# Patient Record
Sex: MALE | Race: WHITE | NOT HISPANIC OR LATINO | Employment: OTHER | URBAN - METROPOLITAN AREA
[De-identification: names, ages, dates, MRNs, and addresses within clinical notes are randomized per-mention and may not be internally consistent; named-entity substitution may affect disease eponyms.]

---

## 2019-03-22 ENCOUNTER — TELEPHONE (OUTPATIENT)
Dept: CARDIOLOGY CLINIC | Facility: CLINIC | Age: 64
End: 2019-03-22

## 2019-04-15 ENCOUNTER — OFFICE VISIT (OUTPATIENT)
Dept: CARDIOLOGY CLINIC | Facility: CLINIC | Age: 64
End: 2019-04-15
Payer: COMMERCIAL

## 2019-04-15 VITALS
WEIGHT: 165 LBS | DIASTOLIC BLOOD PRESSURE: 82 MMHG | HEIGHT: 68 IN | BODY MASS INDEX: 25.01 KG/M2 | HEART RATE: 82 BPM | OXYGEN SATURATION: 94 % | SYSTOLIC BLOOD PRESSURE: 124 MMHG

## 2019-04-15 DIAGNOSIS — I63.9 CEREBROVASCULAR ACCIDENT (CVA), UNSPECIFIED MECHANISM (HCC): ICD-10-CM

## 2019-04-15 DIAGNOSIS — R94.31 ABNORMAL EKG: Primary | ICD-10-CM

## 2019-04-15 DIAGNOSIS — I10 HYPERTENSION, ESSENTIAL: ICD-10-CM

## 2019-04-15 PROCEDURE — 99203 OFFICE O/P NEW LOW 30 MIN: CPT | Performed by: INTERNAL MEDICINE

## 2019-04-15 PROCEDURE — 93000 ELECTROCARDIOGRAM COMPLETE: CPT | Performed by: INTERNAL MEDICINE

## 2019-04-15 RX ORDER — DONEPEZIL HYDROCHLORIDE 5 MG/1
5 TABLET, FILM COATED ORAL
COMMUNITY
Start: 2019-03-18 | End: 2021-10-26 | Stop reason: ALTCHOICE

## 2019-04-15 RX ORDER — BUPROPION HYDROCHLORIDE 300 MG/1
TABLET ORAL
COMMUNITY

## 2019-04-15 RX ORDER — ASPIRIN 81 MG/1
81 TABLET ORAL DAILY
COMMUNITY

## 2019-04-15 RX ORDER — AMLODIPINE BESYLATE AND BENAZEPRIL HYDROCHLORIDE 10; 40 MG/1; MG/1
CAPSULE ORAL
COMMUNITY
Start: 2019-02-26

## 2019-04-15 RX ORDER — DIPHENOXYLATE HYDROCHLORIDE AND ATROPINE SULFATE 2.5; .025 MG/1; MG/1
1 TABLET ORAL
COMMUNITY

## 2019-04-15 RX ORDER — VENLAFAXINE HYDROCHLORIDE 75 MG/1
CAPSULE, EXTENDED RELEASE ORAL
COMMUNITY

## 2019-04-15 RX ORDER — ESOMEPRAZOLE MAGNESIUM 40 MG/1
40 CAPSULE, DELAYED RELEASE ORAL
COMMUNITY

## 2019-05-21 ENCOUNTER — TELEPHONE (OUTPATIENT)
Dept: CARDIOLOGY CLINIC | Facility: CLINIC | Age: 64
End: 2019-05-21

## 2019-05-21 ENCOUNTER — HOSPITAL ENCOUNTER (OUTPATIENT)
Dept: NON INVASIVE DIAGNOSTICS | Facility: CLINIC | Age: 64
Discharge: HOME | End: 2019-05-21
Payer: COMMERCIAL

## 2019-05-21 DIAGNOSIS — I10 HYPERTENSION, ESSENTIAL: ICD-10-CM

## 2019-05-21 DIAGNOSIS — R94.31 ABNORMAL EKG: ICD-10-CM

## 2019-05-21 DIAGNOSIS — I63.9 CEREBROVASCULAR ACCIDENT (CVA), UNSPECIFIED MECHANISM (HCC): ICD-10-CM

## 2019-05-21 PROCEDURE — 93225 XTRNL ECG REC<48 HRS REC: CPT

## 2019-05-21 PROCEDURE — 93226 XTRNL ECG REC<48 HR SCAN A/R: CPT

## 2019-05-21 PROCEDURE — 93306 TTE W/DOPPLER COMPLETE: CPT | Performed by: INTERNAL MEDICINE

## 2019-05-21 PROCEDURE — 93306 TTE W/DOPPLER COMPLETE: CPT

## 2019-05-28 ENCOUNTER — TELEPHONE (OUTPATIENT)
Dept: CARDIOLOGY CLINIC | Facility: CLINIC | Age: 64
End: 2019-05-28

## 2019-05-28 PROCEDURE — 93227 XTRNL ECG REC<48 HR R&I: CPT | Performed by: INTERNAL MEDICINE

## 2021-01-03 ENCOUNTER — TELEPHONE (OUTPATIENT)
Dept: OTHER | Facility: OTHER | Age: 66
End: 2021-01-03

## 2021-01-03 NOTE — TELEPHONE ENCOUNTER
Pt's wife is calling to cancel appointment tomorrow at 3:20pm with Dr Michelle Burks   Pt would like a call back to reschedule

## 2021-01-11 ENCOUNTER — OFFICE VISIT (OUTPATIENT)
Dept: CARDIOLOGY CLINIC | Facility: CLINIC | Age: 66
End: 2021-01-11
Payer: COMMERCIAL

## 2021-01-11 VITALS
BODY MASS INDEX: 27.67 KG/M2 | OXYGEN SATURATION: 98 % | WEIGHT: 182 LBS | DIASTOLIC BLOOD PRESSURE: 80 MMHG | SYSTOLIC BLOOD PRESSURE: 122 MMHG | HEART RATE: 97 BPM

## 2021-01-11 DIAGNOSIS — I63.9 CEREBROVASCULAR ACCIDENT (CVA), UNSPECIFIED MECHANISM (HCC): ICD-10-CM

## 2021-01-11 DIAGNOSIS — I10 HYPERTENSION, ESSENTIAL: Primary | ICD-10-CM

## 2021-01-11 DIAGNOSIS — R06.02 SHORTNESS OF BREATH: ICD-10-CM

## 2021-01-11 PROCEDURE — 99213 OFFICE O/P EST LOW 20 MIN: CPT | Performed by: INTERNAL MEDICINE

## 2021-01-11 RX ORDER — BUDESONIDE AND FORMOTEROL FUMARATE DIHYDRATE 160; 4.5 UG/1; UG/1
2 AEROSOL RESPIRATORY (INHALATION) 2 TIMES DAILY
COMMUNITY
Start: 2020-08-24

## 2021-01-11 RX ORDER — OXYCODONE HYDROCHLORIDE 5 MG/1
TABLET ORAL
COMMUNITY
Start: 2020-10-20

## 2021-01-11 RX ORDER — TIOTROPIUM BROMIDE INHALATION SPRAY 3.12 UG/1
SPRAY, METERED RESPIRATORY (INHALATION) DAILY
COMMUNITY
Start: 2020-10-05

## 2021-01-11 RX ORDER — PREDNISONE 10 MG/1
TABLET ORAL
COMMUNITY
Start: 2020-11-25 | End: 2022-03-24 | Stop reason: ALTCHOICE

## 2021-01-11 RX ORDER — ARIPIPRAZOLE 10 MG/1
TABLET ORAL
COMMUNITY
Start: 2020-09-28

## 2021-01-11 RX ORDER — MONTELUKAST SODIUM 10 MG/1
10 TABLET ORAL
COMMUNITY
Start: 2020-11-25

## 2021-01-11 RX ORDER — ESZOPICLONE 3 MG/1
3 TABLET, FILM COATED ORAL
COMMUNITY
Start: 2020-12-22 | End: 2021-10-26 | Stop reason: ALTCHOICE

## 2021-01-11 RX ORDER — ALBUTEROL SULFATE 90 UG/1
2 AEROSOL, METERED RESPIRATORY (INHALATION) EVERY 6 HOURS PRN
COMMUNITY
Start: 2020-11-25

## 2021-01-11 NOTE — PROGRESS NOTES
[facility] [place]  [address] [facility] [address] [address]  Cardiology Follow Up    Jayden Molina  1955  [id number]      1  Hypertension, essential     2  Cerebrovascular accident (CVA), unspecified mechanism [facility])         Chief Complaint   Patient presents with    Follow-up       Interval History:  Patient presents for follow-up visit  Patient does have some shortness of breath  Patient has been diagnosed to have respiratory issues and asthma by Gerhardt Sherry, patient's pulmonologist   Patient has been on tapering dose of steroids  Patient also has been on inhalers  Patient does state that he smokes marijuana  Patient denies any history of smoking  Patient denies any history of leg edema orthopnea PND  No recent cardiac workup  He states that he has been compliant with all his present medications  Does have history of stroke      Patient Active Problem List   Diagnosis    Abnormal EKG    Hypertension, essential     Past Medical History:   Diagnosis Date    Dementia     Stroke [facility])      Social History     Socioeconomic History    Marital status: /Civil Union     Spouse name: Not on file    Number of children: Not on file    Years of education: Not on file    Highest education level: Not on file   Occupational History    Not on file   Social Needs    Financial resource strain: Not on file    Food insecurity     Worry: Not on file     Inability: Not on file   [employer] needs     Medical: Not on file     Non-medical: Not on file   Tobacco Use    Smoking status: Never Smoker    Smokeless tobacco: Never Used    Tobacco comment: Patient admits to using marijuana   Substance and Sexual Activity    Alcohol use: Not on file    Drug use: Not on file    Sexual activity: Not on file   Lifestyle    Physical activity     Days per week: Not on file     Minutes per session: Not on file    Stress: Not on file   Relationships    Social connections Talks on phone: Not on file     Gets together: Not on file     Attends [employer] service: Not on file     Active member of club or organization: Not on file     Attends meetings of clubs or organizations: Not on file     Relationship status: Not on file    Intimate partner violence     Fear of current or ex partner: Not on file     Emotionally abused: Not on file     Physically abused: Not on file     Forced sexual activity: Not on file   Other Topics Concern    Not on file   Social History Narrative    Not on file      No family history on file    Past Surgical History:   Procedure Laterality Date    BACK SURGERY      ELBOW SURGERY      KNEE SURGERY      NECK SURGERY      SHOULDER SURGERY         Current Outpatient Medications:     albuterol (PROVENTIL HFA,VENTOLIN HFA) 90 mcg/act inhaler, Inhale 2 puffs every 6 (six) hours as needed, Disp: , Rfl:     amLODIPine-benazepril (LOTREL) 10-40 MG per capsule, , Disp: , Rfl:     ARIPiprazole (ABILIFY) 10 mg tablet, , Disp: , Rfl:     aspirin (ECOTRIN LOW STRENGTH) 81 mg EC tablet, Take 81 mg by mouth daily, Disp: , Rfl:     budesonide-formoterol (Symbicort) 160-4 5 mcg/act inhaler, Inhale 2 puffs 2 (two) times a day, Disp: , Rfl:     buPROPion (WELLBUTRIN XL) 300 mg 24 hr tablet, 1 tab(s), Disp: , Rfl:     donepezil (ARICEPT) 5 mg tablet, Take 5 mg by mouth, Disp: , Rfl:     esomeprazole (NexIUM) 40 MG capsule, Take 40 mg by mouth, Disp: , Rfl:     eszopiclone (LUNESTA) 3 MG tablet, Take 3 mg by mouth daily at bedtime as needed, Disp: , Rfl:     montelukast (SINGULAIR) 10 mg tablet, Take 10 mg by mouth, Disp: , Rfl:     multivitamin (THERAGRAN) TABS, Take 1 tablet by mouth, Disp: , Rfl:     oxyCODONE (ROXICODONE) 5 mg immediate release tablet, , Disp: , Rfl:     predniSONE 10 mg tablet, , Disp: , Rfl:     tiotropium (Spiriva Respimat) 2 5 MCG/ACT AERS inhaler, Inhale daily, Disp: , Rfl:     venlafaxine (EFFEXOR-XR) 75 mg 24 hr capsule, 1 cap(s), Disp: , Rfl:   Allergies   Allergen Reactions    Beta Adrenergic Blockers      Pt states they make him feel crappy  Labs:  No visits with results within 2 Month(s) from this visit  Latest known visit with results is:   No results found for any previous visit  Imaging: No results found  Review of Systems:  Review of Systems   REVIEW OF SYSTEMS:  Constitutional:  Denies fever or chills   Eyes:  Denies change in visual acuity   HENT:  Denies nasal congestion or sore throat   Respiratory:   shortness of breath   Cardiovascular:  Denies chest pain or edema   GI:  Denies abdominal pain, nausea, vomiting, bloody stools or diarrhea   :  Denies dysuria, frequency, difficulty in micturition and nocturia  Musculoskeletal:  Denies back pain or joint pain   Neurologic:  Denies headache, focal weakness or sensory changes   Endocrine:  Denies polyuria or polydipsia   Lymphatic:  Denies swollen glands   Psychiatric:  Denies depression or anxiety     Physical Exam:    /80   Pulse 97   Wt 82 6 kg (182 lb)   SpO2 98%   BMI 27 67 kg/m²     Physical Exam   PHYSICAL EXAM:  General:  Patient is not in acute distress   Head: Normocephalic, Atraumatic  HEENT:  Both pupils normal-size atraumatic, normocephalic, nonicteric  Neck:  JVP not raised  Trachea central  No carotid bruit  Respiratory:  Decreased breath sounds bilateral  Cardiovascular:  Regular rate and rhythm no S3 no murmurs  GI:  Abdomen soft nontender  No organomegaly  Lymphatic:  No cervical or inguinal lymphadenopathy  Neurologic:  Patient is awake alert, oriented   Grossly nonfocal  Extremities no edema    Discussion/Summary:  Patient with symptoms of shortness of breath  Patient does have multiple risk factors for coronary artery disease  Patient will be scheduled for an echocardiogram to evaluate ejection fraction valves and look for evidence of pulmonary hypertension especially given his respiratory issues    Patient will also be scheduled for pharmacological nuclear stress test to assess for ischemia given symptoms of shortness of breath with exertion  Possible etiologies for symptoms of shortness of breath discussed at length with patient  Importance of dietary and risk factor modification reinforced  Patient is unable to exercise on the treadmill because of respiratory issues  Medications reviewed  Patient had a few questions which were answered  Follow-up in 1 year or earlier as needed  Patient is agreeable with the plan of care

## 2021-01-28 ENCOUNTER — HOSPITAL ENCOUNTER (OUTPATIENT)
Dept: NON INVASIVE DIAGNOSTICS | Facility: CLINIC | Age: 66
Discharge: HOME | End: 2021-01-28
Payer: COMMERCIAL

## 2021-01-28 DIAGNOSIS — R06.02 SHORTNESS OF BREATH: ICD-10-CM

## 2021-01-28 DIAGNOSIS — I10 HYPERTENSION, ESSENTIAL: ICD-10-CM

## 2021-01-28 PROCEDURE — 93306 TTE W/DOPPLER COMPLETE: CPT | Performed by: INTERNAL MEDICINE

## 2021-01-28 PROCEDURE — A9502 TC99M TETROFOSMIN: HCPCS

## 2021-01-28 PROCEDURE — 78452 HT MUSCLE IMAGE SPECT MULT: CPT

## 2021-01-28 PROCEDURE — C8929 TTE W OR WO FOL WCON,DOPPLER: HCPCS

## 2021-01-28 PROCEDURE — 78452 HT MUSCLE IMAGE SPECT MULT: CPT | Performed by: INTERNAL MEDICINE

## 2021-01-28 PROCEDURE — 93018 CV STRESS TEST I&R ONLY: CPT | Performed by: INTERNAL MEDICINE

## 2021-01-28 PROCEDURE — G1004 CDSM NDSC: HCPCS

## 2021-01-28 PROCEDURE — 93016 CV STRESS TEST SUPVJ ONLY: CPT | Performed by: INTERNAL MEDICINE

## 2021-01-28 PROCEDURE — 93017 CV STRESS TEST TRACING ONLY: CPT

## 2021-01-28 RX ADMIN — PERFLUTREN 0.4 ML/MIN: 6.52 INJECTION, SUSPENSION INTRAVENOUS at 10:52

## 2021-01-28 RX ADMIN — REGADENOSON 0.4 MG: 0.08 INJECTION, SOLUTION INTRAVENOUS at 13:00

## 2021-01-29 LAB
ARRHY DURING EX: NORMAL
CHEST PAIN STATEMENT: NORMAL
MAX DIASTOLIC BP: 84 MMHG
MAX HEART RATE: 108 BPM
MAX PREDICTED HEART RATE: 153 BPM
MAX. SYSTOLIC BP: 150 MMHG
PROTOCOL NAME: NORMAL
REASON FOR TERMINATION: NORMAL
TARGET HR FORMULA: NORMAL
TEST INDICATION: NORMAL
TIME IN EXERCISE PHASE: NORMAL

## 2021-03-02 ENCOUNTER — TELEPHONE (OUTPATIENT)
Dept: CARDIOLOGY CLINIC | Facility: CLINIC | Age: 66
End: 2021-03-02

## 2021-03-02 DIAGNOSIS — I63.89 CEREBROVASCULAR ACCIDENT (CVA) DUE TO OTHER MECHANISM (HCC): Primary | ICD-10-CM

## 2021-03-02 NOTE — TELEPHONE ENCOUNTER
Pt's wife Lay Willams called and would like to know if Dr Hugh Bond will order a Carotid study for pt  Please call back

## 2021-03-03 NOTE — TELEPHONE ENCOUNTER
Dr Tai Hernandez with the patient's wife Ailin [place] and informed her that the order for the Carotid Study was ordered and she understood  Patient's wife would like to make a Virtual Appointment with you after the study is done  Please let me know if this can be done      Thank you

## 2021-03-10 DIAGNOSIS — Z23 ENCOUNTER FOR IMMUNIZATION: ICD-10-CM

## 2021-03-19 ENCOUNTER — IMMUNIZATIONS (OUTPATIENT)
Dept: FAMILY MEDICINE CLINIC | Facility: HOSPITAL | Age: 66
End: 2021-03-19

## 2021-03-19 DIAGNOSIS — Z23 ENCOUNTER FOR IMMUNIZATION: Primary | ICD-10-CM

## 2021-03-19 PROCEDURE — 91300 SARS-COV-2 / COVID-19 MRNA VACCINE (PFIZER-BIONTECH) 30 MCG: CPT

## 2021-03-19 PROCEDURE — 0001A SARS-COV-2 / COVID-19 MRNA VACCINE (PFIZER-BIONTECH) 30 MCG: CPT

## 2021-04-12 ENCOUNTER — IMMUNIZATIONS (OUTPATIENT)
Dept: FAMILY MEDICINE CLINIC | Facility: HOSPITAL | Age: 66
End: 2021-04-12

## 2021-04-12 DIAGNOSIS — Z23 ENCOUNTER FOR IMMUNIZATION: Primary | ICD-10-CM

## 2021-04-12 PROCEDURE — 91300 SARS-COV-2 / COVID-19 MRNA VACCINE (PFIZER-BIONTECH) 30 MCG: CPT

## 2021-04-12 PROCEDURE — 0002A SARS-COV-2 / COVID-19 MRNA VACCINE (PFIZER-BIONTECH) 30 MCG: CPT

## 2021-04-27 ENCOUNTER — HOSPITAL ENCOUNTER (OUTPATIENT)
Dept: NON INVASIVE DIAGNOSTICS | Facility: CLINIC | Age: 66
Discharge: HOME | End: 2021-04-27
Payer: COMMERCIAL

## 2021-04-27 DIAGNOSIS — I63.89 CEREBROVASCULAR ACCIDENT (CVA) DUE TO OTHER MECHANISM (HCC): ICD-10-CM

## 2021-04-27 PROCEDURE — 93880 EXTRACRANIAL BILAT STUDY: CPT | Performed by: INTERNAL MEDICINE

## 2021-04-27 PROCEDURE — 93880 EXTRACRANIAL BILAT STUDY: CPT

## 2021-04-28 ENCOUNTER — TELEPHONE (OUTPATIENT)
Dept: CARDIOLOGY CLINIC | Facility: CLINIC | Age: 66
End: 2021-04-28

## 2021-04-28 NOTE — TELEPHONE ENCOUNTER
----- Message from Philip Bautista MD sent at 4/27/2021 10:09 PM EDT -----  Please call  Carotid ultrasound showed no significant carotid stenosis on the right side and less than 50% stenosis on the left side

## 2021-04-28 NOTE — TELEPHONE ENCOUNTER
Spoke with patient's wife and informed her of the results  She would like to make an appointment with you to go over the tests  You are booked, can clerical overbook you or can patient wait until next available?

## 2021-04-30 NOTE — TELEPHONE ENCOUNTER
Patient's appointment can be virtual if the prefer  I do not see anything in the reports which is of any major concern at this time  Please let them know  Thank you

## 2021-04-30 NOTE — TELEPHONE ENCOUNTER
Less than 50% stenosis in the left internal carotid artery by report (  Unless I missing something)   is age appropriate for the patient

## 2021-04-30 NOTE — TELEPHONE ENCOUNTER
Pt spouse called requesting a virtual appt, spouse also requesting to speak to the doctor before the appt because they are nervous    Can we move to virtual?

## 2021-04-30 NOTE — TELEPHONE ENCOUNTER
Spoke with patients wife, reviewed the results  Verbally understands  CLERICAL ~ can you please switch patients appointment on 5/24 to virtual  Thanks

## 2021-04-30 NOTE — TELEPHONE ENCOUNTER
Spoke with the pt wife she verbally understood there was no major concern but she would like to have a virtual visit sooner that 3 to 4 weeks  She is very concerned about the stenosis on the left   Please advise

## 2021-05-24 ENCOUNTER — TELEPHONE (OUTPATIENT)
Dept: CARDIOLOGY CLINIC | Facility: CLINIC | Age: 66
End: 2021-05-24

## 2021-05-24 NOTE — TELEPHONE ENCOUNTER
Tried to call patient to start virtual appointment  No answer   Left message for patient to call back

## 2021-10-26 ENCOUNTER — OFFICE VISIT (OUTPATIENT)
Dept: CARDIOLOGY CLINIC | Facility: CLINIC | Age: 66
End: 2021-10-26
Payer: COMMERCIAL

## 2021-10-26 VITALS
BODY MASS INDEX: 24.91 KG/M2 | DIASTOLIC BLOOD PRESSURE: 82 MMHG | HEART RATE: 104 BPM | SYSTOLIC BLOOD PRESSURE: 130 MMHG | OXYGEN SATURATION: 94 % | WEIGHT: 174 LBS | HEIGHT: 70 IN | RESPIRATION RATE: 16 BRPM

## 2021-10-26 DIAGNOSIS — R06.02 SHORTNESS OF BREATH: Primary | ICD-10-CM

## 2021-10-26 DIAGNOSIS — I10 HYPERTENSION, ESSENTIAL: ICD-10-CM

## 2021-10-26 PROCEDURE — 99213 OFFICE O/P EST LOW 20 MIN: CPT | Performed by: INTERNAL MEDICINE

## 2021-12-21 NOTE — TELEPHONE ENCOUNTER
I ordered the carotid study  As long he did not have one done through his neurologist in the past 6 months, it should be fine   Please schedule  I did not see in our system or care everywhere Left breast markers

## 2022-03-24 ENCOUNTER — OFFICE VISIT (OUTPATIENT)
Dept: CARDIOLOGY CLINIC | Facility: CLINIC | Age: 67
End: 2022-03-24
Payer: COMMERCIAL

## 2022-03-24 VITALS
HEART RATE: 101 BPM | OXYGEN SATURATION: 95 % | HEIGHT: 70 IN | BODY MASS INDEX: 25.27 KG/M2 | SYSTOLIC BLOOD PRESSURE: 128 MMHG | DIASTOLIC BLOOD PRESSURE: 78 MMHG | WEIGHT: 176.5 LBS

## 2022-03-24 DIAGNOSIS — E11.9 TYPE 2 DIABETES MELLITUS WITHOUT COMPLICATION, WITHOUT LONG-TERM CURRENT USE OF INSULIN (HCC): ICD-10-CM

## 2022-03-24 DIAGNOSIS — I10 HYPERTENSION, ESSENTIAL: Primary | ICD-10-CM

## 2022-03-24 DIAGNOSIS — I65.22 LEFT CAROTID STENOSIS: ICD-10-CM

## 2022-03-24 DIAGNOSIS — R06.02 SHORTNESS OF BREATH: ICD-10-CM

## 2022-03-24 DIAGNOSIS — I20.8 ANGINAL EQUIVALENT (HCC): ICD-10-CM

## 2022-03-24 PROCEDURE — 99214 OFFICE O/P EST MOD 30 MIN: CPT | Performed by: INTERNAL MEDICINE

## 2022-03-24 RX ORDER — TAMSULOSIN HYDROCHLORIDE 0.4 MG/1
0.4 CAPSULE ORAL DAILY
COMMUNITY
Start: 2022-02-13

## 2022-03-24 RX ORDER — TIZANIDINE 4 MG/1
TABLET ORAL
COMMUNITY
Start: 2022-03-08

## 2022-03-24 RX ORDER — TEMAZEPAM 22.5 MG/1
30 CAPSULE ORAL DAILY
COMMUNITY
End: 2022-03-24 | Stop reason: SDUPTHER

## 2022-03-24 RX ORDER — ATORVASTATIN CALCIUM 10 MG/1
10 TABLET, FILM COATED ORAL DAILY
COMMUNITY
Start: 2022-02-28

## 2022-03-24 RX ORDER — TEMAZEPAM 30 MG/1
30 CAPSULE ORAL
COMMUNITY
Start: 2022-03-15

## 2022-03-24 NOTE — PROGRESS NOTES
[facility] [address] [facility] [address]  Cardiology Follow Up    Kelvin Friend  1955  [id number]      1  Hypertension, essential     2  Shortness of breath         Chief Complaint   Patient presents with    Follow-up     6 month follow up       Interval History:  Patient presents for follow-up visit  Patient does have symptoms of chest discomfort and shortness of breath  Patient states that he has been compliant all his present medications  Patient also has 50% left carotid artery stenosis  No history of leg edema orthopnea PND  No history of presyncope syncope  Patient Active Problem List   Diagnosis    Abnormal EKG    Hypertension, essential     Past Medical History:   Diagnosis Date    [facility] ([facility] [address] [facility] )     Stroke ([facility] )      Social History     Socioeconomic History    Marital status: /Civil Union     Spouse name: Not on file    Number of children: Not on file    Years of education: Not on file    Highest education level: Not on file   Occupational History    Not on file   Tobacco Use    Smoking status: Never Smoker    Smokeless tobacco: Never Used    Tobacco comment: Patient admits to using marijuana   Substance and Sexual Activity    Alcohol use: Not on file    Drug use: Not on file    Sexual activity: Not on file   Other Topics Concern    Not on file   Social History Narrative    Not on file     Social Determinants of Health     Financial Resource Strain: Not on file   Food Insecurity: Not on file   Transportation Needs: Not on file   Physical Activity: Not on file   Stress: Not on file   Social Connections: Not on file   Intimate Partner Violence: Not on file   Housing Stability: Not on file      History reviewed  No pertinent family history    Past Surgical History:   Procedure Laterality Date    BACK SURGERY      ELBOW SURGERY      KNEE SURGERY      NECK SURGERY      SHOULDER SURGERY         Current Outpatient Medications:     albuterol (PROVENTIL HFA,VENTOLIN HFA) 90 mcg/act inhaler, Inhale 2 puffs every 6 (six) hours as needed, Disp: , Rfl:     amLODIPine-benazepril (LOTREL) 10-40 MG per capsule, , Disp: , Rfl:     ARIPiprazole (ABILIFY) 10 mg tablet, , Disp: , Rfl:     aspirin (ECOTRIN LOW STRENGTH) 81 mg EC tablet, Take 81 mg by mouth daily, Disp: , Rfl:     atorvastatin (LIPITOR) 10 mg tablet, Take 10 mg by mouth daily, Disp: , Rfl:     budesonide-formoterol (Symbicort) 160-4 5 mcg/act inhaler, Inhale 2 puffs 2 (two) times a day, Disp: , Rfl:     buPROPion (WELLBUTRIN XL) 300 mg 24 hr tablet, 1 tab(s), Disp: , Rfl:     esomeprazole (NexIUM) 40 MG capsule, Take 40 mg by mouth, Disp: , Rfl:     metFORMIN (GLUCOPHAGE) 500 mg tablet, Take 1 tablet by mouth 2 (two) times a day with meals, Disp: , Rfl:     montelukast (SINGULAIR) 10 mg tablet, Take 10 mg by mouth, Disp: , Rfl:     multivitamin (THERAGRAN) TABS, Take 1 tablet by mouth, Disp: , Rfl:     oxyCODONE (ROXICODONE) 5 mg immediate release tablet, , Disp: , Rfl:     predniSONE 10 mg tablet, , Disp: , Rfl:     tamsulosin (FLOMAX) 0 4 mg, Take 0 4 mg by mouth daily, Disp: , Rfl:     temazepam (RESTORIL) 22 5 MG capsule, Take 30 mg by mouth daily, Disp: , Rfl:     temazepam (RESTORIL) 30 mg capsule, Take 30 mg by mouth daily at bedtime as needed, Disp: , Rfl:     tiotropium (Spiriva Respimat) 2 5 MCG/ACT AERS inhaler, Inhale daily, Disp: , Rfl:     tiZANidine (ZANAFLEX) 4 mg tablet, , Disp: , Rfl:     venlafaxine (EFFEXOR-XR) 75 mg 24 hr capsule, 1 cap(s), Disp: , Rfl:   Allergies   Allergen Reactions    Beta Adrenergic Blockers      Pt states they make him feel crappy  Labs:  No visits with results within 2 Month(s) from this visit  Latest known visit with results is:   Hospital Outpatient Visit on 01/28/2021   Component Date Value    Protocol Name 01/28/2021 LEXISCAN-SIT     Time In Exercise Phase 01/28/2021 00:03:00     MAX   SYSTOLIC BP 01/28/2021 120     Max Diastolic Bp 59/65/3893 84     Max Heart Rate 01/28/2021 108     Max Predicted Heart Rate 01/28/2021 153     Reason for Termination 01/28/2021 Protocol Complete     Test Indication 01/28/2021 SOB, PATEL     Target Hr Formular 01/28/2021 (220 - Age)*85%     Arrhy During Ex 01/28/2021 none     Chest Pain Statement 01/28/2021 none      Imaging: No results found  Review of Systems:  Review of Systems   REVIEW OF SYSTEMS:  Constitutional:  Denies fever or chills   Eyes:  Denies change in visual acuity   HENT:  Denies nasal congestion or sore throat   Respiratory:   shortness of breath   Cardiovascular:  Chest discomfort  GI:  Denies abdominal pain, nausea, vomiting, bloody stools or diarrhea   :  Denies dysuria, frequency, difficulty in micturition and nocturia  Musculoskeletal:  Denies back pain or joint pain   Neurologic:  Denies headache, focal weakness or sensory changes   Endocrine:  Denies polyuria or polydipsia   Lymphatic:  Denies swollen glands   Psychiatric:  Denies depression or anxiety     Physical Exam:    /78 (BP Location: Left arm, Patient Position: Sitting, Cuff Size: Standard)   Pulse 101   Ht 5' 10" (1 778 m)   Wt 80 1 kg (176 lb 8 oz)   SpO2 95%   BMI 25 33 kg/m²     Physical Exam   PHYSICAL EXAM:  General:  Patient is not in acute distress   Head: Normocephalic, Atraumatic  HEENT:  Both pupils normal-size atraumatic, normocephalic, nonicteric  Neck:  JVP not raised  Trachea central  Left  carotid bruit  Respiratory:  Decreased breath sounds bilateral  Cardiovascular:  Regular rate and rhythm no S3 no murmurs  GI:  Abdomen soft nontender  No organomegaly  Lymphatic:  No cervical or inguinal lymphadenopathy  Neurologic:  Patient is awake alert, oriented   Grossly nonfocal   Extremities no edema      Discussion/Summary:  Patient with multiple risk factors for coronary artery disease with symptoms of chest pain and shortness of breath    Patient will be scheduled for pharmacological nuclear stress test to assess for ischemia  Patient is unable to exercise on the treadmill because of COPD  Sensitivity and specificity of stress test discussed at length with patient and family  Symptoms to watch out from cardiac standpoint which would indicate the need for further cardiac evaluation including consideration for cardiac catheterization also discussed  Echocardiogram will be done to evaluate ejection fraction valves and look for any evidence of pulmonary hypertension  Carotid ultrasound to assess for carotid artery stenosis and follow-up  Continue dietary and risk factor modification  Follow-up in 6 months or earlier as needed  Patient and family had a few questions which were answered

## 2022-04-29 ENCOUNTER — HOSPITAL ENCOUNTER (OUTPATIENT)
Dept: NON INVASIVE DIAGNOSTICS | Facility: CLINIC | Age: 67
Discharge: HOME | End: 2022-04-29
Payer: COMMERCIAL

## 2022-04-29 DIAGNOSIS — I65.22 LEFT CAROTID STENOSIS: ICD-10-CM

## 2022-04-29 PROCEDURE — 93880 EXTRACRANIAL BILAT STUDY: CPT

## 2022-05-02 PROCEDURE — 93880 EXTRACRANIAL BILAT STUDY: CPT | Performed by: INTERNAL MEDICINE

## 2022-05-03 ENCOUNTER — TELEPHONE (OUTPATIENT)
Dept: CARDIOLOGY CLINIC | Facility: CLINIC | Age: 67
End: 2022-05-03

## 2022-05-03 NOTE — TELEPHONE ENCOUNTER
----- Message from Lei Masters MD sent at 5/2/2022  3:28 PM EDT -----  Less than 50% carotid stenosis on the left side

## 2022-07-11 ENCOUNTER — TELEPHONE (OUTPATIENT)
Dept: CARDIOLOGY CLINIC | Facility: CLINIC | Age: 67
End: 2022-07-11

## 2022-07-11 DIAGNOSIS — I20.8 ANGINAL EQUIVALENT: ICD-10-CM

## 2022-07-11 DIAGNOSIS — R06.02 SHORTNESS OF BREATH: Primary | ICD-10-CM

## 2022-07-11 NOTE — TELEPHONE ENCOUNTER
Patient had to cancel his stress test and scheduling has told him that he needs a new order for him to reschedule the appt      Order needs to be faxed to central scheduling    ed - [phone]

## 2022-07-11 NOTE — TELEPHONE ENCOUNTER
Order faxed to testing dept  Downstairs   Pt stated he will call central scheduling to make appt for test

## 2022-07-27 ENCOUNTER — HOSPITAL ENCOUNTER (OUTPATIENT)
Dept: NON INVASIVE DIAGNOSTICS | Facility: CLINIC | Age: 67
Discharge: HOME | End: 2022-07-27
Payer: COMMERCIAL

## 2022-07-27 VITALS
HEIGHT: 70 IN | SYSTOLIC BLOOD PRESSURE: 152 MMHG | WEIGHT: 176 LBS | BODY MASS INDEX: 25.2 KG/M2 | HEART RATE: 90 BPM | OXYGEN SATURATION: 100 % | DIASTOLIC BLOOD PRESSURE: 80 MMHG

## 2022-07-27 DIAGNOSIS — R06.02 SHORTNESS OF BREATH: ICD-10-CM

## 2022-07-27 DIAGNOSIS — I20.8 ANGINAL EQUIVALENT (HCC): ICD-10-CM

## 2022-07-27 LAB
BASELINE ST DEPRESSION: 0 MM
MAX DIASTOLIC BP: 86 MMHG
MAX HEART RATE: 127 BPM
MAX PREDICTED HEART RATE: 152 BPM
MAX. SYSTOLIC BP: 186 MMHG
NUC STRESS DIASTOLIC VOLUME INDEX: 22 ML/M2
NUC STRESS EJECTION FRACTION: 63 %
NUC STRESS SYSTOLIC VOLUME INDEX: 39 ML/M2
PROTOCOL NAME: NORMAL
RATE PRESSURE PRODUCT: NORMAL
REASON FOR TERMINATION: NORMAL
SL CV REST NUCLEAR ISOTOPE DOSE: 10.84 MCI
SL CV STRESS NUCLEAR ISOTOPE DOSE: 32.1 MCI
SL CV STRESS RECOVERY BP: NORMAL MMHG
SL CV STRESS RECOVERY HR: 108 BPM
STRESS ANGINA INDEX: 0
STRESS BASELINE BP: NORMAL MMHG
STRESS BASELINE HR: 90 BPM
STRESS O2 SAT REST: 100 %
STRESS PEAK HR: 122 BPM
STRESS POST O2 SAT PEAK: 99 %
STRESS POST PEAK BP: 186 MMHG
STRESS ST DEPRESSION: 0 MM
STRESS/REST PERFUSION RATIO: 0.98
TARGET HR FORMULA: NORMAL
TIME IN EXERCISE PHASE: NORMAL

## 2022-07-27 PROCEDURE — 93017 CV STRESS TEST TRACING ONLY: CPT

## 2022-07-27 PROCEDURE — 93018 CV STRESS TEST I&R ONLY: CPT | Performed by: INTERNAL MEDICINE

## 2022-07-27 PROCEDURE — 78452 HT MUSCLE IMAGE SPECT MULT: CPT

## 2022-07-27 PROCEDURE — 93016 CV STRESS TEST SUPVJ ONLY: CPT | Performed by: INTERNAL MEDICINE

## 2022-07-27 PROCEDURE — G1004 CDSM NDSC: HCPCS

## 2022-07-27 PROCEDURE — 78452 HT MUSCLE IMAGE SPECT MULT: CPT | Performed by: INTERNAL MEDICINE

## 2022-07-27 PROCEDURE — A9502 TC99M TETROFOSMIN: HCPCS

## 2022-07-27 RX ADMIN — REGADENOSON 0.4 MG: 0.08 INJECTION, SOLUTION INTRAVENOUS at 13:31

## 2022-09-01 ENCOUNTER — TELEPHONE (OUTPATIENT)
Dept: CARDIOLOGY CLINIC | Facility: CLINIC | Age: 67
End: 2022-09-01

## 2022-09-01 NOTE — TELEPHONE ENCOUNTER
Renita Castro, from [address] [place] [address] Ave testing called & stated patient has an upcoming appt on 9/8 for a Nuclear stress test and she wanted to make sure pt needed to have another one being he just had one in July         Please Advise

## 2022-09-01 NOTE — TELEPHONE ENCOUNTER
Patient already had a nuclear stress test   Please cancel the appointment for the upcoming stress test

## 2022-09-13 ENCOUNTER — OFFICE VISIT (OUTPATIENT)
Dept: CARDIOLOGY CLINIC | Facility: CLINIC | Age: 67
End: 2022-09-13
Payer: COMMERCIAL

## 2022-09-13 VITALS
BODY MASS INDEX: 25.48 KG/M2 | HEART RATE: 95 BPM | WEIGHT: 178 LBS | OXYGEN SATURATION: 96 % | SYSTOLIC BLOOD PRESSURE: 152 MMHG | DIASTOLIC BLOOD PRESSURE: 68 MMHG | HEIGHT: 70 IN | RESPIRATION RATE: 16 BRPM

## 2022-09-13 DIAGNOSIS — I10 HYPERTENSION, ESSENTIAL: Primary | ICD-10-CM

## 2022-09-13 DIAGNOSIS — R06.02 SHORTNESS OF BREATH: ICD-10-CM

## 2022-09-13 PROCEDURE — 99213 OFFICE O/P EST LOW 20 MIN: CPT | Performed by: INTERNAL MEDICINE

## 2022-09-13 NOTE — PROGRESS NOTES
[facility]  [address] [facility] 16 [address]  Cardiology Follow Up    Valdez Luciano  1955  [id number]      1  Hypertension, essential     2  Shortness of breath         Chief Complaint   Patient presents with    Follow-up       Interval History:  Patient presents for follow-up visit  Patient denies any chest pain  No shortness of breath out of the ordinary  Patient does have history of COPD followed by Pulmonary  No history of leg edema orthopnea PND  No history of presyncope syncope  He states that he has been compliant all his present medications  Patient Active Problem List   Diagnosis    Abnormal EKG    Hypertension, essential    Type 2 diabetes mellitus without complication, without long-term current use of insulin (HCC)     Past Medical History:   Diagnosis Date    Dementia ([facility] )     Stroke ([facility] )      Social History     Socioeconomic History    Marital status: /Civil Union     Spouse name: Not on file    Number of children: Not on file    Years of education: Not on file    Highest education level: Not on file   Occupational History    Not on file   Tobacco Use    Smoking status: Never Smoker    Smokeless tobacco: Never Used    Tobacco comment: Patient admits to using marijuana   Substance and Sexual Activity    Alcohol use: Not Currently    Drug use: Yes     Types: Marijuana    Sexual activity: Not on file   Other Topics Concern    Not on file   Social History Narrative    Not on file     Social Determinants of Health     Financial Resource Strain: Not on file   Food Insecurity: Not on file   Transportation Needs: Not on file   Physical Activity: Not on file   Stress: Not on file   Social Connections: Not on file   Intimate Partner Violence: Not on file   Housing Stability: Not on file      History reviewed  No pertinent family history    Past Surgical History:   Procedure Laterality Date    BACK SURGERY      ELBOW SURGERY  KNEE SURGERY      NECK SURGERY      SHOULDER SURGERY         Current Outpatient Medications:     albuterol (PROVENTIL HFA,VENTOLIN HFA) 90 mcg/act inhaler, Inhale 2 puffs every 6 (six) hours as needed, Disp: , Rfl:     amLODIPine-benazepril (LOTREL) 10-40 MG per capsule, , Disp: , Rfl:     ARIPiprazole (ABILIFY) 10 mg tablet, , Disp: , Rfl:     aspirin (ECOTRIN LOW STRENGTH) 81 mg EC tablet, Take 81 mg by mouth daily, Disp: , Rfl:     atorvastatin (LIPITOR) 10 mg tablet, Take 10 mg by mouth daily, Disp: , Rfl:     budesonide-formoterol (SYMBICORT) 160-4 5 mcg/act inhaler, Inhale 2 puffs 2 (two) times a day, Disp: , Rfl:     buPROPion (WELLBUTRIN XL) 300 mg 24 hr tablet, 1 tab(s), Disp: , Rfl:     esomeprazole (NexIUM) 40 MG capsule, Take 40 mg by mouth, Disp: , Rfl:     metFORMIN (GLUCOPHAGE) 500 mg tablet, Take 1 tablet by mouth 2 (two) times a day with meals, Disp: , Rfl:     montelukast (SINGULAIR) 10 mg tablet, Take 10 mg by mouth, Disp: , Rfl:     multivitamin (THERAGRAN) TABS, Take 1 tablet by mouth, Disp: , Rfl:     oxyCODONE (ROXICODONE) 5 mg immediate release tablet, , Disp: , Rfl:     tamsulosin (FLOMAX) 0 4 mg, Take 0 4 mg by mouth daily, Disp: , Rfl:     temazepam (RESTORIL) 30 mg capsule, Take 30 mg by mouth daily at bedtime as needed, Disp: , Rfl:     tiotropium (Spiriva Respimat) 2 5 MCG/ACT AERS inhaler, Inhale daily, Disp: , Rfl:     tiZANidine (ZANAFLEX) 4 mg tablet, , Disp: , Rfl:     venlafaxine (EFFEXOR-XR) 75 mg 24 hr capsule, 1 cap(s), Disp: , Rfl:   Allergies   Allergen Reactions    Beta Adrenergic Blockers      Pt states they make him feel crappy         Labs:  Hospital Outpatient Visit on 07/27/2022   Component Date Value    Rest Nuclear Isotope Dose 07/27/2022 10 84     Stress Nuclear Isotope D* 07/27/2022 32 10     Baseline HR 07/27/2022 90     Baseline BP 07/27/2022 152/80     O2 sat rest 07/27/2022 100     Stress peak HR 07/27/2022 122     Post peak BP 07/27/2022 186     Rate Pressure Product 07/27/2022 22,692 0     O2 sat peak 07/27/2022 99     Recovery HR 07/27/2022 108     Recovery BP 07/27/2022 186/86     Angina Index 07/27/2022 0     Stress/rest perfusion ra* 07/27/2022 4 10     End diastolic index (mL/* 28/16/4021 22 0     EF (%) 07/27/2022 63     End systolic index (mL/m* 79/75/7183 39 0     Base ST Depresion (mm) 07/27/2022 0     ST Depression (mm) 07/27/2022 0     Protocol Name 07/27/2022 LEONIE- WALK     Time In Exercise Phase 07/27/2022 00:03:00     MAX  SYSTOLIC BP 88/19/7317 324     Max Diastolic Bp 04/81/4746 86     Max Heart Rate 07/27/2022 127     Max Predicted Heart Rate 07/27/2022 152     Reason for Termination 07/27/2022 Protocol Complete     Test Indication 07/27/2022                      Value:ANGINAL EQUIVALENT  SOB      Target Hr Formular 07/27/2022 (220 - Age)*85%      Imaging: No results found  Review of Systems:  Review of Systems   REVIEW OF SYSTEMS:  Constitutional:  Denies fever or chills   Eyes:  Denies change in visual acuity   HENT:  Denies nasal congestion or sore throat   Respiratory:   shortness of breath   Cardiovascular:  Denies chest pain or edema   GI:  Denies abdominal pain, nausea, vomiting, bloody stools or diarrhea   :  Denies dysuria, frequency, difficulty in micturition and nocturia  Musculoskeletal:  Denies back pain or joint pain   Neurologic:  Denies headache, focal weakness or sensory changes   Endocrine:  Denies polyuria or polydipsia   Lymphatic:  Denies swollen glands   Psychiatric:  Denies depression or anxiety     Physical Exam:    /68 (BP Location: Left arm, Patient Position: Sitting, Cuff Size: Standard)   Pulse 95   Resp 16   Ht 5' 10" (1 778 m)   Wt 80 7 kg (178 lb)   SpO2 96%   BMI 25 54 kg/m²     Physical Exam   PHYSICAL EXAM:  General:  Patient is not in acute distress   Head: Normocephalic, Atraumatic    HEENT:  Both pupils normal-size atraumatic, normocephalic, nonicteric  Neck:  JVP not raised  Trachea central  No carotid bruit  Respiratory:  Decreased breath sounds bilateral  Cardiovascular:  Regular rate and rhythm no S3 no murmurs  GI:  Abdomen soft nontender  No organomegaly  Lymphatic:  No cervical or inguinal lymphadenopathy  Neurologic:  Patient is awake alert, oriented   Grossly nonfocal  Extremities no edema      Discussion/Summary:  Patient with multiple medical problems who seems to be doing reasonably well from cardiac standpoint  Previous studies reviewed with patient  Medications reviewed and possible side effects discussed  concepts of cardiovascular disease , signs and symptoms of heart disease  Dietary and risk factor modification reinforced  All questions answered  Safety measures reviewed  Patient advised to report any problems prompting medical attention  Patient had a pharmacological nuclear stress test which was negative for ischemia  Previous cardiovascular studies otherwise also discussed and reviewed  Results of carotid ultrasound reviewed  Symptoms to watch out from cardiac standpoint which would indicate the need for further cardiac evaluation discussed  Follow-up with primary care physician as well as Pulmonary for COPD  Follow-up in 6 months or earlier as needed  Patient is agreeable with the plan of care

## 2023-05-03 ENCOUNTER — TELEPHONE (OUTPATIENT)
Dept: CARDIOLOGY CLINIC | Facility: CLINIC | Age: 68
End: 2023-05-03

## 2023-05-03 ENCOUNTER — HOSPITAL ENCOUNTER (OUTPATIENT)
Dept: NON INVASIVE DIAGNOSTICS | Facility: CLINIC | Age: 68
Discharge: HOME | End: 2023-05-03

## 2023-05-03 VITALS
SYSTOLIC BLOOD PRESSURE: 126 MMHG | BODY MASS INDEX: 22.76 KG/M2 | HEART RATE: 70 BPM | DIASTOLIC BLOOD PRESSURE: 68 MMHG | HEIGHT: 70 IN | WEIGHT: 159 LBS

## 2023-05-03 DIAGNOSIS — R06.02 SHORTNESS OF BREATH: ICD-10-CM

## 2023-05-03 DIAGNOSIS — I10 HYPERTENSION, ESSENTIAL: ICD-10-CM

## 2023-05-03 LAB
AORTIC ROOT: 3.7 CM
APICAL FOUR CHAMBER EJECTION FRACTION: 64 %
E WAVE DECELERATION TIME: 206 MS
FRACTIONAL SHORTENING: 36 % (ref 28–44)
INTERVENTRICULAR SEPTUM IN DIASTOLE (PARASTERNAL SHORT AXIS VIEW): 0.9 CM
INTERVENTRICULAR SEPTUM: 0.9 CM (ref 0.6–1.1)
LAAS-AP2: 15.4 CM2
LAAS-AP4: 16.5 CM2
LEFT ATRIUM SIZE: 2.8 CM
LEFT INTERNAL DIMENSION IN SYSTOLE: 2.5 CM (ref 2.1–4)
LEFT VENTRICULAR INTERNAL DIMENSION IN DIASTOLE: 3.9 CM (ref 3.5–6)
LEFT VENTRICULAR POSTERIOR WALL IN END DIASTOLE: 1 CM
LEFT VENTRICULAR STROKE VOLUME: 45 ML
LVSV (TEICH): 45 ML
MV E'TISSUE VEL-SEP: 9 CM/S
MV PEAK A VEL: 0.87 M/S
MV PEAK E VEL: 72 CM/S
MV STENOSIS PRESSURE HALF TIME: 60 MS
MV VALVE AREA P 1/2 METHOD: 3.67 CM2
RIGHT ATRIUM AREA SYSTOLE A4C: 16.5 CM2
RIGHT VENTRICLE ID DIMENSION: 3.2 CM
SL CV LEFT ATRIUM LENGTH A2C: 4.6 CM
SL CV LV EF: 65
SL CV PED ECHO LEFT VENTRICLE DIASTOLIC VOLUME (MOD BIPLANE) 2D: 67 ML
SL CV PED ECHO LEFT VENTRICLE SYSTOLIC VOLUME (MOD BIPLANE) 2D: 23 ML
TRICUSPID ANNULAR PLANE SYSTOLIC EXCURSION: 1.8 CM

## 2023-05-03 NOTE — TELEPHONE ENCOUNTER
----- Message from Foreign Pa MD sent at 5/3/2023  4:16 PM EDT -----  Please call the patient  Echocardiogram shows normal ejection fraction with no significant valvular abnormalities

## 2023-06-15 ENCOUNTER — HOSPITAL ENCOUNTER (OUTPATIENT)
Dept: NON INVASIVE DIAGNOSTICS | Facility: CLINIC | Age: 68
Discharge: HOME | End: 2023-06-15
Payer: COMMERCIAL

## 2023-06-15 DIAGNOSIS — I65.22 LEFT CAROTID STENOSIS: ICD-10-CM

## 2023-06-15 PROCEDURE — 93880 EXTRACRANIAL BILAT STUDY: CPT

## 2023-06-20 ENCOUNTER — HOSPITAL ENCOUNTER (EMERGENCY)
Facility: HOSPITAL | Age: 68
Discharge: HOME | End: 2023-06-20
Attending: EMERGENCY MEDICINE
Payer: COMMERCIAL

## 2023-06-20 VITALS
OXYGEN SATURATION: 98 % | HEART RATE: 88 BPM | BODY MASS INDEX: 22.9 KG/M2 | DIASTOLIC BLOOD PRESSURE: 68 MMHG | RESPIRATION RATE: 16 BRPM | WEIGHT: 160 LBS | TEMPERATURE: 98.6 F | HEIGHT: 70 IN | SYSTOLIC BLOOD PRESSURE: 120 MMHG

## 2023-06-20 DIAGNOSIS — N17.9 ACUTE KIDNEY INJURY (HCC): ICD-10-CM

## 2023-06-20 DIAGNOSIS — R42 DIZZINESS: Primary | ICD-10-CM

## 2023-06-20 DIAGNOSIS — E86.0 DEHYDRATION: ICD-10-CM

## 2023-06-20 LAB
ALBUMIN SERPL BCP-MCNC: 3.9 G/DL (ref 3.5–5)
ALP SERPL-CCNC: 38 U/L (ref 34–104)
ALT SERPL W P-5'-P-CCNC: 25 U/L (ref 7–52)
ANION GAP SERPL CALCULATED.3IONS-SCNC: 9 MMOL/L
AST SERPL W P-5'-P-CCNC: 17 U/L (ref 13–39)
BASOPHILS # BLD AUTO: 0.04 THOUSANDS/ÂΜL (ref 0–0.1)
BASOPHILS NFR BLD AUTO: 0 % (ref 0–1)
BILIRUB SERPL-MCNC: 0.63 MG/DL (ref 0.2–1)
BUN SERPL-MCNC: 40 MG/DL (ref 5–25)
CALCIUM SERPL-MCNC: 9.6 MG/DL (ref 8.4–10.2)
CARDIAC TROPONIN I PNL SERPL HS: 10 NG/L
CHLORIDE SERPL-SCNC: 102 MMOL/L (ref 96–108)
CO2 SERPL-SCNC: 24 MMOL/L (ref 21–32)
CREAT SERPL-MCNC: 1.92 MG/DL (ref 0.6–1.3)
EOSINOPHIL # BLD AUTO: 0.03 THOUSAND/ÂΜL (ref 0–0.61)
EOSINOPHIL NFR BLD AUTO: 0 % (ref 0–6)
ERYTHROCYTE [DISTWIDTH] IN BLOOD BY AUTOMATED COUNT: 14.7 % (ref 11.6–15.1)
GFR SERPL CREATININE-BSD FRML MDRD: 34 ML/MIN/1.73SQ M
GLUCOSE SERPL-MCNC: 121 MG/DL (ref 65–140)
HCT VFR BLD AUTO: 44.2 % (ref 36.5–49.3)
HGB BLD-MCNC: 14.3 G/DL (ref 12–17)
IMM GRANULOCYTES # BLD AUTO: 0.07 THOUSAND/UL (ref 0–0.2)
IMM GRANULOCYTES NFR BLD AUTO: 1 % (ref 0–2)
LYMPHOCYTES # BLD AUTO: 2.39 THOUSANDS/ÂΜL (ref 0.6–4.47)
LYMPHOCYTES NFR BLD AUTO: 15 % (ref 14–44)
MAGNESIUM SERPL-MCNC: 2 MG/DL (ref 1.9–2.7)
MCH RBC QN AUTO: 27.3 PG (ref 26.8–34.3)
MCHC RBC AUTO-ENTMCNC: 32.4 G/DL (ref 31.4–37.4)
MCV RBC AUTO: 84 FL (ref 82–98)
MONOCYTES # BLD AUTO: 1.57 THOUSAND/ÂΜL (ref 0.17–1.22)
MONOCYTES NFR BLD AUTO: 10 % (ref 4–12)
NEUTROPHILS # BLD AUTO: 11.44 THOUSANDS/ÂΜL (ref 1.85–7.62)
NEUTS SEG NFR BLD AUTO: 74 % (ref 43–75)
NRBC BLD AUTO-RTO: 0 /100 WBCS
PLATELET # BLD AUTO: 404 THOUSANDS/UL (ref 149–390)
PMV BLD AUTO: 8.4 FL (ref 8.9–12.7)
POTASSIUM SERPL-SCNC: 3.7 MMOL/L (ref 3.5–5.3)
PROT SERPL-MCNC: 7 G/DL (ref 6.4–8.4)
RBC # BLD AUTO: 5.24 MILLION/UL (ref 3.88–5.62)
SARS-COV-2 RNA RESP QL NAA+PROBE: NEGATIVE
SODIUM SERPL-SCNC: 135 MMOL/L (ref 135–147)
WBC # BLD AUTO: 15.54 THOUSAND/UL (ref 4.31–10.16)

## 2023-06-20 PROCEDURE — 93005 ELECTROCARDIOGRAM TRACING: CPT

## 2023-06-20 PROCEDURE — 80053 COMPREHEN METABOLIC PANEL: CPT | Performed by: EMERGENCY MEDICINE

## 2023-06-20 PROCEDURE — 85025 COMPLETE CBC W/AUTO DIFF WBC: CPT | Performed by: EMERGENCY MEDICINE

## 2023-06-20 PROCEDURE — 84484 ASSAY OF TROPONIN QUANT: CPT | Performed by: EMERGENCY MEDICINE

## 2023-06-20 PROCEDURE — 87635 SARS-COV-2 COVID-19 AMP PRB: CPT | Performed by: EMERGENCY MEDICINE

## 2023-06-20 PROCEDURE — 36415 COLL VENOUS BLD VENIPUNCTURE: CPT | Performed by: EMERGENCY MEDICINE

## 2023-06-20 PROCEDURE — 83735 ASSAY OF MAGNESIUM: CPT | Performed by: EMERGENCY MEDICINE

## 2023-06-20 RX ADMIN — SODIUM CHLORIDE 1000 ML: 0.9 INJECTION, SOLUTION INTRAVENOUS at 17:00

## 2023-06-20 NOTE — ED PROVIDER NOTES
"History  Chief Complaint   Patient presents with   • Dizziness     Has been feeling weak and dizzy since Saturday  Has become worse, \"near syncope\" as per EMS  Pt with n/v last Saturday x few times  Also diarrhea  Then  was better  But Monday with lightheadedness and low energy  Today had a near syncope, and felt even more dizzy  No CHI  No LOC  No fever no chills       History provided by:  Patient   used: No        Prior to Admission Medications   Prescriptions Last Dose Informant Patient Reported? Taking? ARIPiprazole (ABILIFY) 10 mg tablet  Self Yes No    MG tablet  Self Yes No   OneTouch Ultra test strip  Self Yes No   Sig: TEST DAILY  Antonio Duke  DIAGNOSIS E11 9   albuterol (PROVENTIL HFA,VENTOLIN HFA) 90 mcg/act inhaler  Self Yes No   Sig: Inhale 2 puffs every 6 (six) hours as needed   amLODIPine-benazepril (LOTREL) 10-40 MG per capsule  Self Yes No   aspirin (ECOTRIN LOW STRENGTH) 81 mg EC tablet  Self Yes No   Sig: Take 81 mg by mouth daily   atorvastatin (LIPITOR) 10 mg tablet  Self Yes No   Sig: Take 10 mg by mouth daily   buPROPion (WELLBUTRIN XL) 300 mg 24 hr tablet  Self Yes No   Si tab(s)   budesonide-formoterol (SYMBICORT) 160-4 5 mcg/act inhaler  Self Yes No   Sig: Inhale 2 puffs 2 (two) times a day   esomeprazole (NexIUM) 40 MG capsule  Self Yes No   Sig: Take 40 mg by mouth   metFORMIN (GLUCOPHAGE) 500 mg tablet  Self Yes No   Sig: Take 1 tablet by mouth 2 (two) times a day with meals   montelukast (SINGULAIR) 10 mg tablet  Self Yes No   Sig: Take 10 mg by mouth   multivitamin (THERAGRAN) TABS  Self Yes No   Sig: Take 1 tablet by mouth   oxyCODONE (ROXICODONE) 5 mg immediate release tablet  Self Yes No   tamsulosin (FLOMAX) 0 4 mg  Self Yes No   Sig: Take 0 4 mg by mouth daily   temazepam (RESTORIL) 30 mg capsule  Self Yes No   Sig: Take 30 mg by mouth daily at bedtime as needed   tiZANidine (ZANAFLEX) 4 mg tablet  Self Yes No   tiotropium (Spiriva Respimat) 2 5 " MCG/ACT AERS inhaler  Self Yes No   Sig: Inhale daily   venlafaxine (EFFEXOR-XR) 75 mg 24 hr capsule  Self Yes No   Si cap(s)      Facility-Administered Medications: None       Past Medical History:   Diagnosis Date   • Dementia ([address] )    • Sleep apnea    • Stroke [facility])        Past Surgical History:   Procedure Laterality Date   • BACK SURGERY     • ELBOW SURGERY     • KNEE SURGERY     • NECK SURGERY     • SHOULDER SURGERY         History reviewed  No pertinent family history  I have reviewed and agree with the history as documented  E-Cigarette/Vaping     E-Cigarette/Vaping Substances     Social History     Tobacco Use   • Smoking status: Never   • Smokeless tobacco: Never   • Tobacco comments:     Patient admits to using marijuana   Substance Use Topics   • Alcohol use: Not Currently   • Drug use: Yes     Types: Marijuana       Review of Systems   Constitutional: Positive for fatigue  Negative for chills and fever  HENT: Negative for ear pain and sore throat  Eyes: Negative for pain and visual disturbance  Respiratory: Negative for cough and shortness of breath  Cardiovascular: Negative for chest pain and palpitations  Gastrointestinal: Positive for diarrhea, nausea and vomiting  Negative for abdominal pain  Genitourinary: Negative for dysuria and hematuria  Musculoskeletal: Negative for arthralgias and back pain  Skin: Negative for color change and rash  Neurological: Positive for dizziness, weakness and light-headedness  Negative for seizures and syncope  All other systems reviewed and are negative  Physical Exam  Physical Exam  Vitals and nursing note reviewed  Constitutional:       General: He is not in acute distress  Appearance: Normal appearance  He is well-developed and normal weight  HENT:      Head: Normocephalic and atraumatic        Right Ear: Ear canal and external ear normal       Left Ear: Ear canal and external ear normal       Nose: Nose normal    Eyes: Extraocular Movements: Extraocular movements intact  Conjunctiva/sclera: Conjunctivae normal       Pupils: Pupils are equal, round, and reactive to light  Cardiovascular:      Rate and Rhythm: Normal rate and regular rhythm  Pulses: Normal pulses  Heart sounds: Normal heart sounds  No murmur heard  Pulmonary:      Effort: Pulmonary effort is normal  No respiratory distress  Breath sounds: Normal breath sounds  Abdominal:      General: Abdomen is flat  Palpations: Abdomen is soft  Tenderness: There is no abdominal tenderness  Musculoskeletal:         General: No swelling  Cervical back: Neck supple  Skin:     General: Skin is warm and dry  Capillary Refill: Capillary refill takes less than 2 seconds  Neurological:      General: No focal deficit present  Mental Status: He is alert and oriented to person, place, and time  Psychiatric:         Mood and Affect: Mood normal          Thought Content:  Thought content normal          Judgment: Judgment normal          Vital Signs  ED Triage Vitals   Temperature Pulse Respirations Blood Pressure SpO2   06/20/23 1645 06/20/23 1641 06/20/23 1641 06/20/23 1641 06/20/23 1641   98 6 °F (37 °C) 90 16 109/67 98 %      Temp Source Heart Rate Source Patient Position - Orthostatic VS BP Location FiO2 (%)   06/20/23 1645 06/20/23 1641 06/20/23 1641 06/20/23 1641 --   Oral Monitor Lying Right arm       Pain Score       --                  Vitals:    06/20/23 1641 06/20/23 1800   BP: 109/67 120/68   Pulse: 90 88   Patient Position - Orthostatic VS: Lying Lying         Visual Acuity  Visual Acuity    Flowsheet Row Most Recent Value   L Pupil Size (mm) 3   R Pupil Size (mm) 3          ED Medications  Medications   sodium chloride 0 9 % bolus 1,000 mL (1,000 mL Intravenous New Bag 6/20/23 1700)       Diagnostic Studies  Results Reviewed     Procedure Component Value Units Date/Time    HS Troponin I 2hr [[id number]]     Lab Status: No result Specimen: Blood     HS Troponin 0hr (reflex protocol) [[id number]]  (Normal) Collected: 06/20/23 1657    Lab Status: Final result Specimen: Blood from Arm, Left Updated: 06/20/23 1736     hs TnI 0hr 10 ng/L     COVID only [[id number]] Collected: 06/20/23 1729    Lab Status:  In process Specimen: Nares from Nose Updated: 06/20/23 1736    Comprehensive metabolic panel [[id number]]  (Abnormal) Collected: 06/20/23 1657    Lab Status: Final result Specimen: Blood from Arm, Left Updated: 06/20/23 1727     Sodium 135 mmol/L      Potassium 3 7 mmol/L      Chloride 102 mmol/L      CO2 24 mmol/L      ANION GAP 9 mmol/L      BUN 40 mg/dL      Creatinine 1 92 mg/dL      Glucose 121 mg/dL      Calcium 9 6 mg/dL      AST 17 U/L      ALT 25 U/L      Alkaline Phosphatase 38 U/L      Total Protein 7 0 g/dL      Albumin 3 9 g/dL      Total Bilirubin 0 63 mg/dL      eGFR 34 ml/min/1 73sq m     Narrative:      [place] guidelines for Chronic Kidney Disease (CKD):   •  Stage 1 with normal or high GFR (GFR > 90 mL/min/1 73 square meters)  •  Stage 2 Mild CKD (GFR = 60-89 mL/min/1 73 square meters)  •  Stage 3A Moderate CKD (GFR = 45-59 mL/min/1 73 square meters)  •  Stage 3B Moderate CKD (GFR = 30-44 mL/min/1 73 square meters)  •  Stage 4 Severe CKD (GFR = 15-29 mL/min/1 73 square meters)  •  Stage 5 End Stage CKD (GFR <15 mL/min/1 73 square meters)  Note: GFR calculation is accurate only with a steady state creatinine    Magnesium [[id number]]  (Normal) Collected: 06/20/23 1657    Lab Status: Final result Specimen: Blood from Arm, Left Updated: 06/20/23 1727     Magnesium 2 0 mg/dL     CBC and differential [[id number]]  (Abnormal) Collected: 06/20/23 1657    Lab Status: Final result Specimen: Blood from Arm, Left Updated: 06/20/23 1705     WBC 15 54 Thousand/uL      RBC 5 24 Million/uL      Hemoglobin 14 3 g/dL      Hematocrit 44 2 %      MCV 84 fL      MCH 27 3 pg      MCHC 32 4 g/dL      RDW 14 7 %      MPV 8 4 fL      Platelets 407 Thousands/uL      nRBC 0 /100 WBCs      Neutrophils Relative 74 %      Immat GRANS % 1 %      Lymphocytes Relative 15 %      Monocytes Relative 10 %      Eosinophils Relative 0 %      Basophils Relative 0 %      Neutrophils Absolute 11 44 Thousands/µL      Immature Grans Absolute 0 07 Thousand/uL      Lymphocytes Absolute 2 39 Thousands/µL      Monocytes Absolute 1 57 Thousand/µL      Eosinophils Absolute 0 03 Thousand/µL      Basophils Absolute 0 04 Thousands/µL                  No orders to display              Procedures  Procedures         ED Course  ED Course as of 06/20/23 1822 Tue Jun 20, 2023   1816 WBC(!): 15 54   1816 BUN(!): 40   1816 Creatinine(!): 1 92   1816 hs TnI 0hr: 10   1816 Magnesium: 2 0             HEART Risk Score    Flowsheet Row Most Recent Value   Heart Score Risk Calculator    History 0 Filed at: 06/20/2023 1816   ECG 0 Filed at: 06/20/2023 1816   Age 2 Filed at: 06/20/2023 1816   Risk Factors 1 Filed at: 06/20/2023 1816   Troponin 0 Filed at: 06/20/2023 1816   HEART Score 3 Filed at: 06/20/2023 1816                                      Medical Decision Making  Pt with BENNY, up BUN/Cr  Normal electrolytes  Slight wbc elevation but no fever    Gastroenteritis with dehydration and BENNY  IVFs in the ER  Improved symptoms    Acute kidney injury [facility]): acute illness or injury  Dehydration: acute illness or injury  Dizziness: acute illness or injury  Amount and/or Complexity of Data Reviewed  Labs: ordered  Decision-making details documented in ED Course  Risk  OTC drugs  Prescription drug management            Disposition  Final diagnoses:   Dizziness   Dehydration   Acute kidney injury [facility])     Time reflects when diagnosis was documented in both MDM as applicable and the Disposition within this note     Time User Action Codes Description Comment    6/20/2023  6:16 PM Corey Rivera Add [R42] Dizziness     6/20/2023  6:16 PM Corey Holly [E86 0] Dehydration 6/20/2023  6:17 PM Corey Guerrero Add [N17 9] Acute kidney injury [facility])       ED Disposition     ED Disposition   Discharge    Condition   Stable    Date/Time   Tue Jun 20, 2023  6:16 PM    Comment   Josemanuel Organ discharge to home/self care  Follow-up Information     Follow up With Specialties Details Why [address], DO Family Medicine In 3 days If symptoms worsen [address]  [facility] [address]  [phone]            Patient's Medications   Discharge Prescriptions    No medications on file       No discharge procedures on file      PDMP Review     None          ED Provider  Electronically Signed by           Jennie Mars MD  06/20/23 Hung Morton

## 2023-06-20 NOTE — ED NOTES
Patient for discharge  No acute distress noted  All questions answered and DC papers given  IV removed, patent  Ambulatory out of department without difficulty              Jorge Fuentes, RN  06/20/23 1730

## 2023-06-20 NOTE — DISCHARGE INSTRUCTIONS
A  personal message from Dr Gail Leach,  Thank you so much for allowing me to care for you today  I pride myself in the care and attention I give all my patients  I hope you were a witness to this tonight  If for any reason your condition does not improve or worsens, or you have a question that was not answered during your visit you can feel free to text me on my personal phone #  # [phone]  I will answer to your message and continue your care past your emergency room visit  Please understand that although you are being discharged because your condition has been deemed stable and able to be managed on an outpatient setting  However your condition may worsen as part of the natural progression of the illness/condition, if this occurs please come back to the emergency department for a repeat evaluation

## 2023-06-20 NOTE — ED NOTES
"Patient ambulated to bathroom without difficulty, endorses \"I am feeling so much better, amazing what a little bit of fluids can do  \" States he is ready to go home and will follow up with his family doctor        Adalberto Mcgowan RN  06/20/23 [id number]    "

## 2023-06-23 ENCOUNTER — TELEPHONE (OUTPATIENT)
Dept: CARDIOLOGY CLINIC | Facility: CLINIC | Age: 68
End: 2023-06-23

## 2023-06-23 LAB
ATRIAL RATE: 100 BPM
P AXIS: 58 DEGREES
PR INTERVAL: 126 MS
QRS AXIS: 39 DEGREES
QRSD INTERVAL: 78 MS
QT INTERVAL: 356 MS
QTC INTERVAL: 456 MS
T WAVE AXIS: 70 DEGREES
VENTRICULAR RATE: 99 BPM

## 2023-06-23 PROCEDURE — 93010 ELECTROCARDIOGRAM REPORT: CPT | Performed by: INTERNAL MEDICINE

## 2023-06-23 NOTE — TELEPHONE ENCOUNTER
----- Message from Reza Hernandez MD sent at 6/23/2023  9:05 AM EDT -----  Please call the patient  Carotid ultrasound showed less than 50% left carotid artery stenosis

## 2024-03-26 ENCOUNTER — TELEPHONE (OUTPATIENT)
Age: 69
End: 2024-03-26

## 2024-03-26 NOTE — TELEPHONE ENCOUNTER
Pt wife called back and played the voicemail message for me/ it does say calling to confirm sleep study consult for tomorrow at 12:45    But no appt listed for pt.

## 2024-03-26 NOTE — TELEPHONE ENCOUNTER
Patients wife called stating she was returning our call from yesterday, confirming Eds appointment for tomorrow. I advised her Ed does not have a visit with our [place] office until June 5th with  and that is our soonest opening at this time. She stated again that we called them about confirming his appointment tomorrow. I advised again there is nothing documented from today or yesterday for me to concur and that I apologized. That if she would like to listen to her voicemail again and find a name I could try to look into this further. She tried to play the voicemail for me and we had got disconnected. I had already confirmed the appointment for June 5th with the patient but they were still concerned as to what the call was about yesterday. Please advise if anyone is aware of whom called the patient. Thank you.

## 2024-04-29 ENCOUNTER — APPOINTMENT (EMERGENCY)
Dept: RADIOLOGY | Facility: HOSPITAL | Age: 69
End: 2024-04-29
Payer: COMMERCIAL

## 2024-04-29 ENCOUNTER — HOSPITAL ENCOUNTER (EMERGENCY)
Facility: HOSPITAL | Age: 69
Discharge: HOME | End: 2024-04-29
Attending: EMERGENCY MEDICINE
Payer: COMMERCIAL

## 2024-04-29 VITALS
OXYGEN SATURATION: 97 % | SYSTOLIC BLOOD PRESSURE: 135 MMHG | TEMPERATURE: 98.5 F | RESPIRATION RATE: 22 BRPM | DIASTOLIC BLOOD PRESSURE: 70 MMHG | HEART RATE: 105 BPM

## 2024-04-29 DIAGNOSIS — J18.9 PNEUMONIA: Primary | ICD-10-CM

## 2024-04-29 LAB
ALBUMIN SERPL BCP-MCNC: 3.7 G/DL (ref 3.5–5)
ALP SERPL-CCNC: 75 U/L (ref 34–104)
ALT SERPL W P-5'-P-CCNC: 28 U/L (ref 7–52)
ANION GAP SERPL CALCULATED.3IONS-SCNC: 7 MMOL/L (ref 4–13)
APTT PPP: 30 SECONDS (ref 23–37)
AST SERPL W P-5'-P-CCNC: 20 U/L (ref 13–39)
ATRIAL RATE: 114 BPM
BASE EX.OXY STD BLDV CALC-SCNC: 94.4 % (ref 60–80)
BASE EXCESS BLDV CALC-SCNC: 0.2 MMOL/L
BASOPHILS # BLD AUTO: 0.05 THOUSANDS/ÂΜL (ref 0–0.1)
BASOPHILS NFR BLD AUTO: 0 % (ref 0–1)
BILIRUB DIRECT SERPL-MCNC: 0.1 MG/DL (ref 0–0.2)
BILIRUB SERPL-MCNC: 0.26 MG/DL (ref 0.2–1)
BUN SERPL-MCNC: 22 MG/DL (ref 5–25)
CALCIUM SERPL-MCNC: 9.3 MG/DL (ref 8.4–10.2)
CARDIAC TROPONIN I PNL SERPL HS: 8 NG/L
CHLORIDE SERPL-SCNC: 102 MMOL/L (ref 96–108)
CO2 SERPL-SCNC: 26 MMOL/L (ref 21–32)
CREAT SERPL-MCNC: 0.88 MG/DL (ref 0.6–1.3)
EOSINOPHIL # BLD AUTO: 0.06 THOUSAND/ÂΜL (ref 0–0.61)
EOSINOPHIL NFR BLD AUTO: 0 % (ref 0–6)
ERYTHROCYTE [DISTWIDTH] IN BLOOD BY AUTOMATED COUNT: 17.2 % (ref 11.6–15.1)
FLUAV RNA RESP QL NAA+PROBE: NEGATIVE
FLUBV RNA RESP QL NAA+PROBE: NEGATIVE
GFR SERPL CREATININE-BSD FRML MDRD: 87 ML/MIN/1.73SQ M
GLUCOSE SERPL-MCNC: 203 MG/DL (ref 65–140)
HCO3 BLDV-SCNC: 23.9 MMOL/L (ref 24–30)
HCT VFR BLD AUTO: 39.5 % (ref 36.5–49.3)
HGB BLD-MCNC: 12 G/DL (ref 12–17)
IMM GRANULOCYTES # BLD AUTO: 0.15 THOUSAND/UL (ref 0–0.2)
IMM GRANULOCYTES NFR BLD AUTO: 1 % (ref 0–2)
INR PPP: 0.99 (ref 0.84–1.19)
LYMPHOCYTES # BLD AUTO: 1.46 THOUSANDS/ÂΜL (ref 0.6–4.47)
LYMPHOCYTES NFR BLD AUTO: 6 % (ref 14–44)
MCH RBC QN AUTO: 23.3 PG (ref 26.8–34.3)
MCHC RBC AUTO-ENTMCNC: 30.4 G/DL (ref 31.4–37.4)
MCV RBC AUTO: 77 FL (ref 82–98)
MONOCYTES # BLD AUTO: 1.34 THOUSAND/ÂΜL (ref 0.17–1.22)
MONOCYTES NFR BLD AUTO: 6 % (ref 4–12)
NEUTROPHILS # BLD AUTO: 21.48 THOUSANDS/ÂΜL (ref 1.85–7.62)
NEUTS SEG NFR BLD AUTO: 87 % (ref 43–75)
NRBC BLD AUTO-RTO: 0 /100 WBCS
O2 CT BLDV-SCNC: 16.8 ML/DL
P AXIS: 59 DEGREES
PCO2 BLDV: 35.6 MM HG (ref 42–50)
PH BLDV: 7.45 [PH] (ref 7.3–7.4)
PLATELET # BLD AUTO: 417 THOUSANDS/UL (ref 149–390)
PMV BLD AUTO: 8.4 FL (ref 8.9–12.7)
PO2 BLDV: 79.6 MM HG (ref 35–45)
POTASSIUM SERPL-SCNC: 3.9 MMOL/L (ref 3.5–5.3)
PR INTERVAL: 158 MS
PROT SERPL-MCNC: 6.8 G/DL (ref 6.4–8.4)
PROTHROMBIN TIME: 13.7 SECONDS (ref 11.6–14.5)
QRS AXIS: 25 DEGREES
QRSD INTERVAL: 78 MS
QT INTERVAL: 338 MS
QTC INTERVAL: 465 MS
RBC # BLD AUTO: 5.15 MILLION/UL (ref 3.88–5.62)
RSV RNA RESP QL NAA+PROBE: NEGATIVE
SARS-COV-2 RNA RESP QL NAA+PROBE: NEGATIVE
SODIUM SERPL-SCNC: 135 MMOL/L (ref 135–147)
T WAVE AXIS: 62 DEGREES
VENTRICULAR RATE: 114 BPM
WBC # BLD AUTO: 24.54 THOUSAND/UL (ref 4.31–10.16)

## 2024-04-29 PROCEDURE — 80048 BASIC METABOLIC PNL TOTAL CA: CPT | Performed by: EMERGENCY MEDICINE

## 2024-04-29 PROCEDURE — 71045 X-RAY EXAM CHEST 1 VIEW: CPT

## 2024-04-29 PROCEDURE — 93010 ELECTROCARDIOGRAM REPORT: CPT | Performed by: INTERNAL MEDICINE

## 2024-04-29 PROCEDURE — 96375 TX/PRO/DX INJ NEW DRUG ADDON: CPT

## 2024-04-29 PROCEDURE — 99284 EMERGENCY DEPT VISIT MOD MDM: CPT | Performed by: EMERGENCY MEDICINE

## 2024-04-29 PROCEDURE — 93005 ELECTROCARDIOGRAM TRACING: CPT

## 2024-04-29 PROCEDURE — 84484 ASSAY OF TROPONIN QUANT: CPT | Performed by: EMERGENCY MEDICINE

## 2024-04-29 PROCEDURE — 36415 COLL VENOUS BLD VENIPUNCTURE: CPT | Performed by: EMERGENCY MEDICINE

## 2024-04-29 PROCEDURE — 82805 BLOOD GASES W/O2 SATURATION: CPT | Performed by: EMERGENCY MEDICINE

## 2024-04-29 PROCEDURE — 99285 EMERGENCY DEPT VISIT HI MDM: CPT

## 2024-04-29 PROCEDURE — 0241U HB NFCT DS VIR RESP RNA 4 TRGT: CPT | Performed by: EMERGENCY MEDICINE

## 2024-04-29 PROCEDURE — 96365 THER/PROPH/DIAG IV INF INIT: CPT

## 2024-04-29 PROCEDURE — 85730 THROMBOPLASTIN TIME PARTIAL: CPT | Performed by: EMERGENCY MEDICINE

## 2024-04-29 PROCEDURE — 85025 COMPLETE CBC W/AUTO DIFF WBC: CPT | Performed by: EMERGENCY MEDICINE

## 2024-04-29 PROCEDURE — 85610 PROTHROMBIN TIME: CPT | Performed by: EMERGENCY MEDICINE

## 2024-04-29 PROCEDURE — 80076 HEPATIC FUNCTION PANEL: CPT | Performed by: EMERGENCY MEDICINE

## 2024-04-29 RX ORDER — IPRATROPIUM BROMIDE AND ALBUTEROL SULFATE 2.5; .5 MG/3ML; MG/3ML
3 SOLUTION RESPIRATORY (INHALATION) ONCE
Status: COMPLETED | OUTPATIENT
Start: 2024-04-29 | End: 2024-04-29

## 2024-04-29 RX ORDER — AMOXICILLIN AND CLAVULANATE POTASSIUM 875; 125 MG/1; MG/1
1 TABLET, FILM COATED ORAL EVERY 12 HOURS
Qty: 14 TABLET | Refills: 0 | Status: SHIPPED | OUTPATIENT
Start: 2024-04-30 | End: 2024-05-07

## 2024-04-29 RX ORDER — METHYLPREDNISOLONE SODIUM SUCCINATE 125 MG/2ML
60 INJECTION, POWDER, LYOPHILIZED, FOR SOLUTION INTRAMUSCULAR; INTRAVENOUS ONCE
Status: COMPLETED | OUTPATIENT
Start: 2024-04-29 | End: 2024-04-29

## 2024-04-29 RX ADMIN — IPRATROPIUM BROMIDE AND ALBUTEROL SULFATE 3 ML: 2.5; .5 SOLUTION RESPIRATORY (INHALATION) at 12:55

## 2024-04-29 RX ADMIN — CEFTRIAXONE SODIUM 1000 MG: 10 INJECTION, POWDER, FOR SOLUTION INTRAVENOUS at 13:26

## 2024-04-29 RX ADMIN — METHYLPREDNISOLONE SODIUM SUCCINATE 60 MG: 125 INJECTION, POWDER, FOR SOLUTION INTRAMUSCULAR; INTRAVENOUS at 12:53

## 2024-04-29 NOTE — ED PROVIDER NOTES
History  Chief Complaint   Patient presents with    Shortness of Breath     Pt reports increased work of breathing and shortness of breath since last night, having a cough with concerns of blood for a few days.      67 yo male with severe COPD who presents to ED c/o dyspnea since last night. Had difficulty tolerating his HS CPAP last night. No cp. No leg pain or swelling. No h/o VTE. Wife notes scant hemoptysis, ongoing but unchanged from baseline. No fever or chills but wife notes pt recently had COVID. Was scheduled to see pulm last month but appt got canceled due to emergency, is re-scheduled but appt not until .         Prior to Admission Medications   Prescriptions Last Dose Informant Patient Reported? Taking?   ARIPiprazole (ABILIFY) 10 mg tablet  Self Yes No    MG tablet  Self Yes No   OneTouch Ultra test strip  Self Yes No   Sig: TEST DAILY.. DIAGNOSIS E11.9   albuterol (PROVENTIL HFA,VENTOLIN HFA) 90 mcg/act inhaler  Self Yes No   Sig: Inhale 2 puffs every 6 (six) hours as needed   amLODIPine-benazepril (LOTREL) 10-40 MG per capsule  Self Yes No   aspirin (ECOTRIN LOW STRENGTH) 81 mg EC tablet  Self Yes No   Sig: Take 81 mg by mouth daily   atorvastatin (LIPITOR) 10 mg tablet  Self Yes No   Sig: Take 10 mg by mouth daily   buPROPion (WELLBUTRIN XL) 300 mg 24 hr tablet  Self Yes No   Si tab(s)   budesonide-formoterol (SYMBICORT) 160-4.5 mcg/act inhaler  Self Yes No   Sig: Inhale 2 puffs 2 (two) times a day   esomeprazole (NexIUM) 40 MG capsule  Self Yes No   Sig: Take 40 mg by mouth   metFORMIN (GLUCOPHAGE) 500 mg tablet  Self Yes No   Sig: Take 1 tablet by mouth 2 (two) times a day with meals   montelukast (SINGULAIR) 10 mg tablet  Self Yes No   Sig: Take 10 mg by mouth   multivitamin (THERAGRAN) TABS  Self Yes No   Sig: Take 1 tablet by mouth   oxyCODONE (ROXICODONE) 5 mg immediate release tablet  Self Yes No   tamsulosin (FLOMAX) 0.4 mg  Self Yes No   Sig: Take 0.4 mg by mouth daily    temazepam (RESTORIL) 30 mg capsule  Self Yes No   Sig: Take 30 mg by mouth daily at bedtime as needed   tiZANidine (ZANAFLEX) 4 mg tablet  Self Yes No   tiotropium (Spiriva Respimat) 2.5 MCG/ACT AERS inhaler  Self Yes No   Sig: Inhale daily   venlafaxine (EFFEXOR-XR) 75 mg 24 hr capsule  Self Yes No   Si cap(s)      Facility-Administered Medications: None       Past Medical History:   Diagnosis Date    Dementia (HCC)     Sleep apnea     Stroke (HCC)        Past Surgical History:   Procedure Laterality Date    BACK SURGERY      ELBOW SURGERY      KNEE SURGERY      NECK SURGERY      SHOULDER SURGERY         History reviewed. No pertinent family history.  I have reviewed and agree with the history as documented.    E-Cigarette/Vaping    E-Cigarette Use Never User      E-Cigarette/Vaping Substances     Social History     Tobacco Use    Smoking status: Never    Smokeless tobacco: Never    Tobacco comments:     Patient admits to using marijuana   Vaping Use    Vaping status: Never Used   Substance Use Topics    Alcohol use: Not Currently    Drug use: Yes     Types: Marijuana       Review of Systems   Constitutional:  Negative for chills and fever.   Respiratory:  Positive for cough and shortness of breath. Negative for apnea, choking, chest tightness, wheezing and stridor.    Cardiovascular:  Negative for chest pain and leg swelling.       Physical Exam  Physical Exam  Vitals and nursing note reviewed.   Constitutional:       General: He is not in acute distress.     Appearance: He is well-developed. He is not ill-appearing, toxic-appearing or diaphoretic.   HENT:      Head: Normocephalic and atraumatic.      Mouth/Throat:      Mouth: Mucous membranes are moist.      Pharynx: Oropharynx is clear.   Eyes:      Conjunctiva/sclera: Conjunctivae normal.      Pupils: Pupils are equal, round, and reactive to light.   Neck:      Vascular: No JVD.   Cardiovascular:      Rate and Rhythm: Normal rate and regular rhythm.       Pulses: Normal pulses.      Heart sounds: Normal heart sounds. No murmur heard.     No friction rub. No gallop.   Pulmonary:      Effort: Pulmonary effort is normal. No tachypnea, accessory muscle usage or respiratory distress.      Breath sounds: Normal breath sounds. No stridor. No wheezing or rales.   Abdominal:      General: There is no distension.      Palpations: Abdomen is soft.      Tenderness: There is no abdominal tenderness. There is no guarding or rebound.   Musculoskeletal:         General: No swelling, tenderness, deformity or signs of injury. Normal range of motion.      Cervical back: Normal range of motion and neck supple. No rigidity.      Right lower leg: No edema.      Left lower leg: No edema.   Skin:     General: Skin is warm and dry.      Capillary Refill: Capillary refill takes less than 2 seconds.      Coloration: Skin is not jaundiced or pale.      Findings: No bruising or erythema.   Neurological:      General: No focal deficit present.      Mental Status: He is alert and oriented to person, place, and time.      Cranial Nerves: No cranial nerve deficit.      Sensory: No sensory deficit.      Motor: No weakness or abnormal muscle tone.      Coordination: Coordination normal.      Gait: Gait normal.         Vital Signs  ED Triage Vitals [04/29/24 1211]   Temperature Pulse Respirations Blood Pressure SpO2   98.5 °F (36.9 °C) 105 22 135/70 97 %      Temp Source Heart Rate Source Patient Position - Orthostatic VS BP Location FiO2 (%)   Oral Monitor Sitting Left arm --      Pain Score       --           Vitals:    04/29/24 1211   BP: 135/70   Pulse: 105   Patient Position - Orthostatic VS: Sitting         Visual Acuity      ED Medications  Medications   methylPREDNISolone sodium succinate (Solu-MEDROL) injection 60 mg (60 mg Intravenous Given 4/29/24 1253)   ipratropium-albuterol (DUO-NEB) 0.5-2.5 mg/3 mL inhalation solution 3 mL (3 mL Nebulization Given 4/29/24 1255)   ceftriaxone (ROCEPHIN) 1  g/50 mL in dextrose IVPB (0 mg Intravenous Stopped 4/29/24 1420)       Diagnostic Studies  Results Reviewed       Procedure Component Value Units Date/Time    FLU/RSV/COVID - if FLU/RSV clinically relevant [[id number]]  (Normal) Collected: 04/29/24 1247    Lab Status: Final result Specimen: Nares from Nose Updated: 04/29/24 1346     SARS-CoV-2 Negative     INFLUENZA A PCR Negative     INFLUENZA B PCR Negative     RSV PCR Negative    Narrative:      FOR PEDIATRIC PATIENTS - copy/paste COVID Guidelines URL to browser: [email or web address]    SARS-CoV-2 assay is a Nucleic Acid Amplification assay intended for the  qualitative detection of nucleic acid from SARS-CoV-2 in nasopharyngeal  swabs. Results are for the presumptive identification of SARS-CoV-2 RNA.    Positive results are indicative of infection with SARS-CoV-2, the virus  causing COVID-19, but do not rule out bacterial infection or co-infection  with other viruses. Laboratories within the [place] and its  territories are required to report all positive results to the appropriate  public health authorities. Negative results do not preclude SARS-CoV-2  infection and should not be used as the sole basis for treatment or other  patient management decisions. Negative results must be combined with  clinical observations, patient history, and epidemiological information.  This test has not been FDA cleared or approved.    This test has been authorized by FDA under an Emergency Use Authorization  (EUA). This test is only authorized for the duration of time the  declaration that circumstances exist justifying the authorization of the  emergency use of an in vitro diagnostic tests for detection of SARS-CoV-2  virus and/or diagnosis of COVID-19 infection under section 564(b)(1) of  the Act, 21 U.S.C. 360bbb-3(b)(1), unless the authorization is terminated  or revoked sooner. The test has been validated but  independent review by FDA  and CLIA is pending.    Test performed using [employer] GeneXpert: This RT-PCR assay targets N2,  a region unique to SARS-CoV-2. A conserved region in the E-gene was chosen  for pan-Sarbecovirus detection which includes SARS-CoV-2.    According to CMS-2020-01-R, this platform meets the definition of high-throughput technology.    Blood gas, venous [[id number]]  (Abnormal) Collected: 04/29/24 1316    Lab Status: Final result Specimen: Blood from Arm, Right Updated: 04/29/24 1328     pH, Baljit 7.445     pCO2, Baljit 35.6 mm Hg      pO2, Baljit 79.6 mm Hg      HCO3, Baljit 23.9 mmol/L      Base Excess, Baljit 0.2 mmol/L      O2 Content, Baljit 16.8 ml/dL      O2 HGB, VENOUS 94.4 %     HS Troponin 0hr (reflex protocol) [[id number]]  (Normal) Collected: 04/29/24 1247    Lab Status: Final result Specimen: Blood from Arm, Right Updated: 04/29/24 1319     hs TnI 0hr 8 ng/L     Protime-INR [[id number]]  (Normal) Collected: 04/29/24 1247    Lab Status: Final result Specimen: Blood from Arm, Right Updated: 04/29/24 1312     Protime 13.7 seconds      INR 0.99    APTT [[id number]]  (Normal) Collected: 04/29/24 1247    Lab Status: Final result Specimen: Blood from Arm, Right Updated: 04/29/24 1312     PTT 30 seconds     Basic metabolic panel [[id number]]  (Abnormal) Collected: 04/29/24 1247    Lab Status: Final result Specimen: Blood from Arm, Right Updated: 04/29/24 1311     Sodium 135 mmol/L      Potassium 3.9 mmol/L      Chloride 102 mmol/L      CO2 26 mmol/L      ANION GAP 7 mmol/L      BUN 22 mg/dL      Creatinine 0.88 mg/dL      Glucose 203 mg/dL      Calcium 9.3 mg/dL      eGFR 87 ml/min/1.73sq m     Narrative:      National Kidney Disease Foundation guidelines for Chronic Kidney Disease (CKD):     Stage 1 with normal or high GFR (GFR > 90 mL/min/1.73 square meters)    Stage 2 Mild CKD (GFR = 60-89 mL/min/1.73 square meters)    Stage 3A Moderate CKD (GFR = 45-59 mL/min/1.73 square meters)    Stage 3B Moderate CKD (GFR =  30-44 mL/min/1.73 square meters)    Stage 4 Severe CKD (GFR = 15-29 mL/min/1.73 square meters)    Stage 5 End Stage CKD (GFR <15 mL/min/1.73 square meters)  Note: GFR calculation is accurate only with a steady state creatinine    Hepatic function panel [[id number]]  (Normal) Collected: 04/29/24 1247    Lab Status: Final result Specimen: Blood from Arm, Right Updated: 04/29/24 1311     Total Bilirubin 0.26 mg/dL      Bilirubin, Direct 0.10 mg/dL      Alkaline Phosphatase 75 U/L      AST 20 U/L      ALT 28 U/L      Total Protein 6.8 g/dL      Albumin 3.7 g/dL     CBC and differential [[id number]]  (Abnormal) Collected: 04/29/24 1247    Lab Status: Final result Specimen: Blood from Arm, Right Updated: 04/29/24 1255     WBC 24.54 Thousand/uL      RBC 5.15 Million/uL      Hemoglobin 12.0 g/dL      Hematocrit 39.5 %      MCV 77 fL      MCH 23.3 pg      MCHC 30.4 g/dL      RDW 17.2 %      MPV 8.4 fL      Platelets 417 Thousands/uL      nRBC 0 /100 WBCs      Segmented % 87 %      Immature Grans % 1 %      Lymphocytes % 6 %      Monocytes % 6 %      Eosinophils Relative 0 %      Basophils Relative 0 %      Absolute Neutrophils 21.48 Thousands/µL      Absolute Immature Grans 0.15 Thousand/uL      Absolute Lymphocytes 1.46 Thousands/µL      Absolute Monocytes 1.34 Thousand/µL      Eosinophils Absolute 0.06 Thousand/µL      Basophils Absolute 0.05 Thousands/µL                    XR chest 1 view portable    (Results Pending)              Procedures  Procedures         ED Course  ED Course as of 04/29/24 1711   Mon Apr 29, 2024   1348 Reexamined. Remains well appearing with normal WOB, no distress, sat 95-96% on RA, no accessory muscle use. Pt and wife agreeable with plan to d/c home and outpt abx.                                SBIRT 22yo+      Flowsheet Row Most Recent Value   Initial Alcohol Screen: US AUDIT-C     1. How often do you have a drink containing alcohol? 0 Filed at: 04/29/2024 1212   2. How many drinks containing  alcohol do you have on a typical day you are drinking?  0 Filed at: 04/29/2024 1212   3a. Male UNDER 65: How often do you have five or more drinks on one occasion? 0 Filed at: 04/29/2024 1212   3b. FEMALE Any Age, or MALE 65+: How often do you have 4 or more drinks on one occassion? 0 Filed at: 04/29/2024 1212   Audit-C Score 0 Filed at: 04/29/2024 1212   LETICIA: How many times in the past year have you...    Used an illegal drug or used a prescription medication for non-medical reasons? Never Filed at: 04/29/2024 1212                      Medical Decision Making  Problems Addressed:  Pneumonia: acute illness or injury    Amount and/or Complexity of Data Reviewed  Labs: ordered.  Radiology: ordered and independent interpretation performed.    Risk  Prescription drug management.             Disposition  Final diagnoses:   Pneumonia     Time reflects when diagnosis was documented in both MDM as applicable and the Disposition within this note       Time User Action Codes Description Comment    4/29/2024  2:06 PM Marco Antonio Garcia Add [J18.9] Pneumonia           ED Disposition       ED Disposition   Discharge    Condition   Stable    Date/Time   Mon Apr 29, 2024 1406    Comment   Fahad Hernandez discharge to home/self care.                   Follow-up Information       Follow up With Specialties Details Why Contact Info Additional Information    [facility] Emergency Department Emergency Medicine  If symptoms worsen [address] [facility] [address]  [phone] [facility] Emergency Department, [address], [place], [address]            Discharge Medication List as of 4/29/2024  2:07 PM        START taking these medications    Details   amoxicillin-clavulanate (AUGMENTIN) 875-125 mg per tablet Take 1 tablet by mouth every 12 (twelve) hours for 7 days Do not start before April 30, 2024., Starting Tue 4/30/2024, Until Tue 5/7/2024, Normal            CONTINUE these medications which have NOT CHANGED    Details   albuterol (PROVENTIL HFA,VENTOLIN HFA) 90 mcg/act inhaler Inhale 2 puffs every 6 (six) hours as needed, Starting Wed 11/25/2020, Historical Med      amLODIPine-benazepril (LOTREL) 10-40 MG per capsule Starting Tue 2/26/2019, Historical Med      ARIPiprazole (ABILIFY) 10 mg tablet Starting Mon 9/28/2020, Historical Med      aspirin (ECOTRIN LOW STRENGTH) 81 mg EC tablet Take 81 mg by mouth daily, Historical Med      atorvastatin (LIPITOR) 10 mg tablet Take 10 mg by mouth daily, Starting Mon 2/28/2022, Historical Med      budesonide-formoterol (SYMBICORT) 160-4.5 mcg/act inhaler Inhale 2 puffs 2 (two) times a day, Starting Mon 8/24/2020, Historical Med      buPROPion (WELLBUTRIN XL) 300 mg 24 hr tablet 1 tab(s), Historical Med      esomeprazole (NexIUM) 40 MG capsule Take 40 mg by mouth, Historical Med       MG tablet Starting Tue 3/14/2023, Historical Med      metFORMIN (GLUCOPHAGE) 500 mg tablet Take 1 tablet by mouth 2 (two) times a day with meals, Starting Mon 2/14/2022, Historical Med      montelukast (SINGULAIR) 10 mg tablet Take 10 mg by mouth, Starting Wed 11/25/2020, Historical Med      multivitamin (THERAGRAN) TABS Take 1 tablet by mouth, Historical Med      OneTouch Ultra test strip TEST DAILY.. DIAGNOSIS E11.9, Historical Med      oxyCODONE (ROXICODONE) 5 mg immediate release tablet Starting Tue 10/20/2020, Historical Med      tamsulosin (FLOMAX) 0.4 mg Take 0.4 mg by mouth daily, Starting Sun 2/13/2022, Historical Med      temazepam (RESTORIL) 30 mg capsule Take 30 mg by mouth daily at bedtime as needed, Starting Tue 3/15/2022, Historical Med      tiotropium (Spiriva Respimat) 2.5 MCG/ACT AERS inhaler Inhale daily, Starting Mon 10/5/2020, Historical Med      tiZANidine (ZANAFLEX) 4 mg tablet Starting Tue 3/8/2022, Historical Med      venlafaxine (EFFEXOR-XR) 75 mg 24 hr capsule 1 cap(s), Historical Med                 PDMP Review        None            ED Provider  Electronically Signed by             Marco Antonio Garcia MD  04/29/24 5940

## 2024-05-03 ENCOUNTER — OFFICE VISIT (OUTPATIENT)
Age: 69
End: 2024-05-03
Payer: COMMERCIAL

## 2024-05-03 ENCOUNTER — APPOINTMENT (OUTPATIENT)
Age: 69
End: 2024-05-03
Payer: COMMERCIAL

## 2024-05-03 VITALS
BODY MASS INDEX: 22.9 KG/M2 | HEIGHT: 70 IN | SYSTOLIC BLOOD PRESSURE: 130 MMHG | WEIGHT: 160 LBS | HEART RATE: 106 BPM | RESPIRATION RATE: 18 BRPM | OXYGEN SATURATION: 95 % | TEMPERATURE: 99.3 F | DIASTOLIC BLOOD PRESSURE: 79 MMHG

## 2024-05-03 DIAGNOSIS — J45.40 MODERATE PERSISTENT ASTHMA WITHOUT COMPLICATION: ICD-10-CM

## 2024-05-03 DIAGNOSIS — R93.89 ABNORMAL CHEST X-RAY: ICD-10-CM

## 2024-05-03 DIAGNOSIS — J18.9 PNEUMONIA OF RIGHT LUNG DUE TO INFECTIOUS ORGANISM, UNSPECIFIED PART OF LUNG: ICD-10-CM

## 2024-05-03 DIAGNOSIS — J45.40 MODERATE PERSISTENT ASTHMA WITHOUT COMPLICATION: Primary | ICD-10-CM

## 2024-05-03 DIAGNOSIS — F12.90 CANNABIS USE, UNCOMPLICATED: ICD-10-CM

## 2024-05-03 LAB
BASOPHILS # BLD AUTO: 0.04 THOUSANDS/ÂΜL (ref 0–0.1)
BASOPHILS NFR BLD AUTO: 0 % (ref 0–1)
EOSINOPHIL # BLD AUTO: 0.17 THOUSAND/ÂΜL (ref 0–0.61)
EOSINOPHIL NFR BLD AUTO: 1 % (ref 0–6)
ERYTHROCYTE [DISTWIDTH] IN BLOOD BY AUTOMATED COUNT: 17.3 % (ref 11.6–15.1)
HCT VFR BLD AUTO: 43 % (ref 36.5–49.3)
HGB BLD-MCNC: 12.7 G/DL (ref 12–17)
IMM GRANULOCYTES # BLD AUTO: 0.05 THOUSAND/UL (ref 0–0.2)
IMM GRANULOCYTES NFR BLD AUTO: 0 % (ref 0–2)
LYMPHOCYTES # BLD AUTO: 2.6 THOUSANDS/ÂΜL (ref 0.6–4.47)
LYMPHOCYTES NFR BLD AUTO: 22 % (ref 14–44)
MCH RBC QN AUTO: 23.6 PG (ref 26.8–34.3)
MCHC RBC AUTO-ENTMCNC: 29.5 G/DL (ref 31.4–37.4)
MCV RBC AUTO: 80 FL (ref 82–98)
MONOCYTES # BLD AUTO: 0.88 THOUSAND/ÂΜL (ref 0.17–1.22)
MONOCYTES NFR BLD AUTO: 7 % (ref 4–12)
NEUTROPHILS # BLD AUTO: 8.19 THOUSANDS/ÂΜL (ref 1.85–7.62)
NEUTS SEG NFR BLD AUTO: 70 % (ref 43–75)
NRBC BLD AUTO-RTO: 0 /100 WBCS
PLATELET # BLD AUTO: 481 THOUSANDS/UL (ref 149–390)
PMV BLD AUTO: 8.9 FL (ref 8.9–12.7)
RBC # BLD AUTO: 5.37 MILLION/UL (ref 3.88–5.62)
WBC # BLD AUTO: 11.93 THOUSAND/UL (ref 4.31–10.16)

## 2024-05-03 PROCEDURE — 86602 ANTINOMYCES ANTIBODY: CPT

## 2024-05-03 PROCEDURE — 85025 COMPLETE CBC W/AUTO DIFF WBC: CPT

## 2024-05-03 PROCEDURE — 86671 FUNGUS NES ANTIBODY: CPT

## 2024-05-03 PROCEDURE — 86331 IMMUNODIFFUSION OUCHTERLONY: CPT

## 2024-05-03 PROCEDURE — 86003 ALLG SPEC IGE CRUDE XTRC EA: CPT

## 2024-05-03 PROCEDURE — 99214 OFFICE O/P EST MOD 30 MIN: CPT | Performed by: PHYSICIAN ASSISTANT

## 2024-05-03 PROCEDURE — 36415 COLL VENOUS BLD VENIPUNCTURE: CPT

## 2024-05-03 PROCEDURE — 86606 ASPERGILLUS ANTIBODY: CPT

## 2024-05-03 PROCEDURE — 82785 ASSAY OF IGE: CPT

## 2024-05-03 RX ORDER — PREDNISONE 20 MG/1
40 TABLET ORAL DAILY
Qty: 10 TABLET | Refills: 0 | Status: SHIPPED | OUTPATIENT
Start: 2024-05-03 | End: 2024-05-08

## 2024-05-03 NOTE — PROGRESS NOTES
Assessment/Plan:   Diagnoses and all orders for this visit:    Moderate persistent asthma without complication  -     CT chest without contrast; Future  -     predniSONE 20 mg tablet; Take 2 tablets (40 mg total) by mouth daily for 5 days  -     CBC and differential; Future  -     [facility] Panel, Adult; Future  -     Hypersensitivity pnuemonitis profile; Future    Pneumonia of right lung due to infectious organism, unspecified part of lung  -     CT chest without contrast; Future    Cannabis use, uncomplicated    Abnormal chest x-ray  -     CT chest without contrast; Future        Patient is here today to establish for his history of asthma.  He has had asthma for many years, most recent PFT showed severe obstruction with FEV1 of 45% with appreciable response to the bronchodilator.  Also with air trapping and hyperinflation, mildly reduced DLCO.  He does have significant secondhand smoke exposure and does also smoke cannabis for many decades.  They also are not sure if there is any mold at home.  Last week he was seen in the emergency room and did have a possible right lung opacity suspicious for pneumonia, did have a leukocytosis and was given antibiotics which he completed.  He does have some wheezing on the right side.  Will give him a course of steroids.  Will switch him from the Symbicort and Spiriva onto Breztri and he can continue the albuterol 4 times per day, continue Singulair.  He has been on 2 Biologics in the past 1 being Tezspire.  He states these did not help with his asthma control, patient and wife state that he has not received frequent courses of steroids although does have increased need for rescue medications.    PFTs were done recently, these do not need to be repeated.  Will send him for [facility] allergy panel, CBC with differential, hypersensitivity panel.  Will send him for CT scan of the chest given the abnormal chest CT as well as the ongoing symptoms and poorly controlled  "asthma.  He was given a BiPAP from his previous pulmonologist at [facility] which he will continue on a nightly basis.  Does feel that he is sleeping better and wakes up less short of breath with the use of the BiPAP.    He will follow-up with us in about 4 weeks or sooner if necessary.      Return in about 4 weeks (around 5/31/2024).  All questions are answered to the patient's satisfaction and understanding.  He verbalizes understanding.  He is encouraged to call with any further questions or concerns.    Portions of the record may have been created with voice recognition software.  Occasional wrong word or \"sound a like\" substitutions may have occurred due to the inherent limitations of voice recognition software.  Read the chart carefully and recognize, using context, where substitutions have occurred.    Electronically Signed by Kirk Bueno PA-C    ______________________________________________________________________    Chief Complaint: No chief complaint on file.      Patient ID: Fahad is a 68 y.o. y.o. male has a past medical history of Dementia (HCC), Sleep apnea, and Stroke (HCC).    5/3/2024  Patient presents today for follow-up visit.  Patient is a 68-year-old male with secondhand cigarette smoke exposure, cannabis smoker with past medical history of asthma, hypertension, diabetes.    He is here today to establish for his asthma.  He has been following with [facility] for many years.  Has noticed over the last several months worsening of his shortness of breath/dyspnea on exertion.  He has been on Symbicort and Spiriva for some time now for his asthma with albuterol as needed.  He has been on tTezspire as well as 1 other biologic but he cannot remember which one.  Tried both of these for at least 6 months with no better control of his asthma.  He does not frequently get steroids although does have frequent increased need for his rescue medications.  No pets at home, they " do think there may be some mold at home.  He did have COVID back in January.  Last week was seen in the emergency room for ongoing shortness of breath and was found to have a possible right-sided pneumonia for which he did complete antibiotics.        Review of Systems   Constitutional: Negative.    HENT: Negative.     Respiratory:  Positive for cough and shortness of breath.    Cardiovascular: Negative.    Gastrointestinal: Negative.    Genitourinary: Negative.    Musculoskeletal: Negative.    Skin: Negative.    Allergic/Immunologic: Negative.    Neurological: Negative.    Psychiatric/Behavioral: Negative.         Smoking history: He reports that he has never smoked. He has never used smokeless tobacco.    The following portions of the patient's history were reviewed and updated as appropriate: allergies, current medications, past family history, past medical history, past social history, past surgical history, and problem list.    Immunization History   Administered Date(s) Administered    COVID-19 PFIZER VACCINE 0.3 ML IM 03/19/2021, 04/12/2021, 10/21/2021    COVID-19 Pfizer Vac BIVALENT Cuong-sucrose 12 Yr+ IM 09/27/2022    COVID-19 Pfizer vac (Cuong-sucrose, gray cap) 12 yr+ IM 04/04/2022     Current Outpatient Medications   Medication Sig Dispense Refill    albuterol (PROVENTIL HFA,VENTOLIN HFA) 90 mcg/act inhaler Inhale 2 puffs every 6 (six) hours as needed      amLODIPine-benazepril (LOTREL) 10-40 MG per capsule       amoxicillin-clavulanate (AUGMENTIN) 875-125 mg per tablet Take 1 tablet by mouth every 12 (twelve) hours for 7 days Do not start before April 30, 2024. 14 tablet 0    ARIPiprazole (ABILIFY) 10 mg tablet       aspirin (ECOTRIN LOW STRENGTH) 81 mg EC tablet Take 81 mg by mouth daily      atorvastatin (LIPITOR) 10 mg tablet Take 10 mg by mouth daily      buPROPion (WELLBUTRIN XL) 300 mg 24 hr tablet 1 tab(s)      esomeprazole (NexIUM) 40 MG capsule Take 40 mg by mouth       MG tablet        "metFORMIN (GLUCOPHAGE) 500 mg tablet Take 1 tablet by mouth 2 (two) times a day with meals      montelukast (SINGULAIR) 10 mg tablet Take 10 mg by mouth      multivitamin (THERAGRAN) TABS Take 1 tablet by mouth      OneTouch Ultra test strip TEST DAILY.. DIAGNOSIS E11.9      oxyCODONE (ROXICODONE) 5 mg immediate release tablet       predniSONE 20 mg tablet Take 2 tablets (40 mg total) by mouth daily for 5 days 10 tablet 0    tamsulosin (FLOMAX) 0.4 mg Take 0.4 mg by mouth daily      temazepam (RESTORIL) 30 mg capsule Take 30 mg by mouth daily at bedtime as needed      tiZANidine (ZANAFLEX) 4 mg tablet       venlafaxine (EFFEXOR-XR) 75 mg 24 hr capsule 1 cap(s)       No current facility-administered medications for this visit.     Allergies: Beta adrenergic blockers    Objective:  Vitals:    05/03/24 1500 05/03/24 1502   BP: 130/79    BP Location: Right arm    Patient Position: Sitting    Cuff Size: Large    Pulse: (!) 106    Resp: 18    Temp: 99.3 °F (37.4 °C)    SpO2: 95% 95%   Weight: 72.6 kg (160 lb)    Height: 5' 10\" (1.778 m)    Oxygen Therapy  SpO2: 95 %  Oxygen Therapy: None (Room air)  .  Wt Readings from Last 3 Encounters:   05/03/24 72.6 kg (160 lb)   06/20/23 72.6 kg (160 lb)   05/03/23 72.1 kg (159 lb)     Body mass index is 22.96 kg/m².    Physical Exam  Constitutional:       General: He is not in acute distress.     Appearance: Normal appearance. He is well-developed. He is not ill-appearing.   HENT:      Head: Normocephalic and atraumatic.      Mouth/Throat:      Pharynx: Oropharynx is clear.   Eyes:      Pupils: Pupils are equal, round, and reactive to light.   Cardiovascular:      Rate and Rhythm: Normal rate and regular rhythm.   Pulmonary:      Effort: Pulmonary effort is normal. No respiratory distress.      Breath sounds: Examination of the right-upper field reveals wheezing. Examination of the right-middle field reveals wheezing. Wheezing present. No decreased breath sounds, rhonchi or rales. "   Abdominal:      General: Abdomen is flat. There is no distension.   Musculoskeletal:         General: Normal range of motion.      Cervical back: Normal range of motion.      Right lower leg: No edema.      Left lower leg: No edema.   Skin:     General: Skin is warm and dry.      Findings: No rash.   Neurological:      Mental Status: He is alert and oriented to person, place, and time.   Psychiatric:         Mood and Affect: Mood normal.         Behavior: Behavior normal.         Lab Review:   Lab Results   Component Value Date    K 3.9 04/29/2024    K 4.5 10/20/2023     04/29/2024     10/20/2023    CO2 26 04/29/2024    CO2 27 10/20/2023    BUN 22 04/29/2024    BUN 23 10/20/2023    CREATININE 0.88 04/29/2024    CREATININE 0.93 10/20/2023    CALCIUM 9.3 04/29/2024    CALCIUM 9.5 10/20/2023     Lab Results   Component Value Date    WBC 24.54 (H) 04/29/2024    HGB 12.0 04/29/2024    HCT 39.5 04/29/2024    MCV 77 (L) 04/29/2024     (H) 04/29/2024       Diagnostics:  I have personally reviewed pertinent reports.   and I have personally reviewed pertinent films in PACS  Reviewed chest x-ray  Office Spirometry Results:     ESS:    XR chest 1 view portable    Result Date: 4/30/2024  Narrative: XR CHEST PORTABLE INDICATION: SOB. COMPARISON: None FINDINGS: The lungs are hyperinflated. There is vague abnormal airspace disease in the right midlung. No pneumothorax. No pleural effusion. Normal cardiomediastinal silhouette. Bones are unremarkable for age. Postoperative changes in the lower cervical spine. Normal upper abdomen.     Impression: Vague abdominal airspace disease in the right midlung. Correlate for pneumonia. The study was marked in EPIC for significant notification. Recommend follow-up chest film in 1 month to ensure clearing. Workstation performed: [id number]

## 2024-05-06 LAB

## 2024-05-07 ENCOUNTER — TELEPHONE (OUTPATIENT)
Age: 69
End: 2024-05-07

## 2024-05-07 NOTE — TELEPHONE ENCOUNTER
Notified patient of results per HILARIO Thapa 5/7/24 message. Pt acknowledged understanding and states he will await final lab result notification. Pt had no further questions at this time.

## 2024-05-08 LAB
ASPERGILLUS FUMAGATUS IGG: NEGATIVE
AUREOBASIDIUM PULLULANS IGG: NEGATIVE
LACEYELLA SACCHARI AB SER QL: NEGATIVE
PIGEON SERUM IGG: NEGATIVE
S RECTIVIRGULA AB SER QL ID: NEGATIVE
T VULGARIS AB SER QL ID: NEGATIVE

## 2024-05-28 ENCOUNTER — TELEPHONE (OUTPATIENT)
Age: 69
End: 2024-05-28

## 2024-05-28 NOTE — TELEPHONE ENCOUNTER
Patient called in and would like to get a script on the Breztri . Patient is out of the sample that he was given on his last OV.     Please advise

## 2024-05-29 DIAGNOSIS — J45.20 MILD INTERMITTENT ASTHMA WITHOUT COMPLICATION: Primary | ICD-10-CM

## 2024-05-29 RX ORDER — BUDESONIDE, GLYCOPYRROLATE, AND FORMOTEROL FUMARATE 160; 9; 4.8 UG/1; UG/1; UG/1
2 AEROSOL, METERED RESPIRATORY (INHALATION) 2 TIMES DAILY
Qty: 10.7 G | Refills: 3 | Status: SHIPPED | OUTPATIENT
Start: 2024-05-29 | End: 2024-06-07 | Stop reason: SDUPTHER

## 2024-06-02 ENCOUNTER — HOSPITAL ENCOUNTER (OUTPATIENT)
Dept: CT IMAGING | Facility: HOSPITAL | Age: 69
Discharge: HOME | End: 2024-06-02
Payer: COMMERCIAL

## 2024-06-02 DIAGNOSIS — J18.9 PNEUMONIA OF RIGHT LUNG DUE TO INFECTIOUS ORGANISM, UNSPECIFIED PART OF LUNG: ICD-10-CM

## 2024-06-02 DIAGNOSIS — R93.89 ABNORMAL CHEST X-RAY: ICD-10-CM

## 2024-06-02 DIAGNOSIS — J45.40 MODERATE PERSISTENT ASTHMA WITHOUT COMPLICATION: ICD-10-CM

## 2024-06-02 PROCEDURE — 71250 CT THORAX DX C-: CPT

## 2024-06-07 ENCOUNTER — OFFICE VISIT (OUTPATIENT)
Age: 69
End: 2024-06-07
Payer: COMMERCIAL

## 2024-06-07 VITALS
HEIGHT: 70 IN | TEMPERATURE: 98 F | HEART RATE: 98 BPM | BODY MASS INDEX: 23.62 KG/M2 | OXYGEN SATURATION: 96 % | WEIGHT: 165 LBS | SYSTOLIC BLOOD PRESSURE: 136 MMHG | DIASTOLIC BLOOD PRESSURE: 74 MMHG

## 2024-06-07 DIAGNOSIS — J18.9 PNEUMONIA: ICD-10-CM

## 2024-06-07 DIAGNOSIS — J45.50 SEVERE PERSISTENT ASTHMA, UNSPECIFIED WHETHER COMPLICATED: ICD-10-CM

## 2024-06-07 DIAGNOSIS — J44.89 CHRONIC OBSTRUCTIVE ASTHMA: Primary | ICD-10-CM

## 2024-06-07 DIAGNOSIS — J45.20 MILD INTERMITTENT ASTHMA WITHOUT COMPLICATION: ICD-10-CM

## 2024-06-07 DIAGNOSIS — Z79.899 MEDICAL MARIJUANA USE: ICD-10-CM

## 2024-06-07 PROBLEM — F32.9 MAJOR DEPRESSIVE DISORDER WITH CURRENT ACTIVE EPISODE: Status: RESOLVED | Noted: 2019-11-07 | Resolved: 2024-06-07

## 2024-06-07 PROBLEM — Z86.73 HISTORY OF STROKE: Status: RESOLVED | Noted: 2019-11-07 | Resolved: 2024-06-07

## 2024-06-07 PROBLEM — E78.2 MIXED HYPERLIPIDEMIA: Status: ACTIVE | Noted: 2018-11-12

## 2024-06-07 PROBLEM — K21.00 GASTROESOPHAGEAL REFLUX DISEASE WITH ESOPHAGITIS: Status: ACTIVE | Noted: 2019-11-07

## 2024-06-07 PROBLEM — R33.9 URINARY RETENTION: Status: RESOLVED | Noted: 2018-11-12 | Resolved: 2024-06-07

## 2024-06-07 PROBLEM — N36.9 URETHRAL DISORDER: Status: RESOLVED | Noted: 2018-11-12 | Resolved: 2024-06-07

## 2024-06-07 PROBLEM — J44.9 CHRONIC OBSTRUCTIVE PULMONARY DISEASE (HCC): Status: ACTIVE | Noted: 2019-11-07

## 2024-06-07 PROCEDURE — 99214 OFFICE O/P EST MOD 30 MIN: CPT | Performed by: INTERNAL MEDICINE

## 2024-06-07 RX ORDER — DEUTETRABENAZINE 12 MG/1
TABLET, COATED ORAL
COMMUNITY
Start: 2024-05-30

## 2024-06-07 RX ORDER — ARIPIPRAZOLE 15 MG/1
TABLET ORAL
COMMUNITY
Start: 2024-06-03

## 2024-06-07 RX ORDER — ALBUTEROL SULFATE 1.25 MG/3ML
1.25 SOLUTION RESPIRATORY (INHALATION) EVERY 6 HOURS PRN
COMMUNITY

## 2024-06-07 RX ORDER — BUDESONIDE, GLYCOPYRROLATE, AND FORMOTEROL FUMARATE 160; 9; 4.8 UG/1; UG/1; UG/1
2 AEROSOL, METERED RESPIRATORY (INHALATION) 2 TIMES DAILY
Qty: 32.1 G | Refills: 3 | Status: SHIPPED | OUTPATIENT
Start: 2024-06-07

## 2024-06-07 NOTE — ASSESSMENT & PLAN NOTE
Patient's CAT scan does appear to have minimal airspace disease, likely improving from prior chest x-ray  Formal radiology interpretation of the CAT scan is pending.  Will reach out to the patient once this has been formally read but would anticipate 6-month follow-up unless new or concerning finding has been identified

## 2024-06-07 NOTE — ASSESSMENT & PLAN NOTE
He smokes the equivalent of 3 joints a day.  Discussed at length that inhalational use is not recommended.

## 2024-06-07 NOTE — PROGRESS NOTES
Progress note - Pulmonary Medicine   Fahad Hernandez 68 y.o. male MRN: [id number]       Impression & Plan:     Severe persistent asthma, unspecified whether complicated  Patient has severe persistent asthma based on the testing.  No evidence of emphysema.  No evidence of chronic bronchitis based on CT imaging.  He is a lifelong non-smoker.  His pulmonary function testing does show some bronchodilator reversibility although he does have a chronic obstructive component.  Noticed improvement with Breztri.  This prescription has been renewed  Continue albuterol and albuterol rescue inhaler  Discussed use of inhalational marijuana.  With his airway disease, would advise alternate mode of administration.  Inhalational use is not recommended    Pneumonia  Patient's CAT scan does appear to have minimal airspace disease, likely improving from prior chest x-ray  Formal radiology interpretation of the CAT scan is pending.  Will reach out to the patient once this has been formally read but would anticipate 6-month follow-up unless new or concerning finding has been identified    Medical marijuana use  He smokes the equivalent of 3 joints a day.  Discussed at length that inhalational use is not recommended.    Return in about 6 months (around 12/7/2024).    ______________________________________________________________________    HPI:    Fahad Hernandez presents today for follow-up of asthma/COPD.  He had been a longstanding patient of Dr. Ahn and initially was felt to be asthma but more recently was told he has COPD.  In the past he was on Xolair which was not beneficial.  He was on Tezspire which also was not beneficial.  After his most recent PFTs he was told he has COPD and there was concern for possible emphysema or other contributing factors.  This left he and his wife confused.  They sought alternate opinion and did see Kirk in our practice recently.  She is the current pulmonary provider for the patient's  wife.    The patient does have severe obstructive disease by pulmonary function testing but lung volumes and diffusion are normal.  He has significant bronchodilator responsiveness.  His most recent pulmonary function test was at [facility] in November 2023.    He does use Breztri currently.  This is a new medication and works well for him.  He is also on albuterol rescue inhaler and albuterol nebulizer.  He finds that these are beneficial.    He carries history of obstructive sleep apnea.  He was previously managed with a dental appliance.  More recently he was transition to a BiPAP device which was supposedly for his COPD.  This was prescribed by Dr. Ahn.  He has noticed benefit from this device.  He does sleep a bit better and does not have need for the nebulizer immediately when waking up as he had previously.    Finally, he does smoke medical marijuana.  He has been utilizing this for combination of anxiety, sleep disorder, and pain.  He obtains from the dispensary with a medical marijuana card.  He previously used some edibles that he no longer can get his hands on due to a change in the company that supplies them.  He does not have any other inhalational use of cigarettes or other agents    Current Medications:    Current Outpatient Medications:     albuterol (ACCUNEB) 1.25 MG/3ML nebulizer solution, Take 1.25 mg by nebulization every 6 (six) hours as needed for wheezing, Disp: , Rfl:     albuterol (PROVENTIL HFA,VENTOLIN HFA) 90 mcg/act inhaler, Inhale 2 puffs every 6 (six) hours as needed, Disp: , Rfl:     amLODIPine-benazepril (LOTREL) 10-40 MG per capsule, , Disp: , Rfl:     ARIPiprazole (ABILIFY) 10 mg tablet, , Disp: , Rfl:     ARIPiprazole (ABILIFY) 15 mg tablet, , Disp: , Rfl:     aspirin (ECOTRIN LOW STRENGTH) 81 mg EC tablet, Take 81 mg by mouth daily, Disp: , Rfl:     atorvastatin (LIPITOR) 10 mg tablet, Take 10 mg by mouth daily, Disp: , Rfl:     Austedo 12 MG TABS, , Disp: , Rfl:      "Budeson-Glycopyrrol-Formoterol (Breztri Aerosphere) 160-9-4.8 MCG/ACT AERO, Inhale 2 puffs 2 (two) times a day Rinse mouth after use., Disp: 32.1 g, Rfl: 3    buPROPion (WELLBUTRIN XL) 300 mg 24 hr tablet, 1 tab(s), Disp: , Rfl:     esomeprazole (NexIUM) 40 MG capsule, Take 40 mg by mouth, Disp: , Rfl:      MG tablet, , Disp: , Rfl:     metFORMIN (GLUCOPHAGE) 500 mg tablet, Take 1 tablet by mouth 2 (two) times a day with meals, Disp: , Rfl:     montelukast (SINGULAIR) 10 mg tablet, Take 10 mg by mouth, Disp: , Rfl:     multivitamin (THERAGRAN) TABS, Take 1 tablet by mouth, Disp: , Rfl:     nystatin (MYCOSTATIN) 500,000 units/5 mL suspension, TAKE 5 ML (1 TSP) BY MOUTH (SWISH, GARGLE, THEN SPIT) EVERY 6 HOURS FOR TREATMENT OF ORAL THRUSH., Disp: , Rfl:     OneTouch Ultra test strip, TEST DAILY.. DIAGNOSIS E11.9, Disp: , Rfl:     oxyCODONE (ROXICODONE) 5 mg immediate release tablet, , Disp: , Rfl:     tamsulosin (FLOMAX) 0.4 mg, Take 0.4 mg by mouth daily, Disp: , Rfl:     temazepam (RESTORIL) 30 mg capsule, Take 30 mg by mouth daily at bedtime as needed, Disp: , Rfl:     tiZANidine (ZANAFLEX) 4 mg tablet, , Disp: , Rfl:     venlafaxine (EFFEXOR-XR) 75 mg 24 hr capsule, 1 cap(s), Disp: , Rfl:     Review of Systems:    Aside from what is mentioned in the HPI, the review of systems is otherwise negative    Past medical history, surgical history, and family history were reviewed and updated as appropriate    Social history updates:  Social History     Tobacco Use   Smoking Status Never   Smokeless Tobacco Never   Tobacco Comments    Patient admits to using marijuana       PhysicalExamination:  Vitals:   /74   Pulse 98   Temp 98 °F (36.7 °C)   Ht 5' 10\" (1.778 m)   Wt 74.8 kg (165 lb)   SpO2 96%   BMI 23.68 kg/m²     Gen:  Comfortable on room air.  No conversational dyspnea  HEENT:  Conjugate gaze.  sclerae anicteric.  Oropharynx moist  Neck: Trachea is midline. No JVD. No adenopathy  Chest: Slightly " distant breath sounds but they are otherwise clear to auscultation and without any wheezing or crackles  Cardiac: Regular. no murmur  Abdomen:  benign  Extremities: No edema  Neuro:  Normal speech and mentation    Diagnostic Data:  Labs:  I personally reviewed the most recent laboratory data pertinent to today's visit    Lab Results   Component Value Date    WBC 11.93 (H) 05/03/2024    HGB 12.7 05/03/2024    HCT 43.0 05/03/2024    MCV 80 (L) 05/03/2024     (H) 05/03/2024     Lab Results   Component Value Date    SODIUM 135 04/29/2024    K 3.9 04/29/2024    CO2 26 04/29/2024     04/29/2024    BUN 22 04/29/2024    CREATININE 0.88 04/29/2024    CALCIUM 9.3 04/29/2024       PFT results:  The most recent pulmonary function tests were reviewed.  PFT from November 2023 showed FEV1 of 45% predicted.  23% improvement postbronchodilator.  Total lung capacity however was 104% predicted.  Corrected DLCO 83% predicted.  Residual volume elevated at 126% predicted.    Imaging:  I personally reviewed the images on the PAC system pertinent to today's visit  Chest x-ray from April showed right midlung airspace disease concerning for pneumonia.  Patient was short of breath at the time of this study.  Subsequent CT scan which was just performed June 2 does show some scattered atypical airspace disease that may represent resolving pneumonia/inflammation.    Varun Terrell MD

## 2024-06-07 NOTE — ASSESSMENT & PLAN NOTE
Patient has severe persistent asthma based on the testing.  No evidence of emphysema.  No evidence of chronic bronchitis based on CT imaging.  He is a lifelong non-smoker.  His pulmonary function testing does show some bronchodilator reversibility although he does have a chronic obstructive component.  Noticed improvement with Breztri.  This prescription has been renewed  Continue albuterol and albuterol rescue inhaler  Discussed use of inhalational marijuana.  With his airway disease, would advise alternate mode of administration.  Inhalational use is not recommended

## 2024-06-11 ENCOUNTER — TELEPHONE (OUTPATIENT)
Age: 69
End: 2024-06-11

## 2024-06-12 DIAGNOSIS — J18.9 PNEUMONIA DUE TO INFECTIOUS ORGANISM, UNSPECIFIED LATERALITY, UNSPECIFIED PART OF LUNG: Primary | ICD-10-CM

## 2024-06-12 DIAGNOSIS — R91.1 LUNG NODULE: ICD-10-CM

## 2024-06-12 NOTE — TELEPHONE ENCOUNTER
Dr. Terrell    Pt stating did not receive message regarding CT Chest.     Reviewed CT Chest with patient and spouse, concerned about emphysema and nodule.     Requesting call back from Dr. Terrell to review in more depth.

## 2024-06-12 NOTE — TELEPHONE ENCOUNTER
Called patient twice. No answer. Left message on machine that findings are similar as to what Dr. Terrell discussed with him and that he will need 6 month follow-up scan. Advised to call back with any follow-up questions.

## 2024-06-13 PROBLEM — J43.2 CENTRILOBULAR EMPHYSEMA (HCC): Status: ACTIVE | Noted: 2024-06-13

## 2024-07-07 PROBLEM — J18.9 PNEUMONIA: Status: RESOLVED | Noted: 2024-06-07 | Resolved: 2024-07-07

## 2024-08-26 ENCOUNTER — APPOINTMENT (OUTPATIENT)
Dept: NON INVASIVE DIAGNOSTICS | Facility: HOSPITAL | Age: 69
DRG: 202 | End: 2024-08-26
Payer: COMMERCIAL

## 2024-08-26 ENCOUNTER — HOSPITAL ENCOUNTER (INPATIENT)
Facility: HOSPITAL | Age: 69
LOS: 1 days | Discharge: HOME | DRG: 202 | End: 2024-08-28
Admitting: INTERNAL MEDICINE
Payer: COMMERCIAL

## 2024-08-26 ENCOUNTER — APPOINTMENT (EMERGENCY)
Dept: RADIOLOGY | Facility: HOSPITAL | Age: 69
DRG: 202 | End: 2024-08-26
Payer: COMMERCIAL

## 2024-08-26 DIAGNOSIS — J45.50 SEVERE PERSISTENT ASTHMA, UNSPECIFIED WHETHER COMPLICATED: ICD-10-CM

## 2024-08-26 DIAGNOSIS — R06.00 DYSPNEA: ICD-10-CM

## 2024-08-26 DIAGNOSIS — R07.9 CHEST PAIN: ICD-10-CM

## 2024-08-26 DIAGNOSIS — I10 HYPERTENSION: Primary | ICD-10-CM

## 2024-08-26 LAB
2HR DELTA HS TROPONIN: 0 NG/L
4HR DELTA HS TROPONIN: 0 NG/L
ALBUMIN SERPL BCG-MCNC: 3.8 G/DL (ref 3.5–5)
ALP SERPL-CCNC: 61 U/L (ref 34–104)
ALT SERPL W P-5'-P-CCNC: 27 U/L (ref 7–52)
ANION GAP SERPL CALCULATED.3IONS-SCNC: 7 MMOL/L (ref 4–13)
AST SERPL W P-5'-P-CCNC: 20 U/L (ref 13–39)
ATRIAL RATE: 90 BPM
BASOPHILS # BLD AUTO: 0.04 THOUSANDS/ÂΜL (ref 0–0.1)
BASOPHILS NFR BLD AUTO: 0 % (ref 0–1)
BILIRUB SERPL-MCNC: 0.34 MG/DL (ref 0.2–1)
BNP SERPL-MCNC: 81 PG/ML (ref 0–100)
BUN SERPL-MCNC: 19 MG/DL (ref 5–25)
CALCIUM SERPL-MCNC: 9.4 MG/DL (ref 8.4–10.2)
CARDIAC TROPONIN I PNL SERPL HS: 7 NG/L
CHLORIDE SERPL-SCNC: 104 MMOL/L (ref 96–108)
CO2 SERPL-SCNC: 27 MMOL/L (ref 21–32)
CREAT SERPL-MCNC: 0.81 MG/DL (ref 0.6–1.3)
D DIMER PPP FEU-MCNC: 0.41 UG/ML FEU
EOSINOPHIL # BLD AUTO: 0.28 THOUSAND/ÂΜL (ref 0–0.61)
EOSINOPHIL NFR BLD AUTO: 3 % (ref 0–6)
ERYTHROCYTE [DISTWIDTH] IN BLOOD BY AUTOMATED COUNT: 16.1 % (ref 11.6–15.1)
GFR SERPL CREATININE-BSD FRML MDRD: 90 ML/MIN/1.73SQ M
GLUCOSE SERPL-MCNC: 96 MG/DL (ref 65–140)
HCT VFR BLD AUTO: 38.6 % (ref 36.5–49.3)
HGB BLD-MCNC: 12.1 G/DL (ref 12–17)
IMM GRANULOCYTES # BLD AUTO: 0.04 THOUSAND/UL (ref 0–0.2)
IMM GRANULOCYTES NFR BLD AUTO: 0 % (ref 0–2)
LYMPHOCYTES # BLD AUTO: 1.98 THOUSANDS/ÂΜL (ref 0.6–4.47)
LYMPHOCYTES NFR BLD AUTO: 19 % (ref 14–44)
MCH RBC QN AUTO: 25.3 PG (ref 26.8–34.3)
MCHC RBC AUTO-ENTMCNC: 31.3 G/DL (ref 31.4–37.4)
MCV RBC AUTO: 81 FL (ref 82–98)
MONOCYTES # BLD AUTO: 0.89 THOUSAND/ÂΜL (ref 0.17–1.22)
MONOCYTES NFR BLD AUTO: 9 % (ref 4–12)
NEUTROPHILS # BLD AUTO: 7.02 THOUSANDS/ÂΜL (ref 1.85–7.62)
NEUTS SEG NFR BLD AUTO: 69 % (ref 43–75)
NRBC BLD AUTO-RTO: 0 /100 WBCS
P AXIS: 65 DEGREES
PLATELET # BLD AUTO: 372 THOUSANDS/UL (ref 149–390)
PMV BLD AUTO: 8.2 FL (ref 8.9–12.7)
POTASSIUM SERPL-SCNC: 4.1 MMOL/L (ref 3.5–5.3)
PR INTERVAL: 164 MS
PROT SERPL-MCNC: 6.9 G/DL (ref 6.4–8.4)
QRS AXIS: 36 DEGREES
QRSD INTERVAL: 78 MS
QT INTERVAL: 350 MS
QTC INTERVAL: 428 MS
RBC # BLD AUTO: 4.79 MILLION/UL (ref 3.88–5.62)
SODIUM SERPL-SCNC: 138 MMOL/L (ref 135–147)
T WAVE AXIS: 72 DEGREES
VENTRICULAR RATE: 90 BPM
WBC # BLD AUTO: 10.25 THOUSAND/UL (ref 4.31–10.16)

## 2024-08-26 PROCEDURE — 84484 ASSAY OF TROPONIN QUANT: CPT

## 2024-08-26 PROCEDURE — 85025 COMPLETE CBC W/AUTO DIFF WBC: CPT

## 2024-08-26 PROCEDURE — 99223 1ST HOSP IP/OBS HIGH 75: CPT | Performed by: STUDENT IN AN ORGANIZED HEALTH CARE EDUCATION/TRAINING PROGRAM

## 2024-08-26 PROCEDURE — 93005 ELECTROCARDIOGRAM TRACING: CPT

## 2024-08-26 PROCEDURE — 99285 EMERGENCY DEPT VISIT HI MDM: CPT

## 2024-08-26 PROCEDURE — 93306 TTE W/DOPPLER COMPLETE: CPT

## 2024-08-26 PROCEDURE — 83880 ASSAY OF NATRIURETIC PEPTIDE: CPT

## 2024-08-26 PROCEDURE — 93306 TTE W/DOPPLER COMPLETE: CPT | Performed by: INTERNAL MEDICINE

## 2024-08-26 PROCEDURE — 80053 COMPREHEN METABOLIC PANEL: CPT

## 2024-08-26 PROCEDURE — 71046 X-RAY EXAM CHEST 2 VIEWS: CPT

## 2024-08-26 PROCEDURE — 93010 ELECTROCARDIOGRAM REPORT: CPT | Performed by: STUDENT IN AN ORGANIZED HEALTH CARE EDUCATION/TRAINING PROGRAM

## 2024-08-26 PROCEDURE — 36415 COLL VENOUS BLD VENIPUNCTURE: CPT

## 2024-08-26 PROCEDURE — 85379 FIBRIN DEGRADATION QUANT: CPT

## 2024-08-26 PROCEDURE — 96374 THER/PROPH/DIAG INJ IV PUSH: CPT

## 2024-08-26 RX ORDER — OXYCODONE HYDROCHLORIDE 10 MG/1
10 TABLET ORAL DAILY PRN
Status: DISCONTINUED | OUTPATIENT
Start: 2024-08-26 | End: 2024-08-27

## 2024-08-26 RX ORDER — TEMAZEPAM 15 MG/1
30 CAPSULE ORAL
Status: DISCONTINUED | OUTPATIENT
Start: 2024-08-26 | End: 2024-08-28 | Stop reason: HOSPADM

## 2024-08-26 RX ORDER — AMLODIPINE BESYLATE 10 MG/1
10 TABLET ORAL DAILY
Status: DISCONTINUED | OUTPATIENT
Start: 2024-08-26 | End: 2024-08-28 | Stop reason: HOSPADM

## 2024-08-26 RX ORDER — MECLIZINE HYDROCHLORIDE 25 MG/1
25 TABLET ORAL ONCE
Status: COMPLETED | OUTPATIENT
Start: 2024-08-26 | End: 2024-08-26

## 2024-08-26 RX ORDER — LISINOPRIL 20 MG/1
40 TABLET ORAL DAILY
Status: DISCONTINUED | OUTPATIENT
Start: 2024-08-26 | End: 2024-08-28 | Stop reason: HOSPADM

## 2024-08-26 RX ORDER — TAMSULOSIN HYDROCHLORIDE 0.4 MG/1
0.4 CAPSULE ORAL DAILY
Status: DISCONTINUED | OUTPATIENT
Start: 2024-08-26 | End: 2024-08-28 | Stop reason: HOSPADM

## 2024-08-26 RX ORDER — HEPARIN SODIUM 5000 [USP'U]/ML
5000 INJECTION, SOLUTION INTRAVENOUS; SUBCUTANEOUS EVERY 8 HOURS SCHEDULED
Status: DISCONTINUED | OUTPATIENT
Start: 2024-08-26 | End: 2024-08-28 | Stop reason: HOSPADM

## 2024-08-26 RX ORDER — HYDROXYZINE HYDROCHLORIDE 25 MG/1
25 TABLET, FILM COATED ORAL EVERY 6 HOURS PRN
Status: DISCONTINUED | OUTPATIENT
Start: 2024-08-26 | End: 2024-08-28 | Stop reason: HOSPADM

## 2024-08-26 RX ORDER — BUPROPION HYDROCHLORIDE 150 MG/1
300 TABLET ORAL DAILY
Status: DISCONTINUED | OUTPATIENT
Start: 2024-08-26 | End: 2024-08-28 | Stop reason: HOSPADM

## 2024-08-26 RX ORDER — LORAZEPAM 2 MG/ML
0.5 INJECTION INTRAMUSCULAR ONCE
Status: COMPLETED | OUTPATIENT
Start: 2024-08-26 | End: 2024-08-26

## 2024-08-26 RX ORDER — VENLAFAXINE HYDROCHLORIDE 75 MG/1
75 CAPSULE, EXTENDED RELEASE ORAL DAILY
Status: DISCONTINUED | OUTPATIENT
Start: 2024-08-26 | End: 2024-08-28 | Stop reason: HOSPADM

## 2024-08-26 RX ORDER — ATORVASTATIN CALCIUM 10 MG/1
10 TABLET, FILM COATED ORAL DAILY
Status: DISCONTINUED | OUTPATIENT
Start: 2024-08-26 | End: 2024-08-28 | Stop reason: HOSPADM

## 2024-08-26 RX ORDER — NITROGLYCERIN 0.4 MG/1
0.4 TABLET SUBLINGUAL
Status: DISCONTINUED | OUTPATIENT
Start: 2024-08-26 | End: 2024-08-28 | Stop reason: HOSPADM

## 2024-08-26 RX ORDER — LORAZEPAM 0.5 MG/1
0.5 TABLET ORAL EVERY 8 HOURS PRN
Status: DISCONTINUED | OUTPATIENT
Start: 2024-08-26 | End: 2024-08-28 | Stop reason: HOSPADM

## 2024-08-26 RX ADMIN — MECLIZINE HYDROCHLORIDE 25 MG: 25 TABLET ORAL at 13:29

## 2024-08-26 RX ADMIN — HEPARIN SODIUM 5000 UNITS: 5000 INJECTION INTRAVENOUS; SUBCUTANEOUS at 22:29

## 2024-08-26 RX ADMIN — TAMSULOSIN HYDROCHLORIDE 0.4 MG: 0.4 CAPSULE ORAL at 22:30

## 2024-08-26 RX ADMIN — LORAZEPAM 0.5 MG: 2 INJECTION INTRAMUSCULAR; INTRAVENOUS at 13:29

## 2024-08-26 RX ADMIN — OXYCODONE HYDROCHLORIDE 10 MG: 10 TABLET ORAL at 19:28

## 2024-08-26 RX ADMIN — TEMAZEPAM 30 MG: 15 CAPSULE ORAL at 22:29

## 2024-08-26 RX ADMIN — LORAZEPAM 0.5 MG: 0.5 TABLET ORAL at 20:38

## 2024-08-26 NOTE — ASSESSMENT & PLAN NOTE
Presenting with chest pain  No previous cardiac history  Prediabetic, lipid panel with good control from 2023  Troponin's negative thus  Monitor on telemetry, check ECHO   If symptoms persist, consult cardiology

## 2024-08-26 NOTE — ED PROVIDER NOTES
"History  Chief Complaint   Patient presents with    Shortness of Breath     Increased SOB for the past 2 weeks. Hx of COPD. CPAP at night. Use of nebs and inhaler provided no relief.      Patient is a 68-year-old male with a past medical history significant for CASSIE, asthma presenting to the emergency department for evaluation of shortness of breath.  Patient is accompanied by his wife who augments his story.  He notes significant shortness of breath with exertion over the past 3 weeks.  They note the recent son's death last week that has been a significant stressor for the patient.  He reports \"I feel like himself again\" and \"I cannot get into my lungs \".  He notes over the last 3 days he has had acutely worsening shortness of breath with associated chest tightness.  He does report using BiPAP at night.  He denies cough, fever, lower extremity swelling. Overall patient reports his dyspnea has worsened. When asked what brought patient to the ER today his wife reports that they wanted to get in sooner but the death of their son was a great stressor and they are just not getting in.        Prior to Admission Medications   Prescriptions Last Dose Informant Patient Reported? Taking?   ARIPiprazole (ABILIFY) 10 mg tablet 8/26/2024 Self Yes Yes   ARIPiprazole (ABILIFY) 15 mg tablet Unknown  Yes No   Austedo 12 MG TABS Unknown  Yes No   Budeson-Glycopyrrol-Formoterol (Breztri Aerosphere) 160-9-4.8 MCG/ACT AERO Unknown  No No   Sig: Inhale 2 puffs 2 (two) times a day Rinse mouth after use.    MG tablet Unknown Self Yes No   OneTouch Ultra test strip Unknown Self Yes No   Sig: TEST DAILY.. DIAGNOSIS E11.9   albuterol (ACCUNEB) 1.25 MG/3ML nebulizer solution Past Week  Yes Yes   Sig: Take 1.25 mg by nebulization every 6 (six) hours as needed for wheezing   albuterol (PROVENTIL HFA,VENTOLIN HFA) 90 mcg/act inhaler Past Week Self Yes Yes   Sig: Inhale 2 puffs every 6 (six) hours as needed   amLODIPine-benazepril (LOTREL) " 10-40 MG per capsule 2024 Self Yes Yes   aspirin (ECOTRIN LOW STRENGTH) 81 mg EC tablet 2024 Self Yes Yes   Sig: Take 81 mg by mouth daily   atorvastatin (LIPITOR) 10 mg tablet 2024 Self Yes Yes   Sig: Take 10 mg by mouth daily   buPROPion (WELLBUTRIN XL) 300 mg 24 hr tablet 2024 Self Yes Yes   Si tab(s)   esomeprazole (NexIUM) 40 MG capsule Unknown Self Yes No   Sig: Take 40 mg by mouth   metFORMIN (GLUCOPHAGE) 500 mg tablet 2024 Self Yes Yes   Sig: Take 1 tablet by mouth 2 (two) times a day with meals   montelukast (SINGULAIR) 10 mg tablet 2024 Self Yes Yes   Sig: Take 10 mg by mouth   multivitamin (THERAGRAN) TABS 2024 Self Yes Yes   Sig: Take 1 tablet by mouth   nystatin (MYCOSTATIN) 500,000 units/5 mL suspension Unknown  Yes No   Sig: TAKE 5 ML (1 TSP) BY MOUTH (SWISH, GARGLE, THEN SPIT) EVERY 6 HOURS FOR TREATMENT OF ORAL THRUSH.   oxyCODONE (ROXICODONE) 5 mg immediate release tablet 2024 Self Yes Yes   Sig: Take 5 mg by mouth every 6 (six) hours as needed   tamsulosin (FLOMAX) 0.4 mg 2024 Self Yes Yes   Sig: Take 0.4 mg by mouth daily   temazepam (RESTORIL) 30 mg capsule 2024 Self Yes Yes   Sig: Take 30 mg by mouth daily at bedtime as needed   tiZANidine (ZANAFLEX) 4 mg tablet 2024 Self Yes Yes   venlafaxine (EFFEXOR-XR) 75 mg 24 hr capsule 2024 Self Yes Yes   Si cap(s)      Facility-Administered Medications: None       Past Medical History:   Diagnosis Date    Dementia (HCC)     History of stroke 2019    Major depressive disorder with current active episode 2019    Sleep apnea     Stroke (HCC)     Urethral disorder 2018       Past Surgical History:   Procedure Laterality Date    BACK SURGERY      ELBOW SURGERY      KNEE SURGERY      NECK SURGERY      SHOULDER SURGERY         Family History   Family history unknown: Yes     I have reviewed and agree with the history as documented.    E-Cigarette/Vaping    E-Cigarette Use  Never User      E-Cigarette/Vaping Substances    Nicotine No     THC No     CBD Yes     Flavoring No     Other No     Unknown No      Social History     Tobacco Use    Smoking status: Never     Passive exposure: Never    Smokeless tobacco: Never    Tobacco comments:     Patient admits to using marijuana, edibles   Vaping Use    Vaping status: Never Used   Substance Use Topics    Alcohol use: Not Currently    Drug use: Yes     Types: Marijuana       Review of Systems   Constitutional:  Negative for chills and fever.   HENT:  Negative for ear pain and sore throat.    Eyes:  Negative for pain and visual disturbance.   Respiratory:  Positive for chest tightness and shortness of breath. Negative for cough.    Cardiovascular:  Negative for chest pain and palpitations.   Gastrointestinal:  Negative for abdominal pain and vomiting.   Genitourinary:  Negative for dysuria and hematuria.   Musculoskeletal:  Negative for arthralgias and back pain.   Skin:  Negative for color change and rash.   Neurological:  Negative for seizures and syncope.   All other systems reviewed and are negative.      Physical Exam  Physical Exam  Vitals and nursing note reviewed.   Constitutional:       General: He is not in acute distress.     Appearance: Normal appearance. He is not ill-appearing, toxic-appearing or diaphoretic.      Comments: Patient sitting in no acute distress, answering questions in full sentences.   HENT:      Head: Normocephalic and atraumatic.   Eyes:      General: No scleral icterus.        Right eye: No discharge.         Left eye: No discharge.      Extraocular Movements: Extraocular movements intact.      Conjunctiva/sclera: Conjunctivae normal.   Cardiovascular:      Rate and Rhythm: Normal rate.      Pulses: Normal pulses.      Heart sounds: Normal heart sounds. No murmur heard.     No friction rub. No gallop.      Comments: 2+ radial pulses bilaterally  Pulmonary:      Effort: Pulmonary effort is normal. No respiratory  distress.      Breath sounds: Normal breath sounds. No stridor. No wheezing, rhonchi or rales.   Abdominal:      General: Abdomen is flat. Bowel sounds are normal. There is no distension.      Palpations: Abdomen is soft.      Tenderness: There is no abdominal tenderness. There is no guarding or rebound.   Musculoskeletal:         General: No swelling. Normal range of motion.      Cervical back: Normal range of motion. No rigidity.      Right lower leg: No edema.      Left lower leg: No edema.   Skin:     General: Skin is warm and dry.      Capillary Refill: Capillary refill takes less than 2 seconds.      Coloration: Skin is not jaundiced.      Findings: No bruising or lesion.   Neurological:      General: No focal deficit present.      Mental Status: He is alert and oriented to person, place, and time. Mental status is at baseline.   Psychiatric:         Mood and Affect: Mood normal.         Behavior: Behavior normal.         Thought Content: Thought content normal.         Judgment: Judgment normal.         Vital Signs  ED Triage Vitals   Temperature Pulse Respirations Blood Pressure SpO2   08/26/24 1236 08/26/24 1236 08/26/24 1236 08/26/24 1236 08/26/24 1236   98 °F (36.7 °C) 92 20 (!) 178/78 96 %      Temp Source Heart Rate Source Patient Position - Orthostatic VS BP Location FiO2 (%)   08/26/24 1236 08/26/24 1236 08/26/24 1236 08/26/24 1236 --   Oral Monitor Sitting Right arm       Pain Score       08/26/24 1802       No Pain           Vitals:    08/26/24 1542 08/26/24 1648 08/26/24 2100 08/27/24 0300   BP: 164/79 164/79 124/78 146/83   Pulse: 91 90 78    Patient Position - Orthostatic VS: Lying            Visual Acuity      ED Medications  Medications   amLODIPine (NORVASC) tablet 10 mg (10 mg Oral Given 8/27/24 0842)     And   lisinopril (ZESTRIL) tablet 40 mg (40 mg Oral Given 8/27/24 0842)   atorvastatin (LIPITOR) tablet 10 mg (10 mg Oral Given 8/27/24 0842)   aspirin (ECOTRIN LOW STRENGTH) EC tablet 81 mg  (81 mg Oral Given 8/27/24 0842)   buPROPion (WELLBUTRIN XL) 24 hr tablet 300 mg (300 mg Oral Given 8/27/24 0842)   tamsulosin (FLOMAX) capsule 0.4 mg (0.4 mg Oral Given 8/27/24 0842)   temazepam (RESTORIL) capsule 30 mg (30 mg Oral Given 8/26/24 2229)   venlafaxine (EFFEXOR-XR) 24 hr capsule 75 mg (75 mg Oral Given 8/27/24 0842)   heparin (porcine) subcutaneous injection 5,000 Units (5,000 Units Subcutaneous Given 8/27/24 1300)   nitroglycerin (NITROSTAT) SL tablet 0.4 mg (has no administration in time range)   hydrOXYzine HCL (ATARAX) tablet 25 mg (has no administration in time range)   LORazepam (ATIVAN) tablet 0.5 mg (0.5 mg Oral Given 8/27/24 1005)   fluticasone-vilanterol 200-25 mcg/actuation 1 puff (1 puff Inhalation Given 8/27/24 1300)   umeclidinium 62.5 mcg/actuation inhaler AEPB 1 puff (1 puff Inhalation Given 8/27/24 1300)   methylPREDNISolone sodium succinate (Solu-MEDROL) injection 40 mg (40 mg Intravenous Given 8/27/24 1227)   levalbuterol (XOPENEX) inhalation solution 1.25 mg (1.25 mg Nebulization Given 8/27/24 1349)   oxyCODONE (ROXICODONE) IR tablet 5 mg (has no administration in time range)   meclizine (ANTIVERT) tablet 25 mg (25 mg Oral Given 8/26/24 1329)   LORazepam (ATIVAN) injection 0.5 mg (0.5 mg Intravenous Given 8/26/24 1329)       Diagnostic Studies  Results Reviewed       Procedure Component Value Units Date/Time    HS Troponin I 4hr [[id number]]  (Normal) Collected: 08/26/24 1623    Lab Status: Final result Specimen: Blood from Arm, Right Updated: 08/26/24 1656     hs TnI 4hr 7 ng/L      Delta 4hr hsTnI 0 ng/L     HS Troponin I 2hr [[id number]]  (Normal) Collected: 08/26/24 1501    Lab Status: Final result Specimen: Blood from Arm, Left Updated: 08/26/24 1534     hs TnI 2hr 7 ng/L      Delta 2hr hsTnI 0 ng/L     HS Troponin 0hr (reflex protocol) [[id number]]  (Normal) Collected: 08/26/24 1314    Lab Status: Final result Specimen: Blood from Arm, Left Updated: 08/26/24 1345     hs TnI 0hr 7  ng/L     B-Type Natriuretic Peptide(BNP) [[id number]]  (Normal) Collected: 08/26/24 1314    Lab Status: Final result Specimen: Blood from Arm, Left Updated: 08/26/24 1345     BNP 81 pg/mL     Comprehensive metabolic panel [[id number]] Collected: 08/26/24 1314    Lab Status: Final result Specimen: Blood from Arm, Left Updated: 08/26/24 1338     Sodium 138 mmol/L      Potassium 4.1 mmol/L      Chloride 104 mmol/L      CO2 27 mmol/L      ANION GAP 7 mmol/L      BUN 19 mg/dL      Creatinine 0.81 mg/dL      Glucose 96 mg/dL      Calcium 9.4 mg/dL      AST 20 U/L      ALT 27 U/L      Alkaline Phosphatase 61 U/L      Total Protein 6.9 g/dL      Albumin 3.8 g/dL      Total Bilirubin 0.34 mg/dL      eGFR 90 ml/min/1.73sq m     Narrative:      [employer] guidelines for Chronic Kidney Disease (CKD):     Stage 1 with normal or high GFR (GFR > 90 mL/min/1.73 square meters)    Stage 2 Mild CKD (GFR = 60-89 mL/min/1.73 square meters)    Stage 3A Moderate CKD (GFR = 45-59 mL/min/1.73 square meters)    Stage 3B Moderate CKD (GFR = 30-44 mL/min/1.73 square meters)    Stage 4 Severe CKD (GFR = 15-29 mL/min/1.73 square meters)    Stage 5 End Stage CKD (GFR <15 mL/min/1.73 square meters)  Note: GFR calculation is accurate only with a steady state creatinine    D-Dimer [[id number]]  (Normal) Collected: 08/26/24 1314    Lab Status: Final result Specimen: Blood from Arm, Left Updated: 08/26/24 1338     D-Dimer, Quant 0.41 ug/ml FEU     Narrative:      In the evaluation for possible pulmonary embolism, in the appropriate (Well's Score of 4 or less) patient, the age adjusted d-dimer cutoff for this patient can be calculated as:    Age x 0.01 (in ug/mL) for Age-adjusted D-dimer exclusion threshold for a patient over 50 years.    CBC and differential [[id number]]  (Abnormal) Collected: 08/26/24 1314    Lab Status: Final result Specimen: Blood from Arm, Left Updated: 08/26/24 1323     WBC 10.25 Thousand/uL      RBC 4.79  Million/uL      Hemoglobin 12.1 g/dL      Hematocrit 38.6 %      MCV 81 fL      MCH 25.3 pg      MCHC 31.3 g/dL      RDW 16.1 %      MPV 8.2 fL      Platelets 372 Thousands/uL      nRBC 0 /100 WBCs      Segmented % 69 %      Immature Grans % 0 %      Lymphocytes % 19 %      Monocytes % 9 %      Eosinophils Relative 3 %      Basophils Relative 0 %      Absolute Neutrophils 7.02 Thousands/µL      Absolute Immature Grans 0.04 Thousand/uL      Absolute Lymphocytes 1.98 Thousands/µL      Absolute Monocytes 0.89 Thousand/µL      Eosinophils Absolute 0.28 Thousand/µL      Basophils Absolute 0.04 Thousands/µL                    XR chest 2 views   Final Result by Naida Grady MD (08/26 2013)      No acute cardiopulmonary disease.            Workstation performed: [id number]                    Procedures  Procedures         ED Course  ED Course as of 08/27/24 1513   Mon Aug 26, 2024   1322 ECG interpretation by me.  Normal sinus rhythm at 90.  Normal axis.  Normal intervals.  No acute ST or T wave changes.   1325 CBC and differential(!)  Improvement in WBC from prior   1339 Comprehensive metabolic panel   1339 D-Dimer, Quant: 0.41   1345 BNP: 81   1345 hs TnI 0hr: 7               HEART Risk Score      Flowsheet Row Most Recent Value   Heart Score Risk Calculator    History 1 Filed at: 08/26/2024 1416   ECG 1 Filed at: 08/26/2024 1416   Age 2 Filed at: 08/26/2024 1416   Risk Factors 1 Filed at: 08/26/2024 1416   Troponin 0 Filed at: 08/26/2024 1416   HEART Score 5 Filed at: 08/26/2024 1416                          SBIRT 22yo+      Flowsheet Row Most Recent Value   Initial Alcohol Screen: US AUDIT-C     1. How often do you have a drink containing alcohol? 0 Filed at: 08/26/2024 1302   2. How many drinks containing alcohol do you have on a typical day you are drinking?  0 Filed at: 08/26/2024 1302   3b. FEMALE Any Age, or MALE 65+: How often do you have 4 or more drinks on one occassion? 0 Filed at: 08/26/2024 1302    Audit-C Score 0 Filed at: 08/26/2024 1302   LETICIA: How many times in the past year have you...    Used an illegal drug or used a prescription medication for non-medical reasons? Never Filed at: 08/26/2024 1302                      Medical Decision Making  68-year-old male presenting to emergency department for evaluation of worsening shortness of breath    DDX including but not limited to: pneumonia, pleural effusion, CHF, SCAPE, PE, PTX, ACS, MI, asthma exacerbation, anemia, metabolic abnormality, renal failure.    Plan blood work, imaging    Patient evaluation was unrevealing for cause of shortness of breath and chest pressure.  Concern over patient's story, feel this patient would benefit from admission for chest pain rule out.  Patient admitted.  No acute ischemic changes on ECG while in the emergency department, acute distress.  Patient remained hemodynamically stable while in the emergency department.      Amount and/or Complexity of Data Reviewed  Labs: ordered. Decision-making details documented in ED Course.  Radiology: ordered.    Risk  Prescription drug management.  Decision regarding hospitalization.                 Disposition  Final diagnoses:   Hypertension   Dyspnea   Chest pain     Time reflects when diagnosis was documented in both MDM as applicable and the Disposition within this note       Time User Action Codes Description Comment    8/26/2024  2:16 PM Chriss Singer Add [I10] Hypertension     8/26/2024  2:16 PM Chriss Singer Add [R06.00] Dyspnea     8/26/2024  2:16 PM Chriss Singer Add [R07.9] Chest pain     8/27/2024 10:48 AM Dario Gomes Add [J45.50] Severe persistent asthma, unspecified whether complicated           ED Disposition       ED Disposition   Admit    Condition   Stable    Date/Time   Mon Aug 26, 2024 1417    Comment   Case was discussed with johnahtan and the patient's admission status was agreed to be Admission Status: observation status to the service of Dr. mann .                Follow-up Information    None         Current Discharge Medication List        CONTINUE these medications which have NOT CHANGED    Details   albuterol (ACCUNEB) 1.25 MG/3ML nebulizer solution Take 1.25 mg by nebulization every 6 (six) hours as needed for wheezing      albuterol (PROVENTIL HFA,VENTOLIN HFA) 90 mcg/act inhaler Inhale 2 puffs every 6 (six) hours as needed    Comments: Substitution to a formulary equivalent within the same pharmaceutical class is authorized.      amLODIPine-benazepril (LOTREL) 10-40 MG per capsule       !! ARIPiprazole (ABILIFY) 10 mg tablet       aspirin (ECOTRIN LOW STRENGTH) 81 mg EC tablet Take 81 mg by mouth daily      atorvastatin (LIPITOR) 10 mg tablet Take 10 mg by mouth daily      buPROPion (WELLBUTRIN XL) 300 mg 24 hr tablet 1 tab(s)      metFORMIN (GLUCOPHAGE) 500 mg tablet Take 1 tablet by mouth 2 (two) times a day with meals      montelukast (SINGULAIR) 10 mg tablet Take 10 mg by mouth      multivitamin (THERAGRAN) TABS Take 1 tablet by mouth      oxyCODONE (ROXICODONE) 5 mg immediate release tablet Take 5 mg by mouth every 6 (six) hours as needed      tamsulosin (FLOMAX) 0.4 mg Take 0.4 mg by mouth daily      temazepam (RESTORIL) 30 mg capsule Take 30 mg by mouth daily at bedtime as needed      tiZANidine (ZANAFLEX) 4 mg tablet       venlafaxine (EFFEXOR-XR) 75 mg 24 hr capsule 1 cap(s)      !! ARIPiprazole (ABILIFY) 15 mg tablet       Austedo 12 MG TABS       Budeson-Glycopyrrol-Formoterol (Breztri Aerosphere) 160-9-4.8 MCG/ACT AERO Inhale 2 puffs 2 (two) times a day Rinse mouth after use.  Qty: 32.1 g, Refills: 3    Associated Diagnoses: Mild intermittent asthma without complication      esomeprazole (NexIUM) 40 MG capsule Take 40 mg by mouth       MG tablet       nystatin (MYCOSTATIN) 500,000 units/5 mL suspension TAKE 5 ML (1 TSP) BY MOUTH (SWISH, GARGLE, THEN SPIT) EVERY 6 HOURS FOR TREATMENT OF ORAL THRUSH.      OneTouch Ultra test strip TEST  DAILY.. DIAGNOSIS E11.9       !! - Potential duplicate medications found. Please discuss with provider.          No discharge procedures on file.    PDMP Review         Value Time User    PDMP Reviewed  Yes 8/26/2024  7:22 PM JAMIL Tobin            ED Provider  Electronically Signed by             Chriss Singer DO  08/27/24 1899

## 2024-08-26 NOTE — H&P
Critical access hospital  H&P  Name: Fahad Hernandez 68 y.o. male I MRN: [id number]  Unit/Bed#: FT 04 I Date of Admission: 2024   Date of Service: 2024 I Hospital Day: 0      Assessment & Plan   * Chest pain  Assessment & Plan  Presenting with chest pain  No previous cardiac history  Prediabetic, lipid panel with good control from   Troponin's negative thus  Monitor on telemetry, check ECHO   If symptoms persist, consult cardiology     Hypertension  Assessment & Plan  Could be better controlled  Likely exacerbated by pain  Start home meds  Monitor blood pressure           VTE Pharmacologic Prophylaxis:   Moderate Risk (Score 3-4) - Pharmacological DVT Prophylaxis Ordered: heparin.  Code Status: Level 1 - Full Code   Discussion with family: Patient declined call to .     Anticipated Length of Stay: Patient will be admitted on an observation basis with an anticipated length of stay of less than 2 midnights secondary to chest pain .    Total Time Spent on Date of Encounter in care of patient: 30+ mins. This time was spent on one or more of the following: performing physical exam; counseling and coordination of care; obtaining or reviewing history; documenting in the medical record; reviewing/ordering tests, medications or procedures; communicating with other healthcare professionals and discussing with patient's family/caregivers.    Chief Complaint: chest pain    History of Present Illness:  Fahad Hernandez is a 68 y.o. male with a PMH of htn who presents with chest pain. Patient reports chest pain with shortness of breath that has been progressively worsening. His son  last week and he reports it's been a significant stressor in his life. He came to the ED for further evaluation and was admitted for chest pain.     Review of Systems:  Review of Systems   Constitutional:  Negative for chills and fever.   HENT:  Negative for ear pain and sore throat.    Eyes:  Negative for pain  and visual disturbance.   Respiratory:  Negative for cough and shortness of breath.    Cardiovascular:  Positive for chest pain. Negative for palpitations.   Gastrointestinal:  Negative for abdominal pain and vomiting.   Genitourinary:  Negative for dysuria and hematuria.   Musculoskeletal:  Negative for arthralgias and back pain.   Skin:  Negative for color change and rash.   Neurological:  Negative for seizures and syncope.   All other systems reviewed and are negative.      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 Authorizing Provider   albuterol (ACCUNEB) 1.25 MG/3ML nebulizer solution Take 1.25 mg by nebulization every 6 (six) hours as needed for wheezing    Historical Provider, MD   albuterol (PROVENTIL HFA,VENTOLIN HFA) 90 mcg/act inhaler Inhale 2 puffs every 6 (six) hours as needed 11/25/20   Historical Provider, MD   amLODIPine-benazepril (LOTREL) 10-40 MG per capsule  2/26/19   Historical Provider, MD   ARIPiprazole (ABILIFY) 10 mg tablet  9/28/20   Historical Provider, MD   ARIPiprazole (ABILIFY) 15 mg tablet  6/3/24   Historical Provider, MD   aspirin (ECOTRIN LOW STRENGTH) 81 mg EC tablet Take 81 mg by mouth daily    Historical Provider, MD   atorvastatin (LIPITOR) 10 mg tablet Take 10 mg by mouth daily 2/28/22   Historical Provider, MD   Austedo 12 MG TABS  5/30/24   Historical Provider, MD   Budeson-Glycopyrrol-Formoterol (Breztri Aerosphere) 160-9-4.8 MCG/ACT AERO Inhale 2 puffs 2 (two) times a day Rinse mouth after use. 6/7/24    Varun Terrell MD   buPROPion (WELLBUTRIN XL) 300 mg 24 hr tablet 1 tab(s)    Historical Provider, MD   esomeprazole (NexIUM) 40 MG capsule Take 40 mg by mouth    Historical Provider, MD    MG tablet  3/14/23   Historical Provider, MD   metFORMIN (GLUCOPHAGE) 500 mg tablet Take 1 tablet by mouth 2 (two) times a day with meals 2/14/22   Historical Provider, MD   montelukast (SINGULAIR) 10 mg tablet Take 10 mg by mouth 11/25/20   Historical Provider, MD   multivitamin (THERAGRAN) TABS Take 1 tablet by mouth    Historical Provider, MD   nystatin (MYCOSTATIN) 500,000 units/5 mL suspension TAKE 5 ML (1 TSP) BY MOUTH (SWISH, GARGLE, THEN SPIT) EVERY 6 HOURS FOR TREATMENT OF ORAL THRUSH. 5/9/24   Historical Provider, MD   OneTouch Ultra test strip TEST DAILY.. DIAGNOSIS E11.9 3/28/23   Historical Provider, MD   oxyCODONE (ROXICODONE) 5 mg immediate release tablet  10/20/20   Historical Provider, MD   tamsulosin (FLOMAX) 0.4 mg Take 0.4 mg by mouth daily 2/13/22   Historical Provider, MD   temazepam (RESTORIL) 30 mg capsule Take 30 mg by mouth daily at bedtime as needed 3/15/22   Historical Provider, MD   tiZANidine (ZANAFLEX) 4 mg tablet  3/8/22   Historical Provider, MD   venlafaxine (EFFEXOR-XR) 75 mg 24 hr capsule 1 cap(s)    Historical Provider, MD     I have reviewed home medications using recent Epic encounter.    Allergies:   Allergies   Allergen Reactions    Beta Adrenergic Blockers      Pt states they make him feel crappy.       Social History:  Marital Status: /Civil Union   Occupation: na  Patient Pre-hospital Living Situation: Home  Patient Pre-hospital Level of Mobility: walks  Patient Pre-hospital Diet Restrictions: na  Substance Use History:   Social History     Substance and Sexual Activity   Alcohol Use Not Currently     Social History     Tobacco Use   Smoking Status Never   Smokeless Tobacco Never   Tobacco Comments    Patient admits to using marijuana     Social History     Substance  and Sexual Activity   Drug Use Yes    Types: Marijuana       Family History:  History reviewed. No pertinent family history.    Physical Exam:     Vitals:   Blood Pressure: (!) 178/78 (08/26/24 1236)  Pulse: 92 (08/26/24 1236)  Temperature: 98 °F (36.7 °C) (08/26/24 1236)  Temp Source: Oral (08/26/24 1236)  Respirations: 20 (08/26/24 1236)  SpO2: 96 % (08/26/24 1236)    Physical Exam  Constitutional:       General: He is not in acute distress.     Appearance: Normal appearance. He is not toxic-appearing.   Cardiovascular:      Rate and Rhythm: Normal rate and regular rhythm.      Heart sounds: Normal heart sounds. No murmur heard.  Pulmonary:      Effort: Pulmonary effort is normal. No respiratory distress.      Breath sounds: Normal breath sounds. No wheezing.   Abdominal:      General: Abdomen is flat. There is no distension.      Palpations: Abdomen is soft.      Tenderness: There is no abdominal tenderness.   Neurological:      General: No focal deficit present.      Mental Status: He is alert and oriented to person, place, and time. Mental status is at baseline.      Motor: No weakness.          Additional Data:     Lab Results:  Results from last 7 days   Lab Units 08/26/24  1314   WBC Thousand/uL 10.25*   HEMOGLOBIN g/dL 12.1   HEMATOCRIT % 38.6   PLATELETS Thousands/uL 372   SEGS PCT % 69   LYMPHO PCT % 19   MONO PCT % 9   EOS PCT % 3     Results from last 7 days   Lab Units 08/26/24  1314   SODIUM mmol/L 138   POTASSIUM mmol/L 4.1   CHLORIDE mmol/L 104   CO2 mmol/L 27   BUN mg/dL 19   CREATININE mg/dL 0.81   ANION GAP mmol/L 7   CALCIUM mg/dL 9.4   ALBUMIN g/dL 3.8   TOTAL BILIRUBIN mg/dL 0.34   ALK PHOS U/L 61   ALT U/L 27   AST U/L 20   GLUCOSE RANDOM mg/dL 96             Lab Results   Component Value Date    HGBA1C 6.6 (H) 10/20/2023    HGBA1C 6.4 (H) 06/26/2023    HGBA1C 6.7 (H) 06/14/2023           Lines/Drains:  Invasive Devices       Peripheral Intravenous Line  Duration             Peripheral IV  08/26/24 Left Antecubital <1 day                        Imaging: Reviewed radiology reports from this admission including: chest xray  XR chest 2 views    (Results Pending)       EKG and Other Studies Reviewed on Admission:   EKG:  sinus.    ** Please Note: This note has been constructed using a voice recognition system. **

## 2024-08-26 NOTE — PLAN OF CARE
Problem: SAFETY ADULT  Goal: Patient will remain free of falls  Description: INTERVENTIONS:  - Educate patient/family on patient safety including physical limitations  - Instruct patient to call for assistance with activity   - Consult OT/PT to assist with strengthening/mobility   - Keep Call bell within reach  - Keep bed low and locked with side rails adjusted as appropriate  - Keep care items and personal belongings within reach  - Initiate and maintain comfort rounds  - Make Fall Risk Sign visible to staff  - Offer Toileting every 2 Hours, in advance of need  - Initiate/Maintain bed/chair alarm  - Obtain necessary fall risk management equipment: daily  - Apply yellow socks and bracelet for high fall risk patients  - Consider moving patient to room near nurses station  Outcome: Progressing

## 2024-08-27 ENCOUNTER — TELEPHONE (OUTPATIENT)
Age: 69
End: 2024-08-27

## 2024-08-27 PROBLEM — R06.02 SOB (SHORTNESS OF BREATH): Status: ACTIVE | Noted: 2024-08-27

## 2024-08-27 LAB
AORTIC ROOT: 3.7 CM
APICAL FOUR CHAMBER EJECTION FRACTION: 52 %
BSA FOR ECHO PROCEDURE: 1.92 M2
DOP CALC LVOT AREA: 3.14 CM2
DOP CALC LVOT DIAMETER: 2 CM
E WAVE DECELERATION TIME: 218 MS
E/A RATIO: 0.78
FRACTIONAL SHORTENING: 36 (ref 28–44)
INTERVENTRICULAR SEPTUM IN DIASTOLE (PARASTERNAL SHORT AXIS VIEW): 1.1 CM
INTERVENTRICULAR SEPTUM: 1.1 CM (ref 0.6–1.1)
LAAS-AP2: 10.1 CM2
LAAS-AP4: 9.5 CM2
LEFT ATRIUM AREA SYSTOLE SINGLE PLANE A4C: 10 CM2
LEFT ATRIUM SIZE: 3.1 CM
LEFT ATRIUM VOLUME (MOD BIPLANE): 20 ML
LEFT ATRIUM VOLUME INDEX (MOD BIPLANE): 10.4 ML/M2
LEFT INTERNAL DIMENSION IN SYSTOLE: 2.5 CM (ref 2.1–4)
LEFT VENTRICULAR INTERNAL DIMENSION IN DIASTOLE: 3.9 CM (ref 3.5–6)
LEFT VENTRICULAR POSTERIOR WALL IN END DIASTOLE: 1.2 CM
LEFT VENTRICULAR STROKE VOLUME: 43 ML
LVSV (TEICH): 43 ML
MV E'TISSUE VEL-SEP: 8 CM/S
MV PEAK A VEL: 1.04 M/S
MV PEAK E VEL: 81 CM/S
MV STENOSIS PRESSURE HALF TIME: 63 MS
MV VALVE AREA P 1/2 METHOD: 3.49
RIGHT ATRIUM AREA SYSTOLE A4C: 11.6 CM2
RIGHT VENTRICLE ID DIMENSION: 3 CM
SL CV LEFT ATRIUM LENGTH A2C: 4.1 CM
SL CV LV EF: 60
SL CV PED ECHO LEFT VENTRICLE DIASTOLIC VOLUME (MOD BIPLANE) 2D: 65 ML
SL CV PED ECHO LEFT VENTRICLE SYSTOLIC VOLUME (MOD BIPLANE) 2D: 22 ML
TR MAX PG: 6 MMHG
TR PEAK VELOCITY: 1.3 M/S
TRICUSPID ANNULAR PLANE SYSTOLIC EXCURSION: 2.4 CM
TRICUSPID VALVE PEAK REGURGITATION VELOCITY: 1.25 M/S

## 2024-08-27 PROCEDURE — 94760 N-INVAS EAR/PLS OXIMETRY 1: CPT

## 2024-08-27 PROCEDURE — 94660 CPAP INITIATION&MGMT: CPT

## 2024-08-27 PROCEDURE — 94640 AIRWAY INHALATION TREATMENT: CPT

## 2024-08-27 PROCEDURE — 99223 1ST HOSP IP/OBS HIGH 75: CPT | Performed by: INTERNAL MEDICINE

## 2024-08-27 PROCEDURE — 99232 SBSQ HOSP IP/OBS MODERATE 35: CPT

## 2024-08-27 RX ORDER — FLUTICASONE FUROATE AND VILANTEROL 200; 25 UG/1; UG/1
1 POWDER RESPIRATORY (INHALATION) DAILY
Status: DISCONTINUED | OUTPATIENT
Start: 2024-08-27 | End: 2024-08-28 | Stop reason: HOSPADM

## 2024-08-27 RX ORDER — OXYCODONE HYDROCHLORIDE 5 MG/1
5 TABLET ORAL EVERY 6 HOURS PRN
Status: DISCONTINUED | OUTPATIENT
Start: 2024-08-27 | End: 2024-08-28 | Stop reason: HOSPADM

## 2024-08-27 RX ORDER — LEVALBUTEROL INHALATION SOLUTION 1.25 MG/3ML
1.25 SOLUTION RESPIRATORY (INHALATION)
Status: DISCONTINUED | OUTPATIENT
Start: 2024-08-27 | End: 2024-08-28 | Stop reason: HOSPADM

## 2024-08-27 RX ORDER — METHYLPREDNISOLONE SODIUM SUCCINATE 40 MG/ML
40 INJECTION, POWDER, LYOPHILIZED, FOR SOLUTION INTRAMUSCULAR; INTRAVENOUS DAILY
Status: DISCONTINUED | OUTPATIENT
Start: 2024-08-27 | End: 2024-08-28 | Stop reason: HOSPADM

## 2024-08-27 RX ADMIN — LEVALBUTEROL HYDROCHLORIDE 1.25 MG: 1.25 SOLUTION RESPIRATORY (INHALATION) at 19:47

## 2024-08-27 RX ADMIN — LEVALBUTEROL HYDROCHLORIDE 1.25 MG: 1.25 SOLUTION RESPIRATORY (INHALATION) at 13:49

## 2024-08-27 RX ADMIN — UMECLIDINIUM 1 PUFF: 62.5 AEROSOL, POWDER ORAL at 13:00

## 2024-08-27 RX ADMIN — VENLAFAXINE HYDROCHLORIDE 75 MG: 75 CAPSULE, EXTENDED RELEASE ORAL at 08:42

## 2024-08-27 RX ADMIN — BUPROPION HYDROCHLORIDE 300 MG: 150 TABLET, EXTENDED RELEASE ORAL at 08:42

## 2024-08-27 RX ADMIN — OXYCODONE HYDROCHLORIDE 5 MG: 5 TABLET ORAL at 22:20

## 2024-08-27 RX ADMIN — ASPIRIN 81 MG: 81 TABLET, COATED ORAL at 08:42

## 2024-08-27 RX ADMIN — HEPARIN SODIUM 5000 UNITS: 5000 INJECTION INTRAVENOUS; SUBCUTANEOUS at 13:00

## 2024-08-27 RX ADMIN — TAMSULOSIN HYDROCHLORIDE 0.4 MG: 0.4 CAPSULE ORAL at 08:42

## 2024-08-27 RX ADMIN — HEPARIN SODIUM 5000 UNITS: 5000 INJECTION INTRAVENOUS; SUBCUTANEOUS at 05:19

## 2024-08-27 RX ADMIN — LORAZEPAM 0.5 MG: 0.5 TABLET ORAL at 10:05

## 2024-08-27 RX ADMIN — HEPARIN SODIUM 5000 UNITS: 5000 INJECTION INTRAVENOUS; SUBCUTANEOUS at 22:20

## 2024-08-27 RX ADMIN — LORAZEPAM 0.5 MG: 0.5 TABLET ORAL at 22:30

## 2024-08-27 RX ADMIN — ATORVASTATIN CALCIUM 10 MG: 10 TABLET, FILM COATED ORAL at 08:42

## 2024-08-27 RX ADMIN — AMLODIPINE BESYLATE 10 MG: 10 TABLET ORAL at 08:42

## 2024-08-27 RX ADMIN — OXYCODONE HYDROCHLORIDE 10 MG: 10 TABLET ORAL at 12:54

## 2024-08-27 RX ADMIN — TEMAZEPAM 30 MG: 15 CAPSULE ORAL at 22:20

## 2024-08-27 RX ADMIN — FLUTICASONE FUROATE AND VILANTEROL TRIFENATATE 1 PUFF: 200; 25 POWDER RESPIRATORY (INHALATION) at 13:00

## 2024-08-27 RX ADMIN — LISINOPRIL 40 MG: 20 TABLET ORAL at 08:42

## 2024-08-27 RX ADMIN — METHYLPREDNISOLONE SODIUM SUCCINATE 40 MG: 40 INJECTION, POWDER, FOR SOLUTION INTRAMUSCULAR; INTRAVENOUS at 12:27

## 2024-08-27 NOTE — PLAN OF CARE
Problem: Potential for Falls  Goal: Patient will remain free of falls  Description: INTERVENTIONS:  - Educate patient/family on patient safety including physical limitations  - Instruct patient to call for assistance with activity   - Consult OT/PT to assist with strengthening/mobility   - Keep Call bell within reach  - Keep bed low and locked with side rails adjusted as appropriate  - Keep care items and personal belongings within reach  - Initiate and maintain comfort rounds  - Make Fall Risk Sign visible to staff  - Offer Toileting every 2 Hours, in advance of need    - Apply yellow socks and bracelet for high fall risk patients  - Consider moving patient to room near nurses station  Outcome: Progressing     Problem: SAFETY ADULT  Goal: Patient will remain free of falls  Description: INTERVENTIONS:  - Educate patient/family on patient safety including physical limitations  - Instruct patient to call for assistance with activity   - Consult OT/PT to assist with strengthening/mobility   - Keep Call bell within reach  - Keep bed low and locked with side rails adjusted as appropriate  - Keep care items and personal belongings within reach  - Initiate and maintain comfort rounds  - - Apply yellow socks and bracelet for high fall risk patients  - Consider moving patient to room near nurses station  Outcome: Progressing  Goal: Maintain or return to baseline ADL function  Description: INTERVENTIONS:  -  Assess patient's ability to carry out ADLs; assess patient's baseline for ADL function and identify physical deficits which impact ability to perform ADLs (bathing, care of mouth/teeth, toileting, grooming, dressing, etc.)  - Assess/evaluate cause of self-care deficits   - Assess range of motion  - Assess patient's mobility; develop plan if impaired  - Assess patient's need for assistive devices and provide as appropriate  - Encourage maximum independence but intervene and supervise when necessary  - Involve family in  performance of ADLs  - Assess for home care needs following discharge   - Consider OT consult to assist with ADL evaluation and planning for discharge  - Provide patient education as appropriate  Outcome: Progressing  Goal: Maintains/Returns to pre admission functional level  Description: INTERVENTIONS:  - Perform AM-PAC 6 Click Basic Mobility/ Daily Activity assessment daily.  - Set and communicate daily mobility goal to care team and patient/family/caregiver.   - Collaborate with rehabilitation services on mobility goals if consulted  - Perform Range of Motion 2 times a day.  - Reposition patient every 2 hours.  - Dangle patient 2 times a day  - Stand patient 2 times a day  - Ambulate patient 2 times a day  - Out of bed to chair 2 times a day   - Out of bed for meals 2 times a day  - Out of bed for toileting  - Record patient progress and toleration of activity level   Outcome: Progressing

## 2024-08-27 NOTE — UTILIZATION REVIEW
Initial Clinical Review  OBSERVATION ADMISSION 2024 1421   CONVERTED TO   INPATIENT ADMISSION 2024 1322 RE: PATIENT NEEDS > 2 MN FOR MANAGEMENT OF SOB, SEV ASTHMA W MILD ACUTE EXACERBATION    Admission: Date/Time/Statement:   Admission Orders (From admission, onward)       Ordered        24 1322  INPATIENT ADMISSION  Once            24 1421  Place in Observation  Once                          Orders Placed This Encounter   Procedures    INPATIENT ADMISSION     Standing Status:   Standing     Number of Occurrences:   1     Order Specific Question:   Level of Care     Answer:   Med Surg [16]     Order Specific Question:   Estimated length of stay     Answer:   More than 2 Midnights     Order Specific Question:   Certification     Answer:   I certify that inpatient services are medically necessary for this patient for a duration of greater than two midnights. See H&P and MD Progress Notes for additional information about the patient's course of treatment.     ED Arrival Information       Expected   -    Arrival   2024 12:28    Acuity   Urgent              Means of arrival   Wheelchair    Escorted by   Spouse    Service   Hospitalist    Admission type   Emergency              Arrival complaint   SOB, eye pain & foot pain             Chief Complaint   Patient presents with    Shortness of Breath     Increased SOB for the past 2 weeks. Hx of COPD. CPAP at night. Use of nebs and inhaler provided no relief.        Initial Presentation: 68 y.o. male to ED w Wife presents in wheelchair w chest pain; SOB.  PMH HTN, CASSIE, severe persistent asthma, does have emphysema although lifelong non-smoker  presents with chest pain with shortness of breath that has been progressively worsening. Compliant w HS BiPAP.His son  last week and he reports it's been a significant stressor in his life.   EXAM  hypertensive; neg trop, EKG NSR  Observation admission due to Chest pain, HTN. Tele; trend trop, ECHO. If  symptoms persist consult Cardiology. Cont home antiHTN meds, monitor BP  Anticipated Length of Stay/Certification Statement: Patient will be admitted on an observation basis with an anticipated length of stay of less than 2 midnights secondary to chest pain .   Date: 8/27   Day 2: Changed to Inpatient  Pulmonology  on room air, chest x-ray is clear, Wheezing, SOB / PATEL increased over last couple wks. Start IV Solu medrol 40 mg daily & sched NEBs TID. Start on BREO & Incruse & resume OP Breztri on D. Lung nodules on imaging for 6 mo OP follow up   Date: 8/28/2024  Day 3: Has surpassed a 2nd midnight with active treatments and services.  Presents with  hypertensive; chest pain; SOB.  On exam tele wo events; POX well on room air; HTN could be better controlled likely exacerbated by pain  Abnormal labs or imaging  Diagnosis/Plan      COPD/ Asthma exacerbation, Shortness of breath likely multifactorial related to asthma exacerbation, do think there is a large component of anxiety contributing to his respiratory symptoms. Shortness of breath likely multifactorial related to asthma exacerbation, do think there is a large component of anxiety contributing to his respiratory symptoms   HTN start home meds    ED Triage Vitals   Temperature Pulse Respirations Blood Pressure SpO2 Pain Score   08/26/24 1236 08/26/24 1236 08/26/24 1236 08/26/24 1236 08/26/24 1236 08/26/24 1802   98 °F (36.7 °C) 92 20 (!) 178/78 96 % No Pain     Weight (last 2 days)       Date/Time Weight    08/26/24 1651 72.9 (160.72)    08/26/24 1648 74.8 (165)            Vital Signs (last 3 days)       Date/Time Temp Pulse Resp BP MAP (mmHg) SpO2 O2 Device O2 Interface Device Patient Position - Orthostatic VS Pain    08/28/24 0722 99 °F (37.2 °C) -- -- 112/68 -- -- -- -- Lying --    08/27/24 2220 -- -- -- -- -- -- -- -- -- 8    08/27/24 2218 98.2 °F (36.8 °C) 80 -- 133/74 97 94 % -- Nasal mask -- --    08/27/24 2100 -- -- -- -- -- -- -- -- -- 8    08/27/24 1949  -- -- -- -- -- 97 % -- -- -- --    08/27/24 1523 -- 81 20 132/68 93 -- -- -- Lying --    08/27/24 1254 -- -- -- -- -- -- -- -- -- 7    08/27/24 0815 -- -- -- -- -- -- -- -- -- No Pain    08/27/24 0300 -- -- -- 146/83 -- -- -- -- -- --    08/26/24 2100 97.1 °F (36.2 °C) 78 18 124/78 -- 97 % -- -- -- --    08/26/24 1928 -- -- -- -- -- -- -- -- -- 8    08/26/24 1901 -- -- -- -- -- -- -- -- -- 8    08/26/24 1802 -- -- -- -- -- -- -- -- -- No Pain    08/26/24 1700 -- -- -- -- -- 98 % -- -- -- --    08/26/24 1648 -- 90 -- 164/79 -- -- -- -- -- --    08/26/24 1542 98.7 °F (37.1 °C) 91 18 164/79 -- 96 % None (Room air) -- Lying --    08/26/24 1236 98 °F (36.7 °C) 92 20 178/78 -- 96 % None (Room air) -- Sitting --              Pertinent Labs/Diagnostic Test Results:   Radiology:  XR chest 2 views   Final Interpretation by Naida Grady MD (08/26 2013)      No acute cardiopulmonary disease.            Workstation performed: [id number]           Cardiology:  Echo complete w/ contrast if indicated   Final Result by Eva Holman MD (08/27 1109)        Left Ventricle: Left ventricular cavity size is normal. Wall thickness    is normal. The left ventricular ejection fraction is 60%. Systolic    function is normal. Although no diagnostic regional wall motion    abnormality was identified, this possibility cannot be completely excluded    on the basis of this study.  This is a technically difficult study with    poor acoustic window. Diastolic function is mildly abnormal, consistent    with grade I (abnormal) relaxation.     Mitral Valve: There is mild regurgitation.     Tricuspid Valve: There is mild regurgitation.     Pericardium: Trace pericardial effusion versus epicardial fat.           GI:  No orders to display           Results from last 7 days   Lab Units 08/28/24  0617 08/26/24  1314   WBC Thousand/uL 13.43* 10.25*   HEMOGLOBIN g/dL 11.1* 12.1   HEMATOCRIT % 36.4* 38.6   PLATELETS Thousands/uL 340 372   TOTAL  "NEUT ABS Thousands/µL  --  7.02         Results from last 7 days   Lab Units 08/28/24  0617 08/26/24  1314   SODIUM mmol/L 139 138   POTASSIUM mmol/L 4.1 4.1   CHLORIDE mmol/L 103 104   CO2 mmol/L 31 27   ANION GAP mmol/L 5 7   BUN mg/dL 24 19   CREATININE mg/dL 0.83 0.81   EGFR ml/min/1.73sq m 89 90   CALCIUM mg/dL 9.1 9.4     Results from last 7 days   Lab Units 08/26/24  1314   AST U/L 20   ALT U/L 27   ALK PHOS U/L 61   TOTAL PROTEIN g/dL 6.9   ALBUMIN g/dL 3.8   TOTAL BILIRUBIN mg/dL 0.34         Results from last 7 days   Lab Units 08/28/24  0617 08/26/24  1314   GLUCOSE RANDOM mg/dL 115 96             No results found for: \"BETA-HYDROXYBUTYRATE\"                   Results from last 7 days   Lab Units 08/26/24  1623 08/26/24  1501 08/26/24  1314   HS TNI 0HR ng/L  --   --  7   HS TNI 2HR ng/L  --  7  --    HSTNI D2 ng/L  --  0  --    HS TNI 4HR ng/L 7  --   --    HSTNI D4 ng/L 0  --   --      Results from last 7 days   Lab Units 08/26/24  1314   D-DIMER QUANTITATIVE ug/ml FEU 0.41                             Results from last 7 days   Lab Units 08/26/24  1314   BNP pg/mL 81                                                                                       ED Treatment-Medication Administration from 08/26/2024 1228 to 08/26/2024 1541         Date/Time Order Dose Route Action     08/26/2024 1329 meclizine (ANTIVERT) tablet 25 mg 25 mg Oral Given     08/26/2024 1329 LORazepam (ATIVAN) injection 0.5 mg 0.5 mg Intravenous Given            Past Medical History:   Diagnosis Date    Dementia (HCC)     History of stroke 11/07/2019    Major depressive disorder with current active episode 11/07/2019    Sleep apnea     Stroke (HCC)     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 male  Admission Orders:  Tele    Scheduled Medications:  amLODIPine, 10 mg, Oral, Daily   " And  lisinopril, 40 mg, Oral, Daily  aspirin, 81 mg, Oral, Daily  atorvastatin, 10 mg, Oral, Daily  buPROPion, 300 mg, Oral, Daily  fluticasone-vilanterol, 1 puff, Inhalation, Daily  heparin (porcine), 5,000 Units, Subcutaneous, Q8H PARTHA  levalbuterol, 1.25 mg, Nebulization, TID  methylPREDNISolone sodium succinate, 40 mg, Intravenous, Daily  tamsulosin, 0.4 mg, Oral, Daily  umeclidinium, 1 puff, Inhalation, Daily  venlafaxine, 75 mg, Oral, Daily      Continuous IV Infusions:     PRN Meds:  hydrOXYzine HCL, 25 mg, Oral, Q6H PRN  LORazepam, 0.5 mg, Oral, Q8H PRN  nitroglycerin, 0.4 mg, Sublingual, Q5 Min PRN  oxyCODONE, 5 mg, Oral, Q6H PRN  temazepam, 30 mg, Oral, HS PRN            Network Utilization Review Department  ATTENTION: Please call with any questions or concerns to [phone] and carefully listen to the prompts so that you are directed to the right person. All voicemails are confidential.   For Discharge needs, contact Care Management DC Support Team at [phone] opt. [phone]  Send all requests for admission clinical reviews, approved or denied determinations and any other requests to dedicated fax number below belonging to the [facility] where the patient is receiving treatment. List of dedicated fax numbers for the Facilities:  FACILITY NAME UR FAX NUMBER   ADMISSION DENIALS (Administrative/Medical Necessity) [phone]   DISCHARGE SUPPORT TEAM (NETWORK) [phone]   PARENT CHILD HEALTH (Maternity/NICU/Pediatrics) [phone]   [facility] [phone]   [facility] [phone]   [facility] [phone]   [facility] [phone]   [facility] [phone]   [facility] [phone]   [facility] [phone]   [facility] [phone]   [facility] [phone]    [facility] [phone]   [facility] [phone]   [facility] [phone]

## 2024-08-27 NOTE — TELEPHONE ENCOUNTER
I spoke with the hospitalist and the patient is not being discharged. The wife was not in the room when I was there but Dr. Casas will be there to speak with them shortly. Thanks for letting me know!

## 2024-08-27 NOTE — CASE MANAGEMENT
Case Management Progress Note    Patient name Fahad Hernandez  Location /-01 MRN [id number]  : 1955 Date 2024       LOS (days): 0  Geometric Mean LOS (GMLOS) (days):   Days to GMLOS:        OBJECTIVE:        Current admission status: Observation  Preferred Pharmacy:   [facility]/pharmacy #[id number] - [place] - [address]  [address]  [place] [address]  Phone: [phone] Fax: [phone]    [facility] - [address] [address]  Phone: [phone] Fax: [phone]    Primary Care Provider: Surjit Hayes DO    Primary Insurance: [employer]  REP  Secondary Insurance:     PROGRESS NOTE:  As per SLIM rounds, pt is anticipated for dc today pending ECHO results. NO CM NEEDS identified. CM continues to follow.

## 2024-08-27 NOTE — CONSULTS
Consultation - Pulmonary Medicine   Fahad Hernandez 68 y.o. male MRN: [id number]  Unit/Bed#: -01 Encounter: [id number]      Assessment:  Shortness of breath  Severe persistent asthma with mild acute exacerbation  Anxiety  Lung nodules    Plan:   Patient with increasing shortness of breath/PATEL over last couple of weeks acutely worsened.  Asthma exacerbation, also anxiety/stress playing a part due to loss of his son last week  Patient is on room air, chest x-ray is clear  Will start solu-medrol 40 daily and nebs TID - can hopefully switch to prednisone tomorrow  Will place him on Breo and Incruse while here in the hospital, can resume his Breztri upon discharge  Lung nodules seen previously which need 6 month follow up that has already been ordered  Will continue to follow      History of Present Illness   Physician Requesting Consult: Brad Corona,*  Reason for Consult / Principal Problem: Shortness of breath  Hx and PE limited by: None  HPI: Fahad Hernandez is a 68 y.o. year old male non-smoker with significant secondhand smoke exposure with PMH of HTN, asthma, stroke, dementia who presents with complaint of worsening shortness of breath. Breathing has been worsening over the last couple of weeks and has been getting worse over the last several days after the loss of their son. He feels he cannot take a deep breath. Using the inhaler and resting has helped the symptoms. He has been using the Breztri twice per day, some relief with the albuterol.     Patient has history of severe persistent asthma, does have emphysema although lifelong non-smoker.  He does have significant secondhand smoke exposure.  Most recent PFT showed severe obstruction with appreciable response to the bronchodilator.  He is on Breztri for his asthma with albuterol as needed. He had been on both Xolair and Tezspire in the past.     Inpatient consult to Pulmonology  Consult performed by: Kirk Bueno PA-C  Consult ordered by:  Dario Gomes PA-C          Review of Systems   Constitutional: Negative.    HENT: Negative.     Respiratory:  Positive for chest tightness and shortness of breath.    Cardiovascular:  Positive for chest pain.   Gastrointestinal: Negative.    Genitourinary: Negative.    Musculoskeletal: Negative.    Skin: Negative.    Allergic/Immunologic: Negative.    Neurological: Negative.    Psychiatric/Behavioral: Negative.         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     Past Surgical History:   Procedure Laterality Date    BACK SURGERY      ELBOW SURGERY      KNEE SURGERY      NECK SURGERY      SHOULDER SURGERY       Social History   Social History     Substance and Sexual Activity   Alcohol Use Not Currently     Social History     Substance and Sexual Activity   Drug Use Yes    Types: Marijuana     E-Cigarette/Vaping    E-Cigarette Use Never User      E-Cigarette/Vaping Substances    Nicotine No     THC No     CBD Yes     Flavoring No     Other No     Unknown No      Social History     Tobacco Use   Smoking Status Never   Smokeless Tobacco Never   Tobacco Comments    Patient admits to using marijuana, edibles     Occupational History:     Family History:   Family History   Family history unknown: Yes       Meds/Allergies   all current active meds have been reviewed, pertinent pulmonary meds have been reviewed, and current meds:   Current Facility-Administered Medications   Medication Dose Route Frequency    amLODIPine (NORVASC) tablet 10 mg  10 mg Oral Daily    And    lisinopril (ZESTRIL) tablet 40 mg  40 mg Oral Daily    aspirin (ECOTRIN LOW STRENGTH) EC tablet 81 mg  81 mg Oral Daily    atorvastatin (LIPITOR) tablet 10 mg  10 mg Oral Daily    buPROPion (WELLBUTRIN XL) 24 hr tablet 300 mg  300 mg Oral Daily     "heparin (porcine) subcutaneous injection 5,000 Units  5,000 Units Subcutaneous Q8H PARTHA    hydrOXYzine HCL (ATARAX) tablet 25 mg  25 mg Oral Q6H PRN    LORazepam (ATIVAN) tablet 0.5 mg  0.5 mg Oral Q8H PRN    nitroglycerin (NITROSTAT) SL tablet 0.4 mg  0.4 mg Sublingual Q5 Min PRN    oxyCODONE (ROXICODONE) immediate release tablet 10 mg  10 mg Oral Daily PRN    tamsulosin (FLOMAX) capsule 0.4 mg  0.4 mg Oral Daily    temazepam (RESTORIL) capsule 30 mg  30 mg Oral HS PRN    venlafaxine (EFFEXOR-XR) 24 hr capsule 75 mg  75 mg Oral Daily       Allergies   Allergen Reactions    Beta Adrenergic Blockers      Pt states they make him feel crappy.       Objective   Vitals: Blood pressure 146/83, pulse 78, temperature (!) 97.1 °F (36.2 °C), resp. rate 18, height 5' 10\" (1.778 m), weight 72.9 kg (160 lb 11.5 oz), SpO2 97%.,Body mass index is 23.06 kg/m².    Intake/Output Summary (Last 24 hours) at 8/27/2024 1056  Last data filed at 8/26/2024 1700  Gross per 24 hour   Intake 240 ml   Output --   Net 240 ml     Invasive Devices       Peripheral Intravenous Line  Duration             Peripheral IV 08/26/24 Left Antecubital <1 day                    Physical Exam  Vitals reviewed.   Constitutional:       General: He is not in acute distress.     Appearance: Normal appearance. He is not ill-appearing.   HENT:      Head: Normocephalic and atraumatic.      Mouth/Throat:      Pharynx: Oropharynx is clear.   Eyes:      Conjunctiva/sclera: Conjunctivae normal.   Cardiovascular:      Rate and Rhythm: Normal rate and regular rhythm.   Pulmonary:      Effort: Pulmonary effort is normal. No respiratory distress.      Breath sounds: Examination of the right-upper field reveals wheezing. Examination of the right-middle field reveals wheezing. Wheezing present. No rhonchi or rales.   Abdominal:      General: Abdomen is flat. There is no distension.   Musculoskeletal:         General: Normal range of motion.      Cervical back: Normal range of " motion.      Right lower leg: No edema.      Left lower leg: No edema.   Skin:     General: Skin is warm and dry.   Neurological:      Mental Status: He is alert and oriented to person, place, and time.   Psychiatric:         Mood and Affect: Mood normal.         Behavior: Behavior normal.         Lab Results: I have personally reviewed pertinent lab results., CBC:   Lab Results   Component Value Date    WBC 10.25 (H) 08/26/2024    HGB 12.1 08/26/2024    HCT 38.6 08/26/2024    MCV 81 (L) 08/26/2024     08/26/2024    RBC 4.79 08/26/2024    MCH 25.3 (L) 08/26/2024    MCHC 31.3 (L) 08/26/2024    RDW 16.1 (H) 08/26/2024    MPV 8.2 (L) 08/26/2024    NRBC 0 08/26/2024   , CMP:   Lab Results   Component Value Date    SODIUM 138 08/26/2024    K 4.1 08/26/2024     08/26/2024    CO2 27 08/26/2024    BUN 19 08/26/2024    CREATININE 0.81 08/26/2024    CALCIUM 9.4 08/26/2024    AST 20 08/26/2024    ALT 27 08/26/2024    ALKPHOS 61 08/26/2024    EGFR 90 08/26/2024     Imaging Studies: I have personally reviewed pertinent reports.   and I have personally reviewed pertinent films in PACS  EKG, Pathology, and Other Studies: I have personally reviewed pertinent reports.    VTE Prophylaxis: Sequential compression device (Venodyne)  and Heparin    Code Status: Level 1 - Full Code  Advance Directive and Living Will:      Power of :    POLST:

## 2024-08-27 NOTE — TELEPHONE ENCOUNTER
Pt wife calling as she states the hospital is going to discharge pt any minute now and wants to speak to Kirk KWON. She is not happy with this decision as his shortness of breath has had rapid acceleration, he cannot walk to the bathroom without being out of breath. She would like to speak with Kirk baker, or she would like to meet in the hospital for a consult. Please advise

## 2024-08-27 NOTE — PROGRESS NOTES
[facility]  Progress Note  Name: Fahad Hernandez I  MRN: [id number]  Unit/Bed#: -01 I Date of Admission: 8/26/2024   Date of Service: 8/27/2024 I Hospital Day: 0    Assessment & Plan   * Chest pain  Assessment & Plan  Presenting with chest pain and shortness of breath which has since resolved  No previous cardiac history  Prediabetic, lipid panel with good control from 2023  Troponin 7, 7, 7, D-dimer 0.41  BNP 81  EKG heart rate 90 normal sinus rhythm with occasional premature supraventricular complexes  Echocardiogram showed LVEF 60%  telemetry showed no acute events      SOB (shortness of breath)  Assessment & Plan  Mild wheezing on exam   satting well on room air  Pulmonology consulted-recommending to  start IV steroids and monitor  Shortness of breath likely component of seasonal allergies with anxiety    Chronic obstructive asthma  Assessment & Plan  Possible mild exacerbation  Started on steroids  Satting well on room air    Hypertension  Assessment & Plan  Could be better controlled  Likely exacerbated by pain  Start home meds  Monitor blood pressure           VTE Pharmacologic Prophylaxis:   High Risk (Score >/= 5) - Pharmacological DVT Prophylaxis Ordered: heparin. Sequential Compression Devices Ordered.    Mobility:   Basic Mobility Inpatient Raw Score: 22  JH-HLM Goal: 7: Walk 25 feet or more  JH-HLM Achieved: 7: Walk 25 feet or more  JH-HLM Goal achieved. Continue to encourage appropriate mobility.    Patient Centered Rounds: I performed bedside rounds with nursing staff today.   Discussions with Specialists or Other Care Team Provider: CM, pulm    Education and Discussions with Family / Patient: Updated  (wife) at bedside.    Total Time Spent on Date of Encounter in care of patient: 45 mins. This time was spent on one or more of the following: performing physical exam; counseling and coordination of care; obtaining or reviewing history; documenting in the  medical record; reviewing/ordering tests, medications or procedures; communicating with other healthcare professionals and discussing with patient's family/caregivers.    Current Length of Stay: 0 day(s)  Current Patient Status: Inpatient   Certification Statement: The patient will continue to require additional inpatient hospital stay due to continued treatment for shortness of breath with IV steroids  Discharge Plan: Anticipate discharge tomorrow to home.    Code Status: Level 1 - Full Code    Subjective:   Patient reports to have continued shortness of breath although feeling relatively well.  Continues to deny any chest pain/pressure, palpitations, lightheadedness, nausea, or chills.    Objective:     Vitals:   Temp (24hrs), Av.9 °F (36.6 °C), Min:97.1 °F (36.2 °C), Max:98.7 °F (37.1 °C)    Temp:  [97.1 °F (36.2 °C)-98.7 °F (37.1 °C)] 97.1 °F (36.2 °C)  HR:  [78-91] 78  Resp:  [18] 18  BP: (124-164)/(78-83) 146/83  SpO2:  [96 %-98 %] 97 %  Body mass index is 23.06 kg/m².     Input and Output Summary (last 24 hours):     Intake/Output Summary (Last 24 hours) at 2024 1346  Last data filed at 2024 0929  Gross per 24 hour   Intake 480 ml   Output --   Net 480 ml       Physical Exam:   Physical Exam  Vitals and nursing note reviewed.   Constitutional:       Appearance: He is normal weight.   HENT:      Head: Normocephalic.      Nose: Nose normal.      Mouth/Throat:      Mouth: Mucous membranes are moist.      Pharynx: Oropharynx is clear.   Eyes:      General: No scleral icterus.     Conjunctiva/sclera: Conjunctivae normal.      Pupils: Pupils are equal, round, and reactive to light.   Cardiovascular:      Rate and Rhythm: Normal rate and regular rhythm.      Heart sounds: No murmur heard.     No friction rub. No gallop.   Pulmonary:      Effort: Pulmonary effort is normal. No respiratory distress.      Breath sounds: No stridor. Wheezing (Mild wheezing, generalized) present. No rhonchi or rales.    Abdominal:      General: Abdomen is flat.      Palpations: Abdomen is soft.   Musculoskeletal:         General: Normal range of motion.      Cervical back: Normal range of motion and neck supple.      Right lower leg: No edema.      Left lower leg: No edema.   Lymphadenopathy:      Cervical: No cervical adenopathy.   Skin:     General: Skin is warm.      Coloration: Skin is not jaundiced or pale.      Findings: No bruising, erythema or lesion.   Neurological:      General: No focal deficit present.      Mental Status: He is alert and oriented to person, place, and time. Mental status is at baseline.      Cranial Nerves: No cranial nerve deficit.      Motor: No weakness.   Psychiatric:         Mood and Affect: Mood normal.         Behavior: Behavior normal.         Thought Content: Thought content normal.          Additional Data:     Labs:  Results from last 7 days   Lab Units 08/26/24  1314   WBC Thousand/uL 10.25*   HEMOGLOBIN g/dL 12.1   HEMATOCRIT % 38.6   PLATELETS Thousands/uL 372   SEGS PCT % 69   LYMPHO PCT % 19   MONO PCT % 9   EOS PCT % 3     Results from last 7 days   Lab Units 08/26/24  1314   SODIUM mmol/L 138   POTASSIUM mmol/L 4.1   CHLORIDE mmol/L 104   CO2 mmol/L 27   BUN mg/dL 19   CREATININE mg/dL 0.81   ANION GAP mmol/L 7   CALCIUM mg/dL 9.4   ALBUMIN g/dL 3.8   TOTAL BILIRUBIN mg/dL 0.34   ALK PHOS U/L 61   ALT U/L 27   AST U/L 20   GLUCOSE RANDOM mg/dL 96                       Lines/Drains:  Invasive Devices       Peripheral Intravenous Line  Duration             Peripheral IV 08/26/24 Left Antecubital 1 day                          Imaging: Reviewed radiology reports from this admission including: chest xray    Recent Cultures (last 7 days):         Last 24 Hours Medication List:   Current Facility-Administered Medications   Medication Dose Route Frequency Provider Last Rate    amLODIPine  10 mg Oral Daily Ruperto Viveros MD      And    lisinopril  40 mg Oral Daily Ruperto Viveros MD       aspirin  81 mg Oral Daily Ruperto Viveros MD      atorvastatin  10 mg Oral Daily Ruperto Viveros MD      buPROPion  300 mg Oral Daily Ruperto Viveros MD      fluticasone-vilanterol  1 puff Inhalation Daily Kirk Bueno PA-C      heparin (porcine)  5,000 Units Subcutaneous Q8H PARTHA Ruperto Viveros MD      hydrOXYzine HCL  25 mg Oral Q6H PRN JAMIL Tobin      levalbuterol  1.25 mg Nebulization TID Kirk Bueno PA-C      LORazepam  0.5 mg Oral Q8H PRN Ruperto Viveros MD      methylPREDNISolone sodium succinate  40 mg Intravenous Daily Kirk Bueno PA-C      nitroglycerin  0.4 mg Sublingual Q5 Min PRN Ruperto Viveros MD      oxyCODONE  10 mg Oral Daily PRN Ruperto Viveros MD      tamsulosin  0.4 mg Oral Daily Ruperto Viveros MD      temazepam  30 mg Oral HS PRN Ruperto Viveros MD      umeclidinium  1 puff Inhalation Daily Kirk Bueno PA-C      venlafaxine  75 mg Oral Daily Ruperto Viveros MD          Today, Patient Was Seen By: Dario Gomes PA-C    **Please Note: This note may have been constructed using a voice recognition system.**

## 2024-08-27 NOTE — ASSESSMENT & PLAN NOTE
Presenting with chest pain and shortness of breath which has since resolved  No previous cardiac history  Prediabetic, lipid panel with good control from 2023  Troponin 7, 7, 7, D-dimer 0.41  BNP 81  EKG heart rate 90 normal sinus rhythm with occasional premature supraventricular complexes  Echocardiogram showed LVEF 60%  telemetry showed no acute events

## 2024-08-28 VITALS
HEIGHT: 70 IN | TEMPERATURE: 99 F | OXYGEN SATURATION: 94 % | HEART RATE: 80 BPM | DIASTOLIC BLOOD PRESSURE: 68 MMHG | BODY MASS INDEX: 23.01 KG/M2 | RESPIRATION RATE: 20 BRPM | WEIGHT: 160.72 LBS | SYSTOLIC BLOOD PRESSURE: 112 MMHG

## 2024-08-28 DIAGNOSIS — J45.20 MILD INTERMITTENT ASTHMA WITHOUT COMPLICATION: ICD-10-CM

## 2024-08-28 LAB
ANION GAP SERPL CALCULATED.3IONS-SCNC: 5 MMOL/L (ref 4–13)
BUN SERPL-MCNC: 24 MG/DL (ref 5–25)
CALCIUM SERPL-MCNC: 9.1 MG/DL (ref 8.4–10.2)
CHLORIDE SERPL-SCNC: 103 MMOL/L (ref 96–108)
CO2 SERPL-SCNC: 31 MMOL/L (ref 21–32)
CREAT SERPL-MCNC: 0.83 MG/DL (ref 0.6–1.3)
ERYTHROCYTE [DISTWIDTH] IN BLOOD BY AUTOMATED COUNT: 16.1 % (ref 11.6–15.1)
GFR SERPL CREATININE-BSD FRML MDRD: 89 ML/MIN/1.73SQ M
GLUCOSE SERPL-MCNC: 115 MG/DL (ref 65–140)
HCT VFR BLD AUTO: 36.4 % (ref 36.5–49.3)
HGB BLD-MCNC: 11.1 G/DL (ref 12–17)
MCH RBC QN AUTO: 24.6 PG (ref 26.8–34.3)
MCHC RBC AUTO-ENTMCNC: 30.5 G/DL (ref 31.4–37.4)
MCV RBC AUTO: 81 FL (ref 82–98)
PLATELET # BLD AUTO: 340 THOUSANDS/UL (ref 149–390)
PMV BLD AUTO: 8.2 FL (ref 8.9–12.7)
POTASSIUM SERPL-SCNC: 4.1 MMOL/L (ref 3.5–5.3)
RBC # BLD AUTO: 4.52 MILLION/UL (ref 3.88–5.62)
SODIUM SERPL-SCNC: 139 MMOL/L (ref 135–147)
WBC # BLD AUTO: 13.43 THOUSAND/UL (ref 4.31–10.16)

## 2024-08-28 PROCEDURE — 80048 BASIC METABOLIC PNL TOTAL CA: CPT

## 2024-08-28 PROCEDURE — 99232 SBSQ HOSP IP/OBS MODERATE 35: CPT | Performed by: PHYSICIAN ASSISTANT

## 2024-08-28 PROCEDURE — 85027 COMPLETE CBC AUTOMATED: CPT

## 2024-08-28 PROCEDURE — 99239 HOSP IP/OBS DSCHRG MGMT >30: CPT

## 2024-08-28 RX ORDER — BUDESONIDE, GLYCOPYRROLATE, AND FORMOTEROL FUMARATE 160; 9; 4.8 UG/1; UG/1; UG/1
2 AEROSOL, METERED RESPIRATORY (INHALATION) 2 TIMES DAILY
Qty: 32.1 G | Refills: 3 | Status: SHIPPED | OUTPATIENT
Start: 2024-08-28 | End: 2024-09-16 | Stop reason: SDUPTHER

## 2024-08-28 RX ORDER — PREDNISONE 10 MG/1
TABLET ORAL
Qty: 30 TABLET | Refills: 0 | Status: SHIPPED | OUTPATIENT
Start: 2024-08-28 | End: 2024-09-09

## 2024-08-28 RX ADMIN — BUPROPION HYDROCHLORIDE 300 MG: 150 TABLET, EXTENDED RELEASE ORAL at 09:55

## 2024-08-28 RX ADMIN — METHYLPREDNISOLONE SODIUM SUCCINATE 40 MG: 40 INJECTION, POWDER, FOR SOLUTION INTRAMUSCULAR; INTRAVENOUS at 09:56

## 2024-08-28 RX ADMIN — ATORVASTATIN CALCIUM 10 MG: 10 TABLET, FILM COATED ORAL at 09:56

## 2024-08-28 RX ADMIN — UMECLIDINIUM 1 PUFF: 62.5 AEROSOL, POWDER ORAL at 09:58

## 2024-08-28 RX ADMIN — FLUTICASONE FUROATE AND VILANTEROL TRIFENATATE 1 PUFF: 200; 25 POWDER RESPIRATORY (INHALATION) at 09:58

## 2024-08-28 RX ADMIN — HEPARIN SODIUM 5000 UNITS: 5000 INJECTION INTRAVENOUS; SUBCUTANEOUS at 05:46

## 2024-08-28 RX ADMIN — OXYCODONE HYDROCHLORIDE 5 MG: 5 TABLET ORAL at 11:29

## 2024-08-28 RX ADMIN — VENLAFAXINE HYDROCHLORIDE 75 MG: 75 CAPSULE, EXTENDED RELEASE ORAL at 09:55

## 2024-08-28 RX ADMIN — AMLODIPINE BESYLATE 10 MG: 10 TABLET ORAL at 09:56

## 2024-08-28 RX ADMIN — ASPIRIN 81 MG: 81 TABLET, COATED ORAL at 09:56

## 2024-08-28 RX ADMIN — LISINOPRIL 40 MG: 20 TABLET ORAL at 09:56

## 2024-08-28 RX ADMIN — TAMSULOSIN HYDROCHLORIDE 0.4 MG: 0.4 CAPSULE ORAL at 09:56

## 2024-08-28 NOTE — PROGRESS NOTES
"Progress Note - Pulmonary   Fahad Hernandez 68 y.o. male MRN: [id number]  Unit/Bed#: -01 Encounter: [id number]    Assessment:  Shortness of breath  Severe persistent asthma with acute exacerbation  Obstructive sleep apnea on BiPAP  Anxiety  Lung nodule    Plan:  Shortness of breath likely multifactorial related to asthma exacerbation, do think there is a large component of anxiety contributing to his respiratory symptoms as well  Chest x-ray is clear  Continue Breo and Incruse, nebulizer treatments  Will transition to prednisone, can decrease by 10 mg every 3 days  He can resume his Breztri upon discharge  Lung nodule needs 6-month follow-up CT scan which has already been ordered  He is stable for discharge home from a pulmonary standpoint, can follow-up with us in the office in 1 week      Subjective:   Patient resting in bed.  He is feeling much better with his breathing.  He was able to ambulate to the bathroom much easier today.    Objective:     Vitals: Blood pressure 112/68, pulse 80, temperature 99 °F (37.2 °C), temperature source Oral, resp. rate 20, height 5' 10\" (1.778 m), weight 72.9 kg (160 lb 11.5 oz), SpO2 94%.,Body mass index is 23.06 kg/m².      Intake/Output Summary (Last 24 hours) at 8/28/2024 1004  Last data filed at 8/28/2024 0601  Gross per 24 hour   Intake 240 ml   Output --   Net 240 ml       Invasive Devices       Peripheral Intravenous Line  Duration             Peripheral IV 08/26/24 Left Antecubital 1 day                    Physical Exam: /68 (BP Location: Left arm)   Pulse 80   Temp 99 °F (37.2 °C) (Oral)   Resp 20   Ht 5' 10\" (1.778 m)   Wt 72.9 kg (160 lb 11.5 oz)   SpO2 94%   BMI 23.06 kg/m²   General appearance: alert and oriented, in no acute distress  Head: Normocephalic, without obvious abnormality, atraumatic  Eyes: negative findings: conjunctivae and sclerae normal  Lungs: clear to auscultation bilaterally  Heart: regular rate and rhythm  Abdomen: normal " findings: soft, non-tender  Extremities:  no edema  Skin:  warm and dry  Neurologic: Mental status: Alert, oriented, thought content appropriate     Labs: I have personally reviewed pertinent lab results., CBC:   Lab Results   Component Value Date    WBC 13.43 (H) 08/28/2024    HGB 11.1 (L) 08/28/2024    HCT 36.4 (L) 08/28/2024    MCV 81 (L) 08/28/2024     08/28/2024    RBC 4.52 08/28/2024    MCH 24.6 (L) 08/28/2024    MCHC 30.5 (L) 08/28/2024    RDW 16.1 (H) 08/28/2024    MPV 8.2 (L) 08/28/2024   , CMP:   Lab Results   Component Value Date    SODIUM 139 08/28/2024    K 4.1 08/28/2024     08/28/2024    CO2 31 08/28/2024    BUN 24 08/28/2024    CREATININE 0.83 08/28/2024    CALCIUM 9.1 08/28/2024    EGFR 89 08/28/2024     Imaging and other studies: I have personally reviewed pertinent reports.   and I have personally reviewed pertinent films in PACS

## 2024-08-28 NOTE — PLAN OF CARE

## 2024-08-28 NOTE — DISCHARGE INSTR - AVS FIRST PAGE
Please be aware I have added a prednisone taper.  This prednisone taper should be started tomorrow.  Please take 4 tablets daily for the first 3 days, followed by 3 tablets daily for the next 3 days, followed by 2 tablets daily for the next 3 days, and lastly 1 tablet daily for the final 3 days.  Please follow-up with the outpatient pulmonologist as well as your PCP within 1 week.

## 2024-08-28 NOTE — ASSESSMENT & PLAN NOTE
Mild wheezing on exam   satting well on room air  Pulmonology consulted-recommending to  start IV steroids and transition to prednisone taper and monitor  Shortness of breath likely component of seasonal allergies with anxiety

## 2024-08-28 NOTE — RESPIRATORY THERAPY NOTE
08/27/24 2218   Respiratory Assessment   Assessment Type Assess only   General Appearance Drowsy   Respiratory Pattern Normal   Chest Assessment Chest expansion symmetrical   Bilateral Breath Sounds Diminished   Cough None   Resp Comments Pt placed on bipap at this time   O2 Device RA   Non-Invasive Information   O2 Interface Device Nasal mask   Non-Invasive Ventilation Mode BiPAP   $ Intermittent NIV Yes   SpO2 94 %   $ Pulse Oximetry Spot Check Charge Completed   Non-Invasive Settings   IPAP (cm) 14 cm   EPAP (cm) 6 cm   Rate (Set) 8   FiO2 (%) 21   Pressure Support (cm H2O) 8   Rise Time 2   Non-Invasive Readings   Skin Intervention Skin intact   Total Rate 13   Vt (mL) (Mech) 1000   MV (Mech) 13   Peak Pressure (Obs) 14   Spontaneous Vt (mL) 877   Leak (lpm) 38   Non-Invasive Alarms   Low Insp Pressure Time (sec) 60 sec   High Resp Rate (BPM) 40 BPM   Apnea Interval (sec) 30     RT Ventilator Management Note  Fahad Hernandez 68 y.o. male MRN: [id number]  Unit/Bed#: -01 Encounter: [id number]      Daily Screen    No data found in the last 10 encounters.           Physical Exam:   Assessment Type: Assess only  General Appearance: Drowsy  Respiratory Pattern: Normal  Chest Assessment: Chest expansion symmetrical  Bilateral Breath Sounds: Diminished  Cough: None  O2 Device: RA      Resp Comments: Pt placed on bipap at this time

## 2024-08-28 NOTE — PLAN OF CARE
Problem: PAIN - ADULT  Goal: Verbalizes/displays adequate comfort level or baseline comfort level  Description: Interventions:  - Encourage patient to monitor pain and request assistance  - Assess pain using appropriate pain scale  - Administer analgesics based on type and severity of pain and evaluate response  - Implement non-pharmacological measures as appropriate and evaluate response  - Consider cultural and social influences on pain and pain management  - Notify physician/advanced practitioner if interventions unsuccessful or patient reports new pain  Outcome: Adequate for Discharge     Problem: INFECTION - ADULT  Goal: Absence or prevention of progression during hospitalization  Description: INTERVENTIONS:  - Assess and monitor for signs and symptoms of infection  - Monitor lab/diagnostic results  - Monitor all insertion sites, i.e. indwelling lines, tubes, and drains  - Monitor endotracheal if appropriate and nasal secretions for changes in amount and color  - [place] appropriate cooling/warming therapies per order  - Administer medications as ordered  - Instruct and encourage patient and family to use good hand hygiene technique  - Identify and instruct in appropriate isolation precautions for identified infection/condition  Outcome: Adequate for Discharge     Problem: SAFETY ADULT  Goal: Patient will remain free of falls  Description: INTERVENTIONS:  - Educate patient/family on patient safety including physical limitations  - Instruct patient to call for assistance with activity   - Consult OT/PT to assist with strengthening/mobility   - Keep Call bell within reach  - Keep bed low and locked with side rails adjusted as appropriate  - Keep care items and personal belongings within reach  - Initiate and maintain comfort rounds  - Make Fall Risk Sign visible to staff  - Offer Toileting every 2 Hours, in advance of need  - Apply yellow socks and bracelet for high fall risk patients  - Consider moving  patient to room near nurses station  Outcome: Adequate for Discharge  Goal: Maintain or return to baseline ADL function  Description: INTERVENTIONS:  -  Assess patient's ability to carry out ADLs; assess patient's baseline for ADL function and identify physical deficits which impact ability to perform ADLs (bathing, care of mouth/teeth, toileting, grooming, dressing, etc.)  - Assess/evaluate cause of self-care deficits   - Assess range of motion  - Assess patient's mobility; develop plan if impaired  - Assess patient's need for assistive devices and provide as appropriate  - Encourage maximum independence but intervene and supervise when necessary  - Involve family in performance of ADLs  - Assess for home care needs following discharge   - Consider OT consult to assist with ADL evaluation and planning for discharge  - Provide patient education as appropriate  Outcome: Adequate for Discharge     Problem: DISCHARGE PLANNING  Goal: Discharge to home or other facility with appropriate resources  Description: INTERVENTIONS:  - Identify barriers to discharge w/patient and caregiver  - Arrange for needed discharge resources and transportation as appropriate  - Identify discharge learning needs (meds, wound care, etc.)  - Arrange for interpretive services to assist at discharge as needed  - Refer to Case Management Department for coordinating discharge planning if the patient needs post-hospital services based on physician/advanced practitioner order or complex needs related to functional status, cognitive ability, or social support system  Outcome: Adequate for Discharge     Problem: Knowledge Deficit  Goal: Patient/family/caregiver demonstrates understanding of disease process, treatment plan, medications, and discharge instructions  Description: Complete learning assessment and assess knowledge base.  Interventions:  - Provide teaching at level of understanding  - Provide teaching via preferred learning  methods  Outcome: Adequate for Discharge

## 2024-08-28 NOTE — DISCHARGE SUMMARY
Formerly [facility]  Discharge- Fahad Hernandez 1955, 68 y.o. male MRN: [id number]  Unit/Bed#: MS [facility] Encounter: [id number]  Primary Care Provider: Surjit Hayes DO   Date and time admitted to hospital: 8/26/2024 12:34 PM    * Chest pain  Assessment & Plan  Presenting with chest pain and shortness of breath which has since resolved  No previous cardiac history  Prediabetic, lipid panel with good control from 2023  Troponin 7, 7, 7, D-dimer 0.41  BNP 81  EKG heart rate 90 normal sinus rhythm with occasional premature supraventricular complexes  Echocardiogram showed LVEF 60%  telemetry showed no acute events      SOB (shortness of breath)  Assessment & Plan  Mild wheezing on exam   satting well on room air  Pulmonology consulted-recommending to  start IV steroids and transition to prednisone taper and monitor  Shortness of breath likely component of seasonal allergies with anxiety    Chronic obstructive asthma  Assessment & Plan  Possible mild exacerbation  Started on steroids  Satting well on room air    Hypertension  Assessment & Plan  Could be better controlled  Likely exacerbated by pain  Start home meds  Monitor blood pressure      Medical Problems       Resolved Problems  Date Reviewed: 6/7/2024   None       Discharging Physician / Practitioner: Dario Gomes PA-C  PCP: Surjit Hayes DO  Admission Date:   Admission Orders (From admission, onward)       Ordered        08/27/24 1322  INPATIENT ADMISSION  Once            08/26/24 1421  Place in Observation  Once                          Discharge Date: 08/28/24    Consultations During Hospital Stay:  Pulm    Procedures Performed:   Echo complete w/ contrast if indicated  Result Date: 8/27/2024  Narrative:   Left Ventricle: Left ventricular cavity size is normal. Wall thickness is normal. The left ventricular ejection fraction is 60%. Systolic function is normal. Although no diagnostic regional wall motion abnormality was  identified, this possibility cannot be completely excluded on the basis of this study.  This is a technically difficult study with poor acoustic window. Diastolic function is mildly abnormal, consistent with grade I (abnormal) relaxation.   Mitral Valve: There is mild regurgitation.   Tricuspid Valve: There is mild regurgitation.   Pericardium: Trace pericardial effusion versus epicardial fat.     XR chest 2 views  Impression: No acute cardiopulmonary disease      Significant Findings / Test Results:   Troponin, 7, 7, 7  D-dimer 0.41      Incidental Findings:   None      Test Results Pending at Discharge (will require follow up):   None     Outpatient Tests Requested:  None    Complications: None    Reason for Admission: Chest pain/shortness of breath    Hospital Course:   Fahad Hernandez is a 68 y.o. male patient who originally presented to the hospital on 8/26/2024 due to chest pain/shortness of breath.  The patient recently presented with chest pain and shortness of breath that been progressively worsening over the last several weeks.  Last week patient's son passed away which has been a significant stressor in life and shortness of breath worsened.  Patient underwent a brief cardiac workup which was acutely negative.  Pulmonology was consulted and no immediate pulmonology findings were found.  Patient did have mild wheezing on exam and steroids were initiated.  Patient will be sent home with a prednisone taper and instructed follow-up with PCP and pulmonology team.  At this time patient is medically stable for discharge and agreeable for plan at discharge.  For further information in regards to course hospitalization, please see notes attached.      Please see above list of diagnoses and related plan for additional information.     Condition at Discharge: good    Discharge Day Visit / Exam:   Subjective: Patient reports to be feeling well would like to be discharged home.  He currently denies any chest  "pain/pressure, palpitations, lightness, nausea, shortness breath, or chills.  Vitals: Blood Pressure: 112/68 (08/28/24 0722)  Pulse: 80 (08/27/24 2218)  Temperature: 99 °F (37.2 °C) (08/28/24 0722)  Temp Source: Oral (08/28/24 0722)  Respirations: 20 (08/27/24 1523)  Height: 5' 10\" (177.8 cm) (08/26/24 1648)  Weight - Scale: 72.9 kg (160 lb 11.5 oz) (08/26/24 1651)  SpO2: 94 % (08/27/24 2218)  Exam:   Physical Exam  Vitals and nursing note reviewed.   Constitutional:       Appearance: He is normal weight.   HENT:      Head: Normocephalic.      Nose: Nose normal.      Mouth/Throat:      Mouth: Mucous membranes are moist.      Pharynx: Oropharynx is clear.   Eyes:      General: No scleral icterus.     Conjunctiva/sclera: Conjunctivae normal.      Pupils: Pupils are equal, round, and reactive to light.   Cardiovascular:      Rate and Rhythm: Normal rate and regular rhythm.      Heart sounds: No murmur heard.     No friction rub. No gallop.   Pulmonary:      Effort: Pulmonary effort is normal. No respiratory distress.      Breath sounds: Normal breath sounds. No stridor. No wheezing, rhonchi or rales.   Abdominal:      General: Abdomen is flat.      Palpations: Abdomen is soft.   Musculoskeletal:         General: Normal range of motion.      Cervical back: Normal range of motion and neck supple.      Right lower leg: No edema.      Left lower leg: No edema.   Lymphadenopathy:      Cervical: No cervical adenopathy.   Skin:     General: Skin is warm.      Coloration: Skin is not jaundiced or pale.      Findings: No bruising, erythema or lesion.   Neurological:      General: No focal deficit present.      Mental Status: He is alert and oriented to person, place, and time. Mental status is at baseline.      Cranial Nerves: No cranial nerve deficit.      Motor: No weakness.   Psychiatric:         Mood and Affect: Mood normal.         Behavior: Behavior normal.         Thought Content: Thought content normal.      "     Discussion with Family: Updated  (wife) at bedside.    Discharge instructions/Information to patient and family:   See after visit summary for information provided to patient and family.      Provisions for Follow-Up Care:  See after visit summary for information related to follow-up care and any pertinent home health orders.      Mobility at time of Discharge:   Basic Mobility Inpatient Raw Score: 22  JH-HLM Goal: 7: Walk 25 feet or more  JH-HLM Achieved: 7: Walk 25 feet or more  HLM Goal achieved. Continue to encourage appropriate mobility.     Disposition:   Home    Planned Readmission: No     Discharge Statement:  I spent 60 minutes discharging the patient. This time was spent on the day of discharge. I had direct contact with the patient on the day of discharge. Greater than 50% of the total time was spent examining patient, answering all patient questions, arranging and discussing plan of care with patient as well as directly providing post-discharge instructions.  Additional time then spent on discharge activities.    Discharge Medications:  See after visit summary for reconciled discharge medications provided to patient and/or family.      **Please Note: This note may have been constructed using a voice recognition system**

## 2024-08-30 NOTE — UTILIZATION REVIEW
NOTIFICATION OF ADMISSION DISCHARGE   This is a Notification of Discharge from [facility]. Please be advised that this patient has been discharge from our facility. Below you will find the admission and discharge date and time including the patient’s disposition.   UTILIZATION REVIEW CONTACT:  Tania Britton  Utilization   Network Utilization Review Department  Phone: [phone] x carefully listen to the prompts. All voicemails are confidential.  Email: [email or web address]     ADMISSION INFORMATION  PRESENTATION DATE: 8/26/2024 12:34 PM  OBERVATION ADMISSION DATE: 08/26/2024 1421  INPATIENT ADMISSION DATE: 8/27/24  1:22 PM   DISCHARGE DATE: 8/28/2024 12:58 PM   DISPOSITION:Home/Self Care    Network Utilization Review Department  ATTENTION: Please call with any questions or concerns to [phone] and carefully listen to the prompts so that you are directed to the right person. All voicemails are confidential.   For Discharge needs, contact Care Management DC Support Team at [phone] opt. 2  Send all requests for admission clinical reviews, approved or denied determinations and any other requests to dedicated fax number below belonging to the campus where the patient is receiving treatment. List of dedicated fax numbers for the Facilities:  FACILITY NAME UR FAX NUMBER   ADMISSION DENIALS (Administrative/Medical Necessity) [phone]   DISCHARGE SUPPORT TEAM ([facility]) [phone]   PARENT CHILD HEALTH (Maternity/NICU/Pediatrics) [phone]   [facility] [phone]   [facility] [phone]   [facility] [phone]   [facility] [phone]   [facility] [phone]   [facility] [phone]   [facility] [phone]   [facility]  [place] [phone]   [facility] [phone]   [facility] [phone]   [facility] [phone]   [facility] [phone]

## 2024-09-03 ENCOUNTER — TELEPHONE (OUTPATIENT)
Age: 69
End: 2024-09-03

## 2024-09-03 NOTE — TELEPHONE ENCOUNTER
Patient calling to schedule hospital follow up appt. Stated per Elijah KWON he should be seen within one week. Scheduled him for 09/05 in [facility]. No further questions.

## 2024-09-04 ENCOUNTER — APPOINTMENT (OUTPATIENT)
Age: 69
End: 2024-09-04
Payer: COMMERCIAL

## 2024-09-04 ENCOUNTER — OFFICE VISIT (OUTPATIENT)
Age: 69
End: 2024-09-04
Payer: COMMERCIAL

## 2024-09-04 VITALS
OXYGEN SATURATION: 94 % | TEMPERATURE: 98.2 F | WEIGHT: 162 LBS | DIASTOLIC BLOOD PRESSURE: 80 MMHG | HEART RATE: 110 BPM | BODY MASS INDEX: 23.19 KG/M2 | HEIGHT: 70 IN | SYSTOLIC BLOOD PRESSURE: 124 MMHG

## 2024-09-04 DIAGNOSIS — J44.89 CHRONIC OBSTRUCTIVE ASTHMA: ICD-10-CM

## 2024-09-04 DIAGNOSIS — R91.8 LUNG NODULES: ICD-10-CM

## 2024-09-04 DIAGNOSIS — J43.2 CENTRILOBULAR EMPHYSEMA (HCC): Primary | ICD-10-CM

## 2024-09-04 PROBLEM — J45.50 SEVERE PERSISTENT ASTHMA, UNSPECIFIED WHETHER COMPLICATED: Status: RESOLVED | Noted: 2024-06-07 | Resolved: 2024-09-04

## 2024-09-04 PROBLEM — R06.02 SOB (SHORTNESS OF BREATH): Status: RESOLVED | Noted: 2024-08-27 | Resolved: 2024-09-04

## 2024-09-04 PROCEDURE — 99214 OFFICE O/P EST MOD 30 MIN: CPT | Performed by: INTERNAL MEDICINE

## 2024-09-04 PROCEDURE — G2211 COMPLEX E/M VISIT ADD ON: HCPCS | Performed by: INTERNAL MEDICINE

## 2024-09-04 NOTE — ASSESSMENT & PLAN NOTE
He does have lung nodules last identified on most recent CT in June  Recommendation for follow-up is December CAT scan, this was ordered previously but has not yet been scheduled.  He was reminded to schedule this testing

## 2024-09-04 NOTE — ASSESSMENT & PLAN NOTE
Currently on prednisone taper but has had exertional shortness of breath since hospital discharge  This may be baseline for him but he is also somewhat deconditioned and I have recommended pulmonary rehab  I would plan to taper him completely off prednisone but if symptoms recur, he may require low-dose of chronic prednisone and potentially evaluation for other secondary causes and possibly targeted therapy such as Tezspire or Dupixent

## 2024-09-04 NOTE — PROGRESS NOTES
Progress note - Pulmonary Medicine   Fahad Hernandez 68 y.o. male MRN: [id number]       Impression & Plan:     Centrilobular emphysema (HCC)  Emphysema component is mild radiographically  Has severe obstruction on PFTs however which is likely a combination of COPD but chronic obstructive asthma as well  I have recommended pulmonary rehab  I have recommended repeat pulmonary function testing    Chronic obstructive asthma  Currently on prednisone taper but has had exertional shortness of breath since hospital discharge  This may be baseline for him but he is also somewhat deconditioned and I have recommended pulmonary rehab  I would plan to taper him completely off prednisone but if symptoms recur, he may require low-dose of chronic prednisone and potentially evaluation for other secondary causes and possibly targeted therapy such as Tezspire or Dupixent    Lung nodules  He does have lung nodules last identified on most recent CT in June  Recommendation for follow-up is December CAT scan, this was ordered previously but has not yet been scheduled.  He was reminded to schedule this testing    Return in about 3 months (around 12/16/2024).      ______________________________________________________________________    HPI:    Fahad Hernandez presents today for follow-up of hospitalization for exacerbation of asthma with COPD overlap.  He was discharged on a prednisone taper.  Chest x-ray imaging was clear at that time.  He has tapered down now to 2 tablets of prednisone but is still having some shortness of breath particularly with any activity.  He sometimes gets short of breath walking to the kitchen or the bathroom but it seems to be more sporadic.    He regularly uses Breztri.  He has rescue albuterol and the albuterol nebulizer.  He is using the nebulizer at least once a day.  He does not have cough or phlegm.  No fever or chills.  No other infection symptoms.  His wife reports that he tries to be active but he is  not getting regular cardiovascular activity.    No chest pains or cardiovascular complaint.  He does admit to anxiety.    Current Medications:    Current Outpatient Medications:     albuterol (ACCUNEB) 1.25 MG/3ML nebulizer solution, Take 1.25 mg by nebulization every 6 (six) hours as needed for wheezing, Disp: , Rfl:     albuterol (PROVENTIL HFA,VENTOLIN HFA) 90 mcg/act inhaler, Inhale 2 puffs every 6 (six) hours as needed, Disp: , Rfl:     amLODIPine-benazepril (LOTREL) 10-40 MG per capsule, , Disp: , Rfl:     ARIPiprazole (ABILIFY) 10 mg tablet, , Disp: , Rfl:     ARIPiprazole (ABILIFY) 15 mg tablet, , Disp: , Rfl:     aspirin (ECOTRIN LOW STRENGTH) 81 mg EC tablet, Take 81 mg by mouth daily, Disp: , Rfl:     atorvastatin (LIPITOR) 10 mg tablet, Take 10 mg by mouth daily, Disp: , Rfl:     Austedo 12 MG TABS, , Disp: , Rfl:     Budeson-Glycopyrrol-Formoterol (Breztri Aerosphere) 160-9-4.8 MCG/ACT AERO, Inhale 2 puffs 2 (two) times a day Rinse mouth after use., Disp: 32.1 g, Rfl: 3    buPROPion (WELLBUTRIN XL) 300 mg 24 hr tablet, 1 tab(s), Disp: , Rfl:     esomeprazole (NexIUM) 40 MG capsule, Take 40 mg by mouth, Disp: , Rfl:      MG tablet, , Disp: , Rfl:     metFORMIN (GLUCOPHAGE) 500 mg tablet, Take 1 tablet by mouth 2 (two) times a day with meals, Disp: , Rfl:     montelukast (SINGULAIR) 10 mg tablet, Take 10 mg by mouth, Disp: , Rfl:     multivitamin (THERAGRAN) TABS, Take 1 tablet by mouth, Disp: , Rfl:     nystatin (MYCOSTATIN) 500,000 units/5 mL suspension, TAKE 5 ML (1 TSP) BY MOUTH (SWISH, GARGLE, THEN SPIT) EVERY 6 HOURS FOR TREATMENT OF ORAL THRUSH., Disp: , Rfl:     OneTouch Ultra test strip, TEST DAILY.. DIAGNOSIS E11.9, Disp: , Rfl:     oxyCODONE (ROXICODONE) 5 mg immediate release tablet, Take 5 mg by mouth every 6 (six) hours as needed, Disp: , Rfl:     predniSONE 10 mg tablet, Take 4 tablets (40 mg total) by mouth daily for 3 days, THEN 3 tablets (30 mg total) daily for 3 days, THEN 2  "tablets (20 mg total) daily for 3 days, THEN 1 tablet (10 mg total) daily for 3 days., Disp: 30 tablet, Rfl: 0    tamsulosin (FLOMAX) 0.4 mg, Take 0.4 mg by mouth daily, Disp: , Rfl:     temazepam (RESTORIL) 30 mg capsule, Take 30 mg by mouth daily at bedtime as needed, Disp: , Rfl:     tiZANidine (ZANAFLEX) 4 mg tablet, , Disp: , Rfl:     venlafaxine (EFFEXOR-XR) 75 mg 24 hr capsule, 1 cap(s), Disp: , Rfl:     Review of Systems:    Aside from what is mentioned in the HPI, the review of systems is otherwise negative    Past medical history, surgical history, and family history were reviewed and updated as appropriate    Social history updates:  Social History     Tobacco Use   Smoking Status Never    Passive exposure: Never   Smokeless Tobacco Never   Tobacco Comments    Patient admits to using marijuana, edibles       PhysicalExamination:  Vitals:   /80   Pulse (!) 110   Temp 98.2 °F (36.8 °C)   Ht 5' 10\" (1.778 m)   Wt 73.5 kg (162 lb)   SpO2 94%   BMI 23.24 kg/m²     Gen:  Comfortable on room air.  No conversational dyspnea  HEENT:  Conjugate gaze.  sclerae anicteric.  Oropharynx moist  Neck: Trachea is midline. No JVD. No adenopathy  Chest: Excursion is symmetric with slightly distant breath sounds.  He does not have wheezes or crackles.  Normal resonance to percussion  Cardiac: Regular heart tones but slightly tachycardic. no murmur  Abdomen:  benign  Extremities: No edema  Neuro:  Normal speech and mentation    Diagnostic Data:  Labs:  I personally reviewed the most recent laboratory data pertinent to today's visit    Lab Results   Component Value Date    WBC 13.43 (H) 08/28/2024    HGB 11.1 (L) 08/28/2024    HCT 36.4 (L) 08/28/2024    MCV 81 (L) 08/28/2024     08/28/2024     Lab Results   Component Value Date    SODIUM 139 08/28/2024    K 4.1 08/28/2024    CO2 31 08/28/2024     08/28/2024    BUN 24 08/28/2024    CREATININE 0.83 08/28/2024    CALCIUM 9.1 08/28/2024       PFT " results:  The most recent pulmonary function tests were reviewed.  His last pulmonary function test was in 2023 at [facility].  This suggested severe obstruction with preserved diffusion    Imaging:  I personally reviewed the images on the PAC system pertinent to today's visit  Chest x-ray during the hospital stay shows clear lung fields.  No infiltrate or edema    Previous CAT scan was from July and did show resolution of prior groundglass but other smaller nodules that will require ongoing follow-up.  He was recommended for a 6-month interval follow-up CT    Other studies:  Cardiac echo from August 26 showed ejection fraction of 60% grade 1 diastolic dysfunction.  Mild mitral regurgitation and mild tricuspid regurgitation without evidence for pulmonary hypertension    Varun Terrell MD

## 2024-09-04 NOTE — ASSESSMENT & PLAN NOTE
Emphysema component is mild radiographically  Has severe obstruction on PFTs however which is likely a combination of COPD but chronic obstructive asthma as well  I have recommended pulmonary rehab  I have recommended repeat pulmonary function testing

## 2024-09-10 ENCOUNTER — APPOINTMENT (EMERGENCY)
Dept: RADIOLOGY | Facility: HOSPITAL | Age: 69
End: 2024-09-10
Payer: COMMERCIAL

## 2024-09-10 ENCOUNTER — HOSPITAL ENCOUNTER (OUTPATIENT)
Facility: HOSPITAL | Age: 69
Setting detail: OBSERVATION
Discharge: HOME | End: 2024-09-11
Attending: EMERGENCY MEDICINE | Admitting: INTERNAL MEDICINE
Payer: COMMERCIAL

## 2024-09-10 ENCOUNTER — APPOINTMENT (EMERGENCY)
Dept: CT IMAGING | Facility: HOSPITAL | Age: 69
End: 2024-09-10
Payer: COMMERCIAL

## 2024-09-10 DIAGNOSIS — J45.901 ASTHMA EXACERBATION: Primary | ICD-10-CM

## 2024-09-10 LAB
ALBUMIN SERPL BCG-MCNC: 3.7 G/DL (ref 3.5–5)
ALP SERPL-CCNC: 73 U/L (ref 34–104)
ALT SERPL W P-5'-P-CCNC: 38 U/L (ref 7–52)
ANION GAP SERPL CALCULATED.3IONS-SCNC: 7 MMOL/L (ref 4–13)
APTT PPP: 27 SECONDS (ref 23–34)
AST SERPL W P-5'-P-CCNC: 21 U/L (ref 13–39)
BASE EX.OXY STD BLDV CALC-SCNC: 89.4 % (ref 60–80)
BASE EXCESS BLDV CALC-SCNC: 1.3 MMOL/L
BASOPHILS # BLD AUTO: 0.06 THOUSANDS/ÂΜL (ref 0–0.1)
BASOPHILS NFR BLD AUTO: 0 % (ref 0–1)
BILIRUB SERPL-MCNC: 0.2 MG/DL (ref 0.2–1)
BNP SERPL-MCNC: 23 PG/ML (ref 0–100)
BUN SERPL-MCNC: 25 MG/DL (ref 5–25)
CALCIUM SERPL-MCNC: 9.3 MG/DL (ref 8.4–10.2)
CARDIAC TROPONIN I PNL SERPL HS: 7 NG/L
CHLORIDE SERPL-SCNC: 102 MMOL/L (ref 96–108)
CO2 SERPL-SCNC: 26 MMOL/L (ref 21–32)
CREAT SERPL-MCNC: 1.01 MG/DL (ref 0.6–1.3)
EOSINOPHIL # BLD AUTO: 0.48 THOUSAND/ÂΜL (ref 0–0.61)
EOSINOPHIL NFR BLD AUTO: 3 % (ref 0–6)
ERYTHROCYTE [DISTWIDTH] IN BLOOD BY AUTOMATED COUNT: 16.3 % (ref 11.6–15.1)
GFR SERPL CREATININE-BSD FRML MDRD: 75 ML/MIN/1.73SQ M
GLUCOSE SERPL-MCNC: 147 MG/DL (ref 65–140)
HCO3 BLDV-SCNC: 25.5 MMOL/L (ref 24–30)
HCT VFR BLD AUTO: 38.8 % (ref 36.5–49.3)
HGB BLD-MCNC: 11.8 G/DL (ref 12–17)
IMM GRANULOCYTES # BLD AUTO: 0.11 THOUSAND/UL (ref 0–0.2)
IMM GRANULOCYTES NFR BLD AUTO: 1 % (ref 0–2)
INR PPP: 0.88 (ref 0.85–1.19)
LACTATE SERPL-SCNC: 1.5 MMOL/L (ref 0.5–2)
LYMPHOCYTES # BLD AUTO: 4.78 THOUSANDS/ÂΜL (ref 0.6–4.47)
LYMPHOCYTES NFR BLD AUTO: 27 % (ref 14–44)
MCH RBC QN AUTO: 24.5 PG (ref 26.8–34.3)
MCHC RBC AUTO-ENTMCNC: 30.4 G/DL (ref 31.4–37.4)
MCV RBC AUTO: 81 FL (ref 82–98)
MONOCYTES # BLD AUTO: 1.46 THOUSAND/ÂΜL (ref 0.17–1.22)
MONOCYTES NFR BLD AUTO: 8 % (ref 4–12)
NEUTROPHILS # BLD AUTO: 10.91 THOUSANDS/ÂΜL (ref 1.85–7.62)
NEUTS SEG NFR BLD AUTO: 61 % (ref 43–75)
NRBC BLD AUTO-RTO: 0 /100 WBCS
O2 CT BLDV-SCNC: 16.5 ML/DL
PCO2 BLDV: 39 MM HG (ref 42–50)
PH BLDV: 7.43 [PH] (ref 7.3–7.4)
PLATELET # BLD AUTO: 382 THOUSANDS/UL (ref 149–390)
PMV BLD AUTO: 8.2 FL (ref 8.9–12.7)
PO2 BLDV: 63.1 MM HG (ref 35–45)
POTASSIUM SERPL-SCNC: 4.2 MMOL/L (ref 3.5–5.3)
PROCALCITONIN SERPL-MCNC: 0.06 NG/ML
PROT SERPL-MCNC: 6.7 G/DL (ref 6.4–8.4)
PROTHROMBIN TIME: 12.6 SECONDS (ref 12.3–15)
RBC # BLD AUTO: 4.82 MILLION/UL (ref 3.88–5.62)
SODIUM SERPL-SCNC: 135 MMOL/L (ref 135–147)
WBC # BLD AUTO: 17.8 THOUSAND/UL (ref 4.31–10.16)

## 2024-09-10 PROCEDURE — 96365 THER/PROPH/DIAG IV INF INIT: CPT

## 2024-09-10 PROCEDURE — 71045 X-RAY EXAM CHEST 1 VIEW: CPT

## 2024-09-10 PROCEDURE — 85025 COMPLETE CBC W/AUTO DIFF WBC: CPT | Performed by: EMERGENCY MEDICINE

## 2024-09-10 PROCEDURE — 85730 THROMBOPLASTIN TIME PARTIAL: CPT | Performed by: EMERGENCY MEDICINE

## 2024-09-10 PROCEDURE — 83605 ASSAY OF LACTIC ACID: CPT | Performed by: EMERGENCY MEDICINE

## 2024-09-10 PROCEDURE — 84145 PROCALCITONIN (PCT): CPT | Performed by: EMERGENCY MEDICINE

## 2024-09-10 PROCEDURE — 99285 EMERGENCY DEPT VISIT HI MDM: CPT

## 2024-09-10 PROCEDURE — 93005 ELECTROCARDIOGRAM TRACING: CPT

## 2024-09-10 PROCEDURE — 80053 COMPREHEN METABOLIC PANEL: CPT | Performed by: EMERGENCY MEDICINE

## 2024-09-10 PROCEDURE — 99285 EMERGENCY DEPT VISIT HI MDM: CPT | Performed by: EMERGENCY MEDICINE

## 2024-09-10 PROCEDURE — 85610 PROTHROMBIN TIME: CPT | Performed by: EMERGENCY MEDICINE

## 2024-09-10 PROCEDURE — 94640 AIRWAY INHALATION TREATMENT: CPT

## 2024-09-10 PROCEDURE — 36415 COLL VENOUS BLD VENIPUNCTURE: CPT | Performed by: EMERGENCY MEDICINE

## 2024-09-10 PROCEDURE — 83880 ASSAY OF NATRIURETIC PEPTIDE: CPT | Performed by: EMERGENCY MEDICINE

## 2024-09-10 PROCEDURE — 70450 CT HEAD/BRAIN W/O DYE: CPT

## 2024-09-10 PROCEDURE — 96368 THER/DIAG CONCURRENT INF: CPT

## 2024-09-10 PROCEDURE — 84484 ASSAY OF TROPONIN QUANT: CPT | Performed by: EMERGENCY MEDICINE

## 2024-09-10 PROCEDURE — 82805 BLOOD GASES W/O2 SATURATION: CPT | Performed by: EMERGENCY MEDICINE

## 2024-09-10 PROCEDURE — 71250 CT THORAX DX C-: CPT

## 2024-09-10 PROCEDURE — 96366 THER/PROPH/DIAG IV INF ADDON: CPT

## 2024-09-10 RX ORDER — ACETAMINOPHEN 10 MG/ML
1000 INJECTION, SOLUTION INTRAVENOUS ONCE
Status: COMPLETED | OUTPATIENT
Start: 2024-09-10 | End: 2024-09-11

## 2024-09-10 RX ORDER — PREDNISONE 20 MG/1
60 TABLET ORAL ONCE
Status: COMPLETED | OUTPATIENT
Start: 2024-09-10 | End: 2024-09-10

## 2024-09-10 RX ORDER — MAGNESIUM SULFATE 1 G/100ML
1 INJECTION INTRAVENOUS ONCE
Status: COMPLETED | OUTPATIENT
Start: 2024-09-10 | End: 2024-09-11

## 2024-09-10 RX ORDER — ALBUTEROL SULFATE 2.5 MG/3ML
1 SOLUTION RESPIRATORY (INHALATION) ONCE
Status: COMPLETED | OUTPATIENT
Start: 2024-09-10 | End: 2024-09-10

## 2024-09-10 RX ORDER — IPRATROPIUM BROMIDE AND ALBUTEROL SULFATE .5; 3 MG/3ML; MG/3ML
1 SOLUTION RESPIRATORY (INHALATION) ONCE
Status: COMPLETED | OUTPATIENT
Start: 2024-09-10 | End: 2024-09-10

## 2024-09-10 RX ADMIN — MAGNESIUM SULFATE HEPTAHYDRATE 1 G: 1 INJECTION, SOLUTION INTRAVENOUS at 22:02

## 2024-09-10 RX ADMIN — ACETAMINOPHEN 1000 MG: 1000 INJECTION, SOLUTION INTRAVENOUS at 23:29

## 2024-09-10 RX ADMIN — PREDNISONE 60 MG: 20 TABLET ORAL at 22:01

## 2024-09-11 ENCOUNTER — TELEPHONE (OUTPATIENT)
Age: 69
End: 2024-09-11

## 2024-09-11 VITALS
HEIGHT: 70 IN | WEIGHT: 164.02 LBS | OXYGEN SATURATION: 96 % | DIASTOLIC BLOOD PRESSURE: 94 MMHG | SYSTOLIC BLOOD PRESSURE: 141 MMHG | RESPIRATION RATE: 20 BRPM | TEMPERATURE: 98.4 F | BODY MASS INDEX: 23.48 KG/M2 | HEART RATE: 95 BPM

## 2024-09-11 PROBLEM — J45.51 SEVERE PERSISTENT ASTHMA WITH ACUTE EXACERBATION: Status: ACTIVE | Noted: 2024-06-07

## 2024-09-11 LAB
2HR DELTA HS TROPONIN: 0 NG/L
ATRIAL RATE: 88 BPM
ATRIAL RATE: 97 BPM
CARDIAC TROPONIN I PNL SERPL HS: 7 NG/L
EST. AVERAGE GLUCOSE BLD GHB EST-MCNC: 154 MG/DL
GLUCOSE SERPL-MCNC: 165 MG/DL (ref 65–140)
GLUCOSE SERPL-MCNC: 176 MG/DL (ref 65–140)
HBA1C MFR BLD: 7 %
P AXIS: 65 DEGREES
P AXIS: 72 DEGREES
PLATELET # BLD AUTO: 422 THOUSANDS/UL (ref 149–390)
PMV BLD AUTO: 8.6 FL (ref 8.9–12.7)
PR INTERVAL: 150 MS
PR INTERVAL: 162 MS
QRS AXIS: 28 DEGREES
QRS AXIS: 35 DEGREES
QRSD INTERVAL: 74 MS
QRSD INTERVAL: 78 MS
QT INTERVAL: 352 MS
QT INTERVAL: 356 MS
QTC INTERVAL: 425 MS
QTC INTERVAL: 452 MS
T WAVE AXIS: 66 DEGREES
T WAVE AXIS: 76 DEGREES
VENTRICULAR RATE: 88 BPM
VENTRICULAR RATE: 97 BPM

## 2024-09-11 PROCEDURE — 99236 HOSP IP/OBS SAME DATE HI 85: CPT | Performed by: INTERNAL MEDICINE

## 2024-09-11 PROCEDURE — 82948 REAGENT STRIP/BLOOD GLUCOSE: CPT

## 2024-09-11 PROCEDURE — 93010 ELECTROCARDIOGRAM REPORT: CPT | Performed by: INTERNAL MEDICINE

## 2024-09-11 PROCEDURE — 83036 HEMOGLOBIN GLYCOSYLATED A1C: CPT | Performed by: INTERNAL MEDICINE

## 2024-09-11 PROCEDURE — 99239 HOSP IP/OBS DSCHRG MGMT >30: CPT

## 2024-09-11 PROCEDURE — 85049 AUTOMATED PLATELET COUNT: CPT | Performed by: INTERNAL MEDICINE

## 2024-09-11 RX ORDER — LISINOPRIL 20 MG/1
40 TABLET ORAL DAILY
Status: DISCONTINUED | OUTPATIENT
Start: 2024-09-11 | End: 2024-09-11 | Stop reason: HOSPADM

## 2024-09-11 RX ORDER — PANTOPRAZOLE SODIUM 40 MG/1
40 TABLET, DELAYED RELEASE ORAL
Status: DISCONTINUED | OUTPATIENT
Start: 2024-09-11 | End: 2024-09-11 | Stop reason: HOSPADM

## 2024-09-11 RX ORDER — BENZONATATE 100 MG/1
100 CAPSULE ORAL 3 TIMES DAILY PRN
Status: DISCONTINUED | OUTPATIENT
Start: 2024-09-11 | End: 2024-09-11 | Stop reason: HOSPADM

## 2024-09-11 RX ORDER — ACETAMINOPHEN 325 MG/1
650 TABLET ORAL EVERY 6 HOURS PRN
Status: DISCONTINUED | OUTPATIENT
Start: 2024-09-11 | End: 2024-09-11 | Stop reason: HOSPADM

## 2024-09-11 RX ORDER — ATORVASTATIN CALCIUM 10 MG/1
10 TABLET, FILM COATED ORAL DAILY
Status: DISCONTINUED | OUTPATIENT
Start: 2024-09-11 | End: 2024-09-11 | Stop reason: HOSPADM

## 2024-09-11 RX ORDER — AMLODIPINE BESYLATE 10 MG/1
10 TABLET ORAL DAILY
Status: DISCONTINUED | OUTPATIENT
Start: 2024-09-11 | End: 2024-09-11 | Stop reason: HOSPADM

## 2024-09-11 RX ORDER — INSULIN LISPRO 100 [IU]/ML
1-5 INJECTION, SOLUTION INTRAVENOUS; SUBCUTANEOUS
Status: DISCONTINUED | OUTPATIENT
Start: 2024-09-11 | End: 2024-09-11 | Stop reason: HOSPADM

## 2024-09-11 RX ORDER — ENOXAPARIN SODIUM 100 MG/ML
40 INJECTION SUBCUTANEOUS DAILY
Status: DISCONTINUED | OUTPATIENT
Start: 2024-09-11 | End: 2024-09-11 | Stop reason: HOSPADM

## 2024-09-11 RX ORDER — ARIPIPRAZOLE 5 MG/1
15 TABLET ORAL DAILY
Status: DISCONTINUED | OUTPATIENT
Start: 2024-09-11 | End: 2024-09-11 | Stop reason: HOSPADM

## 2024-09-11 RX ORDER — PROMETHAZINE HYDROCHLORIDE 25 MG/1
25 TABLET ORAL DAILY PRN
COMMUNITY
Start: 2024-09-04

## 2024-09-11 RX ORDER — IPRATROPIUM BROMIDE AND ALBUTEROL SULFATE 2.5; .5 MG/3ML; MG/3ML
SOLUTION RESPIRATORY (INHALATION)
Status: COMPLETED
Start: 2024-09-11 | End: 2024-09-11

## 2024-09-11 RX ORDER — TEMAZEPAM 15 MG/1
30 CAPSULE ORAL
Status: DISCONTINUED | OUTPATIENT
Start: 2024-09-11 | End: 2024-09-11 | Stop reason: HOSPADM

## 2024-09-11 RX ORDER — FLUTICASONE FUROATE AND VILANTEROL 100; 25 UG/1; UG/1
1 POWDER RESPIRATORY (INHALATION) DAILY
Status: DISCONTINUED | OUTPATIENT
Start: 2024-09-11 | End: 2024-09-11 | Stop reason: HOSPADM

## 2024-09-11 RX ORDER — MONTELUKAST SODIUM 10 MG/1
10 TABLET ORAL DAILY
Status: DISCONTINUED | OUTPATIENT
Start: 2024-09-11 | End: 2024-09-11 | Stop reason: HOSPADM

## 2024-09-11 RX ORDER — BUPROPION HYDROCHLORIDE 150 MG/1
300 TABLET ORAL DAILY
Status: DISCONTINUED | OUTPATIENT
Start: 2024-09-11 | End: 2024-09-11 | Stop reason: HOSPADM

## 2024-09-11 RX ORDER — TAMSULOSIN HYDROCHLORIDE 0.4 MG/1
0.4 CAPSULE ORAL DAILY
Status: DISCONTINUED | OUTPATIENT
Start: 2024-09-11 | End: 2024-09-11 | Stop reason: HOSPADM

## 2024-09-11 RX ORDER — OXYCODONE AND ACETAMINOPHEN 5; 325 MG/1; MG/1
1 TABLET ORAL EVERY 6 HOURS PRN
Status: DISCONTINUED | OUTPATIENT
Start: 2024-09-11 | End: 2024-09-11 | Stop reason: HOSPADM

## 2024-09-11 RX ORDER — PREDNISONE 20 MG/1
60 TABLET ORAL DAILY
Status: DISCONTINUED | OUTPATIENT
Start: 2024-09-11 | End: 2024-09-11 | Stop reason: HOSPADM

## 2024-09-11 RX ORDER — ALBUTEROL SULFATE 0.83 MG/ML
2.5 SOLUTION RESPIRATORY (INHALATION) EVERY 4 HOURS PRN
Status: DISCONTINUED | OUTPATIENT
Start: 2024-09-11 | End: 2024-09-11 | Stop reason: HOSPADM

## 2024-09-11 RX ORDER — PREDNISONE 10 MG/1
TABLET ORAL
Qty: 57 TABLET | Refills: 0 | Status: SHIPPED | OUTPATIENT
Start: 2024-09-12 | End: 2024-09-29

## 2024-09-11 RX ADMIN — ARIPIPRAZOLE 15 MG: 5 TABLET ORAL at 08:36

## 2024-09-11 RX ADMIN — MONTELUKAST 10 MG: 10 TABLET, FILM COATED ORAL at 08:36

## 2024-09-11 RX ADMIN — INSULIN LISPRO 1 UNITS: 100 INJECTION, SOLUTION INTRAVENOUS; SUBCUTANEOUS at 07:39

## 2024-09-11 RX ADMIN — LISINOPRIL 40 MG: 20 TABLET ORAL at 08:37

## 2024-09-11 RX ADMIN — AMLODIPINE BESYLATE 10 MG: 10 TABLET ORAL at 08:36

## 2024-09-11 RX ADMIN — ATORVASTATIN CALCIUM 10 MG: 10 TABLET, FILM COATED ORAL at 08:36

## 2024-09-11 RX ADMIN — TAMSULOSIN HYDROCHLORIDE 0.4 MG: 0.4 CAPSULE ORAL at 08:36

## 2024-09-11 RX ADMIN — FLUTICASONE FUROATE AND VILANTEROL TRIFENATATE 1 PUFF: 100; 25 POWDER RESPIRATORY (INHALATION) at 08:41

## 2024-09-11 RX ADMIN — BUPROPION HYDROCHLORIDE 300 MG: 150 TABLET, EXTENDED RELEASE ORAL at 08:36

## 2024-09-11 RX ADMIN — ASPIRIN 81 MG: 81 TABLET, COATED ORAL at 08:37

## 2024-09-11 RX ADMIN — PANTOPRAZOLE SODIUM 40 MG: 40 TABLET, DELAYED RELEASE ORAL at 05:32

## 2024-09-11 RX ADMIN — IPRATROPIUM BROMIDE AND ALBUTEROL SULFATE: .5; 3 SOLUTION RESPIRATORY (INHALATION) at 01:35

## 2024-09-11 RX ADMIN — UMECLIDINIUM 1 PUFF: 62.5 AEROSOL, POWDER ORAL at 08:41

## 2024-09-11 RX ADMIN — OXYCODONE HYDROCHLORIDE AND ACETAMINOPHEN 1 TABLET: 5; 325 TABLET ORAL at 11:19

## 2024-09-11 RX ADMIN — ENOXAPARIN SODIUM 40 MG: 40 INJECTION SUBCUTANEOUS at 08:37

## 2024-09-11 RX ADMIN — INSULIN LISPRO 1 UNITS: 100 INJECTION, SOLUTION INTRAVENOUS; SUBCUTANEOUS at 12:04

## 2024-09-11 NOTE — DISCHARGE INSTR - AVS FIRST PAGE
Please be aware that I have added a prednisone taper starting at 60 mg.  Please follow the tapering dose as prescribed.  Please see your outpatient pulmonologist at the earliest convenience.

## 2024-09-11 NOTE — ASSESSMENT & PLAN NOTE
Patient presents with shortness of breath, wheezing, coinciding with stopping steroid course (completed 9/8)  Afebrile, with leukocytosis however recently discontinued prednisone   CT chest shows no acute pathology   Received nebulizer treatment with improvement in ER  Suspect component of anxiety, patient was admitted here recently with similar symptoms  Place in observation for acute asthma exacerbation  Continue with prednisone 60mg daily, may need extended taper   Continue with albuterol nebulizer q4 hrs prn shortness of breath or wheezing  Continue with breztri equivalent incruse/breo  Oxygen therapy as needed, he is currently on room air

## 2024-09-11 NOTE — DISCHARGE SUMMARY
Discharge Summary - Hospitalist   Name: Fahad Hernandez 68 y.o. male I MRN: [id number]  Unit/Bed#: -01 I Date of Admission: 9/10/2024   Date of Service: 9/11/2024 I Hospital Day: 0     Assessment & Plan  Severe persistent asthma with acute exacerbation  Patient presents with shortness of breath, wheezing, coinciding with stopping steroid course (completed 9/8)  Afebrile, with leukocytosis however recently discontinued prednisone   CT chest shows no acute pathology   Received nebulizer treatment with improvement in ER  Suspect component of anxiety, patient was admitted here recently with similar symptoms  Place in observation for acute asthma exacerbation  Continue with prednisone 60mg daily, may need extended taper   Continue with albuterol nebulizer q4 hrs prn shortness of breath or wheezing  Continue with breztri equivalent incruse/breo  Oxygen therapy as needed, he is currently on room air   Hypertension  Continue with lisinopril daily   Type 2 diabetes mellitus without complication, without long-term current use of insulin ([facility])  Lab Results   Component Value Date    HGBA1C 7.0 (H) 09/11/2024       Recent Labs     09/11/24  0652 09/11/24  1156   POCGLU 176* 165*       Blood Sugar Average: Last 72 hrs:  (P) 170.5  On metformin outpatient, continue   ISS as needed  Check sugars with meals  Gastroesophageal reflux disease with esophagitis  Continue with PPI   Critical access hospital   Discharge - Name: Fahad Hernandez I  MRN: [id number]  Unit/Bed#: -01 I Date of Admission: 9/10/2024   Date of Service: 9/11/2024 I Hospital Day: 0      * Severe persistent asthma with acute exacerbation  Assessment & Plan  Patient presents with shortness of breath, wheezing, coinciding with stopping steroid course (completed 9/8)  Afebrile, with leukocytosis however recently discontinued prednisone   CT chest shows no acute pathology   Received nebulizer treatment with improvement in ER  Suspect component of  anxiety, patient was admitted here recently with similar symptoms  Place in observation for acute asthma exacerbation  Continue with prednisone 60mg daily, may need extended taper   Continue with albuterol nebulizer q4 hrs prn shortness of breath or wheezing  Continue with breztri equivalent incruse/breo  Oxygen therapy as needed, he is currently on room air     Gastroesophageal reflux disease with esophagitis  Assessment & Plan  Continue with PPI    Type 2 diabetes mellitus without complication, without long-term current use of insulin (HCC)  Assessment & Plan  Lab Results   Component Value Date    HGBA1C 7.0 (H) 09/11/2024       Recent Labs     09/11/24  0652 09/11/24  1156   POCGLU 176* 165*       Blood Sugar Average: Last 72 hrs:  (P) 170.5  On metformin outpatient, continue   ISS as needed  Check sugars with meals    Hypertension  Assessment & Plan  Continue with lisinopril daily         Discharging Physician / Practitioner: Dario Gomes PA-C  PCP: Surjit Hayes DO  Admission Date:   Admission Orders (From admission, onward)       Ordered        09/11/24 0314  Place in Observation  Once                          Discharge Date: 09/11/24    Consultations During Hospital Stay:  None    Procedures Performed:   None    Significant Findings / Test Results:   XR chest portable - 1 view    Result Date: 9/11/2024  Impression: No acute cardiopulmonary disease. Workstation performed: [id number]     CT chest without contrast    Result Date: 9/11/2024  Impression: No acute intrathoracic abnormalities noted with stable ancillary findings detailed above. Workstation performed: [id number]     CT head without contrast    Result Date: 9/11/2024  Impression: No acute intracranial abnormality. Mild microangiopathic changes. Workstation performed: [id number]       XR chest portable - 1 view    Result Date: 9/11/2024  Impression No acute cardiopulmonary disease. Workstation performed: [id number]      Results for orders placed  "during the hospital encounter of 08/26/24    Echo complete w/ contrast if indicated    Interpretation Summary    Left Ventricle: Left ventricular cavity size is normal. Wall thickness is normal. The left ventricular ejection fraction is 60%. Systolic function is normal. Although no diagnostic regional wall motion abnormality was identified, this possibility cannot be completely excluded on the basis of this study.  This is a technically difficult study with poor acoustic window. Diastolic function is mildly abnormal, consistent with grade I (abnormal) relaxation.    Mitral Valve: There is mild regurgitation.    Tricuspid Valve: There is mild regurgitation.    Pericardium: Trace pericardial effusion versus epicardial fat.      No results for input(s): \"BLOODCX\", \"SPUTUMCULTUR\", \"GRAMSTAIN\", \"URINECX\", \"WOUNDCULT\", \"BODYFLUIDCUL\", \"MRSACULTURE\", \"INFLUAPCR\", \"INFLUBPCR\", \"RSVPCR\", \"LEGIONELLAUR\", \"STPU\", \"CDIFFTOXINB\" in the last 72 hours.    Incidental Findings:   None other than noted above; I reviewed the above mentioned incidental findings with the patient and/or family and they expressed understanding.    Test Results Pending at Discharge (will require follow up):   None     Outpatient Tests Requested:  None    Complications:  None    Reason for Admission:   Chief Complaint   Patient presents with    Shortness of Breath     Medics called by wife for SOB with this pt. Albuterol and duoneb admin in field. Pt 96% on RA at triage.          Hospital Course:   Patient initially presented with severe persistent asthma with acute exacerbation.  He had shortness of breath since recently discontinuing prednisone.  He was given prednisone as well as nebulizing treatment and observed.  He significantly improved and was able to be discharged on a prednisone taper on 9/11.  At this time patient is medically stable for discharge and agreeable for plan at discharge.  For further information in regards to course hospitalization, " "please see notes attached.    Please see above list of diagnoses and related plan for additional information.     Condition at Discharge: good    Discharge Day Visit / Exam:   Subjective: Patient reports to be feeling well and able to be discharged home.  He reports breathing significantly proved.  He currently denies any chest pain/pressure, palpitations, lightheadedness, nausea, shortness breath, or chills.  Vitals: Blood Pressure: 141/94 (09/11/24 0835)  Pulse: 95 (09/11/24 0835)  Temperature: 98.4 °F (36.9 °C) (09/11/24 0652)  Temp Source: Oral (09/10/24 2150)  Respirations: 20 (09/11/24 0330)  Height: 5' 10\" (177.8 cm) (09/11/24 0406)  Weight - Scale: 74.4 kg (164 lb 0.4 oz) (09/11/24 0406)  SpO2: 96 % (09/11/24 0835)  Exam:   Physical Exam  Vitals and nursing note reviewed.   Constitutional:       General: He is not in acute distress.     Appearance: He is normal weight. He is not ill-appearing, toxic-appearing or diaphoretic.   HENT:      Head: Normocephalic.      Nose: Nose normal.      Mouth/Throat:      Mouth: Mucous membranes are moist.      Pharynx: Oropharynx is clear.   Eyes:      General: No scleral icterus.     Conjunctiva/sclera: Conjunctivae normal.      Pupils: Pupils are equal, round, and reactive to light.   Cardiovascular:      Rate and Rhythm: Normal rate and regular rhythm.      Heart sounds: No murmur heard.     No friction rub. No gallop.   Pulmonary:      Effort: Pulmonary effort is normal. No respiratory distress.      Breath sounds: No stridor. Wheezing (Minimal expiratory) present. No rhonchi or rales.   Abdominal:      General: Abdomen is flat.      Palpations: Abdomen is soft.   Musculoskeletal:         General: Normal range of motion.      Cervical back: Normal range of motion and neck supple.      Right lower leg: No edema.      Left lower leg: No edema.   Lymphadenopathy:      Cervical: No cervical adenopathy.   Skin:     General: Skin is warm.      Coloration: Skin is not " jaundiced or pale.      Findings: No bruising, erythema or lesion.   Neurological:      General: No focal deficit present.      Mental Status: He is alert and oriented to person, place, and time. Mental status is at baseline.      Cranial Nerves: No cranial nerve deficit.      Motor: No weakness.   Psychiatric:         Mood and Affect: Mood normal.         Behavior: Behavior normal.         Thought Content: Thought content normal.          Discussion with Family: Patient declined call to .     Discharge instructions/Information to patient and family:   See after visit summary for information provided to patient and family.      Provisions for Follow-Up Care:  See after visit summary for information related to follow-up care and any pertinent home health orders.       Mobility at time of Discharge:   Basic Mobility Inpatient Raw Score: 23  JH-HLM Goal: 7: Walk 25 feet or more  JH-HLM Achieved: 7: Walk 25 feet or more  HLM Goal achieved. Continue to encourage appropriate mobility.    Disposition:   Home    Planned Readmission: none     Discharge Statement:  I spent 60 minutes discharging the patient. This time was spent on the day of discharge. I had direct contact with the patient on the day of discharge. Greater than 50% of the total time was spent examining patient, answering all patient questions, arranging and discussing plan of care with patient as well as directly providing post-discharge instructions.  Additional time then spent on discharge activities.    Discharge Medications:  See after visit summary for reconciled discharge medications provided to patient and/or family.      **Please Note: This note may have been constructed using a voice recognition system**

## 2024-09-11 NOTE — UTILIZATION REVIEW
Initial Clinical Review    Admission: Date/Time/Statement:   Admission Orders (From admission, onward)       Ordered        09/11/24 0314  Place in Observation  Once                          Orders Placed This Encounter   Procedures    Place in Observation     Standing Status:   Standing     Number of Occurrences:   1     Order Specific Question:   Level of Care     Answer:   Med Surg [16]     ED Arrival Information       Expected   -    Arrival   9/10/2024 21:47    Acuity   Urgent              Means of arrival   Ambulance    Escorted by   [facility]   Hospitalist    Admission type   Emergency              Arrival complaint   resp distress             Chief Complaint   Patient presents with    Shortness of Breath     Medics called by wife for SOB with this pt. Albuterol and duoneb admin in field. Pt 96% on RA at triage.        Initial Presentation: 68 y.o. male to ED from home w/ PMH of hypertension, anxiety disorder, severe persistent asthma, diabetes, lung nodules, emphysema presents with complaints of shortness of breath.  He was in his usual state of health when he developed acute onset shortness of breath.  Associated with wheezing.  Patient describes it as feeling like he could not breathe which made him even more short of breath.  He tried using nebulizers and his home inhalers but this did not improve his symptoms so he presented to the emergency room.  He has not had any worsening of his chronic cough, denies any fevers or sore throat.  Patient was admitted and discharged from the hospital 8/28 with complaints of chest pain and workup was benign.  He has since followed up with pulmonology, last visit outpatient 9/4 and was referred to pulmonary rehab and repeat PFTs.  He completed his prednisone tapered course yesterday.  He otherwise denies toxic habits and is a never tobacco smoker.  In ER, vital signs are significant for tachypnea.  Labs notable for WBC 17.8  CT chest without contrast  shows no acute intrathoracic abnormalities. Given albuterol, magnesium, DuoNeb, prednisone with improvement of wheezing. Admitted OBS status w/ asthma w/ acute exacerbation . Cont prednisone , albuterol , breztri quivalent incruse/breo . O2 prn . HTN lotrel qd . DM SSI and monitor .     Anticipated Length of Stay/Certification Statement: Patient will be admitted on an observation basis with an anticipated length of stay of less than 2 midnights secondary to above.         ED Triage Vitals [09/10/24 2150]   Temperature Pulse Respirations Blood Pressure SpO2 Pain Score   97.6 °F (36.4 °C) 88 18 137/83 96 % No Pain     Weight (last 2 days)       Date/Time Weight    09/11/24 0406 74.4 (164.02)            Vital Signs (last 3 days)       Date/Time Temp Pulse Resp BP MAP (mmHg) SpO2 Calculated FIO2 (%) - Nasal Cannula Nasal Cannula O2 Flow Rate (L/min) O2 Device Patient Position - Orthostatic VS Pain    09/11/24 08:35:02 -- 95 -- 141/94 110 96 % -- -- -- -- --    09/11/24 0740 -- -- -- -- -- -- -- -- None (Room air) -- 7    09/11/24 06:52:34 98.4 °F (36.9 °C) 97 -- 144/83 103 95 % -- -- -- -- --    09/11/24 0414 -- -- -- -- -- -- -- -- None (Room air) -- No Pain    09/11/24 04:05:53 98.5 °F (36.9 °C) 95 -- 144/84 104 95 % -- -- -- -- --    09/11/24 0330 -- 93 20 153/86 112 94 % 36 4 L/min Nasal cannula Sitting --    09/11/24 0230 -- 85 32 145/79 104 93 % -- -- -- -- --    09/11/24 0200 -- 83 47 143/79 105 94 % -- -- -- -- --    09/11/24 0131 -- 94 44 162/118 133 94 % -- -- -- -- --    09/11/24 0100 -- 85 26 147/77 105 94 % -- -- -- -- --    09/11/24 0030 -- 84 24 136/74 97 95 % -- -- -- -- --    09/11/24 0000 -- 92 40 161/79 114 94 % -- -- -- -- --    09/10/24 2330 -- 85 25 148/75 106 94 % -- -- -- -- --    09/10/24 2300 -- 84 34 118/78 92 93 % -- -- -- -- --    09/10/24 2230 -- 84 30 128/75 96 93 % -- -- -- -- --    09/10/24 2209 -- -- -- -- -- -- -- -- None (Room air) -- --    09/10/24 2200 -- 89 20 129/79 98 95 % -- --  None (Room air) Sitting --    09/10/24 2150 97.6 °F (36.4 °C) 88 18 137/83 -- 96 % -- -- None (Room air) Sitting No Pain              Pertinent Labs/Diagnostic Test Results:   Radiology:  CT head without contrast   Final Interpretation by Surjit Menjivar MD (09/11 0023)      No acute intracranial abnormality. Mild microangiopathic changes.                  Workstation performed: [id number]         CT chest without contrast   Final Interpretation by Surjit Menjivar MD (09/11 0031)      No acute intrathoracic abnormalities noted with stable ancillary findings detailed above.         Workstation performed: [id number]         XR chest portable - 1 view   ED Interpretation by Oswaldo Woodward MD (09/10 2246)   No acute findings.      Final Interpretation by Hermilo Johnson MD (09/11 0854)      No acute cardiopulmonary disease.            Workstation performed: [id number]           Cardiology:  No orders to display     GI:  No orders to display           Results from last 7 days   Lab Units 09/11/24  0512 09/10/24  2157 09/04/24  1209   WBC Thousand/uL  --  17.80* 15.48*   HEMOGLOBIN g/dL  --  11.8* 13.5   HEMATOCRIT %  --  38.8 44.0   PLATELETS Thousands/uL 422* 382 500*   TOTAL NEUT ABS Thousands/µL  --  10.91* 12.33*         Results from last 7 days   Lab Units 09/10/24  2157 09/04/24  1209   SODIUM mmol/L 135 139   POTASSIUM mmol/L 4.2 4.1   CHLORIDE mmol/L 102 100   CO2 mmol/L 26 28   ANION GAP mmol/L 7 11   BUN mg/dL 25 21   CREATININE mg/dL 1.01 0.93   EGFR ml/min/1.73sq m 75 82   CALCIUM mg/dL 9.3 9.6     Results from last 7 days   Lab Units 09/10/24  2157 09/04/24  1209   AST U/L 21 23   ALT U/L 38 44   ALK PHOS U/L 73 67   TOTAL PROTEIN g/dL 6.7 7.2   ALBUMIN g/dL 3.7 4.0   TOTAL BILIRUBIN mg/dL 0.20 0.47     Results from last 7 days   Lab Units 09/11/24  0652   POC GLUCOSE mg/dl 176*     Results from last 7 days   Lab Units 09/10/24  2157   GLUCOSE RANDOM mg/dL 147*     Results from last 7 days   Lab Units  09/11/24  0512   HEMOGLOBIN A1C % 7.0*   EAG mg/dl 154     Results from last 7 days   Lab Units 09/10/24  2157   PH JOSE DANIEL  7.434*   PCO2 JOSE DANIEL mm Hg 39.0*   PO2 JOSE DANIEL mm Hg 63.1*   HCO3 JOSE DANIEL mmol/L 25.5   BASE EXC JOSE DANIEL mmol/L 1.3   O2 CONTENT JOSE DANIEL ml/dL 16.5   O2 HGB, VENOUS % 89.4*         Results from last 7 days   Lab Units 09/10/24  2358 09/10/24  2157   HS TNI 0HR ng/L  --  7   HS TNI 2HR ng/L 7  --    HSTNI D2 ng/L 0  --          Results from last 7 days   Lab Units 09/10/24  2157   PROTIME seconds 12.6   INR  0.88   PTT seconds 27     Results from last 7 days   Lab Units 09/04/24  1209   TSH 3RD GENERATON uIU/mL 1.928     Results from last 7 days   Lab Units 09/10/24  2157   PROCALCITONIN ng/ml 0.06     Results from last 7 days   Lab Units 09/10/24  2157   LACTIC ACID mmol/L 1.5         Results from last 7 days   Lab Units 09/10/24  2157   BNP pg/mL 23       Results from last 7 days   Lab Units 09/04/24  1209   CREATININE UR mg/dL 76.4           ED Treatment-Medication Administration from 09/10/2024 2147 to 09/11/2024 0359         Date/Time Order Dose Route Action     09/10/2024 2201 predniSONE tablet 60 mg 60 mg Oral Given     09/10/2024 2202 magnesium sulfate IVPB (premix) SOLN 1 g 1 g Intravenous New Bag     09/10/2024 2154 albuterol (FOR EMS ONLY) (2.5 mg/3 mL) 0.083 % inhalation solution 2.5 mg 0 mg Does not apply Given to EMS     09/10/2024 2154 ipratropium-albuterol (FOR EMS ONLY) (DUO-NEB) 0.5-2.5 mg/3 mL inhalation solution 3 mL 0 mL Does not apply Given to EMS     09/10/2024 2329 acetaminophen (Ofirmev) injection 1,000 mg 1,000 mg Intravenous New Bag     09/11/2024 0135 ipratropium-albuterol (DUO-NEB) 0.5-2.5 mg/3 mL inhalation solution **ADS Override Pull** --  Given            Past Medical History:   Diagnosis Date    Asthma     COPD (chronic obstructive pulmonary disease) ([facility])     Dementia ([facility])     History of stroke 11/07/2019    Major depressive disorder with current active episode 11/07/2019    Sleep  apnea     Stroke (HCC)     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 male  Admission Orders:  Scheduled Medications:  amLODIPine, 10 mg, Oral, Daily   And  lisinopril, 40 mg, Oral, Daily  ARIPiprazole, 15 mg, Oral, Daily  aspirin, 81 mg, Oral, Daily  atorvastatin, 10 mg, Oral, Daily  buPROPion, 300 mg, Oral, Daily  enoxaparin, 40 mg, Subcutaneous, Daily  Fluticasone Furoate-Vilanterol, 1 puff, Inhalation, Daily  insulin lispro, 1-5 Units, Subcutaneous, TID AC  insulin lispro, 1-5 Units, Subcutaneous, HS  metFORMIN, 500 mg, Oral, BID With Meals  montelukast, 10 mg, Oral, Daily  pantoprazole, 40 mg, Oral, Early Morning  predniSONE, 60 mg, Oral, Daily  tamsulosin, 0.4 mg, Oral, Daily  umeclidinium, 1 puff, Inhalation, Daily      Continuous IV Infusions:     PRN Meds:  acetaminophen, 650 mg, Oral, Q6H PRN  albuterol, 2.5 mg, Nebulization, Q4H PRN  benzonatate, 100 mg, Oral, TID PRN  temazepam, 30 mg, Oral, HS PRN      Fingerstick ac and hs   I&O   Up and OOB   Tele   Cont pulse ox       Network Utilization Review Department  ATTENTION: Please call with any questions or concerns to [phone] and carefully listen to the prompts so that you are directed to the right person. All voicemails are confidential.   For Discharge needs, contact Care Management DC Support Team at [phone] opt. 2  Send all requests for admission clinical reviews, approved or denied determinations and any other requests to dedicated fax number below belonging to the campus where the patient is receiving treatment. List of dedicated fax numbers for the Facilities:  FACILITY NAME UR FAX NUMBER   ADMISSION DENIALS  (Administrative/Medical Necessity) [phone]   DISCHARGE SUPPORT TEAM (NETWORK) [phone]   PARENT CHILD HEALTH (Maternity/NICU/Pediatrics) [phone]   [facility] [phone]   [facility] [phone]   [facility] [phone]   [facility] [phone]   [facility] [phone]   [facility] [phone]   [facility] [phone]   [facility] [phone]   [facility] [phone]   [facility] [phone]   [facility] [phone]   [facility] [phone]

## 2024-09-11 NOTE — ED PROVIDER NOTES
"1. Asthma exacerbation      ED Disposition       ED Disposition   Admit    Condition   Stable    Date/Time   Wed Sep 11, 2024  3:13 AM    Comment   Case was discussed with ZOE and the patient's admission status was agreed to be Admission Status: observation status to the service of Dr. Amado .               Assessment & Plan       Medical Decision Making  Amount and/or Complexity of Data Reviewed  Labs: ordered.  Radiology: ordered and independent interpretation performed.    Risk  Prescription drug management.  Decision regarding hospitalization.              ED Course as of 09/12/24 0204   Tue Sep 10, 2024   2154 EKG demonstrates sinus rhythm with a single PVC.  No acute ST segment elevation.       Medications   predniSONE tablet 60 mg (60 mg Oral Given 9/10/24 2201)   magnesium sulfate IVPB (premix) SOLN 1 g (0 g Intravenous Stopped 9/11/24 0000)   albuterol (FOR EMS ONLY) (2.5 mg/3 mL) 0.083 % inhalation solution 2.5 mg (0 mg Does not apply Given to EMS 9/10/24 2154)   ipratropium-albuterol (FOR EMS ONLY) (DUO-NEB) 0.5-2.5 mg/3 mL inhalation solution 3 mL (0 mL Does not apply Given to EMS 9/10/24 2154)   acetaminophen (Ofirmev) injection 1,000 mg (0 mg Intravenous Stopped 9/11/24 0000)   ipratropium-albuterol (DUO-NEB) 0.5-2.5 mg/3 mL inhalation solution **ADS Override Pull** (  Given 9/11/24 0135)       History of Present Illness       HPI  68 y.o. male with a history of asthma and COPD per pulmonology notes presents with a chief complaints of \"shortness of breath.\"    Patient conversant upon initial evaluation, able to provide information regarding history.  Patient without signs of hypoxia and initial pulse oxymetry without hypoxemia; no immediate intervention required.  Patient was on nasal cannula oxygen at home that he uses at night however this was titrated off and patient is saturating mid 90s upon arrival to the emergency room.  EMS administered DuoNeb and albuterol.    Patient affirms 3 hours of " sudden onset, progressive dyspnea has improved since EMS administered medications. Patient states exertion worsens the dyspnea and the medication EMS administered improved the dyspnea.  Patient attempted to treat himself at home with his rescue inhaler and nebulizer along with his oxygen though this did not improve his symptoms.    Patient affirms cough.    Patient denies chest pain.    Patient denies acute leg pain or swelling.    Patient denies any significant acute weight gain.    Patient denies any fever/chills.     Patient denies hematochezia or melanotic stools, nausea/vomiting, new rashes, diaphoresis, palpitations, weakness, dizziness, syncope, focal weakness or paresthesia.     Patient denies a history of atherosclerotic disease (CAD/TIA/CVA/PAD).   Patient denies any history of prior cardiopulmonary surgery.     Patient denies any immobilization of at least 3 days or surgery in the past 4 weeks.    Patient denies any history of DVT or PE.    Patient denies any history or family history of thrombophilia.  Patient denies any malignancy with treatment within the past 6 months.   Patient denies any extended travel greater than 10 hours (Brennen 2003).     Focused Objective.  PHYSICAL EXAM:  Constitutional:  No acute distress  Neck:  No asymmetry without obvious masses or swelling; no tracheal deviation.  No jugular venous distention.  No stridor.  No bruit.  CV:  Regular rate and rhythm. Peripheral pulses intact and equal.  Respiratory:  Normal inspection with no rash, signs of infection, or trauma.  No intercostal, supraclavicular, subcostal, or substernal retractions.  No tenderness or crepitus on palpitation.  Auscultation demonstrates diffuse wheezing with adequate air movement.    Medical Decision Making  Patient presents with dyspnea representing a broad differential, including multiple emergent diagnoses.  Given the large differential, the decision making in this case is of high complexity.    Patient  presents afebrile without history of fever/chills making infectious etiologies less likely based upon available information.    Patient has a past medical history suggestive of COPD/asthma exacerbation.    EKG obtained and reviewed independently by myself, which was interpreted by myself as listed under ED course.  Patient placed on cardiac monitoring.    CXR will be obtained to evaluate for alternative pathologies, including pneumothorax, pulmonary edema, or pneumonia.      Laboratory analysis including CBC to evaluate for anemia and evaluate potential for significant leukocytosis. Will obtain BMP to evaluate renal function and electrolytes including possibility of metabolic derangement accounting for patient's symptoms.   Considering patient's history, will obtain troponin to evaluate for ACS.  Will obtain VBG to further evaluate possibility of metabolic derangement as a component of the patient's symptoms and evaluate the chronicity of the possible symptamatology.  Will obtain B-type natriuretic peptide as screening for possible congestive heart failure as patient has no known history though patient's course is concerning for possible diagnosed etiology.     Considering patient's history and examination, will attempt symptomatic management with corticosteroids and magnesium.    Patient will be placed on continuous cardiopulmonary monitoring and reassessed.            Review of Systems        Objective     ED Triage Vitals [09/10/24 2150]   Temperature Pulse Blood Pressure Respirations SpO2 Patient Position - Orthostatic VS   97.6 °F (36.4 °C) 88 137/83 18 96 % Sitting      Temp Source Heart Rate Source BP Location FiO2 (%) Pain Score    Oral Monitor Right arm -- No Pain        Physical Exam    Labs Reviewed   CBC AND DIFFERENTIAL - Abnormal       Result Value    WBC 17.80 (*)     RBC 4.82      Hemoglobin 11.8 (*)     Hematocrit 38.8      MCV 81 (*)     MCH 24.5 (*)     MCHC 30.4 (*)     RDW 16.3 (*)     MPV 8.2  (*)     Platelets 382      nRBC 0      Segmented % 61      Immature Grans % 1      Lymphocytes % 27      Monocytes % 8      Eosinophils Relative 3      Basophils Relative 0      Absolute Neutrophils 10.91 (*)     Absolute Immature Grans 0.11      Absolute Lymphocytes 4.78 (*)     Absolute Monocytes 1.46 (*)     Eosinophils Absolute 0.48      Basophils Absolute 0.06     COMPREHENSIVE METABOLIC PANEL - Abnormal    Sodium 135      Potassium 4.2      Chloride 102      CO2 26      ANION GAP 7      BUN 25      Creatinine 1.01      Glucose 147 (*)     Calcium 9.3      AST 21      ALT 38      Alkaline Phosphatase 73      Total Protein 6.7      Albumin 3.7      Total Bilirubin 0.20      eGFR 75      Narrative:     National Kidney Disease Foundation guidelines for Chronic Kidney Disease (CKD):     Stage 1 with normal or high GFR (GFR > 90 mL/min/1.73 square meters)    Stage 2 Mild CKD (GFR = 60-89 mL/min/1.73 square meters)    Stage 3A Moderate CKD (GFR = 45-59 mL/min/1.73 square meters)    Stage 3B Moderate CKD (GFR = 30-44 mL/min/1.73 square meters)    Stage 4 Severe CKD (GFR = 15-29 mL/min/1.73 square meters)    Stage 5 End Stage CKD (GFR <15 mL/min/1.73 square meters)  Note: GFR calculation is accurate only with a steady state creatinine   BLOOD GAS, VENOUS - Abnormal    pH, Baljit 7.434 (*)     pCO2, Baljit 39.0 (*)     pO2, Baljit 63.1 (*)     HCO3, Baljit 25.5      Base Excess, Baljit 1.3      O2 Content, Baljit 16.5      O2 HGB, VENOUS 89.4 (*)    HEMOGLOBIN A1C - Abnormal    Hemoglobin A1C 7.0 (*)          PLATELET COUNT - Abnormal    Platelets 422 (*)     MPV 8.6 (*)    POCT GLUCOSE - Abnormal    POC Glucose 176 (*)    POCT GLUCOSE - Abnormal    POC Glucose 165 (*)    LACTIC ACID, PLASMA (W/REFLEX IF RESULT > 2.0) - Normal    LACTIC ACID 1.5      Narrative:     Result may be elevated if tourniquet was used during collection.   PROCALCITONIN TEST - Normal    Procalcitonin 0.06     PROTIME-INR - Normal    Protime 12.6      INR  0.88      Narrative:     INR Therapeutic Range    Indication                                             INR Range      Atrial Fibrillation                                               2.0-3.0  Hypercoagulable State                                    2.0.2.3  Left Ventricular Asist Device                            2.0-3.0  Mechanical Heart Valve                                  -    Aortic(with afib, MI, embolism, HF, LA enlargement,    and/or coagulopathy)                                     2.0-3.0 (2.5-3.5)     Mitral                                                             2.5-3.5  Prosthetic/Bioprosthetic Heart Valve               2.0-3.0  Venous thromboembolism (VTE: VT, PE        2.0-3.0   APTT - Normal    PTT 27     B-TYPE NATRIURETIC PEPTIDE (BNP) - Normal    BNP 23     HS TROPONIN I 0HR - Normal    hs TnI 0hr 7     HS TROPONIN I 2HR - Normal    hs TnI 2hr 7      Delta 2hr hsTnI 0       CT head without contrast   Final Interpretation by Surjit Menjivar MD (09/11 0023)      No acute intracranial abnormality. Mild microangiopathic changes.                  Workstation performed: [id number]         CT chest without contrast   Final Interpretation by Surjit Menjivar MD (09/11 0031)      No acute intrathoracic abnormalities noted with stable ancillary findings detailed above.         Workstation performed: [id number]         XR chest portable - 1 view   ED Interpretation by Oswaldo Woodward MD (09/10 4246)   No acute findings.          Procedures       Oswaldo Woodward MD  09/12/24 3714

## 2024-09-11 NOTE — TELEPHONE ENCOUNTER
"Pt wife calling in again stating hospital is still trying to release him due to his insurance. She states he has had no release since admitted, and that o2 made him a better by the time he got to the hospital. She's stating that him being able to not breathe are not \"episodes\" but is a daily issue. Contacted office, they stated Kirk will call her now   "

## 2024-09-11 NOTE — CASE MANAGEMENT
Case Management Discharge Planning Note    Patient name Fahad Hernandez  Location /-01 MRN [id number]  : 1955 Date 2024       Current Admission Date: 9/10/2024  Current Admission Diagnosis:Severe persistent asthma with acute exacerbation   Patient Active Problem List    Diagnosis Date Noted Date Diagnosed    Lung nodules 2024     Chest pain 2024     Centrilobular emphysema (HCC) 2024     Severe persistent asthma with acute exacerbation 2024     Medical marijuana use 2024     Type 2 diabetes mellitus without complication, without long-term current use of insulin (HCC) 2022     Chronic obstructive asthma 2019     Gastroesophageal reflux disease with esophagitis 2019     Abnormal EKG 04/15/2019     Hypertension 04/15/2019     Mixed hyperlipidemia 2018       LOS (days): 0  Geometric Mean LOS (GMLOS) (days):   Days to GMLOS:     OBJECTIVE:            Current admission status: Observation   Preferred Pharmacy:   [facility] #[id number] - [place] - [address]  [place] [address]  Phone: [phone] Fax: [phone]    [facility] - [address] [address]  Phone: [phone] Fax: [phone]    Primary Care Provider: Surjit Hayes DO    Primary Insurance: [employer]  REP  Secondary Insurance:     DISCHARGE DETAILS:     Signed casandra graf placed in medical records. Nursing informed transport booked for 3:30 on [place]. ED entrance.

## 2024-09-11 NOTE — ASSESSMENT & PLAN NOTE
Lab Results   Component Value Date    HGBA1C 7.0 (H) 09/11/2024       Recent Labs     09/11/24  0652 09/11/24  1156   POCGLU 176* 165*       Blood Sugar Average: Last 72 hrs:  (P) 170.5  On metformin outpatient, continue   ISS as needed  Check sugars with meals

## 2024-09-11 NOTE — H&P
"H&P - Hospitalist   Name: Fahad Hernandez 68 y.o. male I MRN: [id number]  Unit/Bed#: -01 I Date of Admission: 9/10/2024   Date of Service: 9/11/2024 I Hospital Day: 0     Assessment & Plan  Severe persistent asthma with acute exacerbation  Patient presents with shortness of breath, wheezing, coinciding with stopping steroid course (completed 9/8)  Afebrile, with leukocytosis however recently discontinued prednisone   CT chest shows no acute pathology   Received nebulizer treatment with improvement in ER  Suspect component of anxiety, patient was admitted here recently with similar symptoms  Place in observation for acute asthma exacerbation  Continue with prednisone 60mg daily, may need extended taper   Continue with albuterol nebulizer q4 hrs prn shortness of breath or wheezing  Continue with breztri equivalent incruse/breo  Oxygen therapy as needed, he is currently on room air   Hypertension  Continue with lotrel daily   Type 2 diabetes mellitus without complication, without long-term current use of insulin ([facility])  Lab Results   Component Value Date    HGBA1C 6.6 (H) 10/20/2023       No results for input(s): \"POCGLU\" in the last 72 hours.    Blood Sugar Average: Last 72 hrs:    On metformin outpatient, continue   ISS as needed  Check sugars with meals  Gastroesophageal reflux disease with esophagitis  Continue with PPI    VTE Pharmacologic Prophylaxis:   Moderate Risk (Score 3-4) - Pharmacological DVT Prophylaxis Ordered: enoxaparin (Lovenox).  Code Status: Level 1 - Full Code   Discussion with family:  no.     Anticipated Length of Stay: Patient will be admitted on an observation basis with an anticipated length of stay of less than 2 midnights secondary to above.    History of Present Illness   Chief Complaint: Shortness of breath     Fahad Hernandez is a 68 y.o. male with a PMH of hypertension, anxiety disorder, severe persistent asthma, diabetes, lung nodules, emphysema presents with complaints of " shortness of breath.  He was in his usual state of health when he developed acute onset shortness of breath.  Associated with wheezing.  Patient describes it as feeling like he could not breathe which made him even more short of breath.  He tried using nebulizers and his home inhalers but this did not improve his symptoms so he presented to the emergency room.  He has not had any worsening of his chronic cough, denies any fevers or sore throat.  Patient was admitted and discharged from the hospital 8/28 with complaints of chest pain and workup was benign.  He has since followed up with pulmonology, last visit outpatient 9/4 and was referred to pulmonary rehab and repeat PFTs.  He completed his prednisone tapered course yesterday.  He otherwise denies toxic habits and is a never tobacco smoker.  In ER, vital signs are significant for tachypnea.  Labs notable for WBC 17.8  CT chest without contrast shows no acute intrathoracic abnormalities  Patient received albuterol, magnesium, DuoNeb, prednisone with improvement of wheezing.  He will be placed on observation unit.      Review of Systems   Constitutional:  Negative for chills and fever.   HENT:  Negative for ear pain and sore throat.    Eyes:  Negative for pain and visual disturbance.   Respiratory:  Positive for cough, shortness of breath and wheezing. Negative for choking and chest tightness.    Cardiovascular:  Negative for chest pain and palpitations.   Gastrointestinal:  Negative for abdominal pain, diarrhea, nausea and vomiting.   Genitourinary:  Negative for dysuria and hematuria.   Musculoskeletal:  Negative for arthralgias and back pain.   Skin:  Negative for color change and rash.   Neurological:  Negative for dizziness, seizures, syncope, weakness and light-headedness.   Psychiatric/Behavioral:  Negative for confusion.    All other systems reviewed and are negative.      I have reviewed the patient's PMH, PSH, Social History, Family History, Meds, and  "Allergies  Social History:  Marital Status: /Civil Union     Objective     Vitals:   Blood Pressure: 144/84 (09/11/24 0405)  Pulse: 95 (09/11/24 0405)  Temperature: 98.5 °F (36.9 °C) (09/11/24 0405)  Temp Source: Oral (09/10/24 2150)  Respirations: 20 (09/11/24 0330)  Height: 5' 10\" (177.8 cm) (09/11/24 0406)  Weight - Scale: 74.4 kg (164 lb 0.4 oz) (09/11/24 0406)  SpO2: 95 % (09/11/24 0405)    Physical Exam  Constitutional:       General: He is not in acute distress.     Appearance: Normal appearance. He is ill-appearing. He is not toxic-appearing.   HENT:      Head: Normocephalic and atraumatic.      Mouth/Throat:      Pharynx: No posterior oropharyngeal erythema.   Eyes:      General: No scleral icterus.  Cardiovascular:      Rate and Rhythm: Normal rate and regular rhythm.      Heart sounds: No murmur heard.  Pulmonary:      Effort: Pulmonary effort is normal. No respiratory distress.      Breath sounds: Decreased air movement present. No wheezing.   Abdominal:      General: Bowel sounds are normal. There is no distension.      Palpations: Abdomen is soft.      Tenderness: There is no abdominal tenderness.   Musculoskeletal:         General: No swelling.      Cervical back: Neck supple.      Right lower leg: No edema.      Left lower leg: No edema.   Skin:     General: Skin is warm and dry.      Capillary Refill: Capillary refill takes less than 2 seconds.      Coloration: Skin is not jaundiced.      Findings: No rash.   Neurological:      General: No focal deficit present.      Mental Status: He is alert. Mental status is at baseline.   Psychiatric:         Mood and Affect: Mood normal.         Lines/Drains:  Lines/Drains/Airways       Active Status       None                        Additional Data:   Lab Results: I have reviewed the following results: CBC/BMP:   .     09/10/24  2157   WBC 17.80*   HGB 11.8*   HCT 38.8      SODIUM 135   K 4.2      CO2 26   BUN 25   CREATININE 1.01   GLUC " 147*      Results from last 7 days   Lab Units 09/10/24  2157   WBC Thousand/uL 17.80*   HEMOGLOBIN g/dL 11.8*   HEMATOCRIT % 38.8   PLATELETS Thousands/uL 382   SEGS PCT % 61   LYMPHO PCT % 27   MONO PCT % 8   EOS PCT % 3     Results from last 7 days   Lab Units 09/10/24  2157   SODIUM mmol/L 135   POTASSIUM mmol/L 4.2   CHLORIDE mmol/L 102   CO2 mmol/L 26   BUN mg/dL 25   CREATININE mg/dL 1.01   ANION GAP mmol/L 7   CALCIUM mg/dL 9.3   ALBUMIN g/dL 3.7   TOTAL BILIRUBIN mg/dL 0.20   ALK PHOS U/L 73   ALT U/L 38   AST U/L 21   GLUCOSE RANDOM mg/dL 147*     Results from last 7 days   Lab Units 09/10/24  2157   INR  0.88         Lab Results   Component Value Date    HGBA1C 6.6 (H) 10/20/2023    HGBA1C 6.4 (H) 06/26/2023    HGBA1C 6.7 (H) 06/14/2023     Results from last 7 days   Lab Units 09/10/24  2157   LACTIC ACID mmol/L 1.5   PROCALCITONIN ng/ml 0.06       Imaging Review: Reviewed radiology reports from this admission including: CT chest.  Other Studies: EKG was reviewed.     Administrative Statements   I have spent a total time of 30 minutes in caring for this patient on the day of the visit/encounter including Documenting in the medical record and Reviewing / ordering tests, medicine, procedures  .    ** Please Note: This note has been constructed using a voice recognition system. **

## 2024-09-11 NOTE — PLAN OF CARE
Problem: Potential for Falls  Goal: Patient will remain free of falls  Description: INTERVENTIONS:  - Educate patient/family on patient safety including physical limitations  - Instruct patient to call for assistance with activity   - Consult OT/PT to assist with strengthening/mobility   - Keep Call bell within reach  - Keep bed low and locked with side rails adjusted as appropriate  - Keep care items and personal belongings within reach  - Initiate and maintain comfort rounds  - Make Fall Risk Sign visible to staff  - Obtain necessary fall risk management equipment: Walker   - Apply yellow socks and bracelet for high fall risk patients  - Consider moving patient to room near nurses station  Outcome: Progressing     Problem: INFECTION - ADULT  Goal: Absence of fever/infection during neutropenic period  Description: INTERVENTIONS:  - Monitor WBC    Outcome: Progressing

## 2024-09-11 NOTE — ASSESSMENT & PLAN NOTE
"Lab Results   Component Value Date    HGBA1C 6.6 (H) 10/20/2023       No results for input(s): \"POCGLU\" in the last 72 hours.    Blood Sugar Average: Last 72 hrs:    On metformin outpatient, continue   ISS as needed  Check sugars with meals  "

## 2024-09-11 NOTE — PLAN OF CARE
Problem: Potential for Falls  Goal: Patient will remain free of falls  Description: INTERVENTIONS:  - Educate patient/family on patient safety including physical limitations  - Instruct patient to call for assistance with activity   - Consult OT/PT to assist with strengthening/mobility   - Keep Call bell within reach  - Keep bed low and locked with side rails adjusted as appropriate  - Keep care items and personal belongings within reach  - Initiate and maintain comfort rounds  - Make Fall Risk Sign visible to staff  - Apply yellow socks and bracelet for high fall risk patients  - Consider moving patient to room near nurses station  9/11/2024 0416 by Thalia Powers  Outcome: Progressing  9/11/2024 0416 by Thalia Powers  Outcome: Progressing

## 2024-09-11 NOTE — TELEPHONE ENCOUNTER
Patient's wife calling stating her  was taken by ambulance to the hospital last night due to difficulty breathing,  patient's wife tried nebulizer before calling the ambulance and nothing worked for him. She put oxygen on him and it seemed to help him.    Wife just received a call stating they are going to release him to home, but wife does not want him discharged, she said he can not walk two steps without having difficulty breathing    Patient's wife requesting a call back ASAP

## 2024-09-12 NOTE — TELEPHONE ENCOUNTER
Carol hunt in stating pt situation hasn't changed since he has been seen in ED. She is requesting an appt with an actual doctor not PA or CRNP solely because he was just released and hasn't improved and is concerned that he may be having a more complicated case.     Still doing Prednisone 60 mg and no improvement. He is still doing albuterol neb every 4hr, and is taking his Beztri.    Hospital visit states that Oxygen as needed but wife confirms he doesn't take any Oxygen PRN.     Scheduled pt for an urgent visit with Dr. Torres on 9/16 at 10:20 AM

## 2024-09-16 ENCOUNTER — OFFICE VISIT (OUTPATIENT)
Age: 69
End: 2024-09-16
Payer: COMMERCIAL

## 2024-09-16 VITALS
RESPIRATION RATE: 16 BRPM | HEIGHT: 70 IN | HEART RATE: 105 BPM | SYSTOLIC BLOOD PRESSURE: 134 MMHG | OXYGEN SATURATION: 97 % | DIASTOLIC BLOOD PRESSURE: 82 MMHG | TEMPERATURE: 96.3 F | WEIGHT: 165 LBS | BODY MASS INDEX: 23.62 KG/M2

## 2024-09-16 DIAGNOSIS — R07.9 CHEST PAIN, UNSPECIFIED TYPE: ICD-10-CM

## 2024-09-16 DIAGNOSIS — R91.8 LUNG NODULES: ICD-10-CM

## 2024-09-16 DIAGNOSIS — J44.89 CHRONIC OBSTRUCTIVE ASTHMA (HCC): Primary | ICD-10-CM

## 2024-09-16 DIAGNOSIS — J45.20 MILD INTERMITTENT ASTHMA WITHOUT COMPLICATION: ICD-10-CM

## 2024-09-16 DIAGNOSIS — R49.0 HOARSENESS OF VOICE: ICD-10-CM

## 2024-09-16 DIAGNOSIS — J43.2 CENTRILOBULAR EMPHYSEMA (HCC): ICD-10-CM

## 2024-09-16 DIAGNOSIS — R06.02 SOB (SHORTNESS OF BREATH): ICD-10-CM

## 2024-09-16 DIAGNOSIS — K21.00 GASTROESOPHAGEAL REFLUX DISEASE WITH ESOPHAGITIS, UNSPECIFIED WHETHER HEMORRHAGE: ICD-10-CM

## 2024-09-16 DIAGNOSIS — Z77.29 LONG-TERM EXPOSURE INVOLVING BIRD DROPPINGS: ICD-10-CM

## 2024-09-16 PROCEDURE — 99214 OFFICE O/P EST MOD 30 MIN: CPT | Performed by: INTERNAL MEDICINE

## 2024-09-16 PROCEDURE — G2211 COMPLEX E/M VISIT ADD ON: HCPCS | Performed by: INTERNAL MEDICINE

## 2024-09-16 RX ORDER — BUDESONIDE, GLYCOPYRROLATE, AND FORMOTEROL FUMARATE 160; 9; 4.8 UG/1; UG/1; UG/1
2 AEROSOL, METERED RESPIRATORY (INHALATION) 2 TIMES DAILY
Qty: 32.1 G | Refills: 3 | Status: SHIPPED | OUTPATIENT
Start: 2024-09-16

## 2024-09-16 RX ORDER — ALBUTEROL SULFATE 90 UG/1
2 INHALANT RESPIRATORY (INHALATION) EVERY 6 HOURS PRN
Qty: 18 G | Refills: 5 | Status: SHIPPED | OUTPATIENT
Start: 2024-09-16

## 2024-09-16 NOTE — ASSESSMENT & PLAN NOTE
Currently patient is on maximal inhaler therapy, still has these episodes of dyspnea on exertion with wheezing and chest heaviness frequently, even while on systemic prednisone.  Tried Biologics twice in the past as described above without any benefit.  For now we will continue Breztri and as needed albuterol and went over the right way to use his inhalers.

## 2024-09-16 NOTE — ASSESSMENT & PLAN NOTE
He describes chest heaviness frequently with the shortness of breath, will prefer to see cardiologist.

## 2024-09-16 NOTE — ASSESSMENT & PLAN NOTE
He states that his GERD is controlled with Nexium, continue.  Denies dysphagia.  Consider EGD or follow-up with GI if he continues to have significant symptoms.

## 2024-09-16 NOTE — ASSESSMENT & PLAN NOTE
Although he had the patch for many years but there is no evidence of interstitial lung disease on his CT scan.

## 2024-09-16 NOTE — PROGRESS NOTES
Progress note - Pulmonary Medicine   Fahad Hernandez 68 y.o. male MRN: [id number]       Impression & Plan:     SOB (shortness of breath)  I have no explanation of his recurrent dyspnea associated with chest heaviness.  Although this could be asthma but it is strange that he is not responding to triple inhaler therapy and even nebulizer machine and also even systemic steroids.  Also in the past he was tried on 2 biologic agents including Xolair and Tezspire without any benefit.  He does not have eosinophilia and his [facility] allergy panel was completely negative except that he had elevated IgE.   I am concerned that his current symptoms are not related to asthma so I will refer patient to see his cardiologist and have probably a stress test and also to ENT as below.    Hoarseness of voice  Will refer to ENT to evaluate vocal cords as this could be the cause of his dyspnea.    Long-term exposure involving bird droppings  Although he had the patch for many years but there is no evidence of interstitial lung disease on his CT scan.    Chest pain  He describes chest heaviness frequently with the shortness of breath, will prefer to see cardiologist.    Gastroesophageal reflux disease with esophagitis  He states that his GERD is controlled with Nexium, continue.  Denies dysphagia.  Consider EGD or follow-up with GI if he continues to have significant symptoms.    Chronic obstructive asthma  Currently patient is on maximal inhaler therapy, still has these episodes of dyspnea on exertion with wheezing and chest heaviness frequently, even while on systemic prednisone. Tried Biologics twice in the past as described above without any benefit. For now we will continue Breztri and as needed albuterol and went over the right way to use his inhalers.     Centrilobular emphysema (HCC)  Minimal emphysema, significant obstruction secondary to asthma.  Management as above.      Return in about 4 months (around  1/16/2025).    Diagnoses and all orders for this visit:    Chronic obstructive asthma  -     albuterol (PROVENTIL HFA,VENTOLIN HFA) 90 mcg/act inhaler; Inhale 2 puffs every 6 (six) hours as needed for shortness of breath or wheezing    Lung nodules    Gastroesophageal reflux disease with esophagitis, unspecified whether hemorrhage    Chest pain, unspecified type  -     Ambulatory Referral to Cardiology; Future    Centrilobular emphysema (HCC)    Mild intermittent asthma without complication  -     Budeson-Glycopyrrol-Formoterol (Breztri Aerosphere) 160-9-4.8 MCG/ACT AERO; Inhale 2 puffs 2 (two) times a day Rinse mouth after use.    Hoarseness of voice  -     Ambulatory Referral to Otolaryngology; Future    SOB (shortness of breath)  -     Ambulatory Referral to Otolaryngology; Future    Long-term exposure involving bird droppings      ______________________________________________________________________    HPI:    Fahad Hernandez presents today for follow-up of asthma.  Patient has chronic obstructive asthma with mild emphysema on CT scan, he follows with Dr. Terrell, and currently on maximal triple inhaler therapy with Breztri twice daily and as needed albuterol.  It seems that in the past he was tried on injectable medications including Xolair and Tezspire without any benefit.  He used to follow at [facility] but he transferred his care to our office earlier this year.  He was seen multiple times and had multiple ER visits with shortness of breath and chest heaviness.  He presents today feeling fine but he describes ongoing dyspnea on exertion with chest heaviness and some wheezing, he feels the inhaler does not help him.  He is currently on prednisone tapering course from recent ER visit but does not feel that the prednisone helps.  He reports some hoarseness in his voice as well.  He has GERD that is controlled currently with Nexium and denies dysphagia or aspiration.    His wife showed me  videos on her phone with him having shortness of breath.  She was answering most of the questions and doing the talk.  They denies any change in the home environment.  No apparent mold inside the house but probably there is some mold outside on the trees as they states.  They used to have cats for many years but currently they do have anymore but they still have not standards in the house.  He has a parrot at home but he had it for many years.  He smokes marijuana regularly but currently switched to edibles.      Current Medications:    Current Outpatient Medications:     albuterol (ACCUNEB) 1.25 MG/3ML nebulizer solution, Take 1.25 mg by nebulization every 6 (six) hours as needed for wheezing, Disp: , Rfl:     albuterol (PROVENTIL HFA,VENTOLIN HFA) 90 mcg/act inhaler, Inhale 2 puffs every 6 (six) hours as needed, Disp: , Rfl:     amLODIPine-benazepril (LOTREL) 10-40 MG per capsule, , Disp: , Rfl:     ARIPiprazole (ABILIFY) 15 mg tablet, , Disp: , Rfl:     aspirin (ECOTRIN LOW STRENGTH) 81 mg EC tablet, Take 81 mg by mouth daily, Disp: , Rfl:     atorvastatin (LIPITOR) 10 mg tablet, Take 10 mg by mouth daily, Disp: , Rfl:     Austedo 12 MG TABS, , Disp: , Rfl:     Budeson-Glycopyrrol-Formoterol (Breztri Aerosphere) 160-9-4.8 MCG/ACT AERO, Inhale 2 puffs 2 (two) times a day Rinse mouth after use., Disp: 32.1 g, Rfl: 3    buPROPion (WELLBUTRIN XL) 300 mg 24 hr tablet, 1 tab(s), Disp: , Rfl:     esomeprazole (NexIUM) 40 MG capsule, Take 40 mg by mouth, Disp: , Rfl:      MG tablet, , Disp: , Rfl:     metFORMIN (GLUCOPHAGE) 500 mg tablet, Take 1 tablet by mouth 2 (two) times a day with meals, Disp: , Rfl:     montelukast (SINGULAIR) 10 mg tablet, Take 10 mg by mouth, Disp: , Rfl:     multivitamin (THERAGRAN) TABS, Take 1 tablet by mouth, Disp: , Rfl:     OneTouch Ultra test strip, TEST DAILY.. DIAGNOSIS E11.9, Disp: , Rfl:     oxyCODONE (ROXICODONE) 5 mg immediate release tablet, Take 10 mg by mouth every 6 (six)  hours as needed, Disp: , Rfl:     predniSONE 10 mg tablet, Take 6 tablets (60 mg total) by mouth daily for 2 days, THEN 5 tablets (50 mg total) daily for 3 days, THEN 4 tablets (40 mg total) daily for 3 days, THEN 3 tablets (30 mg total) daily for 3 days, THEN 2 tablets (20 mg total) daily for 3 days, THEN 1 tablet (10 mg total) daily for 3 days. Do not start before September 12, 2024., Disp: 57 tablet, Rfl: 0    promethazine (PHENERGAN) 25 mg tablet, Take 25 mg by mouth daily as needed, Disp: , Rfl:     tamsulosin (FLOMAX) 0.4 mg, Take 0.4 mg by mouth daily, Disp: , Rfl:     temazepam (RESTORIL) 30 mg capsule, Take 30 mg by mouth daily at bedtime as needed, Disp: , Rfl:     tiZANidine (ZANAFLEX) 4 mg tablet, , Disp: , Rfl:     venlafaxine (EFFEXOR-XR) 75 mg 24 hr capsule, 1 cap(s), Disp: , Rfl:     ARIPiprazole (ABILIFY) 10 mg tablet, , Disp: , Rfl:     nystatin (MYCOSTATIN) 500,000 units/5 mL suspension, TAKE 5 ML (1 TSP) BY MOUTH (SWISH, GARGLE, THEN SPIT) EVERY 6 HOURS FOR TREATMENT OF ORAL THRUSH., Disp: , Rfl:     Review of Systems:  Review of Systems   Constitutional: Negative.    HENT:  Positive for voice change.    Eyes: Negative.    Respiratory:  Positive for shortness of breath and wheezing.    Cardiovascular:  Positive for chest pain.   Gastrointestinal: Negative.    Endocrine: Negative.    Genitourinary: Negative.    Musculoskeletal: Negative.    Skin: Negative.    Allergic/Immunologic: Negative.    Neurological: Negative.    Hematological: Negative.    Psychiatric/Behavioral: Negative.       Aside from what is mentioned in the HPI, the review of systems is otherwise negative    Past medical history, surgical history, and family history were reviewed and updated as appropriate    Social history updates:  Social History     Tobacco Use   Smoking Status Never    Passive exposure: Never   Smokeless Tobacco Never   Tobacco Comments    Patient admits to using marijuana, edibles  "      PhysicalExamination:  Vitals:   /82 (BP Location: Left arm, Patient Position: Sitting, Cuff Size: Large)   Pulse 105   Temp (!) 96.3 °F (35.7 °C)   Resp 16   Ht 5' 10\" (1.778 m)   Wt 74.8 kg (165 lb)   SpO2 97%   BMI 23.68 kg/m²     Gen:  Comfortable on room air.  No conversational dyspnea  HEENT:  Conjugate gaze.  sclerae anicteric.  Oropharynx moist  Neck: Trachea is midline. No JVD. No adenopathy  Chest: Equal breath sounds anterior auscultation bilaterally, no wheezing or crackles  Cardiac: S1-S2 regular. no murmur  Abdomen:  benign  Extremities: No edema  Neuro:  Normal speech and mentation    Diagnostic Data:  Labs:  I personally reviewed the most recent laboratory data pertinent to today's visit    Lab Results   Component Value Date    WBC 17.80 (H) 09/10/2024    HGB 11.8 (L) 09/10/2024    HCT 38.8 09/10/2024    MCV 81 (L) 09/10/2024     (H) 09/11/2024     Lab Results   Component Value Date    SODIUM 135 09/10/2024    K 4.2 09/10/2024    CO2 26 09/10/2024     09/10/2024    BUN 25 09/10/2024    CREATININE 1.01 09/10/2024    CALCIUM 9.3 09/10/2024       PFT results:  The most recent pulmonary function tests were reviewed.  His last pulmonary function test was in 2023 at [facility].  This suggested severe obstruction with preserved diffusion     Imaging:  I personally reviewed the images on the PAC system pertinent to today's visit      Chest CT scan reviewed in PACS: LUNGS: Central airways are patent. There is no tracheal or endobronchial lesion. No lobar airspace consolidation/pneumonia. No suspicious pulmonary parenchymal mass identified. Previous 7 mm right upper lobe nodular density appear resolved. Stable   scarring in portions of the right perifissural upper lobe and right lower lobe posteriorly. Subtle scattered areas of tree-in-bud nodularity again seen without interval change. Mild centrilobular emphysema again noted.        Other studies:  Cardiac echo from " August 26 showed ejection fraction of 60% grade 1 diastolic dysfunction.  Mild mitral regurgitation and mild tricuspid regurgitation without evidence for pulmonary hypertension       Pily Torres MD

## 2024-09-16 NOTE — ASSESSMENT & PLAN NOTE
I have no explanation of his recurrent dyspnea associated with chest heaviness.  Although this could be asthma but it is strange that he is not responding to triple inhaler therapy and even nebulizer machine and also even systemic steroids.  Also in the past he was tried on 2 biologic agents including Xolair and Tezspire without any benefit.  He does not have eosinophilia and his [facility] allergy panel was completely negative except that he had elevated IgE.   I am concerned that his current symptoms are not related to asthma so I will refer patient to see his cardiologist and have probably a stress test and also to ENT as below.

## 2024-09-19 ENCOUNTER — TELEPHONE (OUTPATIENT)
Dept: CARDIOLOGY CLINIC | Facility: CLINIC | Age: 69
End: 2024-09-19

## 2024-09-19 NOTE — TELEPHONE ENCOUNTER
Patient was in the ed  on 9/10/24 for trouble breathing , he was given prednisone and he     states is  not helping . Patient went to pulmonology  Dr on 9/16/24 and patient wife states       she was told might have to do with heart. Patient had an echo done  when was ed .    Patient would like to see Dr GEORGE.    When patient is walking to bathroom he can not  breath but he ok when sitting down     Patient refused to go back to ed

## 2024-09-19 NOTE — TELEPHONE ENCOUNTER
"Hello,    The following message was sent via e-mail to the leadership team:     Please advise if you can help facilitate the following overbook request:    Patient Name: NORBERTO Hernandez    Patient MRN: [id number]    Call back #: [phone]    Insurance: AET[employer]     Department:Cardiology    Speciality: General Cardiology    Reason for overbook request: STAT REFERRAL    Comments (Write \"N/a\" if no comments): patient's wife sara called to schedule a ASAP appt per referral and pt was in ER recently and isn't feeling the best still so wants to see the cardiologist.  Per notes from hospital staff     Requested doctor and location: Dr. Holman, & [facility]     Date of current appointment: n/a       Thank you.    "

## 2024-09-19 NOTE — TELEPHONE ENCOUNTER
Please advise. Patient was at the ED on September 16th.  He is a Dr. Guillaume patient.  There is a STAT referral in his chart.  Patient is having problems breathing. This has been going on a few month and has gotten worse.  He has been to the ED twice for this issue and they send him home.      Patient is scheduled with Kizzy on Tuesday September 24th, Can this wait until then.  Oxygen or Prednisone is not helping patient. Patient is on a BIPAP machine at night.     Transferred call to Violet

## 2024-09-19 NOTE — TELEPHONE ENCOUNTER
Patient had an echocardiogram but he was in the hospital which was overall good.    Patient has an appointment with Kizzy next week.  He should keep the appointment to discuss further cardiac evaluation.    (Kizzy -Patient may need to be considered for combined right heart and left heart cath. FYI)

## 2024-09-24 ENCOUNTER — OFFICE VISIT (OUTPATIENT)
Dept: CARDIOLOGY CLINIC | Facility: CLINIC | Age: 69
End: 2024-09-24
Payer: COMMERCIAL

## 2024-09-24 VITALS
HEART RATE: 105 BPM | SYSTOLIC BLOOD PRESSURE: 142 MMHG | DIASTOLIC BLOOD PRESSURE: 86 MMHG | BODY MASS INDEX: 23.62 KG/M2 | HEIGHT: 70 IN | WEIGHT: 165 LBS | OXYGEN SATURATION: 96 % | RESPIRATION RATE: 16 BRPM

## 2024-09-24 DIAGNOSIS — I20.89 ANGINAL EQUIVALENT (HCC): Primary | ICD-10-CM

## 2024-09-24 DIAGNOSIS — E78.2 MIXED HYPERLIPIDEMIA: ICD-10-CM

## 2024-09-24 DIAGNOSIS — E11.65 TYPE 2 DIABETES MELLITUS WITH HYPERGLYCEMIA, WITHOUT LONG-TERM CURRENT USE OF INSULIN (HCC): ICD-10-CM

## 2024-09-24 DIAGNOSIS — I65.22 LEFT CAROTID STENOSIS: ICD-10-CM

## 2024-09-24 PROCEDURE — 99214 OFFICE O/P EST MOD 30 MIN: CPT

## 2024-09-24 NOTE — PROGRESS NOTES
[facility] [place]  [address]  [place] [address]  Cardiology Office Note    Fahad Hernandez 68 y.o. male MRN: [id number]    09/24/24          Assessment/Plan:  Assessment & Plan  Anginal equivalent  Severe PATEL with chest heaviness with minimal exertion  On steroids and nebulizers with minimal improvement  Concern for anginal equivalent  Nonspecific T wave abnormality on ECG noted  Plan for further evaluation with pharmacologic MPI  Patient unable to achieve workload on treadmill due to PATEL  Continue ASA and atorvastatin; no BB due to asthma  Left carotid stenosis  <50% stenosis noted on study last year  Mixed hyperlipidemia  Continue atrovastatin  Type 2 diabetes mellitus with hyperglycemia, without long-term current use of insulin (HCC)    Lab Results   Component Value Date    HGBA1C 7.0 (H) 09/11/2024            Follow up: 3 months or sooner as needed  All questions and concerns addressed.  Patient was advised to report any problems requiring medical attention.          1. Anginal equivalent  NM myocardial perfusion spect (rx stress and/or rest)    CANCELED: NM myocardial perfusion spect (rx stress and/or rest)      2. Left carotid stenosis        3. Mixed hyperlipidemia        4. Type 2 diabetes mellitus with hyperglycemia, without long-term current use of insulin (HCC)            HPI: Fahad Hernandez is a 68 y.o. year old male with PMH of hypertension, left carotid stenosis, diabetes, GERD who presents for follow-up. Patient is known to Dr. Holman.    Patient recently treated for asthma exacerbation on 9/10/2024.  He began to feel very short of breath after completing a course of steroids.  He given nebulizer treatments and was restarted on course of steroids. His troponins were negative and there was no ischemic changes on his ECG. Patient had TTE which showed EF 60% and mild MR and TR.    Patient has been getting significantly short of breath with minimal exertion.  He notes chest heaviness when this occurs as well. This occurs with activities such as walking to the bathroom and emptying the . He notes that he takes his inhaler during these events with no significant improvement.     Patient was instructed to call the office or seek medical attention if any significant chest pain, shortness of breath, palpitations, or lower extremity swelling develop. All medications reviewed and patient is tolerating medications without side effects.     Social history:   Patient smokes medical marijuana occasional, or ingests through edibles.     EKG- NSR, nonspecific T wave abnormality         Patient Active Problem List   Diagnosis    Abnormal EKG    Hypertension    Type 2 diabetes mellitus with hyperglycemia, without long-term current use of insulin (HCC)    Chronic obstructive asthma    Mixed hyperlipidemia    Gastroesophageal reflux disease with esophagitis    Severe persistent asthma with acute exacerbation    Medical marijuana use    Centrilobular emphysema (HCC)    Chest pain    Lung nodules    Hoarseness of voice    SOB (shortness of breath)    Long-term exposure involving bird droppings       Allergies   Allergen Reactions    Beta Adrenergic Blockers      Pt states they make him feel crappy.         Current Outpatient Medications:     albuterol (ACCUNEB) 1.25 MG/3ML nebulizer solution, Take 1.25 mg by nebulization every 6 (six) hours as needed for wheezing, Disp: , Rfl:     albuterol (PROVENTIL HFA,VENTOLIN HFA) 90 mcg/act inhaler, Inhale 2 puffs every 6 (six) hours as needed for shortness of breath or wheezing, Disp: 18 g, Rfl: 5    amLODIPine-benazepril (LOTREL) 10-40 MG per capsule, , Disp: , Rfl:     ARIPiprazole (ABILIFY) 10 mg tablet, , Disp: , Rfl:     aspirin (ECOTRIN LOW STRENGTH) 81 mg EC tablet, Take 81 mg by mouth daily, Disp: , Rfl:     atorvastatin (LIPITOR) 10 mg tablet, Take 10 mg by mouth daily, Disp: , Rfl:     Austedo 12 MG TABS, , Disp: , Rfl:      Budeson-Glycopyrrol-Formoterol (Breztri Aerosphere) 160-9-4.8 MCG/ACT AERO, Inhale 2 puffs 2 (two) times a day Rinse mouth after use., Disp: 32.1 g, Rfl: 3    buPROPion (WELLBUTRIN XL) 300 mg 24 hr tablet, 1 tab(s), Disp: , Rfl:     esomeprazole (NexIUM) 40 MG capsule, Take 40 mg by mouth, Disp: , Rfl:      MG tablet, , Disp: , Rfl:     metFORMIN (GLUCOPHAGE) 500 mg tablet, Take 1 tablet by mouth 2 (two) times a day with meals, Disp: , Rfl:     montelukast (SINGULAIR) 10 mg tablet, Take 10 mg by mouth, Disp: , Rfl:     multivitamin (THERAGRAN) TABS, Take 1 tablet by mouth, Disp: , Rfl:     OneTouch Ultra test strip, TEST DAILY.. DIAGNOSIS E11.9, Disp: , Rfl:     oxyCODONE (ROXICODONE) 5 mg immediate release tablet, Take 10 mg by mouth every 6 (six) hours as needed, Disp: , Rfl:     predniSONE 10 mg tablet, Take 6 tablets (60 mg total) by mouth daily for 2 days, THEN 5 tablets (50 mg total) daily for 3 days, THEN 4 tablets (40 mg total) daily for 3 days, THEN 3 tablets (30 mg total) daily for 3 days, THEN 2 tablets (20 mg total) daily for 3 days, THEN 1 tablet (10 mg total) daily for 3 days. Do not start before September 12, 2024., Disp: 57 tablet, Rfl: 0    promethazine (PHENERGAN) 25 mg tablet, Take 25 mg by mouth daily as needed, Disp: , Rfl:     tamsulosin (FLOMAX) 0.4 mg, Take 0.4 mg by mouth daily, Disp: , Rfl:     temazepam (RESTORIL) 30 mg capsule, Take 30 mg by mouth daily at bedtime as needed, Disp: , Rfl:     tiZANidine (ZANAFLEX) 4 mg tablet, , Disp: , Rfl:     venlafaxine (EFFEXOR-XR) 75 mg 24 hr capsule, 1 cap(s), Disp: , Rfl:     ARIPiprazole (ABILIFY) 15 mg tablet, , Disp: , Rfl:     Past Medical History:   Diagnosis Date    Asthma     COPD (chronic obstructive pulmonary disease) (HCC)     Dementia (HCC)     Diabetes mellitus (HCC)     GERD (gastroesophageal reflux disease)     History of stroke 11/07/2019    Hypertension     Interstitial lung disease (HCC)     Major depressive disorder with  current active episode 11/07/2019    Pneumonia     Sleep apnea     Sleep apnea, obstructive     Stroke (HCC)     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       Family History   Family history unknown: Yes       Past Surgical History:   Procedure Laterality Date    BACK SURGERY      ELBOW SURGERY      KNEE SURGERY      NECK SURGERY      SHOULDER SURGERY      SPINE SURGERY  2004       Social History     Socioeconomic History    Marital status: /Civil Union     Spouse name: Not on file    Number of children: Not on file    Years of education: Not on file    Highest education level: Not on file   Occupational History    Not on file   Tobacco Use    Smoking status: Never     Passive exposure: Never    Smokeless tobacco: Never    Tobacco comments:     Patient admits to using marijuana, edibles   Vaping Use    Vaping status: Never Used   Substance and Sexual Activity    Alcohol use: Yes     Alcohol/week: 4.0 standard drinks of alcohol     Types: 4 Cans of beer per week    Drug use: Yes     Types: Marijuana, Oxycodone, Psilocybin    Sexual activity: Yes     Partners: Female     Birth control/protection: Male Sterilization   Other Topics Concern    Not on file   Social History Narrative    Not on file     Social Determinants of Health     Financial Resource Strain: Low Risk  (8/13/2024)    Received from [facility]    Overall Financial Resource Strain (CARDIA)     Difficulty of Paying Living Expenses: Not very hard   Food Insecurity: No Food Insecurity (9/11/2024)    Hunger Vital Sign     Worried About Running Out of Food in the Last Year: Never true     Ran Out of Food in the Last Year: Never true   Recent Concern: Food Insecurity - Food Insecurity Present (8/13/2024)    Received from [facility]    Hunger Vital Sign     Worried About Running Out of Food in the Last Year: Never true     Ran Out of Food in the Last Year: Sometimes true   Transportation Needs: No Transportation Needs (9/11/2024)  "   PRAPARE - Transportation     Lack of Transportation (Medical): No     Lack of Transportation (Non-Medical): No   Physical Activity: Not on file   Stress: Stress Concern Present (8/13/2024)    Received from [facility]    Citizen of [employer] - Occupational Stress Questionnaire     Feeling of Stress : To some extent   Social Connections: Feeling Socially Integrated (8/13/2024)    Received from [facility]    OASIS : Social Isolation     How often do you feel lonely or isolated from those around you?: Rarely   Intimate Partner Violence: Not At Risk (8/13/2024)    Received from [facility]    Humiliation, Afraid, Rape, and Kick questionnaire     Fear of Current or Ex-Partner: No     Emotionally Abused: No     Physically Abused: No     Sexually Abused: No   Housing Stability: Low Risk  (9/11/2024)    Housing Stability Vital Sign     Unable to Pay for Housing in the Last Year: No     Number of Times Moved in the Last Year: 1     Homeless in the Last Year: No       Review of symptoms:   Review of Systems   Constitutional:  Negative for chills, diaphoresis and fever.   Respiratory:  Positive for shortness of breath. Negative for cough and chest tightness.    Cardiovascular:  Positive for chest pain. Negative for palpitations and leg swelling.   Gastrointestinal:  Negative for abdominal distention, blood in stool, nausea and vomiting.   Genitourinary:  Negative for difficulty urinating.   Musculoskeletal:  Negative for arthralgias and back pain.   Neurological:  Negative for dizziness, syncope, light-headedness and headaches.   Psychiatric/Behavioral:  Negative for agitation and confusion. The patient is not nervous/anxious.         Vitals: /86 (BP Location: Left arm, Patient Position: Sitting, Cuff Size: Standard)   Pulse 105   Resp 16   Ht 5' 10\" (1.778 m)   Wt 74.8 kg (165 lb)   SpO2 96%   BMI 23.68 kg/m²         Physical Exam: "     Physical Exam  Vitals and nursing note reviewed.   Constitutional:       General: He is not in acute distress.     Appearance: He is well-developed.   HENT:      Head: Normocephalic and atraumatic.   Eyes:      Conjunctiva/sclera: Conjunctivae normal.   Neck:      Vascular: No carotid bruit.   Cardiovascular:      Rate and Rhythm: Normal rate and regular rhythm.      Heart sounds: Normal heart sounds. No murmur heard.  Pulmonary:      Effort: Pulmonary effort is normal. No respiratory distress.      Breath sounds: Normal breath sounds.   Abdominal:      Palpations: Abdomen is soft.      Tenderness: There is no abdominal tenderness.   Musculoskeletal:         General: No swelling.      Cervical back: Neck supple.      Right lower leg: No edema.      Left lower leg: No edema.   Skin:     General: Skin is warm and dry.      Capillary Refill: Capillary refill takes less than 2 seconds.   Neurological:      Mental Status: He is alert and oriented to person, place, and time.   Psychiatric:         Mood and Affect: Mood normal.            Thank you for allowing me to participate in the care and evaluation of your patient.  Should you have any questions, please feel free to contact me.

## 2024-09-26 ENCOUNTER — HOSPITAL ENCOUNTER (OUTPATIENT)
Dept: NON INVASIVE DIAGNOSTICS | Facility: CLINIC | Age: 69
Discharge: HOME | End: 2024-09-26
Payer: COMMERCIAL

## 2024-09-26 VITALS
HEART RATE: 96 BPM | OXYGEN SATURATION: 99 % | SYSTOLIC BLOOD PRESSURE: 150 MMHG | DIASTOLIC BLOOD PRESSURE: 94 MMHG | WEIGHT: 165 LBS | HEIGHT: 70 IN | BODY MASS INDEX: 23.62 KG/M2

## 2024-09-26 DIAGNOSIS — I20.89 ANGINAL EQUIVALENT (HCC): ICD-10-CM

## 2024-09-26 LAB
NUC STRESS DIASTOLIC VOLUME INDEX: 51 ML/M2
NUC STRESS EJECTION FRACTION: 67 %
NUC STRESS SYSTOLIC VOLUME INDEX: 17 ML/M2
RATE PRESSURE PRODUCT: NORMAL
SL CV REST NUCLEAR ISOTOPE DOSE: 10.96 MCI
SL CV STRESS NUCLEAR ISOTOPE DOSE: 31.8 MCI
SL CV STRESS RECOVERY BP: NORMAL MMHG
SL CV STRESS RECOVERY HR: 120 BPM
SL CV STRESS RECOVERY O2 SAT: 97 %
STRESS ANGINA INDEX: 0
STRESS BASELINE BP: NORMAL MMHG
STRESS BASELINE HR: 96 BPM
STRESS O2 SAT REST: 99 %
STRESS PEAK HR: 137 BPM
STRESS POST O2 SAT PEAK: 97 %
STRESS POST PEAK BP: 236 MMHG
STRESS/REST PERFUSION RATIO: 1.07

## 2024-09-26 PROCEDURE — A9502 TC99M TETROFOSMIN: HCPCS

## 2024-09-26 PROCEDURE — 78452 HT MUSCLE IMAGE SPECT MULT: CPT

## 2024-09-26 PROCEDURE — 78452 HT MUSCLE IMAGE SPECT MULT: CPT | Performed by: INTERNAL MEDICINE

## 2024-09-26 PROCEDURE — 93018 CV STRESS TEST I&R ONLY: CPT | Performed by: INTERNAL MEDICINE

## 2024-09-26 PROCEDURE — 93016 CV STRESS TEST SUPVJ ONLY: CPT | Performed by: INTERNAL MEDICINE

## 2024-09-26 PROCEDURE — 93017 CV STRESS TEST TRACING ONLY: CPT

## 2024-09-26 RX ORDER — REGADENOSON 0.08 MG/ML
0.4 INJECTION, SOLUTION INTRAVENOUS ONCE
Status: COMPLETED | OUTPATIENT
Start: 2024-09-26 | End: 2024-09-26

## 2024-09-26 RX ADMIN — REGADENOSON 0.4 MG: 0.08 INJECTION, SOLUTION INTRAVENOUS at 09:09

## 2024-09-27 LAB
CHEST PAIN STATEMENT: NORMAL
MAX DIASTOLIC BP: 96 MMHG
MAX PREDICTED HEART RATE: 150 BPM
PROTOCOL NAME: NORMAL
REASON FOR TERMINATION: NORMAL
STRESS POST EXERCISE DUR MIN: 3 MIN
STRESS POST EXERCISE DUR SEC: 0 SEC
STRESS POST PEAK HR: 137 BPM
STRESS POST PEAK SYSTOLIC BP: 236 MMHG
TARGET HR FORMULA: NORMAL
TEST INDICATION: NORMAL

## 2024-10-01 ENCOUNTER — TELEPHONE (OUTPATIENT)
Dept: CARDIOLOGY CLINIC | Facility: CLINIC | Age: 69
End: 2024-10-01

## 2024-10-01 DIAGNOSIS — I20.89 ANGINAL EQUIVALENT (HCC): Primary | ICD-10-CM

## 2024-10-01 RX ORDER — NITROGLYCERIN 0.4 MG/1
0.4 TABLET SUBLINGUAL
Qty: 30 TABLET | Refills: 3 | Status: SHIPPED | OUTPATIENT
Start: 2024-10-01

## 2024-10-01 RX ORDER — METOPROLOL SUCCINATE 25 MG/1
25 TABLET, EXTENDED RELEASE ORAL DAILY
Qty: 30 TABLET | Refills: 3 | Status: SHIPPED | OUTPATIENT
Start: 2024-10-01

## 2024-10-01 NOTE — TELEPHONE ENCOUNTER
Spoke with pt / his spouse and Kizzy's message was relayed. They verbally understood the results of pt's  NM myocardial perfusion spect (rx stress and/or rest)    Pt / spouse is agreeable with Kizzy's recommendation to further evaluate with cardiac catheterization.    Pt's spouse is requesting a call back from Kizzy. Please call pt.  Thanks!

## 2024-10-01 NOTE — TELEPHONE ENCOUNTER
Spoke with patient and spouse regarding pharmacologic MPI results.  Given patient's significant symptoms shortness of breath and chest heaviness we will further evaluate with cardiac catheterization.    Risks, benefits, and alternatives of cardiac catheterization including but not limited to risk of infection, vascular injury, renal failure, bleeding, myocardial infarction, stroke, and death were discussed at length with patient. Patient understands the risks and benefits and wishes to proceed.     Metoprolol and SL nitroglycerin ordered.  Continue aspirin and atorvastatin.

## 2024-10-01 NOTE — TELEPHONE ENCOUNTER
----- Message from JAMIL Lewis sent at 10/1/2024  3:44 PM EDT -----  LMOM to call office.    Patient's stress test is mildly abnormal. There is an area that may be artifact, however if the patient is still having significant symptoms, then we should further evaluate with cardiac catheterization.

## 2024-10-07 NOTE — TELEPHONE ENCOUNTER
S/w pt's wife. Advised pt go to ED per Dr. GEORGE. Advised to mention to Ed staff that pt was to be scheduled for card cath upon arrival. Wife verbalized understanding

## 2024-10-07 NOTE — TELEPHONE ENCOUNTER
Pt's spouse calls requesting to speak with Kizzy NEGRON.  She has been awaiting to hear about scheduling cath.  She wants to know what to do as they are in [place] 2.5 hours from office.  She is asking if she should bring pt back to the [facility] or if she should have him go to the [facility] in [place].  She states they are a trauma and cardiac expert hospital. She wants cath done as soon as possible as pt continues to have worsening exertional dyspnea.  She has been monitoring his pulse ox and it's been fine.  She has oxygen tank there if needed and they did receive prescription for nitroglycerin.    Please advise.

## 2024-10-24 DIAGNOSIS — I20.89 ANGINAL EQUIVALENT (HCC): ICD-10-CM

## 2024-10-24 RX ORDER — METOPROLOL SUCCINATE 25 MG/1
25 TABLET, EXTENDED RELEASE ORAL DAILY
Qty: 90 TABLET | Refills: 1 | Status: SHIPPED | OUTPATIENT
Start: 2024-10-24

## 2024-11-11 ENCOUNTER — TELEPHONE (OUTPATIENT)
Age: 69
End: 2024-11-11

## 2024-11-11 NOTE — TELEPHONE ENCOUNTER
Pt has been hospitalized 4 times since August. Due to Asthma exacerbations. They are trying to schedule follow up and they are not insure about an infusion. They want to know what kind of services we have and to review his last 2 visits in [place].       They want to know if they should stay there in [place] or come back home. She is looking to speak with Kirk Bueno and about something they suspect. She states she rather discuss it with Kirk as it is quite a lot of information.     Can be reached at her 's number [phone]

## 2024-12-09 ENCOUNTER — TELEPHONE (OUTPATIENT)
Age: 69
End: 2024-12-09

## 2024-12-09 NOTE — TELEPHONE ENCOUNTER
Patients wife said that she is returning a call from our office from this morning. I do not see any documentation that we reached out at all. She states that Kirk was going to get back to them about coming in for an appt. I advised the wife that I do not see any appts available until next week., but she states that Ed cannot wait that long because she is not feeling well and needs more prednisone. Please advise.

## 2024-12-09 NOTE — TELEPHONE ENCOUNTER
Yes I spoke with his wife Carol last week and he wanted to be seen sooner - I can send prednisone in the meantime for him but they wanted to be seen prior to January.

## 2024-12-10 ENCOUNTER — OFFICE VISIT (OUTPATIENT)
Age: 69
End: 2024-12-10
Payer: COMMERCIAL

## 2024-12-10 VITALS
DIASTOLIC BLOOD PRESSURE: 84 MMHG | RESPIRATION RATE: 18 BRPM | OXYGEN SATURATION: 97 % | HEIGHT: 70 IN | BODY MASS INDEX: 23.05 KG/M2 | TEMPERATURE: 97.6 F | HEART RATE: 120 BPM | SYSTOLIC BLOOD PRESSURE: 139 MMHG | WEIGHT: 161 LBS

## 2024-12-10 DIAGNOSIS — J45.50 SEVERE PERSISTENT ASTHMA WITHOUT COMPLICATION: Primary | ICD-10-CM

## 2024-12-10 DIAGNOSIS — Z79.899 MEDICAL MARIJUANA USE: ICD-10-CM

## 2024-12-10 DIAGNOSIS — J43.2 CENTRILOBULAR EMPHYSEMA (HCC): ICD-10-CM

## 2024-12-10 DIAGNOSIS — J45.51 SEVERE PERSISTENT ASTHMA WITH ACUTE EXACERBATION: Primary | ICD-10-CM

## 2024-12-10 PROCEDURE — G2211 COMPLEX E/M VISIT ADD ON: HCPCS | Performed by: PHYSICIAN ASSISTANT

## 2024-12-10 PROCEDURE — 99214 OFFICE O/P EST MOD 30 MIN: CPT | Performed by: PHYSICIAN ASSISTANT

## 2024-12-10 RX ORDER — PREDNISONE 20 MG/1
TABLET ORAL
Qty: 11 TABLET | Refills: 0 | Status: SHIPPED | OUTPATIENT
Start: 2024-12-10 | End: 2024-12-17

## 2024-12-10 NOTE — PROGRESS NOTES
Assessment/Plan:   Diagnoses and all orders for this visit:    Severe persistent asthma without complication    Centrilobular emphysema (HCC)    Medical marijuana use        Patient is here today for follow-up.  He continues to have ongoing issues with dyspnea on exertion without a completely clear etiology.  He has had extensive workup including cardiac catheterization while he was in [place] which was clean.  He has had chest imaging which shows some mild scarring, no acute findings or etiology of his dyspnea on exertion.  He has not required oxygen during this time.  He continues on Symbicort, Spiriva, albuterol 4 times per day as needed.  He is also recently been started on Dupixent by the pulmonologist in [place].  Previously had been on Xolair as well as Tezspire without any real benefit.  He does note very minimal improvement while on the higher doses of prednisone and is requesting more prednisone currently.    Symptoms do not seem to be all asthma related as there is minimal improvement despite Biologics, bronchodilators and steroids.  Do think anxiety plays a part in his symptoms as well as significant deconditioning at this point.    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  Unclear if this is contributing to any of his symptoms although no significant findings on his CT scan.  Patient and wife also state his symptoms were no different while they were in [place] compared to here in [place].    They did also rehome their parrot and did not notice any change in the breathing with the parrot out of the home.    Will give him additional steroids today at their request. I will have him follow up with Dr. Torres next week.     Return in about 1 week (around 12/17/2024).  All questions are answered to the patient's satisfaction and understanding.  He verbalizes  "understanding.  He is encouraged to call with any further questions or concerns.    Portions of the record may have been created with voice recognition software.  Occasional wrong word or \"sound a like\" substitutions may have occurred due to the inherent limitations of voice recognition software.  Read the chart carefully and recognize, using context, where substitutions have occurred.    Electronically Signed by Kirk Bueno PA-C    ______________________________________________________________________    Chief Complaint:   Chief Complaint   Patient presents with    Follow-up       Patient ID: Fahad is a 68 y.o. y.o. 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    12/10/2024  Patient presents today for follow-up visit.  Patient is a 68-year-old male with secondhand cigarette smoke exposure, cannabis smoker with past medical history of asthma, hypertension, diabetes.    He is here today for follow-up.  He had been following with [facility] for many years, has been noticing worsening dyspnea on exertion over the last several months.  He has been on Symbicort and Spiriva, has also been on Xolair and Tezspire in the past.  He was recently in [place] for a prolonged time and was hospitalized twice while there.  He underwent a cardiac workup including cardiac cath which was clean, was also seen by ENT during his hospitalization.  He was seen by pulmonary in [place] and has been started on Dupixent.  He continues with his inhalers, nebulizer, Dupixent.  Has been on multiple courses of prednisone, prednisone does help slightly " but he still remains limited in his activity despite all of these medications.        Review of Systems   Constitutional: Negative.    HENT: Negative.     Respiratory:  Positive for shortness of breath.    Cardiovascular: Negative.    Gastrointestinal: Negative.    Genitourinary: Negative.    Musculoskeletal: Negative.    Skin: Negative.    Allergic/Immunologic: Negative.    Neurological: Negative.    Psychiatric/Behavioral: Negative.         Smoking history: He reports that he has never smoked. He has never been exposed to tobacco smoke. He has never used smokeless tobacco.    The following portions of the patient's history were reviewed and updated as appropriate: allergies, current medications, past family history, past medical history, past social history, past surgical history, and problem list.    Immunization History   Administered Date(s) Administered    COVID-19 PFIZER VACCINE 0.3 ML IM 03/19/2021, 04/12/2021, 10/21/2021    COVID-19 Pfizer Vac BIVALENT Cuong-sucrose 12 Yr+ IM 09/27/2022    COVID-19 Pfizer mRNA vacc PF cuong-sucrose 12 yr and older (Comirnaty) 10/02/2024    COVID-19 Pfizer vac (Cuong-sucrose, gray cap) 12 yr+ IM 04/04/2022    INFLUENZA 10/03/2003, 10/06/2017, 10/17/2018, 09/04/2021, 09/27/2022, 10/20/2023    Influenza Split High Dose Preservative Free IM 09/20/2019, 08/17/2020    Influenza, high dose seasonal 0.7 mL 08/17/2020    Pneumococcal Conjugate 13-Valent 11/07/2019    Pneumococcal Conjugate Vaccine 20-valent (Pcv20), Polysace 02/23/2023    Zoster Vaccine Recombinant 09/17/2018, 12/26/2020     Current Outpatient Medications   Medication Sig Dispense Refill    albuterol (ACCUNEB) 1.25 MG/3ML nebulizer solution Take 1.25 mg by nebulization every 6 (six) hours as needed for wheezing      albuterol (PROVENTIL HFA,VENTOLIN HFA) 90 mcg/act inhaler Inhale 2 puffs every 6 (six) hours as needed for shortness of breath or wheezing 18 g 5    amLODIPine-benazepril (LOTREL) 10-40 MG per capsule        ARIPiprazole (ABILIFY) 10 mg tablet       ARIPiprazole (ABILIFY) 15 mg tablet       aspirin (ECOTRIN LOW STRENGTH) 81 mg EC tablet Take 81 mg by mouth daily      atorvastatin (LIPITOR) 10 mg tablet Take 10 mg by mouth daily      Austedo 12 MG TABS       Budeson-Glycopyrrol-Formoterol (Breztri Aerosphere) 160-9-4.8 MCG/ACT AERO Inhale 2 puffs 2 (two) times a day Rinse mouth after use. 32.1 g 3    buPROPion (WELLBUTRIN XL) 300 mg 24 hr tablet 1 tab(s)      esomeprazole (NexIUM) 40 MG capsule Take 40 mg by mouth       MG tablet       metFORMIN (GLUCOPHAGE) 500 mg tablet Take 1 tablet by mouth 2 (two) times a day with meals      metoprolol succinate (TOPROL-XL) 25 mg 24 hr tablet TAKE 1 TABLET (25 MG TOTAL) BY MOUTH DAILY. 90 tablet 1    montelukast (SINGULAIR) 10 mg tablet Take 10 mg by mouth      multivitamin (THERAGRAN) TABS Take 1 tablet by mouth      nitroglycerin (NITROSTAT) 0.4 mg SL tablet Place 1 tablet (0.4 mg total) under the tongue every 5 (five) minutes as needed for chest pain 30 tablet 3    OneTouch Ultra test strip TEST DAILY.. DIAGNOSIS E11.9      oxyCODONE (ROXICODONE) 5 mg immediate release tablet Take 10 mg by mouth every 6 (six) hours as needed      predniSONE 20 mg tablet Take 2 tablets (40 mg total) by mouth daily for 3 days, THEN 1 tablet (20 mg total) daily for 3 days, THEN 0.5 tablets (10 mg total) daily for 3 days. 11 tablet 0    promethazine (PHENERGAN) 25 mg tablet Take 25 mg by mouth daily as needed      tamsulosin (FLOMAX) 0.4 mg Take 0.4 mg by mouth daily      temazepam (RESTORIL) 30 mg capsule Take 30 mg by mouth daily at bedtime as needed      tiZANidine (ZANAFLEX) 4 mg tablet       venlafaxine (EFFEXOR-XR) 75 mg 24 hr capsule 1 cap(s)       No current facility-administered medications for this visit.     Allergies: Beta adrenergic blockers    Objective:  Vitals:    12/10/24 1420 12/10/24 1423   BP: 139/84    BP Location: Left arm    Patient Position: Sitting    Cuff Size: Large   "  Pulse: (!) 120    Resp: 18    Temp: 97.6 °F (36.4 °C)    TempSrc: Oral    SpO2: 97% 97%   Weight: 73 kg (161 lb)    Height: 5' 10\" (1.778 m)    Oxygen Therapy  SpO2: 97 %  Oxygen Therapy: None (Room air)  .  Wt Readings from Last 3 Encounters:   12/10/24 73 kg (161 lb)   09/26/24 74.8 kg (165 lb)   09/24/24 74.8 kg (165 lb)     Body mass index is 23.1 kg/m².    Physical Exam  Constitutional:       General: He is not in acute distress.     Appearance: Normal appearance. He is well-developed. He is not ill-appearing.   HENT:      Head: Normocephalic and atraumatic.      Mouth/Throat:      Pharynx: Oropharynx is clear.   Eyes:      Pupils: Pupils are equal, round, and reactive to light.   Cardiovascular:      Rate and Rhythm: Normal rate and regular rhythm.   Pulmonary:      Effort: Pulmonary effort is normal. No respiratory distress.      Breath sounds: Normal breath sounds. No decreased breath sounds, wheezing, rhonchi or rales.   Abdominal:      General: Abdomen is flat. There is no distension.   Musculoskeletal:         General: Normal range of motion.      Cervical back: Normal range of motion.      Right lower leg: No edema.      Left lower leg: No edema.   Skin:     General: Skin is warm and dry.      Findings: No rash.   Neurological:      Mental Status: He is alert and oriented to person, place, and time.   Psychiatric:         Mood and Affect: Mood normal.         Behavior: Behavior normal.         Lab Review:   Lab Results   Component Value Date    K 4.2 09/10/2024    K 4.5 10/20/2023     09/10/2024     10/20/2023    CO2 26 09/10/2024    CO2 27 10/20/2023    BUN 25 09/10/2024    BUN 23 10/20/2023    CREATININE 1.01 09/10/2024    CREATININE 0.93 10/20/2023    CALCIUM 9.3 09/10/2024    CALCIUM 9.5 10/20/2023     Lab Results   Component Value Date    WBC 17.80 (H) 09/10/2024    HGB 11.8 (L) 09/10/2024    HCT 38.8 09/10/2024    MCV 81 (L) 09/10/2024     (H) 09/11/2024 "       Diagnostics:    Reviewed outside hospital notes, cardiac workup, imaging  Office Spirometry Results:     ESS:    No results found.

## 2024-12-13 ENCOUNTER — APPOINTMENT (EMERGENCY)
Dept: CT IMAGING | Facility: HOSPITAL | Age: 69
DRG: 202 | End: 2024-12-13
Payer: COMMERCIAL

## 2024-12-13 ENCOUNTER — HOSPITAL ENCOUNTER (INPATIENT)
Facility: HOSPITAL | Age: 69
LOS: 3 days | Discharge: HOME HEALTH | DRG: 202 | End: 2024-12-17
Attending: EMERGENCY MEDICINE | Admitting: FAMILY MEDICINE
Payer: COMMERCIAL

## 2024-12-13 DIAGNOSIS — N17.9 AKI (ACUTE KIDNEY INJURY) (HCC): ICD-10-CM

## 2024-12-13 DIAGNOSIS — M25.511 RIGHT SHOULDER PAIN: ICD-10-CM

## 2024-12-13 DIAGNOSIS — E87.20 METABOLIC ACIDOSIS: ICD-10-CM
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
## 2024-12-13 DIAGNOSIS — L84 CALLUS OF FOOT: ICD-10-CM

## 2024-12-13 DIAGNOSIS — J45.51 SEVERE PERSISTENT ASTHMA WITH ACUTE EXACERBATION: ICD-10-CM

## 2024-12-13 DIAGNOSIS — R53.1 GENERALIZED WEAKNESS: Primary | ICD-10-CM

## 2024-12-13 LAB
2HR DELTA HS TROPONIN: -3 NG/L
ABO GROUP BLD: NORMAL
ABO GROUP BLD: NORMAL
ALBUMIN SERPL BCG-MCNC: 4.1 G/DL (ref 3.5–5)
ALP SERPL-CCNC: 41 U/L (ref 34–104)
ALT SERPL W P-5'-P-CCNC: 34 U/L (ref 7–52)
ANION GAP SERPL CALCULATED.3IONS-SCNC: 17 MMOL/L (ref 4–13)
APTT PPP: 24 SECONDS (ref 23–34)
AST SERPL W P-5'-P-CCNC: 16 U/L (ref 13–39)
BACTERIA UR QL AUTO: ABNORMAL /HPF
BASE EX.OXY STD BLDV CALC-SCNC: 67.7 % (ref 60–80)
BASE EXCESS BLDV CALC-SCNC: -7.1 MMOL/L
BASOPHILS # BLD MANUAL: 0 THOUSAND/UL (ref 0–0.1)
BASOPHILS NFR MAR MANUAL: 0 % (ref 0–1)
BILIRUB SERPL-MCNC: 0.45 MG/DL (ref 0.2–1)
BILIRUB UR QL STRIP: NEGATIVE
BLD GP AB SCN SERPL QL: NEGATIVE
BNP SERPL-MCNC: 38 PG/ML (ref 0–100)
BUN SERPL-MCNC: 42 MG/DL (ref 5–25)
CALCIUM SERPL-MCNC: 10 MG/DL (ref 8.4–10.2)
CARDIAC TROPONIN I PNL SERPL HS: 10 NG/L (ref ?–50)
CARDIAC TROPONIN I PNL SERPL HS: 13 NG/L (ref ?–50)
CHLORIDE SERPL-SCNC: 101 MMOL/L (ref 96–108)
CK SERPL-CCNC: 79 U/L (ref 39–308)
CLARITY UR: CLEAR
CO2 SERPL-SCNC: 21 MMOL/L (ref 21–32)
COLOR UR: YELLOW
CREAT SERPL-MCNC: 2.27 MG/DL (ref 0.6–1.3)
EOSINOPHIL # BLD MANUAL: 0 THOUSAND/UL (ref 0–0.4)
EOSINOPHIL NFR BLD MANUAL: 0 % (ref 0–6)
ERYTHROCYTE [DISTWIDTH] IN BLOOD BY AUTOMATED COUNT: 17.2 % (ref 11.6–15.1)
FLUAV AG UPPER RESP QL IA.RAPID: NEGATIVE
FLUBV AG UPPER RESP QL IA.RAPID: NEGATIVE
GFR SERPL CREATININE-BSD FRML MDRD: 28 ML/MIN/1.73SQ M
GLUCOSE SERPL-MCNC: 203 MG/DL (ref 65–140)
GLUCOSE UR STRIP-MCNC: ABNORMAL MG/DL
HCO3 BLDV-SCNC: 18.7 MMOL/L (ref 24–30)
HCT VFR BLD AUTO: 40.8 % (ref 36.5–49.3)
HGB BLD-MCNC: 12.4 G/DL (ref 12–17)
HGB UR QL STRIP.AUTO: NEGATIVE
HYALINE CASTS #/AREA URNS LPF: ABNORMAL /LPF
INR PPP: 1 (ref 0.85–1.19)
KETONES UR STRIP-MCNC: ABNORMAL MG/DL
LACTATE SERPL-SCNC: 10.7 MMOL/L (ref 0.5–2)
LEUKOCYTE ESTERASE UR QL STRIP: NEGATIVE
LYMPHOCYTES # BLD AUTO: 1.26 THOUSAND/UL (ref 0.6–4.47)
LYMPHOCYTES # BLD AUTO: 6 % (ref 14–44)
MCH RBC QN AUTO: 25.9 PG (ref 26.8–34.3)
MCHC RBC AUTO-ENTMCNC: 30.4 G/DL (ref 31.4–37.4)
MCV RBC AUTO: 85 FL (ref 82–98)
MONOCYTES # BLD AUTO: 0.54 THOUSAND/UL (ref 0–1.22)
MONOCYTES NFR BLD: 3 % (ref 4–12)
MYELOCYTE ABSOLUTE CT: 0.36 THOUSAND/UL (ref 0–0.1)
MYELOCYTES NFR BLD MANUAL: 2 % (ref 0–1)
NEUTROPHILS # BLD MANUAL: 15.87 THOUSAND/UL (ref 1.85–7.62)
NEUTS SEG NFR BLD AUTO: 88 % (ref 43–75)
NITRITE UR QL STRIP: NEGATIVE
NON-SQ EPI CELLS URNS QL MICRO: ABNORMAL /HPF
O2 CT BLDV-SCNC: 12.2 ML/DL
PCO2 BLDV: 39 MM HG (ref 42–50)
PH BLDV: 7.3 [PH] (ref 7.3–7.4)
PH UR STRIP.AUTO: 5.5 [PH]
PLATELET # BLD AUTO: 375 THOUSANDS/UL (ref 149–390)
PLATELET BLD QL SMEAR: ADEQUATE
PMV BLD AUTO: 9 FL (ref 8.9–12.7)
PO2 BLDV: 39 MM HG (ref 35–45)
POTASSIUM SERPL-SCNC: 5 MMOL/L (ref 3.5–5.3)
PROCALCITONIN SERPL-MCNC: 0.16 NG/ML
PROT SERPL-MCNC: 6.9 G/DL (ref 6.4–8.4)
PROT UR STRIP-MCNC: ABNORMAL MG/DL
PROTHROMBIN TIME: 13.9 SECONDS (ref 12.3–15)
RBC # BLD AUTO: 4.79 MILLION/UL (ref 3.88–5.62)
RBC #/AREA URNS AUTO: ABNORMAL /HPF
RH BLD: NEGATIVE
RH BLD: NEGATIVE
SARS-COV+SARS-COV-2 AG RESP QL IA.RAPID: NEGATIVE
SODIUM SERPL-SCNC: 139 MMOL/L (ref 135–147)
SP GR UR STRIP.AUTO: 1.02 (ref 1–1.03)
SPECIMEN EXPIRATION DATE: NORMAL
UROBILINOGEN UR STRIP-ACNC: <2 MG/DL
VARIANT LYMPHS # BLD AUTO: 1 %
WBC # BLD AUTO: 18.03 THOUSAND/UL (ref 4.31–10.16)
WBC #/AREA URNS AUTO: ABNORMAL /HPF

## 2024-12-13 PROCEDURE — 70450 CT HEAD/BRAIN W/O DYE: CPT

## 2024-12-13 PROCEDURE — 87040 BLOOD CULTURE FOR BACTERIA: CPT

## 2024-12-13 PROCEDURE — 86850 RBC ANTIBODY SCREEN: CPT

## 2024-12-13 PROCEDURE — 80053 COMPREHEN METABOLIC PANEL: CPT

## 2024-12-13 PROCEDURE — 84145 PROCALCITONIN (PCT): CPT

## 2024-12-13 PROCEDURE — 85730 THROMBOPLASTIN TIME PARTIAL: CPT

## 2024-12-13 PROCEDURE — 96367 TX/PROPH/DG ADDL SEQ IV INF: CPT

## 2024-12-13 PROCEDURE — 93005 ELECTROCARDIOGRAM TRACING: CPT

## 2024-12-13 PROCEDURE — 99285 EMERGENCY DEPT VISIT HI MDM: CPT

## 2024-12-13 PROCEDURE — 86900 BLOOD TYPING SEROLOGIC ABO: CPT

## 2024-12-13 PROCEDURE — 85007 BL SMEAR W/DIFF WBC COUNT: CPT

## 2024-12-13 PROCEDURE — 85610 PROTHROMBIN TIME: CPT

## 2024-12-13 PROCEDURE — 85027 COMPLETE CBC AUTOMATED: CPT

## 2024-12-13 PROCEDURE — 84484 ASSAY OF TROPONIN QUANT: CPT

## 2024-12-13 PROCEDURE — 74177 CT ABD & PELVIS W/CONTRAST: CPT

## 2024-12-13 PROCEDURE — 96361 HYDRATE IV INFUSION ADD-ON: CPT

## 2024-12-13 PROCEDURE — 87811 SARS-COV-2 COVID19 W/OPTIC: CPT

## 2024-12-13 PROCEDURE — 87804 INFLUENZA ASSAY W/OPTIC: CPT

## 2024-12-13 PROCEDURE — 82550 ASSAY OF CK (CPK): CPT

## 2024-12-13 PROCEDURE — 82805 BLOOD GASES W/O2 SATURATION: CPT

## 2024-12-13 PROCEDURE — 83605 ASSAY OF LACTIC ACID: CPT

## 2024-12-13 PROCEDURE — 81001 URINALYSIS AUTO W/SCOPE: CPT

## 2024-12-13 PROCEDURE — 96365 THER/PROPH/DIAG IV INF INIT: CPT

## 2024-12-13 PROCEDURE — 86901 BLOOD TYPING SEROLOGIC RH(D): CPT

## 2024-12-13 PROCEDURE — 36415 COLL VENOUS BLD VENIPUNCTURE: CPT

## 2024-12-13 PROCEDURE — 71260 CT THORAX DX C+: CPT

## 2024-12-13 PROCEDURE — 72125 CT NECK SPINE W/O DYE: CPT

## 2024-12-13 PROCEDURE — 83880 ASSAY OF NATRIURETIC PEPTIDE: CPT

## 2024-12-13 RX ORDER — SODIUM CHLORIDE, SODIUM GLUCONATE, SODIUM ACETATE, POTASSIUM CHLORIDE, MAGNESIUM CHLORIDE, SODIUM PHOSPHATE, DIBASIC, AND POTASSIUM PHOSPHATE .53; .5; .37; .037; .03; .012; .00082 G/100ML; G/100ML; G/100ML; G/100ML; G/100ML; G/100ML; G/100ML
500 INJECTION, SOLUTION INTRAVENOUS ONCE
Status: COMPLETED | OUTPATIENT
Start: 2024-12-13 | End: 2024-12-14

## 2024-12-13 RX ORDER — SODIUM CHLORIDE, SODIUM GLUCONATE, SODIUM ACETATE, POTASSIUM CHLORIDE, MAGNESIUM CHLORIDE, SODIUM PHOSPHATE, DIBASIC, AND POTASSIUM PHOSPHATE .53; .5; .37; .037; .03; .012; .00082 G/100ML; G/100ML; G/100ML; G/100ML; G/100ML; G/100ML; G/100ML
1000 INJECTION, SOLUTION INTRAVENOUS ONCE
Status: COMPLETED | OUTPATIENT
Start: 2024-12-13 | End: 2024-12-13

## 2024-12-13 RX ADMIN — SODIUM CHLORIDE 1000 ML: 0.9 INJECTION, SOLUTION INTRAVENOUS at 20:21

## 2024-12-13 RX ADMIN — SODIUM CHLORIDE, SODIUM GLUCONATE, SODIUM ACETATE, POTASSIUM CHLORIDE, MAGNESIUM CHLORIDE, SODIUM PHOSPHATE, DIBASIC, AND POTASSIUM PHOSPHATE 500 ML: .53; .5; .37; .037; .03; .012; .00082 INJECTION, SOLUTION INTRAVENOUS at 23:36

## 2024-12-13 RX ADMIN — IOHEXOL 100 ML: 350 INJECTION, SOLUTION INTRAVENOUS at 20:36

## 2024-12-13 RX ADMIN — CEFTRIAXONE SODIUM 1000 MG: 10 INJECTION, POWDER, FOR SOLUTION INTRAVENOUS at 21:52

## 2024-12-13 RX ADMIN — SODIUM CHLORIDE, SODIUM GLUCONATE, SODIUM ACETATE, POTASSIUM CHLORIDE, MAGNESIUM CHLORIDE, SODIUM PHOSPHATE, DIBASIC, AND POTASSIUM PHOSPHATE 1000 ML: .53; .5; .37; .037; .03; .012; .00082 INJECTION, SOLUTION INTRAVENOUS at 21:28
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
## 2024-12-14 PROBLEM — J45.901 ASTHMA EXACERBATION ATTACKS: Status: ACTIVE | Noted: 2024-11-02

## 2024-12-14 PROBLEM — J45.41 MODERATE PERSISTENT ASTHMA WITH EXACERBATION: Status: ACTIVE | Noted: 2024-11-01

## 2024-12-14 PROBLEM — J45.51 SEVERE PERSISTENT ASTHMA WITH (ACUTE) EXACERBATION: Status: ACTIVE | Noted: 2024-12-14

## 2024-12-14 PROBLEM — Z86.39 HISTORY OF DIABETES MELLITUS: Status: ACTIVE | Noted: 2024-10-11

## 2024-12-14 PROBLEM — E87.20 METABOLIC ACIDOSIS: Status: ACTIVE | Noted: 2024-12-14

## 2024-12-14 PROBLEM — N17.9 AKI (ACUTE KIDNEY INJURY) (HCC): Status: ACTIVE | Noted: 2024-12-14

## 2024-12-14 PROBLEM — E11.10 LACTIC ACIDOSIS DUE TO DIABETES MELLITUS (HCC): Status: ACTIVE | Noted: 2024-12-14

## 2024-12-14 PROBLEM — R53.1 GENERALIZED WEAKNESS: Status: ACTIVE | Noted: 2024-12-14

## 2024-12-14 PROBLEM — I95.1 ORTHOSTASIS: Status: ACTIVE | Noted: 2024-10-15

## 2024-12-14 LAB
4HR DELTA HS TROPONIN: -4 NG/L
ALBUMIN SERPL BCG-MCNC: 3.7 G/DL (ref 3.5–5)
ALP SERPL-CCNC: 49 U/L (ref 34–104)
ALT SERPL W P-5'-P-CCNC: 30 U/L (ref 7–52)
ANION GAP SERPL CALCULATED.3IONS-SCNC: 12 MMOL/L (ref 4–13)
ANION GAP SERPL CALCULATED.3IONS-SCNC: 14 MMOL/L (ref 4–13)
ARTERIAL PATENCY WRIST A: YES
AST SERPL W P-5'-P-CCNC: 17 U/L (ref 13–39)
ATRIAL RATE: 89 BPM
BASE EX.OXY STD BLDV CALC-SCNC: 86.7 % (ref 60–80)
BASE EXCESS BLDA CALC-SCNC: 0.6 MMOL/L
BASE EXCESS BLDV CALC-SCNC: -5.4 MMOL/L
BASOPHILS # BLD AUTO: 0.02 THOUSANDS/ÂΜL (ref 0–0.1)
BASOPHILS NFR BLD AUTO: 0 % (ref 0–1)
BILIRUB SERPL-MCNC: 0.27 MG/DL (ref 0.2–1)
BUN SERPL-MCNC: 34 MG/DL (ref 5–25)
BUN SERPL-MCNC: 39 MG/DL (ref 5–25)
CALCIUM SERPL-MCNC: 8.6 MG/DL (ref 8.4–10.2)
CALCIUM SERPL-MCNC: 8.8 MG/DL (ref 8.4–10.2)
CARDIAC TROPONIN I PNL SERPL HS: 9 NG/L (ref ?–50)
CHLORIDE SERPL-SCNC: 102 MMOL/L (ref 96–108)
CHLORIDE SERPL-SCNC: 105 MMOL/L (ref 96–108)
CO2 SERPL-SCNC: 20 MMOL/L (ref 21–32)
CO2 SERPL-SCNC: 24 MMOL/L (ref 21–32)
CREAT SERPL-MCNC: 1.5 MG/DL (ref 0.6–1.3)
CREAT SERPL-MCNC: 1.85 MG/DL (ref 0.6–1.3)
EOSINOPHIL # BLD AUTO: 0.01 THOUSAND/ÂΜL (ref 0–0.61)
EOSINOPHIL NFR BLD AUTO: 0 % (ref 0–6)
ERYTHROCYTE [DISTWIDTH] IN BLOOD BY AUTOMATED COUNT: 17.2 % (ref 11.6–15.1)
EST. AVERAGE GLUCOSE BLD GHB EST-MCNC: 186 MG/DL
GFR SERPL CREATININE-BSD FRML MDRD: 36 ML/MIN/1.73SQ M
GFR SERPL CREATININE-BSD FRML MDRD: 46 ML/MIN/1.73SQ M
GLUCOSE SERPL-MCNC: 124 MG/DL (ref 65–140)
GLUCOSE SERPL-MCNC: 137 MG/DL (ref 65–140)
GLUCOSE SERPL-MCNC: 187 MG/DL (ref 65–140)
GLUCOSE SERPL-MCNC: 192 MG/DL (ref 65–140)
GLUCOSE SERPL-MCNC: 224 MG/DL (ref 65–140)
GLUCOSE SERPL-MCNC: 228 MG/DL (ref 65–140)
HBA1C MFR BLD: 8.1 %
HCO3 BLDA-SCNC: 25 MMOL/L (ref 22–28)
HCO3 BLDV-SCNC: 18.1 MMOL/L (ref 24–30)
HCT VFR BLD AUTO: 37 % (ref 36.5–49.3)
HGB BLD-MCNC: 11 G/DL (ref 12–17)
IMM GRANULOCYTES # BLD AUTO: 0.23 THOUSAND/UL (ref 0–0.2)
IMM GRANULOCYTES NFR BLD AUTO: 1 % (ref 0–2)
LACTATE SERPL-SCNC: 2.2 MMOL/L (ref 0.5–2)
LACTATE SERPL-SCNC: 2.9 MMOL/L (ref 0.5–2)
LACTATE SERPL-SCNC: 3.2 MMOL/L (ref 0.5–2)
LACTATE SERPL-SCNC: 5.4 MMOL/L (ref 0.5–2)
LACTATE SERPL-SCNC: 6.5 MMOL/L (ref 0.5–2)
LYMPHOCYTES # BLD AUTO: 1.95 THOUSANDS/ÂΜL (ref 0.6–4.47)
LYMPHOCYTES NFR BLD AUTO: 11 % (ref 14–44)
MAGNESIUM SERPL-MCNC: 2 MG/DL (ref 1.9–2.7)
MCH RBC QN AUTO: 25.5 PG (ref 26.8–34.3)
MCHC RBC AUTO-ENTMCNC: 29.7 G/DL (ref 31.4–37.4)
MCV RBC AUTO: 86 FL (ref 82–98)
MONOCYTES # BLD AUTO: 0.98 THOUSAND/ÂΜL (ref 0.17–1.22)
MONOCYTES NFR BLD AUTO: 6 % (ref 4–12)
NEUTROPHILS # BLD AUTO: 14.19 THOUSANDS/ÂΜL (ref 1.85–7.62)
NEUTS SEG NFR BLD AUTO: 82 % (ref 43–75)
NRBC BLD AUTO-RTO: 0 /100 WBCS
O2 CT BLDA-SCNC: 15.5 ML/DL (ref 16–23)
O2 CT BLDV-SCNC: 14 ML/DL
OXYHGB MFR BLDA: 93.9 % (ref 94–97)
P AXIS: 41 DEGREES
PCO2 BLDA: 39.1 MM HG (ref 36–44)
PCO2 BLDV: 29.1 MM HG (ref 42–50)
PH BLDA: 7.42 [PH] (ref 7.35–7.45)
PH BLDV: 7.41 [PH] (ref 7.3–7.4)
PHOSPHATE SERPL-MCNC: 3.4 MG/DL (ref 2.3–4.1)
PLATELET # BLD AUTO: 301 THOUSANDS/UL (ref 149–390)
PLATELET # BLD AUTO: 313 THOUSANDS/UL (ref 149–390)
PMV BLD AUTO: 8.8 FL (ref 8.9–12.7)
PMV BLD AUTO: 8.8 FL (ref 8.9–12.7)
PO2 BLDA: 76.6 MM HG (ref 75–129)
PO2 BLDV: 53.4 MM HG (ref 35–45)
POTASSIUM SERPL-SCNC: 4.6 MMOL/L (ref 3.5–5.3)
POTASSIUM SERPL-SCNC: 5 MMOL/L (ref 3.5–5.3)
PR INTERVAL: 152 MS
PROT SERPL-MCNC: 6.3 G/DL (ref 6.4–8.4)
QRS AXIS: 37 DEGREES
QRSD INTERVAL: 78 MS
QT INTERVAL: 372 MS
QTC INTERVAL: 452 MS
RBC # BLD AUTO: 4.31 MILLION/UL (ref 3.88–5.62)
SODIUM SERPL-SCNC: 136 MMOL/L (ref 135–147)
SODIUM SERPL-SCNC: 141 MMOL/L (ref 135–147)
SPECIMEN SOURCE: ABNORMAL
T WAVE AXIS: 41 DEGREES
TSH SERPL DL<=0.05 MIU/L-ACNC: 0.59 UIU/ML (ref 0.45–4.5)
VENTRICULAR RATE: 89 BPM
WBC # BLD AUTO: 17.38 THOUSAND/UL (ref 4.31–10.16)

## 2024-12-14 PROCEDURE — 99223 1ST HOSP IP/OBS HIGH 75: CPT | Performed by: INTERNAL MEDICINE

## 2024-12-14 PROCEDURE — 94760 N-INVAS EAR/PLS OXIMETRY 1: CPT

## 2024-12-14 PROCEDURE — 82948 REAGENT STRIP/BLOOD GLUCOSE: CPT

## 2024-12-14 PROCEDURE — 85049 AUTOMATED PLATELET COUNT: CPT

## 2024-12-14 PROCEDURE — 84100 ASSAY OF PHOSPHORUS: CPT

## 2024-12-14 PROCEDURE — 83605 ASSAY OF LACTIC ACID: CPT | Performed by: STUDENT IN AN ORGANIZED HEALTH CARE EDUCATION/TRAINING PROGRAM

## 2024-12-14 PROCEDURE — 84443 ASSAY THYROID STIM HORMONE: CPT

## 2024-12-14 PROCEDURE — 83735 ASSAY OF MAGNESIUM: CPT

## 2024-12-14 PROCEDURE — 80053 COMPREHEN METABOLIC PANEL: CPT

## 2024-12-14 PROCEDURE — 83605 ASSAY OF LACTIC ACID: CPT

## 2024-12-14 PROCEDURE — 82805 BLOOD GASES W/O2 SATURATION: CPT | Performed by: STUDENT IN AN ORGANIZED HEALTH CARE EDUCATION/TRAINING PROGRAM

## 2024-12-14 PROCEDURE — 94640 AIRWAY INHALATION TREATMENT: CPT

## 2024-12-14 PROCEDURE — 83036 HEMOGLOBIN GLYCOSYLATED A1C: CPT

## 2024-12-14 PROCEDURE — 84484 ASSAY OF TROPONIN QUANT: CPT

## 2024-12-14 PROCEDURE — 93010 ELECTROCARDIOGRAM REPORT: CPT | Performed by: INTERNAL MEDICINE

## 2024-12-14 PROCEDURE — 82805 BLOOD GASES W/O2 SATURATION: CPT

## 2024-12-14 PROCEDURE — 36600 WITHDRAWAL OF ARTERIAL BLOOD: CPT

## 2024-12-14 PROCEDURE — 36415 COLL VENOUS BLD VENIPUNCTURE: CPT

## 2024-12-14 PROCEDURE — 99222 1ST HOSP IP/OBS MODERATE 55: CPT | Performed by: STUDENT IN AN ORGANIZED HEALTH CARE EDUCATION/TRAINING PROGRAM

## 2024-12-14 PROCEDURE — 85025 COMPLETE CBC W/AUTO DIFF WBC: CPT

## 2024-12-14 PROCEDURE — 80048 BASIC METABOLIC PNL TOTAL CA: CPT

## 2024-12-14 RX ORDER — BUPROPION HYDROCHLORIDE 150 MG/1
300 TABLET ORAL DAILY
Status: DISCONTINUED | OUTPATIENT
Start: 2024-12-14 | End: 2024-12-17 | Stop reason: HOSPADM

## 2024-12-14 RX ORDER — IPRATROPIUM BROMIDE AND ALBUTEROL SULFATE 2.5; .5 MG/3ML; MG/3ML
3 SOLUTION RESPIRATORY (INHALATION)
Status: DISCONTINUED | OUTPATIENT
Start: 2024-12-14 | End: 2024-12-14

## 2024-12-14 RX ORDER — AMLODIPINE BESYLATE 10 MG/1
10 TABLET ORAL DAILY
Status: DISCONTINUED | OUTPATIENT
Start: 2024-12-14 | End: 2024-12-15

## 2024-12-14 RX ORDER — FORMOTEROL FUMARATE DIHYDRATE 20 UG/2ML
20 SOLUTION RESPIRATORY (INHALATION)
Status: DISCONTINUED | OUTPATIENT
Start: 2024-12-14 | End: 2024-12-17 | Stop reason: HOSPADM

## 2024-12-14 RX ORDER — METOPROLOL SUCCINATE 25 MG/1
25 TABLET, EXTENDED RELEASE ORAL DAILY
Status: DISCONTINUED | OUTPATIENT
Start: 2024-12-14 | End: 2024-12-17 | Stop reason: HOSPADM

## 2024-12-14 RX ORDER — METHYLPREDNISOLONE SODIUM SUCCINATE 125 MG/2ML
80 INJECTION, POWDER, LYOPHILIZED, FOR SOLUTION INTRAMUSCULAR; INTRAVENOUS EVERY 12 HOURS SCHEDULED
Status: DISCONTINUED | OUTPATIENT
Start: 2024-12-14 | End: 2024-12-15

## 2024-12-14 RX ORDER — INSULIN LISPRO 100 [IU]/ML
1-5 INJECTION, SOLUTION INTRAVENOUS; SUBCUTANEOUS
Status: DISCONTINUED | OUTPATIENT
Start: 2024-12-14 | End: 2024-12-17 | Stop reason: HOSPADM

## 2024-12-14 RX ORDER — ACETAMINOPHEN 325 MG/1
975 TABLET ORAL EVERY 6 HOURS PRN
Status: DISCONTINUED | OUTPATIENT
Start: 2024-12-14 | End: 2024-12-17 | Stop reason: HOSPADM

## 2024-12-14 RX ORDER — ALBUTEROL SULFATE 0.83 MG/ML
1.25 SOLUTION RESPIRATORY (INHALATION) EVERY 6 HOURS PRN
Status: DISCONTINUED | OUTPATIENT
Start: 2024-12-14 | End: 2024-12-17 | Stop reason: HOSPADM

## 2024-12-14 RX ORDER — VENLAFAXINE HYDROCHLORIDE 75 MG/1
75 CAPSULE, EXTENDED RELEASE ORAL DAILY
Status: DISCONTINUED | OUTPATIENT
Start: 2024-12-14 | End: 2024-12-17 | Stop reason: HOSPADM

## 2024-12-14 RX ORDER — MONTELUKAST SODIUM 10 MG/1
10 TABLET ORAL DAILY
Status: DISCONTINUED | OUTPATIENT
Start: 2024-12-14 | End: 2024-12-17 | Stop reason: HOSPADM

## 2024-12-14 RX ORDER — ONDANSETRON 2 MG/ML
4 INJECTION INTRAMUSCULAR; INTRAVENOUS EVERY 6 HOURS PRN
Status: DISCONTINUED | OUTPATIENT
Start: 2024-12-14 | End: 2024-12-17 | Stop reason: HOSPADM

## 2024-12-14 RX ORDER — POLYETHYLENE GLYCOL 3350 17 G/17G
17 POWDER, FOR SOLUTION ORAL DAILY PRN
Status: DISCONTINUED | OUTPATIENT
Start: 2024-12-14 | End: 2024-12-17 | Stop reason: HOSPADM

## 2024-12-14 RX ORDER — PREDNISONE 10 MG/1
10 TABLET ORAL DAILY
Status: DISCONTINUED | OUTPATIENT
Start: 2024-12-16 | End: 2024-12-14

## 2024-12-14 RX ORDER — ARIPIPRAZOLE 5 MG/1
10 TABLET ORAL DAILY
Status: DISCONTINUED | OUTPATIENT
Start: 2024-12-14 | End: 2024-12-17 | Stop reason: HOSPADM

## 2024-12-14 RX ORDER — MAGNESIUM HYDROXIDE/ALUMINUM HYDROXICE/SIMETHICONE 120; 1200; 1200 MG/30ML; MG/30ML; MG/30ML
30 SUSPENSION ORAL EVERY 6 HOURS PRN
Status: DISCONTINUED | OUTPATIENT
Start: 2024-12-14 | End: 2024-12-17 | Stop reason: HOSPADM

## 2024-12-14 RX ORDER — BUDESONIDE 0.5 MG/2ML
0.5 INHALANT ORAL
Status: DISCONTINUED | OUTPATIENT
Start: 2024-12-14 | End: 2024-12-17 | Stop reason: HOSPADM

## 2024-12-14 RX ORDER — PREDNISONE 20 MG/1
20 TABLET ORAL DAILY
Status: DISCONTINUED | OUTPATIENT
Start: 2024-12-14 | End: 2024-12-14

## 2024-12-14 RX ORDER — LISINOPRIL 20 MG/1
40 TABLET ORAL DAILY
Status: DISCONTINUED | OUTPATIENT
Start: 2024-12-14 | End: 2024-12-17 | Stop reason: HOSPADM

## 2024-12-14 RX ORDER — HEPARIN SODIUM 5000 [USP'U]/ML
5000 INJECTION, SOLUTION INTRAVENOUS; SUBCUTANEOUS EVERY 8 HOURS SCHEDULED
Status: DISCONTINUED | OUTPATIENT
Start: 2024-12-14 | End: 2024-12-17 | Stop reason: HOSPADM

## 2024-12-14 RX ORDER — SODIUM CHLORIDE 9 MG/ML
75 INJECTION, SOLUTION INTRAVENOUS CONTINUOUS
Status: DISCONTINUED | OUTPATIENT
Start: 2024-12-14 | End: 2024-12-14

## 2024-12-14 RX ORDER — ATORVASTATIN CALCIUM 10 MG/1
10 TABLET, FILM COATED ORAL DAILY
Status: DISCONTINUED | OUTPATIENT
Start: 2024-12-14 | End: 2024-12-17 | Stop reason: HOSPADM

## 2024-12-14 RX ORDER — TEMAZEPAM 15 MG/1
30 CAPSULE ORAL
Status: DISCONTINUED | OUTPATIENT
Start: 2024-12-14 | End: 2024-12-17 | Stop reason: HOSPADM

## 2024-12-14 RX ORDER — PANTOPRAZOLE SODIUM 40 MG/1
40 TABLET, DELAYED RELEASE ORAL
Status: DISCONTINUED | OUTPATIENT
Start: 2024-12-14 | End: 2024-12-17 | Stop reason: HOSPADM

## 2024-12-14 RX ORDER — BUDESONIDE AND FORMOTEROL FUMARATE DIHYDRATE 160; 4.5 UG/1; UG/1
2 AEROSOL RESPIRATORY (INHALATION) 2 TIMES DAILY
Status: DISCONTINUED | OUTPATIENT
Start: 2024-12-14 | End: 2024-12-14

## 2024-12-14 RX ORDER — OXYCODONE HYDROCHLORIDE 10 MG/1
10 TABLET ORAL EVERY 6 HOURS PRN
Refills: 0 | Status: DISCONTINUED | OUTPATIENT
Start: 2024-12-14 | End: 2024-12-17 | Stop reason: HOSPADM

## 2024-12-14 RX ORDER — TAMSULOSIN HYDROCHLORIDE 0.4 MG/1
0.4 CAPSULE ORAL DAILY
Status: DISCONTINUED | OUTPATIENT
Start: 2024-12-14 | End: 2024-12-17 | Stop reason: HOSPADM

## 2024-12-14 RX ADMIN — METHYLPREDNISOLONE SODIUM SUCCINATE 80 MG: 125 INJECTION, POWDER, FOR SOLUTION INTRAMUSCULAR; INTRAVENOUS at 10:46

## 2024-12-14 RX ADMIN — MONTELUKAST 10 MG: 10 TABLET, FILM COATED ORAL at 09:16

## 2024-12-14 RX ADMIN — VENLAFAXINE HYDROCHLORIDE 75 MG: 75 CAPSULE, EXTENDED RELEASE ORAL at 09:16

## 2024-12-14 RX ADMIN — B-COMPLEX W/ C & FOLIC ACID TAB 1 TABLET: TAB at 09:16

## 2024-12-14 RX ADMIN — OXYCODONE HYDROCHLORIDE 10 MG: 10 TABLET ORAL at 18:12

## 2024-12-14 RX ADMIN — TAMSULOSIN HYDROCHLORIDE 0.4 MG: 0.4 CAPSULE ORAL at 09:16

## 2024-12-14 RX ADMIN — PANTOPRAZOLE SODIUM 40 MG: 40 TABLET, DELAYED RELEASE ORAL at 05:42

## 2024-12-14 RX ADMIN — ARIPIPRAZOLE 10 MG: 5 TABLET ORAL at 09:16

## 2024-12-14 RX ADMIN — ATORVASTATIN CALCIUM 10 MG: 10 TABLET, FILM COATED ORAL at 09:16

## 2024-12-14 RX ADMIN — BUPROPION HYDROCHLORIDE 300 MG: 150 TABLET, EXTENDED RELEASE ORAL at 09:17

## 2024-12-14 RX ADMIN — INSULIN LISPRO 2 UNITS: 100 INJECTION, SOLUTION INTRAVENOUS; SUBCUTANEOUS at 16:38

## 2024-12-14 RX ADMIN — HEPARIN SODIUM 5000 UNITS: 5000 INJECTION, SOLUTION INTRAVENOUS; SUBCUTANEOUS at 16:38

## 2024-12-14 RX ADMIN — METOPROLOL SUCCINATE 25 MG: 25 TABLET, EXTENDED RELEASE ORAL at 09:17

## 2024-12-14 RX ADMIN — OXYCODONE HYDROCHLORIDE 10 MG: 10 TABLET ORAL at 04:38

## 2024-12-14 RX ADMIN — METHYLPREDNISOLONE SODIUM SUCCINATE 80 MG: 125 INJECTION, POWDER, FOR SOLUTION INTRAMUSCULAR; INTRAVENOUS at 21:57

## 2024-12-14 RX ADMIN — BUDESONIDE 0.5 MG: 0.5 INHALANT RESPIRATORY (INHALATION) at 19:27

## 2024-12-14 RX ADMIN — SODIUM CHLORIDE 1000 ML: 0.9 INJECTION, SOLUTION INTRAVENOUS at 01:08

## 2024-12-14 RX ADMIN — BUDESONIDE AND FORMOTEROL FUMARATE DIHYDRATE 2 PUFF: 160; 4.5 AEROSOL RESPIRATORY (INHALATION) at 09:23

## 2024-12-14 RX ADMIN — SODIUM CHLORIDE 1000 ML: 0.9 INJECTION, SOLUTION INTRAVENOUS at 10:46

## 2024-12-14 RX ADMIN — PREDNISONE 20 MG: 20 TABLET ORAL at 09:16

## 2024-12-14 RX ADMIN — HEPARIN SODIUM 5000 UNITS: 5000 INJECTION, SOLUTION INTRAVENOUS; SUBCUTANEOUS at 21:58

## 2024-12-14 RX ADMIN — OXYCODONE HYDROCHLORIDE 10 MG: 10 TABLET ORAL at 11:30

## 2024-12-14 RX ADMIN — FORMOTEROL FUMARATE DIHYDRATE 20 MCG: 20 SOLUTION RESPIRATORY (INHALATION) at 19:27

## 2024-12-14 RX ADMIN — SODIUM CHLORIDE 75 ML/HR: 0.9 INJECTION, SOLUTION INTRAVENOUS at 04:39

## 2024-12-14 RX ADMIN — INSULIN LISPRO 1 UNITS: 100 INJECTION, SOLUTION INTRAVENOUS; SUBCUTANEOUS at 21:57

## 2024-12-14 RX ADMIN — HEPARIN SODIUM 5000 UNITS: 5000 INJECTION, SOLUTION INTRAVENOUS; SUBCUTANEOUS at 05:42

## 2024-12-14 NOTE — CONSULTS
Consultation - Pulmonology   Name: Fahad Hernandez 68 y.o. male I MRN: [id number]  Unit/Bed#: 2 E 269-01 I Date of Admission: 12/13/2024   Date of Service: 12/14/2024 I Hospital Day: 0   Inpatient consult to Pulmonology  Consult performed by: Esperanza Casas MD  Consult ordered by: Alexi Henry MD        Physician Requesting Evaluation: Alexi Henry MD   Reason for Evaluation / Principal Problem: Severe persistent asthma with acute exacerbation    Assessment & Plan  Severe persistent asthma with (acute) exacerbation  Severe persistent asthma with PFTs about a year ago demonstrating severe obstructive airflow limitation with FEV1 25% with significant response to the bronchodilator increased lung volumes and normal DLCO at 83%  Multiple recent exacerbations also likely contributed by his anxiety recent positive his son  Will switch his nebulizer treatments to DuoNeb every 6 hours  Will switch his Symbicort to budesonide and Perforomist via the nebulizer twice daily  Solu-Medrol 40 mg every 12 hours  Continue to monitor peak flow pre and post bronchodilator  Does receive Dupixent outpatient has received the third dose yesterday.    Chronic obstructive asthma (HCC)  Upper lobe predominant emphysematous changes in the CT of the chest  Please see above plan for acute exacerbation  Outpatient pulmonary follow-up  Gastroesophageal reflux disease with esophagitis    I have discussed the above management plan in detail with the primary service.     History of Present Illness   Fahad Hernandez is a 68 y.o. male who presents with no smoking history, with history of significant severe persistent asthma.  COPD overlap, multiple recent exacerbations has been admitted 5 times in the past 3 to 4 months, twice urine [facility] she sees 2 times in [facility].  He has been continuing to use his long-acting bronchodilators and also was on Xolair and tezspire which did not work increase currently has been switched to Dupixent  he did get his third dose of Dupixent yesterday.  He states he has been taking his tapering down dose of prednisone that was given in the office recently and yesterday he felt very weak and had a fall and could not breathe for which she came in he states his breathing is better today.  All recent cardiac workup including cardiac catheterization has all been normal    Review of Systems   Constitutional:  Positive for fatigue.   HENT: Negative.     Eyes: Negative.    Respiratory:  Positive for cough, shortness of breath and wheezing.    Cardiovascular: Negative.    Gastrointestinal: Negative.    Endocrine: Negative.    Genitourinary: Negative.    Musculoskeletal: Negative.    Allergic/Immunologic: Negative.    Neurological:  Positive for weakness.   Psychiatric/Behavioral: Negative.         Historical Information   I have reviewed the patient's PMH, PSH, Social History, Family History, Meds, and Allergies  Tobacco History: Never smoked  Occupational History: Retired  Family History:non-contributory    Objective :  Temp:  [96.9 °F (36.1 °C)-98.6 °F (37 °C)] 98.2 °F (36.8 °C)  HR:  [80-99] 85  BP: ()/(45-95) 131/95  Resp:  [18-34] 18  SpO2:  [93 %-97 %] 95 %  O2 Device: None (Room air)    Physical Exam  Currently sitting up in the bed in no acute respiratory distress  Eyes anicteric Sleeter, conjunctiva is clear  ENT nares is patent, no evidence of any discharge  Neck no JVD no cervical lymphadenopathy  Lungs diminished breath sounds bilaterally occasional expiratory rhonchi  Heart first and second heart sounds are heard no murmur or gallop is heard  Abdomen soft nontender bowel sounds are present  Extremities no pedal edema  Skin no rash no bruises  CNS awake alert oriented x 3.    Lab Results: I have reviewed the following results:  .     12/13/24 2020 12/13/24 2123 12/13/24 2228 12/14/24  0007 12/14/24  0440 12/14/24  0840 12/14/24  0845   WBC 18.03*  --   --   --  17.38*  --   --    HGB 12.4  --   --   --   11.0*  --   --    HCT 40.8  --   --   --  37.0  --   --      --   --   --  313  --  301   SODIUM 139  --   --    < > 141  --   --    K 5.0  --   --    < > 4.6  --   --      --   --    < > 105  --   --    CO2 21  --   --    < > 24  --   --    BUN 42*  --   --    < > 34*  --   --    CREATININE 2.27*  --   --    < > 1.50*  --   --    GLUC 203*  --   --    < > 192*  --   --    MG  --   --   --   --  2.0  --   --    PHOS  --   --   --   --  3.4  --   --    AST 16  --   --   --  17  --   --    ALT 34  --   --   --  30  --   --    ALB 4.1  --   --   --  3.7  --   --    TBILI 0.45  --   --   --  0.27  --   --    ALKPHOS 41  --   --   --  49  --   --    PTT 24  --   --   --   --   --   --    INR 1.00  --   --   --   --   --   --    HSTNI0 13  --   --   --   --   --   --    HSTNI2  --   --  10  --   --   --   --    BNP 38  --   --   --   --   --   --    LACTICACID  --    < >  --    < > 5.4* 3.2*  --     < > = values in this interval not displayed.     ABG:   .     12/14/24  1103   PHART 7.423   GRM4GWY 39.1   PO2ART 76.6   PBV3BBJ 25.0   BEART 0.6       Imaging Results Review: I personally reviewed the following image studies in PACS and associated radiology reports: CT chest. My interpretation of the radiology images/reports is: Upper lobe predominant centrilobular emphysema mild right lower lobe atelectasis scarring CT of the chest 12/13/2024.  Other Study Results Review: No additional pertinent studies reviewed.  PFT Results Reviewed: reviewed    VTE Prophylaxis: Sequential compression device (Venodyne)

## 2024-12-14 NOTE — ED PROVIDER NOTES
Time reflects when diagnosis was documented in both MDM as applicable and the Disposition within this note       Time User Action Codes Description Comment    12/14/2024  1:04 AM Eleuterio Sanabria [R53.1] Generalized weakness     12/14/2024  1:04 AM Eleuterio Sanabria [W19.XXXA] Fall, initial encounter     12/14/2024  1:04 AM Eleuterio Sanabria Add [N17.9] BENNY (acute kidney injury) (HCC)     12/14/2024  1:04 AM Eleuterio Sanabria [E87.20] Metabolic acidosis           ED Disposition       ED Disposition   Admit    Condition   Stable    Date/Time   Sat Dec 14, 2024  1:04 AM    Comment   Case was discussed with SLIM and the patient's admission status was agreed to be Admission Status: inpatient status to the service of Dr. Hendrickson .               Assessment & Plan       Medical Decision Making  Primary Survey:  - Airway: Patent, oropharynx clear, trachea midline  - Breathing: Symmetric breath sounds, equal chest rise  - Circulation: Palpable radial peripheral pulse (BP as above), IV access obatined, pt on monitor  - Disability: GCS E4 V5 M6  - Exposure: Exposed patient for full examination (subsequent to examination, pt covered in warm blanket)    The patient is a 68 y.o. male with a history of asthma, COPD, dementia, diabetes, history of stroke, hypertension, interstitial lung disease, pneumonia, sleep apnea, urethral disorder who presents to [facility] with a chief complaint of generalized weakness and fall.  Patient reports that he has trouble getting around at baseline and uses furniture in his home.  Patient reports however generalized weakness worse than normal and felt today.  Patient also reports that he felt like he was seeing things like worms in his coffee and that he thought he saw his parents today who have passed away.  Patient states he recently picked up new medications including Cardizem and Plavix.  Given patient being poor historian and fall will call trauma eval.  Patient pan scanned  without acute abnormalities or fractures.  Patient blood work significant for WBC 18, Cr up from baseline around 1 to 2.27, and lactic acid 10.7.  Patient tachypneic.  Likely compensating for his metabolic acidosis.  Likely metabolic acidosis in the state of metformin with severe dehydration.  Will continue to treat with fluids as well as repeating lab work to make sure his lactic acid and creatinine are improving.  Discussed with the ICU who evaluated patient and recommended that he is safe to go to the floor.  Discussed case with slim and will admit inpatient for continued hydration and management of his metabolic acidosis.    Problems Addressed:  BENNY (acute kidney injury) (HCC): acute illness or injury  Fall, initial encounter: acute illness or injury  Generalized weakness: acute illness or injury  Metabolic acidosis: acute illness or injury    Amount and/or Complexity of Data Reviewed  Labs: ordered. Decision-making details documented in ED Course.  Radiology: ordered. Decision-making details documented in ED Course.    Risk  Prescription drug management.  Decision regarding hospitalization.        ED Course as of 12/14/24 0534   Fri Dec 13, 2024   2053 TRAUMA - CT head wo contrast  No acute intracranial abnormality.   2105 TRAUMA - CT spine cervical wo contrast  No evidence of acute fracture, traumatic subluxation, or atlantooccipital dislocation   2113 TRAUMA - CT chest abdomen pelvis w contrast  No evidence of intrathoracic or intra-abdominal solid organ injury, or bony fracture.        2132 CT recon only lumbar spine  No fracture or traumatic subluxation.   2207 LACTIC ACID(!!): 10.7   2304 Delta 2hr hsTnI: -3   Sat Dec 14, 2024   0042 Delta 4hr hsTnI: -4       Medications   sodium chloride 0.9 % bolus 1,000 mL (0 mL Intravenous Stopped 12/13/24 2130)   iohexol (OMNIPAQUE) 350 MG/ML injection (MULTI-DOSE) 100 mL (100 mL Intravenous Given 12/13/24 2036)   ceftriaxone (ROCEPHIN) 1 g/50 mL in dextrose IVPB (0  mg Intravenous Stopped 12/13/24 2222)   multi-electrolyte (ISOLYTE-S PH 7.4) bolus 1,000 mL (0 mL Intravenous Stopped 12/13/24 2233)   multi-electrolyte (ISOLYTE-S PH 7.4) bolus 500 mL (0 mL Intravenous Stopped 12/14/24 0006)   sodium chloride 0.9 % bolus 1,000 mL (1,000 mL Intravenous New Bag 12/14/24 0108)       ED Risk Strat Scores   HEART Risk Score      Flowsheet Row Most Recent Value   Heart Score Risk Calculator    History 0 Filed at: 12/14/2024 0530   ECG 1 Filed at: 12/14/2024 0530   Age 2 Filed at: 12/14/2024 0530   Risk Factors 1 Filed at: 12/14/2024 0530   Troponin 1 Filed at: 12/14/2024 0530   HEART Score 5 Filed at: 12/14/2024 0530          HEART Risk Score      Flowsheet Row Most Recent Value   Heart Score Risk Calculator    History 0 Filed at: 12/14/2024 0530   ECG 1 Filed at: 12/14/2024 0530   Age 2 Filed at: 12/14/2024 0530   Risk Factors 1 Filed at: 12/14/2024 0530   Troponin 1 Filed at: 12/14/2024 0530   HEART Score 5 Filed at: 12/14/2024 0530                    Identification of Seniors at Risk      Flowsheet Row Most Recent Value   (ISAR) Identification of Seniors at Risk    Before the illness or injury that brought you to the Emergency, did you need someone to help you on a regular basis? 0 Filed at: 12/13/2024 2005   In the last 24 hours, have you needed more help than usual? 0 Filed at: 12/13/2024 2005   Have you been hospitalized for one or more nights during the past 6 months? 0 Filed at: 12/13/2024 2005   In general, do you see well? 0 Filed at: 12/13/2024 2005   In general, do you have serious problems with your memory? 0 Filed at: 12/13/2024 2005   Do you take more than three different medications every day? 0 Filed at: 12/13/2024 2005   ISAR Score 0 Filed at: 12/13/2024 2005                SBIRT 22yo+      Flowsheet Row Most Recent Value   Initial Alcohol Screen: US AUDIT-C     1. How often do you have a drink containing alcohol? 0 Filed at: 12/13/2024 2005   2. How many drinks  containing alcohol do you have on a typical day you are drinking?  0 Filed at: 12/13/2024 2005   3a. Male UNDER 65: How often do you have five or more drinks on one occasion? 0 Filed at: 12/13/2024 2005   3b. FEMALE Any Age, or MALE 65+: How often do you have 4 or more drinks on one occassion? 0 Filed at: 12/13/2024 2005   Audit-C Score 0 Filed at: 12/13/2024 2005   LETICIA: How many times in the past year have you...    Used an illegal drug or used a prescription medication for non-medical reasons? Never Filed at: 12/13/2024 2005                            History of Present Illness       Chief Complaint   Patient presents with    Weakness - Generalized     Super weak at home, unable to ambulate, just started new bp meds and possibly thinners? (Pt denies), admits to lower back pain and dark smelly urine, bGL 303 for ems, admits to a fall at home due to his weakness, states that he doesn't remember hitting the floor, reports hallucinations prior to fall        Past Medical History:   Diagnosis Date    Asthma     COPD (chronic obstructive pulmonary disease) (HCC)     Dementia (HCC)     Diabetes mellitus (HCC)     GERD (gastroesophageal reflux disease)     History of stroke 11/07/2019    Hypertension     Interstitial lung disease (HCC)     Major depressive disorder with current active episode 11/07/2019    Pneumonia     Sleep apnea     Sleep apnea, obstructive     Stroke (HCC)     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      Past Surgical History:   Procedure Laterality Date    BACK SURGERY      ELBOW SURGERY      KNEE SURGERY      NECK SURGERY      SHOULDER SURGERY      SPINE SURGERY  2004      Family History   Family history unknown: Yes      Social History     Tobacco Use    Smoking status: Never     Passive exposure: Never    Smokeless tobacco: Never    Tobacco comments:     Patient admits to using marijuana, edibles   Vaping Use    Vaping status: Never Used   Substance Use Topics    Alcohol use: Yes     Alcohol/week: 4.0  standard drinks of alcohol     Types: 4 Cans of beer per week    Drug use: Yes     Types: Marijuana, Oxycodone, Psilocybin      E-Cigarette/Vaping    E-Cigarette Use Never User       E-Cigarette/Vaping Substances    Nicotine No     THC No     CBD Yes     Flavoring No     Other No     Unknown No       I have reviewed and agree with the history as documented.     The patient is a 68 y.o. male with a history of asthma, COPD, dementia, diabetes, history of stroke, hypertension, interstitial lung disease, pneumonia, sleep apnea, urethral disorder who presents to [facility] with a chief complaint of generalized weakness and fall. Symptoms began tonight when he fell while trying to get around the house. Patient reports that he uses his furniture at home to get around at baseline and had trouble walking today. He denies any pain anywhere. Associated symptoms include shortness of breath, generalized weakness and thirst. Symptoms are aggravated with none noted and alleviating factors include none noted. The patient denies pain, chest pain, cough, sputum, syncope, headache, blurred vision, double vision, nausea, vomiting, diarrhea, dysuria, frequency, urgency. No other reported symptoms at this time.  Patient affirms allergies to beta adrenergic blockers          History provided by:  Patient   used: No        Review of Systems   Constitutional:  Negative for chills and fever.   HENT:  Negative for ear pain and sore throat.    Eyes:  Negative for pain and visual disturbance.   Respiratory:  Negative for cough and shortness of breath.    Cardiovascular:  Negative for chest pain and palpitations.   Gastrointestinal:  Negative for abdominal pain and vomiting.   Genitourinary:  Negative for dysuria and hematuria.   Musculoskeletal:  Positive for back pain. Negative for arthralgias.   Skin:  Negative for color change and rash.   Neurological:  Negative for dizziness, seizures, syncope, facial  asymmetry, light-headedness, numbness and headaches.   All other systems reviewed and are negative.          Objective       ED Triage Vitals   Temperature Pulse Blood Pressure Respirations SpO2 Patient Position - Orthostatic VS   12/13/24 2109 12/13/24 2000 12/13/24 2000 12/13/24 2000 12/13/24 2000 12/14/24 0236   (!) 96.9 °F (36.1 °C) 84 104/68 (!) 24 97 % Lying      Temp Source Heart Rate Source BP Location FiO2 (%) Pain Score    12/13/24 2109 12/14/24 0236 12/14/24 0236 -- 12/14/24 0229    Rectal Monitor Left arm  7      Vitals      Date and Time Temp Pulse SpO2 Resp BP Pain Score FACES Pain Rating User   12/14/24 0438 -- -- -- -- -- 8 -- MS   12/14/24 0236 97.9 °F (36.6 °C) 89 95 % 32 133/66 -- -- MS   12/14/24 0229 -- -- -- -- -- 7 -- TC   12/14/24 0225 97.9 °F (36.6 °C) 89 95 % -- 133/66 -- -- DII   12/14/24 0200 -- 83 95 % 32 112/61 -- -- AR   12/14/24 0100 -- 90 93 % 26 101/56 -- -- AR   12/14/24 0057 -- 92 94 % -- 99/56 -- -- AR   12/14/24 0057 -- -- -- 25 manually counted bedside -- -- -- NW   12/14/24 0030 -- 95 94 % 34 103/56 -- -- AR   12/13/24 2330 98.6 °F (37 °C) 98 95 % 30 110/61 -- -- AR   12/13/24 2300 -- 90 94 % 29 102/55 -- -- AR   12/13/24 2230 -- 88 95 % 33 94/57 -- -- AR   12/13/24 2215 -- 84 95 % 29 93/55 -- -- AR   12/13/24 2200 -- 86 96 % 28 93/53 -- -- AR   12/13/24 2145 -- 80 96 % 29 94/53 -- -- AR   12/13/24 2130 -- 83 95 % 27 93/51 -- -- AR   12/13/24 2115 -- 97 94 % 30 129/75 -- -- AR   12/13/24 2109 96.9 °F (36.1 °C) -- -- -- -- -- -- AR   12/13/24 2100 -- 81 96 % 27 107/61 -- -- AR   12/13/24 2030 -- 81 96 % 26 100/66 -- -- AR   12/13/24 2003 -- 84 96 % 24 104/68 -- -- AR   12/13/24 2000 -- 84 97 % 24 104/68 -- -- AR            Physical Exam  Vitals reviewed.   Constitutional:       General: He is not in acute distress.     Appearance: He is ill-appearing. He is not toxic-appearing.   HENT:      Head: Normocephalic.      Right Ear: Tympanic membrane, ear canal and external ear  normal.      Left Ear: Tympanic membrane, ear canal and external ear normal.      Nose: Nose normal. No congestion.      Mouth/Throat:      Mouth: Mucous membranes are moist.      Pharynx: Oropharynx is clear.   Eyes:      General: No scleral icterus.     Extraocular Movements: Extraocular movements intact.      Conjunctiva/sclera: Conjunctivae normal.      Pupils: Pupils are equal, round, and reactive to light.   Neck:      Vascular: No carotid bruit.   Cardiovascular:      Rate and Rhythm: Normal rate.      Pulses: Normal pulses.   Pulmonary:      Effort: Pulmonary effort is normal. No respiratory distress.      Breath sounds: Normal breath sounds. No stridor. No wheezing, rhonchi or rales.   Abdominal:      General: Abdomen is flat. Bowel sounds are normal.      Palpations: Abdomen is soft.      Tenderness: There is no abdominal tenderness.   Musculoskeletal:         General: No swelling, tenderness, deformity or signs of injury. Normal range of motion.      Cervical back: Normal range of motion and neck supple. No tenderness.      Right lower leg: No edema.      Left lower leg: No edema.   Lymphadenopathy:      Cervical: No cervical adenopathy.   Skin:     General: Skin is warm and dry.      Capillary Refill: Capillary refill takes less than 2 seconds.      Coloration: Skin is not jaundiced.      Findings: No bruising or lesion.   Neurological:      Mental Status: He is alert and oriented to person, place, and time.         Results Reviewed       Procedure Component Value Units Date/Time    Blood gas, venous [[id number]]  (Abnormal) Collected: 12/14/24 0107    Lab Status: Final result Specimen: Blood from Arm, Left Updated: 12/14/24 0113     pH, Baljit 7.412     pCO2, Baljit 29.1 mm Hg      pO2, Baljit 53.4 mm Hg      HCO3, Baljit 18.1 mmol/L      Base Excess, Baljit -5.4 mmol/L      O2 Content, Baljit 14.0 ml/dL      O2 HGB, VENOUS 86.7 %     HS Troponin I 4hr [[id number]]  (Normal) Collected: 12/14/24 0007    Lab Status: Final  result Specimen: Blood from Arm, Left Updated: 12/14/24 0042     hs TnI 4hr 9 ng/L      Delta 4hr hsTnI -4 ng/L     Lactic acid 2 Hours [[id number]]  (Abnormal) Collected: 12/14/24 0007    Lab Status: Final result Specimen: Blood from Arm, Left Updated: 12/14/24 0037     LACTIC ACID 6.5 mmol/L     Narrative:      Result may be elevated if tourniquet was used during collection.    Basic metabolic panel [[id number]]  (Abnormal) Collected: 12/14/24 0007    Lab Status: Final result Specimen: Blood from Arm, Left Updated: 12/14/24 0032     Sodium 136 mmol/L      Potassium 5.0 mmol/L      Chloride 102 mmol/L      CO2 20 mmol/L      ANION GAP 14 mmol/L      BUN 39 mg/dL      Creatinine 1.85 mg/dL      Glucose 187 mg/dL      Calcium 8.6 mg/dL      eGFR 36 ml/min/1.73sq m     Narrative:      National Kidney Disease Foundation guidelines for Chronic Kidney Disease (CKD):     Stage 1 with normal or high GFR (GFR > 90 mL/min/1.73 square meters)    Stage 2 Mild CKD (GFR = 60-89 mL/min/1.73 square meters)    Stage 3A Moderate CKD (GFR = 45-59 mL/min/1.73 square meters)    Stage 3B Moderate CKD (GFR = 30-44 mL/min/1.73 square meters)    Stage 4 Severe CKD (GFR = 15-29 mL/min/1.73 square meters)    Stage 5 End Stage CKD (GFR <15 mL/min/1.73 square meters)  Note: GFR calculation is accurate only with a steady state creatinine    HS Troponin I 2hr [[id number]]  (Normal) Collected: 12/13/24 2228    Lab Status: Final result Specimen: Blood from Arm, Left Updated: 12/13/24 2303     hs TnI 2hr 10 ng/L      Delta 2hr hsTnI -3 ng/L     Lactic acid, plasma (w/reflex if result > 2.0) [[id number]]  (Abnormal) Collected: 12/13/24 2123    Lab Status: Final result Specimen: Blood from Arm, Right Updated: 12/13/24 2206     LACTIC ACID 10.7 mmol/L     Narrative:      Result may be elevated if tourniquet was used during collection.    Urine Microscopic [[id number]]  (Abnormal) Collected: 12/13/24 4161    Lab Status: Final result Specimen: Urine, Clean  Catch Updated: 12/13/24 2205     RBC, UA None Seen /hpf      WBC, UA 1-2 /hpf      Epithelial Cells Occasional /hpf      Bacteria, UA Occasional /hpf      Hyaline Casts, UA 0-3 /lpf     UA w Reflex to Microscopic w Reflex to Culture [[id number]]  (Abnormal) Collected: 12/13/24 2151    Lab Status: Final result Specimen: Urine, Clean Catch Updated: 12/13/24 2203     Color, UA Yellow     Clarity, UA Clear     Specific Gravity, UA 1.025     pH, UA 5.5     Leukocytes, UA Negative     Nitrite, UA Negative     Protein, UA Trace mg/dl      Glucose,  (1/2%) mg/dl      Ketones, UA Trace mg/dl      Urobilinogen, UA <2.0 mg/dl      Bilirubin, UA Negative     Occult Blood, UA Negative    Procalcitonin [[id number]]  (Normal) Collected: 12/13/24 2020    Lab Status: Final result Specimen: Blood from Arm, Right Updated: 12/13/24 2135     Procalcitonin 0.16 ng/ml     Blood culture [[id number]] Collected: 12/13/24 2123    Lab Status: In process Specimen: Blood from Arm, Left Updated: 12/13/24 2127    Blood culture [[id number]] Collected: 12/13/24 2123    Lab Status: In process Specimen: Blood from Arm, Right Updated: 12/13/24 2127    Manual Differential(PHLEBS Do Not Order) [[id number]]  (Abnormal) Collected: 12/13/24 2020    Lab Status: Final result Specimen: Blood from Arm, Right Updated: 12/13/24 2109     Segmented % 88 %      Lymphocytes % 6 %      Monocytes % 3 %      Eosinophils % 0 %      Basophils % 0 %      Myelocytes % 2 %      Atypical Lymphocytes % 1 %      Absolute Neutrophils 15.87 Thousand/uL      Absolute Lymphocytes 1.26 Thousand/uL      Absolute Monocytes 0.54 Thousand/uL      Absolute Eosinophils 0.00 Thousand/uL      Absolute Basophils 0.00 Thousand/uL      Absolute Myelocytes 0.36 Thousand/uL      Total Counted --     Platelet Estimate Adequate    B-Type Natriuretic Peptide(BNP) [[id number]]  (Normal) Collected: 12/13/24 2020    Lab Status: Final result Specimen: Blood from Arm, Right Updated: 12/13/24 2057      BNP 38 pg/mL     HS Troponin 0hr (reflex protocol) [[id number]]  (Normal) Collected: 12/13/24 2020    Lab Status: Final result Specimen: Blood from Arm, Right Updated: 12/13/24 2056     hs TnI 0hr 13 ng/L     Comprehensive metabolic panel [[id number]]  (Abnormal) Collected: 12/13/24 2020    Lab Status: Final result Specimen: Blood from Arm, Right Updated: 12/13/24 2051     Sodium 139 mmol/L      Potassium 5.0 mmol/L      Chloride 101 mmol/L      CO2 21 mmol/L      ANION GAP 17 mmol/L      BUN 42 mg/dL      Creatinine 2.27 mg/dL      Glucose 203 mg/dL      Calcium 10.0 mg/dL      AST 16 U/L      ALT 34 U/L      Alkaline Phosphatase 41 U/L      Total Protein 6.9 g/dL      Albumin 4.1 g/dL      Total Bilirubin 0.45 mg/dL      eGFR 28 ml/min/1.73sq m     Narrative:      National Kidney Disease Foundation guidelines for Chronic Kidney Disease (CKD):     Stage 1 with normal or high GFR (GFR > 90 mL/min/1.73 square meters)    Stage 2 Mild CKD (GFR = 60-89 mL/min/1.73 square meters)    Stage 3A Moderate CKD (GFR = 45-59 mL/min/1.73 square meters)    Stage 3B Moderate CKD (GFR = 30-44 mL/min/1.73 square meters)    Stage 4 Severe CKD (GFR = 15-29 mL/min/1.73 square meters)    Stage 5 End Stage CKD (GFR <15 mL/min/1.73 square meters)  Note: GFR calculation is accurate only with a steady state creatinine    CK [[id number]]  (Normal) Collected: 12/13/24 2020    Lab Status: Final result Specimen: Blood from Arm, Right Updated: 12/13/24 2051     Total CK 79 U/L     FLU/COVID Rapid Antigen (30 min. TAT) - Preferred screening test in ED [[id number]]  (Normal) Collected: 12/13/24 2021    Lab Status: Final result Specimen: Nares from Nose Updated: 12/13/24 2050     SARS COV Rapid Antigen Negative     Influenza A Rapid Antigen Negative     Influenza B Rapid Antigen Negative    Narrative:      This test has been performed using the Quidel Alesia 2 FLU+SARS Antigen test under the Emergency Use Authorization (EUA). This test has been validated  by the  and verified by the performing laboratory. The Alesia uses lateral flow immunofluorescent sandwich assay to detect SARS-COV, Influenza A and Influenza B Antigen.     The Quidel Alesia 2 SARS Antigen test does not differentiate between SARS-CoV and SARS-CoV-2.     Negative results are presumptive and may be confirmed with a molecular assay, if necessary, for patient management. Negative results do not rule out SARS-CoV-2 or influenza infection and should not be used as the sole basis for treatment or patient management decisions. A negative test result may occur if the level of antigen in a sample is below the limit of detection of this test.     Positive results are indicative of the presence of viral antigens, but do not rule out bacterial infection or co-infection with other viruses.     All test results should be used as an adjunct to clinical observations and other information available to the provider.    FOR PEDIATRIC PATIENTS - copy/paste COVID Guidelines URL to browser: [email or web address]    APTT [[id number]]  (Normal) Collected: 12/13/24 2020    Lab Status: Final result Specimen: Blood from Arm, Right Updated: 12/13/24 2048     PTT 24 seconds     Protime-INR [[id number]]  (Normal) Collected: 12/13/24 2020    Lab Status: Final result Specimen: Blood from Arm, Right Updated: 12/13/24 2048     Protime 13.9 seconds      INR 1.00    Narrative:      INR Therapeutic Range    Indication                                             INR Range      Atrial Fibrillation                                               2.0-3.0  Hypercoagulable State                                    2.0.2.3  Left Ventricular Asist Device                            2.0-3.0  Mechanical Heart Valve                                  -    Aortic(with afib, MI, embolism, HF, LA enlargement,    and/or coagulopathy)                                     2.0-3.0 (2.5-3.5)      Mitral                                                             2.5-3.5  Prosthetic/Bioprosthetic Heart Valve               2.0-3.0  Venous thromboembolism (VTE: VT, PE        2.0-3.0    CBC and differential [[id number]]  (Abnormal) Collected: 12/13/24 2020    Lab Status: Final result Specimen: Blood from Arm, Right Updated: 12/13/24 2040     WBC 18.03 Thousand/uL      RBC 4.79 Million/uL      Hemoglobin 12.4 g/dL      Hematocrit 40.8 %      MCV 85 fL      MCH 25.9 pg      MCHC 30.4 g/dL      RDW 17.2 %      MPV 9.0 fL      Platelets 375 Thousands/uL     Narrative:      This is an appended report.  These results have been appended to a previously verified report.    Blood gas, venous [[id number]]  (Abnormal) Collected: 12/13/24 2020    Lab Status: Final result Specimen: Blood from Arm, Right Updated: 12/13/24 2037     pH, Baljit 7.299     pCO2, Baljit 39.0 mm Hg      pO2, Baljit 39.0 mm Hg      HCO3, Baljit 18.7 mmol/L      Base Excess, Baljit -7.1 mmol/L      O2 Content, Baljit 12.2 ml/dL      O2 HGB, VENOUS 67.7 %             TRAUMA - CT head wo contrast   Final Interpretation by Gaston Urena DO (12/13 2048)      No acute intracranial abnormality.                  Workstation performed: [id number]         TRAUMA - CT spine cervical wo contrast   Final Interpretation by Gaston Urena DO (12/13 2053)   No evidence of acute fracture, traumatic subluxation, or atlantooccipital dislocation.            Workstation performed: [id number]         TRAUMA - CT chest abdomen pelvis w contrast   Final Interpretation by Gaston Urena DO (12/13 2108)      No evidence of intrathoracic or intra-abdominal solid organ injury, or bony fracture.               Workstation performed: [id number]         CT recon only lumbar spine   Final Interpretation by Gaston Urena DO (12/13 2129)      No fracture or traumatic subluxation.            Workstation performed: [id number]             Procedures    ED Medication and Procedure Management   Prior to  Admission Medications   Prescriptions Last Dose Informant Patient Reported? Taking?   ARIPiprazole (ABILIFY) 10 mg tablet 2024 Morning Self Yes Yes   ARIPiprazole (ABILIFY) 15 mg tablet 2024 Morning Self Yes Yes   Austedo 12 MG TABS 2024 Morning Self Yes Yes   Budeson-Glycopyrrol-Formoterol (Breztri Aerosphere) 160-9-4.8 MCG/ACT AERO 2024 Noon Self No Yes   Sig: Inhale 2 puffs 2 (two) times a day Rinse mouth after use.    MG tablet More than a month Self Yes No   OneTouch Ultra test strip 2024 Morning Self Yes Yes   Sig: TEST DAILY.. DIAGNOSIS E11.9   albuterol (ACCUNEB) 1.25 MG/3ML nebulizer solution 2024 Morning Self Yes Yes   Sig: Take 1.25 mg by nebulization every 6 (six) hours as needed for wheezing   albuterol (PROVENTIL HFA,VENTOLIN HFA) 90 mcg/act inhaler 2024 Morning Self No Yes   Sig: Inhale 2 puffs every 6 (six) hours as needed for shortness of breath or wheezing   amLODIPine-benazepril (LOTREL) 10-40 MG per capsule 2024 Morning Self Yes Yes   aspirin (ECOTRIN LOW STRENGTH) 81 mg EC tablet Not Taking Self Yes No   Sig: Take 81 mg by mouth daily   Patient not taking: Reported on 2024   atorvastatin (LIPITOR) 10 mg tablet 2024 Morning Self Yes Yes   Sig: Take 10 mg by mouth daily   buPROPion (WELLBUTRIN XL) 300 mg 24 hr tablet 2024 Morning Self Yes Yes   Si tab(s)   esomeprazole (NexIUM) 40 MG capsule 2024 Morning Self Yes Yes   Sig: Take 40 mg by mouth   metFORMIN (GLUCOPHAGE) 500 mg tablet 2024 Evening Self Yes Yes   Sig: Take 1 tablet by mouth 2 (two) times a day with meals   metoprolol succinate (TOPROL-XL) 25 mg 24 hr tablet 2024 Morning  No Yes   Sig: TAKE 1 TABLET (25 MG TOTAL) BY MOUTH DAILY.   montelukast (SINGULAIR) 10 mg tablet 2024 Morning Self Yes Yes   Sig: Take 10 mg by mouth   multivitamin (THERAGRAN) TABS 2024 Morning Self Yes Yes   Sig: Take 1 tablet by mouth   nitroglycerin  (NITROSTAT) 0.4 mg SL tablet Not Taking  No No   Sig: Place 1 tablet (0.4 mg total) under the tongue every 5 (five) minutes as needed for chest pain   Patient not taking: Reported on 2024   oxyCODONE (ROXICODONE) 5 mg immediate release tablet 2024 Morning Self Yes Yes   Sig: Take 10 mg by mouth every 6 (six) hours as needed   predniSONE 20 mg tablet 2024 Morning  No Yes   Sig: Take 2 tablets (40 mg total) by mouth daily for 3 days, THEN 1 tablet (20 mg total) daily for 3 days, THEN 0.5 tablets (10 mg total) daily for 3 days.   promethazine (PHENERGAN) 25 mg tablet 2024 Noon Self Yes Yes   Sig: Take 25 mg by mouth daily as needed   tamsulosin (FLOMAX) 0.4 mg 2024 Morning Self Yes Yes   Sig: Take 0.4 mg by mouth daily   temazepam (RESTORIL) 30 mg capsule 2024 Bedtime Self Yes Yes   Sig: Take 30 mg by mouth daily at bedtime as needed   tiZANidine (ZANAFLEX) 4 mg tablet Unknown Self Yes No   venlafaxine (EFFEXOR-XR) 75 mg 24 hr capsule 2024 Morning Self Yes Yes   Si cap(s)      Facility-Administered Medications: None     Current Discharge Medication List        CONTINUE these medications which have NOT CHANGED    Details   albuterol (ACCUNEB) 1.25 MG/3ML nebulizer solution Take 1.25 mg by nebulization every 6 (six) hours as needed for wheezing      albuterol (PROVENTIL HFA,VENTOLIN HFA) 90 mcg/act inhaler Inhale 2 puffs every 6 (six) hours as needed for shortness of breath or wheezing  Qty: 18 g, Refills: 5    Comments: Substitution to a formulary equivalent within the same pharmaceutical class is authorized.  Associated Diagnoses: Chronic obstructive asthma (HCC)      amLODIPine-benazepril (LOTREL) 10-40 MG per capsule       !! ARIPiprazole (ABILIFY) 10 mg tablet       !! ARIPiprazole (ABILIFY) 15 mg tablet       atorvastatin (LIPITOR) 10 mg tablet Take 10 mg by mouth daily      Austedo 12 MG TABS       Budeson-Glycopyrrol-Formoterol (Breztri Aerosphere) 160-9-4.8 MCG/ACT  AERO Inhale 2 puffs 2 (two) times a day Rinse mouth after use.  Qty: 32.1 g, Refills: 3    Associated Diagnoses: Mild intermittent asthma without complication      buPROPion (WELLBUTRIN XL) 300 mg 24 hr tablet 1 tab(s)      esomeprazole (NexIUM) 40 MG capsule Take 40 mg by mouth      metFORMIN (GLUCOPHAGE) 500 mg tablet Take 1 tablet by mouth 2 (two) times a day with meals      metoprolol succinate (TOPROL-XL) 25 mg 24 hr tablet TAKE 1 TABLET (25 MG TOTAL) BY MOUTH DAILY.  Qty: 90 tablet, Refills: 1    Associated Diagnoses: Anginal equivalent (HCC)      montelukast (SINGULAIR) 10 mg tablet Take 10 mg by mouth      multivitamin (THERAGRAN) TABS Take 1 tablet by mouth      OneTouch Ultra test strip TEST DAILY.. DIAGNOSIS E11.9      oxyCODONE (ROXICODONE) 5 mg immediate release tablet Take 10 mg by mouth every 6 (six) hours as needed      predniSONE 20 mg tablet Take 2 tablets (40 mg total) by mouth daily for 3 days, THEN 1 tablet (20 mg total) daily for 3 days, THEN 0.5 tablets (10 mg total) daily for 3 days.  Qty: 11 tablet, Refills: 0    Associated Diagnoses: Severe persistent asthma with acute exacerbation      promethazine (PHENERGAN) 25 mg tablet Take 25 mg by mouth daily as needed      tamsulosin (FLOMAX) 0.4 mg Take 0.4 mg by mouth daily      temazepam (RESTORIL) 30 mg capsule Take 30 mg by mouth daily at bedtime as needed      venlafaxine (EFFEXOR-XR) 75 mg 24 hr capsule 1 cap(s)      aspirin (ECOTRIN LOW STRENGTH) 81 mg EC tablet Take 81 mg by mouth daily       MG tablet       nitroglycerin (NITROSTAT) 0.4 mg SL tablet Place 1 tablet (0.4 mg total) under the tongue every 5 (five) minutes as needed for chest pain  Qty: 30 tablet, Refills: 3    Associated Diagnoses: Anginal equivalent (HCC)      tiZANidine (ZANAFLEX) 4 mg tablet        !! - Potential duplicate medications found. Please discuss with provider.        No discharge procedures on file.  ED SEPSIS DOCUMENTATION   Time reflects when diagnosis  was documented in both MDM as applicable and the Disposition within this note       Time User Action Codes Description Comment    12/14/2024  1:04 AM Eleuterio Sanabria [R53.1] Generalized weakness     12/14/2024  1:04 AM Eleuterio Sanabria [W19.XXXA] Fall, initial encounter     12/14/2024  1:04 AM Eleuterio Sanabria [N17.9] BENNY (acute kidney injury) (HCC)     12/14/2024  1:04 AM Eleuterio Sanabria [E87.20] Metabolic acidosis                  Eleuterio Sanabria PA-C  12/14/24 0534

## 2024-12-14 NOTE — ASSESSMENT & PLAN NOTE
Presents in the emergency department due to a fall that happened at home, states he has been extremely weak the last couple of days, reports poor oral intake  Vital signs on admission shows tachypnea, otherwise unremarkable  Initial blood work shows metabolic acidosis, repeat gas shows respiratory alkalosis, there is an BENNY, hyperglycemia and leukocytosis.  Lactic acid is elevated, low suspicious for an infection  Generalized weakness etiology is likely to be due to poor oral intake in the setting of grief.  Patient states he recently lost his son and he is unable to eat  Normal saline x 2 bolus and Plasma-Lyte given in the ER continue with light hydration with normal saline on the floors  Fall precautions  Physical therapy for further evaluation and treatment

## 2024-12-14 NOTE — ASSESSMENT & PLAN NOTE
Severe persistent asthma with PFTs about a year ago demonstrating severe obstructive airflow limitation with FEV1 25% with significant response to the bronchodilator increased lung volumes and normal DLCO at 83%  Multiple recent exacerbations also likely contributed by his anxiety recent positive his son  Will switch his nebulizer treatments to DuoNeb every 6 hours  Will switch his Symbicort to budesonide and Perforomist via the nebulizer twice daily  Solu-Medrol 40 mg every 12 hours  Continue to monitor peak flow pre and post bronchodilator  Does receive Dupixent outpatient has received the third dose yesterday.

## 2024-12-14 NOTE — ASSESSMENT & PLAN NOTE
Upper lobe predominant emphysematous changes in the CT of the chest  Please see above plan for acute exacerbation  Outpatient pulmonary follow-up

## 2024-12-14 NOTE — CONSULTS
Case discussed with medicine team, Patient was consulted for calluses and is to follow up in the outpatient setting for at risk foot care (callus and nail debridement treatment).

## 2024-12-14 NOTE — UTILIZATION REVIEW
Initial Clinical Review    ED TREATMENT  TIME   12/13  @    2152    Admission: Date/Time/Statement:   Admission Orders (From admission, onward)       Ordered        12/14/24 0105  INPATIENT ADMISSION  Once                          Orders Placed This Encounter   Procedures    INPATIENT ADMISSION     Standing Status:   Standing     Number of Occurrences:   1     Level of Care:   Med Surg [16]     Estimated length of stay:   More than 2 Midnights     Certification:   I certify that inpatient services are medically necessary for this patient for a duration of greater than two midnights. See H&P and MD Progress Notes for additional information about the patient's course of treatment.     ED Arrival Information       Expected   -    Arrival   12/13/2024 19:59    Acuity   Emergent              Means of arrival   Ambulance    Escorted by   [facility] [place]   Hospitalist    Admission type   Emergency              Arrival complaint   weakness             Chief Complaint   Patient presents with    Weakness - Generalized     Super weak at home, unable to ambulate, just started new bp meds and possibly thinners? (Pt denies), admits to lower back pain and dark smelly urine, bGL 303 for ems, admits to a fall at home due to his weakness, states that he doesn't remember hitting the floor, reports hallucinations prior to fall        Initial Presentation: 68 y.o. male presents to ED via  EMS from home  with generalized weakness after a fall.  Has been feeling weak due to poor oral Intake.  Recently lost  43 YO son,  has been eating poorly,   has been taking meds.  Fell the day of admission and wife had to pick him up .  No acute findings on  imaging.   PMH  is   DM,  asthma, emphysema, HTN  and chest pain.   Labs reveal creatinine 2.27, improved to  1.85 after 4 boluses.  Admit   Ip with Generalized weakness,  Fall, Metabolic acidosis, Lactic acidosis,  BENNY and plan is  PT/OT, fall  precautions, monitor labs, IVF,  IV   s/medrol   and continue home meds.    Pulmonary consult  Continue IV  s/medrol and  nebulizers.  Diminished breath sounds, occasional wheeze and rhonchi.        Anticipated Length of Stay/Certification Statement: Patient will be admitted on an inpatient basis with an anticipated length of stay of greater than 2 midnights secondary to metabolic acidosis, lactic acidosis, ambulatory dysfunction, fall, pending PT OT evaluation.    Date:   12/15  Day 3: Has surpassed a 2nd midnight with active treatments and services.  Remains on  IV  s/medrol.  Needs  PT/OT.   Feels  significantly  improved since admission.   O2  sats  94  % RA.  Tele shows  Afib, EKG  done and showed  PAC's.              ED Treatment-Medication Administration from 12/13/2024 1959 to 12/14/2024 0216         Date/Time Order Dose Route Action     12/13/2024 2021 sodium chloride 0.9 % bolus 1,000 mL 1,000 mL Intravenous New Bag     12/13/2024 2036 iohexol (OMNIPAQUE) 350 MG/ML injection (MULTI-DOSE) 100 mL 100 mL Intravenous Given     12/13/2024 2152 ceftriaxone (ROCEPHIN) 1 g/50 mL in dextrose IVPB 1,000 mg Intravenous New Bag     12/13/2024 2128 multi-electrolyte (ISOLYTE-S PH 7.4) bolus 1,000 mL 1,000 mL Intravenous New Bag     12/13/2024 2336 multi-electrolyte (ISOLYTE-S PH 7.4) bolus 500 mL 500 mL Intravenous New Bag     12/14/2024 0108 sodium chloride 0.9 % bolus 1,000 mL 1,000 mL Intravenous New Bag            Scheduled Medications:  [Held by provider] amLODIPine, 10 mg, Oral, Daily   And  [Held by provider] lisinopril, 40 mg, Oral, Daily  ARIPiprazole, 10 mg, Oral, Daily  atorvastatin, 10 mg, Oral, Daily  budesonide-formoterol, 2 puff, Inhalation, BID  buPROPion, 300 mg, Oral, Daily  heparin (porcine), 5,000 Units, Subcutaneous, Q8H PARTHA  insulin lispro, 1-5 Units, Subcutaneous, TID AC  insulin lispro, 1-5 Units, Subcutaneous, HS  methylPREDNISolone sodium succinate, 80 mg, Intravenous, Q12H PARTHA  metoprolol succinate, 25 mg, Oral,  Daily  montelukast, 10 mg, Oral, Daily  multivitamin stress formula, 1 tablet, Oral, Daily  pantoprazole, 40 mg, Oral, Early Morning  tamsulosin, 0.4 mg, Oral, Daily  venlafaxine, 75 mg, Oral, Daily      Continuous IV Infusions:     PRN Meds:  acetaminophen, 975 mg, Oral, Q6H PRN  albuterol, 1.25 mg, Nebulization, Q6H PRN  aluminum-magnesium hydroxide-simethicone, 30 mL, Oral, Q6H PRN  ondansetron, 4 mg, Intravenous, Q6H PRN  oxyCODONE, 10 mg, Oral, Q6H PRN  polyethylene glycol, 17 g, Oral, Daily PRN  temazepam, 30 mg, Oral, HS PRN      ED Triage Vitals   Temperature Pulse Respirations Blood Pressure SpO2 Pain Score   12/13/24 2109 12/13/24 2000 12/13/24 2000 12/13/24 2000 12/13/24 2000 12/14/24 0229   (!) 96.9 °F (36.1 °C) 84 (!) 24 104/68 97 % 7     Weight (last 2 days)       Date/Time Weight    12/14/24 0600 72.1 (158.95)    12/14/24 0236 73.1 (161.16)            Vital Signs (last 3 days)       Date/Time Temp Pulse Resp BP MAP (mmHg) SpO2 O2 Device Patient Position - Orthostatic VS Carissa Coma Scale Score Pain    12/14/24 1130 -- -- -- -- -- -- -- -- -- 7    12/14/24 11:08:46 -- 85 18 131/95 107 95 % -- -- -- --    12/14/24 09:19:53 -- 99 -- 125/61 82 96 % -- -- -- --    12/14/24 0917 -- 95 -- 125/61 -- -- -- -- -- --    12/14/24 0900 -- -- -- -- -- -- None (Room air) -- 15 No Pain    12/14/24 07:24:38 98.2 °F (36.8 °C) 80 18 118/45 69 97 % -- -- -- --    12/14/24 0612 -- -- -- -- -- 96 % None (Room air) -- -- --    12/14/24 0438 -- -- -- -- -- -- -- -- -- 8    12/14/24 0236 97.9 °F (36.6 °C) 89 32 133/66 88 95 % None (Room air) Lying -- --    12/14/24 0229 -- -- -- -- -- -- -- -- 15 7    12/14/24 02:25:44 97.9 °F (36.6 °C) 89 -- 133/66 88 95 % -- -- -- --    12/14/24 0200 -- 83 32 112/61 84 95 % -- -- -- --    12/14/24 0130 -- -- -- -- -- -- -- -- 15 --    12/14/24 0100 -- 90 26 101/56 72 93 % -- -- -- --    12/14/24 0057 -- 92 25 99/56 72 94 % -- -- -- --    12/14/24 0030 -- 95 34 103/56 74 94 % -- -- 15 --     12/13/24 2330 98.6 °F (37 °C) 98 30 110/61 80 95 % -- -- 15 --    12/13/24 2300 -- 90 29 102/55 73 94 % -- -- -- --    12/13/24 2230 -- 88 33 94/57 71 95 % -- -- 15 --    12/13/24 2215 -- 84 29 93/55 69 95 % -- -- -- --    12/13/24 2200 -- 86 28 93/53 68 96 % -- -- -- --    12/13/24 2145 -- 80 29 94/53 67 96 % -- -- -- --    12/13/24 2130 -- 83 27 93/51 66 95 % -- -- 15 --    12/13/24 2115 -- 97 30 129/75 88 94 % -- -- -- --    12/13/24 2109 96.9 °F (36.1 °C) -- -- -- -- -- -- -- -- --    12/13/24 2100 -- 81 27 107/61 79 96 % -- -- 15 --    12/13/24 2030 -- 81 26 100/66 79 96 % -- -- -- --    12/13/24 2015 -- -- -- -- -- -- -- -- 15 --    12/13/24 2003 -- 84 24 104/68 -- 96 % -- -- -- --    12/13/24 2000 -- 84 24 104/68 80 97 % -- -- -- --              Pertinent Labs/Diagnostic Test Results:   Radiology:  TRAUMA - CT head wo contrast   Final Interpretation by Gaston Urena DO (12/13 2048)      No acute intracranial abnormality.                  Workstation performed: [id number]         TRAUMA - CT spine cervical wo contrast   Final Interpretation by Gaston Urena DO (12/13 2053)   No evidence of acute fracture, traumatic subluxation, or atlantooccipital dislocation.            Workstation performed: [id number]         TRAUMA - CT chest abdomen pelvis w contrast   Final Interpretation by Gaston Urena DO (12/13 2108)      No evidence of intrathoracic or intra-abdominal solid organ injury, or bony fracture.               Workstation performed: [id number]         CT recon only lumbar spine   Final Interpretation by Gasotn Urena DO (12/13 2129)      No fracture or traumatic subluxation.            Workstation performed: [id number]           Cardiology:  ECG 12 lead    by Interface, Ris Results In (12/13 2013)              Results from last 7 days   Lab Units 12/14/24  0845 12/14/24  0440 12/13/24 2020   WBC Thousand/uL  --  17.38* 18.03*   HEMOGLOBIN g/dL  --  11.0* 12.4   HEMATOCRIT %  --  37.0 40.8   PLATELETS  Thousands/uL 301 313 375   TOTAL NEUT ABS Thousands/µL  --  14.19*  --          Results from last 7 days   Lab Units 12/14/24  0440 12/14/24  0007 12/13/24 2020   SODIUM mmol/L 141 136 139   POTASSIUM mmol/L 4.6 5.0 5.0   CHLORIDE mmol/L 105 102 101   CO2 mmol/L 24 20* 21   ANION GAP mmol/L 12 14* 17*   BUN mg/dL 34* 39* 42*   CREATININE mg/dL 1.50* 1.85* 2.27*   EGFR ml/min/1.73sq m 46 36 28   CALCIUM mg/dL 8.8 8.6 10.0   MAGNESIUM mg/dL 2.0  --   --    PHOSPHORUS mg/dL 3.4  --   --      Results from last 7 days   Lab Units 12/14/24 0440 12/13/24 2020   AST U/L 17 16   ALT U/L 30 34   ALK PHOS U/L 49 41   TOTAL PROTEIN g/dL 6.3* 6.9   ALBUMIN g/dL 3.7 4.1   TOTAL BILIRUBIN mg/dL 0.27 0.45     Results from last 7 days   Lab Units 12/14/24  1100 12/14/24  0747   POC GLUCOSE mg/dl 137 124     Results from last 7 days   Lab Units 12/14/24  0440 12/14/24  0007 12/13/24 2020   GLUCOSE RANDOM mg/dL 192* 187* 203*        Results from last 7 days   Lab Units 12/14/24  1103   PH ART  7.423   PCO2 ART mm Hg 39.1   PO2 ART mm Hg 76.6   HCO3 ART mmol/L 25.0   BASE EXC ART mmol/L 0.6   O2 CONTENT ART mL/dL 15.5*   O2 HGB, ARTERIAL % 93.9*   ABG SOURCE  Radial, Right     Results from last 7 days   Lab Units 12/14/24  0107 12/13/24 2020   PH JOSE DANIEL  7.412* 7.299*   PCO2 JOSE DANIEL mm Hg 29.1* 39.0*   PO2 JOSE DANIEL mm Hg 53.4* 39.0   HCO3 JOSE DANIEL mmol/L 18.1* 18.7*   BASE EXC JOSE DANIEL mmol/L -5.4 -7.1   O2 CONTENT JOSE DANIEL ml/dL 14.0 12.2   O2 HGB, VENOUS % 86.7* 67.7         Results from last 7 days   Lab Units 12/13/24 2020   CK TOTAL U/L 79     Results from last 7 days   Lab Units 12/14/24  0007 12/13/24 2228 12/13/24 2020   HS TNI 0HR ng/L  --   --  13   HS TNI 2HR ng/L  --  10  --    HSTNI D2 ng/L  --  -3  --    HS TNI 4HR ng/L 9  --   --    HSTNI D4 ng/L -4  --   --          Results from last 7 days   Lab Units 12/13/24 2020   PROTIME seconds 13.9   INR  1.00   PTT seconds 24     Results from last 7 days   Lab Units 12/14/24  0440   TSH 3RD  GENERATON uIU/mL 0.591     Results from last 7 days   Lab Units 12/13/24 2020   PROCALCITONIN ng/ml 0.16     Results from last 7 days   Lab Units 12/14/24  0840 12/14/24  0440 12/14/24  0007 12/13/24  2123   LACTIC ACID mmol/L 3.2* 5.4* 6.5* 10.7*             Results from last 7 days   Lab Units 12/13/24 2020   BNP pg/mL 38                                     Results from last 7 days   Lab Units 12/13/24  2151   CLARITY UA  Clear   COLOR UA  Yellow   SPEC GRAV UA  1.025   PH UA  5.5   GLUCOSE UA mg/dl 500 (1/2%)*   KETONES UA mg/dl Trace*   BLOOD UA  Negative   PROTEIN UA mg/dl Trace*   NITRITE UA  Negative   BILIRUBIN UA  Negative   UROBILINOGEN UA (BE) mg/dl <2.0   LEUKOCYTES UA  Negative   WBC UA /hpf 1-2   RBC UA /hpf None Seen   BACTERIA UA /hpf Occasional   EPITHELIAL CELLS WET PREP /hpf Occasional                   Present on Admission:   Type 2 diabetes mellitus with hyperglycemia, without long-term current use of insulin ([facility])   Gastroesophageal reflux disease with esophagitis   Chronic obstructive asthma ([facility])   Generalized weakness   Fall   Metabolic acidosis   Lactic acidosis due to diabetes mellitus ([facility])   BENNY (acute kidney injury) ([facility])      Admitting Diagnosis: Metabolic acidosis [E87.20]  Weakness [R53.1]  BENNY (acute kidney injury) ([facility]) [N17.9]  Generalized weakness [R53.1]  Age/Sex: 68 y.o. male    Network Utilization Review Department  ATTENTION: Please call with any questions or concerns to [phone] and carefully listen to the prompts so that you are directed to the right person. All voicemails are confidential.   For Discharge needs, contact Care Management DC Support Team at [phone] opt. [phone]  Send all requests for admission clinical reviews, approved or denied determinations and any other requests to dedicated fax number below belonging to the campus where the patient is receiving treatment. List of dedicated fax numbers for the Facilities:  FACILITY NAME UR FAX NUMBER   ADMISSION  DENIALS (Administrative/Medical Necessity) [phone]   DISCHARGE SUPPORT TEAM (NETWORK) [phone]   PARENT CHILD HEALTH (Maternity/NICU/Pediatrics) [phone]   [facility] [phone]   [facility] [phone]   Critical access hospital [phone]   [facility] [phone]   [facility] [phone]   [facility] [phone]   [facility] [phone]   [facility] [phone]   [facility] [phone]   [facility] [phone]   [facility] [phone]   [facility] [phone]

## 2024-12-14 NOTE — ED NOTES
Unable to get oral temp after 3 tries, rectal upon return from scans      Yessenia Cavanaugh RN  12/13/24 2022

## 2024-12-14 NOTE — H&P
"H&P - Hospitalist   Name: Fahad Hernandez 68 y.o. male I MRN: [id number]  Unit/Bed#: 2 E 269-01 I Date of Admission: 12/13/2024   Date of Service: 12/14/2024 I Hospital Day: 0     Assessment & Plan  Generalized weakness  Presents in the emergency department due to a fall that happened at home, states he has been extremely weak the last couple of days, reports poor oral intake  Vital signs on admission shows tachypnea, otherwise unremarkable  Initial blood work shows metabolic acidosis, repeat gas shows respiratory alkalosis, there is an BENNY, hyperglycemia and leukocytosis.  Lactic acid is elevated, low suspicious for an infection  Generalized weakness etiology is likely to be due to poor oral intake in the setting of grief.  Patient states he recently lost his son and he is unable to eat  Normal saline x 2 bolus and Plasma-Lyte given in the ER continue with light hydration with normal saline on the floors  Fall precautions  Physical therapy for further evaluation and treatment  Fall  Fell at home, states his spouse picked him up  Denies any injury  Walks at home independently, states he might start using a cane  The patient was pan scanned and CT head, spine, chest, abdomen, pelvis and lumbar spine are negative for any acute findings  Fall precautions  PT OT for further evaluation and treatment  Metabolic acidosis  In the setting of poor oral intake  Repeat VBG shows respiratory alkalosis  Hydration ordered  Monitor mental and respiratory status  Lactic acidosis due to diabetes mellitus (HCC)  Lab Results   Component Value Date    HGBA1C 7.0 (H) 09/11/2024       No results for input(s): \"POCGLU\" in the last 72 hours.    Blood Sugar Average: Last 72 hrs:  Patient on metformin at home, states he keeps taking it without blood glucose monitoring or eating  Lactic on admission 10.7, repeat 6.5, keep trending  Will hold metformin for now  Sliding scale  Andrew/CHO diet  Hypoglycemia protocol    BENNY (acute kidney injury) " (HCC)  In the setting of poor oral intake  Appears to be 0.8-1.1, on admission creatinine 2.27, after 4 boluses, creatinine 1.85  Avoid nephrotoxic medications, contrast dye or NSAIDs if possible  Monitor kidney function with daily BMP  Chronic obstructive asthma (HCC)  Noted tachypnea on assessment and vital signs, otherwise unremarkable  Does not appear to be on an exacerbation  Continue with home inhalers    Gastroesophageal reflux disease with esophagitis  Nexium at home, Protonix 40 mg daily has been added to plan of care      VTE Pharmacologic Prophylaxis: VTE Score: 4 Moderate Risk (Score 3-4) - Pharmacological DVT Prophylaxis Ordered: heparin.  Code Status: Level 1 - Full Code   Discussion with family: Patient declined call to .     Anticipated Length of Stay: Patient will be admitted on an inpatient basis with an anticipated length of stay of greater than 2 midnights secondary to metabolic acidosis, lactic acidosis, ambulatory dysfunction, fall, pending PT OT evaluation.    History of Present Illness   Chief Complaint: Generalized weakness    Fahad Hernandez is a 68 y.o. male with a PMH of asthma, centrilobular emphysema, chest pain, hyperlipidemia, hypertension, who presents with generalized weakness after a fall.  Patient reports he has been feeling weak due to poor oral intake at home, patient states him and his wife recently suffered the loss of his 44-year-old son and he is unable to eat or drink, he continue taking his daily medications.  He is states he had a fall on 12/13/2024 where he needed his spouse to pick him up.  The patient has been admitted as inpatient for further management and treatment.    Review of Systems   Constitutional:  Positive for activity change, appetite change and fatigue. Negative for chills and fever.   HENT:  Negative for ear pain and sore throat.    Eyes:  Negative for pain and visual disturbance.   Respiratory:  Negative for cough and shortness of breath.     Cardiovascular:  Negative for chest pain and palpitations.   Gastrointestinal:  Negative for abdominal pain and vomiting.   Genitourinary:  Negative for dysuria and hematuria.   Musculoskeletal:  Positive for gait problem. Negative for arthralgias and back pain.   Skin:  Negative for color change and rash.   Neurological:  Positive for weakness. Negative for seizures and syncope.   All other systems reviewed and are negative.      Historical Information   Past Medical History:   Diagnosis Date    Asthma     COPD (chronic obstructive pulmonary disease) ([facility])     Dementia ([facility])     Diabetes mellitus ([facility])     GERD (gastroesophageal reflux disease)     History of stroke 11/07/2019    Hypertension     Interstitial lung disease ([facility])     Major depressive disorder with current active episode 11/07/2019    Pneumonia     Sleep apnea     Sleep apnea, obstructive     Stroke ([facility])     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     Past Surgical History:   Procedure Laterality Date    BACK SURGERY      ELBOW SURGERY      KNEE SURGERY      NECK SURGERY      SHOULDER SURGERY      SPINE SURGERY  2004     Social History     Tobacco Use    Smoking status: Never     Passive exposure: Never    Smokeless tobacco: Never    Tobacco comments:     Patient admits to using marijuana, edibles   Vaping Use    Vaping status: Never Used   Substance and Sexual Activity    Alcohol use: Yes     Alcohol/week: 4.0 standard drinks of alcohol     Types: 4 Cans of beer per week    Drug use: Yes     Types: Marijuana, Oxycodone, Psilocybin    Sexual activity: Yes     Partners: Female     Birth control/protection: Male Sterilization     E-Cigarette/Vaping    E-Cigarette Use Never User      E-Cigarette/Vaping Substances    Nicotine No     THC No     CBD Yes     Flavoring No     Other No     Unknown No      Family History   Family history unknown: Yes     Social History:  Marital Status: /Civil Union   Occupation: Retired  Patient Pre-hospital Living Situation:  Home  Patient Pre-hospital Level of Mobility: walks  Patient Pre-hospital Diet Restrictions: None    Meds/Allergies   I have reviewed home medications with patient personally.  Prior to Admission medications    Medication Sig Start Date End Date Taking? Authorizing Provider   albuterol (ACCUNEB) 1.25 MG/3ML nebulizer solution Take 1.25 mg by nebulization every 6 (six) hours as needed for wheezing   Yes Historical Provider, MD   albuterol (PROVENTIL HFA,VENTOLIN HFA) 90 mcg/act inhaler Inhale 2 puffs every 6 (six) hours as needed for shortness of breath or wheezing 9/16/24  Yes Pily Torres MD   amLODIPine-benazepril (LOTREL) 10-40 MG per capsule  2/26/19  Yes Historical Provider, MD   ARIPiprazole (ABILIFY) 10 mg tablet  9/28/20  Yes Historical Provider, MD   ARIPiprazole (ABILIFY) 15 mg tablet  6/3/24  Yes Historical Provider, MD   atorvastatin (LIPITOR) 10 mg tablet Take 10 mg by mouth daily 2/28/22  Yes Historical Provider, MD   Austedo 12 MG TABS  5/30/24  Yes Historical Provider, MD   Budeson-Glycopyrrol-Formoterol (Breztri Aerosphere) 160-9-4.8 MCG/ACT AERO Inhale 2 puffs 2 (two) times a day Rinse mouth after use. 9/16/24  Yes Pily Torres MD   buPROPion (WELLBUTRIN XL) 300 mg 24 hr tablet 1 tab(s)   Yes Historical Provider, MD   esomeprazole (NexIUM) 40 MG capsule Take 40 mg by mouth   Yes Historical Provider, MD   metFORMIN (GLUCOPHAGE) 500 mg tablet Take 1 tablet by mouth 2 (two) times a day with meals 2/14/22  Yes Historical Provider, MD   metoprolol succinate (TOPROL-XL) 25 mg 24 hr tablet TAKE 1 TABLET (25 MG TOTAL) BY MOUTH DAILY. 10/24/24  Yes JAMIL Lindsay   montelukast (SINGULAIR) 10 mg tablet Take 10 mg by mouth 11/25/20  Yes Historical Provider, MD   multivitamin (THERAGRAN) TABS Take 1 tablet by mouth   Yes Historical Provider, MD   OneTouch Ultra test strip TEST DAILY.. DIAGNOSIS E11.9 3/28/23  Yes Historical Provider, MD   oxyCODONE (ROXICODONE) 5 mg immediate release  tablet Take 10 mg by mouth every 6 (six) hours as needed 10/20/20  Yes Historical Provider, MD   predniSONE 20 mg tablet Take 2 tablets (40 mg total) by mouth daily for 3 days, THEN 1 tablet (20 mg total) daily for 3 days, THEN 0.5 tablets (10 mg total) daily for 3 days. 12/10/24 12/19/24 Yes Kirk Bueno PA-C   promethazine (PHENERGAN) 25 mg tablet Take 25 mg by mouth daily as needed 9/4/24  Yes Historical Provider, MD   tamsulosin (FLOMAX) 0.4 mg Take 0.4 mg by mouth daily 2/13/22  Yes Historical Provider, MD   temazepam (RESTORIL) 30 mg capsule Take 30 mg by mouth daily at bedtime as needed 3/15/22  Yes Historical Provider, MD   venlafaxine (EFFEXOR-XR) 75 mg 24 hr capsule 1 cap(s)   Yes Historical Provider, MD   aspirin (ECOTRIN LOW STRENGTH) 81 mg EC tablet Take 81 mg by mouth daily  Patient not taking: Reported on 12/14/2024    Historical Provider, MD    MG tablet  3/14/23   Historical Provider, MD   nitroglycerin (NITROSTAT) 0.4 mg SL tablet Place 1 tablet (0.4 mg total) under the tongue every 5 (five) minutes as needed for chest pain  Patient not taking: Reported on 12/14/2024 10/1/24   JAMIL Lindsay   tiZANidine (ZANAFLEX) 4 mg tablet  3/8/22   Historical Provider, MD     Allergies   Allergen Reactions    Beta Adrenergic Blockers      Pt states they make him feel crappy.       Objective :  Temp:  [96.9 °F (36.1 °C)-98.6 °F (37 °C)] 97.9 °F (36.6 °C)  HR:  [80-98] 89  BP: ()/(51-75) 133/66  Resp:  [24-34] 32  SpO2:  [93 %-97 %] 95 %  O2 Device: None (Room air)    Physical Exam  Vitals and nursing note reviewed.   Constitutional:       General: He is not in acute distress.     Appearance: He is well-developed. He is ill-appearing.   HENT:      Head: Normocephalic and atraumatic.      Mouth/Throat:      Mouth: Mucous membranes are dry.   Eyes:      Conjunctiva/sclera: Conjunctivae normal.      Pupils: Pupils are equal, round, and reactive to light.   Cardiovascular:      Rate and  Rhythm: Normal rate and regular rhythm.      Heart sounds: No murmur heard.  Pulmonary:      Effort: No respiratory distress.      Breath sounds: Wheezing present.   Abdominal:      General: There is distension.      Palpations: Abdomen is soft.      Tenderness: There is no abdominal tenderness.   Musculoskeletal:         General: No swelling.      Cervical back: Neck supple.   Skin:     General: Skin is warm and dry.      Capillary Refill: Capillary refill takes less than 2 seconds.   Neurological:      General: No focal deficit present.      Mental Status: He is alert and oriented to person, place, and time. Mental status is at baseline.      Motor: Weakness present.      Gait: Gait abnormal.   Psychiatric:         Mood and Affect: Mood normal.          Lines/Drains:            Lab Results: I have reviewed the following results:  Results from last 7 days   Lab Units 12/13/24 2020   WBC Thousand/uL 18.03*   HEMOGLOBIN g/dL 12.4   HEMATOCRIT % 40.8   PLATELETS Thousands/uL 375   LYMPHO PCT % 6*   MONO PCT % 3*   EOS PCT % 0     Results from last 7 days   Lab Units 12/14/24 0007 12/13/24 2020   SODIUM mmol/L 136 139   POTASSIUM mmol/L 5.0 5.0   CHLORIDE mmol/L 102 101   CO2 mmol/L 20* 21   BUN mg/dL 39* 42*   CREATININE mg/dL 1.85* 2.27*   ANION GAP mmol/L 14* 17*   CALCIUM mg/dL 8.6 10.0   ALBUMIN g/dL  --  4.1   TOTAL BILIRUBIN mg/dL  --  0.45   ALK PHOS U/L  --  41   ALT U/L  --  34   AST U/L  --  16   GLUCOSE RANDOM mg/dL 187* 203*     Results from last 7 days   Lab Units 12/13/24 2020   INR  1.00         Lab Results   Component Value Date    HGBA1C 7.0 (H) 09/11/2024    HGBA1C 6.6 (H) 10/20/2023    HGBA1C 6.4 (H) 06/26/2023     Results from last 7 days   Lab Units 12/14/24 0007 12/13/24 2123 12/13/24 2020   LACTIC ACID mmol/L 6.5* 10.7*  --    PROCALCITONIN ng/ml  --   --  0.16       Imaging Results Review: I personally reviewed the following image studies/reports in PACS and discussed pertinent findings  with Radiology: Patient was pan scanned. My interpretation of the radiology images/reports is: Negative for any acute finding.  Other Study Results Review: EKG was reviewed.     Administrative Statements   I have spent a total time of 70 minutes in caring for this patient on the day of the visit/encounter including Diagnostic results, Prognosis, Risks and benefits of tx options, Instructions for management, Patient and family education, Importance of tx compliance, Risk factor reductions, Impressions, Counseling / Coordination of care, Documenting in the medical record, Reviewing / ordering tests, medicine, procedures  , Obtaining or reviewing history  , and Communicating with other healthcare professionals .    ** Please Note: This note has been constructed using a voice recognition system. **

## 2024-12-14 NOTE — PLAN OF CARE
Problem: PAIN - ADULT  Goal: Verbalizes/displays adequate comfort level or baseline comfort level  Description: Interventions:  - Encourage patient to monitor pain and request assistance  - Assess pain using appropriate pain scale  - Administer analgesics based on type and severity of pain and evaluate response  - Implement non-pharmacological measures as appropriate and evaluate response  - Consider cultural and social influences on pain and pain management  - Notify physician/advanced practitioner if interventions unsuccessful or patient reports new pain  Outcome: Progressing     Problem: SAFETY ADULT  Goal: Patient will remain free of falls  Description: INTERVENTIONS:  - Educate patient/family on patient safety including physical limitations  - Instruct patient to call for assistance with activity   - Consult OT/PT to assist with strengthening/mobility   - Keep Call bell within reach  - Keep bed low and locked with side rails adjusted as appropriate  - Keep care items and personal belongings within reach  - Initiate and maintain comfort rounds  - Make Fall Risk Sign visible to staff  - Offer Toileting every 2 Hours, in advance of need  - Initiate/Maintain bed/chair alarm  - Apply yellow socks and bracelet for high fall risk patients  - Consider moving patient to room near nurses station  Outcome: Progressing  Goal: Maintain or return to baseline ADL function  Description: INTERVENTIONS:  -  Assess patient's ability to carry out ADLs; assess patient's baseline for ADL function and identify physical deficits which impact ability to perform ADLs (bathing, care of mouth/teeth, toileting, grooming, dressing, etc.)  - Assess/evaluate cause of self-care deficits   - Assess range of motion  - Assess patient's mobility; develop plan if impaired  - Assess patient's need for assistive devices and provide as appropriate  - Encourage maximum independence but intervene and supervise when necessary  - Involve family in  performance of ADLs  - Assess for home care needs following discharge   - Consider OT consult to assist with ADL evaluation and planning for discharge  - Provide patient education as appropriate  Outcome: Progressing

## 2024-12-14 NOTE — ASSESSMENT & PLAN NOTE
In the setting of poor oral intake  Repeat VBG shows respiratory alkalosis  Hydration ordered  Monitor mental and respiratory status

## 2024-12-14 NOTE — ASSESSMENT & PLAN NOTE
Fell at home, states his spouse picked him up  Denies any injury  Walks at home independently, states he might start using a cane  The patient was pan scanned and CT head, spine, chest, abdomen, pelvis and lumbar spine are negative for any acute findings  Fall precautions  PT OT for further evaluation and treatment

## 2024-12-14 NOTE — ASSESSMENT & PLAN NOTE
Noted tachypnea on assessment and vital signs, otherwise unremarkable  Does not appear to be on an exacerbation  Continue with home inhalers

## 2024-12-14 NOTE — ASSESSMENT & PLAN NOTE
In the setting of poor oral intake  Appears to be 0.8-1.1, on admission creatinine 2.27, after 4 boluses, creatinine 1.85  Avoid nephrotoxic medications, contrast dye or NSAIDs if possible  Monitor kidney function with daily BMP

## 2024-12-14 NOTE — ASSESSMENT & PLAN NOTE
Lab Results   Component Value Date    HGBA1C 8.1 (H) 12/14/2024       Recent Labs     12/14/24  0747 12/14/24  1100   POCGLU 124 137       Blood Sugar Average: Last 72 hrs:  (P) 130.5

## 2024-12-14 NOTE — ASSESSMENT & PLAN NOTE
"Lab Results   Component Value Date    HGBA1C 7.0 (H) 09/11/2024       No results for input(s): \"POCGLU\" in the last 72 hours.    Blood Sugar Average: Last 72 hrs:  Patient on metformin at home, states he keeps taking it without blood glucose monitoring or eating  Lactic on admission 10.7, repeat 6.5, keep trending  Will hold metformin for now  Sliding scale  Andrew/CHO diet  Hypoglycemia protocol    "

## 2024-12-15 LAB
ANION GAP SERPL CALCULATED.3IONS-SCNC: 7 MMOL/L (ref 4–13)
ATRIAL RATE: 92 BPM
ATRIAL RATE: 94 BPM
BASOPHILS # BLD AUTO: 0.01 THOUSANDS/ÂΜL (ref 0–0.1)
BASOPHILS NFR BLD AUTO: 0 % (ref 0–1)
BUN SERPL-MCNC: 24 MG/DL (ref 5–25)
CALCIUM SERPL-MCNC: 8.4 MG/DL (ref 8.4–10.2)
CHLORIDE SERPL-SCNC: 102 MMOL/L (ref 96–108)
CO2 SERPL-SCNC: 29 MMOL/L (ref 21–32)
CREAT SERPL-MCNC: 0.95 MG/DL (ref 0.6–1.3)
EOSINOPHIL # BLD AUTO: 0 THOUSAND/ÂΜL (ref 0–0.61)
EOSINOPHIL NFR BLD AUTO: 0 % (ref 0–6)
ERYTHROCYTE [DISTWIDTH] IN BLOOD BY AUTOMATED COUNT: 16.7 % (ref 11.6–15.1)
GFR SERPL CREATININE-BSD FRML MDRD: 80 ML/MIN/1.73SQ M
GLUCOSE SERPL-MCNC: 184 MG/DL (ref 65–140)
GLUCOSE SERPL-MCNC: 188 MG/DL (ref 65–140)
GLUCOSE SERPL-MCNC: 194 MG/DL (ref 65–140)
GLUCOSE SERPL-MCNC: 213 MG/DL (ref 65–140)
GLUCOSE SERPL-MCNC: 244 MG/DL (ref 65–140)
HCT VFR BLD AUTO: 36.6 % (ref 36.5–49.3)
HGB BLD-MCNC: 11.2 G/DL (ref 12–17)
IMM GRANULOCYTES # BLD AUTO: 0.12 THOUSAND/UL (ref 0–0.2)
IMM GRANULOCYTES NFR BLD AUTO: 1 % (ref 0–2)
LACTATE SERPL-SCNC: 1.3 MMOL/L (ref 0.5–2)
LYMPHOCYTES # BLD AUTO: 0.91 THOUSANDS/ÂΜL (ref 0.6–4.47)
LYMPHOCYTES NFR BLD AUTO: 7 % (ref 14–44)
MCH RBC QN AUTO: 26 PG (ref 26.8–34.3)
MCHC RBC AUTO-ENTMCNC: 30.6 G/DL (ref 31.4–37.4)
MCV RBC AUTO: 85 FL (ref 82–98)
MONOCYTES # BLD AUTO: 0.25 THOUSAND/ÂΜL (ref 0.17–1.22)
MONOCYTES NFR BLD AUTO: 2 % (ref 4–12)
NEUTROPHILS # BLD AUTO: 11.82 THOUSANDS/ÂΜL (ref 1.85–7.62)
NEUTS SEG NFR BLD AUTO: 90 % (ref 43–75)
NRBC BLD AUTO-RTO: 0 /100 WBCS
P AXIS: 58 DEGREES
P AXIS: 68 DEGREES
PLATELET # BLD AUTO: 288 THOUSANDS/UL (ref 149–390)
PMV BLD AUTO: 8.7 FL (ref 8.9–12.7)
POTASSIUM SERPL-SCNC: 4.1 MMOL/L (ref 3.5–5.3)
PR INTERVAL: 152 MS
PR INTERVAL: 160 MS
QRS AXIS: 32 DEGREES
QRS AXIS: 37 DEGREES
QRSD INTERVAL: 78 MS
QRSD INTERVAL: 78 MS
QT INTERVAL: 352 MS
QT INTERVAL: 356 MS
QTC INTERVAL: 440 MS
QTC INTERVAL: 440 MS
RBC # BLD AUTO: 4.3 MILLION/UL (ref 3.88–5.62)
SODIUM SERPL-SCNC: 138 MMOL/L (ref 135–147)
T WAVE AXIS: 28 DEGREES
T WAVE AXIS: 79 DEGREES
VENTRICULAR RATE: 92 BPM
VENTRICULAR RATE: 94 BPM
WBC # BLD AUTO: 13.11 THOUSAND/UL (ref 4.31–10.16)

## 2024-12-15 PROCEDURE — 97110 THERAPEUTIC EXERCISES: CPT

## 2024-12-15 PROCEDURE — 99232 SBSQ HOSP IP/OBS MODERATE 35: CPT | Performed by: STUDENT IN AN ORGANIZED HEALTH CARE EDUCATION/TRAINING PROGRAM

## 2024-12-15 PROCEDURE — 99232 SBSQ HOSP IP/OBS MODERATE 35: CPT | Performed by: INTERNAL MEDICINE

## 2024-12-15 PROCEDURE — 97162 PT EVAL MOD COMPLEX 30 MIN: CPT

## 2024-12-15 PROCEDURE — 94760 N-INVAS EAR/PLS OXIMETRY 1: CPT

## 2024-12-15 PROCEDURE — 82948 REAGENT STRIP/BLOOD GLUCOSE: CPT

## 2024-12-15 PROCEDURE — 93005 ELECTROCARDIOGRAM TRACING: CPT

## 2024-12-15 PROCEDURE — 85025 COMPLETE CBC W/AUTO DIFF WBC: CPT | Performed by: STUDENT IN AN ORGANIZED HEALTH CARE EDUCATION/TRAINING PROGRAM

## 2024-12-15 PROCEDURE — 83605 ASSAY OF LACTIC ACID: CPT | Performed by: STUDENT IN AN ORGANIZED HEALTH CARE EDUCATION/TRAINING PROGRAM

## 2024-12-15 PROCEDURE — 94660 CPAP INITIATION&MGMT: CPT

## 2024-12-15 PROCEDURE — 80048 BASIC METABOLIC PNL TOTAL CA: CPT | Performed by: STUDENT IN AN ORGANIZED HEALTH CARE EDUCATION/TRAINING PROGRAM

## 2024-12-15 PROCEDURE — 94640 AIRWAY INHALATION TREATMENT: CPT

## 2024-12-15 PROCEDURE — 93010 ELECTROCARDIOGRAM REPORT: CPT | Performed by: INTERNAL MEDICINE

## 2024-12-15 RX ORDER — METHYLPREDNISOLONE SODIUM SUCCINATE 40 MG/ML
40 INJECTION, POWDER, LYOPHILIZED, FOR SOLUTION INTRAMUSCULAR; INTRAVENOUS EVERY 12 HOURS SCHEDULED
Status: COMPLETED | OUTPATIENT
Start: 2024-12-15 | End: 2024-12-16

## 2024-12-15 RX ORDER — AMLODIPINE BESYLATE 10 MG/1
10 TABLET ORAL DAILY
Status: DISCONTINUED | OUTPATIENT
Start: 2024-12-15 | End: 2024-12-17 | Stop reason: HOSPADM

## 2024-12-15 RX ADMIN — HEPARIN SODIUM 5000 UNITS: 5000 INJECTION, SOLUTION INTRAVENOUS; SUBCUTANEOUS at 05:47

## 2024-12-15 RX ADMIN — PANTOPRAZOLE SODIUM 40 MG: 40 TABLET, DELAYED RELEASE ORAL at 05:47

## 2024-12-15 RX ADMIN — MONTELUKAST 10 MG: 10 TABLET, FILM COATED ORAL at 08:04

## 2024-12-15 RX ADMIN — INSULIN LISPRO 1 UNITS: 100 INJECTION, SOLUTION INTRAVENOUS; SUBCUTANEOUS at 08:08

## 2024-12-15 RX ADMIN — FORMOTEROL FUMARATE DIHYDRATE 20 MCG: 20 SOLUTION RESPIRATORY (INHALATION) at 07:06

## 2024-12-15 RX ADMIN — VENLAFAXINE HYDROCHLORIDE 75 MG: 75 CAPSULE, EXTENDED RELEASE ORAL at 08:04

## 2024-12-15 RX ADMIN — BUDESONIDE 0.5 MG: 0.5 INHALANT RESPIRATORY (INHALATION) at 19:18

## 2024-12-15 RX ADMIN — OXYCODONE HYDROCHLORIDE 10 MG: 10 TABLET ORAL at 21:58

## 2024-12-15 RX ADMIN — INSULIN LISPRO 1 UNITS: 100 INJECTION, SOLUTION INTRAVENOUS; SUBCUTANEOUS at 13:15

## 2024-12-15 RX ADMIN — BUPROPION HYDROCHLORIDE 300 MG: 150 TABLET, EXTENDED RELEASE ORAL at 08:04

## 2024-12-15 RX ADMIN — ARIPIPRAZOLE 10 MG: 5 TABLET ORAL at 08:04

## 2024-12-15 RX ADMIN — TAMSULOSIN HYDROCHLORIDE 0.4 MG: 0.4 CAPSULE ORAL at 08:04

## 2024-12-15 RX ADMIN — INSULIN LISPRO 1 UNITS: 100 INJECTION, SOLUTION INTRAVENOUS; SUBCUTANEOUS at 21:57

## 2024-12-15 RX ADMIN — BUDESONIDE 0.5 MG: 0.5 INHALANT RESPIRATORY (INHALATION) at 07:06

## 2024-12-15 RX ADMIN — HEPARIN SODIUM 5000 UNITS: 5000 INJECTION, SOLUTION INTRAVENOUS; SUBCUTANEOUS at 17:00

## 2024-12-15 RX ADMIN — INSULIN LISPRO 2 UNITS: 100 INJECTION, SOLUTION INTRAVENOUS; SUBCUTANEOUS at 17:00

## 2024-12-15 RX ADMIN — METHYLPREDNISOLONE SODIUM SUCCINATE 80 MG: 125 INJECTION, POWDER, FOR SOLUTION INTRAMUSCULAR; INTRAVENOUS at 08:04

## 2024-12-15 RX ADMIN — METHYLPREDNISOLONE SODIUM SUCCINATE 40 MG: 40 INJECTION, POWDER, FOR SOLUTION INTRAMUSCULAR; INTRAVENOUS at 21:47

## 2024-12-15 RX ADMIN — ATORVASTATIN CALCIUM 10 MG: 10 TABLET, FILM COATED ORAL at 08:04

## 2024-12-15 RX ADMIN — AMLODIPINE BESYLATE 10 MG: 10 TABLET ORAL at 13:12

## 2024-12-15 RX ADMIN — METOPROLOL SUCCINATE 25 MG: 25 TABLET, EXTENDED RELEASE ORAL at 08:04

## 2024-12-15 RX ADMIN — OXYCODONE HYDROCHLORIDE 10 MG: 10 TABLET ORAL at 14:41

## 2024-12-15 RX ADMIN — OXYCODONE HYDROCHLORIDE 10 MG: 10 TABLET ORAL at 08:04

## 2024-12-15 RX ADMIN — B-COMPLEX W/ C & FOLIC ACID TAB 1 TABLET: TAB at 08:04

## 2024-12-15 RX ADMIN — HEPARIN SODIUM 5000 UNITS: 5000 INJECTION, SOLUTION INTRAVENOUS; SUBCUTANEOUS at 21:58

## 2024-12-15 RX ADMIN — FORMOTEROL FUMARATE DIHYDRATE 20 MCG: 20 SOLUTION RESPIRATORY (INHALATION) at 19:18

## 2024-12-15 NOTE — PROGRESS NOTES
Progress Note - Pulmonology   Name: Fahad Hernandez 68 y.o. male I MRN: [id number]  Unit/Bed#: 2 E 269-01 I Date of Admission: 12/13/2024   Date of Service: 12/15/2024 I Hospital Day: 1     Assessment & Plan  Severe persistent asthma with (acute) exacerbation  Severe persistent asthma with PFTs about a year ago demonstrating severe obstructive airflow limitation with FEV1 25% with significant response to the bronchodilator increased lung volumes and normal DLCO at 83%  Multiple recent exacerbations also likely contributed by his anxiety recent positive his son  Will switch his nebulizer treatments to DuoNeb every 6 hours  Will switch his Symbicort to budesonide and Perforomist via the nebulizer twice daily, maybe he would benefit from getting the Perforomist and budesonide outpatient as well given his significantly low FEV1  Solu-Medrol 40 mg every 12 hours  Continue to monitor peak flow pre and post bronchodilator  Does receive Dupixent outpatient has received the third dose yesterday.    Chronic obstructive asthma (HCC)  Upper lobe predominant emphysematous changes in the CT of the chest  Please see above plan for acute exacerbation  Outpatient pulmonary follow-up  Gastroesophageal reflux disease with esophagitis      24 Hour Events : No untoward events noted  Subjective : States he feels significantly better today with his breathing.  And also he walked in the hallway with physical therapy today    Objective :  Temp:  [98.1 °F (36.7 °C)-98.9 °F (37.2 °C)] 98.3 °F (36.8 °C)  HR:  [84-90] 87  BP: (124-142)/(72-95) 142/79  Resp:  [18] 18  SpO2:  [92 %-97 %] 95 %  O2 Device: None (Room air)    Physical Exam  Currently in bed in no acute respiratory distress  Eyes anicteric sclera, conjunctiva is clear  ENT nares is patent, no evidence of any discharge  Lungs bilateral vesicular breath sounds no rhonchi  Heart first and second heart sounds are heard no murmur or gallop is heard  Abdomen soft nontender bowel sounds are  present  Extremities no pedal edema  CNS awake alert oriented x 3.    Lab Results: I have reviewed the following results:   .     12/15/24  0527   WBC 13.11*   HGB 11.2*   HCT 36.6      SODIUM 138   K 4.1      CO2 29   BUN 24   CREATININE 0.95   GLUC 184*   LACTICACID 1.3     ABG:   .     12/14/24  1103   PHART 7.423   CMC2KLX 39.1   PO2ART 76.6   ZDW1VJM 25.0   BEART 0.6       Imaging Results Review: I personally reviewed the following image studies in PACS and associated radiology reports: chest xray. My interpretation of the radiology images/reports is: CT chest with upper lobe predominant centrilobular emphysema.  Other Study Results Review: No additional pertinent studies reviewed.  PFT Results Reviewed: reviewed

## 2024-12-15 NOTE — PLAN OF CARE
Problem: PHYSICAL THERAPY ADULT  Goal: Performs mobility at highest level of function for planned discharge setting.  See evaluation for individualized goals.  Description: Treatment/Interventions: Functional transfer training, LE strengthening/ROM, Elevations, Therapeutic exercise, Endurance training, Patient/family training, Bed mobility, Gait training  Equipment Recommended: Walker       See flowsheet documentation for full assessment, interventions and recommendations.  Note: Prognosis: Good  Problem List: Decreased strength, Decreased endurance, Impaired balance, Decreased mobility, Pain  Assessment: Pt is 68 year old male seen for PT evaluation s/p admit to [facility] on 12/13/2024 with Generalized weakness. PT consulted to assess pt's functional mobility and discharge needs. Order placed for PT evaluation and treatment. Comorbidities affecting pt's physical performance at time of assessment include type 2 DM, chronic obstructive asthma, GERD, fall, metabolic acidosis, lactic acidosis, and BENNY. Prior to hospitalization, pt was independent with all functional mobility without an AD. Pt ambulates unrestricted distances on all terrain and elevations. Pt resides with his spouse, in a two level house, but is able to stay on the first floor, with 2-3 steps to enter. Personal factors affecting pt at time of initial evaluation include lives in a two story house, stairs to enter home, ambulating with an assistive device, positive fall history, difficulty ambulating community distances, difficulty navigating level surfaces without external assistance, difficulty performing ADLs and IADLs, and difficulty performing dynamic tasks in the community. Please find objective findings from PT assessment regarding body systems outlined above with impairments and limitations including weakness, impaired balance, decreased endurance, gait deviations, pain, decreased activity tolerance, decreased functional mobility  tolerance, and fall risk. The following objective measures were performed on initial evaluation Barthel Index: 55/100, Modified Ravinder: 4 (moderate/severe disability), and AM-PAC 6-Clicks: 18/24. Pt's clinical presentation is currently evolving seen in pt's presentation of need for ongoing medical management/monitoring, pt is a fall risk, pt requires use of RW for safe ambulation, and pt requires cues and assist for safety with functional mobility. Pt to benefit from continued PT treatment to address deficits as defined above and maximize pt's level of function and independence with mobility. From a PT standpoint, recommendation at time of discharge would be level 3, minimal resource intensity in order to facilitate return to prior level of function.    Barriers to Discharge: Inaccessible home environment     Rehab Resource Intensity Level, PT: III (Minimum Resource Intensity)    See flowsheet documentation for full assessment.

## 2024-12-15 NOTE — PHYSICAL THERAPY NOTE
Physical Therapy Evaluation     Patient's Name: Fahad Hernandez    Admitting Diagnosis  Metabolic acidosis [E87.20]  Weakness [R53.1]  BENNY (acute kidney injury) (HCC) [N17.9]  Generalized weakness [R53.1]    Problem List  Patient Active Problem List   Diagnosis    Abnormal EKG    Hypertension    Type 2 diabetes mellitus with hyperglycemia, without long-term current use of insulin (HCC)    Chronic obstructive asthma (HCC)    Mixed hyperlipidemia    Gastroesophageal reflux disease with esophagitis    Severe persistent asthma with acute exacerbation    Medical marijuana use    Centrilobular emphysema (HCC)    Chest pain    Lung nodules    Hoarseness of voice    SOB (shortness of breath)    Long-term exposure involving bird droppings    History of diabetes mellitus    Orthostasis    Asthma exacerbation attacks    Moderate persistent asthma with exacerbation    Generalized weakness    Fall    Metabolic acidosis    Lactic acidosis due to diabetes mellitus (HCC)    BENNY (acute kidney injury) (HCC)    Severe persistent asthma with (acute) exacerbation     Past Medical History  Past Medical History:   Diagnosis Date    Asthma     COPD (chronic obstructive pulmonary disease) (HCC)     Dementia (HCC)     Diabetes mellitus (HCC)     GERD (gastroesophageal reflux disease)     History of stroke 11/07/2019    Hypertension     Interstitial lung disease (HCC)     Major depressive disorder with current active episode 11/07/2019    Pneumonia     Sleep apnea     Sleep apnea, obstructive     Stroke (HCC)     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     Past Surgical History  Past Surgical History:   Procedure Laterality Date    BACK SURGERY      ELBOW SURGERY      KNEE SURGERY      NECK SURGERY      SHOULDER SURGERY      SPINE SURGERY  2004      12/15/24 0841   PT Last Visit   PT Visit Date 12/15/24   Note Type   Note type Evaluation and Treatment   Pain Assessment   Pain Assessment Tool 0-10   Pain Score 7   Pain Location/Orientation Orientation:  "Lower;Location: Back   Pain Onset/Description Onset: Ongoing   Hospital Pain Intervention(s) Repositioned;Ambulation/increased activity   Restrictions/Precautions   Weight Bearing Precautions Per Order No   Braces or Orthoses Other (Comment)  (none per patient)   Other Precautions Chair Alarm;Bed Alarm;Fall Risk;Pain;Telemetry   Home Living   Type of Home House   Home Layout Two level;Able to live on main level with bedroom/bathroom;Performs ADLs on one level;Stairs to enter with rails  (2-3 CHUCHO; flight of stairs to 2nd floor)   Bathroom Shower/Tub Tub/shower unit   Bathroom Toilet Standard   Bathroom Equipment Grab bars in shower;Shower chair   Bathroom Accessibility Accessible   Home Equipment Walker;Cane   Additional Comments Pt ambulates without an AD.   Prior Function   Level of Crane Independent with functional mobility;Needs assistance with ADLs;Needs assistance with IADLS   Lives With Spouse   Receives Help From Family   IADLs Family/Friend/Other provides transportation;Family/Friend/Other provides meals;Independent with medication management   Falls in the last 6 months 1 to 4  (1 fall-\"I lost my footing.\")   Vocational Retired   General   Family/Caregiver Present No   Cognition   Overall Cognitive Status WFL   Arousal/Participation Alert   Orientation Level Oriented X4   Memory Within functional limits   Following Commands Follows all commands and directions without difficulty   Comments Pt agreeable to PT.   Subjective   Subjective \"I haven't been doing much walking, my legs are weak.\"   RLE Assessment   RLE Assessment X   Strength RLE   RLE Overall Strength 4-/5   LLE Assessment   LLE Assessment X   Strength LLE   LLE Overall Strength 4-/5   Light Touch   RLE Light Touch Grossly intact   LLE Light Touch Grossly intact   Bed Mobility   Additional Comments Pt was received seated at EOB in NAD.   Transfers   Sit to Stand 4  Minimal assistance  (CG assist)   Additional items Assist x 1;Increased time " required;Verbal cues   Stand to Sit 4  Minimal assistance  (CG assist)   Additional items Assist x 1;Armrests;Increased time required;Verbal cues   Ambulation/Elevation   Gait pattern Decreased toe off;Decreased heel strike;Decreased hip extension;Knees flexed;Short stride   Gait Assistance 4  Minimal assist  (CG assist)   Additional items Assist x 1;Verbal cues   Assistive Device Rolling walker   Distance 70 feet   Balance   Static Sitting Good   Dynamic Sitting Fair +   Static Standing Fair   Dynamic Standing Fair -   Ambulatory Fair -   Endurance Deficit   Endurance Deficit Yes   Endurance Deficit Description decreased activity tolerance   Activity Tolerance   Activity Tolerance Patient tolerated treatment well;Patient limited by fatigue   Assessment   Prognosis Good   Problem List Decreased strength;Decreased endurance;Impaired balance;Decreased mobility;Pain   Assessment Pt is 68 year old male seen for PT evaluation s/p admit to [facility] on 12/13/2024 with Generalized weakness. PT consulted to assess pt's functional mobility and discharge needs. Order placed for PT evaluation and treatment. Comorbidities affecting pt's physical performance at time of assessment include type 2 DM, chronic obstructive asthma, GERD, fall, metabolic acidosis, lactic acidosis, and BENNY. Prior to hospitalization, pt was independent with all functional mobility without an AD. Pt ambulates unrestricted distances on all terrain and elevations. Pt resides with his spouse, in a two level house, but is able to stay on the first floor, with 2-3 steps to enter. Personal factors affecting pt at time of initial evaluation include lives in a two story house, stairs to enter home, ambulating with an assistive device, positive fall history, difficulty ambulating community distances, difficulty navigating level surfaces without external assistance, difficulty performing ADLs and IADLs, and difficulty performing dynamic tasks in the  community. Please find objective findings from PT assessment regarding body systems outlined above with impairments and limitations including weakness, impaired balance, decreased endurance, gait deviations, pain, decreased activity tolerance, decreased functional mobility tolerance, and fall risk. The following objective measures were performed on initial evaluation Barthel Index: 55/100, Modified Ravinder: 4 (moderate/severe disability), and AM-PAC 6-Clicks: 18/24. Pt's clinical presentation is currently evolving seen in pt's presentation of need for ongoing medical management/monitoring, pt is a fall risk, pt requires use of RW for safe ambulation, and pt requires cues and assist for safety with functional mobility. Pt to benefit from continued PT treatment to address deficits as defined above and maximize pt's level of function and independence with mobility. From a PT standpoint, recommendation at time of discharge would be level 3, minimal resource intensity in order to facilitate return to prior level of function.   Barriers to Discharge Inaccessible home environment   Goals   STG Expiration Date 12/25/24   Short Term Goal #1 In 10 days: Increase bilateral LE strength 1/2 grade to facilitate independent mobility, Perform all bed mobility tasks modified independent to decrease caregiver burden, Perform all transfers modified independent to improve independence, Ambulate > 250 ft. with least restrictive assistive device modified independent w/o LOB and w/ normalized gait pattern 100% of the time, Navigate 3 stair(s) modified independent with unilateral handrail to facilitate return to previous living environment, and Increase all balance 1/2 grade to decrease risk for falls   PT Treatment Day 1   Plan   Treatment/Interventions Functional transfer training;LE strengthening/ROM;Elevations;Therapeutic exercise;Endurance training;Patient/family training;Bed mobility;Gait training   PT Frequency 2-3x/wk   Discharge  Recommendation   Rehab Resource Intensity Level, PT III (Minimum Resource Intensity)   Equipment Recommended Walker   Walker Package Recommended Wheeled walker   Change/add to Walker Package? No   AM-PAC Basic Mobility Inpatient   Turning in Flat Bed Without Bedrails 3   Lying on Back to Sitting on Edge of Flat Bed Without Bedrails 3   Moving Bed to Chair 3   Standing Up From Chair Using Arms 3   Walk in Room 3   Climb 3-5 Stairs With Railing 3   Basic Mobility Inpatient Raw Score 18   Basic Mobility Standardized Score 41.05   [facility] Highest Level Of Mobility   -HL Goal 6: Walk 10 steps or more   -HLM Achieved 7: Walk 25 feet or more   Modified [place] Scale   Modified [place] Scale 4   Barthel Index   Feeding 10   Bathing 0   Grooming Score 5   Dressing Score 5   Bladder Score 10   Bowels Score 10   Toilet Use Score 5   Transfers (Bed/Chair) Score 10   Mobility (Level Surface) Score 0   Stairs Score 0   Barthel Index Score 55   Additional Treatment Session   Start Time 0857   End Time 0907   Treatment Assessment Pt agreeable to PT treatment session following PT evaluation. Pt performed seated therapeutic exercise as indicated below. Pt required verbal cues for correct technique and form. Pt tolerated therapeutic exercise well without complaints of pain, but reported muscle fatigue. Pt continues to exhibit decreased lower extremity strength, impaired balance, decreased endurance, gait deviations, and decreased functional mobility. PT to continue to recommend level 3, minimal resource intensity. PT to continue to follow and treat as appropriate.   Exercises   Hip Flexion Sitting;10 reps;AROM;Bilateral   Hip Abduction Sitting;5 reps;AROM;Bilateral  (long sitting)   Knee AROM Long Arc Quad Sitting;15 reps;AROM;Bilateral   Ankle Pumps Sitting;20 reps;AROM;Bilateral   End of Consult   Patient Position at End of Consult Bedside chair;Bed/Chair alarm activated;All needs within reach     PT Evaluation Time:  2152-8929    PT Treatment Time: 0857-0907  10 minutes  Hiwot Chiang, PT, DPT

## 2024-12-15 NOTE — QUICK NOTE
Called and left a message for the patient's spouse Carol, to give an update, left a message stating patient has been doing well with his breathing and to give us a call back at [phone] with any questions

## 2024-12-15 NOTE — ASSESSMENT & PLAN NOTE
Lab Results   Component Value Date    HGBA1C 8.1 (H) 12/14/2024       Recent Labs     12/14/24  1616 12/14/24  2124 12/15/24  0750 12/15/24  1126   POCGLU 228* 224* 188* 213*       Blood Sugar Average: Last 72 hrs:  (P) 185.6204577490425569  Blood sugars are elevated here likely secondary to steroids  Continue sliding scale insulin and diabetic diet

## 2024-12-15 NOTE — ASSESSMENT & PLAN NOTE
Much improved, pulmonary consult appreciated  IV steroids to be tapered  Not requiring oxygen  Likely home tomorrow with long steroid taper

## 2024-12-15 NOTE — ASSESSMENT & PLAN NOTE
Much improved, in the setting of lactic acidosis, poor p.o. intake and grief  PT evaluated recommending level 3

## 2024-12-15 NOTE — ASSESSMENT & PLAN NOTE
Severe persistent asthma with PFTs about a year ago demonstrating severe obstructive airflow limitation with FEV1 25% with significant response to the bronchodilator increased lung volumes and normal DLCO at 83%  Multiple recent exacerbations also likely contributed by his anxiety recent positive his son  Will switch his nebulizer treatments to DuoNeb every 6 hours  Will switch his Symbicort to budesonide and Perforomist via the nebulizer twice daily, maybe he would benefit from getting the Perforomist and budesonide outpatient as well given his significantly low FEV1  Solu-Medrol 40 mg every 12 hours  Continue to monitor peak flow pre and post bronchodilator  Does receive Dupixent outpatient has received the third dose yesterday.

## 2024-12-15 NOTE — ASSESSMENT & PLAN NOTE
The patient was pan scanned and CT head, spine, chest, abdomen, pelvis and lumbar spine are negative for any acute findings  Fall precautions  PT OT rec level 3

## 2024-12-15 NOTE — PROGRESS NOTES
Progress Note - Hospitalist   Name: Fahad Hernandez 68 y.o. male I MRN: [id number]  Unit/Bed#: 2 E 269-01 I Date of Admission: 12/13/2024   Date of Service: 12/15/2024 I Hospital Day: 1    Assessment & Plan  Severe persistent asthma with (acute) exacerbation  Much improved, pulmonary consult appreciated  IV steroids to be tapered  Not requiring oxygen  Likely home tomorrow with long steroid taper  Generalized weakness  Much improved, in the setting of lactic acidosis, poor p.o. intake and grief  PT evaluated recommending level 3  Fall  The patient was pan scanned and CT head, spine, chest, abdomen, pelvis and lumbar spine are negative for any acute findings  Fall precautions  PT OT rec level 3  Metabolic acidosis  Resolved, pH is normalized, lactic acidosis has resolved  Lactic acidosis due to diabetes mellitus ([facility])  Resolved, was taking metformin at home, will DC on discharge    BENNY (acute kidney injury) ([facility])  Resolved  Gastroesophageal reflux disease with esophagitis  Nexium at home, Protonix 40 mg daily has been added to plan of care  Type 2 diabetes mellitus with hyperglycemia, without long-term current use of insulin ([facility])  Lab Results   Component Value Date    HGBA1C 8.1 (H) 12/14/2024       Recent Labs     12/14/24  1616 12/14/24  2124 12/15/24  0750 12/15/24  1126   POCGLU 228* 224* 188* 213*       Blood Sugar Average: Last 72 hrs:  (P) 185.8582364450706825  Blood sugars are elevated here likely secondary to steroids  Continue sliding scale insulin and diabetic diet    VTE Pharmacologic Prophylaxis: VTE Score: 4 Moderate Risk (Score 3-4) - Pharmacological DVT Prophylaxis Ordered: heparin.    Mobility:   Basic Mobility Inpatient Raw Score: 21  JH-HLM Goal: 6: Walk 10 steps or more  JH-HLM Achieved: 7: Walk 25 feet or more  JH-HLM Goal achieved. Continue to encourage appropriate mobility.    Patient Centered Rounds: I performed bedside rounds with nursing staff today.   Discussions with Specialists or Other  Care Team Provider: pulmonary    Education and Discussions with Family / Patient: Patient declined call to .     Current Length of Stay: 1 day(s)  Current Patient Status: Inpatient   Certification Statement: The patient will continue to require additional inpatient hospital stay due to iv steroids  Discharge Plan: Anticipate discharge tomorrow to home.    Code Status: Level 1 - Full Code    Subjective   Sitting in bed, states he feels much better than he has felt in weeks.  Still did not get a chance to move around much so planning on trying to walk more today.  Otherwise breathing has improved significantly.    Per nursing telemetry monitor showing A-fib, EKG was done which showed sinus with PACs, will monitor for now    Objective :  Temp:  [98.1 °F (36.7 °C)-98.9 °F (37.2 °C)] 98.6 °F (37 °C)  HR:  [84-90] 90  BP: (124-142)/(72-83) 142/83  Resp:  [15-18] 15  SpO2:  [92 %-97 %] 94 %  O2 Device: None (Room air)    Body mass index is 22.05 kg/m².     Input and Output Summary (last 24 hours):     Intake/Output Summary (Last 24 hours) at 12/15/2024 1152  Last data filed at 12/15/2024 0747  Gross per 24 hour   Intake 720 ml   Output 825 ml   Net -105 ml       Physical Exam  Constitutional:       Appearance: Normal appearance.   HENT:      Head: Normocephalic and atraumatic.   Eyes:      Extraocular Movements: Extraocular movements intact.      Pupils: Pupils are equal, round, and reactive to light.   Cardiovascular:      Rate and Rhythm: Normal rate and regular rhythm.      Heart sounds: Normal heart sounds. No murmur heard.  Pulmonary:      Effort: Pulmonary effort is normal. No respiratory distress.      Breath sounds: Normal breath sounds. No wheezing.   Abdominal:      General: There is no distension.      Palpations: Abdomen is soft.   Musculoskeletal:         General: No swelling or tenderness.   Skin:     General: Skin is warm and dry.   Neurological:      General: No focal deficit present.       Mental Status: He is alert. Mental status is at baseline.           Lab Results: I have reviewed the following results:   Results from last 7 days   Lab Units 12/15/24  0527   WBC Thousand/uL 13.11*   HEMOGLOBIN g/dL 11.2*   HEMATOCRIT % 36.6   PLATELETS Thousands/uL 288   SEGS PCT % 90*   LYMPHO PCT % 7*   MONO PCT % 2*   EOS PCT % 0     Results from last 7 days   Lab Units 12/15/24  0527 12/14/24  0440   SODIUM mmol/L 138 141   POTASSIUM mmol/L 4.1 4.6   CHLORIDE mmol/L 102 105   CO2 mmol/L 29 24   BUN mg/dL 24 34*   CREATININE mg/dL 0.95 1.50*   ANION GAP mmol/L 7 12   CALCIUM mg/dL 8.4 8.8   ALBUMIN g/dL  --  3.7   TOTAL BILIRUBIN mg/dL  --  0.27   ALK PHOS U/L  --  49   ALT U/L  --  30   AST U/L  --  17   GLUCOSE RANDOM mg/dL 184* 192*     Results from last 7 days   Lab Units 12/13/24 2020   INR  1.00     Results from last 7 days   Lab Units 12/15/24  1126 12/15/24  0750 12/14/24  2124 12/14/24  1616 12/14/24  1100 12/14/24  0747   POC GLUCOSE mg/dl 213* 188* 224* 228* 137 124     Results from last 7 days   Lab Units 12/14/24  0440   HEMOGLOBIN A1C % 8.1*     Results from last 7 days   Lab Units 12/15/24  0527 12/14/24  2030 12/14/24  1614 12/14/24  0840 12/13/24 2123 12/13/24 2020   LACTIC ACID mmol/L 1.3 2.9* 2.2* 3.2*   < >  --    PROCALCITONIN ng/ml  --   --   --   --   --  0.16    < > = values in this interval not displayed.       Recent Cultures (last 7 days):   Results from last 7 days   Lab Units 12/13/24 2123   BLOOD CULTURE  Received in Microbiology Lab. Culture in Progress.  Received in Microbiology Lab. Culture in Progress.         Other Study Results Review: EKG was reviewed.     Last 24 Hours Medication List:     Current Facility-Administered Medications:     acetaminophen (TYLENOL) tablet 975 mg, Q6H PRN    albuterol inhalation solution 1.25 mg, Q6H PRN    aluminum-magnesium hydroxide-simethicone (MAALOX) oral suspension 30 mL, Q6H PRN    amLODIPine (NORVASC) tablet 10 mg, Daily **AND**  lisinopril (ZESTRIL) tablet 40 mg, Daily    ARIPiprazole (ABILIFY) tablet 10 mg, Daily    atorvastatin (LIPITOR) tablet 10 mg, Daily    budesonide (PULMICORT) inhalation solution 0.5 mg, Q12H    buPROPion (WELLBUTRIN XL) 24 hr tablet 300 mg, Daily    formoterol (PERFOROMIST) nebulizer solution 20 mcg, Q12H    heparin (porcine) subcutaneous injection 5,000 Units, Q8H PARTHA **AND** [COMPLETED] Platelet count, Once    insulin lispro (HumALOG/ADMELOG) 100 units/mL subcutaneous injection 1-5 Units, TID AC **AND** Fingerstick Glucose (POCT), TID AC    insulin lispro (HumALOG/ADMELOG) 100 units/mL subcutaneous injection 1-5 Units, HS    methylPREDNISolone sodium succinate (Solu-MEDROL) injection 40 mg, Q12H PARTHA    metoprolol succinate (TOPROL-XL) 24 hr tablet 25 mg, Daily    montelukast (SINGULAIR) tablet 10 mg, Daily    multivitamin stress formula tablet 1 tablet, Daily    ondansetron (ZOFRAN) injection 4 mg, Q6H PRN    oxyCODONE (ROXICODONE) immediate release tablet 10 mg, Q6H PRN    pantoprazole (PROTONIX) EC tablet 40 mg, Early Morning    polyethylene glycol (MIRALAX) packet 17 g, Daily PRN    tamsulosin (FLOMAX) capsule 0.4 mg, Daily    temazepam (RESTORIL) capsule 30 mg, HS PRN    venlafaxine (EFFEXOR-XR) 24 hr capsule 75 mg, Daily    Administrative Statements   Today, Patient Was Seen By: Alexi Henry MD      **Please Note: This note may have been constructed using a voice recognition system.**

## 2024-12-16 ENCOUNTER — TELEPHONE (OUTPATIENT)
Age: 69
End: 2024-12-16

## 2024-12-16 LAB
BACTERIA UR QL AUTO: NORMAL /HPF
BILIRUB UR QL STRIP: NEGATIVE
CLARITY UR: CLEAR
COLOR UR: ABNORMAL
GLUCOSE SERPL-MCNC: 170 MG/DL (ref 65–140)
GLUCOSE SERPL-MCNC: 205 MG/DL (ref 65–140)
GLUCOSE SERPL-MCNC: 213 MG/DL (ref 65–140)
GLUCOSE SERPL-MCNC: 227 MG/DL (ref 65–140)
GLUCOSE SERPL-MCNC: 233 MG/DL (ref 65–140)
GLUCOSE UR STRIP-MCNC: ABNORMAL MG/DL
HGB UR QL STRIP.AUTO: NEGATIVE
KETONES UR STRIP-MCNC: NEGATIVE MG/DL
LEUKOCYTE ESTERASE UR QL STRIP: ABNORMAL
NITRITE UR QL STRIP: NEGATIVE
NON-SQ EPI CELLS URNS QL MICRO: NORMAL /HPF
PH UR STRIP.AUTO: 7 [PH]
PROT UR STRIP-MCNC: NEGATIVE MG/DL
RBC #/AREA URNS AUTO: NORMAL /HPF
SP GR UR STRIP.AUTO: 1.02 (ref 1–1.03)
UROBILINOGEN UR STRIP-ACNC: <2 MG/DL
WBC #/AREA URNS AUTO: NORMAL /HPF

## 2024-12-16 PROCEDURE — 94150 VITAL CAPACITY TEST: CPT

## 2024-12-16 PROCEDURE — 94760 N-INVAS EAR/PLS OXIMETRY 1: CPT

## 2024-12-16 PROCEDURE — 94660 CPAP INITIATION&MGMT: CPT

## 2024-12-16 PROCEDURE — 99232 SBSQ HOSP IP/OBS MODERATE 35: CPT | Performed by: INTERNAL MEDICINE

## 2024-12-16 PROCEDURE — 81001 URINALYSIS AUTO W/SCOPE: CPT

## 2024-12-16 PROCEDURE — 82948 REAGENT STRIP/BLOOD GLUCOSE: CPT

## 2024-12-16 PROCEDURE — 94640 AIRWAY INHALATION TREATMENT: CPT

## 2024-12-16 PROCEDURE — 99232 SBSQ HOSP IP/OBS MODERATE 35: CPT | Performed by: STUDENT IN AN ORGANIZED HEALTH CARE EDUCATION/TRAINING PROGRAM

## 2024-12-16 RX ORDER — PREDNISONE 20 MG/1
40 TABLET ORAL DAILY
Status: DISCONTINUED | OUTPATIENT
Start: 2024-12-16 | End: 2024-12-16

## 2024-12-16 RX ORDER — PREDNISONE 20 MG/1
40 TABLET ORAL DAILY
Status: DISCONTINUED | OUTPATIENT
Start: 2024-12-17 | End: 2024-12-17 | Stop reason: HOSPADM

## 2024-12-16 RX ADMIN — B-COMPLEX W/ C & FOLIC ACID TAB 1 TABLET: TAB at 08:38

## 2024-12-16 RX ADMIN — TAMSULOSIN HYDROCHLORIDE 0.4 MG: 0.4 CAPSULE ORAL at 08:51

## 2024-12-16 RX ADMIN — METHYLPREDNISOLONE SODIUM SUCCINATE 40 MG: 40 INJECTION, POWDER, FOR SOLUTION INTRAMUSCULAR; INTRAVENOUS at 08:43

## 2024-12-16 RX ADMIN — BUDESONIDE 0.5 MG: 0.5 INHALANT RESPIRATORY (INHALATION) at 19:21

## 2024-12-16 RX ADMIN — AMLODIPINE BESYLATE 10 MG: 10 TABLET ORAL at 08:38

## 2024-12-16 RX ADMIN — INSULIN LISPRO 2 UNITS: 100 INJECTION, SOLUTION INTRAVENOUS; SUBCUTANEOUS at 17:36

## 2024-12-16 RX ADMIN — HEPARIN SODIUM 5000 UNITS: 5000 INJECTION, SOLUTION INTRAVENOUS; SUBCUTANEOUS at 21:00

## 2024-12-16 RX ADMIN — HEPARIN SODIUM 5000 UNITS: 5000 INJECTION, SOLUTION INTRAVENOUS; SUBCUTANEOUS at 14:27

## 2024-12-16 RX ADMIN — METOPROLOL SUCCINATE 25 MG: 25 TABLET, EXTENDED RELEASE ORAL at 08:37

## 2024-12-16 RX ADMIN — FORMOTEROL FUMARATE DIHYDRATE 20 MCG: 20 SOLUTION RESPIRATORY (INHALATION) at 07:13

## 2024-12-16 RX ADMIN — INSULIN LISPRO 1 UNITS: 100 INJECTION, SOLUTION INTRAVENOUS; SUBCUTANEOUS at 08:41

## 2024-12-16 RX ADMIN — PANTOPRAZOLE SODIUM 40 MG: 40 TABLET, DELAYED RELEASE ORAL at 06:00

## 2024-12-16 RX ADMIN — ATORVASTATIN CALCIUM 10 MG: 10 TABLET, FILM COATED ORAL at 08:38

## 2024-12-16 RX ADMIN — MONTELUKAST 10 MG: 10 TABLET, FILM COATED ORAL at 08:38

## 2024-12-16 RX ADMIN — OXYCODONE HYDROCHLORIDE 10 MG: 10 TABLET ORAL at 11:46

## 2024-12-16 RX ADMIN — ARIPIPRAZOLE 10 MG: 5 TABLET ORAL at 08:37

## 2024-12-16 RX ADMIN — HEPARIN SODIUM 5000 UNITS: 5000 INJECTION, SOLUTION INTRAVENOUS; SUBCUTANEOUS at 06:00

## 2024-12-16 RX ADMIN — INSULIN LISPRO 2 UNITS: 100 INJECTION, SOLUTION INTRAVENOUS; SUBCUTANEOUS at 12:18

## 2024-12-16 RX ADMIN — FORMOTEROL FUMARATE DIHYDRATE 20 MCG: 20 SOLUTION RESPIRATORY (INHALATION) at 19:21

## 2024-12-16 RX ADMIN — LISINOPRIL 40 MG: 20 TABLET ORAL at 08:37

## 2024-12-16 RX ADMIN — BUDESONIDE 0.5 MG: 0.5 INHALANT RESPIRATORY (INHALATION) at 07:13

## 2024-12-16 RX ADMIN — INSULIN LISPRO 1 UNITS: 100 INJECTION, SOLUTION INTRAVENOUS; SUBCUTANEOUS at 21:00

## 2024-12-16 RX ADMIN — BUPROPION HYDROCHLORIDE 300 MG: 150 TABLET, EXTENDED RELEASE ORAL at 08:37

## 2024-12-16 RX ADMIN — METHYLPREDNISOLONE SODIUM SUCCINATE 40 MG: 40 INJECTION, POWDER, FOR SOLUTION INTRAMUSCULAR; INTRAVENOUS at 21:00

## 2024-12-16 RX ADMIN — OXYCODONE HYDROCHLORIDE 10 MG: 10 TABLET ORAL at 21:02

## 2024-12-16 RX ADMIN — VENLAFAXINE HYDROCHLORIDE 75 MG: 75 CAPSULE, EXTENDED RELEASE ORAL at 08:37

## 2024-12-16 NOTE — RESPIRATORY THERAPY NOTE
12/15/24 0772   Respiratory Assessment   Resp Comments placed pt on bipap for HS use, pt does not know his home unit settings   O2 Device v30 Wheezy   Non-Invasive Information   O2 Interface Device Face mask  (medium)   Non-Invasive Ventilation Mode BiPAP  (auto)   $ Intermittent NIV Yes   SpO2 95 %   $ Pulse Oximetry Spot Check Charge Completed   Non-Invasive Settings   FiO2 (%) 21   Min Pressure Set (cm H20) 6 cm H2O   Max Pressure Set (cm H20) 18 cm H2O   Humidification   (n/a, dry circuit)   Temperature (Set)   (n/a)   Non-Invasive Readings   Total Rate 19   Peak Pressure (Obs) 12   Spontaneous Vt (mL) 717   Auto PEEP Observed (cm H2O) 6   I/E Ratio (Obs) 1:3.7   Heater Temperature (Obs)   (n/a)   Skin Intervention Skin intact   Non-Invasive Alarms   Low Insp Pressure Time (sec) 60 sec   MV Low (L/min) 2   High Resp Rate (BPM) 40 BPM   Apnea Interval (sec) 30     Auto bipap max ipap 18 cmH2o min epap 6 cmH2o

## 2024-12-16 NOTE — CASE MANAGEMENT
Case Management Assessment & Discharge Planning Note    Patient name Fahad Hernandez  Location 2 EAST 269/2 E 269-01 MRN [id number]  : 1955 Date 2024       Current Admission Date: 2024  Current Admission Diagnosis:Generalized weakness   Patient Active Problem List    Diagnosis Date Noted Date Diagnosed    Generalized weakness 2024     Fall 2024     Metabolic acidosis 2024     Lactic acidosis due to diabetes mellitus (HCC) 2024     BENNY (acute kidney injury) (HCC) 2024     Severe persistent asthma with (acute) exacerbation 2024     Asthma exacerbation attacks 2024     Moderate persistent asthma with exacerbation 2024     Orthostasis 10/15/2024     History of diabetes mellitus 10/11/2024     Hoarseness of voice 2024     SOB (shortness of breath) 2024     Long-term exposure involving bird droppings 2024     Lung nodules 2024     Chest pain 2024     Centrilobular emphysema (HCC) 2024     Severe persistent asthma with acute exacerbation 2024     Medical marijuana use 2024     Type 2 diabetes mellitus with hyperglycemia, without long-term current use of insulin ([facility]) 2022     Chronic obstructive asthma (HCC) 2019     Gastroesophageal reflux disease with esophagitis 2019     Abnormal EKG 04/15/2019     Hypertension 04/15/2019     Mixed hyperlipidemia 2018       LOS (days): 2  Geometric Mean LOS (GMLOS) (days):   Days to GMLOS:     OBJECTIVE:    Risk of Unplanned Readmission Score: 16.33         Current admission status: Inpatient       Preferred Pharmacy:   [facility]/pharmacy #[id number] - [place] - [address]  [place] [address]  Phone: [phone] Fax: [phone]    [facility] - [address] [address]  Phone: [phone] Fax: [phone]    Primary Care  Provider: Surjit Hayes DO    Primary Insurance: ZAC BEYER REP  Secondary Insurance:     ASSESSMENT:  Active Health Care Proxies       Carol Hernandez Health Care Representative - Spouse   Primary Phone: [phone] (Mobile)                 Advance Directives  Primary Contact: Veda (Daughter)  [phone]         Readmission Root Cause  30 Day Readmission: No    Patient Information  Admitted from:: Home  Mental Status: Alert  During Assessment patient was accompanied by: Not accompanied during assessment  Assessment information provided by:: Patient  Primary Caregiver: Self  Support Systems: Self, Spouse/significant other  County of Residence: [place]  What city do you live in?: [address] [place] [address] [place]   [place]  Home entry access options. Select all that apply.: Stairs  Number of steps to enter home.: 4  Do the steps have railings?: Yes  Type of Current Residence: [facility]  Living Arrangements: Lives w/ Spouse/significant other    Activities of Daily Living Prior to Admission  Functional Status: Assistance  Completes ADLs independently?: No  Level of ADL dependence: Assistance  Ambulates independently?: Yes  Does patient use assisted devices?: Yes  Assisted Devices (DME) used: Walker  Does patient currently own DME?: Yes  What DME does the patient currently own?: Walker  Does patient have a history of Outpatient Therapy (PT/OT)?: Yes  Does the patient have a history of Short-Term Rehab?: No  Does patient have a history of HHC?: No  Does patient currently have HHC?: No    Current Home Health Care  Home Health Agency Name:: Other (TBD)    Patient Information Continued  Income Source: Pension/intermediate  Does patient have prescription coverage?: Yes  Does patient receive dialysis treatments?: No  Does patient have a history of substance abuse?: No  Does patient have a history of Mental Health Diagnosis?: Yes (depression)  Is patient receiving treatment for mental health?: Yes  Has patient received inpatient treatment  related to mental health in the last 2 years?: No         Means of Transportation  Means of Transport to Appts:: Family transport          DISCHARGE DETAILS:    Discharge planning discussed with:: patient at bedside  Freedom of Choice: Yes  Comments - Freedom of Choice: CM maintained freedom of choice as it pertains to discharge planning. Patient reports plan to return home at discharge. CM addressed level 3 rec, patient reports he would prefer HHC, he denied preferences to agency and agreeable to blanket referrals.  CM contacted family/caregiver?: No- see comments (pt declined/ alert and oriented independent adult)  Were Treatment Team discharge recommendations reviewed with patient/caregiver?: Yes  Did patient/caregiver verbalize understanding of patient care needs?: Yes  Were patient/caregiver advised of the risks associated with not following Treatment Team discharge recommendations?: Yes         Requested Home Health Care         Is the patient interested in HHC at discharge?: Yes  Home Health Discipline requested:: Nursing, Occupational Therapy, Physical Therapy  Home Health Agency Name:: Other (TBD)  HHA External Referral Reason (only applicable if external HHA name selected): Services not provided in network or near patient location  Home Health Follow-Up Provider:: PCP  Home Health Services Needed:: Diabetes Management, Evaluate Functional Status and Safety, Gait/ADL Training, Strengthening/Theraputic Exercises to Improve Function  Homebound Criteria Met:: Requires the Assistance of Another Person for Safe Ambulation or to Leave the Home, Uses an Assist Device (i.e. cane, walker, etc)  Supporting Clincal Findings:: Fatigues Easliy in Short Distances, Limited Endurance    DME Referral Provided  Referral made for DME?: No    Other Referral/Resources/Interventions Provided:  Interventions: HHC  Referral Comments: [place] referrals sent in [facility] for HHC    Would you like to participate in our [facility]  service program?  : No - Declined    Treatment Team Recommendation: Home with Home Health Care  Discharge Destination Plan:: Home with Home Health Care  Transport at Discharge : Family

## 2024-12-16 NOTE — PROGRESS NOTES
Progress Note - Pulmonology   Name: Fahad Hernandez 69 y.o. male I MRN: [id number]  Unit/Bed#: 2 E 269-01 I Date of Admission: 12/13/2024   Date of Service: 12/16/2024 I Hospital Day: 2    Assessment & Plan  Severe persistent asthma with (acute) exacerbation  Severe persistent asthma with PFTs about a year ago demonstrating severe obstructive airflow limitation with FEV1 25% with significant response to the bronchodilator increased lung volumes and normal DLCO at 83%  Multiple recent exacerbations also likely contributed by his anxiety  Continue DuoNeb every 6 hours  Continue budesonide and Perforomist, will send home with this instead of his Symbicort  Will continue Solu-Medrol every 12 hours and decrease tomorrow to daily  Check peak flows  Continue Dupixent as outpatient    Chronic obstructive asthma (HCC)  Upper lobe predominant emphysematous changes in the CT of the chest  Please see above plan for acute exacerbation  Outpatient pulmonary follow-up  Gastroesophageal reflux disease with esophagitis      24 Hour Events : No events  Subjective : Patient resting in bed.  He is noting improvement in his breathing, able to get around easier.    Objective :  Temp:  [97.7 °F (36.5 °C)-98.6 °F (37 °C)] 97.8 °F (36.6 °C)  HR:  [80-94] 92  BP: (128-163)/(71-88) 128/73  Resp:  [15] 15  SpO2:  [93 %-100 %] 100 %  O2 Device: None (Room air)    Physical Exam  Vitals reviewed.   Constitutional:       Appearance: Normal appearance.   HENT:      Head: Normocephalic and atraumatic.      Mouth/Throat:      Pharynx: Oropharynx is clear.   Eyes:      Conjunctiva/sclera: Conjunctivae normal.   Cardiovascular:      Rate and Rhythm: Normal rate and regular rhythm.   Pulmonary:      Effort: Pulmonary effort is normal. No respiratory distress.      Breath sounds: Decreased breath sounds present. No wheezing, rhonchi or rales.   Abdominal:      General: Abdomen is flat. There is no distension.   Musculoskeletal:         General: Normal  range of motion.      Cervical back: Normal range of motion.      Right lower leg: No edema.      Left lower leg: No edema.   Skin:     General: Skin is warm and dry.   Neurological:      Mental Status: He is alert and oriented to person, place, and time.   Psychiatric:         Mood and Affect: Mood normal.         Behavior: Behavior normal.           Lab Results: I have reviewed the following results:   No new results in last 24 hours.  ABG: No new results in last 24 hours.    Imaging Results Review: I reviewed radiology reports from this admission including: CT chest.  Other Study Results Review: No additional pertinent studies reviewed.  PFT Results Reviewed: reviewed

## 2024-12-16 NOTE — PROGRESS NOTES
Progress Note - Hospitalist   Name: Fahad Hernandez 69 y.o. male I MRN: [id number]  Unit/Bed#: 2 E 269-01 I Date of Admission: 12/13/2024   Date of Service: 12/16/2024 I Hospital Day: 2    Assessment & Plan  Severe persistent asthma with (acute) exacerbation  Much improved, pulmonary consult appreciated  IV steroids to be tapered, now twice daily  Not requiring oxygen  Likely home tomorrow with long steroid taper-  Generalized weakness  Much improved, in the setting of lactic acidosis, poor p.o. intake and grief  PT evaluated recommending level 3  Fall  The patient was pan scanned and CT head, spine, chest, abdomen, pelvis and lumbar spine are negative for any acute findings  Fall precautions  PT OT rec level 3  Metabolic acidosis  Resolved, pH is normalized, lactic acidosis has resolved  Lactic acidosis due to diabetes mellitus (HCC)  Resolved, was taking metformin at home, will DC on discharge    BENNY (acute kidney injury) (HCC)  Resolved  Gastroesophageal reflux disease with esophagitis  Nexium at home, Protonix 40 mg daily has been added to plan of care  Type 2 diabetes mellitus with hyperglycemia, without long-term current use of insulin ([facility])  Lab Results   Component Value Date    HGBA1C 8.1 (H) 12/14/2024       Recent Labs     12/15/24  1602 12/15/24  2111 12/16/24  0728 12/16/24  1031   POCGLU 244* 194* 213* 227*       Blood Sugar Average: Last 72 hrs:  (P) 199.2  Blood sugars are elevated here likely secondary to steroids  Continue sliding scale insulin and diabetic diet  Chronic obstructive asthma (HCC)  Continue with current management, pulm consult appreciated      VTE Pharmacologic Prophylaxis: VTE Score: 4 Moderate Risk (Score 3-4) - Pharmacological DVT Prophylaxis Ordered: heparin.    Mobility:   Basic Mobility Inpatient Raw Score: 21  JH-HLM Goal: 6: Walk 10 steps or more  JH-HLM Achieved: 6: Walk 10 steps or more  JH-HLM Goal achieved. Continue to encourage appropriate mobility.    Patient Centered  Rounds: I performed bedside rounds with nursing staff today.   Discussions with Specialists or Other Care Team Provider: pulm,  case management    Education and Discussions with Family / Patient: Updated  (wife) via phone.    Current Length of Stay: 2 day(s)  Current Patient Status: Inpatient   Certification Statement: The patient will continue to require additional inpatient hospital stay due to IV steroid, nebs  Discharge Plan: Anticipate discharge tomorrow to home.    Code Status: Level 1 - Full Code    Subjective   Laying in bed, feels much better but still pretty weak  Breathing is better for sure, no other complaints  Discussed will likely DC metformin on discharge    Objective :  Temp:  [97.7 °F (36.5 °C)-98.3 °F (36.8 °C)] 97.8 °F (36.6 °C)  HR:  [80-94] 92  BP: (128-163)/(71-88) 128/73  Resp:  [18] 18  SpO2:  [93 %-100 %] 100 %  O2 Device: None (Room air)    Body mass index is 21.12 kg/m².     Input and Output Summary (last 24 hours):     Intake/Output Summary (Last 24 hours) at 12/16/2024 1148  Last data filed at 12/16/2024 0603  Gross per 24 hour   Intake 360 ml   Output 900 ml   Net -540 ml       Physical Exam  Constitutional:       Appearance: Normal appearance.   Eyes:      General:         Right eye: No discharge.      Extraocular Movements: Extraocular movements intact.      Conjunctiva/sclera: Conjunctivae normal.   Cardiovascular:      Rate and Rhythm: Normal rate and regular rhythm.   Pulmonary:      Breath sounds: No stridor. No wheezing or rhonchi.      Comments: Poor air entry bilaterally  Abdominal:      General: There is no distension.      Palpations: Abdomen is soft.      Tenderness: There is no abdominal tenderness.   Skin:     General: Skin is warm and dry.   Neurological:      General: No focal deficit present.      Mental Status: He is alert and oriented to person, place, and time.           Lab Results: I have reviewed the following results:   Results from last 7 days    Lab Units 12/15/24  0527   WBC Thousand/uL 13.11*   HEMOGLOBIN g/dL 11.2*   HEMATOCRIT % 36.6   PLATELETS Thousands/uL 288   SEGS PCT % 90*   LYMPHO PCT % 7*   MONO PCT % 2*   EOS PCT % 0     Results from last 7 days   Lab Units 12/15/24  0527 12/14/24  0440   SODIUM mmol/L 138 141   POTASSIUM mmol/L 4.1 4.6   CHLORIDE mmol/L 102 105   CO2 mmol/L 29 24   BUN mg/dL 24 34*   CREATININE mg/dL 0.95 1.50*   ANION GAP mmol/L 7 12   CALCIUM mg/dL 8.4 8.8   ALBUMIN g/dL  --  3.7   TOTAL BILIRUBIN mg/dL  --  0.27   ALK PHOS U/L  --  49   ALT U/L  --  30   AST U/L  --  17   GLUCOSE RANDOM mg/dL 184* 192*     Results from last 7 days   Lab Units 12/13/24 2020   INR  1.00     Results from last 7 days   Lab Units 12/16/24  1031 12/16/24  0728 12/15/24  2111 12/15/24  1602 12/15/24  1126 12/15/24  0750 12/14/24  2124 12/14/24  1616 12/14/24  1100 12/14/24  0747   POC GLUCOSE mg/dl 227* 213* 194* 244* 213* 188* 224* 228* 137 124     Results from last 7 days   Lab Units 12/14/24  0440   HEMOGLOBIN A1C % 8.1*     Results from last 7 days   Lab Units 12/15/24  0527 12/14/24  2030 12/14/24  1614 12/14/24  0840 12/13/24 2123 12/13/24 2020   LACTIC ACID mmol/L 1.3 2.9* 2.2* 3.2*   < >  --    PROCALCITONIN ng/ml  --   --   --   --   --  0.16    < > = values in this interval not displayed.       Recent Cultures (last 7 days):   Results from last 7 days   Lab Units 12/13/24 2123   BLOOD CULTURE  No Growth at 24 hrs.  No Growth at 24 hrs.       Last 24 Hours Medication List:     Current Facility-Administered Medications:     acetaminophen (TYLENOL) tablet 975 mg, Q6H PRN    albuterol inhalation solution 1.25 mg, Q6H PRN    aluminum-magnesium hydroxide-simethicone (MAALOX) oral suspension 30 mL, Q6H PRN    amLODIPine (NORVASC) tablet 10 mg, Daily **AND** lisinopril (ZESTRIL) tablet 40 mg, Daily    ARIPiprazole (ABILIFY) tablet 10 mg, Daily    atorvastatin (LIPITOR) tablet 10 mg, Daily    budesonide (PULMICORT) inhalation solution 0.5  mg, Q12H    buPROPion (WELLBUTRIN XL) 24 hr tablet 300 mg, Daily    formoterol (PERFOROMIST) nebulizer solution 20 mcg, Q12H    heparin (porcine) subcutaneous injection 5,000 Units, Q8H PARTHA **AND** [COMPLETED] Platelet count, Once    insulin lispro (HumALOG/ADMELOG) 100 units/mL subcutaneous injection 1-5 Units, TID AC **AND** Fingerstick Glucose (POCT), TID AC    insulin lispro (HumALOG/ADMELOG) 100 units/mL subcutaneous injection 1-5 Units, HS    methylPREDNISolone sodium succinate (Solu-MEDROL) injection 40 mg, Q12H PARTHA    metoprolol succinate (TOPROL-XL) 24 hr tablet 25 mg, Daily    montelukast (SINGULAIR) tablet 10 mg, Daily    multivitamin stress formula tablet 1 tablet, Daily    ondansetron (ZOFRAN) injection 4 mg, Q6H PRN    oxyCODONE (ROXICODONE) immediate release tablet 10 mg, Q6H PRN    pantoprazole (PROTONIX) EC tablet 40 mg, Early Morning    polyethylene glycol (MIRALAX) packet 17 g, Daily PRN    tamsulosin (FLOMAX) capsule 0.4 mg, Daily    temazepam (RESTORIL) capsule 30 mg, HS PRN    venlafaxine (EFFEXOR-XR) 24 hr capsule 75 mg, Daily    Administrative Statements   Today, Patient Was Seen By: Alexi Henry MD      **Please Note: This note may have been constructed using a voice recognition system.**

## 2024-12-16 NOTE — ASSESSMENT & PLAN NOTE
Much improved, pulmonary consult appreciated  IV steroids to be tapered, now twice daily  Not requiring oxygen  Likely home tomorrow with long steroid taper-

## 2024-12-16 NOTE — TELEPHONE ENCOUNTER
Patient's wife calling to reschedule appt for HFU, he will not be able to make it today he is still in the hosp. Cancelling that one, the only day this week they cannot make it is Thursday and that is the only time slot that is open. They are traveling to [place] that day. Requesting to only see Dr. Melendrez and he does not go back to [facility] until mid Jan, please assist.

## 2024-12-16 NOTE — ASSESSMENT & PLAN NOTE
Severe persistent asthma with PFTs about a year ago demonstrating severe obstructive airflow limitation with FEV1 25% with significant response to the bronchodilator increased lung volumes and normal DLCO at 83%  Multiple recent exacerbations also likely contributed by his anxiety  Continue DuoNeb every 6 hours  Continue budesonide and Perforomist, will send home with this instead of his Symbicort  Will continue Solu-Medrol every 12 hours and decrease tomorrow to daily  Check peak flows  Continue Dupixent as outpatient

## 2024-12-16 NOTE — ASSESSMENT & PLAN NOTE
Lab Results   Component Value Date    HGBA1C 8.1 (H) 12/14/2024       Recent Labs     12/15/24  1602 12/15/24  2111 12/16/24  0728 12/16/24  1031   POCGLU 244* 194* 213* 227*       Blood Sugar Average: Last 72 hrs:  (P) 199.2  Blood sugars are elevated here likely secondary to steroids  Continue sliding scale insulin and diabetic diet

## 2024-12-16 NOTE — TELEPHONE ENCOUNTER
Patient's wife calling, her phone is not holding a charge she was worried she missed call. Advised have not called her yet, asking if Kirk can call back she has some other concerns about her , please advise.    PROVIDER:[TOKEN:[[email or web address]]]

## 2024-12-17 ENCOUNTER — APPOINTMENT (INPATIENT)
Dept: RADIOLOGY | Facility: HOSPITAL | Age: 69
DRG: 202 | End: 2024-12-17
Payer: COMMERCIAL

## 2024-12-17 VITALS
OXYGEN SATURATION: 97 % | SYSTOLIC BLOOD PRESSURE: 124 MMHG | HEART RATE: 93 BPM | WEIGHT: 150.35 LBS | RESPIRATION RATE: 22 BRPM | DIASTOLIC BLOOD PRESSURE: 81 MMHG | TEMPERATURE: 98.5 F | BODY MASS INDEX: 21.05 KG/M2 | HEIGHT: 71 IN

## 2024-12-17 PROBLEM — M12.811 ROTATOR CUFF ARTHROPATHY OF RIGHT SHOULDER: Status: ACTIVE | Noted: 2024-12-17

## 2024-12-17 PROBLEM — M25.511 RIGHT SHOULDER PAIN: Status: ACTIVE | Noted: 2024-12-17

## 2024-12-17 LAB
ANION GAP SERPL CALCULATED.3IONS-SCNC: 7 MMOL/L (ref 4–13)
BASOPHILS # BLD AUTO: 0.03 THOUSANDS/ÂΜL (ref 0–0.1)
BASOPHILS NFR BLD AUTO: 0 % (ref 0–1)
BUN SERPL-MCNC: 24 MG/DL (ref 5–25)
CALCIUM SERPL-MCNC: 9 MG/DL (ref 8.4–10.2)
CHLORIDE SERPL-SCNC: 100 MMOL/L (ref 96–108)
CO2 SERPL-SCNC: 30 MMOL/L (ref 21–32)
CREAT SERPL-MCNC: 0.83 MG/DL (ref 0.6–1.3)
EOSINOPHIL # BLD AUTO: 0 THOUSAND/ÂΜL (ref 0–0.61)
EOSINOPHIL NFR BLD AUTO: 0 % (ref 0–6)
ERYTHROCYTE [DISTWIDTH] IN BLOOD BY AUTOMATED COUNT: 16.5 % (ref 11.6–15.1)
GFR SERPL CREATININE-BSD FRML MDRD: 88 ML/MIN/1.73SQ M
GLUCOSE SERPL-MCNC: 186 MG/DL (ref 65–140)
GLUCOSE SERPL-MCNC: 198 MG/DL (ref 65–140)
GLUCOSE SERPL-MCNC: 245 MG/DL (ref 65–140)
GLUCOSE SERPL-MCNC: 248 MG/DL (ref 65–140)
HCT VFR BLD AUTO: 42.6 % (ref 36.5–49.3)
HGB BLD-MCNC: 12.9 G/DL (ref 12–17)
IMM GRANULOCYTES # BLD AUTO: 0.26 THOUSAND/UL (ref 0–0.2)
IMM GRANULOCYTES NFR BLD AUTO: 1 % (ref 0–2)
LYMPHOCYTES # BLD AUTO: 2.15 THOUSANDS/ÂΜL (ref 0.6–4.47)
LYMPHOCYTES NFR BLD AUTO: 10 % (ref 14–44)
MCH RBC QN AUTO: 25.5 PG (ref 26.8–34.3)
MCHC RBC AUTO-ENTMCNC: 30.3 G/DL (ref 31.4–37.4)
MCV RBC AUTO: 84 FL (ref 82–98)
MONOCYTES # BLD AUTO: 1.24 THOUSAND/ÂΜL (ref 0.17–1.22)
MONOCYTES NFR BLD AUTO: 6 % (ref 4–12)
NEUTROPHILS # BLD AUTO: 17.06 THOUSANDS/ÂΜL (ref 1.85–7.62)
NEUTS SEG NFR BLD AUTO: 83 % (ref 43–75)
NRBC BLD AUTO-RTO: 0 /100 WBCS
PLATELET # BLD AUTO: 373 THOUSANDS/UL (ref 149–390)
PMV BLD AUTO: 9.2 FL (ref 8.9–12.7)
POTASSIUM SERPL-SCNC: 4 MMOL/L (ref 3.5–5.3)
RBC # BLD AUTO: 5.06 MILLION/UL (ref 3.88–5.62)
SODIUM SERPL-SCNC: 137 MMOL/L (ref 135–147)
WBC # BLD AUTO: 20.74 THOUSAND/UL (ref 4.31–10.16)

## 2024-12-17 PROCEDURE — 85025 COMPLETE CBC W/AUTO DIFF WBC: CPT | Performed by: STUDENT IN AN ORGANIZED HEALTH CARE EDUCATION/TRAINING PROGRAM

## 2024-12-17 PROCEDURE — 82948 REAGENT STRIP/BLOOD GLUCOSE: CPT

## 2024-12-17 PROCEDURE — 80048 BASIC METABOLIC PNL TOTAL CA: CPT | Performed by: STUDENT IN AN ORGANIZED HEALTH CARE EDUCATION/TRAINING PROGRAM

## 2024-12-17 PROCEDURE — 94150 VITAL CAPACITY TEST: CPT

## 2024-12-17 PROCEDURE — 99232 SBSQ HOSP IP/OBS MODERATE 35: CPT | Performed by: INTERNAL MEDICINE

## 2024-12-17 PROCEDURE — NC001 PR NO CHARGE: Performed by: ORTHOPAEDIC SURGERY

## 2024-12-17 PROCEDURE — 99222 1ST HOSP IP/OBS MODERATE 55: CPT | Performed by: ORTHOPAEDIC SURGERY

## 2024-12-17 PROCEDURE — 73030 X-RAY EXAM OF SHOULDER: CPT

## 2024-12-17 PROCEDURE — 99232 SBSQ HOSP IP/OBS MODERATE 35: CPT | Performed by: HOSPITALIST

## 2024-12-17 PROCEDURE — 97166 OT EVAL MOD COMPLEX 45 MIN: CPT | Performed by: OCCUPATIONAL THERAPIST

## 2024-12-17 PROCEDURE — 94640 AIRWAY INHALATION TREATMENT: CPT

## 2024-12-17 PROCEDURE — 94760 N-INVAS EAR/PLS OXIMETRY 1: CPT

## 2024-12-17 RX ORDER — PREDNISONE 10 MG/1
TABLET ORAL
Qty: 30 TABLET | Refills: 0 | Status: SHIPPED | OUTPATIENT
Start: 2024-12-17 | End: 2024-12-28

## 2024-12-17 RX ADMIN — ATORVASTATIN CALCIUM 10 MG: 10 TABLET, FILM COATED ORAL at 08:31

## 2024-12-17 RX ADMIN — PANTOPRAZOLE SODIUM 40 MG: 40 TABLET, DELAYED RELEASE ORAL at 06:48

## 2024-12-17 RX ADMIN — OXYCODONE HYDROCHLORIDE 10 MG: 10 TABLET ORAL at 15:08

## 2024-12-17 RX ADMIN — MONTELUKAST 10 MG: 10 TABLET, FILM COATED ORAL at 08:32

## 2024-12-17 RX ADMIN — BUDESONIDE 0.5 MG: 0.5 INHALANT RESPIRATORY (INHALATION) at 07:31

## 2024-12-17 RX ADMIN — TAMSULOSIN HYDROCHLORIDE 0.4 MG: 0.4 CAPSULE ORAL at 08:30

## 2024-12-17 RX ADMIN — METOPROLOL SUCCINATE 25 MG: 25 TABLET, EXTENDED RELEASE ORAL at 08:30

## 2024-12-17 RX ADMIN — LISINOPRIL 40 MG: 20 TABLET ORAL at 08:32

## 2024-12-17 RX ADMIN — AMLODIPINE BESYLATE 10 MG: 10 TABLET ORAL at 08:32

## 2024-12-17 RX ADMIN — BUPROPION HYDROCHLORIDE 300 MG: 150 TABLET, EXTENDED RELEASE ORAL at 08:30

## 2024-12-17 RX ADMIN — INSULIN LISPRO 1 UNITS: 100 INJECTION, SOLUTION INTRAVENOUS; SUBCUTANEOUS at 08:27

## 2024-12-17 RX ADMIN — INSULIN LISPRO 2 UNITS: 100 INJECTION, SOLUTION INTRAVENOUS; SUBCUTANEOUS at 12:23

## 2024-12-17 RX ADMIN — HEPARIN SODIUM 5000 UNITS: 5000 INJECTION, SOLUTION INTRAVENOUS; SUBCUTANEOUS at 13:49

## 2024-12-17 RX ADMIN — B-COMPLEX W/ C & FOLIC ACID TAB 1 TABLET: TAB at 08:31

## 2024-12-17 RX ADMIN — ARIPIPRAZOLE 10 MG: 5 TABLET ORAL at 08:31

## 2024-12-17 RX ADMIN — FORMOTEROL FUMARATE DIHYDRATE 20 MCG: 20 SOLUTION RESPIRATORY (INHALATION) at 07:31

## 2024-12-17 RX ADMIN — HEPARIN SODIUM 5000 UNITS: 5000 INJECTION, SOLUTION INTRAVENOUS; SUBCUTANEOUS at 06:49

## 2024-12-17 RX ADMIN — PREDNISONE 40 MG: 20 TABLET ORAL at 08:31

## 2024-12-17 RX ADMIN — VENLAFAXINE HYDROCHLORIDE 75 MG: 75 CAPSULE, EXTENDED RELEASE ORAL at 08:30

## 2024-12-17 RX ADMIN — OXYCODONE HYDROCHLORIDE 10 MG: 10 TABLET ORAL at 07:41

## 2024-12-17 NOTE — ASSESSMENT & PLAN NOTE
X-rays reviewed demonstrating rotator cuff arthropathy  WBAT RUE  Discontinue sling  Recommend PT/OT evaluation and passive and active motion exercises  Continue OTC medications for pain relief if needed  Dispo: Orthopaedics signing off. If additional questions or concerns, please reach out. He may follow up in office as outpatient and consideration for additional imaging at that time. No surgical intervention warranted at this time.

## 2024-12-17 NOTE — TELEPHONE ENCOUNTER
Called left VM: it does look like DR. Torres completed a note- I recommended any questions can be directed at the primary team and they can get a hold of us via SC

## 2024-12-17 NOTE — CASE MANAGEMENT
Case Management Discharge Planning Note    Patient name Fahad Hernandez  Location 2 EAST 269/2 E 269-01 MRN [id number]  : 1955 Date 2024       Current Admission Date: 2024  Current Admission Diagnosis:Generalized weakness   Patient Active Problem List    Diagnosis Date Noted Date Diagnosed    Generalized weakness 2024     Fall 2024     Metabolic acidosis 2024     Lactic acidosis due to diabetes mellitus (HCC) 2024     BENNY (acute kidney injury) (HCC) 2024     Severe persistent asthma with (acute) exacerbation 2024     Asthma exacerbation attacks 2024     Moderate persistent asthma with exacerbation 2024     Orthostasis 10/15/2024     History of diabetes mellitus 10/11/2024     Hoarseness of voice 2024     SOB (shortness of breath) 2024     Long-term exposure involving bird droppings 2024     Lung nodules 2024     Chest pain 2024     Centrilobular emphysema (HCC) 2024     Severe persistent asthma with acute exacerbation 2024     Medical marijuana use 2024     Type 2 diabetes mellitus with hyperglycemia, without long-term current use of insulin (HCC) 2022     Chronic obstructive asthma (HCC) 2019     Gastroesophageal reflux disease with esophagitis 2019     Abnormal EKG 04/15/2019     Hypertension 04/15/2019     Mixed hyperlipidemia 2018       LOS (days): 3  Geometric Mean LOS (GMLOS) (days): 2.9  Days to GMLOS:-0.7     OBJECTIVE:  Risk of Unplanned Readmission Score: 13.93         Current admission status: Inpatient   Preferred Pharmacy:   [facility]/pharmacy #[id number] - [place] - [address]  [address]  [place] [address]  Phone: [phone] Fax: [phone]    [facility] - [place] [id number] [facility]  [address] [facility]  [address] [place] [address]  Phone: [phone] Fax: [phone]    Primary Care Provider: Surjit SAUNDERS  DO Abigail    Primary Insurance: [employer] REP  Secondary Insurance:     DISCHARGE DETAILS:    Discharge planning discussed with:: patient at bedside  Freedom of Choice: Yes  Comments - Freedom of Choice: CM maintained freedom of choice as it pertains to discharge planning. Pt choice list for HHC provided to pt at bedside. Patient selected [facility]. Patient reports he may need a ride home.  CM contacted family/caregiver?: No- see comments (pt declined, alert and oriented independent adult)  Were Treatment Team discharge recommendations reviewed with patient/caregiver?: Yes  Did patient/caregiver verbalize understanding of patient care needs?: Yes  Were patient/caregiver advised of the risks associated with not following Treatment Team discharge recommendations?: Yes         Requested Home Health Care         Is the patient interested in HHC at discharge?: Yes  Home Health Discipline requested:: Occupational Therapy, Physical Therapy, Nursing  Home Health Agency Name:: [facility]  HHA External Referral Reason (only applicable if external HHA name selected): Services not provided in network or near patient location  Home Health Follow-Up Provider:: PCP  Home Health Services Needed:: Diabetes Management, Evaluate Functional Status and Safety, Gait/ADL Training, Strengthening/Theraputic Exercises to Improve Function  Homebound Criteria Met:: Requires the Assistance of Another Person for Safe Ambulation or to Leave the Home, Uses an Assist Device (i.e. cane, walker, etc)  Supporting Clincal Findings:: Fatigues Easliy in Short Distances, Limited Endurance    DME Referral Provided  Referral made for DME?: No    Other Referral/Resources/Interventions Provided:  Interventions: HHC  Referral Comments: [facility] reserved in AIDIN per pt choice and AVS updated to reflect the same.    Would you like to participate in our [facility] service program?  : No - Declined    Treatment Team Recommendation: Home with Home Health  Care  Discharge Destination Plan:: Home with Home Health Care

## 2024-12-17 NOTE — RESPIRATORY THERAPY NOTE
12/16/24 3494   Respiratory Assessment   Resp Comments pt placed on autoBiPAP for hours of sleep   Non-Invasive Information   O2 Interface Device Face mask   Non-Invasive Ventilation Mode BiPAP   $ Intermittent NIV Yes   $ Pulse Oximetry Spot Check Charge Completed   Non-Invasive Settings   FiO2 (%) 21   Min Pressure Set (cm H20) 6 cm H2O   Max Pressure Set (cm H20) 18 cm H2O   Non-Invasive Readings   Total Rate 25   Peak Pressure (Obs) 23.4   Spontaneous Vt (mL) 1050   Auto PEEP Observed (cm H2O) 9.8   I/E Ratio (Obs) 1:1.3   Leak (lpm) 64.2   Skin Intervention Skin intact

## 2024-12-17 NOTE — PLAN OF CARE
Problem: PAIN - ADULT  Goal: Verbalizes/displays adequate comfort level or baseline comfort level  Description: Interventions:  - Encourage patient to monitor pain and request assistance  - Assess pain using appropriate pain scale  - Administer analgesics based on type and severity of pain and evaluate response  - Implement non-pharmacological measures as appropriate and evaluate response  - Consider cultural and social influences on pain and pain management  - Notify physician/advanced practitioner if interventions unsuccessful or patient reports new pain  12/17/2024 1731 by Telma Morel RN  Outcome: Adequate for Discharge  12/17/2024 1730 by Telma Morel RN  Outcome: Adequate for Discharge  12/17/2024 1034 by Telma Morel RN  Outcome: Progressing     Problem: SAFETY ADULT  Goal: Patient will remain free of falls  Description: INTERVENTIONS:  - Educate patient/family on patient safety including physical limitations  - Instruct patient to call for assistance with activity   - Consult OT/PT to assist with strengthening/mobility   - Keep Call bell within reach  - Keep bed low and locked with side rails adjusted as appropriate  - Keep care items and personal belongings within reach  - Initiate and maintain comfort rounds  - Make Fall Risk Sign visible to staff  - Offer Toileting every 2 Hours, in advance of need  - Initiate/Maintain bed alarm  - Apply yellow socks and bracelet for high fall risk patients  - Consider moving patient to room near nurses station  12/17/2024 1731 by Telma Morel RN  Outcome: Adequate for Discharge  12/17/2024 1730 by Telma Morel RN  Outcome: Adequate for Discharge  12/17/2024 1034 by Telma Morel RN  Outcome: Progressing  Goal: Maintain or return to baseline ADL function  Description: INTERVENTIONS:  -  Assess patient's ability to carry out ADLs; assess patient's baseline for ADL function and identify physical deficits which impact ability to perform ADLs (bathing, care of  mouth/teeth, toileting, grooming, dressing, etc.)  - Assess/evaluate cause of self-care deficits   - Assess range of motion  - Assess patient's mobility; develop plan if impaired  - Assess patient's need for assistive devices and provide as appropriate  - Encourage maximum independence but intervene and supervise when necessary  - Involve family in performance of ADLs  - Assess for home care needs following discharge   - Consider OT consult to assist with ADL evaluation and planning for discharge  - Provide patient education as appropriate  12/17/2024 1731 by Telma Morel RN  Outcome: Adequate for Discharge  12/17/2024 1730 by Telma Morel RN  Outcome: Adequate for Discharge  12/17/2024 1034 by Telma Morel RN  Outcome: Progressing  Goal: Maintains/Returns to pre admission functional level  Description: INTERVENTIONS:  - Perform AM-PAC 6 Click Basic Mobility/ Daily Activity assessment daily.  - Set and communicate daily mobility goal to care team and patient/family/caregiver.   - Collaborate with rehabilitation services on mobility goals if consulted  - Perform Range of Motion 2 times a day.  - Reposition patient every 2 hours.  - Dangle patient 2 times a day  - Stand patient 2 times a day  - Ambulate patient 2 times a day  - Out of bed to chair 2 times a day   - Out of bed for meals 2 times a day  - Out of bed for toileting  - Record patient progress and toleration of activity level   12/17/2024 1731 by Telma Morel RN  Outcome: Adequate for Discharge  12/17/2024 1730 by Telma Morel RN  Outcome: Adequate for Discharge  12/17/2024 1034 by Telma Morel RN  Outcome: Progressing     Problem: DISCHARGE PLANNING  Goal: Discharge to home or other facility with appropriate resources  Description: INTERVENTIONS:  - Identify barriers to discharge w/patient and caregiver  - Arrange for needed discharge resources and transportation as appropriate  - Identify discharge learning needs (meds, wound care, etc.)  -  Arrange for interpretive services to assist at discharge as needed  - Refer to Case Management Department for coordinating discharge planning if the patient needs post-hospital services based on physician/advanced practitioner order or complex needs related to functional status, cognitive ability, or social support system  12/17/2024 1731 by Telma Morel RN  Outcome: Adequate for Discharge  12/17/2024 1730 by Telma Morel RN  Outcome: Adequate for Discharge  12/17/2024 1034 by Telma Morel RN  Outcome: Progressing     Problem: Knowledge Deficit  Goal: Patient/family/caregiver demonstrates understanding of disease process, treatment plan, medications, and discharge instructions  Description: Complete learning assessment and assess knowledge base.  Interventions:  - Provide teaching at level of understanding  - Provide teaching via preferred learning methods  12/17/2024 1731 by Telma Morel RN  Outcome: Adequate for Discharge  12/17/2024 1730 by Telma Morel RN  Outcome: Adequate for Discharge  12/17/2024 1034 by Telma Morel RN  Outcome: Progressing     Problem: Nutrition/Hydration-ADULT  Goal: Nutrient/Hydration intake appropriate for improving, restoring or maintaining nutritional needs  Description: Monitor and assess patient's nutrition/hydration status for malnutrition. Collaborate with interdisciplinary team and initiate plan and interventions as ordered.  Monitor patient's weight and dietary intake as ordered or per policy. Utilize nutrition screening tool and intervene as necessary. Determine patient's food preferences and provide high-protein, high-caloric foods as appropriate.     INTERVENTIONS:  - Monitor oral intake, urinary output, labs, and treatment plans  - Assess nutrition and hydration status and recommend course of action  - Evaluate amount of meals eaten  - Assist patient with eating if necessary   - Allow adequate time for meals  - Recommend/ encourage appropriate diets, oral  nutritional supplements, and vitamin/mineral supplements  - Order, calculate, and assess calorie counts as needed  - Recommend, monitor, and adjust tube feedings and TPN/PPN based on assessed needs  - Assess need for intravenous fluids  - Provide specific nutrition/hydration education as appropriate  - Include patient/family/caregiver in decisions related to nutrition  12/17/2024 1731 by Telma Morel RN  Outcome: Adequate for Discharge  12/17/2024 1730 by Telma Morel RN  Outcome: Adequate for Discharge  12/17/2024 1034 by Telma Morel RN  Outcome: Progressing

## 2024-12-17 NOTE — PROGRESS NOTES
Progress Note - Pulmonology   Name: Fahad Hernandez 69 y.o. male I MRN: [id number]  Unit/Bed#: 2 E 269-01 I Date of Admission: 12/13/2024   Date of Service: 12/17/2024 I Hospital Day: 3    Assessment & Plan  Severe persistent asthma with (acute) exacerbation  Severe persistent asthma with PFTs about a year ago demonstrating severe obstructive airflow limitation with FEV1 25% with significant response to the bronchodilator increased lung volumes and normal DLCO at 83%  Multiple recent exacerbations also likely contributed by his anxiety  Continue DuoNeb every 6 hours  Continue budesonide and Perforomist, will send home with this instead of his Symbicort  Will transition to prednisone, decrease by 10 mg every 5 days  Check peak flows - was 230 this morning  Continue Dupixent as outpatient    Chronic obstructive asthma (HCC)  Upper lobe predominant emphysematous changes in the CT of the chest  Please see above plan for acute exacerbation  Outpatient pulmonary follow-up  Gastroesophageal reflux disease with esophagitis    He is stable for discharge from pulmonary standpoint, can follow-up with us in the office in 1 week    24 Hour Events : No events  Subjective : Patient resting in bed.  Reports improvement in his breathing this morning.  Overnight while moving in the bed he did hurt his shoulder.    Objective :  Temp:  [98.3 °F (36.8 °C)-98.6 °F (37 °C)] 98.6 °F (37 °C)  HR:  [78-95] 95  BP: (143-172)/(83-93) 172/93  Resp:  [18] 18  SpO2:  [92 %-98 %] 98 %  O2 Device: None (Room air)    Physical Exam  Vitals reviewed.   Constitutional:       Appearance: Normal appearance.   HENT:      Head: Normocephalic and atraumatic.      Mouth/Throat:      Pharynx: Oropharynx is clear.   Eyes:      Conjunctiva/sclera: Conjunctivae normal.   Cardiovascular:      Rate and Rhythm: Normal rate and regular rhythm.   Pulmonary:      Effort: Pulmonary effort is normal. No respiratory distress.      Breath sounds: Decreased breath sounds  present. No wheezing, rhonchi or rales.   Abdominal:      General: Abdomen is flat. There is no distension.   Musculoskeletal:         General: Normal range of motion.      Cervical back: Normal range of motion.      Right lower leg: No edema.      Left lower leg: No edema.   Skin:     General: Skin is warm and dry.   Neurological:      Mental Status: He is alert and oriented to person, place, and time.   Psychiatric:         Mood and Affect: Mood normal.         Behavior: Behavior normal.           Lab Results: I have reviewed the following results:   .     12/17/24  0557 12/17/24  0618   WBC  --  20.74*   HGB  --  12.9   HCT  --  42.6   PLT  --  373   SODIUM 137  --    K 4.0  --      --    CO2 30  --    BUN 24  --    CREATININE 0.83  --    GLUC 198*  --      ABG: No new results in last 24 hours.    Imaging Results Review: I reviewed radiology reports from this admission including: CT chest.  Other Study Results Review: No additional pertinent studies reviewed.  PFT Results Reviewed: reviewed

## 2024-12-17 NOTE — ASSESSMENT & PLAN NOTE
Much improved.    Okay to change to oral steroids per pulmonary    I would have discharged her but now he is having the severe right shoulder pain and cannot move his right arm

## 2024-12-17 NOTE — ASSESSMENT & PLAN NOTE
Severe persistent asthma with PFTs about a year ago demonstrating severe obstructive airflow limitation with FEV1 25% with significant response to the bronchodilator increased lung volumes and normal DLCO at 83%  Multiple recent exacerbations also likely contributed by his anxiety  Continue DuoNeb every 6 hours  Continue budesonide and Perforomist, will send home with this instead of his Symbicort  Will transition to prednisone, decrease by 10 mg every 5 days  Check peak flows - was 230 this morning  Continue Dupixent as outpatient

## 2024-12-17 NOTE — OCCUPATIONAL THERAPY NOTE
Occupational Therapy Evaluation     Patient Name: Fahad Hernandez  Today's Date: 12/17/2024  Problem List  Principal Problem:    Generalized weakness  Active Problems:    Type 2 diabetes mellitus with hyperglycemia, without long-term current use of insulin (HCC)    Chronic obstructive asthma (HCC)    Gastroesophageal reflux disease with esophagitis    Fall    Metabolic acidosis    Lactic acidosis due to diabetes mellitus (HCC)    BENNY (acute kidney injury) ([facility])    Severe persistent asthma with (acute) exacerbation    Past Medical History  Past Medical History:   Diagnosis Date    Asthma     COPD (chronic obstructive pulmonary disease) ([facility])     Dementia ([facility])     Diabetes mellitus ([facility])     GERD (gastroesophageal reflux disease)     History of stroke 11/07/2019    Hypertension     Interstitial lung disease ([facility])     Major depressive disorder with current active episode 11/07/2019    Pneumonia     Sleep apnea     Sleep apnea, obstructive     Stroke ([facility])     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     Past Surgical History  Past Surgical History:   Procedure Laterality Date    BACK SURGERY      ELBOW SURGERY      KNEE SURGERY      NECK SURGERY      SHOULDER SURGERY      SPINE SURGERY  2004 12/17/24 1440   OT Last Visit   OT Visit Date 12/17/24   Note Type   Note type Evaluation   Pain Assessment   Pain Assessment Tool 0-10   Pain Score 4   Pain Location/Orientation Orientation: Right;Location: Shoulder   Pain Onset/Description Descriptor: Burning;Descriptor: Aching   Hospital Pain Intervention(s) Repositioned;Ambulation/increased activity   Restrictions/Precautions   Weight Bearing Precautions Per Order No   Braces or Orthoses   (Pt currently has RLE 2* R shoulder pain from over night; awaiting R shoulder x-ray results)   Other Precautions Fall Risk;Pain   Home Living   Type of Home House   Home Layout Two level;Able to live on main level with bedroom/bathroom;Stairs to enter with rails  (2 CHUCHO + rails; 1st floor  "living)   Bathroom Shower/Tub Tub/shower unit   Bathroom Toilet Standard   Bathroom Equipment Grab bars in shower;Shower chair   Bathroom Accessibility Accessible   Home Equipment Walker;Cane   Additional Comments does not usually use AD   Prior Function   Level of [place] Independent with functional mobility;Needs assistance with IADLS;Independent with ADLs  (Wife will assist with BADLs when needed if too tired; but can do some)   Lives With Spouse   Receives Help From Family   IADLs Independent with medication management;Family/Friend/Other provides transportation;Family/Friend/Other provides meals   Falls in the last 6 months 1 to 4  (1)   Vocational Retired   General   Additional Pertinent History 68M with history of severe asthma presenting after fall and generalized weakness at home.  States his asthma has gotten so bad that he can barely walk himself to the bathroom anymore, multiple hospitalizations in the past few months for the same.   Subjective   Subjective \"I hurt my shoulder last night.\"   ADL   Eating Assistance 5  Supervision/Setup   Grooming Assistance 4  Minimal Assistance   UB Bathing Assistance 3  Moderate Assistance   LB Bathing Assistance 3  Moderate Assistance   UB Dressing Assistance 4  Minimal Assistance   LB Dressing Assistance 3  Moderate Assistance   Toileting Assistance  5  Supervision/Setup   Additional Comments Due to R shoulder pain and awaiting R shoulder x-ray; pt perf tasks with LUE; required Min to Mod A for ADLs 1 handed.   Bed Mobility   Supine to Sit 6  Modified independent   Additional items Bedrails;Increased time required   Sit to Supine 6  Modified independent   Additional items Bedrails;Increased time required   Transfers   Sit to Stand 5  Supervision   Additional items Armrests;Increased time required;Verbal cues   Stand to Sit 5  Supervision   Additional items Armrests;Increased time required;Verbal cues   Functional Mobility   Functional Mobility 5  " Supervision  (S/CGA)   Additional Comments no AD   Balance   Static Sitting Good   Dynamic Sitting Fair +   Static Standing Fair   Dynamic Standing Fair -   Activity Tolerance   Activity Tolerance Patient limited by fatigue   RUE Assessment   RUE Assessment   (DNT 2* R shoulder pain, sling and awaiting x-rays)   LUE Assessment   LUE Assessment WFL   Hand Function   Gross Motor Coordination Impaired   Fine Motor Coordination Functional   Psychosocial   Psychosocial (WDL) WDL   Cognition   Overall Cognitive Status WFL   Arousal/Participation Alert;Responsive;Cooperative   Attention Within functional limits   Orientation Level Oriented X4   Memory Within functional limits   Following Commands Follows all commands and directions without difficulty   Assessment   Limitation Decreased ADL status;Decreased UE ROM;Decreased UE strength;Decreased Safe judgement during ADL;Decreased endurance;Non-func R UE   Prognosis Good   Assessment Patient is a 69 y.o. year old male seen for OT eval s/p admit to [facility] on 12/13/2024 with  severe asthma presenting after fall and generalized weakness at home.  Patient with active OT orders and activity orders for Activity as tolerated. OT consulted to assess ADLs/IADLs/functional mobility and assist w/ D/C planning. PTA, required (A) with ADLs, required (A) with IADLs, and completed functional mobility/transfers with Yee utilizing no AD; fluctuating independence to assistance from wife for BADLs depending on energy level . Patient lives in a 2STH with wife, but 1st floor living; did not use AD. Patient demonstrates the following deficits impacting occupational performance: weakness, decreased UE ROM, decreased strength , decreased balance, decreased activity tolerance, impaired GMC, SOB, and decreased cardiovascular endurance. These impairments, as well at pt’s CHUCHO home environment, difficulty performing ADLs, difficulty performing IADLs, and fall risk , limit pt’s ability to safely engage in  all baseline areas of occupation. Pt's CLOF as follows: eating/grooming: supervision , UB ADLs: Bal, LB ADLs: modA, toileting: Bal, bed mobility: Skip, functional transfers: Skip, functional mobility: SBA-CGA, sitting/standing tolerance: G/F-. Pt noted with decline in ADL status right now due to new injury of R shoulder as per pt. Injuried R shoulder overnight getting out of bed. In sling for comfort, still awaiting x-ray results.  Pt would benefit from continued skilled OT while in acute setting to address deficits as defined above and to maximize (I) w/ ADLs/functional mobility, as well as education for 1 handed ADL tasks, and inc standing balance and tolerance training. Occupational performance areas to address include: eating, grooming, bathing/shower, toilet hygiene, dressing, functional mobility, and clothing management. Based on the aforementioned evaluation, functional performance deficits, and assessments, pt has been identified as a moderate complexity evaluation. At this time, recommendation for pt to receive post-acute rehabilitation services at a Level III (minimum resource intensity) due to above deficits and CLOF. If x-rays or negative and pt can f/u with ortho OP, then d/c to home with home services and wife support. OT will continue to follow pt 2-3x/wk to address the goals listed below to  w/in 10-14 days.   Goals   Patient Goals to go home   LTG Time Frame 10-14   Long Term Goal 1- Pt will perf UBD 1 handed technique with Mod I; 2- Pt will perf LBD with AE as ECT with Mod I; 3- Pt will demo inc dynamic standing bal during toileting tasks with Mod I and t/fs to F+; 4- Pt will inc static standing bal/jyothi to G for 5 min during sinkside grooming of brushing teeth;   Plan   Treatment Interventions ADL retraining;Functional transfer training;UE strengthening/ROM;Endurance training;Neuromuscular reeducation;Compensatory technique education;Energy conservation   Goal Expiration Date 24   OT  Treatment Day 0   OT Frequency 2-3x/wk   Discharge Recommendation   Rehab Resource Intensity Level, OT III (Minimum Resource Intensity)   AM-PAC Daily Activity Inpatient   Lower Body Dressing 3   Bathing 3   Toileting 3   Upper Body Dressing 3   Grooming 3   Eating 3   Daily Activity Raw Score 18   Daily Activity Standardized Score (Calc for Raw Score >=11) 38.66   AM-PAC Applied Cognition Inpatient   Following a Speech/Presentation 4   Understanding Ordinary Conversation 4   Taking Medications 4   Remembering Where Things Are Placed or Put Away 4   Remembering List of 4-5 Errands 3   Taking Care of Complicated Tasks 3   Applied Cognition Raw Score 22   Applied Cognition Standardized Score 47.83     Dina Simpson MS OTR/L CLT

## 2024-12-17 NOTE — PLAN OF CARE
Problem: PAIN - ADULT  Goal: Verbalizes/displays adequate comfort level or baseline comfort level  Description: Interventions:  - Encourage patient to monitor pain and request assistance  - Assess pain using appropriate pain scale  - Administer analgesics based on type and severity of pain and evaluate response  - Implement non-pharmacological measures as appropriate and evaluate response  - Consider cultural and social influences on pain and pain management  - Notify physician/advanced practitioner if interventions unsuccessful or patient reports new pain  Outcome: Progressing     Problem: SAFETY ADULT  Goal: Patient will remain free of falls  Description: INTERVENTIONS:  - Educate patient/family on patient safety including physical limitations  - Instruct patient to call for assistance with activity   - Consult OT/PT to assist with strengthening/mobility   - Keep Call bell within reach  - Keep bed low and locked with side rails adjusted as appropriate  - Keep care items and personal belongings within reach  - Initiate and maintain comfort rounds  - Make Fall Risk Sign visible to staff  - Offer Toileting every 2 Hours, in advance of need  - Initiate/Maintain BED AND CHAIR alarm  - Apply yellow socks and bracelet for high fall risk patients  - Consider moving patient to room near nurses station  Outcome: Progressing  Goal: Maintain or return to baseline ADL function  Description: INTERVENTIONS:  -  Assess patient's ability to carry out ADLs; assess patient's baseline for ADL function and identify physical deficits which impact ability to perform ADLs (bathing, care of mouth/teeth, toileting, grooming, dressing, etc.)  - Assess/evaluate cause of self-care deficits   - Assess range of motion  - Assess patient's mobility; develop plan if impaired  - Assess patient's need for assistive devices and provide as appropriate  - Encourage maximum independence but intervene and supervise when necessary  - Involve family in  performance of ADLs  - Assess for home care needs following discharge   - Consider OT consult to assist with ADL evaluation and planning for discharge  - Provide patient education as appropriate  Outcome: Progressing  Goal: Maintains/Returns to pre admission functional level  Description: INTERVENTIONS:  - Perform AM-PAC 6 Click Basic Mobility/ Daily Activity assessment daily.  - Set and communicate daily mobility goal to care team and patient/family/caregiver.   - Collaborate with rehabilitation services on mobility goals if consulted  - Perform Range of Motion 2 times a day.  - Reposition patient every 2 hours.  - Stand patient 2 times a day  - Ambulate patient 2 times a day  - Out of bed to chair 2 times a day   - Out of bed for meals 2 times a day  - Out of bed for toileting  - Record patient progress and toleration of activity level   Outcome: Progressing     Problem: DISCHARGE PLANNING  Goal: Discharge to home or other facility with appropriate resources  Description: INTERVENTIONS:  - Identify barriers to discharge w/patient and caregiver  - Arrange for needed discharge resources and transportation as appropriate  - Identify discharge learning needs (meds, wound care, etc.)  - Arrange for interpretive services to assist at discharge as needed  - Refer to Case Management Department for coordinating discharge planning if the patient needs post-hospital services based on physician/advanced practitioner order or complex needs related to functional status, cognitive ability, or social support system  Outcome: Progressing     Problem: Knowledge Deficit  Goal: Patient/family/caregiver demonstrates understanding of disease process, treatment plan, medications, and discharge instructions  Description: Complete learning assessment and assess knowledge base.  Interventions:  - Provide teaching at level of understanding  - Provide teaching via preferred learning methods  Outcome: Progressing     Problem:  Nutrition/Hydration-ADULT  Goal: Nutrient/Hydration intake appropriate for improving, restoring or maintaining nutritional needs  Description: Monitor and assess patient's nutrition/hydration status for malnutrition. Collaborate with interdisciplinary team and initiate plan and interventions as ordered.  Monitor patient's weight and dietary intake as ordered or per policy. Utilize nutrition screening tool and intervene as necessary. Determine patient's food preferences and provide high-protein, high-caloric foods as appropriate.     INTERVENTIONS:  - Monitor oral intake, urinary output, labs, and treatment plans  - Assess nutrition and hydration status and recommend course of action  - Evaluate amount of meals eaten  - Assist patient with eating if necessary   - Allow adequate time for meals  - Recommend/ encourage appropriate diets, oral nutritional supplements, and vitamin/mineral supplements  - Order, calculate, and assess calorie counts as needed  - Recommend, monitor, and adjust tube feedings and TPN/PPN based on assessed needs  - Assess need for intravenous fluids  - Provide specific nutrition/hydration education as appropriate  - Include patient/family/caregiver in decisions related to nutrition  Outcome: Progressing

## 2024-12-17 NOTE — QUICK NOTE
Notified by RN patient complaining of difficulty urinating. Has not missed any doses of PTA flomax. Bladder scan revealing 168 ml.  Will order UA and place on urinary retention protocol out of precaution.

## 2024-12-17 NOTE — PLAN OF CARE
Problem: OCCUPATIONAL THERAPY ADULT  Goal: Performs self-care activities at highest level of function for planned discharge setting.  See evaluation for individualized goals.  Description: Treatment Interventions: ADL retraining, Functional transfer training, UE strengthening/ROM, Endurance training, Neuromuscular reeducation, Compensatory technique education, Energy conservation          See flowsheet documentation for full assessment, interventions and recommendations.   Outcome: Progressing  Note: Limitation: Decreased ADL status, Decreased UE ROM, Decreased UE strength, Decreased Safe judgement during ADL, Decreased endurance, Non-func R UE  Prognosis: Good  Assessment: Patient is a 69 y.o. year old male seen for OT eval s/p admit to [facility] on 12/13/2024 with  severe asthma presenting after fall and generalized weakness at home.  Patient with active OT orders and activity orders for Activity as tolerated. OT consulted to assess ADLs/IADLs/functional mobility and assist w/ D/C planning. PTA, required (A) with ADLs, required (A) with IADLs, and completed functional mobility/transfers with Skip utilizing no AD; fluctuating independence to assistance from wife for BADLs depending on energy level . Patient lives in a 2STH with wife, but 1st floor living; did not use AD. Patient demonstrates the following deficits impacting occupational performance: weakness, decreased UE ROM, decreased strength , decreased balance, decreased activity tolerance, impaired GMC, SOB, and decreased cardiovascular endurance. These impairments, as well at pt’s [facility] home environment, difficulty performing ADLs, difficulty performing IADLs, and fall risk , limit pt’s ability to safely engage in all baseline areas of occupation. Pt's CLOF as follows: eating/grooming: supervision , UB ADLs: Bal, LB ADLs: modA, toileting: Bal, bed mobility: Skip, functional transfers: Skip, functional mobility: SBA-CGA, sitting/standing tolerance: G/F-. Pt noted  with decline in ADL status right now due to new injury of R shoulder as per pt. Injuried R shoulder overnight getting out of bed. In sling for comfort, still awaiting x-ray results.  Pt would benefit from continued skilled OT while in acute setting to address deficits as defined above and to maximize (I) w/ ADLs/functional mobility, as well as education for 1 handed ADL tasks, and inc standing balance and tolerance training. Occupational performance areas to address include: eating, grooming, bathing/shower, toilet hygiene, dressing, functional mobility, and clothing management. Based on the aforementioned evaluation, functional performance deficits, and assessments, pt has been identified as a moderate complexity evaluation. At this time, recommendation for pt to receive post-acute rehabilitation services at a Level III (minimum resource intensity) due to above deficits and CLOF. If x-rays or negative and pt can f/u with ortho OP, then d/c to home with home services and wife support. OT will continue to follow pt 2-3x/wk to address the goals listed below to  w/in 10-14 days.     Rehab Resource Intensity Level, OT: III (Minimum Resource Intensity)

## 2024-12-17 NOTE — ASSESSMENT & PLAN NOTE
Lab Results   Component Value Date    HGBA1C 8.1 (H) 12/14/2024       Recent Labs     12/16/24  1727 12/16/24  2047 12/17/24  0806 12/17/24  1136   POCGLU 233* 170* 186* 245*       Blood Sugar Average: Last 72 hrs:  (P) [id number]

## 2024-12-17 NOTE — PLAN OF CARE
Problem: PAIN - ADULT  Goal: Verbalizes/displays adequate comfort level or baseline comfort level  Description: Interventions:  - Encourage patient to monitor pain and request assistance  - Assess pain using appropriate pain scale  - Administer analgesics based on type and severity of pain and evaluate response  - Implement non-pharmacological measures as appropriate and evaluate response  - Consider cultural and social influences on pain and pain management  - Notify physician/advanced practitioner if interventions unsuccessful or patient reports new pain  12/17/2024 1730 by Telma Morel RN  Outcome: Adequate for Discharge  12/17/2024 1034 by Telma Morel RN  Outcome: Progressing     Problem: SAFETY ADULT  Goal: Patient will remain free of falls  Description: INTERVENTIONS:  - Educate patient/family on patient safety including physical limitations  - Instruct patient to call for assistance with activity   - Consult OT/PT to assist with strengthening/mobility   - Keep Call bell within reach  - Keep bed low and locked with side rails adjusted as appropriate  - Keep care items and personal belongings within reach  - Initiate and maintain comfort rounds  - Make Fall Risk Sign visible to staff  - Offer Toileting every 2 Hours, in advance of need  - Initiate/Maintain bed alarm  - Apply yellow socks and bracelet for high fall risk patients  - Consider moving patient to room near nurses station  12/17/2024 1730 by Telma Morel RN  Outcome: Adequate for Discharge  12/17/2024 1034 by Telma Morel RN  Outcome: Progressing  Goal: Maintain or return to baseline ADL function  Description: INTERVENTIONS:  -  Assess patient's ability to carry out ADLs; assess patient's baseline for ADL function and identify physical deficits which impact ability to perform ADLs (bathing, care of mouth/teeth, toileting, grooming, dressing, etc.)  - Assess/evaluate cause of self-care deficits   - Assess range of motion  - Assess patient's  mobility; develop plan if impaired  - Assess patient's need for assistive devices and provide as appropriate  - Encourage maximum independence but intervene and supervise when necessary  - Involve family in performance of ADLs  - Assess for home care needs following discharge   - Consider OT consult to assist with ADL evaluation and planning for discharge  - Provide patient education as appropriate  12/17/2024 1730 by Telma Morel RN  Outcome: Adequate for Discharge  12/17/2024 1034 by Telma Morel RN  Outcome: Progressing  Goal: Maintains/Returns to pre admission functional level  Description: INTERVENTIONS:  - Perform AM-PAC 6 Click Basic Mobility/ Daily Activity assessment daily.  - Set and communicate daily mobility goal to care team and patient/family/caregiver.   - Collaborate with rehabilitation services on mobility goals if consulted  - Perform Range of Motion 2 times a day.  - Reposition patient every 2 hours.  - Dangle patient 2 times a day  - Stand patient 2 times a day  - Ambulate patient 2 times a day  - Out of bed to chair 2 times a day   - Out of bed for meals 2 times a day  - Out of bed for toileting  - Record patient progress and toleration of activity level   12/17/2024 1730 by Telma Morel RN  Outcome: Adequate for Discharge  12/17/2024 1034 by Telma Morel RN  Outcome: Progressing     Problem: DISCHARGE PLANNING  Goal: Discharge to home or other facility with appropriate resources  Description: INTERVENTIONS:  - Identify barriers to discharge w/patient and caregiver  - Arrange for needed discharge resources and transportation as appropriate  - Identify discharge learning needs (meds, wound care, etc.)  - Arrange for interpretive services to assist at discharge as needed  - Refer to Case Management Department for coordinating discharge planning if the patient needs post-hospital services based on physician/advanced practitioner order or complex needs related to functional status, cognitive  ability, or social support system  12/17/2024 1730 by Telma Morel RN  Outcome: Adequate for Discharge  12/17/2024 1034 by Telma Morel RN  Outcome: Progressing     Problem: Knowledge Deficit  Goal: Patient/family/caregiver demonstrates understanding of disease process, treatment plan, medications, and discharge instructions  Description: Complete learning assessment and assess knowledge base.  Interventions:  - Provide teaching at level of understanding  - Provide teaching via preferred learning methods  12/17/2024 1730 by Telma Morel RN  Outcome: Adequate for Discharge  12/17/2024 1034 by Telma Morel RN  Outcome: Progressing     Problem: Nutrition/Hydration-ADULT  Goal: Nutrient/Hydration intake appropriate for improving, restoring or maintaining nutritional needs  Description: Monitor and assess patient's nutrition/hydration status for malnutrition. Collaborate with interdisciplinary team and initiate plan and interventions as ordered.  Monitor patient's weight and dietary intake as ordered or per policy. Utilize nutrition screening tool and intervene as necessary. Determine patient's food preferences and provide high-protein, high-caloric foods as appropriate.     INTERVENTIONS:  - Monitor oral intake, urinary output, labs, and treatment plans  - Assess nutrition and hydration status and recommend course of action  - Evaluate amount of meals eaten  - Assist patient with eating if necessary   - Allow adequate time for meals  - Recommend/ encourage appropriate diets, oral nutritional supplements, and vitamin/mineral supplements  - Order, calculate, and assess calorie counts as needed  - Recommend, monitor, and adjust tube feedings and TPN/PPN based on assessed needs  - Assess need for intravenous fluids  - Provide specific nutrition/hydration education as appropriate  - Include patient/family/caregiver in decisions related to nutrition  12/17/2024 1730 by Telma Morel RN  Outcome: Adequate for  Discharge  12/17/2024 1034 by Telma Morel, RN  Outcome: Progressing

## 2024-12-17 NOTE — ASSESSMENT & PLAN NOTE
Lab Results   Component Value Date    HGBA1C 8.1 (H) 12/14/2024       Recent Labs     12/16/24  1727 12/16/24  2047 12/17/24  0806 12/17/24  1136   POCGLU 233* 170* 186* 245*       Blood Sugar Average: Last 72 hrs:  (P) [id number]

## 2024-12-17 NOTE — ASSESSMENT & PLAN NOTE
Lab Results   Component Value Date    HGBA1C 8.1 (H) 12/14/2024       Recent Labs     12/16/24  1727 12/16/24  2047 12/17/24  0806 12/17/24  1136   POCGLU 233* 170* 186* 245*       Blood Sugar Average: Last 72 hrs:  (P) [id number]  Blood sugars are little elevated on steroids.  He is on sliding scale insulin.  He should not have to go home on any sliding scale insulin

## 2024-12-17 NOTE — ASSESSMENT & PLAN NOTE
Called the patient had acute right shoulder pain this morning.    He states he rolled over and laid on his right shoulder and heard a pop and then had severe pain    No longer lift his arm    X-rays were done today which showed no fracture and no dislocation no bony abnormality.  Nothing to explain the pain    Certainly wonder if this could be related to the rotator cuff or tendinitis.  Will have orthopedics see in consultation

## 2024-12-17 NOTE — RESPIRATORY THERAPY NOTE
12/17/24 0732   Respiratory Assessment   Assessment Type Pre-treatment   General Appearance Alert;Awake   Respiratory Pattern Normal   Chest Assessment Chest expansion symmetrical   O2 Device ra   Additional Assessments   Pulse 95   SpO2 98 %   Pre Peak Expiratory Flow Rate  LPM   Post Peak Expiratory Flow Rate  LPM   $ Vital Capacity Mech/Peak Flow Yes
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
## 2024-12-17 NOTE — PROGRESS NOTES
Progress Note - Hospitalist   Name: Fahad Hernandez 69 y.o. male I MRN: [id number]  Unit/Bed#: 2 E 269-01 I Date of Admission: 12/13/2024   Date of Service: 12/17/2024 I Hospital Day: 3    Assessment & Plan  Severe persistent asthma with (acute) exacerbation  Much improved.    Okay to change to oral steroids per pulmonary    I would have discharged her but now he is having the severe right shoulder pain and cannot move his right arm  Generalized weakness  Much improved, in the setting of lactic acidosis, poor p.o. intake and grief  PT evaluated recommending level 3  Fall  The patient was pan scanned and CT head, spine, chest, abdomen, pelvis and lumbar spine are negative for any acute findings  Fall precautions  PT OT rec level 3  Metabolic acidosis  Resolved, pH is normalized, lactic acidosis has resolved  Lactic acidosis due to diabetes mellitus ([facility])  Resolved, was taking metformin at home, will DC on discharge    BENNY (acute kidney injury) ([facility])  Resolved  Gastroesophageal reflux disease with esophagitis  Nexium at home, Protonix 40 mg daily has been added to plan of care  Type 2 diabetes mellitus with hyperglycemia, without long-term current use of insulin ([facility])  Lab Results   Component Value Date    HGBA1C 8.1 (H) 12/14/2024       Recent Labs     12/16/24  1727 12/16/24  2047 12/17/24  0806 12/17/24  1136   POCGLU 233* 170* 186* 245*       Blood Sugar Average: Last 72 hrs:  (P) [id number]  Blood sugars are little elevated on steroids.  He is on sliding scale insulin.  He should not have to go home on any sliding scale insulin  Chronic obstructive asthma (HCC)  Continue with current management, pulm consult appreciated    Right shoulder pain  Called the patient had acute right shoulder pain this morning.    He states he rolled over and laid on his right shoulder and heard a pop and then had severe pain    No longer lift his arm    X-rays were done today which showed no fracture and no dislocation no  bony abnormality.  Nothing to explain the pain    Certainly wonder if this could be related to the rotator cuff or tendinitis.  Will have orthopedics see in consultation    VTE Pharmacologic Prophylaxis:                 Current Length of Stay: 3 day(s)  Current Patient Status: Inpatient   Certification Statement:     Discharge Plan:         Subjective   This morning he rolled over and heard a pop in his right shoulder when he laid on his right shoulder.  Is having severe pain and now cannot lift the shoulder.    His breathing as far as the asthma is much better.    Objective   Vitals:   Temp (24hrs), Av.5 °F (36.9 °C), Min:98.5 °F (36.9 °C), Max:98.6 °F (37 °C)    Temp:  [98.5 °F (36.9 °C)-98.6 °F (37 °C)] 98.5 °F (36.9 °C)  HR:  [73-95] 93  Resp:  [18-22] 22  BP: (124-172)/(81-93) 124/81  SpO2:  [92 %-98 %] 97 %  Body mass index is 20.97 kg/m².         Physical Exam  Vitals and nursing note reviewed.   Constitutional:       General: He is not in acute distress.     Appearance: He is well-developed.   HENT:      Head: Normocephalic and atraumatic.   Eyes:      Conjunctiva/sclera: Conjunctivae normal.   Cardiovascular:      Rate and Rhythm: Normal rate and regular rhythm.      Heart sounds: No murmur heard.  Pulmonary:      Effort: Pulmonary effort is normal. No respiratory distress.      Breath sounds: Normal breath sounds. No stridor.   Abdominal:      Palpations: Abdomen is soft.      Tenderness: There is no abdominal tenderness.   Musculoskeletal:         General: No swelling.      Cervical back: Neck supple.      Right lower leg: No edema.      Left lower leg: No edema.      Comments: Right shoulder is painful to palpation.  I do not feel an effusion.    He cannot elevate his hand at all at the shoulder joint.   Skin:     General: Skin is warm and dry.      Capillary Refill: Capillary refill takes less than 2 seconds.   Neurological:      Mental Status: He is alert.   Psychiatric:         Mood and Affect:  Mood normal.           Lab results  Results from last 7 days   Lab Units 12/17/24  0618 12/15/24  0527 12/14/24  0845 12/14/24 0440 12/13/24 2020   WBC Thousand/uL 20.74* 13.11*  --  17.38* 18.03*   HEMOGLOBIN g/dL 12.9 11.2*  --  11.0* 12.4   PLATELETS Thousands/uL 373 288 301 313 375   MCV fL 84 85  --  86 85   TOTAL NEUT ABS Thousand/uL  --   --   --   --  15.87*   INR   --   --   --   --  1.00     Results from last 7 days   Lab Units 12/17/24  0557 12/15/24  0527 12/14/24 0440 12/14/24 0007 12/13/24 2020   SODIUM mmol/L 137 138 141   < > 139   POTASSIUM mmol/L 4.0 4.1 4.6   < > 5.0   CHLORIDE mmol/L 100 102 105   < > 101   CO2 mmol/L 30 29 24   < > 21   ANION GAP mmol/L 7 7 12   < > 17*   BUN mg/dL 24 24 34*   < > 42*   CREATININE mg/dL 0.83 0.95 1.50*   < > 2.27*   CALCIUM mg/dL 9.0 8.4 8.8   < > 10.0   ALBUMIN g/dL  --   --  3.7  --  4.1   TOTAL BILIRUBIN mg/dL  --   --  0.27  --  0.45   ALK PHOS U/L  --   --  49  --  41   ALT U/L  --   --  30  --  34   AST U/L  --   --  17  --  16   EGFR ml/min/1.73sq m 88 80 46   < > 28   GLUCOSE RANDOM mg/dL 198* 184* 192*   < > 203*    < > = values in this interval not displayed.     Results from last 7 days   Lab Units 12/14/24 0440   MAGNESIUM mg/dL 2.0   PHOSPHORUS mg/dL 3.4     Results from last 7 days   Lab Units 12/13/24 2020   CK TOTAL U/L 79         Last 24 Hours Medication List:     Current Facility-Administered Medications:     acetaminophen (TYLENOL) tablet 975 mg, Q6H PRN    albuterol inhalation solution 1.25 mg, Q6H PRN    aluminum-magnesium hydroxide-simethicone (MAALOX) oral suspension 30 mL, Q6H PRN    amLODIPine (NORVASC) tablet 10 mg, Daily **AND** lisinopril (ZESTRIL) tablet 40 mg, Daily    ARIPiprazole (ABILIFY) tablet 10 mg, Daily    atorvastatin (LIPITOR) tablet 10 mg, Daily    budesonide (PULMICORT) inhalation solution 0.5 mg, Q12H    buPROPion (WELLBUTRIN XL) 24 hr tablet 300 mg, Daily    formoterol (PERFOROMIST) nebulizer solution 20 mcg,  Q12H    heparin (porcine) subcutaneous injection 5,000 Units, Q8H PARTHA **AND** [COMPLETED] Platelet count, Once    insulin lispro (HumALOG/ADMELOG) 100 units/mL subcutaneous injection 1-5 Units, TID AC **AND** Fingerstick Glucose (POCT), TID AC    insulin lispro (HumALOG/ADMELOG) 100 units/mL subcutaneous injection 1-5 Units, HS    metoprolol succinate (TOPROL-XL) 24 hr tablet 25 mg, Daily    montelukast (SINGULAIR) tablet 10 mg, Daily    multivitamin stress formula tablet 1 tablet, Daily    ondansetron (ZOFRAN) injection 4 mg, Q6H PRN    oxyCODONE (ROXICODONE) immediate release tablet 10 mg, Q6H PRN    pantoprazole (PROTONIX) EC tablet 40 mg, Early Morning    polyethylene glycol (MIRALAX) packet 17 g, Daily PRN    predniSONE tablet 40 mg, Daily    tamsulosin (FLOMAX) capsule 0.4 mg, Daily    temazepam (RESTORIL) capsule 30 mg, HS PRN    venlafaxine (EFFEXOR-XR) 24 hr capsule 75 mg, Daily

## 2024-12-17 NOTE — TELEPHONE ENCOUNTER
Incoming call:    Would like to know if Dr. Torres was going to see him today in hospital. Was under the impression that he would be. If not, would like to speak to SOFI Bueno over phone call.    [phone]     Routing.

## 2024-12-19 LAB
BACTERIA BLD CULT: NORMAL
BACTERIA BLD CULT: NORMAL

## 2024-12-23 NOTE — ASSESSMENT & PLAN NOTE
Much improved.    Okay to change to oral steroids per pulmonary    Seen by ortho for shoulder pain and nothing to do acutely    Patient very anxious to go home.

## 2024-12-23 NOTE — DISCHARGE SUMMARY
"DIscharge Summary - Hospitalist   Name: Fahad Hernandez 69 y.o. male I MRN: [id number]  Unit/Bed#: 2 E 269-01 I Date of Admission: 12/13/2024   Date of Service: 12/23/2024 I Hospital Day: 3    Assessment & Plan  Severe persistent asthma with (acute) exacerbation  Much improved.    Okay to change to oral steroids per pulmonary    Seen by ortho for shoulder pain and nothing to do acutely    Patient very anxious to go home.  Generalized weakness  Much improved, in the setting of lactic acidosis, poor p.o. intake and grief  PT evaluated recommending level 3  Fall  The patient was pan scanned and CT head, spine, chest, abdomen, pelvis and lumbar spine are negative for any acute findings  Fall precautions  PT OT rec level 3  Metabolic acidosis  Resolved, pH is normalized, lactic acidosis has resolved  Lactic acidosis due to diabetes mellitus ([facility])  Resolved, was taking metformin at home, will DC on discharge    BENNY (acute kidney injury) (HCC)  Resolved  Gastroesophageal reflux disease with esophagitis  Nexium at home, Protonix 40 mg daily has been added to plan of care  Type 2 diabetes mellitus with hyperglycemia, without long-term current use of insulin ([facility])  Lab Results   Component Value Date    HGBA1C 8.1 (H) 12/14/2024       No results for input(s): \"POCGLU\" in the last 72 hours.      Blood Sugar Average: Last 72 hrs:    Blood sugars are little elevated on steroids.  He is on sliding scale insulin.  He should not have to go home on any sliding scale insulin  Chronic obstructive asthma (HCC)  Continue with current management, pulm consult appreciated    Rotator cuff arthropathy of right shoulder  No fracture on x-ray  Nothing to do acutely from ortho      Ok for d/c home.      Medical Problems       Resolved Problems  Date Reviewed: 9/4/2024   None        Discharging Physician / Practitioner: Stew Pierce DO  PCP: Surjit Hayes DO  Admission Date:   Admission Orders (From admission, onward)       Ordered     "    12/14/24 0105  INPATIENT ADMISSION  Once                        Discharge Date: 12/17/2024    Consultations During Hospital Stay:  IP CONSULT TO PULMONOLOGY  IP CONSULT TO PODIATRY  IP CONSULT TO ORTHOPEDIC SURGERY     Procedures Performed:   * No surgery found *       Lab Results:   Results from last 7 days   Lab Units 12/17/24  0618   WBC Thousand/uL 20.74*   HEMOGLOBIN g/dL 12.9   HEMATOCRIT % 42.6   MCV fL 84   PLATELETS Thousands/uL 373     Results from last 7 days   Lab Units 12/17/24  0557   SODIUM mmol/L 137   POTASSIUM mmol/L 4.0   CHLORIDE mmol/L 100   CO2 mmol/L 30   BUN mg/dL 24   CREATININE mg/dL 0.83   CALCIUM mg/dL 9.0   EGFR ml/min/1.73sq m 88   GLUCOSE RANDOM mg/dL 198*                   Reason for Admission:   Weakness - Generalized (Super weak at home, unable to ambulate, just started new bp meds and possibly thinners? (Pt denies), admits to lower back pain and dark smelly urine, bGL 303 for ems, admits to a fall at home due to his weakness, states that he doesn't remember hitting the floor, reports hallucinations prior to fall )    Hospital Course:   Fahad Hernandez is a 69 y.o. male patient who originally presented to the hospital on 12/13/2024 due to asthma exacerbation.  He quickly improved.  He was weaned to oral steroids by pulmonary.  Then on the date of discharge and she had severe shoulder pain.  He felt something pop.  X-rays were negative for fracture.  A consult orthopedics.  They felt nothing to do acutely.  Patient was okay to go home.  He will go home on a prednisone taper.  Follow-up with Ortho as outpatient.        Please see above list of diagnoses and related plan for additional information.     Condition at Discharge: stable     Discharge Day Visit / Exam:   Subjective:  breathing better  Right shoulder pain with pop this am.  It is tolerable..    Vitals: Blood Pressure: 124/81 (12/17/24 1500)  Pulse: 93 (12/17/24 1500)  Temperature: 98.5 °F (36.9 °C) (12/17/24 1500)  Temp  "Source: Oral (12/17/24 1500)  Respirations: 22 (12/17/24 1500)  Height: 5' 11\" (180.3 cm) (12/14/24 1122)  Weight - Scale: 68.2 kg (150 lb 5.7 oz) (12/17/24 0600)  SpO2: 97 % (12/17/24 1500)    Exam:   Physical Exam  Vitals and nursing note reviewed.   Constitutional:       General: He is not in acute distress.     Appearance: He is well-developed.   HENT:      Head: Normocephalic and atraumatic.   Eyes:      Conjunctiva/sclera: Conjunctivae normal.   Cardiovascular:      Rate and Rhythm: Normal rate and regular rhythm.      Heart sounds: No murmur heard.  Pulmonary:      Effort: Pulmonary effort is normal. No respiratory distress.      Breath sounds: Normal breath sounds.   Abdominal:      Palpations: Abdomen is soft.      Tenderness: There is no abdominal tenderness.   Musculoskeletal:         General: No swelling.      Cervical back: Neck supple.      Right lower leg: No edema.      Left lower leg: No edema.   Skin:     General: Skin is warm and dry.      Capillary Refill: Capillary refill takes less than 2 seconds.   Neurological:      Mental Status: He is alert.   Psychiatric:         Mood and Affect: Mood normal.           Discharge instructions/Information to patient and family:   See after visit summary for information provided to patient and family.      Provisions for Follow-Up Care:  See after visit summary for information related to follow-up care and any pertinent home health orders.        Disposition:   Home       Discharge Medications:  See after visit summary for reconciled discharge medications provided to patient and family.      Administrative Statements   I spent 36 minutes discharging the patient. This time was spent on the day of discharge. I had direct contact with the patient on the day of discharge. Greater than 50% of the total time was spent examining patient, answering all patient questions, arranging and discussing plan of care with patient as well as directly providing post-discharge " instructions.  Additional time then spent on discharge activities.    **Please Note: This note may have been constructed using a voice recognition system**

## 2024-12-23 NOTE — ASSESSMENT & PLAN NOTE
"Lab Results   Component Value Date    HGBA1C 8.1 (H) 12/14/2024       No results for input(s): \"POCGLU\" in the last 72 hours.      Blood Sugar Average: Last 72 hrs:    Blood sugars are little elevated on steroids.  He is on sliding scale insulin.  He should not have to go home on any sliding scale insulin  "

## 2024-12-26 DIAGNOSIS — J45.50 SEVERE PERSISTENT ASTHMA WITHOUT COMPLICATION: Primary | ICD-10-CM

## 2024-12-26 DIAGNOSIS — J45.20 MILD INTERMITTENT ASTHMA WITHOUT COMPLICATION: ICD-10-CM

## 2024-12-26 DIAGNOSIS — J44.89 CHRONIC OBSTRUCTIVE ASTHMA (HCC): ICD-10-CM

## 2024-12-27 ENCOUNTER — TELEPHONE (OUTPATIENT)
Age: 69
End: 2024-12-27

## 2024-12-27 RX ORDER — ALBUTEROL SULFATE 0.83 MG/ML
SOLUTION RESPIRATORY (INHALATION)
Qty: 9 ML | Refills: 10 | Status: SHIPPED | OUTPATIENT
Start: 2024-12-27

## 2024-12-27 RX ORDER — ALBUTEROL SULFATE 90 UG/1
INHALANT RESPIRATORY (INHALATION)
Qty: 8.5 G | Refills: 10 | Status: SHIPPED | OUTPATIENT
Start: 2024-12-27

## 2024-12-27 RX ORDER — DUPILUMAB 300 MG/2ML
INJECTION, SOLUTION SUBCUTANEOUS
Qty: 2 ML | Refills: 10 | Status: SHIPPED | OUTPATIENT
Start: 2024-12-27

## 2024-12-27 RX ORDER — BUDESONIDE, GLYCOPYRROLATE, AND FORMOTEROL FUMARATE 160; 9; 4.8 UG/1; UG/1; UG/1
AEROSOL, METERED RESPIRATORY (INHALATION)
Qty: 10.7 G | Refills: 10 | Status: SHIPPED | OUTPATIENT
Start: 2024-12-27

## 2024-12-27 NOTE — TELEPHONE ENCOUNTER
[facility] calling requesting a new script for dupixent, must be written for either 2 pens or 4ml with 11 refills, stated these pens come in a 2 pack.

## 2024-12-27 NOTE — TELEPHONE ENCOUNTER
Patient called the RX Refill Line. Message is being forwarded to the office.     Patient is requesting a new script for predniSONE 10 mg tablet. He is now taking 2 in Am and 1 in PM.  The ins is giving her a hard time filling it based on the dosing. So she'd like the directions to read 2 in Am and 1 in PM.    if appropriate, please send to [facility] jessica  Pt only has enough meds for today

## 2024-12-27 NOTE — TELEPHONE ENCOUNTER
Refill must be reviewed and completed by the office or provider. The refill is unable to be approved or denied by the medication management team.    Please review to see if the refill is appropriate.

## 2024-12-29 ENCOUNTER — NURSE TRIAGE (OUTPATIENT)
Dept: OTHER | Facility: OTHER | Age: 69
End: 2024-12-29

## 2024-12-29 DIAGNOSIS — J45.50 SEVERE PERSISTENT ASTHMA WITHOUT COMPLICATION: Primary | ICD-10-CM

## 2024-12-29 RX ORDER — PREDNISONE 10 MG/1
TABLET ORAL
Qty: 40 TABLET | Refills: 0 | Status: SHIPPED | OUTPATIENT
Start: 2024-12-29 | End: 2024-12-30 | Stop reason: SDUPTHER

## 2024-12-29 NOTE — TELEPHONE ENCOUNTER
"Reason for Disposition  • [1] Caller has URGENT medicine question about med that PCP or specialist prescribed AND [2] triager unable to answer question    Answer Assessment - Initial Assessment Questions  1. NAME of MEDICINE: \"What medicine(s) are you calling about?\"        Prednisone     2. QUESTION: \"What is your question?\" (e.g., double dose of medicine, side effect)        He took last tablet this am and he needs more. I called Friday but it wasn't addressed.     3. PRESCRIBER: \"Who prescribed the medicine?\" Reason: if prescribed by specialist, call should be referred to that group.        Pulmonary     4. SYMPTOMS: \"Do you have any symptoms?\" If Yes, ask: \"What symptoms are you having?\"  \"How bad are the symptoms (e.g., mild, moderate, severe)    He can't breathe with out it    Wife states she spoke with Kirk COTTRELL about this and is requesting script for 10mg tablets to be written \"2 in am and 1 in pm\" she states he can't breathe without it.    Protocols used: Medication Question Call-Adult-    "

## 2024-12-29 NOTE — TELEPHONE ENCOUNTER
"Regarding: medication question  ----- Message from Rand YOON sent at 12/29/2024 12:27 PM EST -----  Pt's wife stated, \"I'm frustrated because my  takes Prednisone and the prescription information is incorrect and now he is out of medication.\"      Did not submit as a refill since medication information is deemed incorrect as per pt's wife.    "

## 2024-12-30 DIAGNOSIS — J45.50 SEVERE PERSISTENT ASTHMA WITHOUT COMPLICATION: ICD-10-CM

## 2024-12-30 RX ORDER — PREDNISONE 10 MG/1
TABLET ORAL
Qty: 40 TABLET | Refills: 0 | Status: SHIPPED | OUTPATIENT
Start: 2024-12-30 | End: 2025-01-11

## 2024-12-31 ENCOUNTER — TELEPHONE (OUTPATIENT)
Age: 69
End: 2024-12-31

## 2024-12-31 NOTE — TELEPHONE ENCOUNTER
Dr. Terrell    Pt Update from home health nurse:    Clarified Prednisone prescription.  Home health nurse concerned that anxiety worsening SOB, triggering asthma attacks. When home health nurse spoke to pt by himself he calmed, SOB improved. SpO2 97% RA.  Wife upset that Pulmonary medications not helping patient. Prednisone making blood sugars higher.

## 2024-12-31 NOTE — TELEPHONE ENCOUNTER
Piper calls from [facility] pharmacy and states they will be sending over CMN for pt's part b plan.

## 2025-01-02 ENCOUNTER — NURSE TRIAGE (OUTPATIENT)
Age: 70
End: 2025-01-02

## 2025-01-02 DIAGNOSIS — R00.2 PALPITATIONS: Primary | ICD-10-CM

## 2025-01-02 NOTE — TELEPHONE ENCOUNTER
I spoke with Carol.  Patient still having significant dyspnea on exertion and also losing weight.  Has not had any relief with any of the bronchodilators, Biologics, steroids.  Steroids are now causing his sugars to go up.  Will speak with Dr. Torres about any other recommendations.  Patient's wife is requesting [facility] though unsure what they would be able to offer.

## 2025-01-02 NOTE — TELEPHONE ENCOUNTER
Pt's wife calls in and sates that pt is really suffering and wondering if she can speak to Elijah right away or someone who can answer a few of her concerns. She is requesting call back right away if possible

## 2025-01-02 NOTE — TELEPHONE ENCOUNTER
Spoke with patient wife sara and she stated no one is answering her question she want to see Dr GEORGE and also she want a monitor that she can  because her  is bed bond and he can not come out of his house .    Patient is aware DR GEORGE schedule is full till June but that who she want to see DR GEORGE only       Can we do zio patient can  zio in office,Biotel would have to be shipped     Please advise

## 2025-01-02 NOTE — TELEPHONE ENCOUNTER
"Reason for Conversation: Received a call from [place]. The patient is experiencing an irregular HR which is new for him. The patient has chronic SOB with known asthma and is on Steroids for same. He is dizzy. It was found that the patient has not been taking his Metoprolol and for some reason is not able to pick the prescription up from the pharmacy until tomorrow. Pt is prescribed 25mg daily. IT is unclear if the new irregular HR (ranging from ) is contributing to his SOB and fatigue. The spouse reports that the patient is unable do anything without getting extremely SOB  which has been an ongoing issue.     Advised Dayanara and patient will inform provider of symptoms and call patient back with further recommendations. Dayanara and spouse also requested a video f/u visit for the patient. Advised that the provider would have to approve this.     VS/Weight: (Note: Please include date/time vitals/weight were measured)  Today:HR  irr, 130/80    Pain: No    Risk Factors: Hypertension and Diabetic    Recent relevant testing and date of testing: Stress, Echo, and Cardiac Catheterization     Medication: Metoprolol 25mg daily (has not been taking, Amlodipine-Benazepril 10-40 daily      Upcoming Office Visit: No    Last Office Visit: 9/24/24         Answer Assessment - Initial Assessment Questions  1. DESCRIPTION: \"Please describe your heart rate or heartbeat that you are having\" (e.g., fast/slow, regular/irregular, skipped or extra beats, \"palpitations\")      Irregular heart rate per Dayanara visiting nurse  2. ONSET: \"When did it start?\" (e.g., minutes, hours, days)       Unsure   3. DURATION: \"How long does it last\" (e.g., seconds, minutes, hours)      Unsure   4. PATTERN \"Does it come and go, or has it been constant since it started?\"  \"Does it get worse with exertion?\"   \"Are you feeling it now?\"      Unsure - pt is not mobile - chronic short of breath   6. HEART RATE: \"Can you tell me your heart " "rate?\" \"How many beats in 15 seconds?\"  Note: Not all patients can do this.          7. RECURRENT SYMPTOM: \"Have you ever had this before?\" If Yes, ask: \"When was the last time?\" and \"What happened that time?\"       Unsure   8. CAUSE: \"What do you think is causing the palpitations?\"      Unsure   9. CARDIAC HISTORY: \"Do you have any history of heart disease?\" (e.g., heart attack, angina, bypass surgery, angioplasty, arrhythmia)       HTN   10. OTHER SYMPTOMS: \"Do you have any other symptoms?\" (e.g., dizziness, chest pain, sweating, difficulty breathing)        Chronic SOB - on prednisone    Protocols used: Heart Rate and Heartbeat Questions-Adult-[place]    "

## 2025-01-03 NOTE — TELEPHONE ENCOUNTER
Violet: hey i forward you the message for patient ed chantelle 1955 his wife was not happy yesterday and she want to know if she can get a monitor she can  instead of biotel i know zio can be  not biotel      Rand Heath : Ok. Thats fine. Dustin Lazo: ok thank

## 2025-01-03 NOTE — TELEPHONE ENCOUNTER
Spoke with patient wife and she will be in to  zio live monitor for patient in [facility] .    When patient come in we will register live monitor to be release to wife

## 2025-01-07 NOTE — TELEPHONE ENCOUNTER
Incoming call by Carol:    Requesting to speak with SOFI Bueno to help Ed be admitted to a specialty hospital.  [phone]   Routing

## 2025-01-09 ENCOUNTER — APPOINTMENT (EMERGENCY)
Dept: RADIOLOGY | Facility: HOSPITAL | Age: 70
DRG: 189 | End: 2025-01-09
Payer: COMMERCIAL

## 2025-01-09 ENCOUNTER — HOSPITAL ENCOUNTER (INPATIENT)
Facility: HOSPITAL | Age: 70
LOS: 1 days | Discharge: HOME HEALTH | DRG: 189 | End: 2025-01-11
Attending: EMERGENCY MEDICINE | Admitting: STUDENT IN AN ORGANIZED HEALTH CARE EDUCATION/TRAINING PROGRAM
Payer: COMMERCIAL

## 2025-01-09 DIAGNOSIS — R53.1 WEAKNESS: ICD-10-CM

## 2025-01-09 DIAGNOSIS — J45.901 ASTHMA EXACERBATION ATTACKS: ICD-10-CM

## 2025-01-09 DIAGNOSIS — J44.1 COPD WITH ACUTE EXACERBATION (HCC): Primary | ICD-10-CM

## 2025-01-09 PROBLEM — J96.00 ACUTE RESPIRATORY FAILURE (HCC): Status: ACTIVE | Noted: 2025-01-09

## 2025-01-09 LAB
ALBUMIN SERPL BCG-MCNC: 3 G/DL (ref 3.5–5)
ALP SERPL-CCNC: 38 U/L (ref 34–104)
ALT SERPL W P-5'-P-CCNC: 40 U/L (ref 7–52)
ANION GAP SERPL CALCULATED.3IONS-SCNC: 7 MMOL/L (ref 4–13)
AST SERPL W P-5'-P-CCNC: 22 U/L (ref 13–39)
ATRIAL RATE: 90 BPM
BASE EX.OXY STD BLDV CALC-SCNC: 86.8 % (ref 60–80)
BASE EXCESS BLDV CALC-SCNC: -0.5 MMOL/L
BASOPHILS # BLD AUTO: 0.06 THOUSANDS/ΜL (ref 0–0.1)
BASOPHILS NFR BLD AUTO: 1 % (ref 0–1)
BILIRUB SERPL-MCNC: 0.39 MG/DL (ref 0.2–1)
BNP SERPL-MCNC: 46 PG/ML (ref 0–100)
BUN SERPL-MCNC: 23 MG/DL (ref 5–25)
CALCIUM ALBUM COR SERPL-MCNC: 9.4 MG/DL (ref 8.3–10.1)
CALCIUM SERPL-MCNC: 8.6 MG/DL (ref 8.4–10.2)
CHLORIDE SERPL-SCNC: 106 MMOL/L (ref 96–108)
CO2 SERPL-SCNC: 24 MMOL/L (ref 21–32)
CREAT SERPL-MCNC: 0.76 MG/DL (ref 0.6–1.3)
EOSINOPHIL # BLD AUTO: 0.21 THOUSAND/ΜL (ref 0–0.61)
EOSINOPHIL NFR BLD AUTO: 2 % (ref 0–6)
ERYTHROCYTE [DISTWIDTH] IN BLOOD BY AUTOMATED COUNT: 16.3 % (ref 11.6–15.1)
FLUAV RNA RESP QL NAA+PROBE: NEGATIVE
FLUBV RNA RESP QL NAA+PROBE: NEGATIVE
GFR SERPL CREATININE-BSD FRML MDRD: 91 ML/MIN/1.73SQ M
GLUCOSE SERPL-MCNC: 156 MG/DL (ref 65–140)
GLUCOSE SERPL-MCNC: 186 MG/DL (ref 65–140)
GLUCOSE SERPL-MCNC: 216 MG/DL (ref 65–140)
HCO3 BLDV-SCNC: 23.4 MMOL/L (ref 24–30)
HCT VFR BLD AUTO: 34.8 % (ref 36.5–49.3)
HGB BLD-MCNC: 10.9 G/DL (ref 12–17)
IMM GRANULOCYTES # BLD AUTO: 0.24 THOUSAND/UL (ref 0–0.2)
IMM GRANULOCYTES NFR BLD AUTO: 2 % (ref 0–2)
LYMPHOCYTES # BLD AUTO: 1.09 THOUSANDS/ΜL (ref 0.6–4.47)
LYMPHOCYTES NFR BLD AUTO: 9 % (ref 14–44)
MCH RBC QN AUTO: 26.2 PG (ref 26.8–34.3)
MCHC RBC AUTO-ENTMCNC: 31.3 G/DL (ref 31.4–37.4)
MCV RBC AUTO: 84 FL (ref 82–98)
MONOCYTES # BLD AUTO: 0.55 THOUSAND/ΜL (ref 0.17–1.22)
MONOCYTES NFR BLD AUTO: 5 % (ref 4–12)
NEUTROPHILS # BLD AUTO: 9.47 THOUSANDS/ΜL (ref 1.85–7.62)
NEUTS SEG NFR BLD AUTO: 81 % (ref 43–75)
NRBC BLD AUTO-RTO: 0 /100 WBCS
O2 CT BLDV-SCNC: 14.9 ML/DL
P AXIS: 40 DEGREES
PCO2 BLDV: 35.6 MM HG (ref 42–50)
PH BLDV: 7.43 [PH] (ref 7.3–7.4)
PLATELET # BLD AUTO: 314 THOUSANDS/UL (ref 149–390)
PMV BLD AUTO: 8.6 FL (ref 8.9–12.7)
PO2 BLDV: 56.2 MM HG (ref 35–45)
POTASSIUM SERPL-SCNC: 4.2 MMOL/L (ref 3.5–5.3)
PR INTERVAL: 134 MS
PROCALCITONIN SERPL-MCNC: 0.14 NG/ML
PROT SERPL-MCNC: 5.8 G/DL (ref 6.4–8.4)
QRS AXIS: 24 DEGREES
QRSD INTERVAL: 76 MS
QT INTERVAL: 356 MS
QTC INTERVAL: 435 MS
RBC # BLD AUTO: 4.16 MILLION/UL (ref 3.88–5.62)
RSV RNA RESP QL NAA+PROBE: NEGATIVE
SARS-COV-2 RNA RESP QL NAA+PROBE: NEGATIVE
SODIUM SERPL-SCNC: 137 MMOL/L (ref 135–147)
T WAVE AXIS: 40 DEGREES
VENTRICULAR RATE: 90 BPM
WBC # BLD AUTO: 11.62 THOUSAND/UL (ref 4.31–10.16)

## 2025-01-09 PROCEDURE — 82948 REAGENT STRIP/BLOOD GLUCOSE: CPT

## 2025-01-09 PROCEDURE — 84145 PROCALCITONIN (PCT): CPT | Performed by: NURSE PRACTITIONER

## 2025-01-09 PROCEDURE — 82805 BLOOD GASES W/O2 SATURATION: CPT | Performed by: NURSE PRACTITIONER

## 2025-01-09 PROCEDURE — 94640 AIRWAY INHALATION TREATMENT: CPT

## 2025-01-09 PROCEDURE — 99223 1ST HOSP IP/OBS HIGH 75: CPT | Performed by: INTERNAL MEDICINE

## 2025-01-09 PROCEDURE — 83880 ASSAY OF NATRIURETIC PEPTIDE: CPT | Performed by: NURSE PRACTITIONER

## 2025-01-09 PROCEDURE — 85025 COMPLETE CBC W/AUTO DIFF WBC: CPT | Performed by: NURSE PRACTITIONER

## 2025-01-09 PROCEDURE — 80053 COMPREHEN METABOLIC PANEL: CPT | Performed by: NURSE PRACTITIONER

## 2025-01-09 PROCEDURE — 93005 ELECTROCARDIOGRAM TRACING: CPT

## 2025-01-09 PROCEDURE — 94664 DEMO&/EVAL PT USE INHALER: CPT

## 2025-01-09 PROCEDURE — 36415 COLL VENOUS BLD VENIPUNCTURE: CPT | Performed by: NURSE PRACTITIONER

## 2025-01-09 PROCEDURE — 0241U HB NFCT DS VIR RESP RNA 4 TRGT: CPT | Performed by: NURSE PRACTITIONER

## 2025-01-09 PROCEDURE — 99285 EMERGENCY DEPT VISIT HI MDM: CPT | Performed by: NURSE PRACTITIONER

## 2025-01-09 PROCEDURE — 94660 CPAP INITIATION&MGMT: CPT

## 2025-01-09 PROCEDURE — 99285 EMERGENCY DEPT VISIT HI MDM: CPT

## 2025-01-09 PROCEDURE — 71045 X-RAY EXAM CHEST 1 VIEW: CPT

## 2025-01-09 PROCEDURE — 94760 N-INVAS EAR/PLS OXIMETRY 1: CPT

## 2025-01-09 RX ORDER — ACETAMINOPHEN 325 MG/1
650 TABLET ORAL EVERY 6 HOURS PRN
Status: DISCONTINUED | OUTPATIENT
Start: 2025-01-09 | End: 2025-01-11 | Stop reason: HOSPADM

## 2025-01-09 RX ORDER — TAMSULOSIN HYDROCHLORIDE 0.4 MG/1
0.4 CAPSULE ORAL DAILY
Status: DISCONTINUED | OUTPATIENT
Start: 2025-01-10 | End: 2025-01-11 | Stop reason: HOSPADM

## 2025-01-09 RX ORDER — LEVALBUTEROL INHALATION SOLUTION 1.25 MG/3ML
1.25 SOLUTION RESPIRATORY (INHALATION)
Status: DISCONTINUED | OUTPATIENT
Start: 2025-01-09 | End: 2025-01-11 | Stop reason: HOSPADM

## 2025-01-09 RX ORDER — TEMAZEPAM 15 MG/1
30 CAPSULE ORAL
Status: DISCONTINUED | OUTPATIENT
Start: 2025-01-09 | End: 2025-01-11 | Stop reason: HOSPADM

## 2025-01-09 RX ORDER — BUDESONIDE AND FORMOTEROL FUMARATE DIHYDRATE 160; 4.5 UG/1; UG/1
2 AEROSOL RESPIRATORY (INHALATION) 2 TIMES DAILY
Status: DISCONTINUED | OUTPATIENT
Start: 2025-01-09 | End: 2025-01-10

## 2025-01-09 RX ORDER — IPRATROPIUM BROMIDE AND ALBUTEROL SULFATE .5; 3 MG/3ML; MG/3ML
1 SOLUTION RESPIRATORY (INHALATION) ONCE
Status: COMPLETED | OUTPATIENT
Start: 2025-01-09 | End: 2025-01-09

## 2025-01-09 RX ORDER — IPRATROPIUM BROMIDE AND ALBUTEROL SULFATE 2.5; .5 MG/3ML; MG/3ML
3 SOLUTION RESPIRATORY (INHALATION) ONCE
Status: COMPLETED | OUTPATIENT
Start: 2025-01-09 | End: 2025-01-09

## 2025-01-09 RX ORDER — VENLAFAXINE HYDROCHLORIDE 75 MG/1
75 CAPSULE, EXTENDED RELEASE ORAL DAILY
Status: DISCONTINUED | OUTPATIENT
Start: 2025-01-10 | End: 2025-01-11 | Stop reason: HOSPADM

## 2025-01-09 RX ORDER — SODIUM CHLORIDE FOR INHALATION 0.9 %
3 VIAL, NEBULIZER (ML) INHALATION
Status: DISCONTINUED | OUTPATIENT
Start: 2025-01-09 | End: 2025-01-09

## 2025-01-09 RX ORDER — LISINOPRIL 20 MG/1
40 TABLET ORAL DAILY
Status: DISCONTINUED | OUTPATIENT
Start: 2025-01-10 | End: 2025-01-11 | Stop reason: HOSPADM

## 2025-01-09 RX ORDER — PANTOPRAZOLE SODIUM 40 MG/1
40 TABLET, DELAYED RELEASE ORAL
Status: DISCONTINUED | OUTPATIENT
Start: 2025-01-10 | End: 2025-01-11 | Stop reason: HOSPADM

## 2025-01-09 RX ORDER — ATORVASTATIN CALCIUM 10 MG/1
10 TABLET, FILM COATED ORAL DAILY
Status: DISCONTINUED | OUTPATIENT
Start: 2025-01-10 | End: 2025-01-11 | Stop reason: HOSPADM

## 2025-01-09 RX ORDER — METHYLPREDNISOLONE SODIUM SUCCINATE 40 MG/ML
40 INJECTION, POWDER, LYOPHILIZED, FOR SOLUTION INTRAMUSCULAR; INTRAVENOUS EVERY 12 HOURS SCHEDULED
Status: DISCONTINUED | OUTPATIENT
Start: 2025-01-09 | End: 2025-01-11

## 2025-01-09 RX ORDER — ALBUTEROL SULFATE 2.5 MG/3ML
1 SOLUTION RESPIRATORY (INHALATION) ONCE
Status: COMPLETED | OUTPATIENT
Start: 2025-01-09 | End: 2025-01-09

## 2025-01-09 RX ORDER — INSULIN LISPRO 100 [IU]/ML
1-5 INJECTION, SOLUTION INTRAVENOUS; SUBCUTANEOUS
Status: DISCONTINUED | OUTPATIENT
Start: 2025-01-09 | End: 2025-01-11 | Stop reason: HOSPADM

## 2025-01-09 RX ORDER — AMLODIPINE BESYLATE 10 MG/1
10 TABLET ORAL DAILY
Status: DISCONTINUED | OUTPATIENT
Start: 2025-01-10 | End: 2025-01-11 | Stop reason: HOSPADM

## 2025-01-09 RX ORDER — BUPROPION HYDROCHLORIDE 150 MG/1
300 TABLET ORAL DAILY
Status: DISCONTINUED | OUTPATIENT
Start: 2025-01-10 | End: 2025-01-11 | Stop reason: HOSPADM

## 2025-01-09 RX ORDER — ARIPIPRAZOLE 5 MG/1
10 TABLET ORAL DAILY
Status: DISCONTINUED | OUTPATIENT
Start: 2025-01-10 | End: 2025-01-11 | Stop reason: HOSPADM

## 2025-01-09 RX ORDER — ALBUTEROL SULFATE 90 UG/1
2 INHALANT RESPIRATORY (INHALATION) EVERY 4 HOURS PRN
Status: DISCONTINUED | OUTPATIENT
Start: 2025-01-09 | End: 2025-01-11 | Stop reason: HOSPADM

## 2025-01-09 RX ORDER — METOPROLOL SUCCINATE 25 MG/1
25 TABLET, EXTENDED RELEASE ORAL DAILY
Status: DISCONTINUED | OUTPATIENT
Start: 2025-01-10 | End: 2025-01-11 | Stop reason: HOSPADM

## 2025-01-09 RX ORDER — HEPARIN SODIUM 5000 [USP'U]/ML
5000 INJECTION, SOLUTION INTRAVENOUS; SUBCUTANEOUS EVERY 8 HOURS SCHEDULED
Status: DISCONTINUED | OUTPATIENT
Start: 2025-01-09 | End: 2025-01-11 | Stop reason: HOSPADM

## 2025-01-09 RX ORDER — OXYCODONE HYDROCHLORIDE 10 MG/1
10 TABLET ORAL EVERY 6 HOURS PRN
Status: DISCONTINUED | OUTPATIENT
Start: 2025-01-09 | End: 2025-01-11 | Stop reason: HOSPADM

## 2025-01-09 RX ORDER — MONTELUKAST SODIUM 10 MG/1
10 TABLET ORAL
Status: DISCONTINUED | OUTPATIENT
Start: 2025-01-09 | End: 2025-01-11 | Stop reason: HOSPADM

## 2025-01-09 RX ADMIN — OXYCODONE HYDROCHLORIDE 10 MG: 10 TABLET ORAL at 20:40

## 2025-01-09 RX ADMIN — IPRATROPIUM BROMIDE 0.5 MG: 0.5 SOLUTION RESPIRATORY (INHALATION) at 19:58

## 2025-01-09 RX ADMIN — IPRATROPIUM BROMIDE AND ALBUTEROL SULFATE 3 ML: 2.5; .5 SOLUTION RESPIRATORY (INHALATION) at 12:36

## 2025-01-09 RX ADMIN — ACETAMINOPHEN 650 MG: 325 TABLET, FILM COATED ORAL at 18:12

## 2025-01-09 RX ADMIN — INSULIN LISPRO 2 UNITS: 100 INJECTION, SOLUTION INTRAVENOUS; SUBCUTANEOUS at 21:46

## 2025-01-09 RX ADMIN — MONTELUKAST 10 MG: 10 TABLET, FILM COATED ORAL at 21:46

## 2025-01-09 RX ADMIN — HEPARIN SODIUM 5000 UNITS: 5000 INJECTION INTRAVENOUS; SUBCUTANEOUS at 18:12

## 2025-01-09 RX ADMIN — LEVALBUTEROL HYDROCHLORIDE 1.25 MG: 1.25 SOLUTION RESPIRATORY (INHALATION) at 19:58

## 2025-01-09 RX ADMIN — METHYLPREDNISOLONE SODIUM SUCCINATE 40 MG: 40 INJECTION, POWDER, FOR SOLUTION INTRAMUSCULAR; INTRAVENOUS at 18:09

## 2025-01-09 RX ADMIN — INSULIN LISPRO 1 UNITS: 100 INJECTION, SOLUTION INTRAVENOUS; SUBCUTANEOUS at 18:15

## 2025-01-09 RX ADMIN — BUDESONIDE AND FORMOTEROL FUMARATE DIHYDRATE 2 PUFF: 160; 4.5 AEROSOL RESPIRATORY (INHALATION) at 18:16

## 2025-01-09 NOTE — ASSESSMENT & PLAN NOTE
She recently discharged approximately 2 to 3 weeks ago at that time had acute asthma exacerbation likely overlap syndrome  Was evaluated last time by pulmonology  Was noted to be saturating 88% at room air since improved with 2 L nasal cannula  Mildly improved with nebs  Will place on IV steroids 40 every 12  Scheduled nebs  Wean oxygen as able

## 2025-01-09 NOTE — TELEPHONE ENCOUNTER
Pt spouse (Carol) called to inform Kirk that he just got picked up by EMS to go to ED. She states she has been waiting to get a call from her for the past 2 days and only wants to speak with Kirk regarding her 's case. She is asking for a cb today on either her mobile or his.

## 2025-01-09 NOTE — ED NOTES
Pt. Refused to ambulate at this time because he thought that he would get dizzy and lightheaded.       Cinthia John RN  01/09/25 [id number]

## 2025-01-09 NOTE — ED PROVIDER NOTES
Time reflects when diagnosis was documented in both MDM as applicable and the Disposition within this note       Time User Action Codes Description Comment    1/9/2025  1:23 PM Brent Briceno Add [J44.1] COPD with acute exacerbation (HCC)     1/9/2025  1:23 PM Brent Briceno Add [R53.1] Weakness           ED Disposition       ED Disposition   Admit    Condition   Stable    Date/Time   u Jan 9, 2025  1:23 PM    Comment   Case was discussed with Ricci and the patient's admission status was agreed to be Admission Status: observation status to the service of Dr. Borja .               Assessment & Plan       Medical Decision Making  Decision to admit the patient based on his profound weakness.  He was unable to get up to ambulate for ambulatory pulse oximeter.  With hypoxia in the field and profound weakness and COPD exacerbation will bring the patient in for admission.    Amount and/or Complexity of Data Reviewed  Labs: ordered.  Radiology: ordered.    Risk  Prescription drug management.  Decision regarding hospitalization.             Medications   heparin (porcine) subcutaneous injection 5,000 Units (has no administration in time range)   levalbuterol (XOPENEX) inhalation solution 1.25 mg (has no administration in time range)   ipratropium (ATROVENT) 0.02 % inhalation solution 0.5 mg (has no administration in time range)   acetaminophen (TYLENOL) tablet 650 mg (has no administration in time range)   methylPREDNISolone sodium succinate (Solu-MEDROL) injection 40 mg (has no administration in time range)   insulin lispro (HumALOG/ADMELOG) 100 units/mL subcutaneous injection 1-5 Units (has no administration in time range)   insulin lispro (HumALOG/ADMELOG) 100 units/mL subcutaneous injection 1-5 Units (has no administration in time range)   albuterol (FOR EMS ONLY) (2.5 mg/3 mL) 0.083 % inhalation solution 2.5 mg (0 mg Does not apply Given to EMS 1/9/25 1133)   ipratropium-albuterol (FOR EMS ONLY) (DUO-NEB) 0.5-2.5 mg/3 mL  inhalation solution 3 mL (0 mL Does not apply Given to EMS 1/9/25 1133)   ipratropium-albuterol (DUO-NEB) 0.5-2.5 mg/3 mL inhalation solution 3 mL (3 mL Nebulization Given 1/9/25 1236)       ED Risk Strat Scores                  Identification of Seniors at Risk      Flowsheet Row Most Recent Value   (ISAR) Identification of Seniors at Risk    Before the illness or injury that brought you to the Emergency, did you need someone to help you on a regular basis? 1 Filed at: 01/09/2025 1127   In the last 24 hours, have you needed more help than usual? 1 Filed at: 01/09/2025 1127   Have you been hospitalized for one or more nights during the past 6 months? 1 Filed at: 01/09/2025 1127   In general, do you see well? 1 Filed at: 01/09/2025 1127   In general, do you have serious problems with your memory? 1 Filed at: 01/09/2025 1127   Do you take more than three different medications every day? 1 Filed at: 01/09/2025 1127   ISAR Score 6 Filed at: 01/09/2025 1127                SBIRT 22yo+      Flowsheet Row Most Recent Value   Initial Alcohol Screen: US AUDIT-C     1. How often do you have a drink containing alcohol? 0 Filed at: 01/09/2025 1130   2. How many drinks containing alcohol do you have on a typical day you are drinking?  0 Filed at: 01/09/2025 1130   3b. FEMALE Any Age, or MALE 65+: How often do you have 4 or more drinks on one occassion? 0 Filed at: 01/09/2025 1130   Audit-C Score 0 Filed at: 01/09/2025 1130   LETICIA: How many times in the past year have you...    Used an illegal drug or used a prescription medication for non-medical reasons? Never Filed at: 01/09/2025 1130                            History of Present Illness       Chief Complaint   Patient presents with    Shortness of Breath     Pt. Presents to ER from home with c/o shortness of breath getting progressively worse over the past few days.         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      Past Surgical History:   Procedure Laterality Date    BACK SURGERY      ELBOW SURGERY      KNEE SURGERY      NECK SURGERY      SHOULDER SURGERY      SPINE SURGERY  2004      Family History   Family history unknown: Yes      Social History     Tobacco Use    Smoking status: Never     Passive exposure: Never    Smokeless tobacco: Never    Tobacco comments:     Patient admits to using marijuana, edibles and smoking    Vaping Use    Vaping status: Never Used   Substance Use Topics    Alcohol use: Not Currently    Drug use: Yes     Types: Marijuana, Oxycodone, Psilocybin      E-Cigarette/Vaping    E-Cigarette Use Never User       E-Cigarette/Vaping Substances    Nicotine No     THC No     CBD Yes     Flavoring No     Other No     Unknown No       I have reviewed and agree with the history as documented.     HPI    Review of Systems   Constitutional:  Negative for chills and fever.   HENT:  Negative for ear pain and sore throat.    Eyes:  Negative for pain and visual disturbance.   Respiratory:  Positive for shortness of breath and wheezing. Negative for cough.    Cardiovascular:  Negative for chest pain and palpitations.   Gastrointestinal:  Negative for abdominal pain and vomiting.   Genitourinary:  Negative for dysuria and hematuria.   Musculoskeletal:  Negative for arthralgias and back pain.   Skin:  Negative for color change and rash.   Neurological:  Positive for weakness. Negative for seizures and syncope.   All other systems reviewed and are negative.          Objective       ED Triage Vitals [01/09/25 1127]    Temperature Pulse Blood Pressure Respirations SpO2 Patient Position - Orthostatic VS   98.3 °F (36.8 °C) 91 147/75 20 97 % Sitting      Temp Source Heart Rate Source BP Location FiO2 (%) Pain Score    Oral Monitor Right arm -- 4      Vitals      Date and Time Temp Pulse SpO2 Resp BP Pain Score FACES Pain Rating User   01/09/25 1430 -- 91 92 % 18 113/64 -- -- NL   01/09/25 1330 -- 88 90 % 20 102/79 -- -- SB   01/09/25 1238 -- 92 97 % 18 108/74 -- -- NL   01/09/25 1130 -- 91 96 % 20 147/75 -- -- NL   01/09/25 1127 98.3 °F (36.8 °C) 91 97 % 20 147/75 4 -- NL            Physical Exam  Vitals and nursing note reviewed.   Constitutional:       General: He is not in acute distress.     Appearance: He is well-developed.   HENT:      Head: Normocephalic and atraumatic.      Nose: No rhinorrhea.   Eyes:      General:         Right eye: No discharge.         Left eye: No discharge.      Conjunctiva/sclera: Conjunctivae normal.   Cardiovascular:      Rate and Rhythm: Normal rate.   Pulmonary:      Effort: Accessory muscle usage present. No respiratory distress.      Breath sounds: Wheezing present.   Abdominal:      General: There is no distension.      Tenderness: There is no guarding.   Musculoskeletal:         General: No deformity.      Cervical back: Normal range of motion and neck supple.   Skin:     General: Skin is warm and dry.      Coloration: Skin is not pale.   Neurological:      Mental Status: He is alert and oriented to person, place, and time.      Coordination: Coordination normal.   Psychiatric:         Mood and Affect: Mood normal.         Results Reviewed       Procedure Component Value Units Date/Time    FLU/RSV/COVID - if FLU/RSV clinically relevant (2hr TAT) [[id number]]  (Normal) Collected: 01/09/25 1226    Lab Status: Final result Specimen: Nares from Nose Updated: 01/09/25 1312     SARS-CoV-2 Negative     INFLUENZA A PCR Negative     INFLUENZA B PCR Negative     RSV PCR Negative    Narrative:      This  test has been performed using the CoV-2/Flu/RSV plus assay on the [employer] GeneXpert platform. This test has been validated by the  and verified by the performing laboratory.     This test is designed to amplify and detect the following: nucleocapsid (N), envelope (E), and RNA-dependent RNA polymerase (RdRP) genes of the SARS-CoV-2 genome; matrix (M), basic polymerase (PB2), and acidic protein (PA) segments of the influenza A genome; matrix (M) and non-structural protein (NS) segments of the influenza B genome, and the nucleocapsid genes of RSV A and RSV B.     Positive results are indicative of the presence of Flu A, Flu B, RSV, and/or SARS-CoV-2 RNA. Positive results for SARS-CoV-2 or suspected novel influenza should be reported to state, local, or federal health departments according to local reporting requirements.      All results should be assessed in conjunction with clinical presentation and other laboratory markers for clinical management.     FOR PEDIATRIC PATIENTS - copy/paste COVID Guidelines URL to browser: [email or web address]       Procalcitonin [[id number]]  (Normal) Collected: 01/09/25 1226    Lab Status: Final result Specimen: Blood from Arm, Right Updated: 01/09/25 1257     Procalcitonin 0.14 ng/ml     B-Type Natriuretic Peptide(BNP) [[id number]]  (Normal) Collected: 01/09/25 1226    Lab Status: Final result Specimen: Blood from Arm, Right Updated: 01/09/25 1255     BNP 46 pg/mL     Comprehensive metabolic panel [[id number]]  (Abnormal) Collected: 01/09/25 1226    Lab Status: Final result Specimen: Blood from Arm, Right Updated: 01/09/25 1248     Sodium 137 mmol/L      Potassium 4.2 mmol/L      Chloride 106 mmol/L      CO2 24 mmol/L      ANION GAP 7 mmol/L      BUN 23 mg/dL      Creatinine 0.76 mg/dL      Glucose 186 mg/dL      Calcium 8.6 mg/dL      Corrected Calcium 9.4 mg/dL      AST 22 U/L      ALT 40 U/L      Alkaline Phosphatase  38 U/L      Total Protein 5.8 g/dL      Albumin 3.0 g/dL      Total Bilirubin 0.39 mg/dL      eGFR 91 ml/min/1.73sq m     Narrative:      [employer] guidelines for Chronic Kidney Disease (CKD):     Stage 1 with normal or high GFR (GFR > 90 mL/min/1.73 square meters)    Stage 2 Mild CKD (GFR = 60-89 mL/min/1.73 square meters)    Stage 3A Moderate CKD (GFR = 45-59 mL/min/1.73 square meters)    Stage 3B Moderate CKD (GFR = 30-44 mL/min/1.73 square meters)    Stage 4 Severe CKD (GFR = 15-29 mL/min/1.73 square meters)    Stage 5 End Stage CKD (GFR <15 mL/min/1.73 square meters)  Note: GFR calculation is accurate only with a steady state creatinine    CBC and differential [[id number]]  (Abnormal) Collected: 01/09/25 1226    Lab Status: Final result Specimen: Blood from Arm, Right Updated: 01/09/25 1232     WBC 11.62 Thousand/uL      RBC 4.16 Million/uL      Hemoglobin 10.9 g/dL      Hematocrit 34.8 %      MCV 84 fL      MCH 26.2 pg      MCHC 31.3 g/dL      RDW 16.3 %      MPV 8.6 fL      Platelets 314 Thousands/uL      nRBC 0 /100 WBCs      Segmented % 81 %      Immature Grans % 2 %      Lymphocytes % 9 %      Monocytes % 5 %      Eosinophils Relative 2 %      Basophils Relative 1 %      Absolute Neutrophils 9.47 Thousands/µL      Absolute Immature Grans 0.24 Thousand/uL      Absolute Lymphocytes 1.09 Thousands/µL      Absolute Monocytes 0.55 Thousand/µL      Eosinophils Absolute 0.21 Thousand/µL      Basophils Absolute 0.06 Thousands/µL     Blood gas, venous [[id number]]  (Abnormal) Collected: 01/09/25 1226    Lab Status: Final result Specimen: Blood from Arm, Right Updated: 01/09/25 1232     pH, Baljit 7.435     pCO2, Baljit 35.6 mm Hg      pO2, Baljit 56.2 mm Hg      HCO3, Baljit 23.4 mmol/L      Base Excess, Baljit -0.5 mmol/L      O2 Content, Baljit 14.9 ml/dL      O2 HGB, VENOUS 86.8 %             XR chest 1 view portable   Final Interpretation by Wai Hoffmann MD (01/09 1446)      Right sided  scarring/atelectasis.      No new infiltrate.            Resident: Renato Doran I, the attending radiologist, have reviewed the images and agree with the final report above.      Workstation performed: [id number]             Procedures    ED Medication and Procedure Management   Prior to Admission Medications   Prescriptions Last Dose Informant Patient Reported? Taking?   ARIPiprazole (ABILIFY) 10 mg tablet 2025 Self Yes Yes   Austedo 12 MG TABS 2025 Self Yes Yes   Breztri Aerosphere 160-9-4.8 MCG/ACT AERO 2025  No Yes   Sig: INHALE 2 PUFFS BY MOUTH 2 TIMES PER DAY *NEW PRESCRIPTION REQUEST*   Dupixent subcutaneous injection 2025 Morning  No Yes   Sig: INJECT 2ML SUBCUTANEOUSLY 1 TIME EVERY 2 WEEKS *NEW PRESCRIPTION REQUEST*    MG tablet Not Taking Self Yes No   Patient not taking: Reported on 2025   OneTouch Ultra test strip 2025 Self Yes Yes   Sig: TEST DAILY.. DIAGNOSIS E11.9   albuterol (2.5 mg/3 mL) 0.083 % nebulizer solution 2025  No Yes   Sig: USE WITH NEBULIZER EVERY 4 HOURS AS NEEDED *NEW PRESCRIPTION REQUEST*   albuterol (ACCUNEB) 1.25 MG/3ML nebulizer solution Past Week Self Yes Yes   Sig: Take 1.25 mg by nebulization every 6 (six) hours as needed for wheezing   albuterol (PROVENTIL HFA,VENTOLIN HFA) 90 mcg/act inhaler 2025  No Yes   Sig: INHALE 2 PUFFS BY MOUTH EVERY 4 HOURS AS NEEDED *NEW PRESCRIPTION REQUEST*   amLODIPine-benazepril (LOTREL) 10-40 MG per capsule 2025 Self Yes Yes   aspirin (ECOTRIN LOW STRENGTH) 81 mg EC tablet Not Taking Self Yes No   Sig: Take 81 mg by mouth daily   Patient not taking: Reported on 2024   atorvastatin (LIPITOR) 10 mg tablet 2025 Self Yes Yes   Sig: Take 10 mg by mouth daily   buPROPion (WELLBUTRIN XL) 300 mg 24 hr tablet 2025 Self Yes Yes   Si tab(s)   esomeprazole (NexIUM) 40 MG capsule 2025 Self Yes Yes   Sig: Take 40 mg by mouth   metFORMIN (GLUCOPHAGE) 500 mg tablet 2025 Self Yes Yes    Sig: Take 1 tablet by mouth 2 (two) times a day with meals   metoprolol succinate (TOPROL-XL) 25 mg 24 hr tablet 2025  No Yes   Sig: TAKE 1 TABLET (25 MG TOTAL) BY MOUTH DAILY.   montelukast (SINGULAIR) 10 mg tablet 2025 Self Yes Yes   Sig: Take 10 mg by mouth   multivitamin (THERAGRAN) TABS 2025 Self Yes Yes   Sig: Take 1 tablet by mouth   nitroglycerin (NITROSTAT) 0.4 mg SL tablet More than a month  No No   Sig: Place 1 tablet (0.4 mg total) under the tongue every 5 (five) minutes as needed for chest pain   oxyCODONE (ROXICODONE) 5 mg immediate release tablet 2025 Self Yes Yes   Sig: Take 10 mg by mouth every 6 (six) hours as needed   predniSONE 10 mg tablet 2025  No Yes   Sig: Use 2 tablets in a.m. daily and 1 tablet p.m. daily   promethazine (PHENERGAN) 25 mg tablet Not Taking Self Yes No   Sig: Take 25 mg by mouth daily as needed   Patient not taking: Reported on 2025   tamsulosin (FLOMAX) 0.4 mg 2025 Self Yes Yes   Sig: Take 0.4 mg by mouth daily   temazepam (RESTORIL) 30 mg capsule 2025 Self Yes Yes   Sig: Take 30 mg by mouth daily at bedtime as needed   tiZANidine (ZANAFLEX) 4 mg tablet 2025 Self Yes Yes   venlafaxine (EFFEXOR-XR) 75 mg 24 hr capsule 2025 Self Yes Yes   Si cap(s)      Facility-Administered Medications: None     Current Discharge Medication List        CONTINUE these medications which have NOT CHANGED    Details   albuterol (2.5 mg/3 mL) 0.083 % nebulizer solution USE WITH NEBULIZER EVERY 4 HOURS AS NEEDED *NEW PRESCRIPTION REQUEST*  Qty: 9 mL, Refills: 10    Associated Diagnoses: Severe persistent asthma without complication      albuterol (ACCUNEB) 1.25 MG/3ML nebulizer solution Take 1.25 mg by nebulization every 6 (six) hours as needed for wheezing      albuterol (PROVENTIL HFA,VENTOLIN HFA) 90 mcg/act inhaler INHALE 2 PUFFS BY MOUTH EVERY 4 HOURS AS NEEDED *NEW PRESCRIPTION REQUEST*  Qty: 8.5 g, Refills: 10    Comments: Substitution to a  formulary equivalent within the same pharmaceutical class is authorized.  Associated Diagnoses: Chronic obstructive asthma (HCC)      amLODIPine-benazepril (LOTREL) 10-40 MG per capsule       ARIPiprazole (ABILIFY) 10 mg tablet       atorvastatin (LIPITOR) 10 mg tablet Take 10 mg by mouth daily      Austedo 12 MG TABS       Breztri Aerosphere 160-9-4.8 MCG/ACT AERO INHALE 2 PUFFS BY MOUTH 2 TIMES PER DAY *NEW PRESCRIPTION REQUEST*  Qty: 10.7 g, Refills: 10    Associated Diagnoses: Mild intermittent asthma without complication      buPROPion (WELLBUTRIN XL) 300 mg 24 hr tablet 1 tab(s)      Dupixent subcutaneous injection INJECT 2ML SUBCUTANEOUSLY 1 TIME EVERY 2 WEEKS *NEW PRESCRIPTION REQUEST*  Qty: 2 mL, Refills: 10    Associated Diagnoses: Severe persistent asthma without complication      esomeprazole (NexIUM) 40 MG capsule Take 40 mg by mouth      metFORMIN (GLUCOPHAGE) 500 mg tablet Take 1 tablet by mouth 2 (two) times a day with meals      metoprolol succinate (TOPROL-XL) 25 mg 24 hr tablet TAKE 1 TABLET (25 MG TOTAL) BY MOUTH DAILY.  Qty: 90 tablet, Refills: 1    Associated Diagnoses: Anginal equivalent (HCC)      montelukast (SINGULAIR) 10 mg tablet Take 10 mg by mouth      multivitamin (THERAGRAN) TABS Take 1 tablet by mouth      OneTouch Ultra test strip TEST DAILY.. DIAGNOSIS E11.9      oxyCODONE (ROXICODONE) 5 mg immediate release tablet Take 10 mg by mouth every 6 (six) hours as needed      predniSONE 10 mg tablet Use 2 tablets in a.m. daily and 1 tablet p.m. daily  Qty: 40 tablet, Refills: 0    Associated Diagnoses: Severe persistent asthma without complication      tamsulosin (FLOMAX) 0.4 mg Take 0.4 mg by mouth daily      temazepam (RESTORIL) 30 mg capsule Take 30 mg by mouth daily at bedtime as needed      tiZANidine (ZANAFLEX) 4 mg tablet       venlafaxine (EFFEXOR-XR) 75 mg 24 hr capsule 1 cap(s)      aspirin (ECOTRIN LOW STRENGTH) 81 mg EC tablet Take 81 mg by mouth daily       MG tablet        nitroglycerin (NITROSTAT) 0.4 mg SL tablet Place 1 tablet (0.4 mg total) under the tongue every 5 (five) minutes as needed for chest pain  Qty: 30 tablet, Refills: 3    Associated Diagnoses: Anginal equivalent (HCC)      promethazine (PHENERGAN) 25 mg tablet Take 25 mg by mouth daily as needed           No discharge procedures on file.  ED SEPSIS DOCUMENTATION   Time reflects when diagnosis was documented in both MDM as applicable and the Disposition within this note       Time User Action Codes Description Comment    1/9/2025  1:23 PM Brent Briceno Add [J44.1] COPD with acute exacerbation (HCC)     1/9/2025  1:23 PM Brent Briceno [R53.1] Weakness                  JAMIL Guevara  01/09/25 1551

## 2025-01-09 NOTE — ASSESSMENT & PLAN NOTE
Presented with generalized weakness reports difficulty ambulating likely in setting of COPD/asthma exacerbation  Will consult PT/OT for further evaluation

## 2025-01-09 NOTE — PLAN OF CARE
Problem: PAIN - ADULT  Goal: Verbalizes/displays adequate comfort level or baseline comfort level  Description: Interventions:  - Encourage patient to monitor pain and request assistance  - Assess pain using appropriate pain scale  - Administer analgesics based on type and severity of pain and evaluate response  - Implement non-pharmacological measures as appropriate and evaluate response  - Consider cultural and social influences on pain and pain management  - Notify physician/advanced practitioner if interventions unsuccessful or patient reports new pain  Outcome: Progressing     Problem: INFECTION - ADULT  Goal: Absence or prevention of progression during hospitalization  Description: INTERVENTIONS:  - Assess and monitor for signs and symptoms of infection  - Monitor lab/diagnostic results  - Monitor all insertion sites, i.e. indwelling lines, tubes, and drains  - Monitor endotracheal if appropriate and nasal secretions for changes in amount and color  - Neon appropriate cooling/warming therapies per order  - Administer medications as ordered  - Instruct and encourage patient and family to use good hand hygiene technique  - Identify and instruct in appropriate isolation precautions for identified infection/condition  Outcome: Progressing  Goal: Absence of fever/infection during neutropenic period  Description: INTERVENTIONS:  - Monitor WBC    Outcome: Progressing     Problem: SAFETY ADULT  Goal: Maintain or return to baseline ADL function  Description: INTERVENTIONS:  -  Assess patient's ability to carry out ADLs; assess patient's baseline for ADL function and identify physical deficits which impact ability to perform ADLs (bathing, care of mouth/teeth, toileting, grooming, dressing, etc.)  - Assess/evaluate cause of self-care deficits   - Assess range of motion  - Assess patient's mobility; develop plan if impaired  - Assess patient's need for assistive devices and provide as appropriate  - Encourage  maximum independence but intervene and supervise when necessary  - Involve family in performance of ADLs  - Assess for home care needs following discharge   - Consider OT consult to assist with ADL evaluation and planning for discharge  - Provide patient education as appropriate  Outcome: Progressing     Problem: DISCHARGE PLANNING  Goal: Discharge to home or other facility with appropriate resources  Description: INTERVENTIONS:  - Identify barriers to discharge w/patient and caregiver  - Arrange for needed discharge resources and transportation as appropriate  - Identify discharge learning needs (meds, wound care, etc.)  - Arrange for interpretive services to assist at discharge as needed  - Refer to Case Management Department for coordinating discharge planning if the patient needs post-hospital services based on physician/advanced practitioner order or complex needs related to functional status, cognitive ability, or social support system  Outcome: Progressing     Problem: Knowledge Deficit  Goal: Patient/family/caregiver demonstrates understanding of disease process, treatment plan, medications, and discharge instructions  Description: Complete learning assessment and assess knowledge base.  Interventions:  - Provide teaching at level of understanding  - Provide teaching via preferred learning methods  Outcome: Progressing

## 2025-01-09 NOTE — ASSESSMENT & PLAN NOTE
"Lab Results   Component Value Date    HGBA1C 8.1 (H) 12/14/2024       No results for input(s): \"POCGLU\" in the last 72 hours.    Blood Sugar Average: Last 72 hrs:    Hold metformin  Sliding-scale insulin  "

## 2025-01-09 NOTE — H&P
"H&P - Hospitalist   Name: Fahad Hernandez 69 y.o. male I MRN: [id number]  Unit/Bed#: ED 16 I Date of Admission: 1/9/2025   Date of Service: 1/9/2025 I Hospital Day: 0     Assessment & Plan  Severe persistent asthma with acute exacerbation  She recently discharged approximately 2 to 3 weeks ago at that time had acute asthma exacerbation likely overlap syndrome  Was evaluated last time by pulmonology  Was noted to be saturating 88% at room air since improved with 2 L nasal cannula  Mildly improved with nebs  Will place on IV steroids 40 every 12  Scheduled nebs  Wean oxygen as able  Hypertension  Blood pressure currently controlled  Continue amlodipine/ace  Type 2 diabetes mellitus with hyperglycemia, without long-term current use of insulin (HCC)  Lab Results   Component Value Date    HGBA1C 8.1 (H) 12/14/2024       No results for input(s): \"POCGLU\" in the last 72 hours.    Blood Sugar Average: Last 72 hrs:    Hold metformin  Sliding-scale insulin  Gastroesophageal reflux disease with esophagitis  Continue PPI  Generalized weakness  Presented with generalized weakness reports difficulty ambulating likely in setting of COPD/asthma exacerbation  Will consult PT/OT for further evaluation  Acute respiratory failure (HCC)  Noted to be saturating 88% on room air  Secondary to COPD/asthma  Plan as outlined above      VTE Pharmacologic Prophylaxis:   Moderate Risk (Score 3-4) - Pharmacological DVT Prophylaxis Ordered: heparin.  Code Status: full code  Discussion with family: Patient declined call to .     Anticipated Length of Stay: Patient will be admitted on an observation basis with an anticipated length of stay of less than 2 midnights secondary to acute asthma exacerbation.    History of Present Illness   Chief Complaint: Shortness of breath    Fahad Hernandez is a 69 y.o. male with past medical history significant type 2 diabetes, GERD, hypertension initially presented with shortness of breath and " wheezing  Review of Systems   Constitutional:  Negative for appetite change, chills, diaphoresis, fatigue, fever and unexpected weight change.   HENT:  Negative for congestion, rhinorrhea and sore throat.    Eyes:  Negative for photophobia and visual disturbance.   Respiratory:  Positive for shortness of breath and wheezing. Negative for cough.    Cardiovascular:  Negative for chest pain, palpitations and leg swelling.   Gastrointestinal:  Negative for abdominal pain, anal bleeding, blood in stool, constipation, diarrhea, nausea and vomiting.   Genitourinary:  Negative for decreased urine volume, difficulty urinating, dysuria, flank pain, frequency, hematuria and urgency.   Musculoskeletal:  Negative for arthralgias, back pain, joint swelling and myalgias.   Skin:  Negative for color change and rash.   Neurological:  Negative for dizziness, seizures, facial asymmetry, speech difficulty, numbness and headaches.   Psychiatric/Behavioral:  Negative for agitation, confusion and decreased concentration. The patient is not nervous/anxious.        Historical Information   Past Medical History:   Diagnosis Date    Asthma     COPD (chronic obstructive pulmonary disease) ([facility])     Dementia ([facility])     Diabetes mellitus (HCC)     GERD (gastroesophageal reflux disease)     History of stroke 11/07/2019    Hypertension     Interstitial lung disease (HCC)     Major depressive disorder with current active episode 11/07/2019    Pneumonia     Rotator cuff arthropathy of right shoulder 12/17/2024    Sleep apnea     Sleep apnea, obstructive     Stroke ([facility])     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     Past Surgical History:   Procedure Laterality Date    BACK SURGERY      ELBOW SURGERY      KNEE SURGERY      NECK SURGERY      SHOULDER SURGERY      SPINE SURGERY  2004     Social History     Tobacco Use    Smoking status: Never     Passive exposure: Never    Smokeless tobacco: Never    Tobacco comments:     Patient admits to using marijuana,  edibles and smoking    Vaping Use    Vaping status: Never Used   Substance and Sexual Activity    Alcohol use: Not Currently    Drug use: Yes     Types: Marijuana, Oxycodone, Psilocybin    Sexual activity: Yes     Partners: Female     Birth control/protection: Male Sterilization     E-Cigarette/Vaping    E-Cigarette Use Never User      E-Cigarette/Vaping Substances    Nicotine No     THC No     CBD Yes     Flavoring No     Other No     Unknown No      Family History   Family history unknown: Yes     Social History:  Marital Status: /Civil Union   Patient Pre-hospital Living Situation: Home  Patient Pre-hospital Level of Mobility: walks  Patient Pre-hospital Diet Restrictions: none    Meds/Allergies   I have reviewed home medications with patient personally.  Prior to Admission medications    Medication Sig Start Date End Date Taking? Authorizing Provider   albuterol (2.5 mg/3 mL) 0.083 % nebulizer solution USE WITH NEBULIZER EVERY 4 HOURS AS NEEDED *NEW PRESCRIPTION REQUEST* 12/27/24  Yes Kirk Bueno PA-C   albuterol (ACCUNEB) 1.25 MG/3ML nebulizer solution Take 1.25 mg by nebulization every 6 (six) hours as needed for wheezing   Yes Historical Provider, MD   albuterol (PROVENTIL HFA,VENTOLIN HFA) 90 mcg/act inhaler INHALE 2 PUFFS BY MOUTH EVERY 4 HOURS AS NEEDED *NEW PRESCRIPTION REQUEST* 12/27/24  Yes Kirk Bueno PA-C   amLODIPine-benazepril (LOTREL) 10-40 MG per capsule  2/26/19  Yes Historical Provider, MD   ARIPiprazole (ABILIFY) 10 mg tablet  9/28/20  Yes Historical Provider, MD   atorvastatin (LIPITOR) 10 mg tablet Take 10 mg by mouth daily 2/28/22  Yes Historical Provider, MD Lamartedping 12 MG TABS  5/30/24  Yes Historical Provider, MD [place] Aerosphere 160-9-4.8 MCG/ACT AERO INHALE 2 PUFFS BY MOUTH 2 TIMES PER DAY *NEW PRESCRIPTION REQUEST* 12/27/24  Yes Kirk Bueno PA-C   buPROPion (WELLBUTRIN XL) 300 mg 24 hr tablet 1 tab(s)   Yes Historical Provider, MD   Dupixent subcutaneous  injection INJECT 2ML SUBCUTANEOUSLY 1 TIME EVERY 2 WEEKS *NEW PRESCRIPTION REQUEST* 12/27/24  Yes Kirk Bueno PA-C   esomeprazole (NexIUM) 40 MG capsule Take 40 mg by mouth   Yes Historical Provider, MD   metFORMIN (GLUCOPHAGE) 500 mg tablet Take 1 tablet by mouth 2 (two) times a day with meals 2/14/22  Yes Historical Provider, MD   metoprolol succinate (TOPROL-XL) 25 mg 24 hr tablet TAKE 1 TABLET (25 MG TOTAL) BY MOUTH DAILY. 10/24/24  Yes JAMIL Lindsay   montelukast (SINGULAIR) 10 mg tablet Take 10 mg by mouth 11/25/20  Yes Historical Provider, MD   multivitamin (THERAGRAN) TABS Take 1 tablet by mouth   Yes Historical Provider, MD   OneTouch Ultra test strip TEST DAILY.. DIAGNOSIS E11.9 3/28/23  Yes Historical Provider, MD   oxyCODONE (ROXICODONE) 5 mg immediate release tablet Take 10 mg by mouth every 6 (six) hours as needed 10/20/20  Yes Historical Provider, MD   predniSONE 10 mg tablet Use 2 tablets in a.m. daily and 1 tablet p.m. daily 12/30/24  Yes Kirk Bueno PA-C   tamsulosin (FLOMAX) 0.4 mg Take 0.4 mg by mouth daily 2/13/22  Yes Historical Provider, MD   temazepam (RESTORIL) 30 mg capsule Take 30 mg by mouth daily at bedtime as needed 3/15/22  Yes Historical Provider, MD   tiZANidine (ZANAFLEX) 4 mg tablet  3/8/22  Yes Historical Provider, MD   venlafaxine (EFFEXOR-XR) 75 mg 24 hr capsule 1 cap(s)   Yes Historical Provider, MD   aspirin (ECOTRIN LOW STRENGTH) 81 mg EC tablet Take 81 mg by mouth daily  Patient not taking: Reported on 12/14/2024    Historical Provider, MD    MG tablet  3/14/23   Historical Provider, MD   nitroglycerin (NITROSTAT) 0.4 mg SL tablet Place 1 tablet (0.4 mg total) under the tongue every 5 (five) minutes as needed for chest pain 10/1/24   JAMIL Lindsay   promethazine (PHENERGAN) 25 mg tablet Take 25 mg by mouth daily as needed  Patient not taking: Reported on 1/9/2025 9/4/24   Historical Provider, MD     Allergies   Allergen Reactions    Beta  Adrenergic Blockers      Pt states they make him feel crappy.       Objective :  Temp:  [98.3 °F (36.8 °C)] 98.3 °F (36.8 °C)  HR:  [91-92] 92  BP: (108-147)/(74-75) 108/74  Resp:  [18-20] 18  SpO2:  [96 %-97 %] 97 %  O2 Device: Other (comment)  Nasal Cannula O2 Flow Rate (L/min):  [4 L/min] 4 L/min    Physical Exam  Constitutional:       General: He is not in acute distress.     Appearance: He is well-developed. He is not diaphoretic.   HENT:      Head: Normocephalic and atraumatic.      Nose: Nose normal.      Mouth/Throat:      Pharynx: No oropharyngeal exudate.   Eyes:      General: No scleral icterus.        Right eye: No discharge.         Left eye: No discharge.   Neck:      Vascular: No JVD.   Cardiovascular:      Rate and Rhythm: Normal rate and regular rhythm.      Heart sounds: Normal heart sounds. No murmur heard.     No friction rub. No gallop.   Pulmonary:      Effort: Pulmonary effort is normal. No respiratory distress.      Breath sounds: Normal breath sounds. No wheezing or rales.   Chest:      Chest wall: No tenderness.   Abdominal:      General: Bowel sounds are normal. There is no distension.      Palpations: Abdomen is soft. There is no mass.      Tenderness: There is no abdominal tenderness. There is no guarding or rebound.   Musculoskeletal:         General: No tenderness or deformity. Normal range of motion.      Cervical back: Normal range of motion and neck supple.   Lymphadenopathy:      Cervical: No cervical adenopathy.   Skin:     General: Skin is warm and dry.      Coloration: Skin is not pale.      Findings: No erythema or rash.   Neurological:      Mental Status: He is alert. Mental status is at baseline.      Deep Tendon Reflexes: Reflexes are normal and symmetric.   Psychiatric:         Behavior: Behavior normal.          Lines/Drains:            Lab Results: I have reviewed the following results:  Results from last 7 days   Lab Units 01/09/25  1226   WBC Thousand/uL 11.62*    HEMOGLOBIN g/dL 10.9*   HEMATOCRIT % 34.8*   PLATELETS Thousands/uL 314   SEGS PCT % 81*   LYMPHO PCT % 9*   MONO PCT % 5   EOS PCT % 2     Results from last 7 days   Lab Units 01/09/25  1226   SODIUM mmol/L 137   POTASSIUM mmol/L 4.2   CHLORIDE mmol/L 106   CO2 mmol/L 24   BUN mg/dL 23   CREATININE mg/dL 0.76   ANION GAP mmol/L 7   CALCIUM mg/dL 8.6   ALBUMIN g/dL 3.0*   TOTAL BILIRUBIN mg/dL 0.39   ALK PHOS U/L 38   ALT U/L 40   AST U/L 22   GLUCOSE RANDOM mg/dL 186*             Lab Results   Component Value Date    HGBA1C 8.1 (H) 12/14/2024    HGBA1C 7.0 (H) 09/11/2024    HGBA1C 6.6 (H) 10/20/2023     Results from last 7 days   Lab Units 01/09/25  1226   PROCALCITONIN ng/ml 0.14       Imaging Results Review: I personally reviewed the following image studies/reports in PACS and discussed pertinent findings with Radiology: chest xray. My interpretation of the radiology images/reports is:  .  Other Study Results Review: EKG was reviewed.     Administrative Statements   I have spent a total time of 76 minutes in caring for this patient on the day of the visit/encounter including Diagnostic results.    ** Please Note: This note has been constructed using a voice recognition system. **

## 2025-01-09 NOTE — ED NOTES
EDT attempted to complete ambulatory tral with pt, pt sat up for awhile and felt dizzy and lightheaded . Pt requested to lay back down. Primary RN made aware.           Hanna Boudreaux RN  01/09/25 [id number]

## 2025-01-10 ENCOUNTER — TELEPHONE (OUTPATIENT)
Age: 70
End: 2025-01-10

## 2025-01-10 LAB
ANION GAP SERPL CALCULATED.3IONS-SCNC: 7 MMOL/L (ref 4–13)
BASOPHILS # BLD AUTO: 0.02 THOUSANDS/ΜL (ref 0–0.1)
BASOPHILS NFR BLD AUTO: 0 % (ref 0–1)
BUN SERPL-MCNC: 20 MG/DL (ref 5–25)
CALCIUM SERPL-MCNC: 9 MG/DL (ref 8.4–10.2)
CHLORIDE SERPL-SCNC: 105 MMOL/L (ref 96–108)
CO2 SERPL-SCNC: 25 MMOL/L (ref 21–32)
CREAT SERPL-MCNC: 0.65 MG/DL (ref 0.6–1.3)
EOSINOPHIL # BLD AUTO: 0 THOUSAND/ΜL (ref 0–0.61)
EOSINOPHIL NFR BLD AUTO: 0 % (ref 0–6)
ERYTHROCYTE [DISTWIDTH] IN BLOOD BY AUTOMATED COUNT: 16.2 % (ref 11.6–15.1)
GFR SERPL CREATININE-BSD FRML MDRD: 97 ML/MIN/1.73SQ M
GLUCOSE P FAST SERPL-MCNC: 185 MG/DL (ref 65–99)
GLUCOSE SERPL-MCNC: 169 MG/DL (ref 65–140)
GLUCOSE SERPL-MCNC: 185 MG/DL (ref 65–140)
GLUCOSE SERPL-MCNC: 200 MG/DL (ref 65–140)
HCT VFR BLD AUTO: 37.5 % (ref 36.5–49.3)
HGB BLD-MCNC: 11.6 G/DL (ref 12–17)
IMM GRANULOCYTES # BLD AUTO: 0.17 THOUSAND/UL (ref 0–0.2)
IMM GRANULOCYTES NFR BLD AUTO: 2 % (ref 0–2)
LYMPHOCYTES # BLD AUTO: 1.19 THOUSANDS/ΜL (ref 0.6–4.47)
LYMPHOCYTES NFR BLD AUTO: 14 % (ref 14–44)
MCH RBC QN AUTO: 26 PG (ref 26.8–34.3)
MCHC RBC AUTO-ENTMCNC: 30.9 G/DL (ref 31.4–37.4)
MCV RBC AUTO: 84 FL (ref 82–98)
MONOCYTES # BLD AUTO: 0.28 THOUSAND/ΜL (ref 0.17–1.22)
MONOCYTES NFR BLD AUTO: 3 % (ref 4–12)
NEUTROPHILS # BLD AUTO: 7.05 THOUSANDS/ΜL (ref 1.85–7.62)
NEUTS SEG NFR BLD AUTO: 81 % (ref 43–75)
NRBC BLD AUTO-RTO: 0 /100 WBCS
PLATELET # BLD AUTO: 369 THOUSANDS/UL (ref 149–390)
PMV BLD AUTO: 8.8 FL (ref 8.9–12.7)
POTASSIUM SERPL-SCNC: 4.3 MMOL/L (ref 3.5–5.3)
RBC # BLD AUTO: 4.46 MILLION/UL (ref 3.88–5.62)
SODIUM SERPL-SCNC: 137 MMOL/L (ref 135–147)
WBC # BLD AUTO: 8.71 THOUSAND/UL (ref 4.31–10.16)

## 2025-01-10 PROCEDURE — 94664 DEMO&/EVAL PT USE INHALER: CPT

## 2025-01-10 PROCEDURE — 94760 N-INVAS EAR/PLS OXIMETRY 1: CPT

## 2025-01-10 PROCEDURE — 99223 1ST HOSP IP/OBS HIGH 75: CPT | Performed by: INTERNAL MEDICINE

## 2025-01-10 PROCEDURE — 94660 CPAP INITIATION&MGMT: CPT

## 2025-01-10 PROCEDURE — 99232 SBSQ HOSP IP/OBS MODERATE 35: CPT

## 2025-01-10 PROCEDURE — 82948 REAGENT STRIP/BLOOD GLUCOSE: CPT

## 2025-01-10 PROCEDURE — 85025 COMPLETE CBC W/AUTO DIFF WBC: CPT | Performed by: INTERNAL MEDICINE

## 2025-01-10 PROCEDURE — 80048 BASIC METABOLIC PNL TOTAL CA: CPT | Performed by: INTERNAL MEDICINE

## 2025-01-10 PROCEDURE — 94640 AIRWAY INHALATION TREATMENT: CPT

## 2025-01-10 RX ORDER — BUDESONIDE 0.5 MG/2ML
0.5 INHALANT ORAL
Status: DISCONTINUED | OUTPATIENT
Start: 2025-01-10 | End: 2025-01-11 | Stop reason: HOSPADM

## 2025-01-10 RX ORDER — FORMOTEROL FUMARATE DIHYDRATE 20 UG/2ML
20 SOLUTION RESPIRATORY (INHALATION)
Status: DISCONTINUED | OUTPATIENT
Start: 2025-01-10 | End: 2025-01-11 | Stop reason: HOSPADM

## 2025-01-10 RX ADMIN — LISINOPRIL 40 MG: 20 TABLET ORAL at 08:40

## 2025-01-10 RX ADMIN — METOPROLOL SUCCINATE 25 MG: 25 TABLET, EXTENDED RELEASE ORAL at 08:40

## 2025-01-10 RX ADMIN — INSULIN LISPRO 1 UNITS: 100 INJECTION, SOLUTION INTRAVENOUS; SUBCUTANEOUS at 18:14

## 2025-01-10 RX ADMIN — BUPROPION HYDROCHLORIDE 300 MG: 150 TABLET, EXTENDED RELEASE ORAL at 08:40

## 2025-01-10 RX ADMIN — OXYCODONE HYDROCHLORIDE 10 MG: 10 TABLET ORAL at 18:13

## 2025-01-10 RX ADMIN — BUDESONIDE AND FORMOTEROL FUMARATE DIHYDRATE 2 PUFF: 160; 4.5 AEROSOL RESPIRATORY (INHALATION) at 08:43

## 2025-01-10 RX ADMIN — INSULIN LISPRO 1 UNITS: 100 INJECTION, SOLUTION INTRAVENOUS; SUBCUTANEOUS at 21:28

## 2025-01-10 RX ADMIN — IPRATROPIUM BROMIDE 0.5 MG: 0.5 SOLUTION RESPIRATORY (INHALATION) at 19:36

## 2025-01-10 RX ADMIN — HEPARIN SODIUM 5000 UNITS: 5000 INJECTION INTRAVENOUS; SUBCUTANEOUS at 05:16

## 2025-01-10 RX ADMIN — TAMSULOSIN HYDROCHLORIDE 0.4 MG: 0.4 CAPSULE ORAL at 08:40

## 2025-01-10 RX ADMIN — IPRATROPIUM BROMIDE 0.5 MG: 0.5 SOLUTION RESPIRATORY (INHALATION) at 13:00

## 2025-01-10 RX ADMIN — IPRATROPIUM BROMIDE 0.5 MG: 0.5 SOLUTION RESPIRATORY (INHALATION) at 08:08

## 2025-01-10 RX ADMIN — LEVALBUTEROL HYDROCHLORIDE 1.25 MG: 1.25 SOLUTION RESPIRATORY (INHALATION) at 08:08

## 2025-01-10 RX ADMIN — PANTOPRAZOLE SODIUM 40 MG: 40 TABLET, DELAYED RELEASE ORAL at 05:16

## 2025-01-10 RX ADMIN — HEPARIN SODIUM 5000 UNITS: 5000 INJECTION INTRAVENOUS; SUBCUTANEOUS at 21:27

## 2025-01-10 RX ADMIN — METHYLPREDNISOLONE SODIUM SUCCINATE 40 MG: 40 INJECTION, POWDER, FOR SOLUTION INTRAMUSCULAR; INTRAVENOUS at 05:16

## 2025-01-10 RX ADMIN — FORMOTEROL FUMARATE DIHYDRATE 20 MCG: 20 SOLUTION RESPIRATORY (INHALATION) at 19:36

## 2025-01-10 RX ADMIN — HEPARIN SODIUM 5000 UNITS: 5000 INJECTION INTRAVENOUS; SUBCUTANEOUS at 18:14

## 2025-01-10 RX ADMIN — ARIPIPRAZOLE 10 MG: 5 TABLET ORAL at 08:40

## 2025-01-10 RX ADMIN — VENLAFAXINE HYDROCHLORIDE 75 MG: 75 CAPSULE, EXTENDED RELEASE ORAL at 08:40

## 2025-01-10 RX ADMIN — INSULIN LISPRO 1 UNITS: 100 INJECTION, SOLUTION INTRAVENOUS; SUBCUTANEOUS at 12:39

## 2025-01-10 RX ADMIN — OXYCODONE HYDROCHLORIDE 10 MG: 10 TABLET ORAL at 10:53

## 2025-01-10 RX ADMIN — INSULIN LISPRO 1 UNITS: 100 INJECTION, SOLUTION INTRAVENOUS; SUBCUTANEOUS at 08:43

## 2025-01-10 RX ADMIN — AMLODIPINE BESYLATE 10 MG: 10 TABLET ORAL at 08:40

## 2025-01-10 RX ADMIN — ATORVASTATIN CALCIUM 10 MG: 10 TABLET, FILM COATED ORAL at 08:40

## 2025-01-10 RX ADMIN — MONTELUKAST 10 MG: 10 TABLET, FILM COATED ORAL at 21:27

## 2025-01-10 RX ADMIN — LEVALBUTEROL HYDROCHLORIDE 1.25 MG: 1.25 SOLUTION RESPIRATORY (INHALATION) at 13:00

## 2025-01-10 RX ADMIN — METHYLPREDNISOLONE SODIUM SUCCINATE 40 MG: 40 INJECTION, POWDER, FOR SOLUTION INTRAMUSCULAR; INTRAVENOUS at 18:14

## 2025-01-10 RX ADMIN — BUDESONIDE INHALATION 0.5 MG: 0.5 SUSPENSION RESPIRATORY (INHALATION) at 19:36

## 2025-01-10 RX ADMIN — LEVALBUTEROL HYDROCHLORIDE 1.25 MG: 1.25 SOLUTION RESPIRATORY (INHALATION) at 19:36

## 2025-01-10 NOTE — CASE MANAGEMENT
Case Management Discharge Planning Note    Patient name Fahad Hernandez  Location /-01 MRN [id number]  : 1955 Date 1/10/2025       Current Admission Date: 2025  Current Admission Diagnosis:Severe persistent asthma with acute exacerbation   Patient Active Problem List    Diagnosis Date Noted Date Diagnosed    Acute respiratory failure (HCC) 2025     Rotator cuff arthropathy of right shoulder 2024     Generalized weakness 2024     Fall 2024     Metabolic acidosis 2024     Lactic acidosis due to diabetes mellitus (HCC) 2024     BENNY (acute kidney injury) (HCC) 2024     Severe persistent asthma with (acute) exacerbation 2024     Asthma exacerbation attacks 2024     Moderate persistent asthma with exacerbation 2024     Orthostasis 10/15/2024     History of diabetes mellitus 10/11/2024     Hoarseness of voice 2024     SOB (shortness of breath) 2024     Long-term exposure involving bird droppings 2024     Lung nodules 2024     Chest pain 2024     Centrilobular emphysema (HCC) 2024     Severe persistent asthma with acute exacerbation 2024     Medical marijuana use 2024     Type 2 diabetes mellitus with hyperglycemia, without long-term current use of insulin (HCC) 2022     Chronic obstructive asthma (HCC) 2019     Gastroesophageal reflux disease with esophagitis 2019     Abnormal EKG 04/15/2019     Hypertension 04/15/2019     Mixed hyperlipidemia 2018       LOS (days): 0  Geometric Mean LOS (GMLOS) (days):   Days to GMLOS:     OBJECTIVE:            Current admission status: Observation   Preferred Pharmacy:   [id number] [place] - [address]  [place] [address]  Phone: [phone] Fax: [phone]    [facility] - [place], [place] - [address]  [address] [place] [address]  [address]  Phone: [phone] Fax: [phone]    [facility] - [place], [place] - [address]  [address] [place] [address]  Phone: [phone] Fax: [phone]    Primary Care Provider: Surjit Hayes DO    Primary Insurance: [employer] REP  Secondary Insurance:     DISCHARGE DETAILS:    Discharge planning discussed with:: Patient at the bedside  Freedom of Choice: Yes  Comments - Freedom of Choice: FOC maintained in discussion regarding discharge planning. Patient is alert oriented and competent to make decisions. Introduced self and role, explained role of CM in arranging services such as DME, STR, HHC, and providing community resource information. Discussed current living situation and needs at discharge. Patient is IPTA. CM offered HHC, STR, DME, PCP, OP CM, community resources. Patient declined CM resources. States wife will drive him home. Does need additional use DME. Pt is aware and encouraged to seek CM for any questions or concerns. CM continues to follow.  CM contacted family/caregiver?: No- see comments  Were Treatment Team discharge recommendations reviewed with patient/caregiver?: Yes  Did patient/caregiver verbalize understanding of patient care needs?: Yes  Were patient/caregiver advised of the risks associated with not following Treatment Team discharge recommendations?: Yes    Contacts  Patient Contacts: cody Medina  Relationship to Patient:: Family  Reason/Outcome: Emergency Contact    Requested Home Health Care         Is the patient interested in HHC at discharge?: Yes  Home Health Discipline requested:: Nursing, Physical Therapy, Occupational Therapy  Home Health Agency Name:: [facility] External Referral Reason (only applicable if external HHA name selected): Patient has established relationship with provider  Home Health Follow-Up Provider:: PCP  Home Health Services Needed:: Gait/ADL Training, Evaluate Functional Status and Safety,  Strengthening/Theraputic Exercises to Improve Function, Diabetes Management, Other (comment)  Homebound Criteria Met:: Uses an Assist Device (i.e. cane, walker, etc), Requires the Assistance of Another Person for Safe Ambulation or to Leave the Home  Supporting Clincal Findings:: Fatigues Easliy in Short Distances, Dyspnea with Exertion, Limited Endurance    DME Referral Provided  Referral made for DME?: No    Other Referral/Resources/Interventions Provided:  Interventions: C  Referral Comments: CM will continue to follow for needs.    Would you like to participate in our [facility] service program?  : No - Declined    Treatment Team Recommendation: Home with Home Health Care  Discharge Destination Plan:: Home with Home Health Care  Transport at Discharge : Family

## 2025-01-10 NOTE — RESPIRATORY THERAPY NOTE
RT Protocol Note  Fahad Hernandez 69 y.o. male MRN: [id number]  Unit/Bed#: -01 Encounter: [id number]    Assessment    Principal Problem:    Severe persistent asthma with acute exacerbation  Active Problems:    Hypertension    Type 2 diabetes mellitus with hyperglycemia, without long-term current use of insulin (HCC)    Gastroesophageal reflux disease with esophagitis    Generalized weakness    Acute respiratory failure ([facility])      Home Pulmonary Medications:     01/09/25 1958   Respiratory Protocol   Protocol Initiated? Yes   Protocol Selection Respiratory   Language Barrier? No   Medical & Social History Reviewed? Yes   Diagnostic Studies Reviewed? Yes   Physical Assessment Performed? Yes   Home Devices/Therapy Home O2;BiPAP/CPAP   Respiratory Plan Mild Distress pathway   Respiratory Assessment   Assessment Type During-treatment   General Appearance Awake;Alert   Respiratory Pattern Normal   Chest Assessment Chest expansion symmetrical   Bilateral Breath Sounds Diminished   Cough None   Resp Comments Pt uses inhalers and nebs at home. Hx of asthma and use of home O2. Pt also uses cpap machines. Will continue with current therapy   O2 Device RA   Cough Description   Sputum Amount None   Additional Assessments   Pulse 84   Respirations 20   SpO2 92 %       Home Devices/Therapy: Home O2, BiPAP/CPAP    Past Medical History:   Diagnosis Date    Asthma     COPD (chronic obstructive pulmonary disease) ([facility])     Dementia (HCC)     Diabetes mellitus (HCC)     GERD (gastroesophageal reflux disease)     History of stroke 11/07/2019    Hypertension     Interstitial lung disease ([facility])     Major depressive disorder with current active episode 11/07/2019    Pneumonia     Rotator cuff arthropathy of right shoulder 12/17/2024    Sleep apnea     Sleep apnea, obstructive     Stroke ([facility])     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     Social History     Socioeconomic History    Marital status: /Civil Union     Spouse name: None     Number of children: None    Years of education: None    Highest education level: None   Occupational History    None   Tobacco Use    Smoking status: Never     Passive exposure: Never    Smokeless tobacco: Never    Tobacco comments:     Patient admits to using marijuana, edibles and smoking    Vaping Use    Vaping status: Never Used   Substance and Sexual Activity    Alcohol use: Not Currently    Drug use: Yes     Types: Marijuana, Oxycodone, Psilocybin    Sexual activity: Yes     Partners: Female     Birth control/protection: Male Sterilization   Other Topics Concern    None   Social History Narrative    None     Social Drivers of Health     Financial Resource Strain: Low Risk  (11/1/2024)    Received from [facility]    Overall Financial Resource Strain (CARDIA)     Difficulty of Paying Living Expenses: Not hard at all   Food Insecurity: No Food Insecurity (1/9/2025)    Nursing - Inadequate Food Risk Classification     Worried About Running Out of Food in the Last Year: Never true     Ran Out of Food in the Last Year: Never true     Ran Out of Food in the Last Year: Never true   Transportation Needs: No Transportation Needs (1/9/2025)    Nursing - Transportation Risk Classification     Lack of Transportation: Not on file     Lack of Transportation: No   Physical Activity: Inactive (11/1/2024)    Received from [facility]    Exercise Vital Sign     Days of Exercise per Week: 0 days     Minutes of Exercise per Session: 0 min   Stress: Stress Concern Present (11/1/2024)    Received from [facility]    [employer] - Occupational Stress Questionnaire     Feeling of Stress : To some extent   Social Connections: Moderately Isolated (11/1/2024)    Received from [facility]    Social Connection and Isolation Panel [NHANES]     Frequency of Communication with Friends and Family: Three times a week     Frequency of Social Gatherings with  "Friends and Family: Twice a week     Attends Mu-ism Services: Never     Active Member of Clubs or Organizations: No     Attends Club or Organization Meetings: Never     Marital Status:    Intimate Partner Violence: Unknown (2025)    Nursing IPS     Feels Physically and Emotionally Safe: Not on file     Physically Hurt by Someone: Not on file     Humiliated or Emotionally Abused by Someone: Not on file     Physically Hurt by Someone: No     Hurt or Threatened by Someone: No   Housing Stability: Unknown (2025)    Nursing: Inadequate Housing Risk Classification     Has Housing: Not on file     Worried About Losing Housing: Not on file     Unable to Get Utilities: Not on file     Unable to Pay for Housing in the Last Year: No     Has Housin       Subjective         Objective    Physical Exam:   Assessment Type: During-treatment  General Appearance: Awake, Alert  Respiratory Pattern: Normal  Chest Assessment: Chest expansion symmetrical  Bilateral Breath Sounds: Diminished  Cough: None  O2 Device: RA    Vitals:  Blood pressure 129/77, pulse 84, temperature 98.2 °F (36.8 °C), resp. rate 20, height 5' 11\" (1.803 m), weight 71.2 kg (157 lb), SpO2 92%.          Imaging and other studies: Results Review Statement: No pertinent imaging studies reviewed.    O2 Device: RA     Plan    Respiratory Plan: Mild Distress pathway        Resp Comments: Pt uses inhalers and nebs at home. Hx of asthma and use of home O2. Pt also uses cpap machines. Will continue with current therapy   "

## 2025-01-10 NOTE — UTILIZATION REVIEW
WAS OBSERVATION 1/9/25 @ 1323 CONVERTED TO INPATIENT ADMISSION 1/10/25 @ 1356 DUE TO CONTINUED STAY REQUIRED TO CARE FOR PATIENT WITH Severe persistent Asthma w/ acute exacerbation requiring IV Solumedrol and scheduled nebs tx.     Initial Clinical Review    Admission: Date/Time/Statement:   Admission Orders (From admission, onward)       Ordered        01/10/25 1356  INPATIENT ADMISSION  Once            01/09/25 1323  Place in Observation  Once                          Orders Placed This Encounter   Procedures    INPATIENT ADMISSION     Standing Status:   Standing     Number of Occurrences:   1     Level of Care:   Med Surg [16]     Estimated length of stay:   More than 2 Midnights     Certification:   I certify that inpatient services are medically necessary for this patient for a duration of greater than two midnights. See H&P and MD Progress Notes for additional information about the patient's course of treatment.     ED Arrival Information       Expected   -    Arrival   1/9/2025 11:17    Acuity   Emergent              Means of arrival   Ambulance    Escorted by   [facility]   Hospitalist    Admission type   Emergency              Arrival complaint   sob             Chief Complaint   Patient presents with    Shortness of Breath     Pt. Presents to ER from home with c/o shortness of breath getting progressively worse over the past few days.         Initial Presentation: 69 y.o. male who presented by EMS to [facility] ED. Admitted as Observation for evaluation and treatment of Severe persistent asthma w/ Acute Exacerbation.      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      Presented w/ shortness of breath and wheezing, progressively getting worse over past few days. Pt recently discharged from hospital 2-3wks ago w/ acute asthma exacerbation likely overlap syndrome. Per EMS O2 RA 88%, placed on 2 L NC. On exam, accessory muscle usage and wheezing present. Abnormal Labs VBG ph 7.435, pCO2 35.6, pO2 58.2, HCO3 23.4, O2 HGB 86.8, WBC 11.62, H/H 10.9/34.8. CXR: Right sided scarring/atelectasis. Pt received nebs tx via EMS. In ED, pt received Duo-neb x1.     Plan: Start IV Solumedrol, Symbicort BID, Atrovent and Xopenex nebs TID, Wean O2 as able. Hold Metformin. SSI. PT/OT.  Pulmonology consulted.    Date: 1/10/25   Day 2:   Pt reports improved SOB. Pt weaned to RA -sats 91%.   Abnormal labs: Glucose 185. Plan: See below per Pulmonology. Continue SSI. F/U labs in am.     Pulmonology: Asthma exacerbation. Plan: Continue IV Solumedrol for today, transition to Prednisone tomorrow. Switch Symbicort to Perforomist and Budesonide. Atrovent and Xopenex nebs TID. Home O2 eval prior to discharge.     Certification Statement: The patient will continue to require additional inpatient hospital stay due to requiring IV steroids in setting of asthma  Discharge Plan: Anticipate discharge tomorrow to home.    ED Treatment-Medication Administration from 01/09/2025 1117 to 01/09/2025 1544         Date/Time Order Dose Route Action     01/09/2025 1133 albuterol (FOR EMS ONLY) (2.5 mg/3 mL) 0.083 % inhalation solution 2.5 mg 0 mg Does not apply Given to EMS     01/09/2025 1133 ipratropium-albuterol (FOR EMS ONLY) (DUO-NEB) 0.5-2.5 mg/3 mL inhalation solution 3 mL 0 mL Does not apply Given to EMS     01/09/2025 1236 ipratropium-albuterol (DUO-NEB) 0.5-2.5 mg/3 mL inhalation solution 3 mL 3 mL Nebulization Given            Scheduled Medications:  amLODIPine, 10 mg, Oral, Daily    And  lisinopril, 40 mg, Oral, Daily  ARIPiprazole, 10 mg, Oral, Daily  atorvastatin, 10 mg, Oral, Daily  budesonide, 0.5 mg, Nebulization, Q12H  buPROPion, 300 mg, Oral, Daily  formoterol, 20 mcg, Nebulization, Q12H  heparin (porcine), 5,000 Units, Subcutaneous, Q8H PARTHA  insulin lispro, 1-5 Units, Subcutaneous, TID AC  insulin lispro, 1-5 Units, Subcutaneous, HS  ipratropium, 0.5 mg, Nebulization, TID  levalbuterol, 1.25 mg, Nebulization, TID  methylPREDNISolone sodium succinate, 40 mg, Intravenous, Q12H PARTHA  metoprolol succinate, 25 mg, Oral, Daily  montelukast, 10 mg, Oral, HS  pantoprazole, 40 mg, Oral, Early Morning  tamsulosin, 0.4 mg, Oral, Daily  venlafaxine, 75 mg, Oral, Daily  budesonide-formoterol (SYMBICORT) 160-4.5 mcg/act inhaler 2 puff  Dose: 2 puff  Freq: 2 times daily Route: IN  Start: 01/09/25 1800 End: 01/10/25 1124    Continuous IV Infusions: NONE      PRN Meds:  acetaminophen, 650 mg, Oral, Q6H PRN  albuterol, 2 puff, Inhalation, Q4H PRN  oxyCODONE, 10 mg, Oral, Q6H PRN - given x1 1/9, x1 1/10.  temazepam, 30 mg, Oral, HS PRN      ED Triage Vitals [01/09/25 1127]   Temperature Pulse Respirations Blood Pressure SpO2 Pain Score   98.3 °F (36.8 °C) 91 20 147/75 97 % 4     Weight (last 2 days)       Date/Time Weight    01/09/25 1127 71.2 (157)            Vital Signs (last 3 days)       Date/Time Temp Pulse Resp BP MAP (mmHg) SpO2 Calculated FIO2 (%) - Nasal Cannula Nasal Cannula O2 Flow Rate (L/min) O2 Device O2 Interface Device Patient Position - Orthostatic VS Pain    01/10/25 1258 -- -- -- -- -- 91 % -- -- None (Room air) -- -- --    01/10/25 1114 -- -- -- -- -- 91 % -- -- -- -- -- --    01/10/25 1100 -- 83 -- -- -- 93 % -- -- -- -- -- --    01/10/25 1053 -- -- -- -- -- -- -- -- -- -- -- 8    01/10/25 1045 -- 87 -- -- -- 90 % -- -- -- -- -- --    01/10/25 1030 -- 89 -- -- -- 92 % -- -- -- -- -- --    01/10/25 1015 -- 124 -- -- -- 91 % -- -- -- -- -- --    01/10/25 1000 -- 98 -- -- -- 90 % -- -- --  -- -- --    01/10/25 0945 -- 115 -- -- -- 90 % -- -- -- -- -- --    01/10/25 0930 -- 95 -- -- -- 89 % -- -- -- -- -- --    01/10/25 0915 -- 106 -- -- -- 91 % -- -- -- -- -- --    01/10/25 0900 -- 98 -- -- -- 92 % -- -- -- -- -- --    01/10/25 0845 -- 96 -- -- -- 93 % -- -- -- -- -- --    01/10/25 0840 -- -- -- -- -- 93 % -- -- None (Room air) -- -- 4    01/10/25 0830 -- 111 -- -- -- 95 % -- -- -- -- -- --    01/10/25 0815 -- 108 -- -- -- 98 % -- -- -- -- -- --    01/10/25 0808 -- -- -- -- -- 96 % -- -- -- -- -- --    01/10/25 0800 -- 116 -- -- -- 94 % -- -- -- -- -- --    01/10/25 0745 -- 108 -- 141/89 106 93 % -- -- -- -- -- --    01/10/25 07:34:32 98.1 °F (36.7 °C) 99 17 141/89 106 94 % -- -- -- -- -- --    01/10/25 0730 -- 97 -- -- -- 92 % -- -- -- -- -- --    01/10/25 0715 -- 96 -- -- -- 92 % -- -- -- -- -- --    01/10/25 0700 -- 83 -- -- -- 93 % -- -- -- -- -- --    01/10/25 0645 -- 96 -- -- -- 93 % -- -- -- -- -- --    01/10/25 0630 -- 81 -- -- -- 93 % -- -- -- -- -- --    01/10/25 0615 -- 95 -- -- -- 92 % -- -- -- -- -- --    01/10/25 0600 -- 82 -- -- -- 92 % -- -- -- -- -- --    01/09/25 2300 -- -- -- -- -- -- -- -- -- -- -- 2    01/09/25 2251 -- -- -- -- -- 91 % -- -- -- Nasal mask -- --    01/09/25 21:45:08 97.9 °F (36.6 °C) 94 18 141/89 106 91 % -- -- -- -- Lying --    01/09/25 2040 -- -- -- -- -- -- -- -- -- -- -- 8    01/09/25 1958 -- 84 20 -- -- 92 % -- -- -- -- -- --    01/09/25 1845 -- -- -- -- -- -- -- -- None (Room air) -- -- --    01/09/25 1844 -- -- -- -- -- -- -- -- -- -- -- 7 01/09/25 17:35:06 98.2 °F (36.8 °C) 104 -- 129/77 94 92 % -- -- -- -- -- --    01/09/25 1430 -- 91 18 113/64 82 92 % -- -- None (Room air) -- -- --    01/09/25 1330 -- 88 20 102/79 86 90 % -- -- -- -- -- --    01/09/25 1238 -- 92 18 108/74 83 97 % -- -- Other (comment) -- Sitting --    01/09/25 1137 -- -- -- -- -- -- -- -- Nasal cannula -- -- --    01/09/25 1130 -- 91 20 147/75 95 96 % 36 4 L/min Nasal cannula --  Sitting --    01/09/25 1127 98.3 °F (36.8 °C) 91 20 147/75 95 97 % 36 4 L/min Nasal cannula -- Sitting 4              Pertinent Labs/Diagnostic Test Results:   Radiology:  XR chest 1 view portable   Final Interpretation by Wai Hoffmann MD (01/09 1446)      Right sided scarring/atelectasis.      No new infiltrate.            Resident: Renato Doran I, the attending radiologist, have reviewed the images and agree with the final report above.      Workstation performed: [id number]           Cardiology:  ECG 12 lead    by Interface, Ris Results In (01/09 1122)        Results from last 7 days   Lab Units 01/09/25  1226   SARS-COV-2  Negative     Results from last 7 days   Lab Units 01/10/25  0509 01/09/25  1226   WBC Thousand/uL 8.71 11.62*   HEMOGLOBIN g/dL 11.6* 10.9*   HEMATOCRIT % 37.5 34.8*   PLATELETS Thousands/uL 369 314   TOTAL NEUT ABS Thousands/µL 7.05 9.47*         Results from last 7 days   Lab Units 01/10/25  0509 01/09/25  1226   SODIUM mmol/L 137 137   POTASSIUM mmol/L 4.3 4.2   CHLORIDE mmol/L 105 106   CO2 mmol/L 25 24   ANION GAP mmol/L 7 7   BUN mg/dL 20 23   CREATININE mg/dL 0.65 0.76   EGFR ml/min/1.73sq m 97 91   CALCIUM mg/dL 9.0 8.6     Results from last 7 days   Lab Units 01/09/25  1226   AST U/L 22   ALT U/L 40   ALK PHOS U/L 38   TOTAL PROTEIN g/dL 5.8*   ALBUMIN g/dL 3.0*   TOTAL BILIRUBIN mg/dL 0.39     Results from last 7 days   Lab Units 01/10/25  1154 01/10/25  0732 01/09/25  2056 01/09/25  1747   POC GLUCOSE mg/dl 200* 169* 216* 156*     Results from last 7 days   Lab Units 01/10/25  0509 01/09/25  1226   GLUCOSE RANDOM mg/dL 185* 186*     Results from last 7 days   Lab Units 01/09/25  1226   PH JOSE DANIEL  7.435*   PCO2 JOSE DANIEL mm Hg 35.6*   PO2 JOSE DANIEL mm Hg 56.2*   HCO3 JOSE DANIEL mmol/L 23.4*   BASE EXC JOSE DANIEL mmol/L -0.5   O2 CONTENT JOSE DANIEL ml/dL 14.9   O2 HGB, VENOUS % 86.8*     Results from last 7 days   Lab Units 01/09/25  1226   PROCALCITONIN ng/ml 0.14     Results from last 7 days   Lab Units  01/09/25  1226   BNP pg/mL 46     Results from last 7 days   Lab Units 01/09/25  1226   INFLUENZA A PCR  Negative   INFLUENZA B PCR  Negative   RSV PCR  Negative     Past Medical History:   Diagnosis Date    Asthma     COPD (chronic obstructive pulmonary disease) (HCC)     Dementia (HCC)     Diabetes mellitus (HCC)     GERD (gastroesophageal reflux disease)     History of stroke 11/07/2019    Hypertension     Interstitial lung disease (HCC)     Major depressive disorder with current active episode 11/07/2019    Pneumonia     Rotator cuff arthropathy of right shoulder 12/17/2024    Sleep apnea     Sleep apnea, obstructive     Stroke (HCC)     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     Present on Admission:   Severe persistent asthma with acute exacerbation   Generalized weakness   Type 2 diabetes mellitus with hyperglycemia, without long-term current use of insulin (HCC)   Hypertension   Gastroesophageal reflux disease with esophagitis      Admitting Diagnosis: Weakness [R53.1]  SOB (shortness of breath) [R06.02]  COPD with acute exacerbation (HCC) [J44.1]  Age/Sex: 69 y.o. male    Network Utilization Review Department  ATTENTION: Please call with any questions or concerns to [phone] and carefully listen to the prompts so that you are directed to the right person. All voicemails are confidential.   For Discharge needs, contact Care Management DC Support Team at [phone] opt. [phone]  Send all requests for admission clinical reviews, approved or denied determinations and any other requests to dedicated fax number below belonging to the campus where the patient is receiving treatment. List of dedicated fax numbers for the Facilities:  FACILITY NAME UR FAX NUMBER   ADMISSION DENIALS (Administrative/Medical Necessity) [phone]   DISCHARGE SUPPORT TEAM (NETWORK) [phone]   PARENT CHILD HEALTH (Maternity/NICU/Pediatrics) [phone]   [facility] [phone]   [facility]  [place] [phone]   [facility] [phone]   [facility] [phone]   [facility] [phone]   [facility] [phone]   [facility] [phone]   [facility] [phone]   [facility] [phone]   [facility] [phone]   [facility] [phone]   [facility] [phone]

## 2025-01-10 NOTE — PROGRESS NOTES
Progress Note - Hospitalist   Name: Fahad Hernandez 69 y.o. male I MRN: [id number]  Unit/Bed#: -01 I Date of Admission: 1/9/2025   Date of Service: 1/10/2025 I Hospital Day: 0    Assessment & Plan  Severe persistent asthma with acute exacerbation  She recently discharged approximately 2 to 3 weeks ago at that time had acute asthma exacerbation likely overlap syndrome  Was evaluated last time by pulmonology  Was noted to be saturating 88% at room air since improved with 2 L nasal cannula  Mildly improved with nebs  Will continue with on IV steroids 40 every 12  Scheduled nebs  Wean oxygen as able  Consulted pulm-recent evaluation with pulmonology and outpatient inpatient setting.  Shortness of breath likely partially related to anxiety, however patient reports improvement with steroids, will continue with steroids today.  Had lengthy discussion with family member about if they would like a second opinion may see DELOR[facility] in the outpatient setting.  Will likely transition to tapering dose of prednisone tomorrow.  Hypertension  Blood pressure currently controlled  Continue amlodipine/ace  Type 2 diabetes mellitus with hyperglycemia, without long-term current use of insulin (HCC)  Lab Results   Component Value Date    HGBA1C 8.1 (H) 12/14/2024       Recent Labs     01/09/25  1747 01/09/25  2056 01/10/25  0732 01/10/25  1154   POCGLU 156* 216* 169* 200*       Blood Sugar Average: Last 72 hrs:  (P) 185.25  Hold metformin  Sliding-scale insulin  Gastroesophageal reflux disease with esophagitis  Continue PPI  Generalized weakness  Presented with generalized weakness reports difficulty ambulating likely in setting of COPD/asthma exacerbation  Will consult PT/OT for further evaluation  Acute respiratory failure (HCC)  Noted to be saturating 88% on room air  Secondary to COPD/asthma  Plan as outlined above    VTE Pharmacologic Prophylaxis: VTE Score: 7 High Risk (Score >/= 5) - Pharmacological DVT Prophylaxis  Ordered: heparin. Sequential Compression Devices Ordered.    Mobility:   Basic Mobility Inpatient Raw Score: 22  JH-HLM Goal: 7: Walk 25 feet or more  JH-HLM Achieved: 6: Walk 10 steps or more  JH-HLM Goal achieved. Continue to encourage appropriate mobility.    Patient Centered Rounds: I performed bedside rounds with nursing staff today.   Discussions with Specialists or Other Care Team Provider: TREY pulm    Education and Discussions with Family / Patient:  Pulmonology discussed in length.     Current Length of Stay: 0 day(s)  Current Patient Status: Inpatient   Certification Statement: The patient will continue to require additional inpatient hospital stay due to requiring IV steroids in setting of asthma  Discharge Plan: Anticipate discharge tomorrow to home.    Code Status: Level 1 - Full Code    Subjective   Patient reports to have improvement in shortness of breath made yesterday.  He currently denies any chest pain/pressure, palpitation, lightness, nausea, or chills.    Objective :  Temp:  [97.9 °F (36.6 °C)-98.2 °F (36.8 °C)] 98.1 °F (36.7 °C)  HR:  [] 99  BP: (113-141)/(64-89) 141/89  Resp:  [17-20] 17  SpO2:  [91 %-96 %] 91 %  O2 Device: None (Room air)    Body mass index is 21.9 kg/m².     Input and Output Summary (last 24 hours):     Intake/Output Summary (Last 24 hours) at 1/10/2025 1359  Last data filed at 1/9/2025 1958  Gross per 24 hour   Intake 480 ml   Output --   Net 480 ml       Physical Exam  Vitals and nursing note reviewed.   Constitutional:       General: He is not in acute distress.     Appearance: He is normal weight. He is not ill-appearing, toxic-appearing or diaphoretic.   HENT:      Head: Normocephalic.      Nose: Nose normal.      Mouth/Throat:      Mouth: Mucous membranes are moist.      Pharynx: Oropharynx is clear.   Eyes:      General: No scleral icterus.     Conjunctiva/sclera: Conjunctivae normal.      Pupils: Pupils are equal, round, and reactive to light.    Cardiovascular:      Rate and Rhythm: Normal rate and regular rhythm.      Heart sounds: No murmur heard.     No friction rub. No gallop.   Pulmonary:      Effort: Pulmonary effort is normal. No respiratory distress.      Breath sounds: Normal breath sounds. No stridor. No wheezing, rhonchi or rales.   Abdominal:      General: Abdomen is flat.      Palpations: Abdomen is soft.   Musculoskeletal:         General: Normal range of motion.      Cervical back: Normal range of motion and neck supple.      Right lower leg: No edema.      Left lower leg: No edema.   Lymphadenopathy:      Cervical: No cervical adenopathy.   Skin:     General: Skin is warm.      Coloration: Skin is not jaundiced or pale.      Findings: No bruising, erythema or lesion.   Neurological:      General: No focal deficit present.      Mental Status: He is alert and oriented to person, place, and time. Mental status is at baseline.      Cranial Nerves: No cranial nerve deficit.      Motor: No weakness.   Psychiatric:         Mood and Affect: Mood normal.         Behavior: Behavior normal.         Thought Content: Thought content normal.           Lines/Drains:              Lab Results: I have reviewed the following results:   Results from last 7 days   Lab Units 01/10/25  0509   WBC Thousand/uL 8.71   HEMOGLOBIN g/dL 11.6*   HEMATOCRIT % 37.5   PLATELETS Thousands/uL 369   SEGS PCT % 81*   LYMPHO PCT % 14   MONO PCT % 3*   EOS PCT % 0     Results from last 7 days   Lab Units 01/10/25  0509 01/09/25  1226   SODIUM mmol/L 137 137   POTASSIUM mmol/L 4.3 4.2   CHLORIDE mmol/L 105 106   CO2 mmol/L 25 24   BUN mg/dL 20 23   CREATININE mg/dL 0.65 0.76   ANION GAP mmol/L 7 7   CALCIUM mg/dL 9.0 8.6   ALBUMIN g/dL  --  3.0*   TOTAL BILIRUBIN mg/dL  --  0.39   ALK PHOS U/L  --  38   ALT U/L  --  40   AST U/L  --  22   GLUCOSE RANDOM mg/dL 185* 186*         Results from last 7 days   Lab Units 01/10/25  1154 01/10/25  0732 01/09/25  2056 01/09/25  1747   POC  GLUCOSE mg/dl 200* 169* 216* 156*         Results from last 7 days   Lab Units 01/09/25  1226   PROCALCITONIN ng/ml 0.14       Recent Cultures (last 7 days):         Imaging Results Review: I reviewed radiology reports from this admission including: chest xray.  Other Study Results Review: EKG was reviewed.     Last 24 Hours Medication List:     Current Facility-Administered Medications:     acetaminophen (TYLENOL) tablet 650 mg, Q6H PRN    albuterol (PROVENTIL HFA,VENTOLIN HFA) inhaler 2 puff, Q4H PRN    amLODIPine (NORVASC) tablet 10 mg, Daily **AND** lisinopril (ZESTRIL) tablet 40 mg, Daily    ARIPiprazole (ABILIFY) tablet 10 mg, Daily    atorvastatin (LIPITOR) tablet 10 mg, Daily    budesonide (PULMICORT) inhalation solution 0.5 mg, Q12H    buPROPion (WELLBUTRIN XL) 24 hr tablet 300 mg, Daily    formoterol (PERFOROMIST) nebulizer solution 20 mcg, Q12H    heparin (porcine) subcutaneous injection 5,000 Units, Q8H PARTHA    insulin lispro (HumALOG/ADMELOG) 100 units/mL subcutaneous injection 1-5 Units, TID AC **AND** Fingerstick Glucose (POCT), TID AC    insulin lispro (HumALOG/ADMELOG) 100 units/mL subcutaneous injection 1-5 Units, HS    ipratropium (ATROVENT) 0.02 % inhalation solution 0.5 mg, TID    levalbuterol (XOPENEX) inhalation solution 1.25 mg, TID **AND** [DISCONTINUED] sodium chloride 0.9 % inhalation solution 3 mL, TID    methylPREDNISolone sodium succinate (Solu-MEDROL) injection 40 mg, Q12H PARTHA    metoprolol succinate (TOPROL-XL) 24 hr tablet 25 mg, Daily    montelukast (SINGULAIR) tablet 10 mg, HS    oxyCODONE (ROXICODONE) immediate release tablet 10 mg, Q6H PRN    pantoprazole (PROTONIX) EC tablet 40 mg, Early Morning    tamsulosin (FLOMAX) capsule 0.4 mg, Daily    temazepam (RESTORIL) capsule 30 mg, HS PRN    venlafaxine (EFFEXOR-XR) 24 hr capsule 75 mg, Daily    Administrative Statements   Today, Patient Was Seen By: Dario Gomes PA-C  I have spent a total time of 35 minutes in caring for this  patient on the day of the visit/encounter including Counseling / Coordination of care, Documenting in the medical record, Reviewing / ordering tests, medicine, procedures  , Obtaining or reviewing history  , and Communicating with other healthcare professionals .    **Please Note: This note may have been constructed using a voice recognition system.**

## 2025-01-10 NOTE — ASSESSMENT & PLAN NOTE
No current wheezing on exam  Unclear if symptoms can all be attributed to asthma-he has been on maximum therapy for his asthma as well as trialed on multiple Biologics, currently on Dupixent  Minimal response to steroids although he does state he feels better with the IV steroids  Extensive cardiac workup in [place] including cardiac cath which was clean  Suspect anxiety plays a large part in his symptoms, do also think deconditioning contributes as well    Can continue with IV Solu-Medrol for today but will switch to prednisone tomorrow and can taper every 3 days  Will switch Symbicort to Perforomist and budesonide-would continue this upon discharge as well along with albuterol ipratropium that he already has at home  Patient's wife would like evaluation at [facility]-I did tell them they are free to obtain an opinion at somewhere like [place] or [facility]

## 2025-01-10 NOTE — CASE MANAGEMENT
Case Management Assessment & Discharge Planning Note    Patient name Fahad Hernandez  Location /-01 MRN [id number]  : 1955 Date 1/10/2025       Current Admission Date: 2025  Current Admission Diagnosis:Severe persistent asthma with acute exacerbation   Patient Active Problem List    Diagnosis Date Noted Date Diagnosed    Acute respiratory failure (HCC) 2025     Rotator cuff arthropathy of right shoulder 2024     Generalized weakness 2024     Fall 2024     Metabolic acidosis 2024     Lactic acidosis due to diabetes mellitus (HCC) 2024     BENNY (acute kidney injury) (HCC) 2024     Severe persistent asthma with (acute) exacerbation 2024     Asthma exacerbation attacks 2024     Moderate persistent asthma with exacerbation 2024     Orthostasis 10/15/2024     History of diabetes mellitus 10/11/2024     Hoarseness of voice 2024     SOB (shortness of breath) 2024     Long-term exposure involving bird droppings 2024     Lung nodules 2024     Chest pain 2024     Centrilobular emphysema (HCC) 2024     Severe persistent asthma with acute exacerbation 2024     Medical marijuana use 2024     Type 2 diabetes mellitus with hyperglycemia, without long-term current use of insulin (HCC) 2022     Chronic obstructive asthma (HCC) 2019     Gastroesophageal reflux disease with esophagitis 2019     Abnormal EKG 04/15/2019     Hypertension 04/15/2019     Mixed hyperlipidemia 2018       LOS (days): 0  Geometric Mean LOS (GMLOS) (days):   Days to GMLOS:     OBJECTIVE:              Current admission status: Observation       Preferred Pharmacy:   [id number] [place] - [address]  [place] [address]  Phone: [phone] Fax: [phone]    [facility] - [place], [place] - [address]  [address]  [facility] [address]  Phone: [phone] Fax: [phone]    [facility] - [place], [place] - [address]  [address] [place] [address]  Phone: [phone] Fax: [phone]    Primary Care Provider: Surjit Hayes DO    Primary Insurance: [facility]  Secondary Insurance:     ASSESSMENT:  Active Health Care Proxies       Carol Hernandez Health Care Representative - Spouse   Primary Phone: [phone] (Mobile)                 Advance Directives  Does patient have a Health Care POA?: No  Was patient offered paperwork?: Yes  Does patient currently have a Health Care decision maker?: No  Does patient have Advance Directives?: No  Was patient offered paperwork?: Yes  Primary Contact: titus Medina         Readmission Root Cause  30 Day Readmission: No    Patient Information  Admitted from:: Home  Mental Status: Alert  During Assessment patient was accompanied by: Not accompanied during assessment  Assessment information provided by:: Patient  Primary Caregiver: Self  Support Systems: Self, Spouse/significant other, Daughter  County of Residence: [place]  What city do you live in?: [place]  Home entry access options. Select all that apply.: Stairs  Number of steps to enter home.: 4  Do the steps have railings?: Yes  Type of Current Residence: [address] [place] [address] home  Upon entering residence, is there a bedroom on the main floor (no further steps)?: Yes  Upon entering residence, is there a bathroom on the main floor (no further steps)?: Yes  Living Arrangements: Lives w/ Spouse/significant other  Is patient a ?: Yes  Is patient active with VA ([facility] [facility])?: No    Activities of Daily Living Prior to Admission  Functional Status: Assistance  Completes ADLs independently?: No  Level of ADL dependence: Assistance  Ambulates independently?: No  Level of ambulatory dependence: Assistance  Does patient use assisted devices?: Yes  Assisted Devices (DME) used: Walker, Home Oxygen  concentrator  DME Company Name (respiratory supplies): [employer]  O2 Rate(s): 2  Does patient currently own DME?: Yes  What DME does the patient currently own?: Walker  Does patient have a history of Outpatient Therapy (PT/OT)?: No  Does the patient have a history of Short-Term Rehab?: No  Does patient have a history of HHC?: Yes  Does patient currently have HHC?: Yes    Current Home Health Care  Type of Current Home Care Services: Nurse visit, Home PT  Home Health Agency Name:: Other  Current Home Health Follow-Up Provider:: PCP    Patient Information Continued  Income Source: SSI/SSD  Does patient have prescription coverage?: Yes  Does patient receive dialysis treatments?: No  Does patient have a history of substance abuse?: Currently using (states needs to quit now because of his lungs)  Current substance use preference: Marijuana  History of Withdrawal Symptoms: Denies past symptoms  Is patient currently in treatment for substance abuse?: No. Patient declined treatment information.  Does patient have a history of Mental Health Diagnosis?: Yes (anxiety and depression)  Is patient receiving treatment for mental health?: Yes  Has patient received inpatient treatment related to mental health in the last 2 years?: No         Means of Transportation  Means of Transport to Appts:: Family transport      Social Determinants of Health (SDOH)      Flowsheet Row Most Recent Value   Housing Stability    In the last 12 months, was there a time when you were not able to pay the mortgage or rent on time? N   In the past 12 months, how many times have you moved where you were living? 0   At any time in the past 12 months, were you homeless or living in a shelter (including now)? N   Transportation Needs    In the past 12 months, has lack of transportation kept you from medical appointments or from getting medications? no   In the past 12 months, has lack of transportation kept you from meetings, work, or from getting things needed  for daily living? No   Food Insecurity    Within the past 12 months, you worried that your food would run out before you got the money to buy more. Never true   Within the past 12 months, the food you bought just didn't last and you didn't have money to get more. Never true   Utilities    In the past 12 months has the electric, gas, oil, or water company threatened to shut off services in your home? No            DISCHARGE DETAILS:    Discharge planning discussed with:: Patient at the bedside  Freedom of Choice: Yes  Comments - Freedom of Choice: FOC maintained in discussion regarding discharge planning. Patient is alert oriented and competent to make decisions. Introduced self and role, explained role of CM in arranging services such as DME, STR, HHC, and providing community resource information. Discussed current living situation and needs at discharge. Patient is IPTA. CM offered HHC, STR, DME, PCP, OP CM, community resources. Patient declined CM resources. States wife will drive him home. Does need additional use DME. Pt is aware and encouraged to seek CM for any questions or concerns. CM continues to follow.  CM contacted family/caregiver?: No- see comments  Were Treatment Team discharge recommendations reviewed with patient/caregiver?: Yes  Did patient/caregiver verbalize understanding of patient care needs?: Yes  Were patient/caregiver advised of the risks associated with not following Treatment Team discharge recommendations?: Yes    Contacts  Patient Contacts: cody Medina  Relationship to Patient:: Family  Reason/Outcome: Emergency Contact    Requested Home Health Care         Is the patient interested in HHC at discharge?: No  Home Health Agency Name:: Other    DME Referral Provided  Referral made for DME?: No    Other Referral/Resources/Interventions Provided:  Referral Comments: CM will continue to follow for needs.    Would you like to participate in our [facility] service program?  : No -  Declined    Treatment Team Recommendation: Home with Home Health Care  Discharge Destination Plan:: Home with Home Health Care  Transport at Discharge : Family

## 2025-01-10 NOTE — PLAN OF CARE
Problem: Potential for Falls  Goal: Patient will remain free of falls  Description: INTERVENTIONS:  - Educate patient/family on patient safety including physical limitations  - Instruct patient to call for assistance with activity   - Consult OT/PT to assist with strengthening/mobility   - Keep Call bell within reach  - Keep bed low and locked with side rails adjusted as appropriate  - Keep care items and personal belongings within reach  - Initiate and maintain comfort rounds  - Make Fall Risk Sign visible to staff  - Offer Toileting every 2 Hours, in advance of need  - Apply yellow socks and bracelet for high fall risk patients  - Consider moving patient to room near nurses station  Outcome: Progressing     Problem: PAIN - ADULT  Goal: Verbalizes/displays adequate comfort level or baseline comfort level  Description: Interventions:  - Encourage patient to monitor pain and request assistance  - Assess pain using appropriate pain scale  - Administer analgesics based on type and severity of pain and evaluate response  - Implement non-pharmacological measures as appropriate and evaluate response  - Consider cultural and social influences on pain and pain management  - Notify physician/advanced practitioner if interventions unsuccessful or patient reports new pain  Outcome: Progressing

## 2025-01-10 NOTE — TELEPHONE ENCOUNTER
Patient is admitted under the hospitalist service and it appears to be a pulmonary issue they are treating.  If there are any concerns from a cardiac standpoint, hospitalist service will consult cardiology and 1 of our team members will see the patient when needed.

## 2025-01-10 NOTE — CONSULTS
Consultation - Pulmonology   Name: Fahad Hernandez 69 y.o. male I MRN: [id number]  Unit/Bed#: -01 I Date of Admission: 1/9/2025   Date of Service: 1/10/2025 I Hospital Day: 0   Inpatient consult to Pulmonology  Consult performed by: Kirk Bueno PA-C  Consult ordered by: Dario Gomes PA-C        Physician Requesting Evaluation: Oli HERNANDEZ MD   Reason for Evaluation / Principal Problem: Asthma exacerbation    Assessment & Plan  Severe persistent asthma with acute exacerbation  No current wheezing on exam  Unclear if symptoms can all be attributed to asthma-he has been on maximum therapy for his asthma as well as trialed on multiple Biologics, currently on Dupixent  Minimal response to steroids although he does state he feels better with the IV steroids  Extensive cardiac workup in [place] including cardiac cath which was clean  Suspect anxiety plays a large part in his symptoms, do also think deconditioning contributes as well    Can continue with IV Solu-Medrol for today but will switch to prednisone tomorrow and can taper every 3 days  Will switch Symbicort to Perforomist and budesonide-would continue this upon discharge as well along with albuterol ipratropium that he already has at home  Patient's wife would like evaluation at advanced [facility] center-I did tell them they are free to obtain an opinion at somewhere like [facility] or [facility]  Type 2 diabetes mellitus with hyperglycemia, without long-term current use of insulin (HCC)  Lab Results   Component Value Date    HGBA1C 8.1 (H) 12/14/2024       Recent Labs     01/09/25  1747 01/09/25 2056 01/10/25  0732   POCGLU 156* 216* 169*       Blood Sugar Average: Last 72 hrs:  (P) [id number]    Acute respiratory failure (HCC)  Reported hypoxia of 88% with EMS, no documented hypoxia here and he is currently on room air with sats in the low to mid 90s  Can perform home O2 eval prior to discharge, he has not required oxygen in the past on previous  walks  I have discussed the above management plan in detail with the primary service.   Pulmonology service will follow as needed, anticipate discharge home tomorrow.  Please contact the SecureChat role for the Pulmonology service with any questions/concerns.    History of Present Illness   Fahad Heranndez is a 69 y.o. male with secondhand smoke exposure with past medical history of asthma, hypertension, diabetes who presents with again complaint of shortness of breath that seems to have come on suddenly.  He has had multiple hospitalizations and evaluations for the same.  Has been on chronic steroids and feels that once he gets down to 10 mg his breathing worsens.  Does not really note any real significant change with the steroids to begin with.  Has been on multiple bronchodilators as well as multiple different Biologics, currently on Dupixent.    Review of Systems   Constitutional: Negative.    HENT: Negative.     Respiratory:  Positive for shortness of breath.    Cardiovascular: Negative.    Gastrointestinal: Negative.    Genitourinary: Negative.    Musculoskeletal: Negative.    Skin: Negative.    Allergic/Immunologic: Negative.    Neurological: Negative.    Psychiatric/Behavioral: Negative.         Historical Information   I have reviewed the patient's PMH, PSH, Social History, Family History, Meds, and Allergies    Current Facility-Administered Medications:     acetaminophen (TYLENOL) tablet 650 mg, Q6H PRN    albuterol (PROVENTIL HFA,VENTOLIN HFA) inhaler 2 puff, Q4H PRN    amLODIPine (NORVASC) tablet 10 mg, Daily **AND** lisinopril (ZESTRIL) tablet 40 mg, Daily    ARIPiprazole (ABILIFY) tablet 10 mg, Daily    atorvastatin (LIPITOR) tablet 10 mg, Daily    budesonide (PULMICORT) inhalation solution 0.5 mg, Q12H    buPROPion (WELLBUTRIN XL) 24 hr tablet 300 mg, Daily    formoterol (PERFOROMIST) nebulizer solution 20 mcg, Q12H    heparin (porcine) subcutaneous injection 5,000 Units, Q8H PARTHA    insulin lispro  (HumALOG/ADMELOG) 100 units/mL subcutaneous injection 1-5 Units, TID AC **AND** Fingerstick Glucose (POCT), TID AC    insulin lispro (HumALOG/ADMELOG) 100 units/mL subcutaneous injection 1-5 Units, HS    ipratropium (ATROVENT) 0.02 % inhalation solution 0.5 mg, TID    levalbuterol (XOPENEX) inhalation solution 1.25 mg, TID **AND** [DISCONTINUED] sodium chloride 0.9 % inhalation solution 3 mL, TID    methylPREDNISolone sodium succinate (Solu-MEDROL) injection 40 mg, Q12H PARTHA    metoprolol succinate (TOPROL-XL) 24 hr tablet 25 mg, Daily    montelukast (SINGULAIR) tablet 10 mg, HS    oxyCODONE (ROXICODONE) immediate release tablet 10 mg, Q6H PRN    pantoprazole (PROTONIX) EC tablet 40 mg, Early Morning    tamsulosin (FLOMAX) capsule 0.4 mg, Daily    temazepam (RESTORIL) capsule 30 mg, HS PRN    venlafaxine (EFFEXOR-XR) 24 hr capsule 75 mg, Daily  Prior to Admission Medications   Prescriptions Last Dose Informant Patient Reported? Taking?   ARIPiprazole (ABILIFY) 10 mg tablet 1/8/2025 Self Yes Yes   Austedo 12 MG TABS 1/8/2025 Self Yes Yes   Breztri Aerosphere 160-9-4.8 MCG/ACT AERO 1/8/2025  No Yes   Sig: INHALE 2 PUFFS BY MOUTH 2 TIMES PER DAY *NEW PRESCRIPTION REQUEST*   Dupixent subcutaneous injection 1/9/2025 Morning  No Yes   Sig: INJECT 2ML SUBCUTANEOUSLY 1 TIME EVERY 2 WEEKS *NEW PRESCRIPTION REQUEST*    MG tablet Not Taking Self Yes No   Patient not taking: Reported on 1/9/2025   OneTouch Ultra test strip 1/8/2025 Self Yes Yes   Sig: TEST DAILY.. DIAGNOSIS E11.9   albuterol (2.5 mg/3 mL) 0.083 % nebulizer solution 1/8/2025  No Yes   Sig: USE WITH NEBULIZER EVERY 4 HOURS AS NEEDED *NEW PRESCRIPTION REQUEST*   albuterol (ACCUNEB) 1.25 MG/3ML nebulizer solution Past Week Self Yes Yes   Sig: Take 1.25 mg by nebulization every 6 (six) hours as needed for wheezing   albuterol (PROVENTIL HFA,VENTOLIN HFA) 90 mcg/act inhaler 1/8/2025  No Yes   Sig: INHALE 2 PUFFS BY MOUTH EVERY 4 HOURS AS NEEDED *NEW  PRESCRIPTION REQUEST*   amLODIPine-benazepril (LOTREL) 10-40 MG per capsule 2025 Self Yes Yes   aspirin (ECOTRIN LOW STRENGTH) 81 mg EC tablet Not Taking Self Yes No   Sig: Take 81 mg by mouth daily   Patient not taking: Reported on 2024   atorvastatin (LIPITOR) 10 mg tablet 2025 Self Yes Yes   Sig: Take 10 mg by mouth daily   buPROPion (WELLBUTRIN XL) 300 mg 24 hr tablet 2025 Self Yes Yes   Si tab(s)   esomeprazole (NexIUM) 40 MG capsule 2025 Self Yes Yes   Sig: Take 40 mg by mouth   metFORMIN (GLUCOPHAGE) 500 mg tablet 2025 Self Yes Yes   Sig: Take 1 tablet by mouth 2 (two) times a day with meals   metoprolol succinate (TOPROL-XL) 25 mg 24 hr tablet 2025  No Yes   Sig: TAKE 1 TABLET (25 MG TOTAL) BY MOUTH DAILY.   montelukast (SINGULAIR) 10 mg tablet 2025 Self Yes Yes   Sig: Take 10 mg by mouth   multivitamin (THERAGRAN) TABS 2025 Self Yes Yes   Sig: Take 1 tablet by mouth   nitroglycerin (NITROSTAT) 0.4 mg SL tablet More than a month  No No   Sig: Place 1 tablet (0.4 mg total) under the tongue every 5 (five) minutes as needed for chest pain   oxyCODONE (ROXICODONE) 5 mg immediate release tablet 2025 Self Yes Yes   Sig: Take 10 mg by mouth every 6 (six) hours as needed   predniSONE 10 mg tablet 2025  No Yes   Sig: Use 2 tablets in a.m. daily and 1 tablet p.m. daily   promethazine (PHENERGAN) 25 mg tablet Not Taking Self Yes No   Sig: Take 25 mg by mouth daily as needed   Patient not taking: Reported on 2025   tamsulosin (FLOMAX) 0.4 mg 2025 Self Yes Yes   Sig: Take 0.4 mg by mouth daily   temazepam (RESTORIL) 30 mg capsule 2025 Self Yes Yes   Sig: Take 30 mg by mouth daily at bedtime as needed   tiZANidine (ZANAFLEX) 4 mg tablet 2025 Self Yes Yes   venlafaxine (EFFEXOR-XR) 75 mg 24 hr capsule 2025 Self Yes Yes   Si cap(s)      Facility-Administered Medications: None     Tobacco History: Secondhand smoke exposure  Occupational History:    Family History:  Family History   Family history unknown: Yes       Objective :  Temp:  [97.9 °F (36.6 °C)-98.3 °F (36.8 °C)] 98.1 °F (36.7 °C)  HR:  [] 99  BP: (102-147)/(64-89) 141/89  Resp:  [17-20] 17  SpO2:  [90 %-97 %] 96 %  O2 Device: None (Room air)  Nasal Cannula O2 Flow Rate (L/min):  [4 L/min] 4 L/min    Physical Exam  Constitutional:       General: He is not in acute distress.     Appearance: Normal appearance. He is not ill-appearing.   HENT:      Head: Normocephalic and atraumatic.      Mouth/Throat:      Pharynx: Oropharynx is clear.   Eyes:      Conjunctiva/sclera: Conjunctivae normal.   Cardiovascular:      Rate and Rhythm: Normal rate and regular rhythm.   Pulmonary:      Effort: Pulmonary effort is normal.      Breath sounds: Normal breath sounds. No decreased breath sounds, wheezing, rhonchi or rales.   Abdominal:      General: There is no distension.   Musculoskeletal:         General: Normal range of motion.      Cervical back: Normal range of motion.      Left lower leg: No edema.   Skin:     General: Skin is warm and dry.   Neurological:      Mental Status: He is alert and oriented to person, place, and time.   Psychiatric:         Mood and Affect: Mood normal.         Behavior: Behavior normal.           Lab Results: I have reviewed the following results:  .     01/09/25  1226 01/10/25  0509   WBC 11.62* 8.71   HGB 10.9* 11.6*   HCT 34.8* 37.5    369   SODIUM 137 137   K 4.2 4.3    105   CO2 24 25   BUN 23 20   CREATININE 0.76 0.65   GLUC 186* 185*   AST 22  --    ALT 40  --    ALB 3.0*  --    TBILI 0.39  --    ALKPHOS 38  --    BNP 46  --      ABG: No new results in last 24 hours.    Imaging Results Review: I reviewed radiology reports from this admission including: chest xray.  Other Study Results Review: No additional pertinent studies reviewed.  PFT Results Reviewed: reviewed    VTE Prophylaxis: VTE covered by:  heparin (porcine), Subcutaneous, 5,000 Units at  01/10/25 0516

## 2025-01-10 NOTE — ASSESSMENT & PLAN NOTE
She recently discharged approximately 2 to 3 weeks ago at that time had acute asthma exacerbation likely overlap syndrome  Was evaluated last time by pulmonology  Was noted to be saturating 88% at room air since improved with 2 L nasal cannula  Mildly improved with nebs  Will continue with on IV steroids 40 every 12  Scheduled nebs  Wean oxygen as able  Consulted pulm-recent evaluation with pulmonology and outpatient inpatient setting.  Shortness of breath likely partially related to anxiety, however patient reports improvement with steroids, will continue with steroids today.  Had lengthy discussion with family member about if they would like a second opinion may see DELOR[facility] in the outpatient setting.  Will likely transition to tapering dose of prednisone tomorrow.   [place] reviewed  Electronically signed by Leon Uriostegui MD, 05/03/21, 5:46 PM EDT.

## 2025-01-10 NOTE — TELEPHONE ENCOUNTER
Pt's wife Carol called wanting to let cardiology know that the pt is in the hospital and asked if someone could go and see him from cardiology.     CHRISTY

## 2025-01-10 NOTE — ASSESSMENT & PLAN NOTE
Lab Results   Component Value Date    HGBA1C 8.1 (H) 12/14/2024       Recent Labs     01/09/25  1747 01/09/25 2056 01/10/25  0732 01/10/25  1154   POCGLU 156* 216* 169* 200*       Blood Sugar Average: Last 72 hrs:  (P) 185.25  Hold metformin  Sliding-scale insulin

## 2025-01-10 NOTE — TELEPHONE ENCOUNTER
Spoke with Carol today. I did re-iterate that we could not transfer him to a specialty hospital but they are free to obtain an opinion somewhere like [facility] and [facility].

## 2025-01-10 NOTE — RESPIRATORY THERAPY NOTE
RT Protocol Note  Fahad Hernandez 69 y.o. male MRN: [id number]  Unit/Bed#: -01 Encounter: [id number]    Assessment    Principal Problem:    Severe persistent asthma with acute exacerbation  Active Problems:    Hypertension    Type 2 diabetes mellitus with hyperglycemia, without long-term current use of insulin (HCC)    Gastroesophageal reflux disease with esophagitis    Generalized weakness    Acute respiratory failure ([facility])      Home Pulmonary Medications:  Albuterol 2.5mg/nss Q6 PRN   01/10/25 0812   Respiratory Protocol   Protocol Initiated? No   Protocol Selection Respiratory   Language Barrier? No   Medical & Social History Reviewed? Yes   Diagnostic Studies Reviewed? Yes   Physical Assessment Performed? Yes   Home Devices/Therapy Home O2;BiPAP/CPAP   Respiratory Plan Mild Distress pathway   Respiratory Assessment   Assessment Type During-treatment   General Appearance Awake;Alert   Respiratory Pattern Normal   Chest Assessment Chest expansion symmetrical   Bilateral Breath Sounds Diminished   Resp Comments maintain home tx regimen. Pt removed fromCPAP device for the am.Cont with neb therapy as ordered.       Home Devices/Therapy: Home O2, BiPAP/CPAP    Past Medical History:   Diagnosis Date    Asthma     COPD (chronic obstructive pulmonary disease) ([facility])     Dementia ([facility])     Diabetes mellitus ([facility])     GERD (gastroesophageal reflux disease)     History of stroke 11/07/2019    Hypertension     Interstitial lung disease ([facility])     Major depressive disorder with current active episode 11/07/2019    Pneumonia     Rotator cuff arthropathy of right shoulder 12/17/2024    Sleep apnea     Sleep apnea, obstructive     Stroke ([facility])     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     Social History     Socioeconomic History    Marital status: /Civil Union     Spouse name: None    Number of children: None    Years of education: None    Highest education level: None   Occupational History    None   Tobacco Use    Smoking  status: Never     Passive exposure: Never    Smokeless tobacco: Never    Tobacco comments:     Patient admits to using marijuana, edibles and smoking    Vaping Use    Vaping status: Never Used   Substance and Sexual Activity    Alcohol use: Not Currently    Drug use: Yes     Types: Marijuana, Oxycodone, Psilocybin    Sexual activity: Yes     Partners: Female     Birth control/protection: Male Sterilization   Other Topics Concern    None   Social History Narrative    None     Social Drivers of Health     Financial Resource Strain: Low Risk  (11/1/2024)    Received from [facility]    Overall Financial Resource Strain (CARDIA)     Difficulty of Paying Living Expenses: Not hard at all   Food Insecurity: No Food Insecurity (1/9/2025)    Nursing - Inadequate Food Risk Classification     Worried About Running Out of Food in the Last Year: Never true     Ran Out of Food in the Last Year: Never true     Ran Out of Food in the Last Year: Never true   Transportation Needs: No Transportation Needs (1/9/2025)    Nursing - Transportation Risk Classification     Lack of Transportation: Not on file     Lack of Transportation: No   Physical Activity: Inactive (11/1/2024)    Received from [facility]    Exercise Vital Sign     Days of Exercise per Week: 0 days     Minutes of Exercise per Session: 0 min   Stress: Stress Concern Present (11/1/2024)    Received from [facility]    [employer] [place] [employer] - Occupational Stress Questionnaire     Feeling of Stress : To some extent   Social Connections: Moderately Isolated (11/1/2024)    Received from [facility]    Social Connection and Isolation Panel [NHANES]     Frequency of Communication with Friends and Family: Three times a week     Frequency of Social Gatherings with Friends and Family: Twice a week     Attends Mandaen Services: Never     Active Member of Clubs or Organizations: No     Attends Club or  "Organization Meetings: Never     Marital Status:    Intimate Partner Violence: Unknown (2025)    Nursing IPS     Feels Physically and Emotionally Safe: Not on file     Physically Hurt by Someone: Not on file     Humiliated or Emotionally Abused by Someone: Not on file     Physically Hurt by Someone: No     Hurt or Threatened by Someone: No   Housing Stability: Unknown (2025)    Nursing: Inadequate Housing Risk Classification     Has Housing: Not on file     Worried About Losing Housing: Not on file     Unable to Get Utilities: Not on file     Unable to Pay for Housing in the Last Year: No     Has Housin       Subjective         Objective    Physical Exam:   Assessment Type: During-treatment  General Appearance: Awake, Alert  Respiratory Pattern: Normal  Chest Assessment: Chest expansion symmetrical  Bilateral Breath Sounds: Diminished  Cough: None  O2 Device: RA    Vitals:  Blood pressure 141/89, pulse 99, temperature 98.1 °F (36.7 °C), temperature source Oral, resp. rate 17, height 5' 11\" (1.803 m), weight 71.2 kg (157 lb), SpO2 96%.          Imaging and other studies:     O2 Device: RA     Plan    Respiratory Plan: Mild Distress pathway        Resp Comments: maintain home tx regimen. Pt removed fromCPAP device for the am.Cont with neb therapy as ordered.   "

## 2025-01-10 NOTE — ASSESSMENT & PLAN NOTE
Lab Results   Component Value Date    HGBA1C 8.1 (H) 12/14/2024       Recent Labs     01/09/25 1747 01/09/25  2056 01/10/25  0732   POCGLU 156* 216* 169*       Blood Sugar Average: Last 72 hrs:  (P) [id number]

## 2025-01-10 NOTE — RESPIRATORY THERAPY NOTE
01/09/25 3926   Respiratory Assessment   Assessment Type Assess only   General Appearance Awake;Alert   Respiratory Pattern Normal   Chest Assessment Chest expansion symmetrical   Bilateral Breath Sounds Diminished   Cough None   Resp Comments Pt placed on cpap at this time   O2 Device RA   Non-Invasive Information   O2 Interface Device Nasal mask   Non-Invasive Ventilation Mode CPAP   $ Intermittent NIV Yes   SpO2 91 %   $ Pulse Oximetry Spot Check Charge Completed   Non-Invasive Settings   FiO2 (%) 21   PEEP/CPAP (cm H2O) 10   Rise Time 2   Non-Invasive Readings   Total Rate 16   Spontaneous MV (mL) 30   Spontaneous Vt (mL) 684   Leak (lpm) 30   Skin Intervention Skin intact   Non-Invasive Alarms   Low Insp Pressure Time (sec) 60 sec   High Resp Rate (BPM) 40 BPM   Apnea Interval (sec) 30     RT Ventilator Management Note  Fahad Hernandez 69 y.o. male MRN: [id number]  Unit/Bed#: -01 Encounter: [id number]      Daily Screen    No data found in the last 10 encounters.           Physical Exam:   Assessment Type: Assess only  General Appearance: Awake, Alert  Respiratory Pattern: Normal  Chest Assessment: Chest expansion symmetrical  Bilateral Breath Sounds: Diminished  Cough: None  O2 Device: RA      Resp Comments: Pt placed on cpap at this time

## 2025-01-10 NOTE — ASSESSMENT & PLAN NOTE
Reported hypoxia of 88% with EMS, no documented hypoxia here and he is currently on room air with sats in the low to mid 90s  Can perform home O2 eval prior to discharge, he has not required oxygen in the past on previous walks

## 2025-01-11 VITALS
TEMPERATURE: 97.7 F | OXYGEN SATURATION: 94 % | DIASTOLIC BLOOD PRESSURE: 63 MMHG | BODY MASS INDEX: 21.98 KG/M2 | SYSTOLIC BLOOD PRESSURE: 118 MMHG | WEIGHT: 157 LBS | RESPIRATION RATE: 15 BRPM | HEIGHT: 71 IN | HEART RATE: 95 BPM

## 2025-01-11 LAB
ANION GAP SERPL CALCULATED.3IONS-SCNC: 9 MMOL/L (ref 4–13)
BUN SERPL-MCNC: 26 MG/DL (ref 5–25)
CALCIUM SERPL-MCNC: 9 MG/DL (ref 8.4–10.2)
CHLORIDE SERPL-SCNC: 104 MMOL/L (ref 96–108)
CO2 SERPL-SCNC: 25 MMOL/L (ref 21–32)
CREAT SERPL-MCNC: 0.81 MG/DL (ref 0.6–1.3)
ERYTHROCYTE [DISTWIDTH] IN BLOOD BY AUTOMATED COUNT: 16 % (ref 11.6–15.1)
GFR SERPL CREATININE-BSD FRML MDRD: 89 ML/MIN/1.73SQ M
GLUCOSE SERPL-MCNC: 178 MG/DL (ref 65–140)
GLUCOSE SERPL-MCNC: 196 MG/DL (ref 65–140)
GLUCOSE SERPL-MCNC: 271 MG/DL (ref 65–140)
HCT VFR BLD AUTO: 37.6 % (ref 36.5–49.3)
HGB BLD-MCNC: 11.4 G/DL (ref 12–17)
MCH RBC QN AUTO: 25.7 PG (ref 26.8–34.3)
MCHC RBC AUTO-ENTMCNC: 30.3 G/DL (ref 31.4–37.4)
MCV RBC AUTO: 85 FL (ref 82–98)
PLATELET # BLD AUTO: 422 THOUSANDS/UL (ref 149–390)
PMV BLD AUTO: 8.8 FL (ref 8.9–12.7)
POTASSIUM SERPL-SCNC: 4.5 MMOL/L (ref 3.5–5.3)
RBC # BLD AUTO: 4.43 MILLION/UL (ref 3.88–5.62)
SODIUM SERPL-SCNC: 138 MMOL/L (ref 135–147)
WBC # BLD AUTO: 15.53 THOUSAND/UL (ref 4.31–10.16)

## 2025-01-11 PROCEDURE — 80048 BASIC METABOLIC PNL TOTAL CA: CPT

## 2025-01-11 PROCEDURE — 82948 REAGENT STRIP/BLOOD GLUCOSE: CPT

## 2025-01-11 PROCEDURE — 94640 AIRWAY INHALATION TREATMENT: CPT

## 2025-01-11 PROCEDURE — 94664 DEMO&/EVAL PT USE INHALER: CPT

## 2025-01-11 PROCEDURE — 85027 COMPLETE CBC AUTOMATED: CPT

## 2025-01-11 PROCEDURE — 94760 N-INVAS EAR/PLS OXIMETRY 1: CPT

## 2025-01-11 PROCEDURE — 99239 HOSP IP/OBS DSCHRG MGMT >30: CPT

## 2025-01-11 RX ORDER — PREDNISONE 20 MG/1
40 TABLET ORAL DAILY
Status: DISCONTINUED | OUTPATIENT
Start: 2025-01-11 | End: 2025-01-11 | Stop reason: HOSPADM

## 2025-01-11 RX ORDER — BUDESONIDE 0.5 MG/2ML
0.5 INHALANT ORAL
Qty: 120 ML | Refills: 0 | Status: SHIPPED | OUTPATIENT
Start: 2025-01-11 | End: 2025-01-24 | Stop reason: SDUPTHER

## 2025-01-11 RX ORDER — PREDNISONE 20 MG/1
TABLET ORAL
Qty: 14 TABLET | Refills: 0 | Status: SHIPPED | OUTPATIENT
Start: 2025-01-12 | End: 2025-01-21

## 2025-01-11 RX ADMIN — PANTOPRAZOLE SODIUM 40 MG: 40 TABLET, DELAYED RELEASE ORAL at 05:30

## 2025-01-11 RX ADMIN — LEVALBUTEROL HYDROCHLORIDE 1.25 MG: 1.25 SOLUTION RESPIRATORY (INHALATION) at 13:31

## 2025-01-11 RX ADMIN — BUPROPION HYDROCHLORIDE 300 MG: 150 TABLET, EXTENDED RELEASE ORAL at 09:35

## 2025-01-11 RX ADMIN — IPRATROPIUM BROMIDE 0.5 MG: 0.5 SOLUTION RESPIRATORY (INHALATION) at 08:28

## 2025-01-11 RX ADMIN — LISINOPRIL 40 MG: 20 TABLET ORAL at 09:35

## 2025-01-11 RX ADMIN — METHYLPREDNISOLONE SODIUM SUCCINATE 40 MG: 40 INJECTION, POWDER, FOR SOLUTION INTRAMUSCULAR; INTRAVENOUS at 06:26

## 2025-01-11 RX ADMIN — ATORVASTATIN CALCIUM 10 MG: 10 TABLET, FILM COATED ORAL at 09:35

## 2025-01-11 RX ADMIN — LEVALBUTEROL HYDROCHLORIDE 1.25 MG: 1.25 SOLUTION RESPIRATORY (INHALATION) at 08:28

## 2025-01-11 RX ADMIN — VENLAFAXINE HYDROCHLORIDE 75 MG: 75 CAPSULE, EXTENDED RELEASE ORAL at 09:35

## 2025-01-11 RX ADMIN — AMLODIPINE BESYLATE 10 MG: 10 TABLET ORAL at 09:35

## 2025-01-11 RX ADMIN — METOPROLOL SUCCINATE 25 MG: 25 TABLET, EXTENDED RELEASE ORAL at 09:35

## 2025-01-11 RX ADMIN — BUDESONIDE INHALATION 0.5 MG: 0.5 SUSPENSION RESPIRATORY (INHALATION) at 08:28

## 2025-01-11 RX ADMIN — OXYCODONE HYDROCHLORIDE 10 MG: 10 TABLET ORAL at 09:53

## 2025-01-11 RX ADMIN — FORMOTEROL FUMARATE DIHYDRATE 20 MCG: 20 SOLUTION RESPIRATORY (INHALATION) at 08:28

## 2025-01-11 RX ADMIN — TAMSULOSIN HYDROCHLORIDE 0.4 MG: 0.4 CAPSULE ORAL at 09:35

## 2025-01-11 RX ADMIN — ARIPIPRAZOLE 10 MG: 5 TABLET ORAL at 09:35

## 2025-01-11 RX ADMIN — IPRATROPIUM BROMIDE 0.5 MG: 0.5 SOLUTION RESPIRATORY (INHALATION) at 13:31

## 2025-01-11 RX ADMIN — PREDNISONE 40 MG: 20 TABLET ORAL at 09:35

## 2025-01-11 RX ADMIN — INSULIN LISPRO 1 UNITS: 100 INJECTION, SOLUTION INTRAVENOUS; SUBCUTANEOUS at 09:35

## 2025-01-11 RX ADMIN — INSULIN LISPRO 3 UNITS: 100 INJECTION, SOLUTION INTRAVENOUS; SUBCUTANEOUS at 11:43

## 2025-01-11 RX ADMIN — HEPARIN SODIUM 5000 UNITS: 5000 INJECTION INTRAVENOUS; SUBCUTANEOUS at 05:30

## 2025-01-11 NOTE — UTILIZATION REVIEW
Continued Stay Review    SEE INITIAL REVIEW AT BOTTOM      Date:  1/11/25                      Current Patient Class: Inpatient  Current Level of Care:  Med Surg    HPI:69 y.o. male initially admitted as Observation 1/9/25, converted to Inpatient 1/10/25.     1/11  Day 3: Has surpassed a 2nd midnight with active treatments and services. Internal Medicine Discharge Summary:    Patient admitted with complaints of shortness of breath and wheezing.  Of note he was recently discharged about 2 to 3 weeks ago with similar presentation.  He was started on respiratory protocol and IV steroids.  Symptoms improved likely his shortness of breath is exacerbated by anxiety.  He was titrated down to oral steroids today.  Patient's oxygenation remained above 93% while on room air.  Was seen and evaluated by pulmonology who will follow-up with him outpatient within 1 week.  Patient also has pulmonary rehab scheduled.  His symptoms were discussed in detail with the patient and his wife.  They verbalized understanding of discharge and follow-up instructions.  All questions answered to patient's satisfaction.      1/11 1850 Discharged to home with home health care.       Medications:   Scheduled Medications:    amLODIPine, 10 mg, Oral, Daily   And  lisinopril, 40 mg, Oral, Daily  ARIPiprazole, 10 mg, Oral, Daily  atorvastatin, 10 mg, Oral, Daily  budesonide, 0.5 mg, Nebulization, Q12H  buPROPion, 300 mg, Oral, Daily  formoterol, 20 mcg, Nebulization, Q12H  heparin (porcine), 5,000 Units, Subcutaneous, Q8H PARTHA  insulin lispro, 1-5 Units, Subcutaneous, TID AC  insulin lispro, 1-5 Units, Subcutaneous, HS  ipratropium, 0.5 mg, Nebulization, TID  levalbuterol, 1.25 mg, Nebulization, TID  metoprolol succinate, 25 mg, Oral, Daily  montelukast, 10 mg, Oral, HS  pantoprazole, 40 mg, Oral, Early Morning  predniSONE, 40 mg, Oral, Daily  tamsulosin, 0.4 mg, Oral, Daily  venlafaxine, 75 mg, Oral, Daily    predniSONE tablet 40 mg  Dose: 40 mg  Freq:  Daily Route: PO  Start: 01/11/25 0900    methylPREDNISolone sodium succinate (Solu-MEDROL) injection 40 mg x 4 doses  Dose: 40 mg  Freq: Every 12 hours scheduled Route: IV  Start: 01/09/25 1345 End: 01/11/25 0856      Continuous IV Infusions: None.      PRN Meds:  acetaminophen, 650 mg, Oral, Q6H PRN  albuterol, 2 puff, Inhalation, Q4H PRN  oxyCODONE, 10 mg, Oral, Q6H PRN  temazepam, 30 mg, Oral, HS PRN      Discharge Plan:  TBD    Vital Signs (last 3 days) before discharge       Date/Time Temp Pulse Resp BP MAP (mmHg) SpO2 Calculated FIO2 (%) - Nasal Cannula Nasal Cannula O2 Flow Rate (L/min) O2 Device O2 Interface Device Patient Position - Orthostatic VS Pain    01/11/25 1548 -- -- -- -- -- 94 % -- -- None (Room air) -- -- --    01/11/25 15:03:11 97.7 °F (36.5 °C) 95 -- 118/63 81 96 % -- -- -- -- -- --    01/11/25 1331 -- -- -- -- -- 92 % -- -- None (Room air) -- -- --    01/11/25 1330 -- 89 -- -- -- 89 % -- -- -- -- -- --    01/11/25 1300 -- 90 -- -- -- 89 % -- -- -- -- -- --    01/11/25 1230 -- 87 -- -- -- 91 % -- -- -- -- -- --    01/11/25 1200 -- 98 -- -- -- 94 % -- -- -- -- -- --    01/11/25 1130 -- 89 -- -- -- 90 % -- -- -- -- -- --    01/11/25 1100 -- 101 -- -- -- 94 % -- -- -- -- -- --    01/11/25 1030 -- 88 -- -- -- 90 % -- -- -- -- -- --    01/11/25 1000 -- 87 -- -- -- 91 % -- -- -- -- -- --    01/11/25 0953 -- -- -- -- -- -- -- -- -- -- -- 8    01/11/25 0930 -- 90 -- -- -- 92 % -- -- -- -- -- --    01/11/25 0900 -- 93 -- -- -- 98 % -- -- None (Room air) -- -- No Pain    01/11/25 0830 -- 94 -- -- -- 93 % -- -- -- -- -- --    01/11/25 0829 -- -- -- -- -- 93 % -- -- None (Room air) -- -- --    01/11/25 0800 -- 98 15 120/65 83 94 % -- -- -- -- -- --    01/11/25 07:48:28 97.8 °F (36.6 °C) 94 -- 120/65 83 93 % -- -- -- -- -- --    01/11/25 0400 -- -- -- -- -- 96 % -- -- -- Nasal mask -- --    01/10/25 23:06:24 97.6 °F (36.4 °C) 81 -- 119/79 92 95 % -- -- -- -- -- --    01/10/25 2253 -- -- -- -- -- 90 % -- --  -- Nasal mask -- --    01/10/25 1937 -- 83 18 -- -- 93 % -- -- -- -- -- --    01/10/25 1915 -- -- -- -- -- 92 % -- -- None (Room air) -- -- No Pain    01/10/25 1813 -- -- -- -- -- -- -- -- -- -- -- 8    01/10/25 15:05:13 98 °F (36.7 °C) 84 -- 108/70 83 94 % -- -- -- -- -- --    01/10/25 1258 -- -- -- -- -- 91 % -- -- None (Room air) -- -- --    01/10/25 1114 -- -- -- -- -- 91 % -- -- -- -- -- --    01/10/25 1100 -- 83 -- -- -- 93 % -- -- -- -- -- --    01/10/25 1053 -- -- -- -- -- -- -- -- -- -- -- 8    01/10/25 1045 -- 87 -- -- -- 90 % -- -- -- -- -- --    01/10/25 1030 -- 89 -- -- -- 92 % -- -- -- -- -- --    01/10/25 1015 -- 124 -- -- -- 91 % -- -- -- -- -- --    01/10/25 1000 -- 98 -- -- -- 90 % -- -- -- -- -- --    01/10/25 0945 -- 115 -- -- -- 90 % -- -- -- -- -- --    01/10/25 0930 -- 95 -- -- -- 89 % -- -- -- -- -- --    01/10/25 0915 -- 106 -- -- -- 91 % -- -- -- -- -- --    01/10/25 0900 -- 98 -- -- -- 92 % -- -- -- -- -- --    01/10/25 0845 -- 96 -- -- -- 93 % -- -- -- -- -- --    01/10/25 0840 -- -- -- -- -- 93 % -- -- None (Room air) -- -- 4    01/10/25 0830 -- 111 -- -- -- 95 % -- -- -- -- -- --    01/10/25 0815 -- 108 -- -- -- 98 % -- -- -- -- -- --    01/10/25 0808 -- -- -- -- -- 96 % -- -- -- -- -- --    01/10/25 0800 -- 116 -- -- -- 94 % -- -- -- -- -- --    01/10/25 0745 -- 108 -- 141/89 106 93 % -- -- -- -- -- --    01/10/25 07:34:32 98.1 °F (36.7 °C) 99 17 141/89 106 94 % -- -- -- -- -- --    01/10/25 0730 -- 97 -- -- -- 92 % -- -- -- -- -- --    01/10/25 0715 -- 96 -- -- -- 92 % -- -- -- -- -- --    01/10/25 0700 -- 83 -- -- -- 93 % -- -- -- -- -- --    01/10/25 0645 -- 96 -- -- -- 93 % -- -- -- -- -- --    01/10/25 0630 -- 81 -- -- -- 93 % -- -- -- -- -- --    01/10/25 0615 -- 95 -- -- -- 92 % -- -- -- -- -- --    01/10/25 0600 -- 82 -- -- -- 92 % -- -- -- -- -- --    01/09/25 2300 -- -- -- -- -- -- -- -- -- -- -- 2    01/09/25 2251 -- -- -- -- -- 91 % -- -- -- Nasal mask -- --    01/09/25  21:45:08 97.9 °F (36.6 °C) 94 18 141/89 106 91 % -- -- -- -- Lying --    01/09/25 2040 -- -- -- -- -- -- -- -- -- -- -- 8    01/09/25 1958 -- 84 20 -- -- 92 % -- -- -- -- -- --    01/09/25 1845 -- -- -- -- -- -- -- -- None (Room air) -- -- --    01/09/25 1844 -- -- -- -- -- -- -- -- -- -- -- 7    01/09/25 17:35:06 98.2 °F (36.8 °C) 104 -- 129/77 94 92 % -- -- -- -- -- --    01/09/25 1430 -- 91 18 113/64 82 92 % -- -- None (Room air) -- -- --    01/09/25 1330 -- 88 20 102/79 86 90 % -- -- -- -- -- --    01/09/25 1238 -- 92 18 108/74 83 97 % -- -- Other (comment) -- Sitting --    01/09/25 1137 -- -- -- -- -- -- -- -- Nasal cannula -- -- --    01/09/25 1130 -- 91 20 147/75 95 96 % 36 4 L/min Nasal cannula -- Sitting --    01/09/25 1127 98.3 °F (36.8 °C) 91 20 147/75 95 97 % 36 4 L/min Nasal cannula -- Sitting 4          Weight (last 2 days) before discharge       Date/Time Weight    01/09/25 1127 71.2 (157)            Pertinent Labs/Diagnostic Results:   Radiology:  XR chest 1 view portable   Final Interpretation by Wai Hoffmann MD (01/09 1446)      Right sided scarring/atelectasis.      No new infiltrate.            Resident: Renato Doran I, the attending radiologist, have reviewed the images and agree with the final report above.      Workstation performed: [id number]           Results from last 7 days   Lab Units 01/09/25  1226   SARS-COV-2  Negative     Results from last 7 days   Lab Units 01/11/25  0448 01/10/25  0509 01/09/25  1226   WBC Thousand/uL 15.53* 8.71 11.62*   HEMOGLOBIN g/dL 11.4* 11.6* 10.9*   HEMATOCRIT % 37.6 37.5 34.8*   PLATELETS Thousands/uL 422* 369 314   TOTAL NEUT ABS Thousands/µL  --  7.05 9.47*         Results from last 7 days   Lab Units 01/11/25  0448 01/10/25  0509 01/09/25  1226   SODIUM mmol/L 138 137 137   POTASSIUM mmol/L 4.5 4.3 4.2   CHLORIDE mmol/L 104 105 106   CO2 mmol/L 25 25 24   ANION GAP mmol/L 9 7 7   BUN mg/dL 26* 20 23   CREATININE mg/dL 0.81 0.65 0.76   EGFR  ml/min/1.73sq m 89 97 91   CALCIUM mg/dL 9.0 9.0 8.6     Results from last 7 days   Lab Units 01/09/25  1226   AST U/L 22   ALT U/L 40   ALK PHOS U/L 38   TOTAL PROTEIN g/dL 5.8*   ALBUMIN g/dL 3.0*   TOTAL BILIRUBIN mg/dL 0.39     Results from last 7 days   Lab Units 01/11/25  1136 01/11/25  0744 01/10/25  1154 01/10/25  0732 01/09/25  2056 01/09/25  1747   POC GLUCOSE mg/dl 271* 178* 200* 169* 216* 156*     Results from last 7 days   Lab Units 01/11/25  0448 01/10/25  0509 01/09/25  1226   GLUCOSE RANDOM mg/dL 196* 185* 186*           Results from last 7 days   Lab Units 01/09/25  1226   PH JOSE DANIEL  7.435*   PCO2 JOSE DANIEL mm Hg 35.6*   PO2 JOSE DANIEL mm Hg 56.2*   HCO3 JOSE DANIEL mmol/L 23.4*   BASE EXC JOSE DANIEL mmol/L -0.5   O2 CONTENT JOSE DANIEL ml/dL 14.9   O2 HGB, VENOUS % 86.8*               Results from last 7 days   Lab Units 01/09/25  1226   PROCALCITONIN ng/ml 0.14                 Results from last 7 days   Lab Units 01/09/25  1226   BNP pg/mL 46             Results from last 7 days   Lab Units 01/09/25  1226   INFLUENZA A PCR  Negative   INFLUENZA B PCR  Negative   RSV PCR  Negative                   Network Utilization Review Department  ATTENTION: Please call with any questions or concerns to [phone] and carefully listen to the prompts so that you are directed to the right person. All voicemails are confidential.   For Discharge needs, contact Care Management DC Support Team at [phone] opt. [phone]  Send all requests for admission clinical reviews, approved or denied determinations and any other requests to dedicated fax number below belonging to the campus where the patient is receiving treatment. List of dedicated fax numbers for the Facilities:  FACILITY NAME UR FAX NUMBER   ADMISSION DENIALS (Administrative/Medical Necessity) [phone]   DISCHARGE SUPPORT TEAM (NETWORK) [phone]   PARENT CHILD HEALTH (Maternity/NICU/Pediatrics) [phone]   [facility] [phone]   [facility] [phone]   [facility] [phone]   [facility] [phone]   [facility] [phone]   [facility] [phone]   [facility] [phone]   [facility] [phone]   [facility] [phone]   [facility] [phone]   [facility] [phone]   [facility] [phone]

## 2025-01-11 NOTE — DISCHARGE SUMMARY
Discharge Summary - Hospitalist   Name: Fahad Hernandez 69 y.o. male I MRN: [id number]  Unit/Bed#: -01 I Date of Admission: 1/9/2025   Date of Service: 1/11/2025 I Hospital Day: 1     Assessment & Plan  Severe persistent asthma with acute exacerbation  He was  recently discharged approximately 2 to 3 weeks ago at that time had acute asthma exacerbation likely overlap syndrome  Was evaluated last time by pulmonology  Was noted to be saturating 88% at room air since improved with 2 L nasal cannula  Mildly improved with nebs  Transitioned from IV steriods to tapering dose of prednisone tday  Scheduled nebs  Now on RA SpO2 93-94%  Ambulated in hallway, no desaturations noted with ambulation. SpO2 remains >93%  Consulted pulm-recent evaluation with pulmonology and outpatient inpatient setting.  Shortness of breath likely partially related to anxiety, however patient reports improvement with steroids, will continue with tapering dose of steroids upon discharge.  Had lengthy discussion with family member about if they would like a second opinion may see DELOR[facility] in the outpatient setting.   Will follow-up with Pulm outpatient within 1 week and has Pulm Rehab scheduled  Patient stable for discharge   Hypertension  Blood pressure currently controlled  Continue amlodipine/ace  Type 2 diabetes mellitus with hyperglycemia, without long-term current use of insulin ([facility])  Lab Results   Component Value Date    HGBA1C 8.1 (H) 12/14/2024       Recent Labs     01/10/25  0732 01/10/25  1154 01/11/25  0744 01/11/25  1136   POCGLU 169* 200* 178* 271*       Blood Sugar Average: Last 72 hrs:  (P) [id number]  Hold metformin  Sliding-scale insulin  Gastroesophageal reflux disease with esophagitis  Continue PPI  Generalized weakness  Presented with generalized weakness reports difficulty ambulating likely in setting of COPD/asthma exacerbation  Will place outpatient referral for PT/OT  Patient will be discharged with  homecare  Acute respiratory failure (HCC)  Noted to be saturating 88% on room air  Now SpO2  >93% on RA  Secondary to COPD/asthma  Plan as outlined above     Medical Problems       Resolved Problems  Date Reviewed: 9/4/2024   None       Discharging Physician / Practitioner: JAMIL Newton  PCP: Surjit Hayes DO  Admission Date:   Admission Orders (From admission, onward)       Ordered        01/10/25 1356  INPATIENT ADMISSION  Once            01/09/25 1323  Place in Observation  Once                          Discharge Date: 01/11/25    Consultations During Hospital Stay:  Pulmonology    Procedures Performed:   None    Significant Findings / Test Results:   XR chest 1 view portable   Final Result by Wai Hoffmann MD (01/09 1446)      Right sided scarring/atelectasis.      No new infiltrate.            Resident: Renato Doran I, the attending radiologist, have reviewed the images and agree with the final report above.      Workstation performed: [id number]               Incidental Findings:   None    Test Results Pending at Discharge (will require follow up):   None     Outpatient Tests Requested:  None    Complications:  No    Reason for Admission:   Chief Complaint   Patient presents with    Shortness of Breath     Pt. Presents to ER from home with c/o shortness of breath getting progressively worse over the past few days.           Hospital Course:   Fahad Hernandez is a 69 y.o. male patient who originally presented to the hospital on 1/9/2025 due to shortness of breath.  Patient admitted with complaints of shortness of breath and wheezing.  Of note he was recently discharged about 2 to 3 weeks ago with similar presentation.  He was started on respiratory protocol and IV steroids.  Symptoms improved likely his shortness of breath is exacerbated by anxiety.  He was titrated down to oral steroids today.  Patient's oxygenation remained above 93% while on room air.  Was seen and evaluated by  "pulmonology who will follow-up with him outpatient within 1 week.  Patient also has pulmonary rehab scheduled.  His symptoms were discussed in detail with the patient and his wife.  They verbalized understanding of discharge and follow-up instructions.  All questions answered to patient's satisfaction.  This is a brief summary of patient's hospital stay for further details please refer to associated progress notes.     Hospital Course: No notes on file      Please see above list of diagnoses and related plan for additional information.     Condition at Discharge: stable    Discharge Day Visit / Exam:   Subjective:  Patient seen and examined sitting up in bed. Reports improvement of symptoms, however he is concerned that symptoms will be exacerbated once discharged. Education and reassurance provided. Patient and wife understanding of discharge instructions.     Vitals: Blood Pressure: 118/63 (01/11/25 1503)  Pulse: 95 (01/11/25 1503)  Temperature: 97.7 °F (36.5 °C) (01/11/25 1503)  Temp Source: Oral (01/10/25 0734)  Respirations: 15 (01/11/25 0800)  Height: 5' 11\" (180.3 cm) (01/09/25 1127)  Weight - Scale: 71.2 kg (157 lb) (01/09/25 1127)  SpO2: 94 % (01/11/25 1548)  Physical Exam  Vitals and nursing note reviewed.   Constitutional:       General: He is not in acute distress.     Appearance: He is not toxic-appearing.   HENT:      Head: Normocephalic.      Mouth/Throat:      Mouth: Mucous membranes are moist.   Eyes:      Conjunctiva/sclera: Conjunctivae normal.   Cardiovascular:      Rate and Rhythm: Normal rate and regular rhythm.      Pulses: Normal pulses.      Heart sounds: Normal heart sounds. No murmur heard.  Pulmonary:      Effort: Pulmonary effort is normal.      Breath sounds: Normal breath sounds. No wheezing or rhonchi.   Abdominal:      General: There is no distension.      Palpations: Abdomen is soft.      Tenderness: There is no abdominal tenderness.   Musculoskeletal:         General: No swelling. "   Skin:     General: Skin is warm.      Capillary Refill: Capillary refill takes less than 2 seconds.   Neurological:      Mental Status: He is alert.      Motor: Weakness present.   Psychiatric:         Mood and Affect: Mood normal.         Behavior: Behavior normal.         Thought Content: Thought content normal.          Discussion with Family: Updated  (wife) via phone.    Discharge instructions/Information to patient and family:   See after visit summary for information provided to patient and family.      Provisions for Follow-Up Care:  See after visit summary for information related to follow-up care and any pertinent home health orders.      Mobility at time of Discharge:   Basic Mobility Inpatient Raw Score: 22  JH-HLM Goal: 7: Walk 25 feet or more  JH-HLM Achieved: 7: Walk 25 feet or more  HLM Goal achieved. Continue to encourage appropriate mobility.     Disposition:   Home    Planned Readmission: No    Discharge Medications:  See after visit summary for reconciled discharge medications provided to patient and/or family.      Administrative Statements   Discharge Statement:  I have spent a total time of 32 minutes in caring for this patient on the day of the visit/encounter. >30 minutes of time was spent on: Prognosis, Instructions for management, Patient and family education, Risk factor reductions, Impressions, Counseling / Coordination of care, Documenting in the medical record, Reviewing / ordering tests, medicine, procedures  , and Communicating with other healthcare professionals .    **Please Note: This note may have been constructed using a voice recognition system**

## 2025-01-11 NOTE — DISCHARGE INSTR - AVS FIRST PAGE
Dear Fahad Hernandez,     It was our pleasure to care for you here at [facility]. For follow up as well as any medication refills, we recommend that you follow up with your primary care physician. Here are the most important instructions/ recommendations at discharge:     Notable Medication Adjustments -   Start Prednisone taper  Testing Required after Discharge - ** Please contact your PCP to request testing orders for any of the testing recommended here **  None  Important follow up information -   Make an appointment to follow-up with Dr. Casas Pulmonologist in office with in 1 week  Follow-up with Pulmonary rehab, information provided below  Other Instructions -   None  Please review this entire after visit summary as additional general instructions including medication list, appointments, activity, diet, any pertinent wound care, and other additional recommendations from your care team that may be provided for you.      Sincerely,     JAMIL Newton

## 2025-01-11 NOTE — ASSESSMENT & PLAN NOTE
Presented with generalized weakness reports difficulty ambulating likely in setting of COPD/asthma exacerbation  Will place outpatient referral for PT/OT  Patient will be discharged with homecare

## 2025-01-11 NOTE — RESPIRATORY THERAPY NOTE
DISPLAY PLAN FREE TEXT RT Protocol Note  Fahad Hernandez 69 y.o. male MRN: [id number]  Unit/Bed#: -01 Encounter: [id number]    Assessment    Principal Problem:    Severe persistent asthma with acute exacerbation  Active Problems:    Hypertension    Type 2 diabetes mellitus with hyperglycemia, without long-term current use of insulin ([facility])    Gastroesophageal reflux disease with esophagitis    Generalized weakness    Acute respiratory failure ([facility])      Home Pulmonary Medications:     01/10/25 1937   Respiratory Protocol   Protocol Initiated? No   Protocol Selection Respiratory   Language Barrier? No   Medical & Social History Reviewed? Yes   Diagnostic Studies Reviewed? Yes   Physical Assessment Performed? Yes   Home Devices/Therapy Home O2;BiPAP/CPAP   Respiratory Plan Mild Distress pathway   Respiratory Assessment   Assessment Type During-treatment   General Appearance Awake;Alert   Respiratory Pattern Normal   Chest Assessment Chest expansion symmetrical   Bilateral Breath Sounds Diminished   Cough None   Resp Comments Will continue with current tx plan   O2 Device RA   Cough Description   Sputum Amount None   Additional Assessments   Pulse 83   Respirations 18   SpO2 93 %       Home Devices/Therapy: Home O2, BiPAP/CPAP    Past Medical History:   Diagnosis Date    Asthma     COPD (chronic obstructive pulmonary disease) ([facility])     Dementia ([facility])     Diabetes mellitus ([facility])     GERD (gastroesophageal reflux disease)     History of stroke 11/07/2019    Hypertension     Interstitial lung disease ([facility])     Major depressive disorder with current active episode 11/07/2019    Pneumonia     Rotator cuff arthropathy of right shoulder 12/17/2024    Sleep apnea     Sleep apnea, obstructive     Stroke ([facility])     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     Social History     Socioeconomic History    Marital status: /Civil Union     Spouse name: None    Number of children: None    Years of education: None    Highest education level: None    Occupational History    None   Tobacco Use    Smoking status: Never     Passive exposure: Never    Smokeless tobacco: Never    Tobacco comments:     Patient admits to using marijuana, edibles and smoking    Vaping Use    Vaping status: Never Used   Substance and Sexual Activity    Alcohol use: Not Currently    Drug use: Yes     Types: Marijuana, Oxycodone, Psilocybin    Sexual activity: Yes     Partners: Female     Birth control/protection: Male Sterilization   Other Topics Concern    None   Social History Narrative    None     Social Drivers of Health     Financial Resource Strain: Low Risk  (11/1/2024)    Received from [facility]    Overall Financial Resource Strain (CARDIA)     Difficulty of Paying Living Expenses: Not hard at all   Food Insecurity: No Food Insecurity (1/10/2025)    Nursing - Inadequate Food Risk Classification     Worried About Running Out of Food in the Last Year: Never true     Ran Out of Food in the Last Year: Never true     Ran Out of Food in the Last Year: Never true   Transportation Needs: No Transportation Needs (1/10/2025)    Nursing - Transportation Risk Classification     Lack of Transportation: Not on file     Lack of Transportation: No   Physical Activity: Inactive (11/1/2024)    Received from [facility]    Exercise Vital Sign     Days of Exercise per Week: 0 days     Minutes of Exercise per Session: 0 min   Stress: Stress Concern Present (11/1/2024)    Received from [facility]    [employer] [place] [employer] - Occupational Stress Questionnaire     Feeling of Stress : To some extent   Social Connections: Moderately Isolated (11/1/2024)    Received from [facility]    Social Connection and Isolation Panel [NHANES]     Frequency of Communication with Friends and Family: Three times a week     Frequency of Social Gatherings with Friends and Family: Twice a week     Attends Congregation Services: Never     Active  "Member of Clubs or Organizations: No     Attends Club or Organization Meetings: Never     Marital Status:    Intimate Partner Violence: Unknown (1/10/2025)    Nursing IPS     Feels Physically and Emotionally Safe: Not on file     Physically Hurt by Someone: Not on file     Humiliated or Emotionally Abused by Someone: Not on file     Physically Hurt by Someone: No     Hurt or Threatened by Someone: No   Housing Stability: Unknown (1/10/2025)    Nursing: Inadequate Housing Risk Classification     Has Housing: Not on file     Worried About Losing Housing: Not on file     Unable to Get Utilities: Not on file     Unable to Pay for Housing in the Last Year: No     Has Housin       Subjective         Objective    Physical Exam:   Assessment Type: During-treatment  General Appearance: Awake, Alert  Respiratory Pattern: Normal  Chest Assessment: Chest expansion symmetrical  Bilateral Breath Sounds: Diminished  Cough: None  O2 Device: RA    Vitals:  Blood pressure 108/70, pulse 83, temperature 98 °F (36.7 °C), resp. rate 18, height 5' 11\" (1.803 m), weight 71.2 kg (157 lb), SpO2 93%.          Imaging and other studies: Results Review Statement: No pertinent imaging studies reviewed.    O2 Device: RA     Plan    Respiratory Plan: Mild Distress pathway        Resp Comments: Will continue with current tx plan   " DISPLAY PLAN FREE TEXT DISPLAY PLAN FREE TEXT DISPLAY PLAN FREE TEXT DISPLAY PLAN FREE TEXT DISPLAY PLAN FREE TEXT DISPLAY PLAN FREE TEXT DISPLAY PLAN FREE TEXT DISPLAY PLAN FREE TEXT DISPLAY PLAN FREE TEXT DISPLAY PLAN FREE TEXT DISPLAY PLAN FREE TEXT DISPLAY PLAN FREE TEXT DISPLAY PLAN FREE TEXT DISPLAY PLAN FREE TEXT DISPLAY PLAN FREE TEXT DISPLAY PLAN FREE TEXT DISPLAY PLAN FREE TEXT DISPLAY PLAN FREE TEXT DISPLAY PLAN FREE TEXT DISPLAY PLAN FREE TEXT DISPLAY PLAN FREE TEXT DISPLAY PLAN FREE TEXT DISPLAY PLAN FREE TEXT DISPLAY PLAN FREE TEXT DISPLAY PLAN FREE TEXT DISPLAY PLAN FREE TEXT DISPLAY PLAN FREE TEXT DISPLAY PLAN FREE TEXT DISPLAY PLAN FREE TEXT DISPLAY PLAN FREE TEXT DISPLAY PLAN FREE TEXT DISPLAY PLAN FREE TEXT DISPLAY PLAN FREE TEXT DISPLAY PLAN FREE TEXT DISPLAY PLAN FREE TEXT DISPLAY PLAN FREE TEXT DISPLAY PLAN FREE TEXT DISPLAY PLAN FREE TEXT DISPLAY PLAN FREE TEXT DISPLAY PLAN FREE TEXT DISPLAY PLAN FREE TEXT DISPLAY PLAN FREE TEXT DISPLAY PLAN FREE TEXT DISPLAY PLAN FREE TEXT DISPLAY PLAN FREE TEXT DISPLAY PLAN FREE TEXT DISPLAY PLAN FREE TEXT DISPLAY PLAN FREE TEXT DISPLAY PLAN FREE TEXT DISPLAY PLAN FREE TEXT DISPLAY PLAN FREE TEXT DISPLAY PLAN FREE TEXT DISPLAY PLAN FREE TEXT DISPLAY PLAN FREE TEXT DISPLAY PLAN FREE TEXT DISPLAY PLAN FREE TEXT DISPLAY PLAN FREE TEXT DISPLAY PLAN FREE TEXT DISPLAY PLAN FREE TEXT DISPLAY PLAN FREE TEXT DISPLAY PLAN FREE TEXT DISPLAY PLAN FREE TEXT DISPLAY PLAN FREE TEXT DISPLAY PLAN FREE TEXT DISPLAY PLAN FREE TEXT DISPLAY PLAN FREE TEXT DISPLAY PLAN FREE TEXT

## 2025-01-11 NOTE — PLAN OF CARE
Problem: INFECTION - ADULT  Goal: Absence or prevention of progression during hospitalization  Description: INTERVENTIONS:  - Assess and monitor for signs and symptoms of infection  - Monitor lab/diagnostic results  - Monitor all insertion sites, i.e. indwelling lines, tubes, and drains  - Monitor endotracheal if appropriate and nasal secretions for changes in amount and color  - [place] appropriate cooling/warming therapies per order  - Administer medications as ordered  - Instruct and encourage patient and family to use good hand hygiene technique  - Identify and instruct in appropriate isolation precautions for identified infection/condition  Outcome: Progressing     Problem: SAFETY ADULT  Goal: Maintain or return to baseline ADL function  Description: INTERVENTIONS:  -  Assess patient's ability to carry out ADLs; assess patient's baseline for ADL function and identify physical deficits which impact ability to perform ADLs (bathing, care of mouth/teeth, toileting, grooming, dressing, etc.)  - Assess/evaluate cause of self-care deficits   - Assess range of motion  - Assess patient's mobility; develop plan if impaired  - Assess patient's need for assistive devices and provide as appropriate  - Encourage maximum independence but intervene and supervise when necessary  - Involve family in performance of ADLs  - Assess for home care needs following discharge   - Consider OT consult to assist with ADL evaluation and planning for discharge  - Provide patient education as appropriate  Outcome: Progressing     Problem: DISCHARGE PLANNING  Goal: Discharge to home or other facility with appropriate resources  Description: INTERVENTIONS:  - Identify barriers to discharge w/patient and caregiver  - Arrange for needed discharge resources and transportation as appropriate  - Identify discharge learning needs (meds, wound care, etc.)  - Arrange for interpretive services to assist at discharge as needed  - Refer to Case  Management Department for coordinating discharge planning if the patient needs post-hospital services based on physician/advanced practitioner order or complex needs related to functional status, cognitive ability, or social support system  Outcome: Progressing

## 2025-01-11 NOTE — ASSESSMENT & PLAN NOTE
He was  recently discharged approximately 2 to 3 weeks ago at that time had acute asthma exacerbation likely overlap syndrome  Was evaluated last time by pulmonology  Was noted to be saturating 88% at room air since improved with 2 L nasal cannula  Mildly improved with nebs  Transitioned from IV steriods to tapering dose of prednisone tday  Scheduled nebs  Now on RA SpO2 93-94%  Ambulated in hallway, no desaturations noted with ambulation. SpO2 remains >93%  Consulted pulm-recent evaluation with pulmonology and outpatient inpatient setting.  Shortness of breath likely partially related to anxiety, however patient reports improvement with steroids, will continue with tapering dose of steroids upon discharge.  Had lengthy discussion with family member about if they would like a second opinion may see DELOR[facility] in the outpatient setting.   Will follow-up with Pulm outpatient within 1 week and has Pulm Rehab scheduled  Patient stable for discharge

## 2025-01-11 NOTE — ASSESSMENT & PLAN NOTE
Lab Results   Component Value Date    HGBA1C 8.1 (H) 12/14/2024       Recent Labs     01/10/25  0732 01/10/25  1154 01/11/25  0744 01/11/25  1136   POCGLU 169* 200* 178* 271*       Blood Sugar Average: Last 72 hrs:  (P) [id number]  Hold metformin  Sliding-scale insulin

## 2025-01-11 NOTE — ASSESSMENT & PLAN NOTE
Noted to be saturating 88% on room air  Now SpO2  >93% on RA  Secondary to COPD/asthma  Plan as outlined above

## 2025-01-11 NOTE — RESPIRATORY THERAPY NOTE
RT Protocol Note  Fahad Hernandez 69 y.o. male MRN: [id number]  Unit/Bed#: -01 Encounter: [id number]    Assessment    Principal Problem:    Severe persistent asthma with acute exacerbation  Active Problems:    Hypertension    Type 2 diabetes mellitus with hyperglycemia, without long-term current use of insulin (HCC)    Gastroesophageal reflux disease with esophagitis    Generalized weakness    Acute respiratory failure ([facility])      Home Pulmonary Medications:    Home Devices/Therapy: Home O2, BiPAP/CPAP    Past Medical History:   Diagnosis Date    Asthma     COPD (chronic obstructive pulmonary disease) ([facility])     Dementia ([facility])     Diabetes mellitus ([facility])     GERD (gastroesophageal reflux disease)     History of stroke 11/07/2019    Hypertension     Interstitial lung disease ([facility])     Major depressive disorder with current active episode 11/07/2019    Pneumonia     Rotator cuff arthropathy of right shoulder 12/17/2024    Sleep apnea     Sleep apnea, obstructive     Stroke ([facility])     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     Social History     Socioeconomic History    Marital status: /Civil Union     Spouse name: None    Number of children: None    Years of education: None    Highest education level: None   Occupational History    None   Tobacco Use    Smoking status: Never     Passive exposure: Never    Smokeless tobacco: Never    Tobacco comments:     Patient admits to using marijuana, edibles and smoking    Vaping Use    Vaping status: Never Used   Substance and Sexual Activity    Alcohol use: Not Currently    Drug use: Yes     Types: Marijuana, Oxycodone, Psilocybin    Sexual activity: Yes     Partners: Female     Birth control/protection: Male Sterilization   Other Topics Concern    None   Social History Narrative    None     Social Drivers of Health     Financial Resource Strain: Low Risk  (11/1/2024)    Received from [facility]    Overall Financial Resource Strain (CARDIA)     Difficulty of  Paying Living Expenses: Not hard at all   Food Insecurity: No Food Insecurity (1/10/2025)    Nursing - Inadequate Food Risk Classification     Worried About Running Out of Food in the Last Year: Never true     Ran Out of Food in the Last Year: Never true     Ran Out of Food in the Last Year: Never true   Transportation Needs: No Transportation Needs (1/10/2025)    Nursing - Transportation Risk Classification     Lack of Transportation: Not on file     Lack of Transportation: No   Physical Activity: Inactive (2024)    Received from [facility]    Exercise Vital Sign     Days of Exercise per Week: 0 days     Minutes of Exercise per Session: 0 min   Stress: Stress Concern Present (2024)    Received from [facility]    [employer] - Occupational Stress Questionnaire     Feeling of Stress : To some extent   Social Connections: Moderately Isolated (2024)    Received from [facility]    Social Connection and Isolation Panel [NHANES]     Frequency of Communication with Friends and Family: Three times a week     Frequency of Social Gatherings with Friends and Family: Twice a week     Attends Evangelical Services: Never     Active Member of Clubs or Organizations: No     Attends Club or Organization Meetings: Never     Marital Status:    Intimate Partner Violence: Unknown (1/10/2025)    Nursing IPS     Feels Physically and Emotionally Safe: Not on file     Physically Hurt by Someone: Not on file     Humiliated or Emotionally Abused by Someone: Not on file     Physically Hurt by Someone: No     Hurt or Threatened by Someone: No   Housing Stability: Unknown (1/10/2025)    Nursing: Inadequate Housing Risk Classification     Has Housing: Not on file     Worried About Losing Housing: Not on file     Unable to Get Utilities: Not on file     Unable to Pay for Housing in the Last Year: No     Has Housin       Subjective      "    Objective    Physical Exam:   Assessment Type: (P) During-treatment  General Appearance: (P) Awake, Alert  Respiratory Pattern: (P) Normal  Chest Assessment: (P) Chest expansion symmetrical  Bilateral Breath Sounds: (P) Diminished  Cough: (P) None  O2 Device: (P) ra    Vitals:  Blood pressure 120/65, pulse 94, temperature 97.8 °F (36.6 °C), resp. rate 18, height 5' 11\" (1.803 m), weight 71.2 kg (157 lb), SpO2 93%.          Imaging and other studies: Results Review Statement: No pertinent imaging studies reviewed.    O2 Device: (P) ra     Plan    Respiratory Plan: Mild Distress pathway        Resp Comments: (P) cont w/ current therapy   "

## 2025-01-11 NOTE — RESPIRATORY THERAPY NOTE
01/11/25 0400   Respiratory Assessment   Assessment Type Assess only   General Appearance Sleeping   Respiratory Pattern Normal   Chest Assessment Chest expansion symmetrical   Bilateral Breath Sounds Diminished   Cough None   Resp Comments Pt remains on cpap   O2 Device RA   Non-Invasive Information   O2 Interface Device Nasal mask   Non-Invasive Ventilation Mode CPAP   SpO2 96 %   $ Pulse Oximetry Spot Check Charge Completed   Non-Invasive Settings   FiO2 (%) 21   PEEP/CPAP (cm H2O) 10   Rise Time 2   Non-Invasive Readings   Total Rate 18   Spontaneous MV (mL) 8   Spontaneous Vt (mL) 456   Leak (lpm) 40   Skin Intervention Skin intact   Non-Invasive Alarms   Low Insp Pressure Time (sec) 60 sec   High Resp Rate (BPM) 40 BPM   Apnea Interval (sec) 30     RT Ventilator Management Note  Fahad Hernandez 69 y.o. male MRN: [id number]  Unit/Bed#: -01 Encounter: [id number]      Daily Screen    No data found in the last 10 encounters.           Physical Exam:   Assessment Type: Assess only  General Appearance: Sleeping  Respiratory Pattern: Normal  Chest Assessment: Chest expansion symmetrical  Bilateral Breath Sounds: Diminished  Cough: None  O2 Device: RA      Resp Comments: Pt remains on cpap

## 2025-01-11 NOTE — RESPIRATORY THERAPY NOTE
01/10/25 2253   Respiratory Assessment   Assessment Type Assess only   General Appearance Awake;Alert   Respiratory Pattern Normal   Chest Assessment Chest expansion symmetrical   Bilateral Breath Sounds Diminished   Cough None   Resp Comments Pt placed on cpap at this time   O2 Device RA   Non-Invasive Information   O2 Interface Device Nasal mask   Non-Invasive Ventilation Mode CPAP   $ Intermittent NIV Yes   SpO2 90 %   $ Pulse Oximetry Spot Check Charge Completed   Non-Invasive Settings   FiO2 (%) 21   PEEP/CPAP (cm H2O) 10   Rise Time 2   Non-Invasive Readings   Total Rate 25   Spontaneous MV (mL) 15   Spontaneous Vt (mL) 367   Leak (lpm) 41   Skin Intervention Skin intact   Non-Invasive Alarms   Low Insp Pressure Time (sec) 60 sec   High Resp Rate (BPM) 40 BPM   Apnea Interval (sec) 30     RT Ventilator Management Note  Fahad Hernandez 69 y.o. male MRN: [id number]  Unit/Bed#: -01 Encounter: [id number]      Daily Screen    No data found in the last 10 encounters.           Physical Exam:   Assessment Type: Assess only  General Appearance: Awake, Alert  Respiratory Pattern: Normal  Chest Assessment: Chest expansion symmetrical  Bilateral Breath Sounds: Diminished  Cough: None  O2 Device: RA      Resp Comments: Pt placed on cpap at this time

## 2025-01-13 NOTE — UTILIZATION REVIEW
NOTIFICATION OF ADMISSION DISCHARGE   This is a Notification of Discharge from [facility]. Please be advised that this patient has been discharge from our facility. Below you will find the admission and discharge date and time including the patient’s disposition.   UTILIZATION REVIEW CONTACT:  Tania Britton  Utilization   Network Utilization Review Department  Phone: [phone] x carefully listen to the prompts. All voicemails are confidential.  Email: [email or web address]     ADMISSION INFORMATION  PRESENTATION DATE: 1/9/2025 11:17 AM  OBERVATION ADMISSION DATE: 01/09/2025 1323  INPATIENT ADMISSION DATE: 1/10/25  1:56 PM   DISCHARGE DATE: 1/11/2025  6:50 PM   DISPOSITION:Home with Home Health Care    Network Utilization Review Department  ATTENTION: Please call with any questions or concerns to [phone] and carefully listen to the prompts so that you are directed to the right person. All voicemails are confidential.   For Discharge needs, contact Care Management DC Support Team at [phone] opt. 2  Send all requests for admission clinical reviews, approved or denied determinations and any other requests to dedicated fax number below belonging to the campus where the patient is receiving treatment. List of dedicated fax numbers for the Facilities:  FACILITY NAME UR FAX NUMBER   ADMISSION DENIALS (Administrative/Medical Necessity) [phone]   DISCHARGE SUPPORT TEAM ([facility]) [phone]   PARENT CHILD HEALTH (Maternity/NICU/Pediatrics) [phone]   [facility] [phone]   [facility] [phone]   [facility] [phone]   [facility] [phone]   [facility] [phone]   [facility] [phone]   [facility] [phone]   [facility]  [facility] [phone]   [facility] [phone]   [facility] [phone]   [facility] [phone]   [facility] [phone]

## 2025-01-15 DIAGNOSIS — J45.50 SEVERE PERSISTENT ASTHMA WITHOUT COMPLICATION: ICD-10-CM

## 2025-01-15 RX ORDER — ALBUTEROL SULFATE 0.83 MG/ML
2.5 SOLUTION RESPIRATORY (INHALATION) EVERY 6 HOURS
Qty: 360 ML | Refills: 2 | Status: SHIPPED | OUTPATIENT
Start: 2025-01-15 | End: 2025-01-24 | Stop reason: SDUPTHER

## 2025-01-22 ENCOUNTER — TELEPHONE (OUTPATIENT)
Age: 70
End: 2025-01-22

## 2025-01-22 ENCOUNTER — NURSE TRIAGE (OUTPATIENT)
Age: 70
End: 2025-01-22

## 2025-01-22 LAB
ATRIAL RATE: 90 BPM
P AXIS: 40 DEGREES
PR INTERVAL: 134 MS
QRS AXIS: 24 DEGREES
QRSD INTERVAL: 76 MS
QT INTERVAL: 356 MS
QTC INTERVAL: 435 MS
T WAVE AXIS: 40 DEGREES
VENTRICULAR RATE: 90 BPM

## 2025-01-22 PROCEDURE — 93010 ELECTROCARDIOGRAM REPORT: CPT | Performed by: INTERNAL MEDICINE

## 2025-01-22 NOTE — TELEPHONE ENCOUNTER
"Called and spoke with pt and his wife. The confusion was that they thought his appt was this week and they weren't going to be able to make it but informed that it's on 1/30 and they said to keep it.   Answer Assessment - Initial Assessment Questions  1. REASON FOR CALL: \"What is the main reason for your call?\" or \"How can I best help you?\"      Appt    Protocols used: Information Only Call - No Triage-Adult-[place]    "

## 2025-01-22 NOTE — TELEPHONE ENCOUNTER
1st attempt. LM to CB to go over symptoms and possibly offer sooner appt. Pt is scheduled to see Dr. Terrell in [place] on 1/30.       Reviewed calendar appears to be sooner appt on 1/27 with Yessenia

## 2025-01-22 NOTE — TELEPHONE ENCOUNTER
Pt's wife calls and states that pt is not feeling well and bed ridden at the moment and would like to r/s for urgent appt sometime next weak pt is not  well. Thy would prefer

## 2025-01-22 NOTE — TELEPHONE ENCOUNTER
Regarding: request nurse  ----- Message from Apoorva DICKSON sent at 1/22/2025  4:12 PM EST -----  Wife calls in for advice on pt not feeling well due to pulmonary issues would like to speak to nurse and get him in for urgent next week

## 2025-01-24 DIAGNOSIS — J45.50 SEVERE PERSISTENT ASTHMA WITHOUT COMPLICATION: ICD-10-CM

## 2025-01-24 DIAGNOSIS — J44.1 COPD WITH ACUTE EXACERBATION (HCC): ICD-10-CM

## 2025-01-24 RX ORDER — ALBUTEROL SULFATE 0.83 MG/ML
2.5 SOLUTION RESPIRATORY (INHALATION) EVERY 6 HOURS
Qty: 360 ML | Refills: 2 | Status: ON HOLD | OUTPATIENT
Start: 2025-01-24

## 2025-01-24 RX ORDER — FORMOTEROL FUMARATE DIHYDRATE 20 UG/2ML
20 SOLUTION RESPIRATORY (INHALATION) 2 TIMES DAILY
Qty: 120 ML | Refills: 3 | Status: ON HOLD | OUTPATIENT
Start: 2025-01-24

## 2025-01-24 RX ORDER — MONTELUKAST SODIUM 10 MG/1
10 TABLET ORAL
Qty: 90 TABLET | Refills: 2 | Status: ON HOLD | OUTPATIENT
Start: 2025-01-24

## 2025-01-24 RX ORDER — BUDESONIDE 0.5 MG/2ML
0.5 INHALANT ORAL
Qty: 120 ML | Refills: 0 | Status: ON HOLD | OUTPATIENT
Start: 2025-01-24

## 2025-01-25 ENCOUNTER — APPOINTMENT (EMERGENCY)
Dept: CT IMAGING | Facility: HOSPITAL | Age: 70
DRG: 552 | End: 2025-01-25
Payer: COMMERCIAL

## 2025-01-25 ENCOUNTER — HOSPITAL ENCOUNTER (INPATIENT)
Facility: HOSPITAL | Age: 70
LOS: 8 days | Discharge: NURSING, REHAB OR LONG-TERM CARE | DRG: 552 | End: 2025-02-03
Attending: EMERGENCY MEDICINE | Admitting: INTERNAL MEDICINE
Payer: COMMERCIAL

## 2025-01-25 ENCOUNTER — APPOINTMENT (EMERGENCY)
Dept: RADIOLOGY | Facility: HOSPITAL | Age: 70
DRG: 552 | End: 2025-01-25
Payer: COMMERCIAL

## 2025-01-25 DIAGNOSIS — R63.4 WEIGHT LOSS: ICD-10-CM

## 2025-01-25 DIAGNOSIS — R29.6 FREQUENT FALLS: ICD-10-CM

## 2025-01-25 DIAGNOSIS — R26.2 AMBULATORY DYSFUNCTION: ICD-10-CM

## 2025-01-25 DIAGNOSIS — M79.601 RIGHT ARM PAIN: ICD-10-CM

## 2025-01-25 DIAGNOSIS — E11.65 TYPE 2 DIABETES MELLITUS WITH HYPERGLYCEMIA, WITHOUT LONG-TERM CURRENT USE OF INSULIN (HCC): ICD-10-CM

## 2025-01-25 DIAGNOSIS — G89.4 CHRONIC PAIN SYNDROME: ICD-10-CM

## 2025-01-25 DIAGNOSIS — R53.1 RIGHT SIDED WEAKNESS: ICD-10-CM

## 2025-01-25 DIAGNOSIS — M48.02 CERVICAL SPINAL STENOSIS: ICD-10-CM
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
## 2025-01-25 DIAGNOSIS — S09.90XA CLOSED HEAD INJURY, INITIAL ENCOUNTER: ICD-10-CM

## 2025-01-25 LAB
ALBUMIN SERPL BCG-MCNC: 3.5 G/DL (ref 3.5–5)
ALP SERPL-CCNC: 53 U/L (ref 34–104)
ALT SERPL W P-5'-P-CCNC: 37 U/L (ref 7–52)
ANION GAP SERPL CALCULATED.3IONS-SCNC: 9 MMOL/L (ref 4–13)
AST SERPL W P-5'-P-CCNC: 18 U/L (ref 13–39)
BASOPHILS # BLD AUTO: 0.05 THOUSANDS/ΜL (ref 0–0.1)
BASOPHILS NFR BLD AUTO: 0 % (ref 0–1)
BILIRUB SERPL-MCNC: 0.33 MG/DL (ref 0.2–1)
BILIRUB UR QL STRIP: NEGATIVE
BUN SERPL-MCNC: 21 MG/DL (ref 5–25)
CALCIUM SERPL-MCNC: 9.1 MG/DL (ref 8.4–10.2)
CHLORIDE SERPL-SCNC: 102 MMOL/L (ref 96–108)
CLARITY UR: CLEAR
CO2 SERPL-SCNC: 25 MMOL/L (ref 21–32)
COLOR UR: ABNORMAL
CREAT SERPL-MCNC: 0.86 MG/DL (ref 0.6–1.3)
EOSINOPHIL # BLD AUTO: 0.17 THOUSAND/ΜL (ref 0–0.61)
EOSINOPHIL NFR BLD AUTO: 1 % (ref 0–6)
ERYTHROCYTE [DISTWIDTH] IN BLOOD BY AUTOMATED COUNT: 16.1 % (ref 11.6–15.1)
GFR SERPL CREATININE-BSD FRML MDRD: 87 ML/MIN/1.73SQ M
GLUCOSE SERPL-MCNC: 125 MG/DL (ref 65–140)
GLUCOSE UR STRIP-MCNC: ABNORMAL MG/DL
HCT VFR BLD AUTO: 38.8 % (ref 36.5–49.3)
HGB BLD-MCNC: 11.8 G/DL (ref 12–17)
HGB UR QL STRIP.AUTO: NEGATIVE
IMM GRANULOCYTES # BLD AUTO: 0.23 THOUSAND/UL (ref 0–0.2)
IMM GRANULOCYTES NFR BLD AUTO: 2 % (ref 0–2)
KETONES UR STRIP-MCNC: NEGATIVE MG/DL
LEUKOCYTE ESTERASE UR QL STRIP: NEGATIVE
LYMPHOCYTES # BLD AUTO: 3.36 THOUSANDS/ΜL (ref 0.6–4.47)
LYMPHOCYTES NFR BLD AUTO: 21 % (ref 14–44)
MCH RBC QN AUTO: 25.8 PG (ref 26.8–34.3)
MCHC RBC AUTO-ENTMCNC: 30.4 G/DL (ref 31.4–37.4)
MCV RBC AUTO: 85 FL (ref 82–98)
MONOCYTES # BLD AUTO: 1.26 THOUSAND/ΜL (ref 0.17–1.22)
MONOCYTES NFR BLD AUTO: 8 % (ref 4–12)
NEUTROPHILS # BLD AUTO: 10.79 THOUSANDS/ΜL (ref 1.85–7.62)
NEUTS SEG NFR BLD AUTO: 68 % (ref 43–75)
NITRITE UR QL STRIP: NEGATIVE
NRBC BLD AUTO-RTO: 0 /100 WBCS
PH UR STRIP.AUTO: 5.5 [PH]
PLATELET # BLD AUTO: 440 THOUSANDS/UL (ref 149–390)
PMV BLD AUTO: 8.4 FL (ref 8.9–12.7)
POTASSIUM SERPL-SCNC: 4.1 MMOL/L (ref 3.5–5.3)
PROT SERPL-MCNC: 6.4 G/DL (ref 6.4–8.4)
PROT UR STRIP-MCNC: NEGATIVE MG/DL
RBC # BLD AUTO: 4.58 MILLION/UL (ref 3.88–5.62)
SODIUM SERPL-SCNC: 136 MMOL/L (ref 135–147)
SP GR UR STRIP.AUTO: 1.02 (ref 1–1.03)
UROBILINOGEN UR STRIP-ACNC: <2 MG/DL
WBC # BLD AUTO: 15.86 THOUSAND/UL (ref 4.31–10.16)

## 2025-01-25 PROCEDURE — 72170 X-RAY EXAM OF PELVIS: CPT

## 2025-01-25 PROCEDURE — 36415 COLL VENOUS BLD VENIPUNCTURE: CPT | Performed by: EMERGENCY MEDICINE

## 2025-01-25 PROCEDURE — 99285 EMERGENCY DEPT VISIT HI MDM: CPT

## 2025-01-25 PROCEDURE — 85025 COMPLETE CBC W/AUTO DIFF WBC: CPT | Performed by: EMERGENCY MEDICINE

## 2025-01-25 PROCEDURE — 80053 COMPREHEN METABOLIC PANEL: CPT | Performed by: EMERGENCY MEDICINE

## 2025-01-25 PROCEDURE — 72125 CT NECK SPINE W/O DYE: CPT

## 2025-01-25 PROCEDURE — 96374 THER/PROPH/DIAG INJ IV PUSH: CPT

## 2025-01-25 PROCEDURE — 99285 EMERGENCY DEPT VISIT HI MDM: CPT | Performed by: EMERGENCY MEDICINE

## 2025-01-25 PROCEDURE — 93005 ELECTROCARDIOGRAM TRACING: CPT

## 2025-01-25 PROCEDURE — 70450 CT HEAD/BRAIN W/O DYE: CPT

## 2025-01-25 PROCEDURE — 73030 X-RAY EXAM OF SHOULDER: CPT

## 2025-01-25 PROCEDURE — 71045 X-RAY EXAM CHEST 1 VIEW: CPT

## 2025-01-25 RX ORDER — MORPHINE SULFATE 4 MG/ML
4 INJECTION, SOLUTION INTRAMUSCULAR; INTRAVENOUS ONCE
Status: COMPLETED | OUTPATIENT
Start: 2025-01-25 | End: 2025-01-25

## 2025-01-25 RX ADMIN — MORPHINE SULFATE 4 MG: 4 INJECTION INTRAVENOUS at 18:59

## 2025-01-25 NOTE — ED PROVIDER NOTES
Time reflects when diagnosis was documented in both MDM as applicable and the Disposition within this note       Time User Action Codes Description Comment    1/25/2025 10:03 PM Lucy Ramírez [W19.XXXA] Fall, initial encounter     1/25/2025 10:03 PM Lucy Ramírez [S09.90XA] Closed head injury, initial encounter     1/25/2025 10:04 PM Lucy Ramírez [M79.601] Right arm pain     1/25/2025 10:04 PM Lucy Ramírez [R29.6] Frequent falls     1/25/2025 10:04 PM Lucy Ramírez [R26.2] Ambulatory dysfunction           ED Disposition       ED Disposition   Discharge    Condition   Stable    Date/Time   Sat Jan 25, 2025 10:12 PM    Comment   Case was discussed with Dr. Rodas and the patient's admission status was agreed to be Admission Status: observation status to the hospitalist.               Assessment & Plan       Medical Decision Making  Patient is a pleasant 69-year-old male who presents to the emergency department via EMS and was seen and evaluated as a trauma alert.  Patient is on dual antiplatelet therapy and suffered a fall.  He struck his head off of his walker and was subsequently able to lower himself to his knees.  He denies loss of consciousness.  No presyncopal complaints.  He denies chest pain, palpitations, or lightheadedness.  He reports a history of multiple falls due to historic stroke with residual balance deficits.  He is currently complaining of headache and neck pain.  Remainder of his exam is notable for bruises in various stages and minor abrasions suggesting previous falls.  The scans to evaluate for acute traumatic pathology that would require hospitalization or surgical intervention has been ordered for further evaluation.    Amount and/or Complexity of Data Reviewed  Labs: ordered.  Radiology: ordered. Decision-making details documented in ED Course.    Risk  Prescription drug management.  Decision regarding hospitalization.        ED Course  as of 01/25/25 2318   Sat Jan 25, 2025 2047 XR shoulder 2+ views RIGHT  No acute fx or dislocation   2137 Patient reports multiple falls despite recently being given a walker. Patient would benefit from PT/OT and potential rehab placement.    2212 Now declines admission for rehab eval. States he would prefer to return home.    2317 Although patient declined wife called and felt unsafe with his ambulatory status and her ability to assist out of the uber home. Patient agrees to obs stay.        Medications   morphine injection 4 mg (4 mg Intravenous Given 1/25/25 1859)       ED Risk Strat Scores                                              History of Present Illness       Chief Complaint   Patient presents with    Fall     Per ems pt lost his balance while walking in the kitchen with his walker, +HS, +BT. Pt c/o head pain and neck pain       Past Medical History:   Diagnosis Date    Asthma     COPD (chronic obstructive pulmonary disease) (HCC)     Dementia (HCC)     Diabetes mellitus (HCC)     GERD (gastroesophageal reflux disease)     History of stroke 11/07/2019    Hypertension     Interstitial lung disease (HCC)     Major depressive disorder with current active episode 11/07/2019    Pneumonia     Rotator cuff arthropathy of right shoulder 12/17/2024    Sleep apnea     Sleep apnea, obstructive     Stroke (HCC)     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      Past Surgical History:   Procedure Laterality Date    BACK SURGERY      ELBOW SURGERY      KNEE SURGERY      NECK SURGERY      SHOULDER SURGERY      SPINE SURGERY  2004      Family History   Family history unknown: Yes      Social History     Tobacco Use    Smoking status: Never     Passive exposure: Never    Smokeless tobacco: Never    Tobacco comments:     Patient admits to using marijuana, edibles and smoking    Vaping Use    Vaping status: Never Used   Substance Use Topics    Alcohol use: Not Currently    Drug use: Yes     Types: Marijuana, Oxycodone, Psilocybin       E-Cigarette/Vaping    E-Cigarette Use Never User       E-Cigarette/Vaping Substances    Nicotine No     THC No     CBD Yes     Flavoring No     Other No     Unknown No       I have reviewed and agree with the history as documented.     Patient presents to the ED as a trauma alert. Hx by EMS>   Patient with two falls today, second fall w/ head strike. No LOC.         Fall  Associated symptoms: headaches and neck pain        Review of Systems   Musculoskeletal:  Positive for arthralgias and neck pain.   Neurological:  Positive for headaches.   All other systems reviewed and are negative.          Objective       ED Triage Vitals [01/25/25 1819]   Temperature Pulse Blood Pressure Respirations SpO2 Patient Position - Orthostatic VS   98.6 °F (37 °C) 85 114/65 17 94 % Sitting      Temp Source Heart Rate Source BP Location FiO2 (%) Pain Score    Oral Monitor Right arm -- 7      Vitals      Date and Time Temp Pulse SpO2 Resp BP Pain Score FACES Pain Rating User   01/25/25 2100 -- 87 94 % 16 101/61 -- -- JK   01/25/25 2000 -- 86 95 % 19 112/70 7 -- JK   01/25/25 1930 -- 83 94 % 19 102/67 -- -- JK   01/25/25 1915 -- 85 94 % 19 130/58 5 -- JK   01/25/25 1859 -- -- -- -- -- 8 -- FS   01/25/25 1823 98.6 °F (37 °C) 90 94 % 18 114/65 -- -- FS   01/25/25 1819 98.6 °F (37 °C) 85 94 % 17 114/65 7 -- AC            Physical Exam  Vitals and nursing note reviewed.   Constitutional:       General: He is not in acute distress.  HENT:      Head: Normocephalic and atraumatic.      Right Ear: External ear normal.      Left Ear: External ear normal.      Nose: Nose normal.   Cardiovascular:      Rate and Rhythm: Normal rate and regular rhythm.   Pulmonary:      Effort: Pulmonary effort is normal.      Breath sounds: Normal breath sounds.   Abdominal:      General: There is no distension.      Palpations: Abdomen is soft.      Tenderness: There is no abdominal tenderness.   Musculoskeletal:         General: Tenderness (R shoulder decr ROM,  no deformity) present.   Skin:     General: Skin is warm and dry.      Findings: Bruising present.   Neurological:      General: No focal deficit present.      Mental Status: He is alert and oriented to person, place, and time. Mental status is at baseline.   Psychiatric:         Behavior: Behavior normal.         Results Reviewed       Procedure Component Value Units Date/Time    UA w Reflex to Microscopic w Reflex to Culture [[id number]]  (Abnormal) Collected: 01/25/25 2131    Lab Status: Final result Specimen: Urine, Clean Catch Updated: 01/25/25 2146     Color, UA Light Yellow     Clarity, UA Clear     Specific Gravity, UA 1.018     pH, UA 5.5     Leukocytes, UA Negative     Nitrite, UA Negative     Protein, UA Negative mg/dl      Glucose, UA Trace mg/dl      Ketones, UA Negative mg/dl      Urobilinogen, UA <2.0 mg/dl      Bilirubin, UA Negative     Occult Blood, UA Negative    Comprehensive metabolic panel [[id number]] Collected: 01/25/25 1834    Lab Status: Final result Specimen: Blood from Arm, Right Updated: 01/25/25 1857     Sodium 136 mmol/L      Potassium 4.1 mmol/L      Chloride 102 mmol/L      CO2 25 mmol/L      ANION GAP 9 mmol/L      BUN 21 mg/dL      Creatinine 0.86 mg/dL      Glucose 125 mg/dL      Calcium 9.1 mg/dL      AST 18 U/L      ALT 37 U/L      Alkaline Phosphatase 53 U/L      Total Protein 6.4 g/dL      Albumin 3.5 g/dL      Total Bilirubin 0.33 mg/dL      eGFR 87 ml/min/1.73sq m     Narrative:      National Kidney Disease Foundation guidelines for Chronic Kidney Disease (CKD):     Stage 1 with normal or high GFR (GFR > 90 mL/min/1.73 square meters)    Stage 2 Mild CKD (GFR = 60-89 mL/min/1.73 square meters)    Stage 3A Moderate CKD (GFR = 45-59 mL/min/1.73 square meters)    Stage 3B Moderate CKD (GFR = 30-44 mL/min/1.73 square meters)    Stage 4 Severe CKD (GFR = 15-29 mL/min/1.73 square meters)    Stage 5 End Stage CKD (GFR <15 mL/min/1.73 square meters)  Note: GFR calculation is accurate  only with a steady state creatinine    CBC and differential [[id number]]  (Abnormal) Collected: 01/25/25 1834    Lab Status: Final result Specimen: Blood from Arm, Right Updated: 01/25/25 1842     WBC 15.86 Thousand/uL      RBC 4.58 Million/uL      Hemoglobin 11.8 g/dL      Hematocrit 38.8 %      MCV 85 fL      MCH 25.8 pg      MCHC 30.4 g/dL      RDW 16.1 %      MPV 8.4 fL      Platelets 440 Thousands/uL      nRBC 0 /100 WBCs      Segmented % 68 %      Immature Grans % 2 %      Lymphocytes % 21 %      Monocytes % 8 %      Eosinophils Relative 1 %      Basophils Relative 0 %      Absolute Neutrophils 10.79 Thousands/µL      Absolute Immature Grans 0.23 Thousand/uL      Absolute Lymphocytes 3.36 Thousands/µL      Absolute Monocytes 1.26 Thousand/µL      Eosinophils Absolute 0.17 Thousand/µL      Basophils Absolute 0.05 Thousands/µL             TRAUMA - CT head wo contrast   Final Interpretation by Delmar Burkett MD (01/25 2012)      No acute intracranial hemorrhage, significant mass effect or midline shift. Moderate-marked chronic microangiopathy.      The study was marked in EPIC for immediate notification.                  Workstation performed: [id number]         TRAUMA - CT spine cervical wo contrast   Final Interpretation by Delmar Burkett MD (01/25 2018)      No acute cervical spine fracture or traumatic malalignment.      The study was marked in EPIC for immediate notification.            Workstation performed: [id number]         XR Trauma chest portable   Final Interpretation by Naida Grady MD (01/25 1959)      No acute cardiopulmonary disease.      No acute displaced fractures.      Workstation performed: [id number]         XR Trauma pelvis ap only 1 or 2 vw   Final Interpretation by Delmar Burkett MD (01/25 2021)      No acute, displaced pelvic fracture.         Computerized Assisted Algorithm (CAA) may have been used to analyze all applicable images.         Workstation performed:  [id number]         XR shoulder 2+ views RIGHT    (Results Pending)       Procedures    ED Medication and Procedure Management   Prior to Admission Medications   Prescriptions Last Dose Informant Patient Reported? Taking?   ARIPiprazole (ABILIFY) 10 mg tablet  Self Yes No   Austedo 12 MG TABS  Self Yes No   Dupixent subcutaneous injection   No No   Sig: INJECT 2ML SUBCUTANEOUSLY 1 TIME EVERY 2 WEEKS *NEW PRESCRIPTION REQUEST*    MG tablet  Self Yes No   Patient not taking: Reported on 2025   OneTouch Ultra test strip  Self Yes No   Sig: TEST DAILY.. DIAGNOSIS E11.9   albuterol (2.5 mg/3 mL) 0.083 % nebulizer solution   No No   Sig: Take 3 mL (2.5 mg total) by nebulization every 6 (six) hours   amLODIPine-benazepril (LOTREL) 10-40 MG per capsule  Self Yes No   aspirin (ECOTRIN LOW STRENGTH) 81 mg EC tablet  Self Yes No   Sig: Take 81 mg by mouth daily   Patient not taking: Reported on 2024   atorvastatin (LIPITOR) 10 mg tablet  Self Yes No   Sig: Take 10 mg by mouth daily   buPROPion (WELLBUTRIN XL) 300 mg 24 hr tablet  Self Yes No   Si tab(s)   budesonide (PULMICORT) 0.5 mg/2 mL nebulizer solution   No No   Sig: Take 2 mL (0.5 mg total) by nebulization every 12 (twelve) hours Rinse mouth after use.   esomeprazole (NexIUM) 40 MG capsule  Self Yes No   Sig: Take 40 mg by mouth   formoterol (PERFOROMIST) 20 MCG/2ML nebulizer solution   No No   Sig: Take 2 mL (20 mcg total) by nebulization 2 (two) times a day   metFORMIN (GLUCOPHAGE) 500 mg tablet  Self Yes No   Sig: Take 1 tablet by mouth 2 (two) times a day with meals   metoprolol succinate (TOPROL-XL) 25 mg 24 hr tablet   No No   Sig: TAKE 1 TABLET (25 MG TOTAL) BY MOUTH DAILY.   montelukast (SINGULAIR) 10 mg tablet   No No   Sig: Take 1 tablet (10 mg total) by mouth daily at bedtime   multivitamin (THERAGRAN) TABS  Self Yes No   Sig: Take 1 tablet by mouth   nitroglycerin (NITROSTAT) 0.4 mg SL tablet   No No   Sig: Place 1 tablet (0.4 mg total)  under the tongue every 5 (five) minutes as needed for chest pain   oxyCODONE (ROXICODONE) 5 mg immediate release tablet  Self Yes No   Sig: Take 10 mg by mouth every 6 (six) hours as needed   tamsulosin (FLOMAX) 0.4 mg  Self Yes No   Sig: Take 0.4 mg by mouth daily   temazepam (RESTORIL) 30 mg capsule  Self Yes No   Sig: Take 30 mg by mouth daily at bedtime as needed   venlafaxine (EFFEXOR-XR) 75 mg 24 hr capsule  Self Yes No   Si cap(s)      Facility-Administered Medications: None     Patient's Medications   Discharge Prescriptions    No medications on file       ED SEPSIS DOCUMENTATION   Time reflects when diagnosis was documented in both MDM as applicable and the Disposition within this note       Time User Action Codes Description Comment    2025 10:03 PM Lucy Ramírez [W19.XXXA] Fall, initial encounter     2025 10:03 PM Lucy Ramírez [S09.90XA] Closed head injury, initial encounter     2025 10:04 PM Lucy Ramírez [M79.601] Right arm pain     2025 10:04 PM Lucy Ramírez [R29.6] Frequent falls     2025 10:04 PM Lucy Ramírez [R26.2] Ambulatory dysfunction                  Lucy Ramírez MD  [phone]       Lucy Ramírez MD  [phone]

## 2025-01-25 NOTE — Clinical Note
Case was discussed with Dr. Rodas and the patient's admission status was agreed to be Admission Status: observation status to the hospitalist.

## 2025-01-26 ENCOUNTER — APPOINTMENT (INPATIENT)
Dept: MRI IMAGING | Facility: HOSPITAL | Age: 70
DRG: 552 | End: 2025-01-26
Payer: COMMERCIAL

## 2025-01-26 ENCOUNTER — APPOINTMENT (OUTPATIENT)
Dept: CT IMAGING | Facility: HOSPITAL | Age: 70
DRG: 552 | End: 2025-01-26
Payer: COMMERCIAL

## 2025-01-26 PROBLEM — F32.9 MAJOR DEPRESSIVE DISORDER: Status: ACTIVE | Noted: 2019-11-07

## 2025-01-26 PROBLEM — G89.4 CHRONIC PAIN SYNDROME: Status: ACTIVE | Noted: 2025-01-26

## 2025-01-26 PROBLEM — R29.6 MULTIPLE FALLS: Status: ACTIVE | Noted: 2025-01-26

## 2025-01-26 LAB
ALBUMIN SERPL BCG-MCNC: 3.2 G/DL (ref 3.5–5)
ALP SERPL-CCNC: 39 U/L (ref 34–104)
ALT SERPL W P-5'-P-CCNC: 32 U/L (ref 7–52)
ANION GAP SERPL CALCULATED.3IONS-SCNC: 7 MMOL/L (ref 4–13)
APTT PPP: 27 SECONDS (ref 23–34)
AST SERPL W P-5'-P-CCNC: 16 U/L (ref 13–39)
ATRIAL RATE: 92 BPM
BASOPHILS # BLD AUTO: 0.06 THOUSANDS/ΜL (ref 0–0.1)
BASOPHILS NFR BLD AUTO: 1 % (ref 0–1)
BILIRUB SERPL-MCNC: 0.37 MG/DL (ref 0.2–1)
BUN SERPL-MCNC: 16 MG/DL (ref 5–25)
CALCIUM ALBUM COR SERPL-MCNC: 9.5 MG/DL (ref 8.3–10.1)
CALCIUM SERPL-MCNC: 8.9 MG/DL (ref 8.4–10.2)
CHLORIDE SERPL-SCNC: 103 MMOL/L (ref 96–108)
CHOLEST SERPL-MCNC: 178 MG/DL (ref ?–200)
CO2 SERPL-SCNC: 27 MMOL/L (ref 21–32)
CREAT SERPL-MCNC: 0.77 MG/DL (ref 0.6–1.3)
CRP SERPL QL: 1.3 MG/L
EOSINOPHIL # BLD AUTO: 0.18 THOUSAND/ΜL (ref 0–0.61)
EOSINOPHIL NFR BLD AUTO: 2 % (ref 0–6)
ERYTHROCYTE [DISTWIDTH] IN BLOOD BY AUTOMATED COUNT: 16 % (ref 11.6–15.1)
EST. AVERAGE GLUCOSE BLD GHB EST-MCNC: 192 MG/DL
GFR SERPL CREATININE-BSD FRML MDRD: 91 ML/MIN/1.73SQ M
GLUCOSE SERPL-MCNC: 106 MG/DL (ref 65–140)
GLUCOSE SERPL-MCNC: 116 MG/DL (ref 65–140)
GLUCOSE SERPL-MCNC: 127 MG/DL (ref 65–140)
GLUCOSE SERPL-MCNC: 142 MG/DL (ref 65–140)
GLUCOSE SERPL-MCNC: 172 MG/DL (ref 65–140)
HBA1C MFR BLD: 8.3 %
HCT VFR BLD AUTO: 35 % (ref 36.5–49.3)
HDLC SERPL-MCNC: 76 MG/DL
HGB BLD-MCNC: 11 G/DL (ref 12–17)
IMM GRANULOCYTES # BLD AUTO: 0.18 THOUSAND/UL (ref 0–0.2)
IMM GRANULOCYTES NFR BLD AUTO: 2 % (ref 0–2)
INR PPP: 0.94 (ref 0.85–1.19)
LDLC SERPL CALC-MCNC: 79 MG/DL (ref 0–100)
LYMPHOCYTES # BLD AUTO: 3.32 THOUSANDS/ΜL (ref 0.6–4.47)
LYMPHOCYTES NFR BLD AUTO: 28 % (ref 14–44)
MAGNESIUM SERPL-MCNC: 1.5 MG/DL (ref 1.9–2.7)
MCH RBC QN AUTO: 26.6 PG (ref 26.8–34.3)
MCHC RBC AUTO-ENTMCNC: 31.4 G/DL (ref 31.4–37.4)
MCV RBC AUTO: 85 FL (ref 82–98)
MONOCYTES # BLD AUTO: 0.99 THOUSAND/ΜL (ref 0.17–1.22)
MONOCYTES NFR BLD AUTO: 8 % (ref 4–12)
NEUTROPHILS # BLD AUTO: 7.35 THOUSANDS/ΜL (ref 1.85–7.62)
NEUTS SEG NFR BLD AUTO: 59 % (ref 43–75)
NRBC BLD AUTO-RTO: 0 /100 WBCS
P AXIS: 20 DEGREES
PHOSPHATE SERPL-MCNC: 3.6 MG/DL (ref 2.3–4.1)
PLATELET # BLD AUTO: 352 THOUSANDS/UL (ref 149–390)
PMV BLD AUTO: 8.3 FL (ref 8.9–12.7)
POTASSIUM SERPL-SCNC: 3.9 MMOL/L (ref 3.5–5.3)
PR INTERVAL: 150 MS
PROT SERPL-MCNC: 5.7 G/DL (ref 6.4–8.4)
PROTHROMBIN TIME: 13.2 SECONDS (ref 12.3–15)
QRS AXIS: 34 DEGREES
QRSD INTERVAL: 76 MS
QT INTERVAL: 344 MS
QTC INTERVAL: 425 MS
RBC # BLD AUTO: 4.14 MILLION/UL (ref 3.88–5.62)
SODIUM SERPL-SCNC: 137 MMOL/L (ref 135–147)
T WAVE AXIS: 66 DEGREES
TRIGL SERPL-MCNC: 117 MG/DL (ref ?–150)
VENTRICULAR RATE: 92 BPM
WBC # BLD AUTO: 12.08 THOUSAND/UL (ref 4.31–10.16)

## 2025-01-26 PROCEDURE — 94664 DEMO&/EVAL PT USE INHALER: CPT

## 2025-01-26 PROCEDURE — 94760 N-INVAS EAR/PLS OXIMETRY 1: CPT

## 2025-01-26 PROCEDURE — 83036 HEMOGLOBIN GLYCOSYLATED A1C: CPT | Performed by: HOSPITALIST

## 2025-01-26 PROCEDURE — 80053 COMPREHEN METABOLIC PANEL: CPT | Performed by: HOSPITALIST

## 2025-01-26 PROCEDURE — 93010 ELECTROCARDIOGRAM REPORT: CPT | Performed by: INTERNAL MEDICINE

## 2025-01-26 PROCEDURE — 70498 CT ANGIOGRAPHY NECK: CPT

## 2025-01-26 PROCEDURE — A9585 GADOBUTROL INJECTION: HCPCS | Performed by: INTERNAL MEDICINE

## 2025-01-26 PROCEDURE — 72156 MRI NECK SPINE W/O & W/DYE: CPT

## 2025-01-26 PROCEDURE — 99223 1ST HOSP IP/OBS HIGH 75: CPT | Performed by: HOSPITALIST

## 2025-01-26 PROCEDURE — 80061 LIPID PANEL: CPT | Performed by: HOSPITALIST

## 2025-01-26 PROCEDURE — 99223 1ST HOSP IP/OBS HIGH 75: CPT | Performed by: PSYCHIATRY & NEUROLOGY

## 2025-01-26 PROCEDURE — 85610 PROTHROMBIN TIME: CPT | Performed by: HOSPITALIST

## 2025-01-26 PROCEDURE — 94660 CPAP INITIATION&MGMT: CPT

## 2025-01-26 PROCEDURE — 83735 ASSAY OF MAGNESIUM: CPT | Performed by: HOSPITALIST

## 2025-01-26 PROCEDURE — 82948 REAGENT STRIP/BLOOD GLUCOSE: CPT

## 2025-01-26 PROCEDURE — 85025 COMPLETE CBC W/AUTO DIFF WBC: CPT | Performed by: HOSPITALIST

## 2025-01-26 PROCEDURE — 86140 C-REACTIVE PROTEIN: CPT | Performed by: HOSPITALIST

## 2025-01-26 PROCEDURE — 70496 CT ANGIOGRAPHY HEAD: CPT

## 2025-01-26 PROCEDURE — 84100 ASSAY OF PHOSPHORUS: CPT | Performed by: HOSPITALIST

## 2025-01-26 PROCEDURE — 94640 AIRWAY INHALATION TREATMENT: CPT

## 2025-01-26 PROCEDURE — 70551 MRI BRAIN STEM W/O DYE: CPT

## 2025-01-26 PROCEDURE — 85730 THROMBOPLASTIN TIME PARTIAL: CPT | Performed by: HOSPITALIST

## 2025-01-26 RX ORDER — CLOPIDOGREL BISULFATE 75 MG/1
75 TABLET ORAL DAILY
Status: DISCONTINUED | OUTPATIENT
Start: 2025-01-26 | End: 2025-02-03 | Stop reason: HOSPADM

## 2025-01-26 RX ORDER — ALBUTEROL SULFATE 0.83 MG/ML
2.5 SOLUTION RESPIRATORY (INHALATION) EVERY 6 HOURS
Status: DISCONTINUED | OUTPATIENT
Start: 2025-01-26 | End: 2025-01-26

## 2025-01-26 RX ORDER — GADOBUTROL 604.72 MG/ML
6 INJECTION INTRAVENOUS
Status: COMPLETED | OUTPATIENT
Start: 2025-01-26 | End: 2025-01-26

## 2025-01-26 RX ORDER — VENLAFAXINE HYDROCHLORIDE 150 MG/1
150 CAPSULE, EXTENDED RELEASE ORAL DAILY
Status: DISCONTINUED | OUTPATIENT
Start: 2025-01-26 | End: 2025-02-03 | Stop reason: HOSPADM

## 2025-01-26 RX ORDER — ENOXAPARIN SODIUM 100 MG/ML
40 INJECTION SUBCUTANEOUS DAILY
Status: DISCONTINUED | OUTPATIENT
Start: 2025-01-26 | End: 2025-02-03 | Stop reason: HOSPADM

## 2025-01-26 RX ORDER — LEVALBUTEROL INHALATION SOLUTION 1.25 MG/3ML
1.25 SOLUTION RESPIRATORY (INHALATION)
Status: DISCONTINUED | OUTPATIENT
Start: 2025-01-26 | End: 2025-01-26

## 2025-01-26 RX ORDER — BUPROPION HYDROCHLORIDE 150 MG/1
300 TABLET ORAL DAILY
Status: DISCONTINUED | OUTPATIENT
Start: 2025-01-26 | End: 2025-02-03 | Stop reason: HOSPADM

## 2025-01-26 RX ORDER — ARIPIPRAZOLE 5 MG/1
10 TABLET ORAL DAILY
Status: DISCONTINUED | OUTPATIENT
Start: 2025-01-26 | End: 2025-02-03 | Stop reason: HOSPADM

## 2025-01-26 RX ORDER — INSULIN LISPRO 100 [IU]/ML
2-12 INJECTION, SOLUTION INTRAVENOUS; SUBCUTANEOUS
Status: DISCONTINUED | OUTPATIENT
Start: 2025-01-26 | End: 2025-02-03 | Stop reason: HOSPADM

## 2025-01-26 RX ORDER — ACETAMINOPHEN 325 MG/1
650 TABLET ORAL EVERY 4 HOURS PRN
Status: DISCONTINUED | OUTPATIENT
Start: 2025-01-26 | End: 2025-02-03 | Stop reason: HOSPADM

## 2025-01-26 RX ORDER — CALCIUM CARBONATE 500 MG/1
1000 TABLET, CHEWABLE ORAL DAILY PRN
Status: DISCONTINUED | OUTPATIENT
Start: 2025-01-26 | End: 2025-02-03 | Stop reason: HOSPADM

## 2025-01-26 RX ORDER — MONTELUKAST SODIUM 10 MG/1
10 TABLET ORAL
Status: DISCONTINUED | OUTPATIENT
Start: 2025-01-26 | End: 2025-02-03 | Stop reason: HOSPADM

## 2025-01-26 RX ORDER — BUDESONIDE 0.5 MG/2ML
0.5 INHALANT ORAL
Status: DISCONTINUED | OUTPATIENT
Start: 2025-01-26 | End: 2025-02-03 | Stop reason: HOSPADM

## 2025-01-26 RX ORDER — PANTOPRAZOLE SODIUM 40 MG/1
40 TABLET, DELAYED RELEASE ORAL
Status: DISCONTINUED | OUTPATIENT
Start: 2025-01-26 | End: 2025-02-03 | Stop reason: HOSPADM

## 2025-01-26 RX ORDER — FORMOTEROL FUMARATE DIHYDRATE 20 UG/2ML
20 SOLUTION RESPIRATORY (INHALATION) 2 TIMES DAILY
Status: DISCONTINUED | OUTPATIENT
Start: 2025-01-26 | End: 2025-01-26

## 2025-01-26 RX ORDER — ALBUTEROL SULFATE 0.83 MG/ML
2.5 SOLUTION RESPIRATORY (INHALATION) EVERY 6 HOURS PRN
Status: DISCONTINUED | OUTPATIENT
Start: 2025-01-26 | End: 2025-02-03 | Stop reason: HOSPADM

## 2025-01-26 RX ORDER — ONDANSETRON 2 MG/ML
4 INJECTION INTRAMUSCULAR; INTRAVENOUS EVERY 6 HOURS PRN
Status: DISCONTINUED | OUTPATIENT
Start: 2025-01-26 | End: 2025-02-03 | Stop reason: HOSPADM

## 2025-01-26 RX ORDER — FORMOTEROL FUMARATE DIHYDRATE 20 UG/2ML
20 SOLUTION RESPIRATORY (INHALATION)
Status: DISCONTINUED | OUTPATIENT
Start: 2025-01-26 | End: 2025-02-03 | Stop reason: HOSPADM

## 2025-01-26 RX ORDER — NITROGLYCERIN 0.4 MG/1
0.4 TABLET SUBLINGUAL
Status: DISCONTINUED | OUTPATIENT
Start: 2025-01-26 | End: 2025-02-03 | Stop reason: HOSPADM

## 2025-01-26 RX ORDER — ASPIRIN 81 MG/1
81 TABLET ORAL DAILY
Status: DISCONTINUED | OUTPATIENT
Start: 2025-01-26 | End: 2025-02-03 | Stop reason: HOSPADM

## 2025-01-26 RX ORDER — TAMSULOSIN HYDROCHLORIDE 0.4 MG/1
0.4 CAPSULE ORAL DAILY
Status: DISCONTINUED | OUTPATIENT
Start: 2025-01-26 | End: 2025-02-03 | Stop reason: HOSPADM

## 2025-01-26 RX ORDER — ATORVASTATIN CALCIUM 40 MG/1
40 TABLET, FILM COATED ORAL
Status: DISCONTINUED | OUTPATIENT
Start: 2025-01-26 | End: 2025-02-03 | Stop reason: HOSPADM

## 2025-01-26 RX ORDER — TEMAZEPAM 15 MG/1
30 CAPSULE ORAL
Status: DISCONTINUED | OUTPATIENT
Start: 2025-01-26 | End: 2025-02-03 | Stop reason: HOSPADM

## 2025-01-26 RX ADMIN — BUDESONIDE 0.5 MG: 0.5 INHALANT ORAL at 19:23

## 2025-01-26 RX ADMIN — LEVALBUTEROL HYDROCHLORIDE 1.25 MG: 1.25 SOLUTION RESPIRATORY (INHALATION) at 07:10

## 2025-01-26 RX ADMIN — TAMSULOSIN HYDROCHLORIDE 0.4 MG: 0.4 CAPSULE ORAL at 10:25

## 2025-01-26 RX ADMIN — IOHEXOL 85 ML: 350 INJECTION, SOLUTION INTRAVENOUS at 04:14

## 2025-01-26 RX ADMIN — BUDESONIDE 0.5 MG: 0.5 INHALANT ORAL at 07:10

## 2025-01-26 RX ADMIN — VENLAFAXINE HYDROCHLORIDE 150 MG: 150 CAPSULE, EXTENDED RELEASE ORAL at 10:25

## 2025-01-26 RX ADMIN — ENOXAPARIN SODIUM 40 MG: 40 INJECTION SUBCUTANEOUS at 10:25

## 2025-01-26 RX ADMIN — CLOPIDOGREL 75 MG: 75 TABLET ORAL at 10:24

## 2025-01-26 RX ADMIN — FORMOTEROL FUMARATE DIHYDRATE 20 MCG: 20 SOLUTION RESPIRATORY (INHALATION) at 19:23

## 2025-01-26 RX ADMIN — INSULIN LISPRO 2 UNITS: 100 INJECTION, SOLUTION INTRAVENOUS; SUBCUTANEOUS at 21:29

## 2025-01-26 RX ADMIN — Medication 7.5 MG: at 06:39

## 2025-01-26 RX ADMIN — TEMAZEPAM 30 MG: 15 CAPSULE ORAL at 21:22

## 2025-01-26 RX ADMIN — Medication 7.5 MG: at 12:56

## 2025-01-26 RX ADMIN — GADOBUTROL 6 ML: 604.72 INJECTION INTRAVENOUS at 18:38

## 2025-01-26 RX ADMIN — Medication 7.5 MG: at 21:21

## 2025-01-26 RX ADMIN — ACETAMINOPHEN 650 MG: 325 TABLET, FILM COATED ORAL at 05:36

## 2025-01-26 RX ADMIN — ARIPIPRAZOLE 10 MG: 5 TABLET ORAL at 10:24

## 2025-01-26 RX ADMIN — MONTELUKAST 10 MG: 10 TABLET, FILM COATED ORAL at 21:22

## 2025-01-26 RX ADMIN — BUPROPION HYDROCHLORIDE 300 MG: 150 TABLET, EXTENDED RELEASE ORAL at 10:24

## 2025-01-26 RX ADMIN — PANTOPRAZOLE SODIUM 40 MG: 40 TABLET, DELAYED RELEASE ORAL at 06:39

## 2025-01-26 NOTE — ASSESSMENT & PLAN NOTE
Hold blood pressure meds to allow for permissive HTN for 24-48 hours (on metoprolol, amlodipine, benazepril

## 2025-01-26 NOTE — PHYSICAL THERAPY NOTE
01/26/25 0942   PT Last Visit   PT Visit Date 01/26/25   Note Type   Note type Evaluation;Cancelled Session   Additional Comments Chart reviewed. Pt still has pending X-ray of R shoulder. When read, PT will conduct therapy as appropriate.

## 2025-01-26 NOTE — CASE MANAGEMENT
Case Management Discharge Planning Note    Patient name Fahad Hernandez  Location 2 EAST 253/2 E 253-01 MRN [id number]  : 1955 Date 2025       Current Admission Date: 2025  Current Admission Diagnosis:Acute right-sided weakness   Patient Active Problem List    Diagnosis Date Noted Date Diagnosed    Multiple falls 2025     Chronic pain syndrome 2025     Acute respiratory failure (HCC) 2025     Rotator cuff arthropathy of right shoulder 2024     Acute right-sided weakness 2024     Fall 2024     Metabolic acidosis 2024     Lactic acidosis due to diabetes mellitus ([facility]) 2024     BENNY (acute kidney injury) ([facility]) 2024     Severe persistent asthma with (acute) exacerbation 2024     Asthma exacerbation attacks 2024     Moderate persistent asthma with exacerbation 2024     Orthostasis 10/15/2024     History of diabetes mellitus 10/11/2024     Hoarseness of voice 2024     SOB (shortness of breath) 2024     Long-term exposure involving bird droppings 2024     Lung nodules 2024     Chest pain 2024     Centrilobular emphysema ([facility]) 2024     Severe persistent asthma with acute exacerbation 2024     Medical marijuana use 2024     Type 2 diabetes mellitus with hyperglycemia, without long-term current use of insulin ([facility]) 2022     Chronic obstructive asthma ([facility]) 2019     Major depressive disorder 2019     Gastroesophageal reflux disease with esophagitis 2019     Abnormal EKG 04/15/2019     Hypertension 04/15/2019     Mixed hyperlipidemia 2018       LOS (days): 0  Geometric Mean LOS (GMLOS) (days):   Days to GMLOS:     OBJECTIVE:  Risk of Unplanned Readmission Score: 19.93         Current admission status: Inpatient   Preferred Pharmacy:   [facility]/pharmacy #[id number] - [place] - [address]  [place] [address]  Phone: [phone] Fax:  [phone]    [facility] - [place], [place] - [address]  [address]  [address]  [place] [address]  Phone: [phone] Fax: [phone]    [facility] [place] - [address]  [address] [id number]  Phone: [phone] Fax: [phone]    Primary Care Provider: Surjit Hayes DO    [facility]  Secondary Insurance:     DISCHARGE DETAILS:                                     DME Referral Provided  Referral made for DME?: Yes  DME referral completed for the following items:: Shower Chair, Bedside Commode  DME Supplier Name:: [facility]    Other Referral/Resources/Interventions Provided:  Interventions: DME  Referral Comments: Pt requesting a commode and a shower chiar.  Request entered via [place] to [employer].  TREY to f/u on order    Would you like to participate in our [facility] service program?  : No - Declined

## 2025-01-26 NOTE — ASSESSMENT & PLAN NOTE
Neurology is asked to see this 69-year-old right-hand-dominant gentleman after he presented to our facility last evening after having a complaint of of a fall.  He was brought via EMS as a trauma alert. Patient is on dual antiplatelet therapy baseline.  He reports he struck his head on his walker and denies loss of consciousness. No presyncopal complaints as he reports. he has been seen and worked up by the trauma service..   His workup here included a CTA: 1. No acute intracranial hemorrhage, significant mass effect or midline shift. Note that there is a background of moderate-marked microangiopathy, 2. No proximal arterial large vessel occlusion in the head and neck.  3. Approximately 65% stenosis of the proximal left ICA and 50% stenosis of the proximal right ICA at the level of the carotid bifurcation. 4. An acute dural venous sinus thrombosis is not excluded based on timing of the contrast and if there is concern for acute dural venous sinus thrombosis, dedicated venographic examination is recommended.  We note that his left ICA appears to be an asymptomatic carotid and the event that this patient suffered is related more to posterior circulation at this time.  Additionally this patient has been seen at [facility] in 2019 and worked up at that time which discovered his remote stroke and also an EMG done then noted that he had a neuropathy in his bilateral hands and feet already (see below)  Possible etiologies of the patient's recurrent falls remain multifactorial with chronic back pain, polyneuropathy, however with focal deficits noted on admission TIA/CVA cannot be completely excluded, and with cervical spondylosis status post fusion and bilateral lower extremity spasticity worsening cervical stenosis remains in the differential.  Patient's polypharmacy could also be contributing including the use of opioids and antidepressants.  PSP was also considered due to mild bradykinesia noted but no eye movement  abnormalities speech swallowing difficulties noted.  -  Stroke protocol as he is currently on  -  MRI of brain with out contrast, as well as a C-spine with and without- likely to be done this evening or tomorrow a.m.  -  Echocardiogram -pending  -  maintain BP's if possible.  If he becomes hypotensive decrease head of the bed maintain perfusion pressure with the fluids.  -  Telemetry, continue no AFib seen so far,   -  Continue ... Antipltl aspirin 81.  -  Lipid panel - Statin medication started and to be maintained post discharge  -  A1C & other labs  -  Keep euvolemic, as dehydration can worsen exam and function  -  Treat high blood sugars if arises  -  Therapies to include PT/OT/ST  -  DVT prevention   -  Secondary stroke / TIA  risk factor modification  -  Frequent neurological check and notify neurology with any change in neuro status  -  Continue to monitor for infectious and metabolic derangements and treat as arises

## 2025-01-26 NOTE — OCCUPATIONAL THERAPY NOTE
Occupational Therapy         Patient Name: Fahad Hernandez  Today's Date: 1/26/2025 01/26/25 0943   Note Type   Note type Cancelled Session;Evaluation   Additional Comments Chart reviewed. Pt is still pending x-ray of right shoulder. Will hold evaluation until results are known.

## 2025-01-26 NOTE — ASSESSMENT & PLAN NOTE
On my evaluation I found him to have right sided weakness not previously noted. He was initially examined by the ER physician at 18:33 on 1/25/25. I was initially called for admission for multiple falls but then was called back that he changed his mind and was not going to stay. He and the ER doctor and his wife went back and forth for hours and the patient finally decided to stay. I saw him at 3 a.m. which is when he was noted to have right sided weakness.   Not in window for TPA if this is a new deficit, suspect he may be having waxing and waning symptoms for several days.  CTA Head and Neck  Stroke pathway: Neuro checks, neurologist eval, MRI brain, echo, telemetry, lipid panel, A1c, PT/OT

## 2025-01-26 NOTE — NURSING NOTE
This patient has a med ordered that needs to be brought in from home, we don't carry it - deutrabenazine (Austedo). Please work with patient to have it brought in.

## 2025-01-26 NOTE — ASSESSMENT & PLAN NOTE
Decreased oxycodone dose from 10mg to 7.5mg due to concern for contributing to falls and fatigue, addition of Austedo to his home med regimen which can increase concentrations

## 2025-01-26 NOTE — RESPIRATORY THERAPY NOTE
RT Protocol Note  Fahad Hernandez 69 y.o. male MRN: [id number]  Unit/Bed#: -01 Encounter: [id number]    Assessment    Principal Problem:    Acute right-sided weakness  Active Problems:    Hypertension    Type 2 diabetes mellitus with hyperglycemia, without long-term current use of insulin (HCC)    Chronic obstructive asthma (HCC)    Major depressive disorder    Mixed hyperlipidemia    Gastroesophageal reflux disease with esophagitis    Medical marijuana use    Multiple falls    Chronic pain syndrome      Home Pulmonary Medications:  Nebs    Home Devices/Therapy: (P) BiPAP/CPAP    Past Medical History:   Diagnosis Date    Asthma     COPD (chronic obstructive pulmonary disease) ([facility])     Dementia (HCC)     Diabetes mellitus (HCC)     GERD (gastroesophageal reflux disease)     History of stroke 11/07/2019    Hypertension     Interstitial lung disease ([facility])     Major depressive disorder with current active episode 11/07/2019    Pneumonia     Rotator cuff arthropathy of right shoulder 12/17/2024    Sleep apnea     Sleep apnea, obstructive     Stroke ([facility])     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     Social History     Socioeconomic History    Marital status: /Civil Union     Spouse name: None    Number of children: None    Years of education: None    Highest education level: None   Occupational History    None   Tobacco Use    Smoking status: Never     Passive exposure: Never    Smokeless tobacco: Never    Tobacco comments:     Patient admits to using marijuana, edibles and smoking    Vaping Use    Vaping status: Never Used   Substance and Sexual Activity    Alcohol use: Not Currently    Drug use: Yes     Types: Marijuana, Oxycodone, Psilocybin    Sexual activity: Yes     Partners: Female     Birth control/protection: Male Sterilization   Other Topics Concern    None   Social History Narrative    None     Social Drivers of Health     Financial Resource Strain: Low Risk  (11/1/2024)    Received from [facility]    Overall Financial Resource Strain (CARDIA)     Difficulty of Paying Living Expenses: Not hard at all   Food Insecurity: No Food Insecurity (1/26/2025)    Nursing - Inadequate Food Risk Classification     Worried About Running Out of Food in the Last Year: Never true     Ran Out of Food in the Last Year: Never true     Ran Out of Food in the Last Year: Never true   Transportation Needs: No Transportation Needs (1/26/2025)    Nursing - Transportation Risk Classification     Lack of Transportation: Not on file     Lack of Transportation: No   Physical Activity: Inactive (11/1/2024)    Received from [facility]    Exercise Vital Sign     Days of Exercise per Week: 0 days     Minutes of Exercise per Session: 0 min   Stress: Stress Concern Present (11/1/2024)    Received from [facility]    [employer] [place] [employer] - Occupational Stress Questionnaire     Feeling of Stress : To some extent   Social Connections: Moderately Isolated (11/1/2024)    Received from [facility]    Social Connection and Isolation Panel [NHANES]     Frequency of Communication with Friends and Family: Three times a week     Frequency of Social Gatherings with Friends and Family: Twice a week     Attends Faith Services: Never     Active Member of Clubs or Organizations: No     Attends Club or Organization Meetings: Never     Marital Status:    Intimate Partner Violence: Unknown (1/26/2025)    Nursing IPS     Feels Physically and Emotionally Safe: Not on file     Physically Hurt by Someone: Not on file     Humiliated or Emotionally Abused by Someone: Not on file     Physically Hurt by Someone: No     Hurt or Threatened by Someone: No   Housing Stability: Unknown (1/26/2025)    Nursing: Inadequate Housing Risk Classification     Has Housing: Not on file     Worried About Losing Housing: Not on file     Unable to Get Utilities: Not on file     Unable to Pay for  "Housing in the Last Year: No     Has Housin       Subjective         Objective    Physical Exam:   Assessment Type: (P) During-treatment  General Appearance: (P) Awake, Alert  Respiratory Pattern: (P) Normal  Chest Assessment: (P) Chest expansion symmetrical  Bilateral Breath Sounds: (P) Diminished  Cough: (P) None  O2 Device: (P) ra    Vitals:  Blood pressure 142/79, pulse 85, temperature 97.9 °F (36.6 °C), temperature source Oral, resp. rate 20, height 5' 11\" (1.803 m), weight 68 kg (149 lb 14.6 oz), SpO2 92%.          Imaging and other studies: Results Review Statement: No pertinent imaging studies reviewed.    O2 Device: (P) ra     Plan    Respiratory Plan: (P) Home Bronchodilator Patient pathway        Resp Comments: (P) Protocol complete.  Pt w/ hx of COPD/asthma.  Pt uses nebs at home BID and PRN.  Admitted w/ right sided weakness; pt in no distress at this time.  Will cont w/ home regimen.   "

## 2025-01-26 NOTE — H&P
"H&P - Hospitalist   Name: Fahad Hernandez 69 y.o. male I MRN: [id number]  Unit/Bed#: ED 20 I Date of Admission: 1/25/2025   Date of Service: 1/26/2025 I Hospital Day: 0     Assessment & Plan  Acute right-sided weakness  On my evaluation I found him to have right sided weakness not previously noted. He was initially examined by the ER physician at 18:33 on 1/25/25. I was initially called for admission for multiple falls but then was called back that he changed his mind and was not going to stay. He and the ER doctor and his wife went back and forth for hours and the patient finally decided to stay. I saw him at 3 a.m. which is when he was noted to have right sided weakness.   Not in window for TPA if this is a new deficit, suspect he may be having waxing and waning symptoms for several days.  CTA Head and Neck  Stroke pathway: Neuro checks, neurologist eval, MRI brain, echo, telemetry, lipid panel, A1c, PT/OT  Multiple falls  PT/OT  I decreased his oxycodone dose from 10mg to 7.5mg as I am concerned that it may be contributing to falls and fatigue. He has been on it for 15 years. He is also now on Austedo for tardive dyskinesia which can cause increased concentrations.  Consider decreasing restoril dose  Type 2 diabetes mellitus with hyperglycemia, without long-term current use of insulin (HCC)  Lab Results   Component Value Date    HGBA1C 8.1 (H) 12/14/2024       No results for input(s): \"POCGLU\" in the last 72 hours.    ISS  Diabetic cardiac diet    Hold metformin  Chronic obstructive asthma (HCC)  Cont prn albuterol, pulmicort, perforomist, singulair  Mixed hyperlipidemia  Increase lipitor to 40mg  Gastroesophageal reflux disease with esophagitis  Continue PPI; protonix substituted for nexium based on formulary  Medical marijuana use  noted  Chronic pain syndrome  Decreased oxycodone dose from 10mg to 7.5mg due to concern for contributing to falls and fatigue, addition of Austedo to his home med regimen which can " increase concentrations  Major depressive disorder  Cont home meds: Effexor, restoril, wellbutrin, abilify  Hypertension  Hold blood pressure meds to allow for permissive HTN for 24-48 hours (on metoprolol, amlodipine, benazepril      VTE Pharmacologic Prophylaxis:   High Risk (Score >/= 5) - Pharmacological DVT Prophylaxis Ordered: enoxaparin (Lovenox). Sequential Compression Devices Ordered.  Code Status: Full Code  Discussion with family:  did not call wife in the middle of the night.     Anticipated Length of Stay: Patient will be admitted on an observation basis with an anticipated length of stay of less than 2 midnights secondary to frequent falls, right sided weakness and an stroke rule out.    History of Present Illness   Chief Complaint: fall    Fahad Hernandez is a 69 y.o. male with a PMH of asthma, copd, dementia, DM, GERD, hx of stroke in 2019, HTN, interstitial lung disease, major depressive disorder, CASSIE on CPAP but unknown pressures who presents with a fall while using his walker.    Patient states that he fell at home. He was using his walker and he still fell. He hit his head on the rubber part of his walker. He hurt his back and his shoulder. He has fallen 3 times in the last. He says that the room started spinning and he fell. He says that didn't happen with the other falls. He says usually he trips over his own feet. He says that the last few weeks this all started happening. He feels like his legs are weak, equally so. He has lost 20 pounds in the last 6 months without trying. He has been in the hospital because of his asthma and this is his 5th admission since August. He has a good appetite. No night sweats.     Review of Systems   Constitutional:  Positive for fatigue and unexpected weight change. Negative for appetite change, chills, diaphoresis and fever.   HENT:  Positive for rhinorrhea. Negative for congestion, ear pain, sinus pressure, sinus pain, sore throat, trouble swallowing and voice  change.    Eyes:  Negative for pain and visual disturbance.   Respiratory:  Negative for cough, chest tightness, shortness of breath and wheezing.    Cardiovascular:  Negative for chest pain, palpitations and leg swelling.   Gastrointestinal:  Positive for constipation. Negative for abdominal pain, blood in stool, diarrhea, nausea and vomiting.        Constipation Secondary to percocet use, has been on a long time. Works with pain management. Has been at least 15 years. His dose hasn't changed. He takes 10mg 2-3 times during the day.    Endocrine: Positive for polydipsia and polyphagia. Negative for cold intolerance, heat intolerance and polyuria.   Genitourinary:  Negative for dysuria, frequency, hematuria and urgency.   Musculoskeletal:  Positive for back pain. Negative for arthralgias and joint swelling.        Chronic back pain, a little worse after the fall   Skin:  Positive for color change.   Allergic/Immunologic: Negative for environmental allergies and food allergies.   Neurological:  Positive for dizziness, weakness, numbness and headaches. Negative for tremors, seizures, syncope and speech difficulty.        HE says he had a headache after the fall today that is only slight. His neck has been bothering him too but that is part of his chronic back problems.    Right foot with paresthesias   Hematological:  Negative for adenopathy. Bruises/bleeds easily.   Psychiatric/Behavioral:  Positive for dysphoric mood. Negative for suicidal ideas. The patient is nervous/anxious.        Historical Information   Past Medical History:   Diagnosis Date    Asthma     COPD (chronic obstructive pulmonary disease) (HCC)     Dementia (HCC)     Diabetes mellitus (HCC)     GERD (gastroesophageal reflux disease)     History of stroke 11/07/2019    Hypertension     Interstitial lung disease (HCC)     Major depressive disorder with current active episode 11/07/2019    Pneumonia     Rotator cuff arthropathy of right shoulder  2024    Sleep apnea     Sleep apnea, obstructive     Stroke (HCC)     Urethral disorder 2018   Wears a CPAP to sleep, unknown setting    Past Surgical History:   Procedure Laterality Date    BACK SURGERY      ELBOW SURGERY      KNEE SURGERY      NECK SURGERY      SHOULDER SURGERY      SPINE SURGERY       Social History     Tobacco Use    Smoking status: Never     Passive exposure: Never    Smokeless tobacco: Never    Tobacco comments:     Patient admits to using marijuana, edibles and smoking    Vaping Use    Vaping status: Never Used   Substance and Sexual Activity    Alcohol use: Not Currently    Drug use: Yes     Types: Marijuana, Oxycodone, Psilocybin    Sexual activity: Yes     Partners: Female     Birth control/protection: Male Sterilization   Uses medical marijuana    E-Cigarette/Vaping    E-Cigarette Use Never User      E-Cigarette/Vaping Substances    Nicotine No     THC No     CBD Yes     Flavoring No     Other No     Unknown No      Family History   Family history unknown: Yes   Mom  when he was young  Dad lived to be 86 and  from old age  Social History:  Marital Status: /Civil Union   Patient Pre-hospital Living Situation: Home  Patient Pre-hospital Level of Mobility: walks with walker  Patient Pre-hospital Diet Restrictions: diabetic diet    Meds/Allergies   I have reviewed home medications with patient personally.  Prior to Admission medications    Medication Sig Start Date End Date Taking? Authorizing Provider   albuterol (2.5 mg/3 mL) 0.083 % nebulizer solution Take 3 mL (2.5 mg total) by nebulization every 6 (six) hours 25   Kirk Bueno PA-C   amLODIPine-benazepril (LOTREL) 10-40 MG per capsule  19   Historical Provider, MD   ARIPiprazole (ABILIFY) 10 mg tablet  20   Historical Provider, MD   aspirin (ECOTRIN LOW STRENGTH) 81 mg EC tablet Take 81 mg by mouth daily  Patient not taking: Reported on 2024    Historical Provider, MD   atorvastatin  (LIPITOR) 10 mg tablet Take 10 mg by mouth daily 2/28/22   Historical Provider, MD   Austedo 12 MG TABS  5/30/24   Historical Provider, MD   budesonide (PULMICORT) 0.5 mg/2 mL nebulizer solution Take 2 mL (0.5 mg total) by nebulization every 12 (twelve) hours Rinse mouth after use. 1/24/25   Kirk Bueno PA-C   buPROPion (WELLBUTRIN XL) 300 mg 24 hr tablet 1 tab(s)    Historical Provider, MD   Dupixent subcutaneous injection INJECT 2ML SUBCUTANEOUSLY 1 TIME EVERY 2 WEEKS *NEW PRESCRIPTION REQUEST* 12/27/24   Kirk Bueno PA-C   esomeprazole (NexIUM) 40 MG capsule Take 40 mg by mouth    Historical Provider, MD   formoterol (PERFOROMIST) 20 MCG/2ML nebulizer solution Take 2 mL (20 mcg total) by nebulization 2 (two) times a day 1/24/25   Kirk Bueno PA-C    MG tablet  3/14/23   Historical Provider, MD   metFORMIN (GLUCOPHAGE) 500 mg tablet Take 1 tablet by mouth 2 (two) times a day with meals 2/14/22   Historical Provider, MD   metoprolol succinate (TOPROL-XL) 25 mg 24 hr tablet TAKE 1 TABLET (25 MG TOTAL) BY MOUTH DAILY. 10/24/24   JAMIL Lindsay   montelukast (SINGULAIR) 10 mg tablet Take 1 tablet (10 mg total) by mouth daily at bedtime 1/24/25   Kirk Bueno PA-C   multivitamin (THERAGRAN) TABS Take 1 tablet by mouth    Historical Provider, MD   nitroglycerin (NITROSTAT) 0.4 mg SL tablet Place 1 tablet (0.4 mg total) under the tongue every 5 (five) minutes as needed for chest pain 10/1/24   JAMIL Lindsay   OneTouch Ultra test strip TEST DAILY.. DIAGNOSIS E11.9 3/28/23   Historical Provider, MD   oxyCODONE (ROXICODONE) 5 mg immediate release tablet Take 10 mg by mouth every 6 (six) hours as needed 10/20/20   Historical Provider, MD   tamsulosin (FLOMAX) 0.4 mg Take 0.4 mg by mouth daily 2/13/22   Historical Provider, MD   temazepam (RESTORIL) 30 mg capsule Take 30 mg by mouth daily at bedtime as needed 3/15/22   Historical Provider, MD   venlafaxine (EFFEXOR-XR) 75 mg 24 hr  capsule 1 cap(s)    Historical Provider, MD     Allergies   Allergen Reactions    Beta Adrenergic Blockers      Pt states they make him feel crappy.       Objective :  Temp:  [98.6 °F (37 °C)] 98.6 °F (37 °C)  HR:  [72-92] 73  BP: (101-166)/(58-72) 117/69  Resp:  [16-22] 20  SpO2:  [91 %-95 %] 91 %  O2 Device: None (Room air)    Physical Exam  Constitutional:       General: He is not in acute distress.     Appearance: He is not ill-appearing.      Comments: disheveled   HENT:      Head: Normocephalic and atraumatic.      Mouth/Throat:      Mouth: Mucous membranes are moist.      Pharynx: Oropharynx is clear. No oropharyngeal exudate or posterior oropharyngeal erythema.   Eyes:      General: No scleral icterus.     Extraocular Movements: Extraocular movements intact.      Pupils: Pupils are equal, round, and reactive to light.      Comments: Right eye with injected conjunctiva laterally   Neck:      Vascular: No carotid bruit.   Cardiovascular:      Rate and Rhythm: Normal rate and regular rhythm.      Pulses: Normal pulses.      Heart sounds: Normal heart sounds. No murmur heard.     No friction rub. No gallop.   Pulmonary:      Effort: Pulmonary effort is normal. No respiratory distress.      Breath sounds: No wheezing, rhonchi or rales.   Abdominal:      General: Abdomen is flat. Bowel sounds are normal. There is no distension.      Palpations: Abdomen is soft.      Tenderness: There is no abdominal tenderness. There is no guarding or rebound.      Comments: cachectic   Musculoskeletal:         General: No swelling or tenderness.      Cervical back: Neck supple. No rigidity or tenderness.      Right lower leg: No edema.      Left lower leg: No edema.   Lymphadenopathy:      Cervical: No cervical adenopathy.   Skin:     Coloration: Skin is not jaundiced.      Findings: Bruising and rash present. No erythema.      Comments: Multiple small bruises and red spots   Neurological:      Mental Status: He is alert and  oriented to person, place, and time.      Cranial Nerves: No cranial nerve deficit.      Sensory: No sensory deficit.      Motor: Weakness present.      Comments: Right upper and lower extremity are weak  3-4/5 while left upper and lower extremity are strong at 5/5                 Lab Results: I have reviewed the following results:  Results from last 7 days   Lab Units 01/25/25  1834   WBC Thousand/uL 15.86*   HEMOGLOBIN g/dL 11.8*   HEMATOCRIT % 38.8   PLATELETS Thousands/uL 440*   SEGS PCT % 68   LYMPHO PCT % 21   MONO PCT % 8   EOS PCT % 1     Results from last 7 days   Lab Units 01/25/25  1834   SODIUM mmol/L 136   POTASSIUM mmol/L 4.1   CHLORIDE mmol/L 102   CO2 mmol/L 25   BUN mg/dL 21   CREATININE mg/dL 0.86   ANION GAP mmol/L 9   CALCIUM mg/dL 9.1   ALBUMIN g/dL 3.5   TOTAL BILIRUBIN mg/dL 0.33   ALK PHOS U/L 53   ALT U/L 37   AST U/L 18   GLUCOSE RANDOM mg/dL 125             Lab Results   Component Value Date    HGBA1C 8.1 (H) 12/14/2024    HGBA1C 7.0 (H) 09/11/2024    HGBA1C 6.6 (H) 10/20/2023         PER Radiologist;  CXR:   IMPRESSION:     No acute cardiopulmonary disease.     No acute displaced fractures.      XR Pelvis:   IMPRESSION:     No acute, displaced pelvic fracture.      CT Head:  CT BRAIN - WITHOUT CONTRAST     INDICATION:   TRAUMA.     COMPARISON: CT head 12/13/2024.     TECHNIQUE:  CT examination of the brain was performed.  Multiplanar 2D reformatted images were created from the source data.     Radiation dose length product (DLP) for this visit:  934 mGy-cm .  This examination, like all CT scans performed in the [facility], was performed utilizing techniques to minimize radiation dose exposure, including the use of iterative   reconstruction and automated exposure control.     IMAGE QUALITY:  Diagnostic.     FINDINGS:     PARENCHYMA:No acute intracranial hemorrhage, significant mass effect or midline shift.     Moderate-marked chronic ischemic changes of the white matter.  Intracranial vascular calcifications.     VENTRICLES AND EXTRA-AXIAL SPACES: No hydrocephalus.     VISUALIZED ORBITS:  No acute abnormality.     PARANASAL SINUSES:  Trace mucosal thickening.     CALVARIUM AND EXTRACRANIAL SOFT TISSUES:  Bilateral temporomandibular joint osteoarthritis with flattening of the mandibular condyles.     IMPRESSION:     No acute intracranial hemorrhage, significant mass effect or midline shift. Moderate-marked chronic microangiopathy.     The study was marked in EPIC for immediate notification.      CT C-Spine:  CT CERVICAL SPINE - WITHOUT CONTRAST     INDICATION:   TRAUMA.     COMPARISON: CT cervical spine 12/13/2024.     TECHNIQUE:  CT examination of the cervical spine was performed without intravenous contrast.  Contiguous axial images were obtained. Multiplanar 2D reformatted images were created from the source data.     Radiation dose length product (DLP) for this visit:  412 mGy-cm .  This examination, like all CT scans performed in the [facility], was performed utilizing techniques to minimize radiation dose exposure, including the use of iterative   reconstruction and automated exposure control.     IMAGE QUALITY:  Diagnostic.     FINDINGS:     ALIGNMENT: Anterior cervical discectomy and fusion from C4-C7 with ankylosis from C4-C6 and intervertebral disc spacer at C6-C7. Nonspecific straightening of the cervical spine alignment.     VERTEBRAE:  No acute fracture.     DEGENERATIVE CHANGES: Spondylotic changes without acute critical central canal stenosis.     PREVERTEBRAL AND PARASPINAL SOFT TISSUES: No acute abnormality.     THORACIC INLET: No acute abnormality.     IMPRESSION:     No acute cervical spine fracture or traumatic malalignment.     The study was marked in EPIC for immediate notification.         XR Shoulder Right: negative for fracture      Other Study Results Review: EKG was reviewed.  NSR with no acute ST-T changes.    Administrative Statements   I  have spent a total time of 60 minutes in caring for this patient on the day of the visit/encounter including Diagnostic results, Prognosis, Risks and benefits of tx options, Risk factor reductions, Impressions, Counseling / Coordination of care, Documenting in the medical record, Reviewing / ordering tests, medicine, procedures  , Obtaining or reviewing history  , and Communicating with other healthcare professionals .    ** Please Note: This note has been constructed using a voice recognition system. **

## 2025-01-26 NOTE — PLAN OF CARE
Problem: PAIN - ADULT  Goal: Verbalizes/displays adequate comfort level or baseline comfort level  Description: Interventions:  - Encourage patient to monitor pain and request assistance  - Assess pain using appropriate pain scale  - Administer analgesics based on type and severity of pain and evaluate response  - Implement non-pharmacological measures as appropriate and evaluate response  - Consider cultural and social influences on pain and pain management  - Notify physician/advanced practitioner if interventions unsuccessful or patient reports new pain  Outcome: Progressing     Problem: METABOLIC, FLUID AND ELECTROLYTES - ADULT  Goal: Fluid balance maintained  Description: INTERVENTIONS:  - Monitor labs   - Monitor I/O and WT  - Instruct patient on fluid and nutrition as appropriate  - Assess for signs & symptoms of volume excess or deficit  Outcome: Progressing     Problem: MUSCULOSKELETAL - ADULT  Goal: Maintain or return mobility to safest level of function  Description: INTERVENTIONS:  - Assess patient's ability to carry out ADLs; assess patient's baseline for ADL function and identify physical deficits which impact ability to perform ADLs (bathing, care of mouth/teeth, toileting, grooming, dressing, etc.)  - Assess/evaluate cause of self-care deficits   - Assess range of motion  - Assess patient's mobility  - Assess patient's need for assistive devices and provide as appropriate  - Encourage maximum independence but intervene and supervise when necessary  - Involve family in performance of ADLs  - Assess for home care needs following discharge   - Consider OT consult to assist with ADL evaluation and planning for discharge  - Provide patient education as appropriate  Outcome: Progressing

## 2025-01-26 NOTE — UTILIZATION REVIEW
Initial Clinical Review    WAS OBSERVATION 1/25 CONVERTED TO INPATIENT ADMISSION 1/26 DUE TO CONTINUED STAY REQUIRED TO CARE FOR PATIENT WITH  Acute Right-sided weakness, Multiple Falls. Stroke workup    Admission : Date/Time/Statement:   Admission Orders (From admission, onward)       Ordered        01/26/25 1126  INPATIENT ADMISSION  Once            01/25/25 2317  Place in Observation  Once                    Orders Placed This Encounter   Procedures    INPATIENT ADMISSION     Standing Status:   Standing     Number of Occurrences:   1     Level of Care:   Med Surg [16]     Estimated length of stay:   More than 2 Midnights     Certification:   I certify that inpatient services are medically necessary for this patient for a duration of greater than two midnights. See H&P and MD Progress Notes for additional information about the patient's course of treatment.     ED Arrival Information       Expected   -    Arrival   1/25/2025 18:18    Acuity   Emergent              Means of arrival   Ambulance    Escorted by   [facility]    Service   Hospitalist    Admission type   Emergency              Arrival complaint   Fall: Head Strike             Chief Complaint   Patient presents with    Fall     Per ems pt lost his balance while walking in the kitchen with his walker, +HS, +BT. Pt c/o head pain and neck pain       Initial Presentation: 69 y.o. male, to ED presents for Fall while using his walker. He fell at home while using his walker. + Head strike on the rubber part of his walker. He hurt his back and his shoulder. He has fallen 3 times recently. States that the room started spinning and he fell. He says that didn't happen with the other falls. He says usually he trips over his own feet. This all started last few weeks. C/o leg weakness. States he has lost 20 pounds in the last 6 months without trying. He has been in the hospital because of his asthma and this is his 5th admission since August.   PMH for   asthma, copd, dementia, DM, GERD, hx of stroke in 2019, HTN, interstitial lung disease, major depressive disorder, CASSIE on CPAP but unknown pressures   Admit to Observation Dx; Acute Right-sided weakness, Multiple Falls  Not in window for TPA if this is a new deficit, suspect he may be having waxing and waning symptoms for several days.   Plan; Stroke workup; Neuro checks, Neurologist consult, MRI brain, Echo, Telemetry monitoring, lipid panel, A1c,   CTA Head and Neck. Oxycodone dose decreased from 10 mg to 7.5 mg.   He is also now on Austedo for tardive dyskinesia which can cause increased concentrations.  Consider decreasing restoril dose    1/26 Changed to Inpatient status  Progress notes; Neurology consult pending. Tele monitoring. Continue aspirin and statin.  CT H/N pending. Neuro checks; Every 1 hour x 4 hours, then every 2 hours x 8 hours, then every 4 hours x 72 hours   PT/OT    Neurology cons; Multiple Falls, Stroke workup  MRI Brain. Echo pending. Tele monitoring. Aspirin 81 mg. Lipid panel and A1c.  Treat high bld sugars if arises. Frequent neuro checks. Maintain BP's if possible.  On exam; mild speech dysarthria but he remains completely understandable    CT head and neck   1. No acute intracranial hemorrhage, significant mass effect or midline shift. Note that there is a background of moderate-marked microangiopathy, and if there is concern for acute infarct, recommend brain MRI, particularly given reduced sensitivity on   CT given significant microangiopathy.   2. No proximal arterial large vessel occlusion in the head and neck.   3. Approximately 65% stenosis of the proximal left ICA and 50% stenosis of the proximal right ICA at the level of the carotid bifurcation. See narrative above for additional vascular stenoses.   4. While this examination is not tailored for assessment, there is a markedly diminutive/nonopacified right transverse/sigmoid sinus without visualization of the right internal  jugular vein. While findings could be due to congenital hypoplasia and/or   phase of contrast of this study, acute dural venous sinus thrombosis is not excluded and if there is concern for acute dural venous sinus thrombosis, dedicated venographic examination is recommended.       Date: 1/27  Day 3: Has surpassed a 2nd midnight with active treatments and services.  D/c Stroke workup. MRI C-spine: Severe central stenosis C3-C4 with disc bulge, severe R foraminal stenosis at C2-C3 due to facet arthropathy   Neurosurgery consulted. Pain control.  Now on Austedo for tardive dyskinesia which can cause increased concentrations.  Consider decreasing restoril dose  Pt still feels weakness to R upper and lower extremities. Mild numbness over right upper arm.  Certification Statement: The patient will continue to require additional inpatient hospital stay due to neurosurgical eval, placement to [facility]     Neurosurgery Telemed;  MRI cervical spine demonstrates severe central stenosis at C3-C4 due to anterolisthesis, disc bulge, and ligamentum flavum redundancy. There is also severe bilateral foraminal stenosis due to severe right and moderate left facet arthropathy and uncovertebral osteophytes. Cord signal remains normal.There is also severe right foraminal stenosis at C2-C3 due to severe facet arthropathy and uncovertebral osteophytes. ACDF at C4-C7. No enhancing lesion seen.  Continue neuro checks. Pain control.  No emergent neurosurgical intervention warranted at this time given multiple comorbidities.   Recommend patient exhaust all nonsurgical options including referrals to physical therapy and pain management.   Unfortunately pt is at increased risk for spinal cord injury given stenosis at C3-4.     ED Treatment-Medication Administration from 01/25/2025 1818 to 01/26/2025 0318         Date/Time Order Dose Route Action     01/25/2025 1853 morphine injection 4 mg 4 mg Intravenous Given            Scheduled  Medications:  ARIPiprazole, 10 mg, Oral, Daily  aspirin, 81 mg, Oral, Daily  atorvastatin, 40 mg, Oral, Daily With Dinner  budesonide, 0.5 mg, Nebulization, Q12H  buPROPion, 300 mg, Oral, Daily  clopidogrel, 75 mg, Oral, Daily  Deutetrabenazine, 12 mg, Oral, BID  enoxaparin, 40 mg, Subcutaneous, Daily  formoterol, 20 mcg, Nebulization, BID  insulin lispro, 2-12 Units, Subcutaneous, TID AC  insulin lispro, 2-12 Units, Subcutaneous, HS  montelukast, 10 mg, Oral, HS  pantoprazole, 40 mg, Oral, Early Morning  tamsulosin, 0.4 mg, Oral, Daily  venlafaxine, 150 mg, Oral, Daily      Continuous IV Infusions: None     PRN Meds:  acetaminophen, 650 mg, Oral, Q4H PRN  albuterol, 2.5 mg, Nebulization, Q6H PRN  calcium carbonate, 1,000 mg, Oral, Daily PRN  nitroglycerin, 0.4 mg, Sublingual, Q5 Min PRN  ondansetron, 4 mg, Intravenous, Q6H PRN  oxyCODONE, 7.5 mg, Oral, Q6H PRN  temazepam, 30 mg, Oral, HS PRN      ED Triage Vitals [01/25/25 1819]   Temperature Pulse Respirations Blood Pressure SpO2 Pain Score   98.6 °F (37 °C) 85 17 114/65 94 % 7     Weight (last 2 days)       Date/Time Weight    01/27/25 0600 68.3 (150.57)    01/26/25 0600 68 (149.91)    01/26/25 0345 78.5 (173)    01/25/25 1819 78.8 (173.72)            Vital Signs (last 3 days)       Date/Time Temp Pulse Resp BP MAP (mmHg) SpO2 O2 Device O2 Interface Device Patient Position - Orthostatic VS Carissa Coma Scale Score Pain    01/27/25 0901 -- -- -- -- -- -- -- -- -- -- 7    01/27/25 0723 -- -- -- -- -- -- -- -- -- 15 No Pain    01/27/25 0700 97.7 °F (36.5 °C) 86 18 125/91 102 93 % None (Room air) -- Lying 15 --    01/27/25 0500 -- -- -- -- -- -- -- -- -- -- No Pain    01/27/25 0445 -- -- -- -- -- 95 % -- Nasal mask -- -- --    01/27/25 03:29:05 97.5 °F (36.4 °C) 96 18 144/86 105 91 % -- -- -- -- --    01/27/25 0300 97.5 °F (36.4 °C) 96 18 144/86 105 91 % CPAP -- Lying 15 --    01/27/25 0100 -- -- -- -- -- -- -- -- -- 15 --    01/26/25 2300 97.9 °F (36.6 °C) 89 18  138/86 103 95 % None (Room air) -- Lying 15 --    01/26/25 2231 -- -- -- -- -- 96 % -- Nasal mask -- -- --    01/26/25 21:35:19 97.9 °F (36.6 °C) 89 18 138/86 103 95 % -- -- -- -- --    01/26/25 2100 -- -- -- -- -- -- -- -- -- 15 --    01/26/25 1923 -- -- -- -- -- 94 % None (Room air) -- -- -- --    01/26/25 19:20:44 98.3 °F (36.8 °C) 88 18 119/77 91 94 % -- -- -- -- --    01/26/25 1900 -- -- -- -- -- -- -- -- -- 15 --    01/26/25 15:30:36 -- 90 -- 131/76 94 96 % -- -- -- -- --    01/26/25 1500 97.9 °F (36.6 °C) 104 16 131/76 94 95 % None (Room air) -- Lying 15 --    01/26/25 1408 -- -- -- -- -- -- -- -- -- 15 --    01/26/25 1256 -- -- -- -- -- -- -- -- -- -- 8    01/26/25 1100 97.9 °F (36.6 °C) 88 18 135/79 -- 94 % None (Room air) -- Lying 15 --    01/26/25 0900 -- -- -- -- -- 95 % -- -- -- 15 8    01/26/25 08:01:55 -- 81 -- 149/84 106 94 % -- -- -- -- --    01/26/25 0711 -- -- -- -- -- 92 % None (Room air) -- -- -- --    01/26/25 0700 97.6 °F (36.4 °C) 85 18 130/70 -- -- -- -- Lying 15 --    01/26/25 0639 -- -- -- -- -- -- -- -- -- -- 9    01/26/25 0600 -- -- -- -- -- -- -- -- -- 15 --    01/26/25 0536 -- -- -- -- -- -- -- -- -- -- 10 - Worst Possible Pain    01/26/25 0500 -- -- -- -- -- -- -- -- -- 15 --    01/26/25 0400 -- -- -- -- -- -- -- -- -- 15 --    01/26/25 0356 -- -- -- -- -- -- -- -- -- 15 --    01/26/25 0345 97.9 °F (36.6 °C) 85 20 142/79 100 93 % None (Room air) -- Lying 15 9    01/26/25 03:41:48 97.9 °F (36.6 °C) 69 -- 142/79 100 94 % -- -- -- -- --    01/26/25 0230 -- 75 22 127/78 97 96 % None (Room air) -- Lying 15 --    01/26/25 0130 -- 73 20 117/69 87 91 % None (Room air) -- Lying -- --    01/26/25 0100 -- 72 22 106/60 78 92 % None (Room air) -- Lying -- --    01/25/25 2330 -- 79 22 132/65 92 92 % None (Room air) -- Lying -- --    01/25/25 2230 -- 92 20 166/67 97 92 % None (Room air) -- Lying -- --    01/25/25 2130 -- 89 22 112/72 88 93 % None (Room air) -- Lying -- --    01/25/25 2100 -- 87 16  101/61 76 94 % None (Room air) -- Lying -- --    01/25/25 2000 -- 86 19 112/70 86 95 % None (Room air) -- Lying 15 7    01/25/25 1930 -- 83 19 102/67 80 94 % None (Room air) -- Lying -- --    01/25/25 1915 -- 85 19 130/58 83 94 % None (Room air) -- Lying 15 5    01/25/25 1859 -- -- -- -- -- -- -- -- -- -- 8    01/25/25 1825 -- -- -- -- -- -- None (Room air) -- -- -- --    01/25/25 1823 98.6 °F (37 °C) 90 18 114/65 -- 94 % None (Room air) -- -- 15 --    01/25/25 1819 98.6 °F (37 °C) 85 17 114/65 -- 94 % None (Room air) -- Sitting -- 7              Pertinent Labs/Diagnostic Test Results:   Radiology:  MRI cervical spine w wo contrast   Final Interpretation by Masoud Daniel MD (01/27 2996)      1. Severe central stenosis at C3-C4 due to anterolisthesis, disc bulge, and ligamentum flavum redundancy. There is also severe bilateral foraminal stenosis due to severe right and moderate left facet arthropathy and uncovertebral osteophytes. Cord signal    remains normal.   2. There is also severe right foraminal stenosis at C2-C3 due to severe facet arthropathy and uncovertebral osteophytes.   3. ACDF at C4-C7   4. No enhancing lesion seen.         Workstation performed: [id number]         MRI brain wo contrast   Final Interpretation by Delmar Burkett MD (01/26 2461)      No acute infarct, significant mass effect or midline shift.      Workstation performed: [id number]         CTA head and neck w wo contrast   Final Interpretation by Delmar Burkett MD (01/26 1412)      1. No acute intracranial hemorrhage, significant mass effect or midline shift. Note that there is a background of moderate-marked microangiopathy, and if there is concern for acute infarct, recommend brain MRI, particularly given reduced sensitivity on    CT given significant microangiopathy.      2. No proximal arterial large vessel occlusion in the head and neck.      3. Approximately 65% stenosis of the proximal left ICA and 50% stenosis of the  proximal right ICA at the level of the carotid bifurcation. See narrative above for additional vascular stenoses.      4. While this examination is not tailored for assessment, there is a markedly diminutive/nonopacified right transverse/sigmoid sinus without visualization of the right internal jugular vein. While findings could be due to congenital hypoplasia and/or    phase of contrast of this study, acute dural venous sinus thrombosis is not excluded and if there is concern for acute dural venous sinus thrombosis, dedicated venographic examination is recommended.      The study was marked in EPIC for immediate notification.                  Workstation performed: [id number]         XR shoulder 2+ views RIGHT   Final Interpretation by Jose Roberto Fernando MD (01/26 1236)      No acute osseous abnormality.      Degenerative changes as described.         Computerized Assisted Algorithm (CAA) may have been used to analyze all applicable images.         Workstation performed: [id number]         TRAUMA - CT head wo contrast   Final Interpretation by Delmar Burkett MD (01/25 2012)      No acute intracranial hemorrhage, significant mass effect or midline shift. Moderate-marked chronic microangiopathy.      The study was marked in EPIC for immediate notification.                  Workstation performed: [id number]         TRAUMA - CT spine cervical wo contrast   Final Interpretation by Delmar Burkett MD (01/25 2018)      No acute cervical spine fracture or traumatic malalignment.      The study was marked in EPIC for immediate notification.            Workstation performed: [id number]         XR Trauma chest portable   Final Interpretation by Naida Grady MD (01/25 1959)      No acute cardiopulmonary disease.      No acute displaced fractures.      Workstation performed: [id number]         XR Trauma pelvis ap only 1 or 2 vw   Final Interpretation by Delmar Burkett MD (01/25 2021)      No acute, displaced  pelvic fracture.         Computerized Assisted Algorithm (CAA) may have been used to analyze all applicable images.         Workstation performed: [id number]           Cardiology:  ECG 12 lead   Final Result by Gaston Palm MD (01/26 1707)   Normal sinus rhythm   Low voltage QRS   Abnormal ECG   When compared with ECG of 09-Jan-2025 11:22,   Premature supraventricular complexes are no longer Present      Confirmed by Gaston Palm ([id number]) on 1/26/2025 5:07:02 PM        GI:  No orders to display           Results from last 7 days   Lab Units 01/26/25  0536 01/25/25  1834   WBC Thousand/uL 12.08* 15.86*   HEMOGLOBIN g/dL 11.0* 11.8*   HEMATOCRIT % 35.0* 38.8   PLATELETS Thousands/uL 352 440*   TOTAL NEUT ABS Thousands/µL 7.35 10.79*         Results from last 7 days   Lab Units 01/26/25  0536 01/25/25  1834   SODIUM mmol/L 137 136   POTASSIUM mmol/L 3.9 4.1   CHLORIDE mmol/L 103 102   CO2 mmol/L 27 25   ANION GAP mmol/L 7 9   BUN mg/dL 16 21   CREATININE mg/dL 0.77 0.86   EGFR ml/min/1.73sq m 91 87   CALCIUM mg/dL 8.9 9.1   MAGNESIUM mg/dL 1.5*  --    PHOSPHORUS mg/dL 3.6  --      Results from last 7 days   Lab Units 01/26/25  0536 01/25/25  1834   AST U/L 16 18   ALT U/L 32 37   ALK PHOS U/L 39 53   TOTAL PROTEIN g/dL 5.7* 6.4   ALBUMIN g/dL 3.2* 3.5   TOTAL BILIRUBIN mg/dL 0.37 0.33     Results from last 7 days   Lab Units 01/27/25  0735 01/26/25  2049 01/26/25  1643 01/26/25  1302 01/26/25  0705   POC GLUCOSE mg/dl 102 172* 142* 127 106     Results from last 7 days   Lab Units 01/26/25  0536 01/25/25  1834   GLUCOSE RANDOM mg/dL 116 125         Results from last 7 days   Lab Units 01/26/25  0536   HEMOGLOBIN A1C % 8.3*   EAG mg/dl 192       Results from last 7 days   Lab Units 01/26/25  0536   PROTIME seconds 13.2   INR  0.94   PTT seconds 27       Results from last 7 days   Lab Units 01/26/25  0536   CRP mg/L 1.3             Results from last 7 days   Lab Units 01/25/25  2131   CLARITY UA  Clear    COLOR UA  Light Yellow   SPEC GRAV UA  1.018   PH UA  5.5   GLUCOSE UA mg/dl Trace*   KETONES UA mg/dl Negative   BLOOD UA  Negative   PROTEIN UA mg/dl Negative   NITRITE UA  Negative   BILIRUBIN UA  Negative   UROBILINOGEN UA (BE) mg/dl <2.0   LEUKOCYTES UA  Negative         Past Medical History:   Diagnosis Date    Asthma     COPD (chronic obstructive pulmonary disease) (HCC)     Dementia (HCC)     Diabetes mellitus (HCC)     GERD (gastroesophageal reflux disease)     History of stroke 11/07/2019    Hypertension     Interstitial lung disease (HCC)     Major depressive disorder with current active episode 11/07/2019    Pneumonia     Rotator cuff arthropathy of right shoulder 12/17/2024    Sleep apnea     Sleep apnea, obstructive     Stroke (HCC)     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     Present on Admission:   Acute right-sided weakness   Type 2 diabetes mellitus with hyperglycemia, without long-term current use of insulin (HCC)   Chronic obstructive asthma (HCC)   Mixed hyperlipidemia   Gastroesophageal reflux disease with esophagitis   Major depressive disorder   Hypertension      Admitting Diagnosis: Head injury [S09.90XA]  Right arm pain [M79.601]  Frequent falls [R29.6]  Closed head injury, initial encounter [S09.90XA]  Fall, initial encounter [W19.XXXA]  Ambulatory dysfunction [R26.2]  Age/Sex: 69 y.o. male    Network Utilization Review Department  ATTENTION: Please call with any questions or concerns to [phone] and carefully listen to the prompts so that you are directed to the right person. All voicemails are confidential.   For Discharge needs, contact Care Management DC Support Team at [phone] opt. 2  Send all requests for admission clinical reviews, approved or denied determinations and any other requests to dedicated fax number below belonging to the campus where the patient is receiving treatment. List of dedicated fax numbers for the Facilities:  FACILITY NAME UR FAX NUMBER   ADMISSION DENIALS  (Administrative/Medical Necessity) [phone]   DISCHARGE SUPPORT TEAM (NETWORK) [phone]   PARENT CHILD HEALTH (Maternity/NICU/Pediatrics) [phone]   [facility] [phone]   [facility] [phone]   [facility] [phone]   [facility] [phone]   [facility] [phone]   [facility] [phone]   [facility] [phone]   [facility] [phone]   [facility] [phone]   [facility] [phone]   [facility] [phone]   [facility] [phone]

## 2025-01-26 NOTE — ASSESSMENT & PLAN NOTE
"Lab Results   Component Value Date    HGBA1C 8.1 (H) 12/14/2024       No results for input(s): \"POCGLU\" in the last 72 hours.    ISS  Diabetic cardiac diet    Hold metformin  "

## 2025-01-26 NOTE — CASE MANAGEMENT
Case Management Assessment & Discharge Planning Note    Patient name Fahad Hernandez  Location 2 EAST 253/2 E 253-01 MRN [id number]  : 1955 Date 2025       Current Admission Date: 2025  Current Admission Diagnosis:Acute right-sided weakness   Patient Active Problem List    Diagnosis Date Noted Date Diagnosed    Multiple falls 2025     Chronic pain syndrome 2025     Acute respiratory failure (HCC) 2025     Rotator cuff arthropathy of right shoulder 2024     Acute right-sided weakness 2024     Fall 2024     Metabolic acidosis 2024     Lactic acidosis due to diabetes mellitus ([facility]) 2024     BENNY (acute kidney injury) ([facility]) 2024     Severe persistent asthma with (acute) exacerbation 2024     Asthma exacerbation attacks 2024     Moderate persistent asthma with exacerbation 2024     Orthostasis 10/15/2024     History of diabetes mellitus 10/11/2024     Hoarseness of voice 2024     SOB (shortness of breath) 2024     Long-term exposure involving bird droppings 2024     Lung nodules 2024     Chest pain 2024     Centrilobular emphysema ([facility]) 2024     Severe persistent asthma with acute exacerbation 2024     Medical marijuana use 2024     Type 2 diabetes mellitus with hyperglycemia, without long-term current use of insulin ([facility]) 2022     Chronic obstructive asthma ([facility]) 2019     Major depressive disorder 2019     Gastroesophageal reflux disease with esophagitis 2019     Abnormal EKG 04/15/2019     Hypertension 04/15/2019     Mixed hyperlipidemia 2018       LOS (days): 0  Geometric Mean LOS (GMLOS) (days):   Days to GMLOS:     OBJECTIVE:  PATIENT READMITTED TO HOSPITAL  Risk of Unplanned Readmission Score: 19.93         Current admission status: Inpatient       Preferred Pharmacy:   [facility]/pharmacy #[id number] - [place] - [address]  [address]  [place] [address]  Phone: [phone] Fax: [phone]    [facility] - [place], [place] - [address]  [address]  [address]  [place] [address]  Phone: [phone] Fax: [phone]    [facility] - [place], [place] - [address]  [address] [place] [address]  Phone: [phone] Fax: [phone]    Primary Care Provider: Surjit Hayes DO    Primary Insurance: A[employer]HIT  REP  Secondary Insurance:     ASSESSMENT:  Active Health Care Proxies       Carol Hernandez Health Care Representative - Spouse   Primary Phone: [phone] (Mobile)                                          Current Home Health Care  Home Health Agency Name:: [facility]                     Social Determinants of Health ([facility])      Flowsheet Row Most Recent Value   Housing Stability    In the last 12 months, was there a time when you were not able to pay the mortgage or rent on time? N   In the past 12 months, how many times have you moved where you were living? 0   At any time in the past 12 months, were you homeless or living in a shelter (including now)? N   Transportation Needs    In the past 12 months, has lack of transportation kept you from medical appointments or from getting medications? no   In the past 12 months, has lack of transportation kept you from meetings, work, or from getting things needed for daily living? No   Food Insecurity    Within the past 12 months, you worried that your food would run out before you got the money to buy more. Never true   Within the past 12 months, the food you bought just didn't last and you didn't have money to get more. Never true   Utilities    In the past 12 months has the electric, gas, oil, or water company threatened to shut off services in your home? No            DISCHARGE DETAILS:    Discharge planning discussed with:: Patient  Freedom of Choice: Yes  Comments - Freedom of Choice: CM discussed freedom of  choice as it pertains to discharge planning. After reviewing chart patient is current with [facility]. CM sent referral to [facility] and to snf incase pt/ot recommends rehab. Patient agreeable to referrals and is aware referral can be cancelled.  CM contacted family/caregiver?: No- see comments (declined)  Were Treatment Team discharge recommendations reviewed with patient/caregiver?: Yes  Did patient/caregiver verbalize understanding of patient care needs?: Yes  Were patient/caregiver advised of the risks associated with not following Treatment Team discharge recommendations?: Yes         Requested Home Health Care         Is the patient interested in [facility] at discharge?: Yes  Home Health Discipline requested:: Nursing, Occupational Therapy, Physical Therapy  Home Health Agency Name:: [facility]  Home Health Follow-Up Provider:: PCP  Home Health Services Needed:: Evaluate Functional Status and Safety, Gait/ADL Training, Strengthening/Theraputic Exercises to Improve Function, Diabetes Management  Homebound Criteria Met:: Requires the Assistance of Another Person for Safe Ambulation or to Leave the Home, Uses an Assist Device (i.e. cane, walker, etc)  Supporting Clincal Findings:: Fatigues Easliy in Short Distances, Limited Endurance    DME Referral Provided  Referral made for DME?: No    Other Referral/Resources/Interventions Provided:  Referral Comments: referral sent for snf and [facility]    Would you like to participate in our [facility] service program?  : No - Declined    Treatment Team Recommendation: Other (TBD)  Discharge Destination Plan:: Home with Home Health Care, Short Term Rehab, Acute Rehab  Transport at Discharge : Other (Comment) (May need transport)

## 2025-01-26 NOTE — ASSESSMENT & PLAN NOTE
PT/OT  I decreased his oxycodone dose from 10mg to 7.5mg as I am concerned that it may be contributing to falls and fatigue. He has been on it for 15 years. He is also now on Austedo for tardive dyskinesia which can cause increased concentrations.  Consider decreasing restoril dose

## 2025-01-26 NOTE — CONSULTS
Consultation - Neurology   Name: Fahad Hernandez 69 y.o. male I MRN: [id number]  Unit/Bed#: 2 E 253-01 I Date of Admission: 1/25/2025   Date of Service: 1/26/2025 I Hospital Day: 0   Inpatient consult to Neurology  Consult performed by: JAMIL Nur  Consult ordered by: Lucie Rodas DO      Reason for Consult / Principal Problem: Right-sided weakness and multiple falls  Hx and PE limited by: None  Review of previous medical records was completed.   Family, was not present at the bedside for history and examination  Physician Requesting Evaluation: Zach Cavanaugh MD       Assessment & Plan  Multiple falls  Neurology is asked to see this 69-year-old right-hand-dominant gentleman after he presented to our facility last evening after having a complaint of of a fall.  He was brought via EMS as a trauma alert. Patient is on dual antiplatelet therapy baseline.  He reports he struck his head on his walker and denies loss of consciousness. No presyncopal complaints as he reports. he has been seen and worked up by the trauma service..   His workup here included a CTA: 1. No acute intracranial hemorrhage, significant mass effect or midline shift. Note that there is a background of moderate-marked microangiopathy, 2. No proximal arterial large vessel occlusion in the head and neck.  3. Approximately 65% stenosis of the proximal left ICA and 50% stenosis of the proximal right ICA at the level of the carotid bifurcation. 4. An acute dural venous sinus thrombosis is not excluded based on timing of the contrast and if there is concern for acute dural venous sinus thrombosis, dedicated venographic examination is recommended.  We note that his left ICA appears to be an asymptomatic carotid and the event that this patient suffered is related more to posterior circulation at this time.  Additionally this patient has been seen at [facility] in 2019 and worked up at that time which discovered his remote stroke  and also an EMG done then noted that he had a neuropathy in his bilateral hands and feet already (see below)  Possible etiologies of the patient's recurrent falls remain multifactorial with chronic back pain, polyneuropathy, however with focal deficits noted on admission TIA/CVA cannot be completely excluded, and with cervical spondylosis status post fusion and bilateral lower extremity spasticity worsening cervical stenosis remains in the differential.  Patient's polypharmacy could also be contributing including the use of opioids and antidepressants.  PSP was also considered due to mild bradykinesia noted but no eye movement abnormalities speech swallowing difficulties noted.  -  Stroke protocol as he is currently on  -  MRI of brain with out contrast, as well as a C-spine with and without- likely to be done this evening or tomorrow a.m.  -  Echocardiogram -pending  -  maintain BP's if possible.  If he becomes hypotensive decrease head of the bed maintain perfusion pressure with the fluids.  -  Telemetry, continue no AFib seen so far,   -  Continue ... Antipltl aspirin 81.  -  Lipid panel - Statin medication started and to be maintained post discharge  -  A1C & other labs  -  Keep euvolemic, as dehydration can worsen exam and function  -  Treat high blood sugars if arises  -  Therapies to include PT/OT/ST  -  DVT prevention   -  Secondary stroke / TIA  risk factor modification  -  Frequent neurological check and notify neurology with any change in neuro status  -  Continue to monitor for infectious and metabolic derangements and treat as arises  Acute right-sided weakness  See the note concerning falls and the stroke workup above.  Hypertension  Being managed by the primary team  Type 2 diabetes mellitus with hyperglycemia, without long-term current use of insulin (HCC)  Generally controlled at 8.3  I have discussed the above management plan in detail with the primary service.     This patient will need to be seen  in our outpatient neurology office here in the [place] area if that is convenient for the patient.  His workup is still pending his meds and any outpatient testings are still undetermined at this time.        HPI: Fahad Hernandez is a right handed  69 y.o. male who neurology is asked to see by the trauma service after the patient was initially admitted here after having a fall from which she had difficulty getting up.  His workup in the trauma bay noted that he had several injuries that appear to be consistent with falls at a somewhat later date.  This gentleman has a PMH of a 2019 infarct asthma, copd, dementia, DM, GERD, hx of stroke in 2019, HTN, interstitial lung disease, major depressive disorder, CASSIE on CPAP but unknown pressures who presents with a fall while using his walker.     Patient states that he fell at home. He was using his walker and he still fell. He hit his head on the rubber part of his walker. He hurt his back and his shoulder. He has fallen 3 times in the last 1 week. He says that the room started spinning and he fell. He says that didn't happen with the other falls. He says usually he trips over his own feet. He says that the last few weeks this all started happening. He feels like his legs are weak, equally so. He has lost 20 pounds in the last 6 months without trying. He has been in the hospital because of his asthma and has chronic back pain, opioid dependent for pain relief currently taking oxycodone 10 mg 2-3 times a day.  Patient also reports cervical fusion several years ago and has intermittent neck pain.  He has difficulty with depth perception when he ambulates with a walker.  Patient denies any speech swallowing difficulties.      ROS: 12 system cued query: The patient denies any headaches.  He reports his memory problems continue and he feels Aricept earlier may not have been helpful.  He continues to report ongoing upper and lower back pain he has had a cervical fusion which  she reports he still has pain from.  He reports his vision has blurriness and up also he has problems with his depth perception which may also be contributing to his falls.  No acute illnesses or flus.    Historical Information     Past Medical History:   Diagnosis Date    Asthma     COPD (chronic obstructive pulmonary disease) ([facility])     Dementia ([facility])     Diabetes mellitus ([facility])     GERD (gastroesophageal reflux disease)     History of stroke 11/07/2019    Hypertension     Interstitial lung disease ([facility])     Major depressive disorder with current active episode 11/07/2019    Pneumonia     Rotator cuff arthropathy of right shoulder 12/17/2024    Sleep apnea     Sleep apnea, obstructive     Stroke ([facility])     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     Past Surgical History:   Procedure Laterality Date    BACK SURGERY      ELBOW SURGERY      KNEE SURGERY      NECK SURGERY      SHOULDER SURGERY      SPINE SURGERY  2004       Social History : The patient is .  He previously was a  and loaded many heavy things which is responsible has a previous neurologist and wife feel is responsible for his back and neck problems.  He also has a history of anxiety depression and anger and at one point was on 3 different psychiatric medications.        Family History:   Family History   Family history unknown: Yes         Allergies   Allergen Reactions    Beta Adrenergic Blockers      Pt states they make him feel crappy.     Meds:all current active meds have been reviewed    Scheduled Meds:  Current Facility-Administered Medications   Medication Dose Route Frequency    acetaminophen  650 mg Oral Q4H PRN    albuterol  2.5 mg Nebulization Q6H PRN    ARIPiprazole  10 mg Oral Daily    aspirin  81 mg Oral Daily    atorvastatin  40 mg Oral Daily With Dinner    budesonide  0.5 mg Nebulization Q12H    buPROPion  300 mg Oral Daily    calcium carbonate  1,000 mg Oral Daily PRN    clopidogrel  75 mg Oral Daily    Deutetrabenazine  12 mg  "Oral BID    enoxaparin  40 mg Subcutaneous Daily    formoterol  20 mcg Nebulization BID    insulin lispro  2-12 Units Subcutaneous TID AC    insulin lispro  2-12 Units Subcutaneous HS    montelukast  10 mg Oral HS    nitroglycerin  0.4 mg Sublingual Q5 Min PRN    ondansetron  4 mg Intravenous Q6H PRN    oxyCODONE  7.5 mg Oral Q6H PRN    pantoprazole  40 mg Oral Early Morning    tamsulosin  0.4 mg Oral Daily    temazepam  30 mg Oral HS PRN    venlafaxine  150 mg Oral Daily     PRN Meds:.  acetaminophen    albuterol    calcium carbonate    nitroglycerin    ondansetron    oxyCODONE    temazepam      Physical Exam:   Objective   Vitals:Blood pressure 131/76, pulse 90, temperature 97.9 °F (36.6 °C), temperature source Oral, resp. rate 18, height 5' 11\" (1.803 m), weight 68 kg (149 lb 14.6 oz), SpO2 96%.,Body mass index is 20.91 kg/m².      Patient was examined in bed no family at bedside at this time.  General: alert, appears stated age and cooperative  Head: Normocephalic, without obvious abnormality, atraumatic  Oral exam: lips, mucosa, and tongue moist;   Neck: no carotid bruit,   Lungs: clear to auscultation ant. bilaterally  Heart: regular rate and rhythm, S1, S2 normal, no murmur appreciated,   Abdomen: soft, +BS    Extremities: atraumatic, no cyanosis or edema    Neurologic:   Mental status: Alert, grossly oriented, mild speech dysarthria but he remains completely understandable.  He was able to follow commands.  There was no gross aphasia appreciated.  Cognitive bedside testing was not done at this time.  CN Exam: Visual fields are full, pupils are equally reactive, extraocular movements are intact, sensation in the V1 V2 V3 distribution is preserved, no obvious facial asymmetry, tongue is midline gag is adequate.  Motor: Patient has bilateral pronation with no evidence of any weakness in the upper extremities, adequate strength noted in both lower extremities with spasticity of his bilateral lower extremities.  " DTRs are diminished, toes are bilaterally upgoing.  Sensory: Sensation noted bilaterally in both feet up to the mid calves, no extinction to double simultaneous stimulation  Cerebellar: no finger-to-nose dysmetria.  DTR's: +1 x 4; Plantars: Upgoing bilaterally  Gait: Was not gated at this time.       Lab Results: I have personally reviewed pertinent reports.  , CBC:   Results from last 7 days   Lab Units 01/26/25  0536 01/25/25  1834   WBC Thousand/uL 12.08* 15.86*   RBC Million/uL 4.14 4.58   HEMOGLOBIN g/dL 11.0* 11.8*   HEMATOCRIT % 35.0* 38.8   MCV fL 85 85   PLATELETS Thousands/uL 352 440*   , BMP/CMP:   Results from last 7 days   Lab Units 01/26/25  0536 01/25/25  1834   SODIUM mmol/L 137 136   POTASSIUM mmol/L 3.9 4.1   CHLORIDE mmol/L 103 102   CO2 mmol/L 27 25   BUN mg/dL 16 21   CREATININE mg/dL 0.77 0.86   CALCIUM mg/dL 8.9 9.1   AST U/L 16 18   ALT U/L 32 37   ALK PHOS U/L 39 53   EGFR ml/min/1.73sq m 91 87   , Vitamin B12:   , HgBA1C:   Results from last 7 days   Lab Units 01/26/25  0536   HEMOGLOBIN A1C % 8.3*   , TSH:   , Coagulation:   Results from last 7 days   Lab Units 01/26/25  0536   INR  0.94   , Lipid Profile:   Results from last 7 days   Lab Units 01/26/25  0536   HDL mg/dL 76   LDL CALC mg/dL 79   TRIGLYCERIDES mg/dL 117        Imaging Studies: I personally reviewed the images as well as the radiology reports and agree with radiology notes:    CT head: The ventricles and sulci are larger than expected for a patient of this age particularly in the posterior frontal and parietal regions. Old right cerebellar infarct. Lot of white matter hyperintensities.    CTA head and neck: 1. No acute intracranial hemorrhage, significant mass effect or midline shift. Note that there is a background of moderate-marked microangiopathy, and if there is concern for acute infarct, recommend brain MRI, particularly given reduced sensitivity on CT given significant microangiopathy.   2. No proximal arterial large  vessel occlusion in the head and neck.   3. Approximately 65% stenosis of the proximal left ICA and 50% stenosis of the proximal right ICA at the level of the carotid bifurcation. See narrative above for additional vascular stenoses.   4. While this examination is not tailored for assessment, there is a markedly diminutive/nonopacified right transverse/sigmoid sinus without visualization of the right internal jugular vein. While findings could be due to congenital hypoplasia and/or phase of contrast of this study, acute dural venous sinus thrombosis is not excluded and if there is concern for acute dural venous sinus thrombosis, dedicated venographic examination is recommended.    Films were also reviewed by neurology attending.    His MRI here, brain as well as C-spine are pending.    MRI brain [facility] 3-19: The ventricles and sulci are larger than expected for a patient of this age particularly in the posterior frontal and parietal regions. Old right cerebellar infarct. Lot of white matter hyperintensities.         EMG 2019:  EMG nerve conduction test of his bilateral feet to rule out neuropathy. However, the patient elected to have the test done on his bilateral upper extremity, because on the day of the test, he was having a lot of pain in his bilateral feet and could not tolerate the test. The test showed moderate right and mild left carpal tunnel syndrome. There was mild bilateral ulnar neuropathy across the elbow and chronic right C6 radiculopathy.    EEG, Echo, Pathology, and Other Studies: Echo is pending.    Counseling / Coordination of Care  Total time spent today 45 minutes. Greater than 50% of total time was spent with the patient and / or family counseling and / or coordination of care. A description of the counseling / coordination of care: All of the above was discussed and reviewed with the patient.  Care and workup was also discussed with internal medicine.      Dictation voice to  text software has been used in the creation of this document. Please consider this in light of any contextual or grammatical errors.

## 2025-01-27 ENCOUNTER — TELEPHONE (OUTPATIENT)
Age: 70
End: 2025-01-27

## 2025-01-27 ENCOUNTER — APPOINTMENT (INPATIENT)
Dept: NON INVASIVE DIAGNOSTICS | Facility: HOSPITAL | Age: 70
DRG: 552 | End: 2025-01-27
Payer: COMMERCIAL

## 2025-01-27 PROBLEM — G47.33 OSA (OBSTRUCTIVE SLEEP APNEA): Status: ACTIVE | Noted: 2025-01-27

## 2025-01-27 LAB
AORTIC ROOT: 3.7 CM
BSA FOR ECHO PROCEDURE: 1.87 M2
E WAVE DECELERATION TIME: 348 MS
E/A RATIO: 0.76
FRACTIONAL SHORTENING: 35 (ref 28–44)
GLUCOSE SERPL-MCNC: 102 MG/DL (ref 65–140)
GLUCOSE SERPL-MCNC: 127 MG/DL (ref 65–140)
GLUCOSE SERPL-MCNC: 134 MG/DL (ref 65–140)
GLUCOSE SERPL-MCNC: 153 MG/DL (ref 65–140)
INTERVENTRICULAR SEPTUM IN DIASTOLE (PARASTERNAL SHORT AXIS VIEW): 1.2 CM
INTERVENTRICULAR SEPTUM: 1.2 CM (ref 0.6–1.1)
LAAS-AP2: 13.8 CM2
LAAS-AP4: 16 CM2
LEFT ATRIUM SIZE: 3.7 CM
LEFT ATRIUM VOLUME (MOD BIPLANE): 35 ML
LEFT ATRIUM VOLUME INDEX (MOD BIPLANE): 18.7 ML/M2
LEFT INTERNAL DIMENSION IN SYSTOLE: 3 CM (ref 2.1–4)
LEFT VENTRICULAR INTERNAL DIMENSION IN DIASTOLE: 4.6 CM (ref 3.5–6)
LEFT VENTRICULAR POSTERIOR WALL IN END DIASTOLE: 1.2 CM
LEFT VENTRICULAR STROKE VOLUME: 61 ML
LV EF US.2D.A4C+ESTIMATED: 61 %
LVSV (TEICH): 61 ML
MV PEAK A VEL: 0.75 M/S
MV PEAK E VEL: 57 CM/S
MV STENOSIS PRESSURE HALF TIME: 101 MS
MV VALVE AREA P 1/2 METHOD: 2.18
RIGHT ATRIUM AREA SYSTOLE A4C: 15.5 CM2
RIGHT VENTRICLE ID DIMENSION: 3.9 CM
SL CV LEFT ATRIUM LENGTH A2C: 4.6 CM
SL CV LV EF: 52
SL CV PED ECHO LEFT VENTRICLE DIASTOLIC VOLUME (MOD BIPLANE) 2D: 95 ML
SL CV PED ECHO LEFT VENTRICLE SYSTOLIC VOLUME (MOD BIPLANE) 2D: 34 ML
TRICUSPID ANNULAR PLANE SYSTOLIC EXCURSION: 2.2 CM

## 2025-01-27 PROCEDURE — 94640 AIRWAY INHALATION TREATMENT: CPT

## 2025-01-27 PROCEDURE — 99447 NTRPROF PH1/NTRNET/EHR 11-20: CPT | Performed by: NURSE PRACTITIONER

## 2025-01-27 PROCEDURE — 82948 REAGENT STRIP/BLOOD GLUCOSE: CPT

## 2025-01-27 PROCEDURE — 99232 SBSQ HOSP IP/OBS MODERATE 35: CPT

## 2025-01-27 PROCEDURE — 94760 N-INVAS EAR/PLS OXIMETRY 1: CPT

## 2025-01-27 PROCEDURE — 97163 PT EVAL HIGH COMPLEX 45 MIN: CPT

## 2025-01-27 PROCEDURE — 97167 OT EVAL HIGH COMPLEX 60 MIN: CPT

## 2025-01-27 PROCEDURE — 94660 CPAP INITIATION&MGMT: CPT

## 2025-01-27 PROCEDURE — 93325 DOPPLER ECHO COLOR FLOW MAPG: CPT | Performed by: INTERNAL MEDICINE

## 2025-01-27 PROCEDURE — 99222 1ST HOSP IP/OBS MODERATE 55: CPT | Performed by: PHYSICAL MEDICINE & REHABILITATION

## 2025-01-27 PROCEDURE — 93308 TTE F-UP OR LMTD: CPT

## 2025-01-27 PROCEDURE — 93325 DOPPLER ECHO COLOR FLOW MAPG: CPT

## 2025-01-27 PROCEDURE — 93308 TTE F-UP OR LMTD: CPT | Performed by: INTERNAL MEDICINE

## 2025-01-27 PROCEDURE — 93321 DOPPLER ECHO F-UP/LMTD STD: CPT

## 2025-01-27 PROCEDURE — 93321 DOPPLER ECHO F-UP/LMTD STD: CPT | Performed by: INTERNAL MEDICINE

## 2025-01-27 RX ADMIN — CLOPIDOGREL 75 MG: 75 TABLET ORAL at 10:05

## 2025-01-27 RX ADMIN — BUDESONIDE 0.5 MG: 0.5 INHALANT ORAL at 18:18

## 2025-01-27 RX ADMIN — BUDESONIDE 0.5 MG: 0.5 INHALANT ORAL at 07:13

## 2025-01-27 RX ADMIN — BUPROPION HYDROCHLORIDE 300 MG: 150 TABLET, EXTENDED RELEASE ORAL at 10:04

## 2025-01-27 RX ADMIN — ENOXAPARIN SODIUM 40 MG: 40 INJECTION SUBCUTANEOUS at 10:05

## 2025-01-27 RX ADMIN — FORMOTEROL FUMARATE DIHYDRATE 20 MCG: 20 SOLUTION RESPIRATORY (INHALATION) at 18:19

## 2025-01-27 RX ADMIN — INSULIN LISPRO 2 UNITS: 100 INJECTION, SOLUTION INTRAVENOUS; SUBCUTANEOUS at 11:39

## 2025-01-27 RX ADMIN — Medication 7.5 MG: at 14:00

## 2025-01-27 RX ADMIN — PANTOPRAZOLE SODIUM 40 MG: 40 TABLET, DELAYED RELEASE ORAL at 05:07

## 2025-01-27 RX ADMIN — VENLAFAXINE HYDROCHLORIDE 150 MG: 150 CAPSULE, EXTENDED RELEASE ORAL at 10:05

## 2025-01-27 RX ADMIN — Medication 7.5 MG: at 20:14

## 2025-01-27 RX ADMIN — TEMAZEPAM 30 MG: 15 CAPSULE ORAL at 20:14

## 2025-01-27 RX ADMIN — TAMSULOSIN HYDROCHLORIDE 0.4 MG: 0.4 CAPSULE ORAL at 10:05

## 2025-01-27 RX ADMIN — ASPIRIN 81 MG: 81 TABLET, COATED ORAL at 10:05

## 2025-01-27 RX ADMIN — MONTELUKAST 10 MG: 10 TABLET, FILM COATED ORAL at 22:17

## 2025-01-27 RX ADMIN — FORMOTEROL FUMARATE DIHYDRATE 20 MCG: 20 SOLUTION RESPIRATORY (INHALATION) at 07:13

## 2025-01-27 RX ADMIN — ARIPIPRAZOLE 10 MG: 5 TABLET ORAL at 10:05

## 2025-01-27 RX ADMIN — ATORVASTATIN CALCIUM 40 MG: 40 TABLET, FILM COATED ORAL at 16:47

## 2025-01-27 NOTE — SPEECH THERAPY NOTE
Speech Language/Pathology      Patient passed nursing dysphagia screen; presently tolerating oral diet.  Need for formal evaluation of swallow function is not indicated at this time.  Please re-order should status change or concerns arise.     Yessenia Newberry MS, CCC-SLP  Speech-Language Pathologist  [place] #[id number]  [place] #[id number]

## 2025-01-27 NOTE — ASSESSMENT & PLAN NOTE
Lab Results   Component Value Date    HGBA1C 8.3 (H) 01/26/2025       Recent Labs     01/26/25  1643 01/26/25  2049 01/27/25  0735 01/27/25  1100   POCGLU 142* 172* 102 153*       ISS  Diabetic cardiac diet  (P) 129.8  Hold metformin  A1c elevated  - continue sliding scale insulin algorithm 4

## 2025-01-27 NOTE — PLAN OF CARE
Problem: OCCUPATIONAL THERAPY ADULT  Goal: Performs self-care activities at highest level of function for planned discharge setting.  See evaluation for individualized goals.  Description: Treatment Interventions: ADL retraining, Functional transfer training, UE strengthening/ROM, Endurance training, Patient/family training, Equipment evaluation/education, Compensatory technique education, Continued evaluation, Energy conservation, Activityengagement          See flowsheet documentation for full assessment, interventions and recommendations.   Note: Limitation: Decreased ADL status, Decreased UE strength, Decreased Safe judgement during ADL, Decreased endurance, Decreased self-care trans, Decreased high-level ADLs  Prognosis: Good  Assessment: Patient is a 69 y.o. male seen for OT evaluation s/p admit to [facility] on 1/25/2025 w/Acute right-sided weakness. Commorbidities affecting patient's functional performance at time of assessment include: Acute right sided weakness, Multiple falls, type 2 DM, Chronic obstructive asthma, GERD, medical marijuana use, mayor depressive disorder, HTN, presented to ED s/p fall at home. Orders placed for OT evaluation and treatment.  Performed at least two patient identifiers during session including name and wristband.  Prior to admission,  Patient lives in a 2 story house, 5-6 [address] with 1st floor set up. Patient requires assistance with ADLs, bathing and LB dressing, ambulates with a RW, presents with recent frequent falls.  Personal factors affecting patient at time of initial evaluation include: steps to enter, decreased initiation and engagement, difficulty performing ADLs, and difficulty performing IADLs. Upon evaluation, patient requires minimal  assist for UB ADLs, moderate and maximal assist for LB ADLs.  Occupational performance is affected by the following deficits: decreased functional use of BUEs, limited active ROM, decreased muscle strength, degenerative  arthritic joint changes, decreased activity tolerance, impaired problem solving, decreased safety awareness, and postural control and postural alignment deficit, requiring external assistance to complete transitional movements.  Patient to benefit from continued Occupational Therapy treatment while in the hospital to address deficits as defined above and maximize level of functional independence with ADLs and functional mobility. Occupational Performance areas to address include: bathing/ shower, dressing, toilet hygiene, transfer to all surfaces, functional mobility, IADLs: safety procedures, and Leisure Participation. From OT standpoint, recommendation at time of d/c would be Level 2.     Rehab Resource Intensity Level, OT: II (Moderate Resource Intensity)

## 2025-01-27 NOTE — RESPIRATORY THERAPY NOTE
01/26/25 1923   Respiratory Protocol   Protocol Initiated? No   Protocol Selection Respiratory   Language Barrier? No   Medical & Social History Reviewed? Yes   Diagnostic Studies Reviewed? Yes   Physical Assessment Performed? Yes   Home Devices/Therapy BiPAP/CPAP   Respiratory Plan Home Bronchodilator Patient pathway   Respiratory Assessment   General Appearance Alert;Awake   Respiratory Pattern Normal   Chest Assessment Chest expansion symmetrical   Bilateral Breath Sounds Clear;Diminished   Cough Non-productive;Strong;Moist   Resp Comments talking on telephone with ease, respirations are even and non labored   O2 Device room air   Additional Assessments   SpO2 94 %     Continue as ordered per home regimen, provide HS cpap

## 2025-01-27 NOTE — ASSESSMENT & PLAN NOTE
Patient presented with fall and right sided weakness not previously noted. He was initially examined by the ER physician at 18:33 on 1/25/25. 3 a.m. which is when he was noted to have right sided weakness.  Patient was not in window for TPA if this is a new deficit, suspect he may be having waxing and waning symptoms for several days.  CTA Head and Neck: moderate microangiopathy, L ICA 65% stenosis, R ICA 50% stenosis. Nonopacified R transverse / sigmoid sinus.   MRI Brain: No Acute infarct  MRI C-spine: Severe central stenosis C3-C4 with disc bulge, severe R foraminal stenosis at C2-C3 due to facet arthropathy.    Plan:   - Neurosurgery consulted for C3-C4 stenosis, appreciated recommendations  - no acute infarct, possible symptoms due to cervical stenosis  - atorvastatin 40 mg daily  - PT/OT/ST evaluation : recommend STR (wife also would prefer patient go to rehab)  - Discontinue stroke pathway  - telemetry no AF, discontinue today

## 2025-01-27 NOTE — CONSULTS
PHYSICAL MEDICINE AND REHABILITATION CONSULT NOTE  Fahad Hernandez 69 y.o. male MRN: [id number]  Unit/Bed#: 2 E 253-01 Encounter: [id number]    Requested by (Physician/Service): Emilie Soyeon Kim, MD  Reason for Consultation:  Assessment of rehabilitation needs  Reason for Hospitalization:  Head injury [S09.90XA]  Right arm pain [M79.601]  Frequent falls [R29.6]  Closed head injury, initial encounter [S09.90XA]  Fall, initial encounter [W19.XXXA]  Ambulatory dysfunction [R26.2]      History of Present Illness:  Fahad Hernandez is a 69 y.o. male who  has a past medical history of Asthma, COPD (chronic obstructive pulmonary disease) ([facility]), Dementia ([facility]), Diabetes mellitus ([facility]), GERD (gastroesophageal reflux disease), History of stroke (11/07/2019), Hypertension, Interstitial lung disease ([facility]), Major depressive disorder with current active episode (11/07/2019), Pneumonia, Rotator cuff arthropathy of right shoulder (12/17/2024), Sleep apnea, Sleep apnea, obstructive, Stroke ([facility]), and 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 who presented to the [facility] after sustaining a fall.  He was brought via EMS as a trauma alert.  He reports he struck his head on his walker but no loss of consciousness.  CTA with no acute intracranial hemorrhage.  He was noted to have acute right upper extremity weakness but was out of window for tPA and admitted for stroke pathway MRI of the brain negative for stroke .Patient was seen at [facility] in 2019 and a workup at that time discovered a remote stroke.  An EMG done at that time revealed neuropathy in both hands and feet.  He also reports history of cervical fusion several years ago and intermittent neck pain.  He has been in the hospital before secondary to #, and chronic back pain.    PM&R consulted for rehabilitation recommendations.    Restrictions include:  none    Hospital Complications/Comorbidities:   Complications: As above  Comorbidities: As  above    Morbid Obesity (BMI > 40) No     Last Weight Last BMI   Wt Readings from Last 1 Encounters:   01/27/25 68 kg (150 lb)    Body mass index is 20.92 kg/m².     Functional History:     Prior to Admission:     Functional Status: Independent with functional mobility.  Needs assistance with ADLs and IADLs.     Present:  Physical Therapy Occupational Therapy Speech Therapy   Normal assistance with bed mobility, sit to stand transfers, ambulated 25 feet with rolling walker with minimal assistance and verbal cues. Eating independent, grooming supervision, minimal assistance with upper body bathing, max assist with lower body bathing, minimal assistance with upper body dressing, max assist with lower body dressing and mod assist with toileting. Tolerating oral diet.     Past Medical History:   Past Surgical History:   Family History:     Past Medical History:   Diagnosis Date    Asthma     COPD (chronic obstructive pulmonary disease) ([facility])     Dementia (HCC)     Diabetes mellitus (HCC)     GERD (gastroesophageal reflux disease)     History of stroke 11/07/2019    Hypertension     Interstitial lung disease (HCC)     Major depressive disorder with current active episode 11/07/2019    Pneumonia     Rotator cuff arthropathy of right shoulder 12/17/2024    Sleep apnea     Sleep apnea, obstructive     Stroke ([facility])     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    Past Surgical History:   Procedure Laterality Date    BACK SURGERY      ELBOW SURGERY      KNEE SURGERY      NECK SURGERY      SHOULDER SURGERY      SPINE SURGERY  2004     Family History   Family history unknown: Yes     - No family history of neurologic diseases.      Medications:    Current Facility-Administered Medications:     acetaminophen (TYLENOL) tablet 650 mg, 650 mg, Oral, Q4H PRN, Lucie Rodas DO, 650 mg at 01/26/25 0536    albuterol inhalation solution 2.5 mg, 2.5 mg, Nebulization, Q6H PRN, Zach Cavanaugh MD    ARIPiprazole (ABILIFY) tablet 10 mg,  10 mg, Oral, Daily, Lucie Rodas, DO, 10 mg at 01/27/25 1005    aspirin (ECOTRIN LOW STRENGTH) EC tablet 81 mg, 81 mg, Oral, Daily, Lucie Rodas, DO, 81 mg at 01/27/25 1005    atorvastatin (LIPITOR) tablet 40 mg, 40 mg, Oral, Daily With Dinner, Lucie Rodas DO    budesonide (PULMICORT) inhalation solution 0.5 mg, 0.5 mg, Nebulization, Q12H, Lucie Rodas DO, 0.5 mg at 01/27/25 0713    buPROPion (WELLBUTRIN XL) 24 hr tablet 300 mg, 300 mg, Oral, Daily, Lucie Rodas DO, 300 mg at 01/27/25 1004    calcium carbonate (TUMS) chewable tablet 1,000 mg, 1,000 mg, Oral, Daily PRN, Lucie Rodas DO    clopidogrel (PLAVIX) tablet 75 mg, 75 mg, Oral, Daily, Lucie Rodas, DO, 75 mg at 01/27/25 1005    Deutetrabenazine TABS 12 mg, 12 mg, Oral, BID, Lucie Rodas DO    enoxaparin (LOVENOX) subcutaneous injection 40 mg, 40 mg, Subcutaneous, Daily, Lucie Rodas DO, 40 mg at 01/27/25 1005    formoterol (PERFOROMIST) nebulizer solution 20 mcg, 20 mcg, Nebulization, BID, Zach Cavanaugh MD, 20 mcg at 01/27/25 0713    insulin lispro (HumALOG/ADMELOG) 100 units/mL subcutaneous injection 2-12 Units, 2-12 Units, Subcutaneous, TID AC, 2 Units at 01/27/25 1139 **AND** Fingerstick Glucose (POCT), , , TID AC, Lucie Rodas DO    insulin lispro (HumALOG/ADMELOG) 100 units/mL subcutaneous injection 2-12 Units, 2-12 Units, Subcutaneous, HS, Lucie Rodas, , 2 Units at 01/26/25 2129    montelukast (SINGULAIR) tablet 10 mg, 10 mg, Oral, HS, Lucie Rodas, DO, 10 mg at 01/26/25 2122    nitroglycerin (NITROSTAT) SL tablet 0.4 mg, 0.4 mg, Sublingual, Q5 Min PRN, Lucie Rodas DO    ondansetron (ZOFRAN) injection 4 mg, 4 mg, Intravenous, Q6H PRN, Lucie Rodas DO    oxyCODONE (ROXICODONE) split tablet 7.5 mg, 7.5 mg, Oral, Q6H PRN, Lucie Rodas, , 7.5 mg at 01/27/25 1400    pantoprazole (PROTONIX) EC tablet 40 mg, 40 mg,  Oral, Early Morning, Lucienikky Camposevans Rangelks, DO, 40 mg at 01/27/25 0507    tamsulosin (FLOMAX) capsule 0.4 mg, 0.4 mg, Oral, Daily, Lucie Rangelks, DO, 0.4 mg at 01/27/25 1005    temazepam (RESTORIL) capsule 30 mg, 30 mg, Oral, HS PRN, Lucie Campostle Rodas, DO, 30 mg at 01/26/25 2122    venlafaxine (EFFEXOR-XR) 24 hr capsule 150 mg, 150 mg, Oral, Daily, Lucie Rangelks, DO, 150 mg at 01/27/25 1005    Allergies:   Allergies   Allergen Reactions    Beta Adrenergic Blockers      Pt states they make him feel crappy.        Social History:    Social History     Socioeconomic History    Marital status: /Civil Union     Spouse name: None    Number of children: None    Years of education: None    Highest education level: None   Occupational History    None   Tobacco Use    Smoking status: Never     Passive exposure: Never    Smokeless tobacco: Never    Tobacco comments:     Patient admits to using marijuana, edibles and smoking    Vaping Use    Vaping status: Never Used   Substance and Sexual Activity    Alcohol use: Not Currently    Drug use: Yes     Types: Marijuana, Oxycodone, Psilocybin    Sexual activity: Yes     Partners: Female     Birth control/protection: Male Sterilization   Other Topics Concern    None   Social History Narrative    None     Social Drivers of Health     Financial Resource Strain: Low Risk  (11/1/2024)    Received from [facility]    Overall Financial Resource Strain (CARDIA)     Difficulty of Paying Living Expenses: Not hard at all   Food Insecurity: No Food Insecurity (1/26/2025)    Hunger Vital Sign     Worried About Running Out of Food in the Last Year: Never true     Ran Out of Food in the Last Year: Never true   Transportation Needs: No Transportation Needs (1/26/2025)    PRAPARE - Transportation     Lack of Transportation (Medical): No     Lack of Transportation (Non-Medical): No   Physical Activity: Inactive (11/1/2024)    Received from [facility]    Exercise Vital Sign     Days of Exercise per Week: 0 days     Minutes of Exercise per Session: 0 min   Stress: Stress Concern Present (11/1/2024)    Received from [facility]    [employer] [place] [employer]ational [employer] - Occupational Stress Questionnaire     Feeling of Stress : To some extent   Social Connections: Moderately Isolated (11/1/2024)    Received from [facility]    Social Connection and Isolation Panel [NHANES]     Frequency of Communication with Friends and Family: Three times a week     Frequency of Social Gatherings with Friends and Family: Twice a week     Attends Jainism Services: Never     Active Member of Clubs or Organizations: No     Attends Club or Organization Meetings: Never     Marital Status:    Intimate Partner Violence: Unknown (1/26/2025)    Nursing IPS     Feels Physically and Emotionally Safe: Not on file     Physically Hurt by Someone: Not on file     Humiliated or Emotionally Abused by Someone: Not on file     Physically Hurt by Someone: No     Hurt or Threatened by Someone: No   Housing Stability: Low Risk  (1/26/2025)    Housing Stability Vital Sign     Unable to Pay for Housing in the Last Year: No     Number of Times Moved in the Last Year: 0     Homeless in the Last Year: No        Fahad Hernandez lives in a two-level house with 5-6 steps to enter, able to live on main level with bedroom/bathroom.  Performs ADLs on one level.  Stairs to enter with rails.    Review of Systems: A 10-point review of systems was performed. Negative except as listed above.     Physical Exam:  Vital Signs:      Temp:  [97.5 °F (36.4 °C)-98.4 °F (36.9 °C)] 98.4 °F (36.9 °C)  HR:  [84-97] 97  BP: (119-144)/(76-91) 133/76  Resp:  [18] 18  SpO2:  [91 %-96 %] 95 %  O2 Device: None (Room air) No intake or output data in the 24 hours ending 01/27/25 1620     Laboratory:      Lab Results   Component Value Date    HGB 11.0 (L) 01/26/2025    HCT 35.0 (L)  01/26/2025    WBC 12.08 (H) 01/26/2025     Lab Results   Component Value Date    BUN 16 01/26/2025    BUN 23 10/20/2023    K 3.9 01/26/2025    K 4.5 10/20/2023     01/26/2025     10/20/2023    CREATININE 0.77 01/26/2025    CREATININE 0.93 10/20/2023     Lab Results   Component Value Date    PROTIME 13.2 01/26/2025    INR 0.94 01/26/2025        General: alert, no apparent distress, cooperative, and comfortable  Head: Normal, normocephalic, atraumatic.  Eye: Normal external eye, conjunctiva, lids   Ears: Normal external ears  Nose: Normal external nose, mucus membranes.  Pharynx: Dental Hygiene adequate. Normal buccal mucosa. Normal pharynx.  Neck / Thyroid: Supple, no masses, nodes, nodules or enlargement.    Abdomen: soft, nontender, nondistended, no masses or organomegaly  Skin/Extremity: no rashes, no erythema, no peripheral edema    Neurologic: Mild speech dysarthria but he remains completely understandable.  Follows commands.  No gross aphasia appreciated.  Nerves II to XII intact.  Deep tendon reflexes are diminished, toes are upgoing bilaterally.  Sensations are intact to light touch and pinprick bilaterally.  No finger-to-nose dysmetria.  Gait was not tested.  Psych: Appropriate affect, alert and oriented to person, place and time   Musculoskeletal - Strength:   Right  Left  Site  Right  Left  Site    5 5  S Ab: Shoulder Abductors  5  5  HF: Hip Flexors    5 5  EF: Elbow Flexors  5 5 KF: Knee Flexors    5  5  EE: Elbow Extensors  5  5  KE: Knee Extensors    5  5  WE: Wrist Extensors  5  5  DR: Dorsi Flexors    5  5  FF: Finger Flexors  5  5  PF: Plantar Flexors    5  5  HI: Hand Intrinsics  5  5  EHL: Extensor Hallucis Longus       Imaging: Reviewed  MRI cervical spine w wo contrast  Result Date: 1/27/2025  Impression: 1. Severe central stenosis at C3-C4 due to anterolisthesis, disc bulge, and ligamentum flavum redundancy. There is also severe bilateral foraminal stenosis due to severe right and  moderate left facet arthropathy and uncovertebral osteophytes. Cord signal  remains normal. 2. There is also severe right foraminal stenosis at C2-C3 due to severe facet arthropathy and uncovertebral osteophytes. 3. ACDF at C4-C7 4. No enhancing lesion seen. Workstation performed: [id number]     MRI brain wo contrast  Result Date: 1/26/2025  Impression: No acute infarct, significant mass effect or midline shift. Workstation performed: [id number]     XR shoulder 2+ views RIGHT  Result Date: 1/26/2025  Impression: No acute osseous abnormality. Degenerative changes as described. Computerized Assisted Algorithm (CAA) may have been used to analyze all applicable images. Workstation performed: [id number]     CTA head and neck w wo contrast  Result Date: 1/26/2025  Impression: 1. No acute intracranial hemorrhage, significant mass effect or midline shift. Note that there is a background of moderate-marked microangiopathy, and if there is concern for acute infarct, recommend brain MRI, particularly given reduced sensitivity on CT given significant microangiopathy. 2. No proximal arterial large vessel occlusion in the head and neck. 3. Approximately 65% stenosis of the proximal left ICA and 50% stenosis of the proximal right ICA at the level of the carotid bifurcation. See narrative above for additional vascular stenoses. 4. While this examination is not tailored for assessment, there is a markedly diminutive/nonopacified right transverse/sigmoid sinus without visualization of the right internal jugular vein. While findings could be due to congenital hypoplasia and/or phase of contrast of this study, acute dural venous sinus thrombosis is not excluded and if there is concern for acute dural venous sinus thrombosis, dedicated venographic examination is recommended. The study was marked in EPIC for immediate notification. Workstation performed: [id number]     XR Trauma pelvis ap only 1 or 2 vw  Result Date: 1/25/2025  Impression:  No acute, displaced pelvic fracture. Computerized Assisted Algorithm (CAA) may have been used to analyze all applicable images. Workstation performed: [id number]     TRAUMA - CT spine cervical wo contrast  Result Date: 1/25/2025  Impression: No acute cervical spine fracture or traumatic malalignment. The study was marked in EPIC for immediate notification. Workstation performed: [id number]     TRAUMA - CT head wo contrast  Result Date: 1/25/2025  Impression: No acute intracranial hemorrhage, significant mass effect or midline shift. Moderate-marked chronic microangiopathy. The study was marked in EPIC for immediate notification. Workstation performed: [id number]     XR Trauma chest portable  Result Date: 1/25/2025  Impression: No acute cardiopulmonary disease. No acute displaced fractures. Workstation performed: [id number]       Assessment and Recommendations:    69-year-old male presents with fall and acute right-sided weakness.  MRI cervical spine with C3-C4 stenosis.  No emergent neurosurgical intervention as per neurosurgery.  Impairments:  Impaired functional mobility and ability to perform ADL's  Impaired  speech    Recommendations:  - Chart reviewed  - Imaging reviewed   - Continue PT/OT/SLP while inpatient.      - Pt is unable to safely return home until he is at the supervision/contact-guard functional level (25% assistance level with or without a device)  modified-independent functional level (0% assistance with a device) independent functional level (0% assistance without a device)         -Based on the patient's prior and current functional status, and ability to tolerate 3 hrs of therapy per day, we believe he is a good acute inpatient rehab candidate (3 hours of therapy a day, medical and nursing care, highest level of rehab).    - In addition to therapies, he will require medical management of  type 2 diabetes mellitus, multiple falls, chronic pain syndrome, mixed hyperlipidemia and obstructive sleep  apnea .         - Disposition unclear at this point in time but inpatient rehab a possibility in the near future pending achievement of clinically stability, potential for functional progress.    Thank you for allowing the PM&R service to participate in the care of this patient. We will continue to follow Fahad Hernandez's progress with you. Please do not hesitate to call with questions or concerns    ** Please Note: Fluency Direct voice to text software may have been used in the creation of this document. **

## 2025-01-27 NOTE — PLAN OF CARE
Problem: PHYSICAL THERAPY ADULT  Goal: Performs mobility at highest level of function for planned discharge setting.  See evaluation for individualized goals.  Description: Treatment/Interventions: Functional transfer training, LE strengthening/ROM, Elevations, Therapeutic exercise, Endurance training, Patient/family training, Bed mobility, Gait training, Spoke to nursing, OT          See flowsheet documentation for full assessment, interventions and recommendations.  Note: Prognosis: Good  Problem List: Decreased strength, Decreased endurance, Impaired balance, Decreased mobility, Pain  Assessment: Pt is 69 year old male seen for PT evaluation s/p admit to [facility] on 1/25/2025 with Acute right-sided weakness. PT consulted to assess pt's functional mobility and discharge needs. Order placed for PT evaluation and treatment, with out of bed to chair order. Comorbidities affecting pt's physical performance at time of assessment include hypertension, type 2 DM, chronic obstructive asthma, major depressive disorder, mixed hyperlipidemia, GERD, medical marijuana use, multiple falls, and chronic pain syndrome. Prior to hospitalization, pt was independent with all functional mobility with a walker. Pt ambulates household and community distances. Pt resides with his spouse, in a two level house, but is able to stay on the first floor, with 5-6 steps to enter. Personal factors affecting pt at time of initial evaluation include lives in a two story house, stairs to enter home, ambulating with an assistive device, difficulty ambulating household distances, inability to ambulate community distances, inability to navigate level surfaces without external assistance, unable to perform dynamic tasks in the community, limited home support, positive fall history, difficulty performing ADLs, and inability to perform IADLs. Please find objective findings from PT assessment regarding body systems outlined above with  impairments and limitations including weakness, impaired balance, decreased endurance, gait deviations, pain, decreased activity tolerance, decreased functional mobility tolerance, and fall risk. The following objective measures were performed on initial evaluation Barthel Index: 50/100, Modified [place]: 4 (moderate/severe disability), and AM-PAC 6-Clicks: 17/24. Pt's clinical presentation is currently unstable/unpredictable seen in pt's presentation of need for ongoing medical management/monitoring, pt is a fall risk, and pt requires cues and assist for safety with functional mobility. Pt to benefit from continued PT treatment to address deficits as defined above and maximize pt's level of function and independence with mobility. From a PT standpoint, recommendation at time of discharge would be level 2, moderate resource intensity in order to facilitate return to prior level of function.    Barriers to Discharge: Inaccessible home environment, Decreased caregiver support     Rehab Resource Intensity Level, PT: II (Moderate Resource Intensity)    See flowsheet documentation for full assessment.

## 2025-01-27 NOTE — RESPIRATORY THERAPY NOTE
01/26/25 7491   Respiratory Assessment   Resp Comments placed pt on cpap for HS use   O2 Device v60   Non-Invasive Information   O2 Interface Device Nasal mask  (medium)   Non-Invasive Ventilation Mode CPAP   $ Intermittent NIV Yes   SpO2 96 %   $ Pulse Oximetry Spot Check Charge Completed   Non-Invasive Settings   FiO2 (%) 21   PEEP/CPAP (cm H2O) 10   Rise Time 2  (15 min ramp applied)   Humidification   (n/a)   Temperature (Set)   (n/a)   Non-Invasive Readings   Total Rate 17   Spontaneous MV (mL) 12.8   Spontaneous Vt (mL) 751   Heater Temperature (Obs)   (n/a)   Leak (lpm) 22   Skin Intervention Skin intact   Non-Invasive Alarms   Insp Pressure High (cm H20) 15   Insp Pressure Low (cm H20) 5   Low Insp Pressure Time (sec) 20 sec   MV Low (L/min) 2   Vt High (mL) 1100   Vt Low (mL) 150   High Resp Rate (BPM) 40 BPM   Low Resp Rate (BPM) 6 BPM     Pt does not know home cpap setting, not found in echart, set at 10 cmH2o and will monitor

## 2025-01-27 NOTE — ASSESSMENT & PLAN NOTE
Hold blood pressure meds to allow for permissive HTN for 24-48 hours (on metoprolol, amlodipine, benazepril  - normotensive

## 2025-01-27 NOTE — OCCUPATIONAL THERAPY NOTE
Occupational Therapy Evaluation        Patient Name: Fahad Hernandez  Today's Date: 1/27/2025 01/27/25 0901   OT Last Visit   OT Visit Date 01/27/25   Note Type   Note type Evaluation   Pain Assessment   Pain Assessment Tool 0-10   Pain Score 7   Pain Location/Orientation Orientation: Lower;Location: Back   Pain Radiating Towards down towards both legs   Pain Onset/Description Onset: Ongoing   Restrictions/Precautions   Weight Bearing Precautions Per Order No   Braces or Orthoses Other (Comment)  (none reported)   Other Precautions Chair Alarm;Bed Alarm;Fall Risk;Pain   Home Living   Type of Home House   Home Layout Two level;Performs ADLs on one level;Able to live on main level with bedroom/bathroom;Stairs to enter with rails  (5-6 CHUCHO)   Bathroom Shower/Tub Walk-in shower   Bathroom Toilet Standard   Bathroom Equipment Other (Comment)   Bathroom Accessibility Accessible   Home Equipment Walker   Additional Comments ambulatory with a RW   Prior Function   Level of [place] Needs assistance with ADLs;Independent with functional mobility   Lives With Spouse   Receives Help From Family   IADLs Family/Friend/Other provides transportation;Family/Friend/Other provides meals;Independent with medication management   Falls in the last 6 months 5 to 10  (about 5 times)   Vocational Retired   Lifestyle   Autonomy Patient lives in a 2 story house, 5-6 CHUCHO with 1st floor set up. Patient requires assistance with ADLs, bathing and LB dressing, ambulates with a RW, presents with recent frequent falls.   Reciprocal Relationships Supportive Falls   Service to Others Retired [employer] Work   Intrinsic Gratification watch TV   General   Family/Caregiver Present No   ADL   Where Assessed Edge of bed   Eating Assistance 7  Independent   Grooming Assistance 5  Supervision/Setup   UB Bathing Assistance 4  Minimal Assistance   LB Bathing Assistance 2  Maximal Assistance   UB Dressing Assistance 4  Minimal Assistance    LB Dressing Assistance 2  Maximal Assistance   Toileting Assistance  3  Moderate Assistance   Functional Assistance 3  Moderate Assistance   Bed Mobility   Supine to Sit 4  Minimal assistance   Additional items Assist x 1;HOB elevated;Bedrails;Increased time required;Verbal cues  (log rolling for back safety)   Transfers   Sit to Stand 4  Minimal assistance   Additional items Assist x 1;Increased time required;Verbal cues   Stand to Sit 4  Minimal assistance   Additional items Assist x 1;Increased time required;Verbal cues   Functional Mobility   Functional Mobility 4  Minimal assistance   Additional Comments assist of 1 with RW/ unsteady with c/o of LE weakness   Additional items Rolling walker   Balance   Static Sitting Fair +   Dynamic Sitting Fair   Static Standing Fair -   Dynamic Standing Poor +   Activity Tolerance   Activity Tolerance Patient limited by fatigue;Patient limited by pain   RUE Assessment   RUE Assessment X  (limited ROM due to chronic rotator cuff injury/ and pain upon movement)   RUE Strength   RUE Overall Strength Deficits  (proximal- not tested, distal 3/5)   LUE Assessment   LUE Assessment X   LUE Strength   LUE Overall Strength Deficits  (3+/5)   Hand Function   Gross Motor Coordination Functional   Fine Motor Coordination Functional   Sensation   Light Touch No apparent deficits  (BUEs)   Vision-Basic Assessment   Current Vision Wears glasses for distance only   Psychosocial   Psychosocial (WDL) WDL   Cognition   Overall Cognitive Status WFL  (benefits from further assessment)   Arousal/Participation Alert;Responsive;Cooperative   Attention Within functional limits   Orientation Level Oriented to person;Oriented to place;Oriented to situation  (inconsistent about time)   Memory Within functional limits   Following Commands Follows all commands and directions without difficulty   Assessment   Limitation Decreased ADL status;Decreased UE strength;Decreased Safe judgement during  ADL;Decreased endurance;Decreased self-care trans;Decreased high-level ADLs   Prognosis Good   Assessment Patient is a 69 y.o. male seen for OT evaluation s/p admit to [facility] on 1/25/2025 w/Acute right-sided weakness. Commorbidities affecting patient's functional performance at time of assessment include: Acute right sided weakness, Multiple falls, type 2 DM, Chronic obstructive asthma, GERD, medical marijuana use, mayor depressive disorder, HTN, presented to ED s/p fall at home. Orders placed for OT evaluation and treatment.  Performed at least two patient identifiers during session including name and wristband.  Prior to admission,  Patient lives in a 2 story house, 5-6 CHUCHO with 1st floor set up. Patient requires assistance with ADLs, bathing and LB dressing, ambulates with a RW, presents with recent frequent falls.  Personal factors affecting patient at time of initial evaluation include: steps to enter, decreased initiation and engagement, difficulty performing ADLs, and difficulty performing IADLs. Upon evaluation, patient requires minimal  assist for UB ADLs, moderate and maximal assist for LB ADLs.  Occupational performance is affected by the following deficits: decreased functional use of BUEs, limited active ROM, decreased muscle strength, degenerative arthritic joint changes, decreased activity tolerance, impaired problem solving, decreased safety awareness, and postural control and postural alignment deficit, requiring external assistance to complete transitional movements.  Patient to benefit from continued Occupational Therapy treatment while in the hospital to address deficits as defined above and maximize level of functional independence with ADLs and functional mobility. Occupational Performance areas to address include: bathing/ shower, dressing, toilet hygiene, transfer to all surfaces, functional mobility, IADLs: safety procedures, and Leisure Participation. From OT standpoint,  recommendation at time of d/c would be Level 2.   Plan   Treatment Interventions ADL retraining;Functional transfer training;UE strengthening/ROM;Endurance training;Patient/family training;Equipment evaluation/education;Compensatory technique education;Continued evaluation;Energy conservation;Activityengagement   Goal Expiration Date 02/10/25   OT Frequency 3-5x/wk   Discharge Recommendation   Rehab Resource Intensity Level, OT II (Moderate Resource Intensity)   AM-PAC Daily Activity Inpatient   Lower Body Dressing 2   Bathing 2   Toileting 3   Upper Body Dressing 3   Grooming 4   Eating 4   Daily Activity Raw Score 18   Daily Activity Standardized Score (Calc for Raw Score >=11) 38.66   AM-PAC Applied Cognition Inpatient   Following a Speech/Presentation 4   Understanding Ordinary Conversation 4   Taking Medications 4   Remembering Where Things Are Placed or Put Away 4   Remembering List of 4-5 Errands 3   Taking Care of Complicated Tasks 3   Applied Cognition Raw Score 22   Applied Cognition Standardized Score 47.83   Barthel Index   Feeding 10   Bathing 0   Grooming Score 0   Dressing Score 5   Bladder Score 10   Bowels Score 10   Toilet Use Score 5   Transfers (Bed/Chair) Score 10   Mobility (Level Surface) Score 0   Stairs Score 0   Barthel Index Score 50         1 - Patient will verbalize and demonstrate use of energy conservation/ deep breathing technique and work simplification skills during functional activity with no verbal cues.    2 - Patient will verbalize and demonstrate good body mechanics and joint protection techniques during  ADLs/ IADLs with no verbal cues.    3 - Patient will increase OOB/ sitting tolerance to 2-4 hours per day for increased participation in self care and leisure tasks with no s/s of exertion.    4 - Patient will increase standing tolerance time to 5  minutes with unilateral UE support to complete sink level ADLs@ mod I level.    5 - Patient will increase sitting tolerance at  edge of bed to 20 minutes to complete UB ADLs @ set up assist level.    6 - Patient will transfer bed to Chair / toilet at Set up assist level with AD as indicated.     7 - Patient will complete UB ADLs with set up assist.     8 - Patient will complete LB ADLs with min assist with the use of adaptive equipment.     9 - Patient will complete toileting hygiene with set up assist/ supervision for thoroughness    10 - Patient/ Family  will demonstrate competency with UE Home Exercise Program.

## 2025-01-27 NOTE — PROGRESS NOTES
Progress Note - Hospitalist   Name: Fahad Hernandez 69 y.o. male I MRN: [id number]  Unit/Bed#: 2 E 253-01 I Date of Admission: 1/25/2025   Date of Service: 1/27/2025 I Hospital Day: 1    Assessment & Plan  Acute right-sided weakness  Patient presented with fall and right sided weakness not previously noted. He was initially examined by the ER physician at 18:33 on 1/25/25. 3 a.m. which is when he was noted to have right sided weakness.  Patient was not in window for TPA if this is a new deficit, suspect he may be having waxing and waning symptoms for several days.  CTA Head and Neck: moderate microangiopathy, L ICA 65% stenosis, R ICA 50% stenosis. Nonopacified R transverse / sigmoid sinus.   MRI Brain: No Acute infarct  MRI C-spine: Severe central stenosis C3-C4 with disc bulge, severe R foraminal stenosis at C2-C3 due to facet arthropathy.    Plan:   - Neurosurgery consulted for C3-C4 stenosis, appreciated recommendations  - no acute infarct, possible symptoms due to cervical stenosis  - atorvastatin 40 mg daily  - PT/OT/ST evaluation : recommend STR (wife also would prefer patient go to rehab)  - Discontinue stroke pathway  - telemetry no AF, discontinue today  Multiple falls  - PT/OT  - Decreased Oxycodone 10mg to 7.5mg 2/2 concern that it may be contributing to falls and fatigue. He has been on it for 15 years. He is also now on Austedo for tardive dyskinesia which can cause increased concentrations.  - Consider decreasing restoril dose  Type 2 diabetes mellitus with hyperglycemia, without long-term current use of insulin (HCC)  Lab Results   Component Value Date    HGBA1C 8.3 (H) 01/26/2025       Recent Labs     01/26/25  1643 01/26/25  2049 01/27/25  0735 01/27/25  1100   POCGLU 142* 172* 102 153*       ISS  Diabetic cardiac diet  (P) 129.8  Hold metformin  A1c elevated  - continue sliding scale insulin algorithm 4  Chronic obstructive asthma (HCC)  Cont prn albuterol, pulmicort, perforomist, singulair  Mixed  hyperlipidemia  Increase lipitor to 40mg  Gastroesophageal reflux disease with esophagitis  Continue PPI; protonix substituted for nexium based on formulary  Medical marijuana use  noted  Chronic pain syndrome  Decreased oxycodone dose from 10mg to 7.5mg due to concern for contributing to falls and fatigue, addition of Austedo to his home med regimen which can increase concentrations  Major depressive disorder  Cont home meds: Effexor, restoril, wellbutrin, abilify  Hypertension  Hold blood pressure meds to allow for permissive HTN for 24-48 hours (on metoprolol, amlodipine, benazepril  - normotensive  CASSIE (obstructive sleep apnea)  CPAP at nighttime ordered    VTE Pharmacologic Prophylaxis:   Moderate Risk (Score 3-4) - Pharmacological DVT Prophylaxis Ordered: enoxaparin (Lovenox).    Mobility:   Basic Mobility Inpatient Raw Score: 19  JH-HLM Goal: 6: Walk 10 steps or more  JH-HLM Achieved: 6: Walk 10 steps or more  JH-HLM Goal achieved. Continue to encourage appropriate mobility.    Patient Centered Rounds: I performed bedside rounds with nursing staff today.   Discussions with Specialists or Other Care Team Provider: CM    Education and Discussions with Family / Patient: Updated  (wife) via phone.    Current Length of Stay: 1 day(s)  Current Patient Status: Inpatient   Certification Statement: The patient will continue to require additional inpatient hospital stay due to neurosurgical eval, placement to STR  Discharge Plan: Anticipate discharge in 24-48 hrs to rehab facility.    Code Status: Level 1 - Full Code    Subjective   Discussed results with patient and wife on the phone. Wife would like patient to go to rehab in setting of frequent falls. Patient still feels weakness on R upper and lower extremities. Mild numbness over right upper arm.     Objective :  Temp:  [97.5 °F (36.4 °C)-98.3 °F (36.8 °C)] 97.7 °F (36.5 °C)  HR:  [] 86  BP: (119-144)/(76-91) 125/91  Resp:  [16-18] 18  SpO2:   [91 %-96 %] 93 %  O2 Device: None (Room air)    Body mass index is 21 kg/m².     Input and Output Summary (last 24 hours):   No intake or output data in the 24 hours ending 01/27/25 0838    Physical Exam  Vitals and nursing note reviewed.   Constitutional:       General: He is not in acute distress.     Appearance: He is well-developed.   HENT:      Head: Normocephalic and atraumatic.   Eyes:      Conjunctiva/sclera: Conjunctivae normal.   Cardiovascular:      Rate and Rhythm: Normal rate and regular rhythm.      Heart sounds: No murmur heard.  Pulmonary:      Effort: Pulmonary effort is normal. No respiratory distress.      Breath sounds: Normal breath sounds. No wheezing.   Abdominal:      Palpations: Abdomen is soft.      Tenderness: There is no abdominal tenderness.   Musculoskeletal:         General: No swelling.      Cervical back: Neck supple.   Skin:     General: Skin is warm and dry.      Capillary Refill: Capillary refill takes less than 2 seconds.   Neurological:      Mental Status: He is alert.      Comments: RUE 4/5 RLE 4/5  LUE 5/5, LLE 5/5  RUE decreased sensation compared to left     Psychiatric:         Mood and Affect: Mood normal.           Lines/Drains:        Telemetry:  Telemetry Orders (From admission, onward)               24 Hour Telemetry Monitoring  Continuous x 24 Hours (Telem)        Expiring   Question:  Reason for 24 Hour Telemetry  Answer:  TIA/Suspected CVA/ Confirmed CVA                     Telemetry Reviewed: Normal Sinus Rhythm  Indication for Continued Telemetry Use: No indication for continued use. Will discontinue.                Lab Results: I have reviewed the following results:   Results from last 7 days   Lab Units 01/26/25  0536   WBC Thousand/uL 12.08*   HEMOGLOBIN g/dL 11.0*   HEMATOCRIT % 35.0*   PLATELETS Thousands/uL 352   SEGS PCT % 59   LYMPHO PCT % 28   MONO PCT % 8   EOS PCT % 2     Results from last 7 days   Lab Units 01/26/25  0536   SODIUM mmol/L 137    POTASSIUM mmol/L 3.9   CHLORIDE mmol/L 103   CO2 mmol/L 27   BUN mg/dL 16   CREATININE mg/dL 0.77   ANION GAP mmol/L 7   CALCIUM mg/dL 8.9   ALBUMIN g/dL 3.2*   TOTAL BILIRUBIN mg/dL 0.37   ALK PHOS U/L 39   ALT U/L 32   AST U/L 16   GLUCOSE RANDOM mg/dL 116     Results from last 7 days   Lab Units 01/26/25  0536   INR  0.94     Results from last 7 days   Lab Units 01/27/25  0735 01/26/25  2049 01/26/25  1643 01/26/25  1302 01/26/25  0705   POC GLUCOSE mg/dl 102 172* 142* 127 106     Results from last 7 days   Lab Units 01/26/25  0536   HEMOGLOBIN A1C % 8.3*           Recent Cultures (last 7 days):         Imaging Results Review: I reviewed radiology reports from this admission including: MRI brain and MRI spine.  Other Study Results Review: No additional pertinent studies reviewed.    Last 24 Hours Medication List:     Current Facility-Administered Medications:     acetaminophen (TYLENOL) tablet 650 mg, Q4H PRN    albuterol inhalation solution 2.5 mg, Q6H PRN    ARIPiprazole (ABILIFY) tablet 10 mg, Daily    aspirin (ECOTRIN LOW STRENGTH) EC tablet 81 mg, Daily    atorvastatin (LIPITOR) tablet 40 mg, Daily With Dinner    budesonide (PULMICORT) inhalation solution 0.5 mg, Q12H    buPROPion (WELLBUTRIN XL) 24 hr tablet 300 mg, Daily    calcium carbonate (TUMS) chewable tablet 1,000 mg, Daily PRN    clopidogrel (PLAVIX) tablet 75 mg, Daily    Deutetrabenazine TABS 12 mg, BID    enoxaparin (LOVENOX) subcutaneous injection 40 mg, Daily    formoterol (PERFOROMIST) nebulizer solution 20 mcg, BID    insulin lispro (HumALOG/ADMELOG) 100 units/mL subcutaneous injection 2-12 Units, TID AC **AND** Fingerstick Glucose (POCT), TID AC    insulin lispro (HumALOG/ADMELOG) 100 units/mL subcutaneous injection 2-12 Units, HS    montelukast (SINGULAIR) tablet 10 mg, HS    nitroglycerin (NITROSTAT) SL tablet 0.4 mg, Q5 Min PRN    ondansetron (ZOFRAN) injection 4 mg, Q6H PRN    oxyCODONE (ROXICODONE) split tablet 7.5 mg, Q6H PRN     pantoprazole (PROTONIX) EC tablet 40 mg, Early Morning    tamsulosin (FLOMAX) capsule 0.4 mg, Daily    temazepam (RESTORIL) capsule 30 mg, HS PRN    venlafaxine (EFFEXOR-XR) 24 hr capsule 150 mg, Daily    Administrative Statements   Today, Patient Was Seen By: Emilie Soyeon Kim, MD      **Please Note: This note may have been constructed using a voice recognition system.**

## 2025-01-27 NOTE — RESPIRATORY THERAPY NOTE
01/27/25 0445   Respiratory Assessment   Resp Comments pt asleep and remains on cpap   O2 Device v60   Non-Invasive Information   O2 Interface Device Nasal mask   Non-Invasive Ventilation Mode CPAP   SpO2 95 %   $ Pulse Oximetry Spot Check Charge Completed   Non-Invasive Settings   FiO2 (%) 21   PEEP/CPAP (cm H2O) 10   Rise Time 2   Humidification   (n/a)   Temperature (Set)   (n/a)   Non-Invasive Readings   Total Rate 18   Spontaneous MV (mL) 5.9   Spontaneous Vt (mL) 332   Leak (lpm) 29   Skin Intervention Skin intact   Non-Invasive Alarms   Insp Pressure High (cm H20) 15   Insp Pressure Low (cm H20) 5   Low Insp Pressure Time (sec) 20 sec   MV Low (L/min) 2   Vt High (mL) 1100   Vt Low (mL) 150   High Resp Rate (BPM) 40 BPM   Low Resp Rate (BPM) 6 BPM

## 2025-01-27 NOTE — TELEMEDICINE
"e-Consult (IPC)     Inpatient consult to Neurosurgery  Consult performed by: JAMIL Samuel  Consult ordered by: Emilie Soyeon Kim, MD           Fahad Hernandez 69 y.o. male MRN: [id number]  Unit/Bed#: 2 E 253-01 Encounter: [id number]    Reason for Consult    Per provider report, patient presented to the hospital on 1/25/25 as a trauma alert.  He is on dual antiplatelet therapy and sustained a fall.  He struck his head on his walker and was able to lower himself to his knees.  Denies LOC.  He reports history of multiple falls due to stroke with residual balance deficits.  He was complaining of a headache and neck pain.  He was noted to have acute right upper extremity weakness.  Out of window for tPA and was admitted for stroke pathway.  MRI brain negative for stroke.  Patient's hemoglobin A1c 8.3.  He was recently hospitalized earlier this month for asthma with acute exacerbation.  Also history of prior cervical fusion several years ago with intermittent neck pain.  He is currently on aspirin and Plavix.      Available past medical history,social history, surgical history, medication list, drug allergies and review of systems were reviewed.    /79 (BP Location: Right arm)   Pulse 84   Temp 97.6 °F (36.4 °C) (Oral)   Resp 18   Ht 5' 11\" (1.803 m)   Wt 68 kg (150 lb)   SpO2 96%   BMI 20.92 kg/m²      Clinical exam per report, strength 4/5 on RUE 5/5 LUE, 4/5 RLE, 5/5 LLE. He has poor dorsiflexion on R foot but he does have history of fusion surgery there. RUE slightly less sensation than LUE     Imaging personally reviewed and reviewed with Dr. Mckeon.  MRI cervical spine demonstrates severe central stenosis at C3-C4 due to anterolisthesis, disc bulge, and ligamentum flavum redundancy. There is also severe bilateral foraminal stenosis due to severe right and moderate left facet arthropathy and uncovertebral osteophytes. Cord signal remains normal.There is also severe right foraminal " stenosis at C2-C3 due to severe facet arthropathy and uncovertebral osteophytes. ACDF at C4-C7. No enhancing lesion seen.    Assessment and Recommendations    Continue neurological checks.   Imaging demonstrates adjacent level disease  No emergent neurosurgical intervention warranted at this time given multiple comorbidities.  Recommend patient exhaust all nonsurgical options including referrals to physical therapy and pain management.  Patient likely needs rehab.  Medical management and pain control per primary team  Neurology following, input appreciated  Unfortunately patient is at increased risk for spinal cord injury given stenosis at C3-4.  Neurosurgery will sign off will schedule patient outpatient follow-up in 4-6 weeks after trialing conservative management.  Call with any further question or concerns.    All questions answered. Provider is in agreement with the course of action. 11-20 minutes, >50% of the total time devoted to medical consultative verbal/EMR discussion between providers. Written report will be generated in the EMR.

## 2025-01-27 NOTE — ASSESSMENT & PLAN NOTE
- PT/OT  - Decreased Oxycodone 10mg to 7.5mg 2/2 concern that it may be contributing to falls and fatigue. He has been on it for 15 years. He is also now on Austedo for tardive dyskinesia which can cause increased concentrations.  - Consider decreasing restoril dose

## 2025-01-27 NOTE — PHYSICAL THERAPY NOTE
Physical Therapy Evaluation     Patient's Name: Fahad Hernandez    Admitting Diagnosis  Head injury [S09.90XA]  Right arm pain [M79.601]  Frequent falls [R29.6]  Closed head injury, initial encounter [S09.90XA]  Fall, initial encounter [W19.XXXA]  Ambulatory dysfunction [R26.2]    Problem List  Patient Active Problem List   Diagnosis    Abnormal EKG    Hypertension    Type 2 diabetes mellitus with hyperglycemia, without long-term current use of insulin (HCC)    Chronic obstructive asthma (HCC)    Major depressive disorder    Mixed hyperlipidemia    Gastroesophageal reflux disease with esophagitis    Severe persistent asthma with acute exacerbation    Medical marijuana use    Centrilobular emphysema (HCC)    Chest pain    Lung nodules    Hoarseness of voice    SOB (shortness of breath)    Long-term exposure involving bird droppings    History of diabetes mellitus    Orthostasis    Asthma exacerbation attacks    Moderate persistent asthma with exacerbation    Acute right-sided weakness    Fall    Metabolic acidosis    Lactic acidosis due to diabetes mellitus (HCC)    BENNY (acute kidney injury) (HCC)    Severe persistent asthma with (acute) exacerbation    Rotator cuff arthropathy of right shoulder    Acute respiratory failure (HCC)    Multiple falls    Chronic pain syndrome     Past Medical History  Past Medical History:   Diagnosis Date    Asthma     COPD (chronic obstructive pulmonary disease) (HCC)     Dementia (HCC)     Diabetes mellitus (HCC)     GERD (gastroesophageal reflux disease)     History of stroke 11/07/2019    Hypertension     Interstitial lung disease (HCC)     Major depressive disorder with current active episode 11/07/2019    Pneumonia     Rotator cuff arthropathy of right shoulder 12/17/2024    Sleep apnea     Sleep apnea, obstructive     Stroke (HCC)     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     Past Surgical History  Past Surgical History:   Procedure Laterality Date    BACK SURGERY      ELBOW SURGERY    "   KNEE SURGERY      NECK SURGERY      SHOULDER SURGERY      SPINE SURGERY  2004 01/27/25 0917   PT Last Visit   PT Visit Date 01/27/25   Note Type   Note type Evaluation   Pain Assessment   Pain Assessment Tool 0-10   Pain Score 7   Pain Location/Orientation Orientation: Lower;Location: Back   Pain Onset/Description Onset: Ongoing   Hospital Pain Intervention(s) Repositioned;Ambulation/increased activity   Restrictions/Precautions   Weight Bearing Precautions Per Order No   Braces or Orthoses Other (Comment)  (none per patient)   Other Precautions Chair Alarm;Bed Alarm;Fall Risk;Pain   Home Living   Type of Home House   Home Layout Two level;Able to live on main level with bedroom/bathroom;Performs ADLs on one level;Stairs to enter with rails  (5-6 CHUCHO)   Bathroom Shower/Tub Walk-in shower   Bathroom Toilet Standard   Bathroom Equipment Other (Comment)  (none per patient)   Bathroom Accessibility Accessible   Home Equipment Walker   Additional Comments Pt ambulates with a walker.   Prior Function   Level of [place] Independent with functional mobility;Needs assistance with ADLs;Needs assistance with IADLS   Lives With Spouse   Receives Help From Family   IADLs Independent with medication management;Family/Friend/Other provides transportation;Family/Friend/Other provides meals   Falls in the last 6 months 5 to 10  (\"about 5\")   Vocational Retired   General   Family/Caregiver Present No   Cognition   Overall Cognitive Status WFL   Arousal/Participation Alert   Attention Within functional limits   Orientation Level Oriented to person;Oriented to place;Oriented to situation   Memory Within functional limits   Following Commands Follows multistep commands without difficulty   Comments Pt agreeable to PT.   Subjective   Subjective \"I'm weak.\"   RLE Assessment   RLE Assessment X   Strength RLE   RLE Overall Strength 3+/5   LLE Assessment   LLE Assessment X   Strength LLE   LLE Overall Strength 4-/5   Light Touch "   RLE Light Touch Grossly intact   LLE Light Touch Grossly intact   Bed Mobility   Rolling L 4  Minimal assistance   Additional items Assist x 1;Bedrails;Increased time required;Verbal cues;LE management  (log roll for back safety)   Supine to Sit 4  Minimal assistance   Additional items Assist x 1;HOB elevated;Bedrails;Increased time required;Verbal cues;LE management   Transfers   Sit to Stand 4  Minimal assistance   Additional items Assist x 1;Increased time required;Verbal cues   Stand to Sit 4  Minimal assistance   Additional items Assist x 1;Armrests;Increased time required;Verbal cues   Ambulation/Elevation   Gait pattern Decreased toe off;Decreased heel strike;Decreased hip extension;Short stride;Shuffling   Gait Assistance 4  Minimal assist   Additional items Assist x 1;Verbal cues   Assistive Device Rolling walker   Distance 25 feet   Balance   Static Sitting Fair +   Dynamic Sitting Fair   Static Standing Fair -   Dynamic Standing Poor +   Ambulatory Poor +   Endurance Deficit   Endurance Deficit Yes   Endurance Deficit Description decreased activity tolerance   Activity Tolerance   Activity Tolerance Patient tolerated treatment well   Medical Staff Made Aware OT Leena  (Co-evaluation performed with OT secondary to complex medical condition of patient and regression of functional status from baseline. PT/OT goals were addressed separately.)   Nurse Made Aware JOHN Gomez   Assessment   Prognosis Good   Problem List Decreased strength;Decreased endurance;Impaired balance;Decreased mobility;Pain   Assessment Pt is 69 year old male seen for PT evaluation s/p admit to [facility] on 1/25/2025 with Acute right-sided weakness. PT consulted to assess pt's functional mobility and discharge needs. Order placed for PT evaluation and treatment, with out of bed to chair order. Comorbidities affecting pt's physical performance at time of assessment include hypertension, type 2 DM, chronic obstructive asthma,  major depressive disorder, mixed hyperlipidemia, GERD, medical marijuana use, multiple falls, and chronic pain syndrome. Prior to hospitalization, pt was independent with all functional mobility with a walker. Pt ambulates household and community distances. Pt resides with his spouse, in a two level house, but is able to stay on the first floor, with 5-6 steps to enter. Personal factors affecting pt at time of initial evaluation include lives in a two story house, stairs to enter home, ambulating with an assistive device, difficulty ambulating household distances, inability to ambulate community distances, inability to navigate level surfaces without external assistance, unable to perform dynamic tasks in the community, limited home support, positive fall history, difficulty performing ADLs, and inability to perform IADLs. Please find objective findings from PT assessment regarding body systems outlined above with impairments and limitations including weakness, impaired balance, decreased endurance, gait deviations, pain, decreased activity tolerance, decreased functional mobility tolerance, and fall risk. The following objective measures were performed on initial evaluation Barthel Index: 50/100, Modified Ravinder: 4 (moderate/severe disability), and AM-PAC 6-Clicks: 17/24. Pt's clinical presentation is currently unstable/unpredictable seen in pt's presentation of need for ongoing medical management/monitoring, pt is a fall risk, and pt requires cues and assist for safety with functional mobility. Pt to benefit from continued PT treatment to address deficits as defined above and maximize pt's level of function and independence with mobility. From a PT standpoint, recommendation at time of discharge would be level 2, moderate resource intensity in order to facilitate return to prior level of function.   Barriers to Discharge Inaccessible home environment;Decreased caregiver support   Goals   STG Expiration Date  02/06/25   Short Term Goal #1 In 10 days: Increase bilateral LE strength 1/2 grade to facilitate independent mobility, Perform all bed mobility tasks modified independent to decrease caregiver burden, Perform all transfers modified independent to improve independence, Ambulate > 150 ft. with RW modified independent w/o LOB and w/ normalized gait pattern 100% of the time, Navigate 6 stair(s) modified independent with unilateral handrail to facilitate return to previous living environment, and Increase all balance 1/2 grade to decrease risk for falls   Plan   Treatment/Interventions Functional transfer training;LE strengthening/ROM;Elevations;Therapeutic exercise;Endurance training;Patient/family training;Bed mobility;Gait training;Spoke to nursing;OT   PT Frequency 3-5x/wk   Discharge Recommendation   Rehab Resource Intensity Level, PT II (Moderate Resource Intensity)   AM-PAC Basic Mobility Inpatient   Turning in Flat Bed Without Bedrails 3   Lying on Back to Sitting on Edge of Flat Bed Without Bedrails 3   Moving Bed to Chair 3   Standing Up From Chair Using Arms 3   Walk in Room 3   Climb 3-5 Stairs With Railing 2   Basic Mobility Inpatient Raw Score 17   Basic Mobility Standardized Score 39.67   [facility] Highest Level Of Mobility   -[facility] Goal 5: Stand one or more mins   -HL Achieved 7: Walk 25 feet or more   Modified [place] Scale   Modified [place] Scale 4   Barthel Index   Feeding 10   Bathing 0   Grooming Score 0   Dressing Score 5   Bladder Score 10   Bowels Score 10   Toilet Use Score 5   Transfers (Bed/Chair) Score 10   Mobility (Level Surface) Score 0   Stairs Score 0   Barthel Index Score 50     PT Evaluation Time: 0901-0917    Hiwot Chiang, PT, DPT

## 2025-01-27 NOTE — TELEPHONE ENCOUNTER
01/29/2025- PT STILL IN HOSPITAL    01/27/2025- PT IS IN [facility]  03/06/2025- NEWPT 4-6 WK HFU SNPX W/ SPINE SURGEON after trialing conservative management for c-spine   E-CONSULT COMPLETED    JAMIL Samuel [facility] [place] Clerical  Patient needs post hospital follow-up joint appointment spine surgeon in 4-6 weeks after trialing conservative management for c-spine

## 2025-01-28 LAB
GLUCOSE SERPL-MCNC: 114 MG/DL (ref 65–140)
GLUCOSE SERPL-MCNC: 130 MG/DL (ref 65–140)
GLUCOSE SERPL-MCNC: 131 MG/DL (ref 65–140)
GLUCOSE SERPL-MCNC: 154 MG/DL (ref 65–140)

## 2025-01-28 PROCEDURE — 94760 N-INVAS EAR/PLS OXIMETRY 1: CPT

## 2025-01-28 PROCEDURE — 99232 SBSQ HOSP IP/OBS MODERATE 35: CPT | Performed by: FAMILY MEDICINE

## 2025-01-28 PROCEDURE — 94660 CPAP INITIATION&MGMT: CPT

## 2025-01-28 PROCEDURE — 82948 REAGENT STRIP/BLOOD GLUCOSE: CPT

## 2025-01-28 PROCEDURE — 94640 AIRWAY INHALATION TREATMENT: CPT

## 2025-01-28 RX ORDER — AMLODIPINE BESYLATE 10 MG/1
10 TABLET ORAL DAILY
Status: DISCONTINUED | OUTPATIENT
Start: 2025-01-28 | End: 2025-02-03 | Stop reason: HOSPADM

## 2025-01-28 RX ORDER — METOPROLOL SUCCINATE 25 MG/1
25 TABLET, EXTENDED RELEASE ORAL DAILY
Status: DISCONTINUED | OUTPATIENT
Start: 2025-01-28 | End: 2025-02-03 | Stop reason: HOSPADM

## 2025-01-28 RX ORDER — LISINOPRIL 20 MG/1
40 TABLET ORAL DAILY
Status: DISCONTINUED | OUTPATIENT
Start: 2025-01-28 | End: 2025-02-03 | Stop reason: HOSPADM

## 2025-01-28 RX ADMIN — Medication 7.5 MG: at 17:28

## 2025-01-28 RX ADMIN — ACETAMINOPHEN 650 MG: 325 TABLET, FILM COATED ORAL at 12:29

## 2025-01-28 RX ADMIN — CLOPIDOGREL 75 MG: 75 TABLET ORAL at 08:51

## 2025-01-28 RX ADMIN — ATORVASTATIN CALCIUM 40 MG: 40 TABLET, FILM COATED ORAL at 15:33

## 2025-01-28 RX ADMIN — AMLODIPINE BESYLATE 10 MG: 10 TABLET ORAL at 15:33

## 2025-01-28 RX ADMIN — FORMOTEROL FUMARATE DIHYDRATE 20 MCG: 20 SOLUTION RESPIRATORY (INHALATION) at 20:08

## 2025-01-28 RX ADMIN — ASPIRIN 81 MG: 81 TABLET, COATED ORAL at 08:51

## 2025-01-28 RX ADMIN — MONTELUKAST 10 MG: 10 TABLET, FILM COATED ORAL at 21:32

## 2025-01-28 RX ADMIN — VENLAFAXINE HYDROCHLORIDE 150 MG: 150 CAPSULE, EXTENDED RELEASE ORAL at 08:52

## 2025-01-28 RX ADMIN — PANTOPRAZOLE SODIUM 40 MG: 40 TABLET, DELAYED RELEASE ORAL at 05:28

## 2025-01-28 RX ADMIN — ARIPIPRAZOLE 10 MG: 5 TABLET ORAL at 08:51

## 2025-01-28 RX ADMIN — BUDESONIDE 0.5 MG: 0.5 INHALANT ORAL at 20:08

## 2025-01-28 RX ADMIN — LISINOPRIL 40 MG: 20 TABLET ORAL at 15:33

## 2025-01-28 RX ADMIN — FORMOTEROL FUMARATE DIHYDRATE 20 MCG: 20 SOLUTION RESPIRATORY (INHALATION) at 07:07

## 2025-01-28 RX ADMIN — INSULIN LISPRO 2 UNITS: 100 INJECTION, SOLUTION INTRAVENOUS; SUBCUTANEOUS at 22:30

## 2025-01-28 RX ADMIN — METOPROLOL SUCCINATE 25 MG: 25 TABLET, EXTENDED RELEASE ORAL at 15:33

## 2025-01-28 RX ADMIN — TAMSULOSIN HYDROCHLORIDE 0.4 MG: 0.4 CAPSULE ORAL at 08:51

## 2025-01-28 RX ADMIN — BUDESONIDE 0.5 MG: 0.5 INHALANT ORAL at 07:07

## 2025-01-28 RX ADMIN — ENOXAPARIN SODIUM 40 MG: 40 INJECTION SUBCUTANEOUS at 08:51

## 2025-01-28 RX ADMIN — Medication 7.5 MG: at 09:00

## 2025-01-28 RX ADMIN — ACETAMINOPHEN 650 MG: 325 TABLET, FILM COATED ORAL at 21:34

## 2025-01-28 RX ADMIN — BUPROPION HYDROCHLORIDE 300 MG: 150 TABLET, EXTENDED RELEASE ORAL at 08:51

## 2025-01-28 NOTE — ASSESSMENT & PLAN NOTE
- PT/OT : recommend level 2   - Decreased Oxycodone 10mg to 7.5mg 2/2 concern that it may be contributing to falls and fatigue. He has been on it for 15 years. He is also now on Austedo for tardive dyskinesia which can cause increased concentrations.  - Consider decreasing restoril dose  Cm aware  Fall precautions

## 2025-01-28 NOTE — RESPIRATORY THERAPY NOTE
01/28/25 0417   Respiratory Assessment   General Appearance Sleeping   Resp Comments pt asleep and remains on cpap   O2 Device v60   Non-Invasive Information   O2 Interface Device Nasal mask   Non-Invasive Ventilation Mode CPAP   SpO2 97 %   $ Pulse Oximetry Spot Check Charge Completed   Non-Invasive Settings   FiO2 (%) 21   PEEP/CPAP (cm H2O) 10   Rise Time 2   Humidification   (n/a)   Non-Invasive Readings   Total Rate 19   Spontaneous MV (mL) 4   Spontaneous Vt (mL) 208   Heater Temperature (Obs)   (n/a)   Skin Intervention Skin intact   Non-Invasive Alarms   Insp Pressure High (cm H20) 15   Insp Pressure Low (cm H20) 5   Low Insp Pressure Time (sec) 20 sec   MV Low (L/min) 2   Vt High (mL) 1200   Vt Low (mL) 150   High Resp Rate (BPM) 40 BPM   Low Resp Rate (BPM) 6 BPM

## 2025-01-28 NOTE — ASSESSMENT & PLAN NOTE
C/c : Patient presented with fall and right sided weakness (weakness is ongoing for a few months - both RUE/RLE).   He was initially examined by the ER physician at 18:33 on 1/25/25. 3 a.m. & noted to have right sided weakness.      Patient was not in the window for TPA  CTA Head and Neck: moderate microangiopathy, L ICA 65% stenosis, R ICA 50% stenosis. Nonopacified R transverse / sigmoid sinus.   MRI Brain: No Acute infarct  MRI C-spine: Severe central stenosis C3-C4 with disc bulge, severe R foraminal stenosis at C2-C3 due to facet arthropathy.    Plan:   - Neurosurgery consulted for C3-C4 stenosis : no plans for emergent neurosurgical intervention.  Recommend PT OT and outpatient follow-up 4 to 6 weeks  - Neuro on board, no acute infarct, possible symptoms due to cervical stenosis  - Continue with atorvastatin 40 mg daily  - PT/OT/ST evaluation : recommend STR (wife also would prefer patient go to rehab)

## 2025-01-28 NOTE — ASSESSMENT & PLAN NOTE
Lab Results   Component Value Date    HGBA1C 8.3 (H) 01/26/2025       Recent Labs     01/27/25  1613 01/27/25 2039 01/28/25  0718 01/28/25  1042   POCGLU 127 134 114 131       ISS  Diabetic cardiac diet  (P) 130.8  Hold metformin  On sliding scale coverage with ACHS checks  Hypoglycemia protocol

## 2025-01-28 NOTE — CASE MANAGEMENT
Case Management Progress Note    Patient name Fahad Hernandez  Location 2 EAST 253/2 E 253-01 MRN [id number]  : 1955 Date 2025       LOS (days): 2  Geometric Mean LOS (GMLOS) (days): 2.9  Days to GMLOS:0.7        OBJECTIVE:        Current admission status: Inpatient  Preferred Pharmacy:   [facility]/pharmacy #[id number] - [place] - [address]  [address]  [place] [address]  Phone: [phone] Fax: [phone]    [facility] - [place] [address] [address]  Phone: [phone] Fax: [phone]    [facility], [place] [id number] [place] [address]  Phone: [phone] Fax: [phone]    Primary Care Provider: Surjit Hayes DO    Primary Insurance: [employer]  REP  Secondary Insurance:     PROGRESS NOTE:  Medically cleared for d/c per SLIM.  Level II rec, patient agreeable to placement.  Will need [employer]  auth prior to d/c.  Patient has available STR options.  CM to secure choice and initiate auth.  (PT/OT notes )

## 2025-01-28 NOTE — ASSESSMENT & PLAN NOTE
Decreased oxycodone dose from 10mg to 7.5mg due to concern its contributing to falls and fatigue, addition of Austedo to his home med regimen which can increase concentrations

## 2025-01-28 NOTE — RESPIRATORY THERAPY NOTE
01/27/25 6120   Respiratory Assessment   Resp Comments placed pt on cpap for HS use   O2 Device v60   Non-Invasive Information   O2 Interface Device Nasal mask   Non-Invasive Ventilation Mode CPAP   $ Intermittent NIV Yes   SpO2 96 %   $ Pulse Oximetry Spot Check Charge Completed   Non-Invasive Settings   FiO2 (%) 21   PEEP/CPAP (cm H2O) 10   Rise Time 2  (15 min ramp applied)   Humidification   (n/a)   Temperature (Set)   (n/a)   Non-Invasive Readings   Total Rate 19   Spontaneous MV (mL) 10.9   Spontaneous Vt (mL) 563   Heater Temperature (Obs)   (n/a)   Leak (lpm) 18   Skin Intervention Skin intact   Non-Invasive Alarms   Insp Pressure High (cm H20) 15   Insp Pressure Low (cm H20) 5   Low Insp Pressure Time (sec) 20 sec   MV Low (L/min) 2   Vt High (mL) 1200   Vt Low (mL) 150   High Resp Rate (BPM) 40 BPM   Low Resp Rate (BPM) 6 BPM

## 2025-01-28 NOTE — PROGRESS NOTES
Progress Note - Hospitalist   Name: Fahad Hernandez 69 y.o. male I MRN: [id number]  Unit/Bed#: 2 E 253-01 I Date of Admission: 1/25/2025   Date of Service: 1/28/2025 I Hospital Day: 2    Assessment & Plan  Acute right-sided weakness  C/c : Patient presented with fall and right sided weakness (weakness is ongoing for a few months - both RUE/RLE).   He was initially examined by the ER physician at 18:33 on 1/25/25. 3 a.m. & noted to have right sided weakness.      Patient was not in the window for TPA  CTA Head and Neck: moderate microangiopathy, L ICA 65% stenosis, R ICA 50% stenosis. Nonopacified R transverse / sigmoid sinus.   MRI Brain: No Acute infarct  MRI C-spine: Severe central stenosis C3-C4 with disc bulge, severe R foraminal stenosis at C2-C3 due to facet arthropathy.    Plan:   - Neurosurgery consulted for C3-C4 stenosis : no plans for emergent neurosurgical intervention.  Recommend PT OT and outpatient follow-up 4 to 6 weeks  - Neuro on board, no acute infarct, possible symptoms due to cervical stenosis  - Continue with atorvastatin 40 mg daily  - PT/OT/ST evaluation : recommend STR (wife also would prefer patient go to rehab)    Multiple falls  - PT/OT : recommend level 2   - Decreased Oxycodone 10mg to 7.5mg 2/2 concern that it may be contributing to falls and fatigue. He has been on it for 15 years. He is also now on Austedo for tardive dyskinesia which can cause increased concentrations.  - Consider decreasing restoril dose  Cm aware  Fall precautions  Type 2 diabetes mellitus with hyperglycemia, without long-term current use of insulin (HCC)  Lab Results   Component Value Date    HGBA1C 8.3 (H) 01/26/2025       Recent Labs     01/27/25  1613 01/27/25  2039 01/28/25  0718 01/28/25  1042   POCGLU 127 134 114 131       ISS  Diabetic cardiac diet  (P) 130.8  Hold metformin  On sliding scale coverage with ACHS checks  Hypoglycemia protocol    Medical marijuana use  noted  Chronic pain syndrome  Decreased  oxycodone dose from 10mg to 7.5mg due to concern its contributing to falls and fatigue, addition of Austedo to his home med regimen which can increase concentrations  Hypertension  Resume home metoprolol/amlodipine/ACE inhibitor  Blood pressure well-controlled   CASSIE (obstructive sleep apnea)  CPAP at nighttime ordered    VTE Pharmacologic Prophylaxis:   lovenox    Mobility:   Basic Mobility Inpatient Raw Score: 17  JH-HLM Goal: 5: Stand one or more mins  JH-HLM Achieved: 3: Sit at edge of bed  JH-HLM Goal NOT achieved. Continue with multidisciplinary rounding and encourage appropriate mobility to improve upon JH-HLM goals.    Patient Centered Rounds: I performed bedside rounds with nursing staff today.   Discussions with Specialists or Other Care Team Provider: neuro    Education and Discussions with Family / Patient: patient    Current Length of Stay: 2 day(s)  Current Patient Status: Inpatient   Certification Statement: The patient will continue to require additional inpatient hospital stay due to needs rehab  Discharge Plan: once rehab avail    Code Status: Level 1 - Full Code    Subjective   No new concerns      Temp:  [97.5 °F (36.4 °C)-98.5 °F (36.9 °C)] 97.5 °F (36.4 °C)  HR:  [] 100  BP: (113-135)/(76-90) 120/85  Resp:  [17-18] 18  SpO2:  [93 %-99 %] 96 %  O2 Device: None (Room air)    Body mass index is 21.06 kg/m².     Input and Output Summary (last 24 hours):     Intake/Output Summary (Last 24 hours) at 1/28/2025 1439  Last data filed at 1/28/2025 1300  Gross per 24 hour   Intake 1370 ml   Output 790 ml   Net 580 ml       Physical Exam  Constitutional:       General: He is not in acute distress.     Appearance: He is normal weight.   HENT:      Mouth/Throat:      Mouth: Mucous membranes are moist.   Cardiovascular:      Rate and Rhythm: Normal rate and regular rhythm.   Pulmonary:      Effort: Pulmonary effort is normal. No respiratory distress.      Breath sounds: Normal breath sounds.   Abdominal:       General: Abdomen is flat. Bowel sounds are normal.      Palpations: Abdomen is soft.   Musculoskeletal:      Right lower leg: No edema.      Left lower leg: No edema.   Neurological:      Mental Status: He is alert and oriented to person, place, and time.           Lines/Drains:      Lab Results: I have reviewed the following results:   Results from last 7 days   Lab Units 01/26/25  0536   WBC Thousand/uL 12.08*   HEMOGLOBIN g/dL 11.0*   HEMATOCRIT % 35.0*   PLATELETS Thousands/uL 352   SEGS PCT % 59   LYMPHO PCT % 28   MONO PCT % 8   EOS PCT % 2     Results from last 7 days   Lab Units 01/26/25  0536   SODIUM mmol/L 137   POTASSIUM mmol/L 3.9   CHLORIDE mmol/L 103   CO2 mmol/L 27   BUN mg/dL 16   CREATININE mg/dL 0.77   ANION GAP mmol/L 7   CALCIUM mg/dL 8.9   ALBUMIN g/dL 3.2*   TOTAL BILIRUBIN mg/dL 0.37   ALK PHOS U/L 39   ALT U/L 32   AST U/L 16   GLUCOSE RANDOM mg/dL 116     Results from last 7 days   Lab Units 01/26/25  0536   INR  0.94     Results from last 7 days   Lab Units 01/28/25  1042 01/28/25  0718 01/27/25  2039 01/27/25  1613 01/27/25  1100 01/27/25  0735 01/26/25  2049 01/26/25  1643 01/26/25  1302 01/26/25  0705   POC GLUCOSE mg/dl 131 114 134 127 153* 102 172* 142* 127 106     Results from last 7 days   Lab Units 01/26/25  0536   HEMOGLOBIN A1C % 8.3*           Recent Cultures (last 7 days):           Last 24 Hours Medication List:     Current Facility-Administered Medications:     acetaminophen (TYLENOL) tablet 650 mg, Q4H PRN    albuterol inhalation solution 2.5 mg, Q6H PRN    amLODIPine (NORVASC) tablet 10 mg, Daily **AND** lisinopril (ZESTRIL) tablet 40 mg, Daily    ARIPiprazole (ABILIFY) tablet 10 mg, Daily    aspirin (ECOTRIN LOW STRENGTH) EC tablet 81 mg, Daily    atorvastatin (LIPITOR) tablet 40 mg, Daily With Dinner    budesonide (PULMICORT) inhalation solution 0.5 mg, Q12H    buPROPion (WELLBUTRIN XL) 24 hr tablet 300 mg, Daily    calcium carbonate (TUMS) chewable tablet 1,000 mg,  Daily PRN    clopidogrel (PLAVIX) tablet 75 mg, Daily    Deutetrabenazine TABS 12 mg, BID    enoxaparin (LOVENOX) subcutaneous injection 40 mg, Daily    formoterol (PERFOROMIST) nebulizer solution 20 mcg, BID    insulin lispro (HumALOG/ADMELOG) 100 units/mL subcutaneous injection 2-12 Units, TID AC **AND** Fingerstick Glucose (POCT), TID AC    insulin lispro (HumALOG/ADMELOG) 100 units/mL subcutaneous injection 2-12 Units, HS    metoprolol succinate (TOPROL-XL) 24 hr tablet 25 mg, Daily    montelukast (SINGULAIR) tablet 10 mg, HS    nitroglycerin (NITROSTAT) SL tablet 0.4 mg, Q5 Min PRN    ondansetron (ZOFRAN) injection 4 mg, Q6H PRN    oxyCODONE (ROXICODONE) split tablet 7.5 mg, Q6H PRN    pantoprazole (PROTONIX) EC tablet 40 mg, Early Morning    tamsulosin (FLOMAX) capsule 0.4 mg, Daily    temazepam (RESTORIL) capsule 30 mg, HS PRN    venlafaxine (EFFEXOR-XR) 24 hr capsule 150 mg, Daily    Administrative Statements   Today, Patient Was Seen By: Carter Srivastava MD      **Please Note: This note may have been constructed using a voice recognition system.**

## 2025-01-29 LAB
GLUCOSE SERPL-MCNC: 103 MG/DL (ref 65–140)
GLUCOSE SERPL-MCNC: 109 MG/DL (ref 65–140)
GLUCOSE SERPL-MCNC: 115 MG/DL (ref 65–140)
GLUCOSE SERPL-MCNC: 194 MG/DL (ref 65–140)

## 2025-01-29 PROCEDURE — 97110 THERAPEUTIC EXERCISES: CPT

## 2025-01-29 PROCEDURE — 94640 AIRWAY INHALATION TREATMENT: CPT

## 2025-01-29 PROCEDURE — 97129 THER IVNTJ 1ST 15 MIN: CPT

## 2025-01-29 PROCEDURE — 99232 SBSQ HOSP IP/OBS MODERATE 35: CPT | Performed by: FAMILY MEDICINE

## 2025-01-29 PROCEDURE — 97535 SELF CARE MNGMENT TRAINING: CPT

## 2025-01-29 PROCEDURE — 82948 REAGENT STRIP/BLOOD GLUCOSE: CPT

## 2025-01-29 PROCEDURE — 94760 N-INVAS EAR/PLS OXIMETRY 1: CPT

## 2025-01-29 PROCEDURE — 97116 GAIT TRAINING THERAPY: CPT

## 2025-01-29 PROCEDURE — 94660 CPAP INITIATION&MGMT: CPT

## 2025-01-29 RX ORDER — DOCUSATE SODIUM 100 MG/1
100 CAPSULE, LIQUID FILLED ORAL 2 TIMES DAILY
Status: DISCONTINUED | OUTPATIENT
Start: 2025-01-29 | End: 2025-02-03 | Stop reason: HOSPADM

## 2025-01-29 RX ORDER — POLYETHYLENE GLYCOL 3350 17 G/17G
17 POWDER, FOR SOLUTION ORAL DAILY PRN
Status: DISCONTINUED | OUTPATIENT
Start: 2025-01-29 | End: 2025-02-03 | Stop reason: HOSPADM

## 2025-01-29 RX ADMIN — ATORVASTATIN CALCIUM 40 MG: 40 TABLET, FILM COATED ORAL at 17:43

## 2025-01-29 RX ADMIN — FORMOTEROL FUMARATE DIHYDRATE 20 MCG: 20 SOLUTION RESPIRATORY (INHALATION) at 07:35

## 2025-01-29 RX ADMIN — ENOXAPARIN SODIUM 40 MG: 40 INJECTION SUBCUTANEOUS at 08:46

## 2025-01-29 RX ADMIN — VENLAFAXINE HYDROCHLORIDE 150 MG: 150 CAPSULE, EXTENDED RELEASE ORAL at 08:46

## 2025-01-29 RX ADMIN — BUDESONIDE 0.5 MG: 0.5 INHALANT ORAL at 07:35

## 2025-01-29 RX ADMIN — MONTELUKAST 10 MG: 10 TABLET, FILM COATED ORAL at 21:11

## 2025-01-29 RX ADMIN — DOCUSATE SODIUM 100 MG: 100 CAPSULE, LIQUID FILLED ORAL at 11:48

## 2025-01-29 RX ADMIN — CLOPIDOGREL 75 MG: 75 TABLET ORAL at 08:46

## 2025-01-29 RX ADMIN — Medication 7.5 MG: at 13:54

## 2025-01-29 RX ADMIN — BUPROPION HYDROCHLORIDE 300 MG: 150 TABLET, EXTENDED RELEASE ORAL at 08:46

## 2025-01-29 RX ADMIN — DOCUSATE SODIUM 100 MG: 100 CAPSULE, LIQUID FILLED ORAL at 17:43

## 2025-01-29 RX ADMIN — INSULIN LISPRO 2 UNITS: 100 INJECTION, SOLUTION INTRAVENOUS; SUBCUTANEOUS at 17:43

## 2025-01-29 RX ADMIN — TAMSULOSIN HYDROCHLORIDE 0.4 MG: 0.4 CAPSULE ORAL at 08:46

## 2025-01-29 RX ADMIN — FORMOTEROL FUMARATE DIHYDRATE 20 MCG: 20 SOLUTION RESPIRATORY (INHALATION) at 20:17

## 2025-01-29 RX ADMIN — Medication 7.5 MG: at 21:56

## 2025-01-29 RX ADMIN — TEMAZEPAM 30 MG: 15 CAPSULE ORAL at 21:56

## 2025-01-29 RX ADMIN — ASPIRIN 81 MG: 81 TABLET, COATED ORAL at 08:46

## 2025-01-29 RX ADMIN — PANTOPRAZOLE SODIUM 40 MG: 40 TABLET, DELAYED RELEASE ORAL at 05:45

## 2025-01-29 RX ADMIN — TEMAZEPAM 30 MG: 15 CAPSULE ORAL at 00:44

## 2025-01-29 RX ADMIN — BUDESONIDE 0.5 MG: 0.5 INHALANT ORAL at 20:17

## 2025-01-29 RX ADMIN — ARIPIPRAZOLE 10 MG: 5 TABLET ORAL at 08:46

## 2025-01-29 NOTE — PLAN OF CARE
"  Problem: OCCUPATIONAL THERAPY ADULT  Goal: Performs self-care activities at highest level of function for planned discharge setting.  See evaluation for individualized goals.  Description: Treatment Interventions: ADL retraining, Functional transfer training, UE strengthening/ROM, Endurance training, Patient/family training, Cognitive reorientation, Equipment evaluation/education, Continued evaluation, Energy conservation, Activityengagement, Compensatory technique education          See flowsheet documentation for full assessment, interventions and recommendations.   Note: Limitation: Decreased ADL status, Decreased UE strength, Decreased Safe judgement during ADL, Decreased endurance, Decreased self-care trans, Decreased high-level ADLs  Prognosis: Good  Assessment: Patient participated in Skilled OT session this date with interventions consisting of ADL re training with the use of correct body mechnaics,  therapeutic activities to: increase activity tolerance, increase cardiovascular endurance , increase dynamic sit/ stand balance during functional activity , and increase OOB/ sitting tolerance . Patient agreeable to OT treatment session, upon arrival patient was found seated OOB to Recliner.  In comparison to previous session, patient with improvements in overall endurance/ activity engagement and self care performance. Please refer to flow sheet for detailed performance. Patient scored 22/30 indicative of mild cognitive impairment, patient presents most area of difficulty in delayed recall. Therapist discussed with patient memory- compensatory strategies and engagement in \"brain games\". Patient requiring verbal cues for safety and ocassional safety reminders. Patient continues to be functioning below baseline level, occupational performance remains limited secondary to factors listed above and increased risk for falls and injury.   From OT standpoint, recommendation at time of d/c would be Level 2.   Patient " to benefit from continued Occupational Therapy treatment while in the hospital to address deficits as defined above and maximize level of functional independence with ADLs and functional mobility.     Rehab Resource Intensity Level, OT: II (Moderate Resource Intensity)

## 2025-01-29 NOTE — TELEPHONE ENCOUNTER
Pt's wife Carol calls to cancel tomorrows appt due to pt being hospitalized. She states she would like to speak to Elijah in regards to new information received that may help with diagnosis.

## 2025-01-29 NOTE — PHYSICAL THERAPY NOTE
"   Physical Therapy Treatment Note    Patient Name: Fahad Hernandez    Diagnosis: Head injury [S09.90XA]  Right arm pain [M79.601]  Frequent falls [R29.6]  Closed head injury, initial encounter [S09.90XA]  Fall, initial encounter [W19.XXXA]  Ambulatory dysfunction [R26.2]     01/29/25 1034   PT Last Visit   PT Visit Date 01/29/25   Note Type   Note Type Treatment   Pain Assessment   Pain Assessment Tool 0-10   Pain Score 7   Pain Location/Orientation Orientation: Lower;Location: Back   Pain Onset/Description Onset: Ongoing   Hospital Pain Intervention(s) Repositioned;Ambulation/increased activity   Restrictions/Precautions   Weight Bearing Precautions Per Order No   Other Precautions Chair Alarm;Bed Alarm;Fall Risk;Pain   General   Chart Reviewed Yes   Response to Previous Treatment Patient with no complaints from previous session.   Family/Caregiver Present No   Cognition   Overall Cognitive Status WFL   Arousal/Participation Alert;Responsive;Cooperative   Attention Within functional limits   Orientation Level Oriented X4   Memory Within functional limits   Following Commands Follows multistep commands without difficulty   Comments Pt agreeable to PT.   Subjective   Subjective \"I've been walking.\"   Bed Mobility   Supine to Sit 5  Supervision   Additional items Assist x 1;HOB elevated;Bedrails;Increased time required;Verbal cues   Additional Comments Pt initially reported dizziness upon sitting at EOB; resolved with a seated rest break.   Transfers   Sit to Stand 4  Minimal assistance   Additional items Assist x 1;Increased time required;Verbal cues   Stand to Sit 4  Minimal assistance   Additional items Assist x 1;Armrests;Increased time required;Verbal cues   Ambulation/Elevation   Gait pattern Decreased hip extension;Decreased toe off;Short stride;Narrow ROSALBA;Decreased R stance;Decreased foot clearance   Gait Assistance 4  Minimal assist   Additional items Assist x 1;Verbal cues   Assistive Device Rolling walker " "  Distance 60 feet   Ambulation/Elevation Additional Comments Pt reported \"slight\" dizziness post ambulation; /69. Pt reported a resolve in symptoms with a seated rest break.   Balance   Static Sitting Fair +   Dynamic Sitting Fair   Static Standing Fair -   Dynamic Standing Poor +   Ambulatory Poor +   Endurance Deficit   Endurance Deficit Yes   Endurance Deficit Description decreased activity tolerance   Activity Tolerance   Activity Tolerance Patient tolerated treatment well   Exercises   Glute Sets Sitting;10 reps;AROM;Bilateral   Hip Flexion Sitting;20 reps;AROM;Bilateral   Hip Abduction Sitting;10 reps;AAROM;Right;AROM;Left  (long sitting)   Hip Adduction Sitting;20 reps;AROM;Bilateral   Knee AROM Long Arc Quad Sitting;20 reps;AROM;Bilateral   Ankle Pumps Sitting;20 reps;AROM;Bilateral   Assessment   Prognosis Good   Problem List Decreased strength;Decreased endurance;Impaired balance;Decreased mobility;Pain   Assessment Chart reviewed. Patient was received supine in bed in NAD and agreeable to PT session. Today's PT treatment session consisted of therapeutic activity for facilitation of transitional movements and safe performance of correct technique for bed mobility and sit to stand transfers, therapeutic exercise to increase lower extremity muscle strength, and gait training to promote safe and functional ambulation on level surfaces. In comparison to the previous session the patient has made progress as evident by requiring decreased assistance with bed mobility. The patient continues to require assist of one for all functional mobility. The patient was able to ambulate an increased distance this session. When ambulating pt exhibits decreased right stance time, decreased hip extension, and a narrow base of support. Pt reported \"slight\" dizziness post ambulation; /69. Pt reported a resolve in symptoms with a seated rest break. Pt tolerated all therapeutic exercise well without complaints. " Overall, patient tolerated today's session well and continues to be making progress towards achieving his STG's. Patient's prognosis for achieving their STG's is good as evident by pt's motivation. PT intervention continues to be appropriate as the patient continues to be limited by pain, decreased lower extremity strength, impaired balance, decreased endurance, gait deviations, and decreased functional mobility. Continue to recommend level 2, moderate resource intensity. PT to continue to see patient in order to address the deficits listed above and provide interventions consistent with the POC in order to achieve STG's and optimize the patient's independence with functional mobility.   Barriers to Discharge Inaccessible home environment;Decreased caregiver support   Goals   STG Expiration Date 02/06/25   PT Treatment Day 1   Plan   Treatment/Interventions Functional transfer training;LE strengthening/ROM;Elevations;Therapeutic exercise;Endurance training;Patient/family training;Bed mobility;Gait training;OT   Progress Progressing toward goals   PT Frequency 3-5x/wk   Discharge Recommendation   Rehab Resource Intensity Level, PT II (Moderate Resource Intensity)   AM-PAC Basic Mobility Inpatient   Turning in Flat Bed Without Bedrails 3   Lying on Back to Sitting on Edge of Flat Bed Without Bedrails 3   Moving Bed to Chair 3   Standing Up From Chair Using Arms 3   Walk in Room 3   Climb 3-5 Stairs With Railing 2   Basic Mobility Inpatient Raw Score 17   Basic Mobility Standardized Score 39.67   [facility] Highest Level Of Mobility   -HLM Goal 5: Stand one or more mins   -HLM Achieved 7: Walk 25 feet or more   Education   Education Provided Mobility training;Home exercise program;Assistive device   Patient Demonstrates acceptance/verbal understanding;Reinforcement needed   End of Consult   Patient Position at End of Consult Bedside chair;Bed/Chair alarm activated;All needs within reach     Hiwot Chiang, PT,  DPT    Time of PT treatment session: 9022-8022  23 minutes

## 2025-01-29 NOTE — OCCUPATIONAL THERAPY NOTE
Occupational Therapy Treatment note        Patient Name: Fahad Hernandez  Today's Date: 1/29/2025 01/29/25 1155   OT Last Visit   OT Visit Date 01/29/25   Note Type   Note Type Treatment   Pain Assessment   Pain Assessment Tool 0-10   Pain Score No Pain   Restrictions/Precautions   Weight Bearing Precautions Per Order No   Other Precautions Chair Alarm;Bed Alarm;Fall Risk;Pain   ADL   Where Assessed Chair   Eating Assistance 6  Modified independent   Grooming Assistance 5  Supervision/Setup   Grooming Deficit Setup;Supervision/safety   UB Bathing Assistance 4  Minimal Assistance   UB Bathing Deficit Setup;Verbal cueing;Supervision/safety   LB Bathing Assistance 3  Moderate Assistance   LB Bathing Deficit Setup;Verbal cueing;Supervision/safety;Increased time to complete   UB Dressing Assistance 4  Minimal Assistance   UB Dressing Deficit Setup;Supervision/safety;Increased time to complete   LB Dressing Assistance 3  Moderate Assistance   LB Dressing Deficit Setup;Verbal cueing;Supervision/safety;Increased time to complete   Toileting Assistance  3  Moderate Assistance   Toileting Deficit Setup;Steadying;Supervison/safety;Increased time to complete   Transfers   Sit to Stand 4  Minimal assistance   Additional items Assist x 1;Armrests;Increased time required;Verbal cues   Stand to Sit 4  Minimal assistance   Additional items Assist x 1;Armrests;Increased time required;Verbal cues   Functional Mobility   Functional Mobility 4  Minimal assistance   Additional Comments assist of 1 with RW   Cognition   Overall Cognitive Status WFL   Arousal/Participation Alert;Responsive;Cooperative   Attention Within functional limits   Orientation Level Oriented X4   Memory Within functional limits   Following Commands Follows multistep commands without difficulty   Activity Tolerance   Activity Tolerance Patient tolerated treatment well   Assessment   Assessment Patient participated in Skilled OT session this date  "with interventions consisting of ADL re training with the use of correct body mechnaics,  therapeutic activities to: increase activity tolerance, increase cardiovascular endurance , increase dynamic sit/ stand balance during functional activity , and increase OOB/ sitting tolerance . Patient agreeable to OT treatment session, upon arrival patient was found seated OOB to Recliner.  In comparison to previous session, patient with improvements in overall endurance/ activity engagement and self care performance. Please refer to flow sheet for detailed performance. Patient scored 22/30 indicative of mild cognitive impairment, patient presents most area of difficulty in delayed recall. Therapist discussed with patient memory- compensatory strategies and engagement in \"brain games\". Patient requiring verbal cues for safety and ocassional safety reminders. Patient continues to be functioning below baseline level, occupational performance remains limited secondary to factors listed above and increased risk for falls and injury.   From OT standpoint, recommendation at time of d/c would be Level 2.   Patient to benefit from continued Occupational Therapy treatment while in the hospital to address deficits as defined above and maximize level of functional independence with ADLs and functional mobility.   Plan   Treatment Interventions ADL retraining;Functional transfer training;UE strengthening/ROM;Endurance training;Patient/family training;Cognitive reorientation;Equipment evaluation/education;Continued evaluation;Energy conservation;Activityengagement;Compensatory technique education   Goal Expiration Date 02/10/25   OT Treatment Day 1   OT Frequency 3-5x/wk   Discharge Recommendation   Rehab Resource Intensity Level, OT II (Moderate Resource Intensity)   AM-PAC Daily Activity Inpatient   Lower Body Dressing 2   Bathing 2   Toileting 3   Upper Body Dressing 3   Grooming 3   Eating 4   Daily Activity Raw Score 17   Daily " "Activity Standardized Score (Calc for Raw Score >=11) 37.26   AM-PAC Applied Cognition Inpatient   Following a Speech/Presentation 4   Understanding Ordinary Conversation 4   Taking Medications 3   Remembering Where Things Are Placed or Put Away 3   Remembering List of 4-5 Errands 3   Taking Care of Complicated Tasks 3   Applied Cognition Raw Score 20   Applied Cognition Standardized Score 41.76   MOCA   Version 7.1   Visuopatial/Executive 3   Naming 3   Memory 0   Attention: Digits 2   Attention: Letters 1   Attention: Serial 3   Language: Repeat 1   Language: Fluency 0   Abstraction 2   Delayed Recall 2   Orientation 5   Does patient have less than or equal to 12 years of education? 0   MOCA Total Score 22   MOCA Comments Patient scored 22/30 indicative of mild cognitive impairment, patient presents most area of difficulty in delayed recall. Therapist discussed with patient memory- compensatory strategies and engagement in \"brain games\".                          "

## 2025-01-29 NOTE — RESPIRATORY THERAPY NOTE
RT Protocol Note  Fahad Hernandez 69 y.o. male MRN: [id number]  Unit/Bed#: 2 E 253-01 Encounter: [id number]    Assessment    Principal Problem:    Acute right-sided weakness  Active Problems:    Hypertension    Type 2 diabetes mellitus with hyperglycemia, without long-term current use of insulin (HCC)    Medical marijuana use    Multiple falls    Chronic pain syndrome    CASSIE (obstructive sleep apnea)      Home Pulmonary Medications:     01/28/25 2008   Respiratory Protocol   Protocol Initiated? No   Protocol Selection Respiratory   Language Barrier? No   Medical & Social History Reviewed? Yes   Diagnostic Studies Reviewed? Yes   Physical Assessment Performed? Yes   Home Devices/Therapy BiPAP/CPAP   Respiratory Plan Home Bronchodilator Patient pathway   Respiratory Assessment   Assessment Type During-treatment   General Appearance Awake;Alert   Respiratory Pattern Normal   Chest Assessment Chest expansion symmetrical   Bilateral Breath Sounds Diminished   Cough None   Resp Comments Will continue with current therapy   O2 Device RA   Cough Description   Sputum Amount None   Additional Assessments   Pulse 75   Respirations 20   SpO2 91 %       Home Devices/Therapy: BiPAP/CPAP    Past Medical History:   Diagnosis Date    Asthma     COPD (chronic obstructive pulmonary disease) ([facility])     Dementia ([facility])     Diabetes mellitus ([facility])     GERD (gastroesophageal reflux disease)     History of stroke 11/07/2019    Hypertension     Interstitial lung disease ([facility])     Major depressive disorder with current active episode 11/07/2019    Pneumonia     Rotator cuff arthropathy of right shoulder 12/17/2024    Sleep apnea     Sleep apnea, obstructive     Stroke ([facility])     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     Social History     Socioeconomic History    Marital status: /Civil Union     Spouse name: None    Number of children: None    Years of education: None    Highest education level: None   Occupational History    None   Tobacco Use     Smoking status: Never     Passive exposure: Never    Smokeless tobacco: Never    Tobacco comments:     Patient admits to using marijuana, edibles and smoking    Vaping Use    Vaping status: Never Used   Substance and Sexual Activity    Alcohol use: Not Currently    Drug use: Yes     Types: Marijuana, Oxycodone, Psilocybin    Sexual activity: Yes     Partners: Female     Birth control/protection: Male Sterilization   Other Topics Concern    None   Social History Narrative    None     Social Drivers of Health     Financial Resource Strain: Low Risk  (11/1/2024)    Received from [facility]    Overall Financial Resource Strain (CARDIA)     Difficulty of Paying Living Expenses: Not hard at all   Food Insecurity: No Food Insecurity (1/26/2025)    Hunger Vital Sign     Worried About Running Out of Food in the Last Year: Never true     Ran Out of Food in the Last Year: Never true   Transportation Needs: No Transportation Needs (1/26/2025)    PRAPARE - Transportation     Lack of Transportation (Medical): No     Lack of Transportation (Non-Medical): No   Physical Activity: Inactive (11/1/2024)    Received from [facility]    Exercise Vital Sign     Days of Exercise per Week: 0 days     Minutes of Exercise per Session: 0 min   Stress: Stress Concern Present (11/1/2024)    Received from [facility]    [employer] - Occupational Stress Questionnaire     Feeling of Stress : To some extent   Social Connections: Moderately Isolated (11/1/2024)    Received from [facility]    Social Connection and Isolation Panel [NHANES]     Frequency of Communication with Friends and Family: Three times a week     Frequency of Social Gatherings with Friends and Family: Twice a week     Attends Protestant Services: Never     Active Member of Clubs or Organizations: No     Attends Club or Organization Meetings: Never     Marital Status:    Intimate  "Partner Violence: Unknown (1/26/2025)    Nursing IPS     Feels Physically and Emotionally Safe: Not on file     Physically Hurt by Someone: Not on file     Humiliated or Emotionally Abused by Someone: Not on file     Physically Hurt by Someone: No     Hurt or Threatened by Someone: No   Housing Stability: Low Risk  (1/26/2025)    Housing Stability Vital Sign     Unable to Pay for Housing in the Last Year: No     Number of Times Moved in the Last Year: 0     Homeless in the Last Year: No       Subjective         Objective    Physical Exam:   Assessment Type: During-treatment  General Appearance: Awake, Alert  Respiratory Pattern: Normal  Chest Assessment: Chest expansion symmetrical  Bilateral Breath Sounds: Diminished  Cough: None  O2 Device: RA    Vitals:  Blood pressure 106/70, pulse 75, temperature 98.2 °F (36.8 °C), temperature source Oral, resp. rate 20, height 5' 11\" (1.803 m), weight 68.5 kg (151 lb 0.2 oz), SpO2 91%.          Imaging and other studies: Results Review Statement: No pertinent imaging studies reviewed.    O2 Device: RA     Plan    Respiratory Plan: Home Bronchodilator Patient pathway        Resp Comments: Will continue with current therapy   "

## 2025-01-29 NOTE — RESPIRATORY THERAPY NOTE
01/29/25 0400   Respiratory Assessment   Assessment Type Assess only   General Appearance Sleeping   Respiratory Pattern Normal   Chest Assessment Chest expansion symmetrical   Bilateral Breath Sounds Diminished   Cough None   Resp Comments Pt remains on cpap   O2 Device V60   Non-Invasive Information   O2 Interface Device Face mask   Non-Invasive Ventilation Mode CPAP   SpO2 95 %   $ Pulse Oximetry Spot Check Charge Completed   Non-Invasive Settings   FiO2 (%) 21   PEEP/CPAP (cm H2O) 10   Rise Time 2   Non-Invasive Readings   Total Rate 15   Spontaneous MV (mL) 4   Spontaneous Vt (mL) 302   Leak (lpm) 51   Skin Intervention Skin intact   Non-Invasive Alarms   Insp Pressure High (cm H20) 15   Insp Pressure Low (cm H20) 5   Low Insp Pressure Time (sec) 20 sec   MV Low (L/min) 2   Vt High (mL) 1200   Vt Low (mL) 150   High Resp Rate (BPM) 40 BPM   Low Resp Rate (BPM) 8 BPM     RT Ventilator Management Note  Fahad Hernandez 69 y.o. male MRN: [id number]  Unit/Bed#: 2 E 253-01 Encounter: [id number]      Daily Screen    No data found in the last 10 encounters.           Physical Exam:   Assessment Type: Assess only  General Appearance: Sleeping  Respiratory Pattern: Normal  Chest Assessment: Chest expansion symmetrical  Bilateral Breath Sounds: Diminished  Cough: None  O2 Device: V60      Resp Comments: Pt remains on cpap

## 2025-01-29 NOTE — ASSESSMENT & PLAN NOTE
Lab Results   Component Value Date    HGBA1C 8.3 (H) 01/26/2025       Recent Labs     01/28/25  1553 01/28/25  2113 01/29/25  0725 01/29/25  1130   POCGLU 130 154* 103 115       ISS  Diabetic cardiac diet  (P) [id number]  Hold metformin  On sliding scale coverage with ACHS checks  Hypoglycemia protocol

## 2025-01-29 NOTE — CASE MANAGEMENT
Case Management Discharge Planning Note    Patient name Fahad Hernandez  Location 2 EAST 253/2 E 253-01 MRN [id number]  : 1955 Date 2025       Current Admission Date: 2025  Current Admission Diagnosis:Acute right-sided weakness   Patient Active Problem List    Diagnosis Date Noted Date Diagnosed    CASSIE (obstructive sleep apnea) 2025     Multiple falls 2025     Chronic pain syndrome 2025     Acute respiratory failure (HCC) 2025     Rotator cuff arthropathy of right shoulder 2024     Acute right-sided weakness 2024     Fall 2024     Metabolic acidosis 2024     Lactic acidosis due to diabetes mellitus (HCC) 2024     BENNY (acute kidney injury) ([facility]) 2024     Severe persistent asthma with (acute) exacerbation 2024     Asthma exacerbation attacks 2024     Moderate persistent asthma with exacerbation 2024     Orthostasis 10/15/2024     History of diabetes mellitus 10/11/2024     Hoarseness of voice 2024     SOB (shortness of breath) 2024     Long-term exposure involving bird droppings 2024     Lung nodules 2024     Chest pain 2024     Centrilobular emphysema ([facility]) 2024     Severe persistent asthma with acute exacerbation 2024     Medical marijuana use 2024     Type 2 diabetes mellitus with hyperglycemia, without long-term current use of insulin ([facility]) 2022     Chronic obstructive asthma ([facility]) 2019     Major depressive disorder 2019     Gastroesophageal reflux disease with esophagitis 2019     Abnormal EKG 04/15/2019     Hypertension 04/15/2019     Mixed hyperlipidemia 2018       LOS (days): 3  Geometric Mean LOS (GMLOS) (days): 2.9  Days to GMLOS:-0.2     OBJECTIVE:  Risk of Unplanned Readmission Score: 17.98         Current admission status: Inpatient   Preferred Pharmacy:   [facility]/pharmacy #[id number] - [place] - [address]  [address]  [place] [address]  Phone: [phone] Fax: [phone]    [facility] - [place], [place] - [address]  [address]  [address]  [place] [address]  Phone: [phone] Fax: [phone]    [facility] - [place], [place] - [address]  [address] [address]  Phone: [phone] Fax: [phone]    Primary Care Provider: uSrjit Hayes DO    Primary Insurance: [employer]  REP  Secondary Insurance:     DISCHARGE DETAILS:    Discharge planning discussed with:: Patient at bedside.  Freedom of Choice: Yes  Comments - Freedom of Choice: FOC maintained - CM reviewed DCP.  Choice list provided.  Patient choice is [facility].  CM reserved in [facility].  Auth pending updated OT note.  CM contacted family/caregiver?: No- see comments (Independent)  Were Treatment Team discharge recommendations reviewed with patient/caregiver?: Yes  Did patient/caregiver verbalize understanding of patient care needs?: N/A- going to facility  Were patient/caregiver advised of the risks associated with not following Treatment Team discharge recommendations?: Yes    Requested Home Health Care         Is the patient interested in HHC at discharge?: No    DME Referral Provided  Referral made for DME?: No    Other Referral/Resources/Interventions Provided:  Interventions: Short Term Rehab  Referral Comments: See FOC. [facility] reserved, contacts updated, PASRR attached.  CM reviewed prior DME order.  Per CM notes, items were ordered and approved but not delivered.  Message sent in [place] updating order for cancel.    Treatment Team Recommendation: Short Term Rehab, Home with Home Health Care  Discharge Destination Plan:: Short Term Rehab  Transport at Discharge : Wheelchair van    IMM Given (Date):: 01/29/25  IMM Given to:: Patient (Verbal review of IMM at bedside with patient.  Understanding confirmed, no questions or concerns.  Original to medical  records.)    Accepting Facility Name, City & State : [facility] - [address] [place] [address], [place] [address]  Receiving Facility/Agency Phone Number: Phone: [phone]  Facility/Agency Fax Number: Fax: [phone]

## 2025-01-29 NOTE — CASE MANAGEMENT
[facility] received request for authorization from Care Manager.  Authorization request submitted for: [facility]  Facility Name:  [place]  NPI:  [id number]  Facility MD: [facility]  NPI: [id number]  Authorization initiated by contacting insurance:  [employer]  Via: [employer] Portal   Clinicals submitted via [employer] attachment   Pending Reference #: [id number]    Care Manager notified: Crow Sandoval    Updates to authorization status will be noted in chart. Please reach out to CM for updates on any clinical information.

## 2025-01-29 NOTE — TELEPHONE ENCOUNTER
Please let the patient wife know once he is discharged from the hospital and  to follow-up in the office with Dr Nidhi Bradley, Dr Torres

## 2025-01-29 NOTE — PROGRESS NOTES
Progress Note - Hospitalist   Name: Fahad Hernandez 69 y.o. male I MRN: [id number]  Unit/Bed#: 2 E 253-01 I Date of Admission: 1/25/2025   Date of Service: 1/29/2025 I Hospital Day: 3    Assessment & Plan  Acute right-sided weakness  C/c : Patient presented with fall and right sided weakness (weakness is ongoing for a few months - both RUE/RLE).   He was initially examined by the ER physician at 18:33 on 1/25/25. 3 a.m. & noted to have right sided weakness.      Patient was not in the window for TPA  CTA Head and Neck: moderate microangiopathy, L ICA 65% stenosis, R ICA 50% stenosis. Nonopacified R transverse / sigmoid sinus.   MRI Brain: No Acute infarct  MRI C-spine: Severe central stenosis C3-C4 with disc bulge, severe R foraminal stenosis at C2-C3 due to facet arthropathy.    Plan:   - Neurosurgery consulted for C3-C4 stenosis : no plans for emergent neurosurgical intervention.  Recommend PT OT and outpatient follow-up 4 to 6 weeks  - Neuro on board, no acute infarct, possible symptoms due to cervical stenosis  - Continue with atorvastatin 40 mg daily  - PT/OT/ST evaluation : recommend STR (wife also would prefer patient go to rehab)    Multiple falls  - PT/OT : recommend level 2   - Decreased Oxycodone 10mg to 7.5mg 2/2 concern that it may be contributing to falls and fatigue. He has been on it for 15 years. He is also now on Austedo for tardive dyskinesia which can cause increased concentrations.  - Consider decreasing restoril dose  Cm aware  Fall precautions  Type 2 diabetes mellitus with hyperglycemia, without long-term current use of insulin (HCC)  Lab Results   Component Value Date    HGBA1C 8.3 (H) 01/26/2025       Recent Labs     01/28/25  1553 01/28/25  2113 01/29/25  0725 01/29/25  1130   POCGLU 130 154* 103 115       ISS  Diabetic cardiac diet  (P) 129.2614365416497471  Hold metformin  On sliding scale coverage with ACHS checks  Hypoglycemia protocol    Medical marijuana use  noted  Chronic pain  syndrome  Decreased oxycodone dose from 10mg to 7.5mg due to concern its contributing to falls and fatigue, addition of Austedo to his home med regimen which can increase concentrations  Hypertension  Resume home metoprolol/amlodipine/ACE inhibitor  Blood pressure well-controlled   CASSIE (obstructive sleep apnea)  CPAP at nighttime ordered    VTE Pharmacologic Prophylaxis:   lovenox    Mobility:   Basic Mobility Inpatient Raw Score: 17  JH-HLM Goal: 5: Stand one or more mins  JH-HLM Achieved: 7: Walk 25 feet or more  JH-HLM Goal NOT achieved. Continue with multidisciplinary rounding and encourage appropriate mobility to improve upon JH-HLM goals.    Patient Centered Rounds: I performed bedside rounds with nursing staff today.   Discussions with Specialists or Other Care Team Provider: cm    Education and Discussions with Family / Patient: patient    Current Length of Stay: 3 day(s)  Current Patient Status: Inpatient   Certification Statement: The patient will continue to require additional inpatient hospital stay due to needs STR  Discharge Plan: await rehab placement    Code Status: Level 1 - Full Code    Subjective   No new concerns      Temp:  [97.4 °F (36.3 °C)-98.4 °F (36.9 °C)] 97.6 °F (36.4 °C)  HR:  [69-89] 79  BP: ()/(57-88) 126/69  Resp:  [17-20] 18  SpO2:  [91 %-95 %] 95 %  O2 Device: None (Room air)    Body mass index is 21.03 kg/m².     Input and Output Summary (last 24 hours):     Intake/Output Summary (Last 24 hours) at 1/29/2025 1200  Last data filed at 1/29/2025 1136  Gross per 24 hour   Intake 700 ml   Output 520 ml   Net 180 ml       Physical Exam  Constitutional:       General: He is not in acute distress.     Appearance: He is normal weight.   HENT:      Mouth/Throat:      Mouth: Mucous membranes are moist.   Cardiovascular:      Rate and Rhythm: Normal rate and regular rhythm.   Pulmonary:      Effort: Pulmonary effort is normal. No respiratory distress.      Breath sounds: Normal breath  sounds.   Abdominal:      General: Abdomen is flat. Bowel sounds are normal.      Palpations: Abdomen is soft.   Musculoskeletal:      Right lower leg: No edema.      Left lower leg: No edema.   Neurological:      Mental Status: He is alert and oriented to person, place, and time.           Lines/Drains:      Lab Results: I have reviewed the following results:   Results from last 7 days   Lab Units 01/26/25  0536   WBC Thousand/uL 12.08*   HEMOGLOBIN g/dL 11.0*   HEMATOCRIT % 35.0*   PLATELETS Thousands/uL 352   SEGS PCT % 59   LYMPHO PCT % 28   MONO PCT % 8   EOS PCT % 2     Results from last 7 days   Lab Units 01/26/25  0536   SODIUM mmol/L 137   POTASSIUM mmol/L 3.9   CHLORIDE mmol/L 103   CO2 mmol/L 27   BUN mg/dL 16   CREATININE mg/dL 0.77   ANION GAP mmol/L 7   CALCIUM mg/dL 8.9   ALBUMIN g/dL 3.2*   TOTAL BILIRUBIN mg/dL 0.37   ALK PHOS U/L 39   ALT U/L 32   AST U/L 16   GLUCOSE RANDOM mg/dL 116     Results from last 7 days   Lab Units 01/26/25  0536   INR  0.94     Results from last 7 days   Lab Units 01/29/25  1130 01/29/25  0725 01/28/25  2113 01/28/25  1553 01/28/25  1042 01/28/25  0718 01/27/25  2039 01/27/25  1613 01/27/25  1100 01/27/25  0735 01/26/25  2049 01/26/25  1643   POC GLUCOSE mg/dl 115 103 154* 130 131 114 134 127 153* 102 172* 142*     Results from last 7 days   Lab Units 01/26/25  0536   HEMOGLOBIN A1C % 8.3*           Recent Cultures (last 7 days):           Last 24 Hours Medication List:     Current Facility-Administered Medications:     acetaminophen (TYLENOL) tablet 650 mg, Q4H PRN    albuterol inhalation solution 2.5 mg, Q6H PRN    amLODIPine (NORVASC) tablet 10 mg, Daily **AND** lisinopril (ZESTRIL) tablet 40 mg, Daily    ARIPiprazole (ABILIFY) tablet 10 mg, Daily    aspirin (ECOTRIN LOW STRENGTH) EC tablet 81 mg, Daily    atorvastatin (LIPITOR) tablet 40 mg, Daily With Dinner    budesonide (PULMICORT) inhalation solution 0.5 mg, Q12H    buPROPion (WELLBUTRIN XL) 24 hr tablet 300 mg,  Daily    calcium carbonate (TUMS) chewable tablet 1,000 mg, Daily PRN    clopidogrel (PLAVIX) tablet 75 mg, Daily    Deutetrabenazine TABS 12 mg, BID    docusate sodium (COLACE) capsule 100 mg, BID    enoxaparin (LOVENOX) subcutaneous injection 40 mg, Daily    formoterol (PERFOROMIST) nebulizer solution 20 mcg, BID    insulin lispro (HumALOG/ADMELOG) 100 units/mL subcutaneous injection 2-12 Units, TID AC **AND** Fingerstick Glucose (POCT), TID AC    insulin lispro (HumALOG/ADMELOG) 100 units/mL subcutaneous injection 2-12 Units, HS    metoprolol succinate (TOPROL-XL) 24 hr tablet 25 mg, Daily    montelukast (SINGULAIR) tablet 10 mg, HS    nitroglycerin (NITROSTAT) SL tablet 0.4 mg, Q5 Min PRN    ondansetron (ZOFRAN) injection 4 mg, Q6H PRN    oxyCODONE (ROXICODONE) split tablet 7.5 mg, Q6H PRN    pantoprazole (PROTONIX) EC tablet 40 mg, Early Morning    polyethylene glycol (MIRALAX) packet 17 g, Daily PRN    tamsulosin (FLOMAX) capsule 0.4 mg, Daily    temazepam (RESTORIL) capsule 30 mg, HS PRN    venlafaxine (EFFEXOR-XR) 24 hr capsule 150 mg, Daily    Administrative Statements   Today, Patient Was Seen By: Carter Srivastava MD      **Please Note: This note may have been constructed using a voice recognition system.**

## 2025-01-29 NOTE — RESPIRATORY THERAPY NOTE
01/28/25 2251   Respiratory Assessment   Assessment Type Assess only   General Appearance Awake;Alert   Respiratory Pattern Normal   Chest Assessment Chest expansion symmetrical   Bilateral Breath Sounds Diminished   Cough None   Resp Comments Pt placed on cpap at this time   O2 Device V60   Non-Invasive Information   O2 Interface Device Nasal mask   Non-Invasive Ventilation Mode CPAP   $ Intermittent NIV Yes   SpO2 93 %   $ Pulse Oximetry Spot Check Charge Completed   Non-Invasive Settings   FiO2 (%) 21   PEEP/CPAP (cm H2O) 10   Rise Time 2   Non-Invasive Readings   Total Rate 15   Spontaneous MV (mL) 8   Spontaneous Vt (mL) 505   Leak (lpm) 15   Skin Intervention Skin intact   Non-Invasive Alarms   Insp Pressure High (cm H20) 15   Insp Pressure Low (cm H20) 5   Low Insp Pressure Time (sec) 20 sec   MV Low (L/min) 2   Vt High (mL) 1200   Vt Low (mL) 150   High Resp Rate (BPM) 40 BPM   Low Resp Rate (BPM) 8 BPM     RT Ventilator Management Note  Fahad Hernandez 69 y.o. male MRN: [id number]  Unit/Bed#: 2 E 253-01 Encounter: [id number]      Daily Screen    No data found in the last 10 encounters.           Physical Exam:   Assessment Type: Assess only  General Appearance: Awake, Alert  Respiratory Pattern: Normal  Chest Assessment: Chest expansion symmetrical  Bilateral Breath Sounds: Diminished  Cough: None  O2 Device: V60      Resp Comments: Pt placed on cpap at this time

## 2025-01-30 LAB
GLUCOSE SERPL-MCNC: 115 MG/DL (ref 65–140)
GLUCOSE SERPL-MCNC: 144 MG/DL (ref 65–140)
GLUCOSE SERPL-MCNC: 147 MG/DL (ref 65–140)
GLUCOSE SERPL-MCNC: 157 MG/DL (ref 65–140)

## 2025-01-30 PROCEDURE — 99232 SBSQ HOSP IP/OBS MODERATE 35: CPT | Performed by: FAMILY MEDICINE

## 2025-01-30 PROCEDURE — 94640 AIRWAY INHALATION TREATMENT: CPT

## 2025-01-30 PROCEDURE — 94664 DEMO&/EVAL PT USE INHALER: CPT

## 2025-01-30 PROCEDURE — 82948 REAGENT STRIP/BLOOD GLUCOSE: CPT

## 2025-01-30 PROCEDURE — 94660 CPAP INITIATION&MGMT: CPT

## 2025-01-30 PROCEDURE — 94760 N-INVAS EAR/PLS OXIMETRY 1: CPT

## 2025-01-30 RX ORDER — OXYCODONE HYDROCHLORIDE 15 MG/1
7.5 TABLET ORAL EVERY 6 HOURS PRN
Qty: 20 TABLET | Refills: 0 | Status: SHIPPED | OUTPATIENT
Start: 2025-01-30 | End: 2025-02-09

## 2025-01-30 RX ORDER — DOCUSATE SODIUM 100 MG/1
100 CAPSULE, LIQUID FILLED ORAL 2 TIMES DAILY
Qty: 60 CAPSULE | Refills: 0 | Status: ON HOLD
Start: 2025-01-30

## 2025-01-30 RX ORDER — CLOPIDOGREL BISULFATE 75 MG/1
75 TABLET ORAL DAILY
Qty: 30 TABLET | Refills: 0 | Status: ON HOLD
Start: 2025-01-31

## 2025-01-30 RX ADMIN — LISINOPRIL 40 MG: 20 TABLET ORAL at 09:22

## 2025-01-30 RX ADMIN — FORMOTEROL FUMARATE DIHYDRATE 20 MCG: 20 SOLUTION RESPIRATORY (INHALATION) at 07:18

## 2025-01-30 RX ADMIN — BUDESONIDE 0.5 MG: 0.5 INHALANT ORAL at 20:28

## 2025-01-30 RX ADMIN — BUPROPION HYDROCHLORIDE 300 MG: 150 TABLET, EXTENDED RELEASE ORAL at 09:22

## 2025-01-30 RX ADMIN — FORMOTEROL FUMARATE DIHYDRATE 20 MCG: 20 SOLUTION RESPIRATORY (INHALATION) at 20:28

## 2025-01-30 RX ADMIN — ENOXAPARIN SODIUM 40 MG: 40 INJECTION SUBCUTANEOUS at 09:22

## 2025-01-30 RX ADMIN — DOCUSATE SODIUM 100 MG: 100 CAPSULE, LIQUID FILLED ORAL at 09:22

## 2025-01-30 RX ADMIN — DOCUSATE SODIUM 100 MG: 100 CAPSULE, LIQUID FILLED ORAL at 17:07

## 2025-01-30 RX ADMIN — VENLAFAXINE HYDROCHLORIDE 150 MG: 150 CAPSULE, EXTENDED RELEASE ORAL at 09:22

## 2025-01-30 RX ADMIN — Medication 7.5 MG: at 20:18

## 2025-01-30 RX ADMIN — AMLODIPINE BESYLATE 10 MG: 10 TABLET ORAL at 09:22

## 2025-01-30 RX ADMIN — TAMSULOSIN HYDROCHLORIDE 0.4 MG: 0.4 CAPSULE ORAL at 09:22

## 2025-01-30 RX ADMIN — PANTOPRAZOLE SODIUM 40 MG: 40 TABLET, DELAYED RELEASE ORAL at 05:22

## 2025-01-30 RX ADMIN — BUDESONIDE 0.5 MG: 0.5 INHALANT ORAL at 07:18

## 2025-01-30 RX ADMIN — METOPROLOL SUCCINATE 25 MG: 25 TABLET, EXTENDED RELEASE ORAL at 09:22

## 2025-01-30 RX ADMIN — ATORVASTATIN CALCIUM 40 MG: 40 TABLET, FILM COATED ORAL at 17:07

## 2025-01-30 RX ADMIN — ASPIRIN 81 MG: 81 TABLET, COATED ORAL at 09:22

## 2025-01-30 RX ADMIN — INSULIN LISPRO 2 UNITS: 100 INJECTION, SOLUTION INTRAVENOUS; SUBCUTANEOUS at 21:12

## 2025-01-30 RX ADMIN — ACETAMINOPHEN 650 MG: 325 TABLET, FILM COATED ORAL at 14:26

## 2025-01-30 RX ADMIN — MONTELUKAST 10 MG: 10 TABLET, FILM COATED ORAL at 21:00

## 2025-01-30 RX ADMIN — Medication 7.5 MG: at 09:26

## 2025-01-30 RX ADMIN — TEMAZEPAM 30 MG: 15 CAPSULE ORAL at 21:54

## 2025-01-30 RX ADMIN — ARIPIPRAZOLE 10 MG: 5 TABLET ORAL at 09:22

## 2025-01-30 RX ADMIN — CLOPIDOGREL 75 MG: 75 TABLET ORAL at 09:22

## 2025-01-30 NOTE — RESPIRATORY THERAPY NOTE
RT Protocol Note  Fahad Hernandez 69 y.o. male MRN: [id number]  Unit/Bed#: 2 E 253-01 Encounter: [id number]    Assessment    Principal Problem:    Acute right-sided weakness  Active Problems:    Hypertension    Type 2 diabetes mellitus with hyperglycemia, without long-term current use of insulin (HCC)    Medical marijuana use    Multiple falls    Chronic pain syndrome    CASSIE (obstructive sleep apnea)      Home Pulmonary Medications:     01/29/25 2018   Respiratory Protocol   Protocol Initiated? No   Protocol Selection Respiratory   Language Barrier? No   Medical & Social History Reviewed? Yes   Diagnostic Studies Reviewed? Yes   Physical Assessment Performed? Yes   Home Devices/Therapy BiPAP/CPAP   Respiratory Plan Home Bronchodilator Patient pathway   Respiratory Assessment   Assessment Type During-treatment   General Appearance Awake;Alert   Respiratory Pattern Normal   Chest Assessment Chest expansion symmetrical   Bilateral Breath Sounds Diminished   Resp Comments Will continue with current tx plan   O2 Device RA   Cough Description   Sputum Amount None   Additional Assessments   Pulse 79   Respirations 21   SpO2 91 %       Home Devices/Therapy: BiPAP/CPAP    Past Medical History:   Diagnosis Date    Asthma     COPD (chronic obstructive pulmonary disease) ([facility])     Dementia ([facility])     Diabetes mellitus ([facility])     GERD (gastroesophageal reflux disease)     History of stroke 11/07/2019    Hypertension     Interstitial lung disease ([facility])     Major depressive disorder with current active episode 11/07/2019    Pneumonia     Rotator cuff arthropathy of right shoulder 12/17/2024    Sleep apnea     Sleep apnea, obstructive     Stroke ([facility])     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     Social History     Socioeconomic History    Marital status: /Civil Union     Spouse name: None    Number of children: None    Years of education: None    Highest education level: None   Occupational History    None   Tobacco Use    Smoking  status: Never     Passive exposure: Never    Smokeless tobacco: Never    Tobacco comments:     Patient admits to using marijuana, edibles and smoking    Vaping Use    Vaping status: Never Used   Substance and Sexual Activity    Alcohol use: Not Currently    Drug use: Yes     Types: Marijuana, Oxycodone, Psilocybin    Sexual activity: Yes     Partners: Female     Birth control/protection: Male Sterilization   Other Topics Concern    None   Social History Narrative    None     Social Drivers of Health     Financial Resource Strain: Low Risk  (11/1/2024)    Received from [facility]    Overall Financial Resource Strain (CARDIA)     Difficulty of Paying Living Expenses: Not hard at all   Food Insecurity: No Food Insecurity (1/26/2025)    Hunger Vital Sign     Worried About Running Out of Food in the Last Year: Never true     Ran Out of Food in the Last Year: Never true   Transportation Needs: No Transportation Needs (1/26/2025)    PRAPARE - Transportation     Lack of Transportation (Medical): No     Lack of Transportation (Non-Medical): No   Physical Activity: Inactive (11/1/2024)    Received from [facility]    Exercise Vital Sign     Days of Exercise per Week: 0 days     Minutes of Exercise per Session: 0 min   Stress: Stress Concern Present (11/1/2024)    Received from [facility]    [employer] - Occupational Stress Questionnaire     Feeling of Stress : To some extent   Social Connections: Moderately Isolated (11/1/2024)    Received from [facility]    Social Connection and Isolation Panel [NHANES]     Frequency of Communication with Friends and Family: Three times a week     Frequency of Social Gatherings with Friends and Family: Twice a week     Attends Gnosticist Services: Never     Active Member of Clubs or Organizations: No     Attends Club or Organization Meetings: Never     Marital Status:    Intimate Partner  "Violence: Unknown (1/26/2025)    Nursing IPS     Feels Physically and Emotionally Safe: Not on file     Physically Hurt by Someone: Not on file     Humiliated or Emotionally Abused by Someone: Not on file     Physically Hurt by Someone: No     Hurt or Threatened by Someone: No   Housing Stability: Low Risk  (1/26/2025)    Housing Stability Vital Sign     Unable to Pay for Housing in the Last Year: No     Number of Times Moved in the Last Year: 0     Homeless in the Last Year: No       Subjective         Objective    Physical Exam:   Assessment Type: During-treatment  General Appearance: Awake, Alert  Respiratory Pattern: Normal  Chest Assessment: Chest expansion symmetrical  Bilateral Breath Sounds: Diminished  O2 Device: RA    Vitals:  Blood pressure 117/67, pulse 79, temperature 97.8 °F (36.6 °C), temperature source Oral, resp. rate 21, height 5' 11\" (1.803 m), weight 68.4 kg (150 lb 12.7 oz), SpO2 91%.          Imaging and other studies: Results Review Statement: No pertinent imaging studies reviewed.    O2 Device: RA     Plan    Respiratory Plan: Home Bronchodilator Patient pathway        Resp Comments: Will continue with current tx plan   "

## 2025-01-30 NOTE — ASSESSMENT & PLAN NOTE
Lab Results   Component Value Date    HGBA1C 8.3 (H) 01/26/2025       Recent Labs     01/29/25  1645 01/29/25 2052 01/30/25  0737 01/30/25  1108   POCGLU 194* 109 115 144*       ISS  Diabetic cardiac diet  (P) 130.6356580164972602  Resume metformin on dc  On sliding scale coverage with ACHS checks  Hypoglycemia protocol

## 2025-01-30 NOTE — RESPIRATORY THERAPY NOTE
01/30/25 0348   Respiratory Assessment   Resp Comments Pt found off cpap   Non-Invasive Information   SpO2 91 %     RT Ventilator Management Note  Fahad Hernandez 69 y.o. male MRN: [id number]  Unit/Bed#: 2 E 253-01 Encounter: [id number]      Daily Screen    No data found in the last 10 encounters.           Physical Exam:   Assessment Type: Assess only  General Appearance: Drowsy  Respiratory Pattern: Normal  Chest Assessment: Chest expansion symmetrical  Bilateral Breath Sounds: Diminished  O2 Device: RA      Resp Comments: Pt found off cpap

## 2025-01-30 NOTE — CASE MANAGEMENT
Case Management Discharge Planning Note    Patient name Fahad Hernandez  Location 2 EAST 253/2 E 253-01 MRN [id number]  : 1955 Date 2025       Current Admission Date: 2025  Current Admission Diagnosis:Acute right-sided weakness   Patient Active Problem List    Diagnosis Date Noted Date Diagnosed    CASSIE (obstructive sleep apnea) 2025     Multiple falls 2025     Chronic pain syndrome 2025     Acute respiratory failure (HCC) 2025     Rotator cuff arthropathy of right shoulder 2024     Acute right-sided weakness 2024     Fall 2024     Metabolic acidosis 2024     Lactic acidosis due to diabetes mellitus (HCC) 2024     BENNY (acute kidney injury) ([facility]) 2024     Severe persistent asthma with (acute) exacerbation 2024     Asthma exacerbation attacks 2024     Moderate persistent asthma with exacerbation 2024     Orthostasis 10/15/2024     History of diabetes mellitus 10/11/2024     Hoarseness of voice 2024     SOB (shortness of breath) 2024     Long-term exposure involving bird droppings 2024     Lung nodules 2024     Chest pain 2024     Centrilobular emphysema ([facility]) 2024     Severe persistent asthma with acute exacerbation 2024     Medical marijuana use 2024     Type 2 diabetes mellitus with hyperglycemia, without long-term current use of insulin ([facility]) 2022     Chronic obstructive asthma ([facility]) 2019     Major depressive disorder 2019     Gastroesophageal reflux disease with esophagitis 2019     Abnormal EKG 04/15/2019     Hypertension 04/15/2019     Mixed hyperlipidemia 2018       LOS (days): 4  Geometric Mean LOS (GMLOS) (days): 2.9  Days to GMLOS:-1.3     OBJECTIVE:  Risk of Unplanned Readmission Score: 18.85         Current admission status: Inpatient   Preferred Pharmacy:   [facility]/pharmacy #[id number] - [place] - [address]  [address]  [place] [address]  Phone: [phone] Fax: [phone]    [facility] - [address] [address]  Phone: [phone] Fax: [phone]    [facility] [facility] [address] [place] [address]  Phone: [phone] Fax: [phone]    Primary Care Provider: Surjit Hayes DO    Primary Insurance: [employer]AC BEYER REP  Secondary Insurance:     DISCHARGE DETAILS:                                                                                                               Facility Insurance Auth Number: [id number]

## 2025-01-30 NOTE — CASE MANAGEMENT
[place] Support [place] has received APPROVED authorization.  Insurance:  [employer]    Auth obtained via portal  Authorization received for: [facility]  Facility: [place]    Authorization #: [id number]   Start of Care: 1/30  Next Review Date: 2/5  Continued Stay Care Coordinator:  n/a  Submit next review to: [phone]     Care Manager notified: Crow Sandoval    Please reach out to CM for updates on any clinical information.

## 2025-01-30 NOTE — PROGRESS NOTES
Progress Note - Hospitalist   Name: Fahad Hernandez 69 y.o. male I MRN: [id number]  Unit/Bed#: 2 E 253-01 I Date of Admission: 1/25/2025   Date of Service: 1/30/2025 I Hospital Day: 4    Assessment & Plan  Acute right-sided weakness  C/c : Patient presented with fall and right sided weakness (weakness is ongoing for a few months - both RUE/RLE).   He was initially examined by the ER physician at 18:33 on 1/25/25. 3 a.m. & noted to have right sided weakness.      Patient was not in the window for TPA  CTA Head and Neck: moderate microangiopathy, L ICA 65% stenosis, R ICA 50% stenosis. Nonopacified R transverse / sigmoid sinus.   MRI Brain: No Acute infarct  MRI C-spine: Severe central stenosis C3-C4 with disc bulge, severe R foraminal stenosis at C2-C3 due to facet arthropathy.    Plan:   - Neurosurgery consulted for C3-C4 stenosis : no plans for emergent neurosurgical intervention.  Recommend PT OT and outpatient follow-up 4 to 6 weeks  - Neuro on board, no acute infarct, possible symptoms due to cervical stenosis  - Continue with atorvastatin 40 mg daily  - PT/OT/ST evaluation : recommend STR (wife also would prefer patient go to rehab)    Multiple falls  - PT/OT : recommend level 2   - Decreased Oxycodone 10mg to 7.5mg 2/2 concern that it may be contributing to falls and fatigue. He has been on it for 15 years. He is also now on Austedo for tardive dyskinesia which can cause increased concentrations.  - Consider decreasing restoril dose  Cm aware  Fall precautions  Type 2 diabetes mellitus with hyperglycemia, without long-term current use of insulin (HCC)  Lab Results   Component Value Date    HGBA1C 8.3 (H) 01/26/2025       Recent Labs     01/29/25  1645 01/29/25  2052 01/30/25  0737 01/30/25  1108   POCGLU 194* 109 115 144*       ISS  Diabetic cardiac diet  (P) 130.3372805622941983  Resume metformin on dc  On sliding scale coverage with ACHS checks  Hypoglycemia protocol    Medical marijuana use  noted  Chronic  pain syndrome  Decreased oxycodone dose from 10mg to 7.5mg due to concern its contributing to falls and fatigue, addition of Austedo to his home med regimen which can increase concentrations  Hypertension  Resumed home metoprolol/amlodipine/ACE inhibitor  Blood pressure well-controlled   CASSIE (obstructive sleep apnea)  CPAP at nighttime ordered    VTE Pharmacologic Prophylaxis:   LOVENOX    Mobility:   Basic Mobility Inpatient Raw Score: 17  JH-HLM Goal: 5: Stand one or more mins  JH-HLM Achieved: 7: Walk 25 feet or more  JH-HLM Goal NOT achieved. Continue with multidisciplinary rounding and encourage appropriate mobility to improve upon JH-HLM goals.    Patient Centered Rounds: I performed bedside rounds with nursing staff today.   Discussions with Specialists or Other Care Team Provider: cm    Education and Discussions with Family / Patient: patient    Current Length of Stay: 4 day(s)  Current Patient Status: Inpatient   Certification Statement: The patient will continue to require additional inpatient hospital stay due to needs rehab  Discharge Plan: await auth    Code Status: Level 1 - Full Code    Subjective   No new concerns      Temp:  [97.8 °F (36.6 °C)-98.3 °F (36.8 °C)] 98.2 °F (36.8 °C)  HR:  [75-86] 77  BP: (117-130)/(62-79) 130/79  Resp:  [15-21] 15  SpO2:  [91 %-95 %] 94 %  O2 Device: None (Room air)    Body mass index is 20.98 kg/m².     Input and Output Summary (last 24 hours):     Intake/Output Summary (Last 24 hours) at 1/30/2025 1242  Last data filed at 1/30/2025 0942  Gross per 24 hour   Intake 480 ml   Output 750 ml   Net -270 ml       Physical Exam  Constitutional:       General: He is not in acute distress.     Appearance: He is normal weight.   HENT:      Mouth/Throat:      Mouth: Mucous membranes are moist.   Cardiovascular:      Rate and Rhythm: Normal rate and regular rhythm.   Pulmonary:      Effort: Pulmonary effort is normal. No respiratory distress.      Breath sounds: Normal breath  sounds.   Musculoskeletal:      Right lower leg: No edema.      Left lower leg: No edema.   Neurological:      Mental Status: He is alert and oriented to person, place, and time.         Lines/Drains:      Lab Results: I have reviewed the following results:   Results from last 7 days   Lab Units 01/26/25  0536   WBC Thousand/uL 12.08*   HEMOGLOBIN g/dL 11.0*   HEMATOCRIT % 35.0*   PLATELETS Thousands/uL 352   SEGS PCT % 59   LYMPHO PCT % 28   MONO PCT % 8   EOS PCT % 2     Results from last 7 days   Lab Units 01/26/25  0536   SODIUM mmol/L 137   POTASSIUM mmol/L 3.9   CHLORIDE mmol/L 103   CO2 mmol/L 27   BUN mg/dL 16   CREATININE mg/dL 0.77   ANION GAP mmol/L 7   CALCIUM mg/dL 8.9   ALBUMIN g/dL 3.2*   TOTAL BILIRUBIN mg/dL 0.37   ALK PHOS U/L 39   ALT U/L 32   AST U/L 16   GLUCOSE RANDOM mg/dL 116     Results from last 7 days   Lab Units 01/26/25  0536   INR  0.94     Results from last 7 days   Lab Units 01/30/25  1108 01/30/25  0737 01/29/25  2052 01/29/25  1645 01/29/25  1130 01/29/25  0725 01/28/25  2113 01/28/25  1553 01/28/25  1042 01/28/25  0718 01/27/25  2039 01/27/25  1613   POC GLUCOSE mg/dl 144* 115 109 194* 115 103 154* 130 131 114 134 127     Results from last 7 days   Lab Units 01/26/25  0536   HEMOGLOBIN A1C % 8.3*           Recent Cultures (last 7 days):             Last 24 Hours Medication List:     Current Facility-Administered Medications:     acetaminophen (TYLENOL) tablet 650 mg, Q4H PRN    albuterol inhalation solution 2.5 mg, Q6H PRN    amLODIPine (NORVASC) tablet 10 mg, Daily **AND** lisinopril (ZESTRIL) tablet 40 mg, Daily    ARIPiprazole (ABILIFY) tablet 10 mg, Daily    aspirin (ECOTRIN LOW STRENGTH) EC tablet 81 mg, Daily    atorvastatin (LIPITOR) tablet 40 mg, Daily With Dinner    budesonide (PULMICORT) inhalation solution 0.5 mg, Q12H    buPROPion (WELLBUTRIN XL) 24 hr tablet 300 mg, Daily    calcium carbonate (TUMS) chewable tablet 1,000 mg, Daily PRN    clopidogrel (PLAVIX) tablet 75  mg, Daily    Deutetrabenazine TABS 12 mg, BID    docusate sodium (COLACE) capsule 100 mg, BID    enoxaparin (LOVENOX) subcutaneous injection 40 mg, Daily    formoterol (PERFOROMIST) nebulizer solution 20 mcg, BID    insulin lispro (HumALOG/ADMELOG) 100 units/mL subcutaneous injection 2-12 Units, TID AC **AND** Fingerstick Glucose (POCT), TID AC    insulin lispro (HumALOG/ADMELOG) 100 units/mL subcutaneous injection 2-12 Units, HS    metoprolol succinate (TOPROL-XL) 24 hr tablet 25 mg, Daily    montelukast (SINGULAIR) tablet 10 mg, HS    nitroglycerin (NITROSTAT) SL tablet 0.4 mg, Q5 Min PRN    ondansetron (ZOFRAN) injection 4 mg, Q6H PRN    oxyCODONE (ROXICODONE) split tablet 7.5 mg, Q6H PRN    pantoprazole (PROTONIX) EC tablet 40 mg, Early Morning    polyethylene glycol (MIRALAX) packet 17 g, Daily PRN    tamsulosin (FLOMAX) capsule 0.4 mg, Daily    temazepam (RESTORIL) capsule 30 mg, HS PRN    venlafaxine (EFFEXOR-XR) 24 hr capsule 150 mg, Daily    Administrative Statements   Today, Patient Was Seen By: Carter Srivastava MD      **Please Note: This note may have been constructed using a voice recognition system.**

## 2025-01-30 NOTE — RESPIRATORY THERAPY NOTE
01/30/25 0738   Respiratory Protocol   Protocol Initiated? No   Protocol Selection Respiratory   Language Barrier? No   Medical & Social History Reviewed? Yes   Diagnostic Studies Reviewed? Yes   Physical Assessment Performed? Yes   Home Devices/Therapy BiPAP/CPAP   Respiratory Plan Home Bronchodilator Patient pathway   Respiratory Assessment   Assessment Type Post-treatment   General Appearance Alert;Awake   Respiratory Pattern Normal   Chest Assessment Chest expansion symmetrical   Bilateral Breath Sounds Diminished   Resp Comments will cont home regimen, pt awaiting placements   O2 Device ra   Additional Assessments   Pulse 86   Respirations 16   SpO2 95 %

## 2025-01-30 NOTE — CASE MANAGEMENT
Per Availity, SNF auth still pending.     Escalation email sent to [employer] Escalation Team requesting expedite of determination.     CM notified:  Crow Sandoval

## 2025-01-30 NOTE — RESPIRATORY THERAPY NOTE
01/29/25 6924   Respiratory Assessment   Assessment Type Assess only   General Appearance Drowsy   Respiratory Pattern Normal   Chest Assessment Chest expansion symmetrical   Bilateral Breath Sounds Diminished   Resp Comments Pt placed on cpap at this time   O2 Device RA   Non-Invasive Information   O2 Interface Device Face mask   Non-Invasive Ventilation Mode CPAP   $ Intermittent NIV Yes   SpO2 91 %   $ Pulse Oximetry Spot Check Charge Completed   Non-Invasive Settings   FiO2 (%) 21   PEEP/CPAP (cm H2O) 10   Rise Time 2   Non-Invasive Readings   Total Rate 19   Spontaneous MV (mL) 12   Spontaneous Vt (mL) 595   Leak (lpm) 30   Skin Intervention Skin intact   Non-Invasive Alarms   Insp Pressure High (cm H20) 15   Insp Pressure Low (cm H20) 5   Low Insp Pressure Time (sec) 20 sec   MV Low (L/min) 2   Vt High (mL) 1200   Vt Low (mL) 150   High Resp Rate (BPM) 40 BPM   Low Resp Rate (BPM) 8 BPM     RT Ventilator Management Note  Fahad Hernandez 69 y.o. male MRN: [id number]  Unit/Bed#: 2 E 253-01 Encounter: [id number]      Daily Screen    No data found in the last 10 encounters.           Physical Exam:   Assessment Type: Assess only  General Appearance: Drowsy  Respiratory Pattern: Normal  Chest Assessment: Chest expansion symmetrical  Bilateral Breath Sounds: Diminished  O2 Device: RA      Resp Comments: Pt placed on cpap at this time

## 2025-01-31 LAB
GLUCOSE SERPL-MCNC: 107 MG/DL (ref 65–140)
GLUCOSE SERPL-MCNC: 111 MG/DL (ref 65–140)
GLUCOSE SERPL-MCNC: 121 MG/DL (ref 65–140)
GLUCOSE SERPL-MCNC: 176 MG/DL (ref 65–140)

## 2025-01-31 PROCEDURE — 94760 N-INVAS EAR/PLS OXIMETRY 1: CPT

## 2025-01-31 PROCEDURE — 94660 CPAP INITIATION&MGMT: CPT

## 2025-01-31 PROCEDURE — 94640 AIRWAY INHALATION TREATMENT: CPT

## 2025-01-31 PROCEDURE — 94664 DEMO&/EVAL PT USE INHALER: CPT

## 2025-01-31 PROCEDURE — 99232 SBSQ HOSP IP/OBS MODERATE 35: CPT | Performed by: FAMILY MEDICINE

## 2025-01-31 PROCEDURE — 82948 REAGENT STRIP/BLOOD GLUCOSE: CPT

## 2025-01-31 RX ADMIN — INSULIN LISPRO 2 UNITS: 100 INJECTION, SOLUTION INTRAVENOUS; SUBCUTANEOUS at 17:16

## 2025-01-31 RX ADMIN — ENOXAPARIN SODIUM 40 MG: 40 INJECTION SUBCUTANEOUS at 08:13

## 2025-01-31 RX ADMIN — DOCUSATE SODIUM 100 MG: 100 CAPSULE, LIQUID FILLED ORAL at 08:13

## 2025-01-31 RX ADMIN — METOPROLOL SUCCINATE 25 MG: 25 TABLET, EXTENDED RELEASE ORAL at 08:13

## 2025-01-31 RX ADMIN — AMLODIPINE BESYLATE 10 MG: 10 TABLET ORAL at 08:13

## 2025-01-31 RX ADMIN — PANTOPRAZOLE SODIUM 40 MG: 40 TABLET, DELAYED RELEASE ORAL at 05:28

## 2025-01-31 RX ADMIN — DOCUSATE SODIUM 100 MG: 100 CAPSULE, LIQUID FILLED ORAL at 17:16

## 2025-01-31 RX ADMIN — BUPROPION HYDROCHLORIDE 300 MG: 150 TABLET, EXTENDED RELEASE ORAL at 08:13

## 2025-01-31 RX ADMIN — ATORVASTATIN CALCIUM 40 MG: 40 TABLET, FILM COATED ORAL at 15:57

## 2025-01-31 RX ADMIN — Medication 7.5 MG: at 15:57

## 2025-01-31 RX ADMIN — BUDESONIDE 0.5 MG: 0.5 INHALANT ORAL at 19:45

## 2025-01-31 RX ADMIN — TEMAZEPAM 30 MG: 15 CAPSULE ORAL at 21:02

## 2025-01-31 RX ADMIN — LISINOPRIL 40 MG: 20 TABLET ORAL at 08:13

## 2025-01-31 RX ADMIN — MONTELUKAST 10 MG: 10 TABLET, FILM COATED ORAL at 21:01

## 2025-01-31 RX ADMIN — Medication 7.5 MG: at 09:53

## 2025-01-31 RX ADMIN — FORMOTEROL FUMARATE DIHYDRATE 20 MCG: 20 SOLUTION RESPIRATORY (INHALATION) at 19:45

## 2025-01-31 RX ADMIN — VENLAFAXINE HYDROCHLORIDE 150 MG: 150 CAPSULE, EXTENDED RELEASE ORAL at 08:13

## 2025-01-31 RX ADMIN — ASPIRIN 81 MG: 81 TABLET, COATED ORAL at 08:13

## 2025-01-31 RX ADMIN — CLOPIDOGREL 75 MG: 75 TABLET ORAL at 08:13

## 2025-01-31 RX ADMIN — ARIPIPRAZOLE 10 MG: 5 TABLET ORAL at 08:23

## 2025-01-31 RX ADMIN — TAMSULOSIN HYDROCHLORIDE 0.4 MG: 0.4 CAPSULE ORAL at 08:13

## 2025-01-31 NOTE — RESPIRATORY THERAPY NOTE
RT Protocol Note  Fahad Hernandez 69 y.o. male MRN: [id number]  Unit/Bed#: 2 E 253-01 Encounter: [id number]    Assessment    Principal Problem:    Acute right-sided weakness  Active Problems:    Hypertension    Type 2 diabetes mellitus with hyperglycemia, without long-term current use of insulin (HCC)    Medical marijuana use    Multiple falls    Chronic pain syndrome    CASSIE (obstructive sleep apnea)      Home Pulmonary Medications:    Home Devices/Therapy: BiPAP/CPAP    Past Medical History:   Diagnosis Date    Asthma     COPD (chronic obstructive pulmonary disease) ([facility])     Dementia ([facility])     Diabetes mellitus ([facility])     GERD (gastroesophageal reflux disease)     History of stroke 11/07/2019    Hypertension     Interstitial lung disease ([facility])     Major depressive disorder with current active episode 11/07/2019    Pneumonia     Rotator cuff arthropathy of right shoulder 12/17/2024    Sleep apnea     Sleep apnea, obstructive     Stroke ([facility])     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     Social History     Socioeconomic History    Marital status: /Civil Union     Spouse name: None    Number of children: None    Years of education: None    Highest education level: None   Occupational History    None   Tobacco Use    Smoking status: Never     Passive exposure: Never    Smokeless tobacco: Never    Tobacco comments:     Patient admits to using marijuana, edibles and smoking    Vaping Use    Vaping status: Never Used   Substance and Sexual Activity    Alcohol use: Not Currently    Drug use: Yes     Types: Marijuana, Oxycodone, Psilocybin    Sexual activity: Yes     Partners: Female     Birth control/protection: Male Sterilization   Other Topics Concern    None   Social History Narrative    None     Social Drivers of Health     Financial Resource Strain: Low Risk  (11/1/2024)    Received from [facility]    Overall Financial Resource Strain (CARDIA)     Difficulty of Paying Living Expenses: Not hard at all    Food Insecurity: No Food Insecurity (1/26/2025)    Hunger Vital Sign     Worried About Running Out of Food in the Last Year: Never true     Ran Out of Food in the Last Year: Never true   Transportation Needs: No Transportation Needs (1/26/2025)    PRAPARE - Transportation     Lack of Transportation (Medical): No     Lack of Transportation (Non-Medical): No   Physical Activity: Inactive (11/1/2024)    Received from [facility]    Exercise Vital Sign     Days of Exercise per Week: 0 days     Minutes of Exercise per Session: 0 min   Stress: Stress Concern Present (11/1/2024)    Received from [facility]    [employer] - Occupational Stress Questionnaire     Feeling of Stress : To some extent   Social Connections: Moderately Isolated (11/1/2024)    Received from [facility]    Social Connection and Isolation Panel [NHANES]     Frequency of Communication with Friends and Family: Three times a week     Frequency of Social Gatherings with Friends and Family: Twice a week     Attends Yarsani Services: Never     Active Member of Clubs or Organizations: No     Attends Club or Organization Meetings: Never     Marital Status:    Intimate Partner Violence: Unknown (1/26/2025)    Nursing IPS     Feels Physically and Emotionally Safe: Not on file     Physically Hurt by Someone: Not on file     Humiliated or Emotionally Abused by Someone: Not on file     Physically Hurt by Someone: No     Hurt or Threatened by Someone: No   Housing Stability: Low Risk  (1/26/2025)    Housing Stability Vital Sign     Unable to Pay for Housing in the Last Year: No     Number of Times Moved in the Last Year: 0     Homeless in the Last Year: No       Subjective         Objective    Physical Exam:   Assessment Type: During-treatment  General Appearance: Alert, Awake  Respiratory Pattern: Normal  Chest Assessment: Chest expansion symmetrical  Bilateral Breath Sounds:  "Diminished    Vitals:  Blood pressure 102/67, pulse 75, temperature 98.8 °F (37.1 °C), resp. rate 15, height 5' 11\" (1.803 m), weight 68.2 kg (150 lb 6.4 oz), SpO2 95%.          Imaging and other studies:     O2 Device: ra     Plan    Respiratory Plan: Home Bronchodilator Patient pathway        Resp Comments: will continue with dorota's home medications   "

## 2025-01-31 NOTE — CASE MANAGEMENT
Case Management Discharge Planning Note    Patient name Fahad Hernandez  Location 2 EAST 253/2 E 253-01 MRN [id number]  : 1955 Date 2025       Current Admission Date: 2025  Current Admission Diagnosis:Acute right-sided weakness   Patient Active Problem List    Diagnosis Date Noted Date Diagnosed    CASSIE (obstructive sleep apnea) 2025     Multiple falls 2025     Chronic pain syndrome 2025     Acute respiratory failure (HCC) 2025     Rotator cuff arthropathy of right shoulder 2024     Acute right-sided weakness 2024     Fall 2024     Metabolic acidosis 2024     Lactic acidosis due to diabetes mellitus (HCC) 2024     BENNY (acute kidney injury) ([facility]) 2024     Severe persistent asthma with (acute) exacerbation 2024     Asthma exacerbation attacks 2024     Moderate persistent asthma with exacerbation 2024     Orthostasis 10/15/2024     History of diabetes mellitus 10/11/2024     Hoarseness of voice 2024     SOB (shortness of breath) 2024     Long-term exposure involving bird droppings 2024     Lung nodules 2024     Chest pain 2024     Centrilobular emphysema ([facility]) 2024     Severe persistent asthma with acute exacerbation 2024     Medical marijuana use 2024     Type 2 diabetes mellitus with hyperglycemia, without long-term current use of insulin ([facility]) 2022     Chronic obstructive asthma ([facility]) 2019     Major depressive disorder 2019     Gastroesophageal reflux disease with esophagitis 2019     Abnormal EKG 04/15/2019     Hypertension 04/15/2019     Mixed hyperlipidemia 2018       LOS (days): 5  Geometric Mean LOS (GMLOS) (days): 2.9  Days to GMLOS:-2.1     OBJECTIVE:  Risk of Unplanned Readmission Score: 19.03         Current admission status: Inpatient   Preferred Pharmacy:   [facility]pharmacy #[id number] - [place] - [address]  [address]  [place] [address]  Phone: [phone] Fax: [phone]    [facility] - [place], [place] - [address]  [address]  [address]  [facility] [address]  Phone: [phone] Fax: [phone]    [facility] - [place], [place] - [address]  [address] [address]  Phone: [phone] Fax: [phone]    Primary Care Provider: Surjit Hayes DO    Primary Insurance: [employer]  REP  Secondary Insurance:     DISCHARGE DETAILS:    Discharge planning discussed with:: Patient at bedside.  Freedom of Choice: Yes  Comments - Freedom of Choice: FOC maintained - CM reviewed DCP.  Message received from [facility] liaison stating they do not have a bed for the patient today. Offered [facility] instead with transfer to [facility] once bed is available.  CM reviewed with patient.  Patient agreeable.  CM contacted family/caregiver?: Yes (Attempted to reach spouse via phone.  Unable to make contact.)  Were Treatment Team discharge recommendations reviewed with patient/caregiver?: Yes  Did patient/caregiver verbalize understanding of patient care needs?: N/A- going to facility  Were patient/caregiver advised of the risks associated with not following Treatment Team discharge recommendations?: Yes    Contacts  Patient Contacts: Carol (spouse)  Relationship to Patient:: Family  Contact Method: Phone  Phone Number: [phone]  Reason/Outcome: Continuity of Care, Discharge Planning    Requested Home Health Care         Is the patient interested in HHC at discharge?: No    Other Referral/Resources/Interventions Provided:  Interventions: Short Term Rehab, Transportation to treatment  Referral Comments: [facility] added to SNF referral, reserved & auth change requested.  Facility contacts updated.  Transport requested via [facility] for 1300 p/u (w/c van).    Number/Name of Dispatcher: [facility] [phone]     ETA of Transport (Date): 01/31/25  ETA of  Transport (Time): 1300    IMM Given (Date):: 01/31/25  IMM Given to:: Patient (Verbal review of IMM at bedside with patient.  Understanding verbalized, no questions or concerns.  Original to medical records.)    Accepting Facility Name, City & State : [facility] - [address] [place] [address]  Receiving Facility/Agency Phone Number: Phone: [phone]  Facility/Agency Fax Number: Fax: [phone]     @ [facility] contacted by DCS team stating that [facility] is OON.  CM reviewed with [place] liaison who states patient has a PPO and can go [facility].  CM made contact with spouse, Carol ([phone]), and reviewed status.  Spouse endorses same, states she's always been able to go [facility].  CM in communication with DCS team for coordination.  CM cancelled transport in [facility] and sent secure chat message to [facility] dispatch with update.

## 2025-01-31 NOTE — ASSESSMENT & PLAN NOTE
Lab Results   Component Value Date    HGBA1C 8.3 (H) 01/26/2025       Recent Labs     01/30/25  1645 01/30/25  2106 01/31/25  0800 01/31/25  1047   POCGLU 147* 157* 107 121       ISS  Diabetic cardiac diet  (P) 131.5  Resume metformin on dc  On sliding scale coverage with ACHS checks  Hypoglycemia protocol

## 2025-01-31 NOTE — TELEPHONE ENCOUNTER
1/31/25 - PT STILL IN HOSPITAL    01/29/2025- PT STILL IN HOSPITAL    01/27/2025- PT IS IN [facility]  03/06/2025- NEWPT 4-6 WK HFU SNPX W/ SPINE SURGEON after trialing conservative management for c-spine   E-CONSULT COMPLETED    JAMIL [facility]  Patient needs post hospital follow-up joint appointment spine surgeon in 4-6 weeks after trialing conservative management for c-spine

## 2025-01-31 NOTE — CASE MANAGEMENT
Received message from trey asking to change facility to [facility]:    Lacey     Called [employer] ([phone]) and received a automated message that said [facility] service center is currently down and needed to call back later . Will try again in 15 mins. Trey notified shawanda lynch     Sent a fax to emiliano [phone] with request askign to change facility in the mean time     UPDATE 12:19 PM   Called [employer] ([phone]) and spoke to Surjit  who stated that he had to transfer me to a state team ([phone] option 3) [facility] who [facility] and therefore can not be changed to that facility . Cm notified shawanda lynch     UPDATE 1:04 PM   Called [employer] again ([phone]) and got the temporarily down automated message again ,     UPDATE 1:19 PM   Called [employer] again ([phone]) spoke to Wendy who stated that we would need to void the current auth and initiate  a new auth     DC Support Center received request for authorization from Care Manager.  Authorization request submitted for: [facility]  Facility Name:  [facility]: NPI: [id number]  Facility MD: Lacey   NPI: [id number]  Authorization initiated by contacting insurance:  emiliano   Via: Phone # [phone]  Clinicals submitted via fax # [phone]  Pending Reference #: [id number]    Care Manager notified: shawanda lynch     Updates to authorization status will be noted in chart. Please reach out to TREY for updates on any clinical information.

## 2025-01-31 NOTE — CASE MANAGEMENT
Case Management Progress Note    Patient name Fahad Hernandez  Location 2 EAST 253/2 E 253-01 MRN [id number]  : 1955 Date 2025       LOS (days): 5  Geometric Mean LOS (GMLOS) (days): 2.9  Days to GMLOS:-2.2        OBJECTIVE:        Current admission status: Inpatient  Preferred Pharmacy:   [id number] - [place] - [address]  [address]  [place] [address]  Phone: [phone] Fax: [phone]    [facility] - [address]  [address]  [address]  [facility] [address]  Phone: [phone] Fax: [phone]    [facility] [place] - [id number] [place] [id number]  Phone: [phone] Fax: [phone]    Primary Care Provider: Surjit Hayes DO    Primary Insurance: [employer]  REP  Secondary Insurance:     PROGRESS NOTE:  CM continues to maintain communication with all parties re:  DCP.  DCS team initiated new auth [facility] per Aet's directive.  CM received follow-up call from spouse stating that she read the reviews online for facility and does not want patient to go there.  Spouse gave permission for contact from [facility] liaison.  Return call received from spouse stating she spoke with liaison and does not trust that patient will be transferred to [facility] once bed is available.      CM reviewed appeal right as patient is medically cleared for d/c and has a safe d/c plan with several available SNF options.  Spouse requesting new placement referral be sent for out of network options as patient has a PPO plan and does not have to remain in-network.  CM opened new [employer] referral for STR's within 25 miles for home zip code as spouse states she does not drive much.      CM provided verbal instructions of completing appeal over the phone including patient's [facility] ID#, hospital address, [facility] contact number, and general timeline/appeal process.  TREY  advised spouse to call CM back if unable to complete appeal.  Spouse called CM back to state that [facility] is asking for patient's [facility] ID number.  CM reviewed chart for alt ins ID numbers, only [employer] MC number available.  Spouse states she speak with them again and see if she can find it.      @ [phone] - [facility] appeal notice received via fax.  Call received from spouse stating same.  CM reviewed DNOD and emailed copy to spouse at [email or web address].  Spouse requesting alt list of available facilities be sent to her for review.  CM advised weekend team would follow-up with her as there are not any yet.  Appeal packet (IMM, appeal notice, & DNOD) sent to CM D/C Support team for processing.

## 2025-01-31 NOTE — DISCHARGE SUMMARY
Discharge Summary - Hospitalist   Name: Fahad Hernandez 69 y.o. male I MRN: [id number]  Unit/Bed#: 2 E 253-01 I Date of Admission: 1/25/2025   Date of Service: 1/31/2025 I Hospital Day: 5     Assessment & Plan  Acute right-sided weakness  C/c : Patient presented with fall and right sided weakness (weakness is ongoing for a few months - both RUE/RLE).   He was initially examined by the ER physician at 18:33 on 1/25/25. 3 a.m. & noted to have right sided weakness.      Patient was not in the window for TPA  CTA Head and Neck: moderate microangiopathy, L ICA 65% stenosis, R ICA 50% stenosis. Nonopacified R transverse / sigmoid sinus.   MRI Brain: No Acute infarct  MRI C-spine: Severe central stenosis C3-C4 with disc bulge, severe R foraminal stenosis at C2-C3 due to facet arthropathy.    Plan:   - Neurosurgery consulted for C3-C4 stenosis : no plans for emergent neurosurgical intervention.  Recommend PT OT and outpatient follow-up 4 to 6 weeks  - Neuro on board, no acute infarct, possible symptoms due to cervical stenosis  - Continue with atorvastatin 40 mg daily  - PT/OT/ST evaluation : recommend STR (wife also would prefer patient go to rehab)    Multiple falls  - PT/OT : recommend level 2   - Decreased Oxycodone 10mg to 7.5mg 2/2 concern that it may be contributing to falls and fatigue. He has been on it for 15 years. He is also now on Austedo for tardive dyskinesia which can cause increased concentrations.  - Consider decreasing restoril dose  Cm aware  Fall precautions  Type 2 diabetes mellitus with hyperglycemia, without long-term current use of insulin (HCC)  Lab Results   Component Value Date    HGBA1C 8.3 (H) 01/26/2025       Recent Labs     01/30/25  1645 01/30/25  2106 01/31/25  0800 01/31/25  1047   POCGLU 147* 157* 107 121       ISS  Diabetic cardiac diet  (P) 131.5  Resume metformin on dc  On sliding scale coverage with ACHS checks  Hypoglycemia protocol    Medical marijuana use  noted  Chronic pain  syndrome  Decreased oxycodone dose from 10mg to 7.5mg due to concern its contributing to falls and fatigue, addition of Austedo to his home med regimen which can increase concentrations  Hypertension  Resumed home metoprolol/amlodipine/ACE inhibitor  Blood pressure well-controlled   CASSIE (obstructive sleep apnea)  CPAP at nighttime ordered   Discharging Physician / Practitioner: Carter Srivastava MD  PCP: Surjit Hayes DO  Admission Date:   Admission Orders (From admission, onward)       Ordered        01/26/25 1126  INPATIENT ADMISSION  Once            01/25/25 2317  Place in Observation  Once            01/25/25 2204  Place in Observation  Once,   Status:  Canceled                          Discharge Date: 01/31/25    Disposition:      SNF    Reason for Admission: Weakness, falls    Discharge Diagnoses:     Please see assessment and plan section above for further details regarding discharge diagnoses.     Medical Problems       Resolved Problems  Date Reviewed: 9/4/2024   None         Consultations During Hospital Stay:  Neurosurgery  neurology      Medication Adjustments and Discharge Medications:  Summary of Medication Adjustments made as a result of this hospitalization: see med rec  Medication Dosing Tapers - Please refer to Discharge Medication List for details on any medication dosing tapers (if applicable to patient).  Medications being temporarily held (include recommended restart time): see med rec  Discharge Medication List: See after visit summary for reconciled discharge medications.       Diet Recommendations at Discharge:  Diet -        Diet Orders   (From admission, onward)                 Start     Ordered    01/27/25 1434  Diet Andrew/CHO Controlled; Consistent Carbohydrate Diet Level 2 (5 carb servings/75 grams CHO/meal); Sodium 4 GM (BRIDGET)  Diet effective now        References:    Adult Nutrition Support Algorithm    RD Therapeutic Diet Order Protocol   Question Answer Comment   Diet Type Andrew/CHO  "Controlled    Andrew/CHO Controlled Consistent Carbohydrate Diet Level 2 (5 carb servings/75 grams CHO/meal)    Other Restriction(s): Sodium 4 GM (BRIDGET)    RD to adjust diet per protocol? Yes        01/27/25 6817                      Hospital Course:     Fahad Hernandez is a 69 y.o. male patient who originally presented to the hospital on 1/25/2025 with underlying history of diabetes hypertension stroke, interstitial lung disease, GERD, asthma, depressive disorder, CASSIE on CPAP who presented after a fall while using his walker.  He did report having ongoing both right upper/lower extremity weakness for few months.    In the ED he was evaluated and noted to have right upper/lower extremity weakness.  He was not in the window for tPA.  CTA head and neck noted moderate microangiopathy.  MRI brain no acute infarct.  MRI C-spine noted severe canal stenosis C3-C4 with disc bulge.  Severe right foraminal stenosis at C2-C3 due to facet arthropathy.  Neurosurgery was consulted for the C3-C4 stenosis; no plans for emergent neurosurgical intervention.  Recommended PT OT and outpatient follow-up in 4 to 6 weeks.  Neurology was on board, no acute infarct, possible symptoms due to cervical stenosis.  Recommended to continue dual antiplatelet and atorvastatin.  PT OT recommended short-term rehab.  Patient and wife in agreement.    Likely patient will go to rehab today.  Awaiting Auth  Patient and wife agreeable with plan.    Condition at Discharge: fair     Discharge Day Visit / Exam:       Vitals: Blood Pressure: 128/75 (01/31/25 0813)  Pulse: 76 (01/31/25 0900)  Temperature: 97.9 °F (36.6 °C) (01/30/25 2247)  Temp Source: Oral (01/29/25 1509)  Respirations: 17 (01/31/25 0800)  Height: 5' 11\" (180.3 cm) (01/27/25 0912)  Weight - Scale: 68.2 kg (150 lb 6.4 oz) (01/31/25 0552)  SpO2: 96 % (01/31/25 1049)  Exam:   Physical Exam  Constitutional:       General: He is not in acute distress.     Appearance: He is normal weight. He is not " toxic-appearing.   HENT:      Mouth/Throat:      Mouth: Mucous membranes are moist.   Cardiovascular:      Rate and Rhythm: Normal rate and regular rhythm.   Pulmonary:      Effort: Pulmonary effort is normal. No respiratory distress.      Breath sounds: Normal breath sounds.   Musculoskeletal:      Right lower leg: No edema.      Left lower leg: No edema.   Neurological:      Mental Status: He is alert and oriented to person, place, and time.         Discussion with Family: wife    Goals of Care Discussions:  Code Status at Discharge: Level 1 - Full Code      Discharge instructions/Information to patient and family:   See after visit summary section titled Discharge Instructions for information provided to patient and family.       Discharge Statement:  I spent 40 minutes discharging the patient. This time was spent on the day of discharge. I had direct contact with the patient on the day of discharge. Greater than 50% of the total time was spent examining patient, answering all patient questions, arranging and discussing plan of care with patient as well as directly providing post-discharge instructions.  Additional time then spent on discharge activities.    ** Please Note: This note has been constructed using a voice recognition system **

## 2025-01-31 NOTE — RESPIRATORY THERAPY NOTE
01/30/25 9721   Respiratory Assessment   Resp Comments patient placed on CPAP for hours of sleep   Non-Invasive Information   O2 Interface Device Nasal mask   Non-Invasive Ventilation Mode CPAP   $ Intermittent NIV Yes   $ Pulse Oximetry Spot Check Charge Completed   Non-Invasive Settings   FiO2 (%) 28   PEEP/CPAP (cm H2O) 10   Rise Time 2   Non-Invasive Readings   Total Rate 21   Spontaneous MV (mL) 7.8   Spontaneous Vt (mL) 368

## 2025-02-01 ENCOUNTER — APPOINTMENT (INPATIENT)
Dept: CT IMAGING | Facility: HOSPITAL | Age: 70
DRG: 552 | End: 2025-02-01
Payer: COMMERCIAL

## 2025-02-01 LAB
GLUCOSE SERPL-MCNC: 179 MG/DL (ref 65–140)
GLUCOSE SERPL-MCNC: 198 MG/DL (ref 65–140)
GLUCOSE SERPL-MCNC: 200 MG/DL (ref 65–140)
GLUCOSE SERPL-MCNC: 98 MG/DL (ref 65–140)
GLUCOSE SERPL-MCNC: 99 MG/DL (ref 65–140)

## 2025-02-01 PROCEDURE — 97116 GAIT TRAINING THERAPY: CPT

## 2025-02-01 PROCEDURE — 94760 N-INVAS EAR/PLS OXIMETRY 1: CPT

## 2025-02-01 PROCEDURE — 72125 CT NECK SPINE W/O DYE: CPT

## 2025-02-01 PROCEDURE — 97535 SELF CARE MNGMENT TRAINING: CPT

## 2025-02-01 PROCEDURE — 82948 REAGENT STRIP/BLOOD GLUCOSE: CPT

## 2025-02-01 PROCEDURE — NC001 PR NO CHARGE: Performed by: NURSE PRACTITIONER

## 2025-02-01 PROCEDURE — 70450 CT HEAD/BRAIN W/O DYE: CPT

## 2025-02-01 PROCEDURE — 94640 AIRWAY INHALATION TREATMENT: CPT

## 2025-02-01 RX ADMIN — BUPROPION HYDROCHLORIDE 300 MG: 150 TABLET, EXTENDED RELEASE ORAL at 09:18

## 2025-02-01 RX ADMIN — MONTELUKAST 10 MG: 10 TABLET, FILM COATED ORAL at 22:38

## 2025-02-01 RX ADMIN — ARIPIPRAZOLE 10 MG: 5 TABLET ORAL at 09:18

## 2025-02-01 RX ADMIN — Medication 7.5 MG: at 11:13

## 2025-02-01 RX ADMIN — VENLAFAXINE HYDROCHLORIDE 150 MG: 150 CAPSULE, EXTENDED RELEASE ORAL at 09:18

## 2025-02-01 RX ADMIN — ASPIRIN 81 MG: 81 TABLET, COATED ORAL at 09:18

## 2025-02-01 RX ADMIN — DOCUSATE SODIUM 100 MG: 100 CAPSULE, LIQUID FILLED ORAL at 17:30

## 2025-02-01 RX ADMIN — ENOXAPARIN SODIUM 40 MG: 40 INJECTION SUBCUTANEOUS at 09:18

## 2025-02-01 RX ADMIN — DOCUSATE SODIUM 100 MG: 100 CAPSULE, LIQUID FILLED ORAL at 09:18

## 2025-02-01 RX ADMIN — ATORVASTATIN CALCIUM 40 MG: 40 TABLET, FILM COATED ORAL at 17:30

## 2025-02-01 RX ADMIN — TAMSULOSIN HYDROCHLORIDE 0.4 MG: 0.4 CAPSULE ORAL at 09:18

## 2025-02-01 RX ADMIN — INSULIN LISPRO 4 UNITS: 100 INJECTION, SOLUTION INTRAVENOUS; SUBCUTANEOUS at 22:38

## 2025-02-01 RX ADMIN — PANTOPRAZOLE SODIUM 40 MG: 40 TABLET, DELAYED RELEASE ORAL at 05:23

## 2025-02-01 RX ADMIN — ACETAMINOPHEN 650 MG: 325 TABLET, FILM COATED ORAL at 23:49

## 2025-02-01 RX ADMIN — INSULIN LISPRO 2 UNITS: 100 INJECTION, SOLUTION INTRAVENOUS; SUBCUTANEOUS at 17:30

## 2025-02-01 RX ADMIN — LISINOPRIL 40 MG: 20 TABLET ORAL at 09:18

## 2025-02-01 RX ADMIN — AMLODIPINE BESYLATE 10 MG: 10 TABLET ORAL at 09:18

## 2025-02-01 RX ADMIN — BUDESONIDE 0.5 MG: 0.5 INHALANT ORAL at 19:05

## 2025-02-01 RX ADMIN — METOPROLOL SUCCINATE 25 MG: 25 TABLET, EXTENDED RELEASE ORAL at 09:18

## 2025-02-01 RX ADMIN — Medication 7.5 MG: at 19:16

## 2025-02-01 RX ADMIN — FORMOTEROL FUMARATE DIHYDRATE 20 MCG: 20 SOLUTION RESPIRATORY (INHALATION) at 19:05

## 2025-02-01 RX ADMIN — CLOPIDOGREL 75 MG: 75 TABLET ORAL at 09:18

## 2025-02-01 NOTE — OCCUPATIONAL THERAPY NOTE
Occupational Therapy Progress Note     Patient Name: Fahad Hernandez  Today's Date: 2/1/2025  Problem List  Principal Problem:    Acute right-sided weakness  Active Problems:    Hypertension    Type 2 diabetes mellitus with hyperglycemia, without long-term current use of insulin (HCC)    Medical marijuana use    Multiple falls    Chronic pain syndrome    CASSIE (obstructive sleep apnea)       02/01/25 0843   Note Type   Note Type Treatment for insurance authorization   Pain Assessment   Pain Assessment Tool 0-10   Pain Score No Pain   Restrictions/Precautions   Weight Bearing Precautions Per Order No   Braces or Orthoses Other (Comment)  (None per pt)   Other Precautions Chair Alarm;Bed Alarm;Fall Risk;Telemetry   ADL   Where Assessed   (In bathroom)   Eating Assistance 6  Modified independent   Grooming Assistance   (CGA)   Grooming Deficit Supervision/safety;Increased time to complete;Setup   UB Bathing Assistance 5  Supervision/Setup   UB Bathing Deficit Setup;Increased time to complete;Supervision/safety   LB Bathing Assistance 4  Minimal Assistance   UB Dressing Assistance 5  Supervision/Setup   UB Dressing Deficit Setup;Increased time to complete;Supervision/safety   LB Dressing Assistance 3  Moderate Assistance   LB Dressing Deficit Increased time to complete;Supervision/safety   Functional Standing Tolerance   Time 4 minutes   Activity Standing at sink to complete grooming/hygiene tasks with walker.   Bed Mobility   Additional Comments Pt found seated edge of bed.   Transfers   Sit to Stand   (CGA)   Additional items Assist x 1;Bedrails   Stand to Sit   (CGA)   Additional items Assist x 1;Armrests   Functional Mobility   Functional Mobility   (CGA)   Additional Comments In room to bathroom and out to hallway   Additional items Rolling walker   Cognition   Overall Cognitive Status WFL   Arousal/Participation Alert;Responsive;Cooperative   Attention Within functional limits   Orientation Level Oriented X4    Activity Tolerance   Activity Tolerance Patient tolerated treatment well   Assessment   Assessment Patient participated in Skilled OT session this date with interventions consisting of ADL re training with the use of correct body mechnaics, safety awareness and fall prevention techniques, and increase standing tolerance time with unilateral UE support to complete sink level ADLs . Patient agreeable to OT treatment session, upon arrival patient was found seated edge of bed, pt left seated in chair with all needs met at the end of the session.   In comparison to previous session, patient with improvements in functional mobility with RW and participation in ADL tasks while standing at the sink. Patient requiring verbal cues for safety and verbal cues for pacing thru activity steps. Patient continues to be functioning below baseline level, occupational performance remains limited secondary to factors listed above and increased risk for falls and injury.   From OT standpoint, recommendation at time of d/c would be Level II (Moderate Resource Intensity).   Patient to benefit from continued Occupational Therapy treatment while in the hospital to address deficits as defined above and maximize level of functional independence with ADLs and functional mobility.   Plan   Treatment Interventions ADL retraining;Functional transfer training;Endurance training;Compensatory technique education   Goal Expiration Date 02/10/25   OT Treatment Day 2   OT Frequency 3-5x/wk   Discharge Recommendation   Rehab Resource Intensity Level, OT II (Moderate Resource Intensity)   AM-PAC Daily Activity Inpatient   Lower Body Dressing 2   Bathing 2   Toileting 3   Upper Body Dressing 3   Grooming 3   Eating 4   Daily Activity Raw Score 17   Daily Activity Standardized Score (Calc for Raw Score >=11) 37.26

## 2025-02-01 NOTE — CASE MANAGEMENT
Case Management Discharge Planning Note    Patient name Fahad Hernandez  Location 2 EAST 253/2 E 253-01 MRN [id number]  : 1955 Date 2025       Current Admission Date: 2025  Current Admission Diagnosis:Acute right-sided weakness   Patient Active Problem List    Diagnosis Date Noted Date Diagnosed    CASSIE (obstructive sleep apnea) 2025     Multiple falls 2025     Chronic pain syndrome 2025     Acute respiratory failure (HCC) 2025     Rotator cuff arthropathy of right shoulder 2024     Acute right-sided weakness 2024     Fall 2024     Metabolic acidosis 2024     Lactic acidosis due to diabetes mellitus (HCC) 2024     BENNY (acute kidney injury) ([facility]) 2024     Severe persistent asthma with (acute) exacerbation 2024     Asthma exacerbation attacks 2024     Moderate persistent asthma with exacerbation 2024     Orthostasis 10/15/2024     History of diabetes mellitus 10/11/2024     Hoarseness of voice 2024     SOB (shortness of breath) 2024     Long-term exposure involving bird droppings 2024     Lung nodules 2024     Chest pain 2024     Centrilobular emphysema ([facility]) 2024     Severe persistent asthma with acute exacerbation 2024     Medical marijuana use 2024     Type 2 diabetes mellitus with hyperglycemia, without long-term current use of insulin ([facility]) 2022     Chronic obstructive asthma ([facility]) 2019     Major depressive disorder 2019     Gastroesophageal reflux disease with esophagitis 2019     Abnormal EKG 04/15/2019     Hypertension 04/15/2019     Mixed hyperlipidemia 2018       LOS (days): 6  Geometric Mean LOS (GMLOS) (days): 2.9  Days to GMLOS:-3.1     OBJECTIVE:  Risk of Unplanned Readmission Score: 19.3         Current admission status: Inpatient   Preferred Pharmacy:   [facility]/pharmacy #[id number] - [place] - [address]  [address]  [place] [address]  Phone: [phone] Fax: [phone]    [facility] - [place] [address] [facility]  [address] [facility]  [address]  [place] [address]  Phone: [phone] Fax: [phone]    [facility] [place] [place] - [address] [place] [address]  Phone: [phone] Fax: [phone]    Primary Care Provider: Surjit Hayes DO    Primary Insurance: AET[employer]  REP  Secondary Insurance:     DISCHARGE DETAILS:                                          Other Referral/Resources/Interventions Provided:  Referral Comments: Spoke adriana pt today and asked him that when the appeal process was completed and if they agree with the hospital, would he agree to go to [facility]. He stated he would not until he spoke to his wife.

## 2025-02-01 NOTE — CASE MANAGEMENT
[facility] received a [employer] appeal.   Case Control ID: [id number]  EMR Key: [id number]  Clinicals attached via [employer] online portal. Appeal is pending.     Care Manager notified: [place] [facility]    Please reach out to  for updates on any clinical information.     Your Submission Tracking ID is [id number]

## 2025-02-01 NOTE — PLAN OF CARE
Problem: PHYSICAL THERAPY ADULT  Goal: Performs mobility at highest level of function for planned discharge setting.  See evaluation for individualized goals.  Description: Treatment/Interventions: Functional transfer training, LE strengthening/ROM, Elevations, Therapeutic exercise, Endurance training, Patient/family training, Bed mobility, Gait training, Spoke to nursing, OT          See flowsheet documentation for full assessment, interventions and recommendations.  Outcome: Progressing  Note: Prognosis: Good  Problem List: Decreased strength, Decreased endurance, Impaired balance, Decreased mobility, Pain  Assessment: Pt seen for PT treatment session this date with interventions consisting of gait training w/ emphasis on improving pt's ability to ambulate level surfaces x 125 ft with CGA provided by therapist with RW and therapeutic activity consisting of training: sit<>stand transfers and static standing tolerance for 5 minutes w/ Unilateral UE support. Pt agreeable to PT treatment session upon arrival, pt found seated at EOB, in no apparent distress and A&O x 4 . In comparison to previous session, pt with improvements in level of assist needed, distance, ability to negotiate stairs . Pt w/ good carryover for HEP, verbalizing understanding. PT to assess carryover next session. and Post session: pt returned back to recliner. Continue to recommend Level 2 Moderate utilization HHPT vs post acute rehabilitation services at time of d/c in order to maximize pt's functional independence and safety w/ mobility. Pt continues to be functioning below baseline level, and remains limited 2* factors listed above and including continued need of medical management and monitoring, decreased strength and balance, leading to an increased risk of falls, decreased endurance and activity tolerance limiting mobility  . PT will continue to see pt during current hospitalization in order to address the deficits listed above and provide  interventions consistent w/ POC in effort to achieve STGs.  Barriers to Discharge: Inaccessible home environment, Decreased caregiver support     Rehab Resource Intensity Level, PT: II (Moderate Resource Intensity)    See flowsheet documentation for full assessment.

## 2025-02-01 NOTE — RESPIRATORY THERAPY NOTE
01/31/25 2236   Respiratory Assessment   Assessment Type Assess only   General Appearance Awake;Alert   Respiratory Pattern Normal   Chest Assessment Chest expansion symmetrical   Bilateral Breath Sounds Diminished   Resp Comments pt placed on CPAP for hours of sleep   Non-Invasive Information   O2 Interface Device Nasal mask   Non-Invasive Ventilation Mode CPAP   $ Intermittent NIV Yes   SpO2 94 %   $ Pulse Oximetry Spot Check Charge Completed   Non-Invasive Settings   FiO2 (%) 28   PEEP/CPAP (cm H2O) 10   Rise Time 2   Non-Invasive Readings   Total Rate 13   Spontaneous MV (mL) 11   Spontaneous Vt (mL) 827   Non-Invasive Alarms   Insp Pressure High (cm H20) 15   Insp Pressure Low (cm H20) 5   Low Insp Pressure Time (sec) 20 sec   MV Low (L/min) 2   Vt High (mL) 1200   Vt Low (mL) 150   High Resp Rate (BPM) 40 BPM   Low Resp Rate (BPM) 8 BPM

## 2025-02-01 NOTE — CASE MANAGEMENT
Case Management Discharge Planning Note    Patient name Fahad Hernandez  Location 2 EAST 253/2 E 253-01 MRN [id number]  : 1955 Date 2025       Current Admission Date: 2025  Current Admission Diagnosis:Acute right-sided weakness   Patient Active Problem List    Diagnosis Date Noted Date Diagnosed    CASSIE (obstructive sleep apnea) 2025     Multiple falls 2025     Chronic pain syndrome 2025     Acute respiratory failure (HCC) 2025     Rotator cuff arthropathy of right shoulder 2024     Acute right-sided weakness 2024     Fall 2024     Metabolic acidosis 2024     Lactic acidosis due to diabetes mellitus (HCC) 2024     BENNY (acute kidney injury) ([facility]) 2024     Severe persistent asthma with (acute) exacerbation 2024     Asthma exacerbation attacks 2024     Moderate persistent asthma with exacerbation 2024     Orthostasis 10/15/2024     History of diabetes mellitus 10/11/2024     Hoarseness of voice 2024     SOB (shortness of breath) 2024     Long-term exposure involving bird droppings 2024     Lung nodules 2024     Chest pain 2024     Centrilobular emphysema ([facility]) 2024     Severe persistent asthma with acute exacerbation 2024     Medical marijuana use 2024     Type 2 diabetes mellitus with hyperglycemia, without long-term current use of insulin ([facility]) 2022     Chronic obstructive asthma ([facility]) 2019     Major depressive disorder 2019     Gastroesophageal reflux disease with esophagitis 2019     Abnormal EKG 04/15/2019     Hypertension 04/15/2019     Mixed hyperlipidemia 2018       LOS (days): 6  Geometric Mean LOS (GMLOS) (days): 2.9  Days to GMLOS:-3.1     OBJECTIVE:  Risk of Unplanned Readmission Score: 19.3         Current admission status: Inpatient   Preferred Pharmacy:   [facility]/pharmacy #[id number] - [place] - [address]  [address]  [place] [address]  Phone: [phone] Fax: [phone]    [facility] - [address]  [address] [facility] [address]  [facility] [address]  Phone: [phone] Fax: [phone]    [facility] [id number] [place] [address]  Phone: [phone] Fax: [phone]    Primary Care Provider: Surjit Hayes DO    Primary Insurance: AET[employer] [facility] REP  Secondary Insurance:     DISCHARGE DETAILS:                                          Other Referral/Resources/Interventions Provided:  Referral Comments: emailed accepting facilities to pt's wife

## 2025-02-01 NOTE — CASE MANAGEMENT
Case Management Discharge Planning Note    Patient name Fahad Hernandez  Location 2 EAST 253/2 E 253-01 MRN [id number]  : 1955 Date 2025       Current Admission Date: 2025  Current Admission Diagnosis:Acute right-sided weakness   Patient Active Problem List    Diagnosis Date Noted Date Diagnosed    CASSIE (obstructive sleep apnea) 2025     Multiple falls 2025     Chronic pain syndrome 2025     Acute respiratory failure (HCC) 2025     Rotator cuff arthropathy of right shoulder 2024     Acute right-sided weakness 2024     Fall 2024     Metabolic acidosis 2024     Lactic acidosis due to diabetes mellitus (HCC) 2024     BENNY (acute kidney injury) ([facility]) 2024     Severe persistent asthma with (acute) exacerbation 2024     Asthma exacerbation attacks 2024     Moderate persistent asthma with exacerbation 2024     Orthostasis 10/15/2024     History of diabetes mellitus 10/11/2024     Hoarseness of voice 2024     SOB (shortness of breath) 2024     Long-term exposure involving bird droppings 2024     Lung nodules 2024     Chest pain 2024     Centrilobular emphysema ([facility]) 2024     Severe persistent asthma with acute exacerbation 2024     Medical marijuana use 2024     Type 2 diabetes mellitus with hyperglycemia, without long-term current use of insulin ([facility]) 2022     Chronic obstructive asthma ([facility]) 2019     Major depressive disorder 2019     Gastroesophageal reflux disease with esophagitis 2019     Abnormal EKG 04/15/2019     Hypertension 04/15/2019     Mixed hyperlipidemia 2018       LOS (days): 6  Geometric Mean LOS (GMLOS) (days): 2.9  Days to GMLOS:-3     OBJECTIVE:  Risk of Unplanned Readmission Score: 19.3         Current admission status: Inpatient   Preferred Pharmacy:   [facility]/pharmacy #[id number] - [place] - [address]  [address]  [place]  [place] [address]  Phone: [phone] Fax: [phone]    [facility] - [address] [address]  Phone: [phone] Fax: [phone]    [facility] [id number] [place] [id number]  Phone: [phone] Fax: [phone]    Primary Care Provider: Surjit Hayes DO    Primary Insurance: ZAC BEYER REP  Secondary Insurance:     DISCHARGE DETAILS:                                                                                                               Facility Insurance Auth Number: [id number]

## 2025-02-01 NOTE — PLAN OF CARE
Problem: OCCUPATIONAL THERAPY ADULT  Goal: Performs self-care activities at highest level of function for planned discharge setting.  See evaluation for individualized goals.  Description: Treatment Interventions: ADL retraining, Functional transfer training, Endurance training, Compensatory technique education          See flowsheet documentation for full assessment, interventions and recommendations.   Note: Limitation: Decreased ADL status, Decreased UE strength, Decreased Safe judgement during ADL, Decreased endurance, Decreased self-care trans, Decreased high-level ADLs  Prognosis: Good  Assessment: Patient participated in Skilled OT session this date with interventions consisting of ADL re training with the use of correct body mechnaics, safety awareness and fall prevention techniques, and increase standing tolerance time with unilateral UE support to complete sink level ADLs . Patient agreeable to OT treatment session, upon arrival patient was found seated edge of bed, pt left seated in chair with all needs met at the end of the session.   In comparison to previous session, patient with improvements in functional mobility with RW and participation in ADL tasks while standing at the sink. Patient requiring verbal cues for safety and verbal cues for pacing thru activity steps. Patient continues to be functioning below baseline level, occupational performance remains limited secondary to factors listed above and increased risk for falls and injury.   From OT standpoint, recommendation at time of d/c would be Level II (Moderate Resource Intensity).   Patient to benefit from continued Occupational Therapy treatment while in the hospital to address deficits as defined above and maximize level of functional independence with ADLs and functional mobility.     Rehab Resource Intensity Level, OT: II (Moderate Resource Intensity)

## 2025-02-01 NOTE — QUICK NOTE
Patient seen at bedside, awake alert oriented x 3 with no acute concerns.  Discussed with patient's wife over the phone that we are currently waiting to hear back regarding the discharge appeal. She has no further medical questions  Vital signs remained stable, patient remains medically cleared for discharge.   DC orders remain in chart.   Case management input appreciated.

## 2025-02-01 NOTE — PHYSICAL THERAPY NOTE
"    02/01/25 0842   PT Last Visit   PT Visit Date 02/01/25   Note Type   Note Type   (Simultaneous filing. User may not have seen previous data.)   Pain Assessment   Pain Assessment Tool   (Simultaneous filing. User may not have seen previous data.)   Pain Score 9  (Simultaneous filing. User may not have seen previous data.)   Pain Radiating Towards   (Down R LE)   Hospital Pain Intervention(s) Ambulation/increased activity;Repositioned;Emotional support   Restrictions/Precautions   Weight Bearing Precautions Per Order   (Simultaneous filing. User may not have seen previous data.)   Braces or Orthoses   (Simultaneous filing. User may not have seen previous data.)   Other Precautions   (Simultaneous filing. User may not have seen previous data.)   General   Chart Reviewed Yes   Response to Previous Treatment Patient with no complaints from previous session.   Family/Caregiver Present No   Cognition   Overall Cognitive Status WFL   Arousal/Participation Alert;Responsive;Cooperative   Attention Within functional limits   Orientation Level Oriented X4   Memory Within functional limits   Following Commands Follows multistep commands without difficulty   Comments Pt agreeable to PT.   Subjective   Subjective \" marc been walking\".   Bed Mobility   Additional Comments Not assessed. Pt received sitting at EOB eating breakfast.   Transfers   Sit to Stand   (CG x 1)   Additional items Assist x 1;Increased time required;Verbal cues   Stand to Sit   (CG x 1)   Additional items Assist x 1;Armrests;Increased time required;Verbal cues   Ambulation/Elevation   Gait pattern Decreased hip extension;Decreased heel strike;Decreased toe off;Step through pattern;Decreased R stance   Gait Assistance   (CG x 1)   Additional items Assist x 1   Assistive Device Rolling walker   Distance 125 ft   Stair Management Assistance   (CG x 1)   Additional items Verbal cues   Stair Management Technique Two rails;Reciprocal   Number of Stairs 4   Balance "   Static Sitting Good   Dynamic Sitting Fair +   Static Standing Fair +   Dynamic Standing Fair   Ambulatory Fair -   Endurance Deficit   Endurance Deficit Yes   Activity Tolerance   Activity Tolerance Patient limited by fatigue   Medical Staff Made Aware Pt seen as co-treat with MIKE Ramirez due to patient's co-morbidities, clinically unstable presentation, and present impairments which are a regression from the patient's baseline.   Assessment   Prognosis Good   Problem List Decreased strength;Decreased endurance;Impaired balance;Decreased mobility;Pain   Assessment Pt seen for PT treatment session this date with interventions consisting of gait training w/ emphasis on improving pt's ability to ambulate level surfaces x 125 ft with CGA provided by therapist with RW and therapeutic activity consisting of training: sit<>stand transfers and static standing tolerance for 5 minutes w/ Unilateral UE support. Pt agreeable to PT treatment session upon arrival, pt found seated at EOB, in no apparent distress and A&O x 4 . In comparison to previous session, pt with improvements in level of assist needed, distance, ability to negotiate stairs . Pt w/ good carryover for HEP, verbalizing understanding. PT to assess carryover next session. and Post session: pt returned back to recliner. Continue to recommend Level 2 Moderate utilization HHPT vs post acute rehabilitation services at time of d/c in order to maximize pt's functional independence and safety w/ mobility. Pt continues to be functioning below baseline level, and remains limited 2* factors listed above and including continued need of medical management and monitoring, decreased strength and balance, leading to an increased risk of falls, decreased endurance and activity tolerance limiting mobility  . PT will continue to see pt during current hospitalization in order to address the deficits listed above and provide interventions consistent w/ POC in effort to achieve STGs.    Barriers to Discharge Inaccessible home environment;Decreased caregiver support   Plan   Treatment/Interventions ADL retraining;Functional transfer training;Elevations;Therapeutic exercise;Endurance training;Cognitive reorientation;Equipment eval/education;Gait training;Bed mobility;Continued evaluation;OT   Progress Progressing toward goals   PT Frequency 3-5x/wk   Discharge Recommendation   Rehab Resource Intensity Level, PT II (Moderate Resource Intensity)   AM-PAC Basic Mobility Inpatient   Turning in Flat Bed Without Bedrails 4   Lying on Back to Sitting on Edge of Flat Bed Without Bedrails 4   Moving Bed to Chair 4   Standing Up From Chair Using Arms 4   Walk in Room 3   Climb 3-5 Stairs With Railing 3   Basic Mobility Inpatient Raw Score 22   Basic Mobility Standardized Score 47.4   [facility] Highest Level Of Mobility   -HLM Goal 7: Walk 25 feet or more   -HLM Achieved 7: Walk 25 feet or more   Education   Education Provided Mobility training;Home exercise program;Assistive device   Patient Demonstrates acceptance/verbal understanding;Reinforcement needed   End of Consult   Patient Position at End of Consult Bed/Chair alarm activated;All needs within reach;Bedside chair

## 2025-02-01 NOTE — CASE MANAGEMENT
[facility] has received APPROVED authorization.  Insurance:   A[employer]  Auth obtained via fax.  Authorization received for: [facility]  Facility: [facility]   Authorization #: [id number]  Start of Care: 1/30  Next Review Date: 2/04  Continued Stay Care Coordinator:  none given  Submit next review to: P: [phone]      Care Manager notified: [place] CMs    Please reach out to  for updates on any clinical information.

## 2025-02-02 LAB
GLUCOSE SERPL-MCNC: 123 MG/DL (ref 65–140)
GLUCOSE SERPL-MCNC: 127 MG/DL (ref 65–140)
GLUCOSE SERPL-MCNC: 157 MG/DL (ref 65–140)
GLUCOSE SERPL-MCNC: 177 MG/DL (ref 65–140)

## 2025-02-02 PROCEDURE — 82948 REAGENT STRIP/BLOOD GLUCOSE: CPT

## 2025-02-02 PROCEDURE — 99232 SBSQ HOSP IP/OBS MODERATE 35: CPT | Performed by: FAMILY MEDICINE

## 2025-02-02 PROCEDURE — 94640 AIRWAY INHALATION TREATMENT: CPT

## 2025-02-02 PROCEDURE — 94660 CPAP INITIATION&MGMT: CPT

## 2025-02-02 PROCEDURE — 94760 N-INVAS EAR/PLS OXIMETRY 1: CPT

## 2025-02-02 RX ADMIN — ATORVASTATIN CALCIUM 40 MG: 40 TABLET, FILM COATED ORAL at 17:09

## 2025-02-02 RX ADMIN — TAMSULOSIN HYDROCHLORIDE 0.4 MG: 0.4 CAPSULE ORAL at 09:06

## 2025-02-02 RX ADMIN — AMLODIPINE BESYLATE 10 MG: 10 TABLET ORAL at 09:02

## 2025-02-02 RX ADMIN — Medication 7.5 MG: at 09:02

## 2025-02-02 RX ADMIN — VENLAFAXINE HYDROCHLORIDE 150 MG: 150 CAPSULE, EXTENDED RELEASE ORAL at 09:02

## 2025-02-02 RX ADMIN — ARIPIPRAZOLE 10 MG: 5 TABLET ORAL at 09:02

## 2025-02-02 RX ADMIN — ASPIRIN 81 MG: 81 TABLET, COATED ORAL at 09:02

## 2025-02-02 RX ADMIN — MONTELUKAST 10 MG: 10 TABLET, FILM COATED ORAL at 21:19

## 2025-02-02 RX ADMIN — ACETAMINOPHEN 650 MG: 325 TABLET, FILM COATED ORAL at 13:32

## 2025-02-02 RX ADMIN — DOCUSATE SODIUM 100 MG: 100 CAPSULE, LIQUID FILLED ORAL at 17:09

## 2025-02-02 RX ADMIN — Medication 7.5 MG: at 21:19

## 2025-02-02 RX ADMIN — Medication 7.5 MG: at 03:29

## 2025-02-02 RX ADMIN — BUDESONIDE 0.5 MG: 0.5 INHALANT ORAL at 20:48

## 2025-02-02 RX ADMIN — BUPROPION HYDROCHLORIDE 300 MG: 150 TABLET, EXTENDED RELEASE ORAL at 09:02

## 2025-02-02 RX ADMIN — METOPROLOL SUCCINATE 25 MG: 25 TABLET, EXTENDED RELEASE ORAL at 09:02

## 2025-02-02 RX ADMIN — ENOXAPARIN SODIUM 40 MG: 40 INJECTION SUBCUTANEOUS at 09:01

## 2025-02-02 RX ADMIN — PANTOPRAZOLE SODIUM 40 MG: 40 TABLET, DELAYED RELEASE ORAL at 06:07

## 2025-02-02 RX ADMIN — FORMOTEROL FUMARATE DIHYDRATE 20 MCG: 20 SOLUTION RESPIRATORY (INHALATION) at 20:48

## 2025-02-02 RX ADMIN — LISINOPRIL 40 MG: 20 TABLET ORAL at 09:02

## 2025-02-02 RX ADMIN — Medication 7.5 MG: at 15:13

## 2025-02-02 RX ADMIN — CLOPIDOGREL 75 MG: 75 TABLET ORAL at 09:02

## 2025-02-02 RX ADMIN — INSULIN LISPRO 2 UNITS: 100 INJECTION, SOLUTION INTRAVENOUS; SUBCUTANEOUS at 21:19

## 2025-02-02 RX ADMIN — DOCUSATE SODIUM 100 MG: 100 CAPSULE, LIQUID FILLED ORAL at 09:02

## 2025-02-02 RX ADMIN — INSULIN LISPRO 2 UNITS: 100 INJECTION, SOLUTION INTRAVENOUS; SUBCUTANEOUS at 12:00

## 2025-02-02 NOTE — CASE MANAGEMENT
[place] [facility] received determination for [employer] Appeal.   Med Director agrees with termination of services.   Liability starts: 2/03/2025    Care Manager notified: Nicolle Muñoz    Please reach out to CM for updates on any clinical information.

## 2025-02-02 NOTE — ASSESSMENT & PLAN NOTE
- PT/OT : recommend level 2   - Decreased Oxycodone 10mg to 7.5mg 2/2 concern that it may be contributing to falls and fatigue. He has been on it for 15 years. He is also now on Austedo for tardive dyskinesia which can cause increased concentrations.  - Consider decreasing restoril dose  Cm aware  Fall precautions    **2/1/2025, patient accidentally tripped over his foot and fell on the right side of his head now with ecchymosis around right eye.  Remains awake alert oriented x 3.   No new neurodeficits. CT head/C-spine negative for acute findings.   He remains medically cleared for discharge.  Fall precautions in place.

## 2025-02-02 NOTE — CASE MANAGEMENT
Case Management Discharge Planning Note    Patient name Fahad Hernandez  Location 2 EAST 253/2 E 253-01 MRN [id number]  : 1955 Date 2025       Current Admission Date: 2025  Current Admission Diagnosis:Acute right-sided weakness   Patient Active Problem List    Diagnosis Date Noted Date Diagnosed    CASSIE (obstructive sleep apnea) 2025     Multiple falls 2025     Chronic pain syndrome 2025     Acute respiratory failure (HCC) 2025     Rotator cuff arthropathy of right shoulder 2024     Acute right-sided weakness 2024     Fall 2024     Metabolic acidosis 2024     Lactic acidosis due to diabetes mellitus (HCC) 2024     BENNY (acute kidney injury) ([facility]) 2024     Severe persistent asthma with (acute) exacerbation 2024     Asthma exacerbation attacks 2024     Moderate persistent asthma with exacerbation 2024     Orthostasis 10/15/2024     History of diabetes mellitus 10/11/2024     Hoarseness of voice 2024     SOB (shortness of breath) 2024     Long-term exposure involving bird droppings 2024     Lung nodules 2024     Chest pain 2024     Centrilobular emphysema ([facility]) 2024     Severe persistent asthma with acute exacerbation 2024     Medical marijuana use 2024     Type 2 diabetes mellitus with hyperglycemia, without long-term current use of insulin ([facility]) 2022     Chronic obstructive asthma ([facility]) 2019     Major depressive disorder 2019     Gastroesophageal reflux disease with esophagitis 2019     Abnormal EKG 04/15/2019     Hypertension 04/15/2019     Mixed hyperlipidemia 2018       LOS (days): 7  Geometric Mean LOS (GMLOS) (days): 2.9  Days to GMLOS:-4     OBJECTIVE:  Risk of Unplanned Readmission Score: 19.58         Current admission status: Inpatient   Preferred Pharmacy:   [facility]/pharmacy #[id number] - [place] - [address]  [address]  [place]  [place] [address]  Phone: [phone] Fax: [phone]    [facility] - [place] [address] [address]  [address]  [address]  [facility] [address]  Phone: [phone] Fax: [phone]    [facility] - [place], [place] - [address]  [address] [address]  Phone: [phone] Fax: [phone]    Primary Care Provider: Surjit Hayes DO    Primary Insurance: AETNA MC REP  Secondary Insurance:     DISCHARGE DETAILS:                                          Other Referral/Resources/Interventions Provided:  Interventions: Short Term Rehab  Referral Comments: [place] [facility] [place] received a [employer] appeal. Case Control ID: [id number] EMR Key: [id number] Clinicals attached via [employer] online portal. Appeal is pending.   Sat 8:16 AM  [facility] has received APPROVED authorization. Insurance:   Aetna Auth obtained via fax. Authorization received for: SNF Facility: [facility] Authorization #: [id number] Start of Care: 1/30 Next Review Date: 2/04 Continued Stay Care Coordinator:  none given Submit next review to: P: [phone]   Sat 8:20 AM  MARIYA Maurice has been obtained for [facility], but wife only wants [facility].  No beds available at [facility] per Loretta

## 2025-02-02 NOTE — RESPIRATORY THERAPY NOTE
Attending rapid response with BALJ[facility] and [employer], called for witnessed fall, pt alert and oriented, no respiratory distress noted at this time

## 2025-02-02 NOTE — CASE MANAGEMENT
Case Management Discharge Planning Note    Patient name Fahad Hernandez  Location 2 EAST 253/2 E 253-01 MRN [id number]  : 1955 Date 2025       Current Admission Date: 2025  Current Admission Diagnosis:Acute right-sided weakness   Patient Active Problem List    Diagnosis Date Noted Date Diagnosed    CASSIE (obstructive sleep apnea) 2025     Multiple falls 2025     Chronic pain syndrome 2025     Acute respiratory failure (HCC) 2025     Rotator cuff arthropathy of right shoulder 2024     Acute right-sided weakness 2024     Fall 2024     Metabolic acidosis 2024     Lactic acidosis due to diabetes mellitus (HCC) 2024     BENNY (acute kidney injury) ([facility]) 2024     Severe persistent asthma with (acute) exacerbation 2024     Asthma exacerbation attacks 2024     Moderate persistent asthma with exacerbation 2024     Orthostasis 10/15/2024     History of diabetes mellitus 10/11/2024     Hoarseness of voice 2024     SOB (shortness of breath) 2024     Long-term exposure involving bird droppings 2024     Lung nodules 2024     Chest pain 2024     Centrilobular emphysema ([facility]) 2024     Severe persistent asthma with acute exacerbation 2024     Medical marijuana use 2024     Type 2 diabetes mellitus with hyperglycemia, without long-term current use of insulin ([facility]) 2022     Chronic obstructive asthma ([facility]) 2019     Major depressive disorder 2019     Gastroesophageal reflux disease with esophagitis 2019     Abnormal EKG 04/15/2019     Hypertension 04/15/2019     Mixed hyperlipidemia 2018       LOS (days): 7  Geometric Mean LOS (GMLOS) (days): 2.9  Days to GMLOS:-4.3     OBJECTIVE:  Risk of Unplanned Readmission Score: 19.47         Current admission status: Inpatient   Preferred Pharmacy:   [facility]/pharmacy #[id number] - [place] - [address]  [address]  [place] [address]  Phone: [phone] Fax: [phone]    [facility] - [place], [place] - [address]  [address]  [address]  [facility] [address]  Phone: [phone] Fax: [phone]    [facility] - [place], [place] - [address]  [address] [address]  Phone: [phone] Fax: [phone]    Primary Care Provider: Surjit Hayes DO    Primary Insurance: AETNA MC REP  Secondary Insurance:     DISCHARGE DETAILS:    Discharge planning discussed with:: wife Carol via phone  Freedom of Choice: Yes  Comments - Freedom of Choice: CM has a long discussion with wife Carol about losing the [employer] appeal.  An auth has been obtained for [facility], but wife is very unhappy with this facility. Wife insistent that they have excellent insurance and can go to any facility they want.  CM tried to explain but unsure if wife fully understands.  Wife feels he is not cleared for d/c since his fall here at the hospital.  CM to f/u tomorrow.  Hospital Issued Notice of Non Coverage reviewed with pt and copy left at bedside.  CM contacted family/caregiver?: Yes  Were Treatment Team discharge recommendations reviewed with patient/caregiver?: Yes  Did patient/caregiver verbalize understanding of patient care needs?: Yes  Were patient/caregiver advised of the risks associated with not following Treatment Team discharge recommendations?: Yes    Contacts  Patient Contacts: Carol  Relationship to Patient:: Family  Contact Method: Phone  Phone Number: on facesheet  Reason/Outcome: Discharge Planning    Requested Home Health Care         Is the patient interested in HHC at discharge?: No         Other Referral/Resources/Interventions Provided:  Interventions: Short Term Rehab  Referral Comments: Pt lost the [employer] appeal.  Auth obtained for [facility]    Would you like to participate in our [facility] service program?  : No -  Declined    Treatment Team Recommendation: Short Term Rehab  Discharge Destination Plan:: Short Term Rehab

## 2025-02-02 NOTE — PROGRESS NOTES
Progress Note - Hospitalist   Name: Fahad Hernandez 69 y.o. male I MRN: [id number]  Unit/Bed#: 2 E 253-01 I Date of Admission: 1/25/2025   Date of Service: 2/2/2025 I Hospital Day: 7    Assessment & Plan  Acute right-sided weakness  C/c : Patient presented with fall and right sided weakness (weakness is ongoing for a few months - both RUE/RLE).   He was initially examined by the ER physician at 18:33 on 1/25/25. 3 a.m. & noted to have right sided weakness.      Patient was not in the window for TPA  CTA Head and Neck: moderate microangiopathy, L ICA 65% stenosis, R ICA 50% stenosis. Nonopacified R transverse / sigmoid sinus.   MRI Brain: No Acute infarct  MRI C-spine: Severe central stenosis C3-C4 with disc bulge, severe R foraminal stenosis at C2-C3 due to facet arthropathy.    Plan:   - Neurosurgery consulted for C3-C4 stenosis : no plans for emergent neurosurgical intervention.  Recommend PT OT and outpatient follow-up 4 to 6 weeks  - Neuro on board, no acute infarct, possible symptoms due to cervical stenosis  - Continue with atorvastatin 40 mg daily  - PT/OT/ST evaluation : recommend STR (wife also would prefer patient go to rehab)    Multiple falls  - PT/OT : recommend level 2   - Decreased Oxycodone 10mg to 7.5mg 2/2 concern that it may be contributing to falls and fatigue. He has been on it for 15 years. He is also now on Austedo for tardive dyskinesia which can cause increased concentrations.  - Consider decreasing restoril dose  Cm aware  Fall precautions    **2/1/2025, patient accidentally tripped over his foot and fell on the right side of his head now with ecchymosis around right eye.  Remains awake alert oriented x 3.   No new neurodeficits. CT head/C-spine negative for acute findings.   He remains medically cleared for discharge.  Fall precautions in place.  Type 2 diabetes mellitus with hyperglycemia, without long-term current use of insulin (HCC)  Lab Results   Component Value Date    HGBA1C 8.3 (H)  01/26/2025       Recent Labs     02/01/25  2110 02/01/25  2130 02/02/25  0716 02/02/25  1111   POCGLU 198* 200* 123 157*       ISS  Diabetic cardiac diet  (P) 142.7151951781313279  Resume metformin on dc  On sliding scale coverage with ACHS checks  Hypoglycemia protocol    Medical marijuana use  noted  Chronic pain syndrome  Decreased oxycodone dose from 10mg to 7.5mg due to concern its contributing to falls and fatigue, addition of Austedo to his home med regimen which can increase concentrations  Hypertension  Resumed home metoprolol/amlodipine/ACE inhibitor  Blood pressure well-controlled   CASSIE (obstructive sleep apnea)  CPAP at nighttime ordered    VTE Pharmacologic Prophylaxis:   lovenox    Mobility:   Basic Mobility Inpatient Raw Score: 22  JH-HLM Goal: 7: Walk 25 feet or more  JH-HLM Achieved: 2: Bed activities/Dependent transfer  JH-HLM Goal NOT achieved. Continue with multidisciplinary rounding and encourage appropriate mobility to improve upon JH-HLM goals.    Patient Centered Rounds: I performed bedside rounds with nursing staff today.   Discussions with Specialists or Other Care Team Provider: cm    Education and Discussions with Family / Patient: patient    Current Length of Stay: 7 day(s)  Current Patient Status: Inpatient   Certification Statement: The patient will continue to require additional inpatient hospital stay due to await rehab placement  Discharge Plan: once rehab avail    Code Status: Level 1 - Full Code    Subjective   Rapid response last evening : - patient accidentally tripped over his foot and fell on the right side of his head now with ecchymosis around right eye.  Remains awake alert oriented x 3.   No new neurodeficits. CT head/C-spine negative for acute findings.       Temp:  [97.7 °F (36.5 °C)-98.4 °F (36.9 °C)] 98.4 °F (36.9 °C)  HR:  [71-85] 77  BP: (101-148)/(55-84) 115/74  Resp:  [14-20] 15  SpO2:  [91 %-95 %] 94 %  O2 Device: None (Room air)    Body mass index is 20.54 kg/m².      Input and Output Summary (last 24 hours):     Intake/Output Summary (Last 24 hours) at 2/2/2025 1152  Last data filed at 2/2/2025 0903  Gross per 24 hour   Intake 300 ml   Output 175 ml   Net 125 ml       Physical Exam  Constitutional:       Appearance: He is normal weight.   HENT:      Head:      Comments: Ecchymosis around right eye     Mouth/Throat:      Mouth: Mucous membranes are moist.      Pharynx: Oropharynx is clear.   Pulmonary:      Effort: Pulmonary effort is normal.      Breath sounds: Normal breath sounds.   Abdominal:      General: Abdomen is flat. Bowel sounds are normal.      Palpations: Abdomen is soft.   Musculoskeletal:      Right lower leg: No edema.      Left lower leg: No edema.   Neurological:      Mental Status: He is alert and oriented to person, place, and time.       Lines/Drains:      Lab Results: I have reviewed the following results:               Results from last 7 days   Lab Units 02/02/25  1111 02/02/25  0716 02/01/25  2130 02/01/25  2110 02/01/25  1609 02/01/25  1131 02/01/25  0719 01/31/25  2049 01/31/25  1616 01/31/25  1047 01/31/25  0800 01/30/25  2106   POC GLUCOSE mg/dl 157* 123 200* 198* 179* 98 99 111 176* 121 107 157*               Recent Cultures (last 7 days):         Imaging Results Review: I reviewed radiology reports from this admission including: CT head and CT C-spine.      Last 24 Hours Medication List:     Current Facility-Administered Medications:     acetaminophen (TYLENOL) tablet 650 mg, Q4H PRN    albuterol inhalation solution 2.5 mg, Q6H PRN    amLODIPine (NORVASC) tablet 10 mg, Daily **AND** lisinopril (ZESTRIL) tablet 40 mg, Daily    ARIPiprazole (ABILIFY) tablet 10 mg, Daily    aspirin (ECOTRIN LOW STRENGTH) EC tablet 81 mg, Daily    atorvastatin (LIPITOR) tablet 40 mg, Daily With Dinner    budesonide (PULMICORT) inhalation solution 0.5 mg, Q12H    buPROPion (WELLBUTRIN XL) 24 hr tablet 300 mg, Daily    calcium carbonate (TUMS) chewable tablet 1,000  mg, Daily PRN    clopidogrel (PLAVIX) tablet 75 mg, Daily    docusate sodium (COLACE) capsule 100 mg, BID    enoxaparin (LOVENOX) subcutaneous injection 40 mg, Daily    formoterol (PERFOROMIST) nebulizer solution 20 mcg, BID    insulin lispro (HumALOG/ADMELOG) 100 units/mL subcutaneous injection 2-12 Units, TID AC **AND** Fingerstick Glucose (POCT), TID AC    insulin lispro (HumALOG/ADMELOG) 100 units/mL subcutaneous injection 2-12 Units, HS    metoprolol succinate (TOPROL-XL) 24 hr tablet 25 mg, Daily    montelukast (SINGULAIR) tablet 10 mg, HS    nitroglycerin (NITROSTAT) SL tablet 0.4 mg, Q5 Min PRN    ondansetron (ZOFRAN) injection 4 mg, Q6H PRN    oxyCODONE (ROXICODONE) split tablet 7.5 mg, Q6H PRN    pantoprazole (PROTONIX) EC tablet 40 mg, Early Morning    polyethylene glycol (MIRALAX) packet 17 g, Daily PRN    tamsulosin (FLOMAX) capsule 0.4 mg, Daily    temazepam (RESTORIL) capsule 30 mg, HS PRN    venlafaxine (EFFEXOR-XR) 24 hr capsule 150 mg, Daily    Administrative Statements   Today, Patient Was Seen By: Carter Srivastava MD      **Please Note: This note may have been constructed using a voice recognition system.**

## 2025-02-02 NOTE — ASSESSMENT & PLAN NOTE
Lab Results   Component Value Date    HGBA1C 8.3 (H) 01/26/2025       Recent Labs     02/01/25  2110 02/01/25  2130 02/02/25  0716 02/02/25  1111   POCGLU 198* 200* 123 157*       ISS  Diabetic cardiac diet  (P) 142.8038142680674318  Resume metformin on dc  On sliding scale coverage with ACHS checks  Hypoglycemia protocol

## 2025-02-02 NOTE — RESPIRATORY THERAPY NOTE
02/01/25 4007   Respiratory Assessment   Resp Comments pt placed on CPAP for hours of sleep   Non-Invasive Information   O2 Interface Device Nasal mask   Non-Invasive Ventilation Mode CPAP   $ Pulse Oximetry Spot Check Charge Completed   Non-Invasive Settings   FiO2 (%) 28   PEEP/CPAP (cm H2O) 10   Rise Time 2   Non-Invasive Readings   Total Rate 31   Spontaneous MV (mL) 13.9   Spontaneous Vt (mL) 449   Leak (lpm) 6   Skin Intervention Skin intact   Non-Invasive Alarms   Insp Pressure High (cm H20) 15   Insp Pressure Low (cm H20) 5   Low Insp Pressure Time (sec) 20 sec   MV Low (L/min) 2   Vt High (mL) 1200   Vt Low (mL) 150   High Resp Rate (BPM) 40 BPM   Low Resp Rate (BPM) 8 BPM   Apnea Interval (sec) 0

## 2025-02-02 NOTE — CASE MANAGEMENT
Case Management Discharge Planning Note    Patient name Fahad Hernandez  Location 2 EAST 253/2 E 253-01 MRN [id number]  : 1955 Date 2025       Current Admission Date: 2025  Current Admission Diagnosis:Acute right-sided weakness   Patient Active Problem List    Diagnosis Date Noted Date Diagnosed    CASSIE (obstructive sleep apnea) 2025     Multiple falls 2025     Chronic pain syndrome 2025     Acute respiratory failure (HCC) 2025     Rotator cuff arthropathy of right shoulder 2024     Acute right-sided weakness 2024     Fall 2024     Metabolic acidosis 2024     Lactic acidosis due to diabetes mellitus (HCC) 2024     BENNY (acute kidney injury) ([facility]) 2024     Severe persistent asthma with (acute) exacerbation 2024     Asthma exacerbation attacks 2024     Moderate persistent asthma with exacerbation 2024     Orthostasis 10/15/2024     History of diabetes mellitus 10/11/2024     Hoarseness of voice 2024     SOB (shortness of breath) 2024     Long-term exposure involving bird droppings 2024     Lung nodules 2024     Chest pain 2024     Centrilobular emphysema ([facility]) 2024     Severe persistent asthma with acute exacerbation 2024     Medical marijuana use 2024     Type 2 diabetes mellitus with hyperglycemia, without long-term current use of insulin ([facility]) 2022     Chronic obstructive asthma ([facility]) 2019     Major depressive disorder 2019     Gastroesophageal reflux disease with esophagitis 2019     Abnormal EKG 04/15/2019     Hypertension 04/15/2019     Mixed hyperlipidemia 2018       LOS (days): 7  Geometric Mean LOS (GMLOS) (days): 2.9  Days to GMLOS:-4.3     OBJECTIVE:  Risk of Unplanned Readmission Score: 19.47         Current admission status: Inpatient   Preferred Pharmacy:   [facility]/pharmacy #[id number] - [place] - [address]  [address]  [place] [address]  Phone: [phone] Fax: [phone]    [facility] - [place], [place] - [address]  [address]  [address]  [facility] [address]  Phone: [phone] Fax: [phone]    [facility] - [place], [place] - [address]  [address] [facility] [address]  Phone: [phone] Fax: [phone]    Primary Care Provider: Surjit Hayes DO    Primary Insurance: AETNA  REP  Secondary Insurance:     DISCHARGE DETAILS:    Discharge planning discussed with:: patient  Freedom of Choice: Yes  Comments - Freedom of Choice: CM explained to pt that he lost the appeal and that he would be responsible for $1256.00 plus meds and supplies.  He insists that he will accept [facility] and will be leaving before noon.  Pt signed and was given a copy of the hospital issued notice of non-coverage continued stay  CM contacted family/caregiver?: Yes  Were Treatment Team discharge recommendations reviewed with patient/caregiver?: Yes  Did patient/caregiver verbalize understanding of patient care needs?: Yes  Were patient/caregiver advised of the risks associated with not following Treatment Team discharge recommendations?: Yes    Contacts  Patient Contacts: Carol  Relationship to Patient:: Family  Contact Method: Phone  Phone Number: on facesheet  Reason/Outcome: Discharge Planning    Requested Home Health Care         Is the patient interested in HHC at discharge?: No         Other Referral/Resources/Interventions Provided:  Interventions: Short Term Rehab  Referral Comments: Pt lost the [employer] appeal.  Auth obtained for [facility]    Would you like to participate in our [facility] service program?  : No - Declined    Treatment Team Recommendation: Short Term Rehab  Discharge Destination Plan:: Short Term Rehab

## 2025-02-02 NOTE — RAPID RESPONSE
Rapid Response Note  Fahad Hernandez 69 y.o. male MRN: [id number]  Unit/Bed#: 2 E 253-01 Encounter: [id number]    Rapid Response Notification(s):   Response called date/time:  2/1/2025 9:28 PM  Response team arrival date/time:  2/1/2025 9:29 PM  Response end date/time:  2/1/2025 9:37 PM  Level of care:  [place]  [facility]  Primary reason for rapid response call:  Fall    Rapid Response Intervention(s):   Airway:  None  Breathing:  None  Comments:  Stat CT head/cervical spine       Assessment:   Fall    Plan:   Stat CT head, cervical spine, cervical collar pending results of CT cervical spine, fall precautions, q4 neuro checks overnight, PT/OT, please contact primary team if musculoskeletal complaints     Rapid Response Outcome:   Transfer:  Remain on floor  Code Status: Level 1 (Full Code)      Family notified: Primary team to notify Family Member       Background/Situation:   Fahad Hernandez is a 69 y.o. male who presented on 1/26 with a complaint of ambulatory dysfunction. Today he is a rapid response following a witnessed fall with reported head strike. On arrival, the patient is sitting, complaining of head pain and endorses trippiing over his feet.    Review of Systems   Musculoskeletal:  Positive for gait problem. Negative for arthralgias and neck pain.   Neurological:  Positive for weakness and headaches. Negative for dizziness, syncope and light-headedness.       Objective:   Vitals:    02/01/25 1100 02/01/25 1130 02/01/25 1516 02/01/25 2135   BP: 126/77 126/77 101/63 148/84   BP Location: Left arm      Pulse: 65 68 74 82   Resp:   18    Temp: 98.2 °F (36.8 °C) 98.2 °F (36.8 °C) 98.4 °F (36.9 °C) 97.7 °F (36.5 °C)   TempSrc: Oral      SpO2: 95% 92% 95% 94%   Weight:       Height:         Physical Exam  Constitutional:       General: He is not in acute distress.     Appearance: He is ill-appearing.   HENT:      Head: Normocephalic.      Comments: Abrasion over right  scalp  Eyes:      Pupils: Pupils are equal, round, and reactive to light.   Cardiovascular:      Rate and Rhythm: Normal rate.   Pulmonary:      Effort: Pulmonary effort is normal.   Musculoskeletal:         General: No signs of injury. Normal range of motion.      Right lower leg: No edema.      Left lower leg: No edema.   Skin:     General: Skin is warm and dry.   Neurological:      Mental Status: He is alert.      GCS: GCS eye subscore is 4. GCS verbal subscore is 5. GCS motor subscore is 6.

## 2025-02-02 NOTE — CASE MANAGEMENT
Case Management Discharge Planning Note    Patient name Fahad Hernandez  Location 2 EAST 253/2 E 253-01 MRN [id number]  : 1955 Date 2025       Current Admission Date: 2025  Current Admission Diagnosis:Acute right-sided weakness   Patient Active Problem List    Diagnosis Date Noted Date Diagnosed    CASSIE (obstructive sleep apnea) 2025     Multiple falls 2025     Chronic pain syndrome 2025     Acute respiratory failure (HCC) 2025     Rotator cuff arthropathy of right shoulder 2024     Acute right-sided weakness 2024     Fall 2024     Metabolic acidosis 2024     Lactic acidosis due to diabetes mellitus (HCC) 2024     BENNY (acute kidney injury) ([facility]) 2024     Severe persistent asthma with (acute) exacerbation 2024     Asthma exacerbation attacks 2024     Moderate persistent asthma with exacerbation 2024     Orthostasis 10/15/2024     History of diabetes mellitus 10/11/2024     Hoarseness of voice 2024     SOB (shortness of breath) 2024     Long-term exposure involving bird droppings 2024     Lung nodules 2024     Chest pain 2024     Centrilobular emphysema ([facility]) 2024     Severe persistent asthma with acute exacerbation 2024     Medical marijuana use 2024     Type 2 diabetes mellitus with hyperglycemia, without long-term current use of insulin ([facility]) 2022     Chronic obstructive asthma ([facility]) 2019     Major depressive disorder 2019     Gastroesophageal reflux disease with esophagitis 2019     Abnormal EKG 04/15/2019     Hypertension 04/15/2019     Mixed hyperlipidemia 2018       LOS (days): 7  Geometric Mean LOS (GMLOS) (days): 2.9  Days to GMLOS:-4.3     OBJECTIVE:  Risk of Unplanned Readmission Score: 19.43         Current admission status: Inpatient   Preferred Pharmacy:   [facility]/pharmacy #[id number] - [place] - [address]  [address]  [place] [address]  Phone: [phone] Fax: [phone]    [facility] - [place], [place] - [address]  [address]  [address]  [facility] [address]  Phone: [phone] Fax: [phone]    [facility] [place] - [address]  [address] [place] [id number]  Phone: [phone] Fax: [phone]    Primary Care Provider: Surjit Hayes DO    Primary Insurance: AETNA  REP  Secondary Insurance:     DISCHARGE DETAILS:    Discharge planning discussed with:: pt at bedside  [place] of Choice: Yes  Comments - Freedom of Choice: Pt has lost his [employer] appeal and has until tomorrow at noon to be d/[facility].  CM left message with wife to call back to discuss dcp.  Awaiting call back.  Wife had wanted [facility], but per Loretta a bed is still not available.  Auth has been obtained for [facility].                               Other Referral/Resources/Interventions Provided:  Interventions: Short Term Rehab  Referral Comments: Pt lost [employer] appeal.  Awaiting call back from wife to discuss dcp.  Auth has been obtained for [facility].  Still no male bed availablity at [facility]    Would you like to participate in our [facility] service program?  : No - Declined    Treatment Team Recommendation: Short Term Rehab  Discharge Destination Plan:: Short Term Rehab

## 2025-02-02 NOTE — QUICK NOTE
rapid response called for fall that was witnessed with head strike. See rapid response note.  Patient reporting he accidentally tripped over his foot, hit the right side of his head and complaining of mild head pain.  There is ecchymosis and abrasion above right eye.  Remains alert and oriented, but will start neurochecks every 4 hours tonight.  Able with assistance to get up from the floor and back into bed, no other complaints of pain anywhere.    Follow-up on stat CT head/C-spine ordered  Called wife to update her, but she did not answer and call went straight to voicemail.  Dayshift to attempt calling wife again tomorrow morning for update.

## 2025-02-03 ENCOUNTER — APPOINTMENT (INPATIENT)
Dept: RADIOLOGY | Facility: HOSPITAL | Age: 70
DRG: 552 | End: 2025-02-03
Payer: COMMERCIAL

## 2025-02-03 VITALS
TEMPERATURE: 98 F | HEIGHT: 71 IN | BODY MASS INDEX: 20.77 KG/M2 | OXYGEN SATURATION: 95 % | HEART RATE: 82 BPM | SYSTOLIC BLOOD PRESSURE: 107 MMHG | WEIGHT: 148.4 LBS | DIASTOLIC BLOOD PRESSURE: 58 MMHG | RESPIRATION RATE: 16 BRPM

## 2025-02-03 LAB
GLUCOSE SERPL-MCNC: 127 MG/DL (ref 65–140)
GLUCOSE SERPL-MCNC: 133 MG/DL (ref 65–140)

## 2025-02-03 PROCEDURE — 94760 N-INVAS EAR/PLS OXIMETRY 1: CPT

## 2025-02-03 PROCEDURE — 94640 AIRWAY INHALATION TREATMENT: CPT

## 2025-02-03 PROCEDURE — 82948 REAGENT STRIP/BLOOD GLUCOSE: CPT

## 2025-02-03 PROCEDURE — 73030 X-RAY EXAM OF SHOULDER: CPT

## 2025-02-03 PROCEDURE — 99239 HOSP IP/OBS DSCHRG MGMT >30: CPT | Performed by: FAMILY MEDICINE

## 2025-02-03 RX ADMIN — FORMOTEROL FUMARATE DIHYDRATE 20 MCG: 20 SOLUTION RESPIRATORY (INHALATION) at 07:10

## 2025-02-03 RX ADMIN — LISINOPRIL 40 MG: 20 TABLET ORAL at 08:36

## 2025-02-03 RX ADMIN — TAMSULOSIN HYDROCHLORIDE 0.4 MG: 0.4 CAPSULE ORAL at 08:37

## 2025-02-03 RX ADMIN — BUPROPION HYDROCHLORIDE 300 MG: 150 TABLET, EXTENDED RELEASE ORAL at 08:36

## 2025-02-03 RX ADMIN — CLOPIDOGREL 75 MG: 75 TABLET ORAL at 08:36

## 2025-02-03 RX ADMIN — BUDESONIDE 0.5 MG: 0.5 INHALANT ORAL at 07:10

## 2025-02-03 RX ADMIN — ENOXAPARIN SODIUM 40 MG: 40 INJECTION SUBCUTANEOUS at 08:37

## 2025-02-03 RX ADMIN — METOPROLOL SUCCINATE 25 MG: 25 TABLET, EXTENDED RELEASE ORAL at 08:36

## 2025-02-03 RX ADMIN — AMLODIPINE BESYLATE 10 MG: 10 TABLET ORAL at 08:37

## 2025-02-03 RX ADMIN — ASPIRIN 81 MG: 81 TABLET, COATED ORAL at 08:36

## 2025-02-03 RX ADMIN — VENLAFAXINE HYDROCHLORIDE 150 MG: 150 CAPSULE, EXTENDED RELEASE ORAL at 08:36

## 2025-02-03 RX ADMIN — Medication 7.5 MG: at 03:31

## 2025-02-03 RX ADMIN — ARIPIPRAZOLE 10 MG: 5 TABLET ORAL at 08:36

## 2025-02-03 RX ADMIN — ACETAMINOPHEN 650 MG: 325 TABLET, FILM COATED ORAL at 08:36

## 2025-02-03 RX ADMIN — PANTOPRAZOLE SODIUM 40 MG: 40 TABLET, DELAYED RELEASE ORAL at 05:43

## 2025-02-03 RX ADMIN — DOCUSATE SODIUM 100 MG: 100 CAPSULE, LIQUID FILLED ORAL at 08:37

## 2025-02-03 RX ADMIN — Medication 7.5 MG: at 10:56

## 2025-02-03 NOTE — PROGRESS NOTES
Patient:  KARLIE LÓPEZ    MRN:  [id number]    Aidin Request ID:  [id number]    Level of care reserved:    Partner Reserved:    Clinical needs requested:    Geography searched:  [address]    Start of Service:    Request sent:  2:49pm EST on 1/26/2025 by Kena Eubanks    Partner reserved:    Choice list shared:

## 2025-02-03 NOTE — RESPIRATORY THERAPY NOTE
02/02/25 2300   Respiratory Assessment   Assessment Type Assess only   General Appearance Drowsy   Respiratory Pattern Normal   Chest Assessment Chest expansion symmetrical   Bilateral Breath Sounds Diminished   Resp Comments pt placed on CPAP for hours of sleep   O2 Device V60   Non-Invasive Information   O2 Interface Device Nasal mask   Non-Invasive Ventilation Mode CPAP   $ Intermittent NIV Yes   $ Pulse Oximetry Spot Check Charge Completed   Non-Invasive Settings   FiO2 (%) 28   PEEP/CPAP (cm H2O) 10   Rise Time 2   Non-Invasive Readings   Total Rate 19   Spontaneous MV (mL) 14.2   Spontaneous Vt (mL) 708   Leak (lpm) 57   Skin Intervention Skin intact   Non-Invasive Alarms   Insp Pressure High (cm H20) 15   Insp Pressure Low (cm H20) 5   Low Insp Pressure Time (sec) 20 sec   MV Low (L/min) 2   Vt High (mL) 1200   Vt Low (mL) 150   High Resp Rate (BPM) 40 BPM   Low Resp Rate (BPM) 8 BPM   Apnea Interval (sec) 0

## 2025-02-03 NOTE — CASE MANAGEMENT
Case Management Discharge Planning Note    Patient name Fahad Hernandez  Location 2 EAST 253/2 E 253-01 MRN [id number]  : 1955 Date 2/3/2025       Current Admission Date: 2025  Current Admission Diagnosis:Acute right-sided weakness   Patient Active Problem List    Diagnosis Date Noted Date Diagnosed    CASSIE (obstructive sleep apnea) 2025     Multiple falls 2025     Chronic pain syndrome 2025     Acute respiratory failure (HCC) 2025     Rotator cuff arthropathy of right shoulder 2024     Acute right-sided weakness 2024     Fall 2024     Metabolic acidosis 2024     Lactic acidosis due to diabetes mellitus (HCC) 2024     BENNY (acute kidney injury) ([facility]) 2024     Severe persistent asthma with (acute) exacerbation 2024     Asthma exacerbation attacks 2024     Moderate persistent asthma with exacerbation 2024     Orthostasis 10/15/2024     History of diabetes mellitus 10/11/2024     Hoarseness of voice 2024     SOB (shortness of breath) 2024     Long-term exposure involving bird droppings 2024     Lung nodules 2024     Chest pain 2024     Centrilobular emphysema ([facility]) 2024     Severe persistent asthma with acute exacerbation 2024     Medical marijuana use 2024     Type 2 diabetes mellitus with hyperglycemia, without long-term current use of insulin ([facility]) 2022     Chronic obstructive asthma ([facility]) 2019     Major depressive disorder 2019     Gastroesophageal reflux disease with esophagitis 2019     Abnormal EKG 04/15/2019     Hypertension 04/15/2019     Mixed hyperlipidemia 2018       LOS (days): 8  Geometric Mean LOS (GMLOS) (days): 2.9  Days to GMLOS:-5.1     OBJECTIVE:  Risk of Unplanned Readmission Score: 19.66         Current admission status: Inpatient   Preferred Pharmacy:   [facility]/pharmacy #[id number] - [place] - [address]  [address]  [place] [address]  Phone: [phone] Fax: [phone]    [facility] - [place], [place] - [address]  [address]  [address]  [facility] [address]  Phone: [phone] Fax: [phone]    [facility] - [place], [place] - [address] [place]  [address] [address]  Phone: [phone] Fax: [phone]    Primary Care Provider: Surjit Hayes DO    Primary Insurance: AET[employer] [facility] REP  Secondary Insurance:     DISCHARGE DETAILS:    Discharge planning discussed with:: Patient at bedside.  Freedom of Choice: Yes  Comments - Freedom of Choice: FOC maintained - CM reviewed DCP with patient.  Patient agreeable to d/c to [facility] today.  Patient pending xray, worried about not discharging by noon.  CM advised team would continue to work for optimal outcome for patient.  CM contacted family/caregiver?: No- see comments (Patient is capable of independent decision making.)  Were Treatment Team discharge recommendations reviewed with patient/caregiver?: Yes  Did patient/caregiver verbalize understanding of patient care needs?: N/A- going to facility  Were patient/caregiver advised of the risks associated with not following Treatment Team discharge recommendations?: Yes    Other Referral/Resources/Interventions Provided:  Interventions: Short Term Rehab  Referral Comments: Facility contacts updated & auth details sent to [facility] via [facility].  1200 p/u, w/c van, requested via [employer].    Number/Name of Dispatcher: [employer] [phone]     ETA of Transport (Date): 02/03/25  ETA of Transport (Time): 1200    The Wheelchair Van you requested for Edasa N. in unit/room 2 [place] [address] on 02/03/2025 is scheduled to arrive at 1:00pm EST! [facility] and you can contact them at [phone].      Accepting Facility Name, City & State : [facility] - [address] [place]  [address]  Receiving Facility/Agency Phone Number: Phone: [phone]  Facility/Agency Fax Number: Fax: [phone]     Update... CM requested by patient at bedside.  Patient requesting list of other available STR facilities.  CM provided lists from two open referrals.  Advised patient that he's been discharged and auth obtained for [facility].  CM is unable to re-route placement or auth at this time.  Patient aware transport is scheduled for 1300 p/u.  CPAP setting attached in [facility] for [facility].

## 2025-02-03 NOTE — PLAN OF CARE
Problem: PAIN - ADULT  Goal: Verbalizes/displays adequate comfort level or baseline comfort level  Description: Interventions:  - Encourage patient to monitor pain and request assistance  - Assess pain using appropriate pain scale  - Administer analgesics based on type and severity of pain and evaluate response  - Implement non-pharmacological measures as appropriate and evaluate response  - Consider cultural and social influences on pain and pain management  - Notify physician/advanced practitioner if interventions unsuccessful or patient reports new pain  Outcome: Progressing     Problem: METABOLIC, FLUID AND ELECTROLYTES - ADULT  Goal: Fluid balance maintained  Description: INTERVENTIONS:  - Monitor labs   - Monitor I/O and WT  - Instruct patient on fluid and nutrition as appropriate  - Assess for signs & symptoms of volume excess or deficit  Outcome: Progressing     Problem: Potential for Falls  Goal: Patient will remain free of falls  Description: INTERVENTIONS:  - Educate patient/family on patient safety including physical limitations  - Instruct patient to call for assistance with activity   - Consult OT/PT to assist with strengthening/mobility   - Keep Call bell within reach  - Keep bed low and locked with side rails adjusted as appropriate  - Keep care items and personal belongings within reach  - Initiate and maintain comfort rounds  - Make Fall Risk Sign visible to staff  - Offer Toileting every 2 Hours, in advance of need  - Initiate/Maintain alarm  - Obtain necessary fall risk management equipment:  - Apply yellow socks and bracelet for high fall risk patients  - Consider moving patient to room near nurses station  Outcome: Progressing

## 2025-02-03 NOTE — PROGRESS NOTES
Patient:  KARLIE LÓPEZ    MRN:  [id number]    Aidin Request ID:  [id number]    Level of care reserved:  Skilled Nursing Facility    Partner Reserved:  [facility] , [place] [id number] [phone]    Clinical needs requested:    Geography searched:  20 miles around [address]    Start of Service:    Request sent:  2:48pm EST on 1/26/2025 by Kena Eubanks    Partner reserved:  11:55am EST on 2/3/2025 by Crow Sandoval    Choice list shared:  11:35am EST on 2/3/2025 by Crow Sandoval

## 2025-02-03 NOTE — DISCHARGE SUMMARY
Discharge Summary - Hospitalist   Name: Fahad Hernandez 69 y.o. male I MRN: [id number]  Unit/Bed#: 2 E 253-01 I Date of Admission: 1/25/2025   Date of Service: 2/3/2025 I Hospital Day: 8     Assessment & Plan  Acute right-sided weakness  C/c : Patient presented with fall and right sided weakness (weakness is ongoing for a few months - both RUE/RLE).   He was initially examined by the ER physician at 18:33 on 1/25/25. 3 a.m. & noted to have right sided weakness.      Patient was not in the window for TPA  CTA Head and Neck: moderate microangiopathy, L ICA 65% stenosis, R ICA 50% stenosis. Nonopacified R transverse / sigmoid sinus.   MRI Brain: No Acute infarct  MRI C-spine: Severe central stenosis C3-C4 with disc bulge, severe R foraminal stenosis at C2-C3 due to facet arthropathy.    Plan:   - Neurosurgery consulted for C3-C4 stenosis : no plans for emergent neurosurgical intervention.  Recommend PT OT and outpatient follow-up 4 to 6 weeks  - Neuro on board, no acute infarct, possible symptoms due to cervical stenosis  - Continue with atorvastatin 40 mg daily  - PT/OT/ST evaluation : recommend STR (wife also would prefer patient go to rehab)    Multiple falls  - PT/OT : recommend level 2   - Decreased Oxycodone 10mg to 7.5mg 2/2 concern that it may be contributing to falls and fatigue. He has been on it for 15 years. He is also now on Austedo for tardive dyskinesia which can cause increased concentrations.  - Consider decreasing restoril dose  Cm aware  Fall precautions    **2/1/2025, patient accidentally tripped over his foot and fell on the right side of his head now with ecchymosis around right eye.  Remains awake alert oriented x 3.   No new neurodeficits. CT head/C-spine negative for acute findings.   He remains medically cleared for discharge.  Fall precautions in place.  Type 2 diabetes mellitus with hyperglycemia, without long-term current use of insulin (HCC)  Lab Results   Component Value Date    HGBA1C  8.3 (H) 01/26/2025       Recent Labs     02/02/25  1111 02/02/25  1635 02/02/25  2108 02/03/25  0705   POCGLU 157* 127 177* 127       ISS  Diabetic cardiac diet  (P) [id number]  Resume metformin on dc  On sliding scale coverage with ACHS checks  Hypoglycemia protocol    Medical marijuana use  noted  Chronic pain syndrome  Decreased oxycodone dose from 10mg to 7.5mg due to concern its contributing to falls and fatigue, addition of Austedo to his home med regimen which can increase concentrations  Hypertension  Resumed home metoprolol/amlodipine/ACE inhibitor  Blood pressure well-controlled   CASSIE (obstructive sleep apnea)  CPAP at nighttime ordered     Discharging Physician / Practitioner: Carter Srivastava MD  PCP: Surjit Hayes DO  Admission Date:   Admission Orders (From admission, onward)       Ordered        01/26/25 1126  INPATIENT ADMISSION  Once            01/25/25 2317  Place in Observation  Once            01/25/25 2204  Place in Observation  Once,   Status:  Canceled                          Discharge Date: 02/03/25    Disposition:      SNF    Reason for Admission: Multiple falls    Discharge Diagnoses:     Please see assessment and plan section above for further details regarding discharge diagnoses.     Medical Problems       Resolved Problems  Date Reviewed: 9/4/2024   None         Consultations During Hospital Stay:  Neurology  neurosurgery      Medication Adjustments and Discharge Medications:  Summary of Medication Adjustments made as a result of this hospitalization: see med rec  Medication Dosing Tapers - Please refer to Discharge Medication List for details on any medication dosing tapers (if applicable to patient).  Medications being temporarily held (include recommended restart time): see med rec  Discharge Medication List: See after visit summary for reconciled discharge medications.       Diet Recommendations at Discharge:  Diet -        Diet Orders   (From admission, onward)                  Start     Ordered    01/27/25 1434  Diet Andrew/CHO Controlled; Consistent Carbohydrate Diet Level 2 (5 carb servings/75 grams CHO/meal); Sodium 4 GM (BRIDGET)  Diet effective now        References:    Adult Nutrition Support Algorithm    RD Therapeutic Diet Order Protocol   Question Answer Comment   Diet Type Andrew/CHO Controlled    Andrew/CHO Controlled Consistent Carbohydrate Diet Level 2 (5 carb servings/75 grams CHO/meal)    Other Restriction(s): Sodium 4 GM (BRIDGET)    RD to adjust diet per protocol? Yes        01/27/25 1433                      Hospital Course:     Fahad Hernandez is a 69 y.o. male patient who originally presented to the hospital on 1/25/2025 with underlying history of diabetes hypertension stroke, interstitial lung disease, GERD, asthma, depressive disorder, CASSIE on CPAP who presented after a fall while using his walker.  He did report having ongoing both right upper/lower extremity weakness for few months.     In the ED he was evaluated and noted to have right upper/lower extremity weakness.  He was not in the window for tPA.  CTA head and neck noted moderate microangiopathy.  MRI brain no acute infarct.  MRI C-spine noted severe canal stenosis C3-C4 with disc bulge.  Severe right foraminal stenosis at C2-C3 due to facet arthropathy.  Neurosurgery was consulted for the C3-C4 stenosis; no plans for emergent neurosurgical intervention.  Recommended PT OT and outpatient follow-up in 4 to 6 weeks.  Neurology was on board, no acute infarct, possible symptoms due to cervical stenosis.  Recommended to continue dual antiplatelet and atorvastatin.  PT OT recommended short-term rehab.  Patient and wife in agreement.     Going to rehab today.    Condition at Discharge: fair     Discharge Day Visit / Exam:       Vitals: Blood Pressure: 114/65 (02/03/25 0700)  Pulse: 89 (02/03/25 0836)  Temperature: 98.2 °F (36.8 °C) (02/03/25 0720)  Temp Source: Oral (02/03/25 0700)  Respirations: 16 (02/03/25 0700)  Height: 5'  "11\" (180.3 cm) (01/27/25 0912)  Weight - Scale: 67.3 kg (148 lb 6.4 oz) (02/03/25 0632)  SpO2: 95 % (02/03/25 0720)  Exam:   Physical Exam  Constitutional:       General: He is not in acute distress.     Appearance: He is normal weight. He is not toxic-appearing.   HENT:      Ears:      Comments: Ecchymosis around his right eye     Mouth/Throat:      Mouth: Mucous membranes are dry.      Pharynx: Oropharynx is clear.   Pulmonary:      Effort: Pulmonary effort is normal.      Breath sounds: Normal breath sounds.   Abdominal:      General: Abdomen is flat. Bowel sounds are normal.      Palpations: Abdomen is soft.   Musculoskeletal:      Right lower leg: No edema.      Left lower leg: No edema.   Neurological:      Mental Status: He is alert and oriented to person, place, and time.         Discussion with Family: called wife, went to voicemail, left message for her    Goals of Care Discussions:  Code Status at Discharge: Level 1 - Full Code      Discharge instructions/Information to patient and family:   See after visit summary section titled Discharge Instructions for information provided to patient and family.           Discharge Statement:  I spent 45 minutes discharging the patient. This time was spent on the day of discharge. I had direct contact with the patient on the day of discharge. Greater than 50% of the total time was spent examining patient, answering all patient questions, arranging and discussing plan of care with patient as well as directly providing post-discharge instructions.  Additional time then spent on discharge activities.    ** Please Note: This note has been constructed using a voice recognition system **  "

## 2025-02-03 NOTE — PROGRESS NOTES
Patient discharged to [facility] after seen by ZOE and TREY. Report called and script sent.

## 2025-02-03 NOTE — ASSESSMENT & PLAN NOTE
Lab Results   Component Value Date    HGBA1C 8.3 (H) 01/26/2025       Recent Labs     02/02/25  1111 02/02/25  1635 02/02/25  2108 02/03/25  0705   POCGLU 157* 127 177* 127       ISS  Diabetic cardiac diet  (P) [id number]  Resume metformin on dc  On sliding scale coverage with ACHS checks  Hypoglycemia protocol

## 2025-02-03 NOTE — PROGRESS NOTES
Patient:  KARLIE LÓPEZ    MRN:  [id number]    Aidin Request ID:  [id number]    Level of care reserved:    Partner Reserved:    Clinical needs requested:    Geography searched:  25 miles around [facility]    Start of Service:    Request sent:  3:20pm EST on 1/31/2025 by Crow Sandoval    Partner reserved:    Choice list shared:  11:34am EST on 2/3/2025 by Crow Sandoval

## 2025-02-11 ENCOUNTER — TELEPHONE (OUTPATIENT)
Age: 70
End: 2025-02-11

## 2025-02-11 DIAGNOSIS — J45.50 SEVERE PERSISTENT ASTHMA WITHOUT COMPLICATION: ICD-10-CM

## 2025-02-11 NOTE — TELEPHONE ENCOUNTER
MARTY Bueno    Pt currently in [facility].    Spouse stating calls not being returned by provider. Concerned provider did not return call on 01/29/25. See 01/22/25 Encounter Thread. Spouse stresses she only wants Kirk to return her calls since she knows her spouse.

## 2025-02-14 ENCOUNTER — TELEPHONE (OUTPATIENT)
Age: 70
End: 2025-02-14

## 2025-02-14 NOTE — TELEPHONE ENCOUNTER
Hiwot nurse from [facility] called (#[phone]) re: pt's BP medications.    Pt was recently in hospital 1/25-2/3/25 and was d/c to [facility] facility. He is now d/c to home and she saw pt for first time.    Pt's BP today was 112/60 P 84 (this is the only reading she has).    Per d/c paperwork from rehab pt is to be taking two medications for BP: amlodipine-benazepril 10-40mg daily and Metoprolol succinate 25mg daily.    Per wife and pt he is taking the amlodipine-benazepril but he is NOT taking the metoprolol succinate, wife states they do not have this medication.     However pt IS taking diltiazem 180mg daily which is NOT on d/c med list.    Also Plavix 75mg and ASA 81mg on list, pt is taking the Plavix but is NOT taking the ASA.    Hiwot is trying to confirm which of the above medications pt should be taking.    (She is also going to have pt call to schedule office visit.  There is a note in chart 1/2/25 regarding office visit - wife only wants to see Dr. Holman and it does not appear apt was ever made.)    Please call Hiwot back to confirm medications. Thank you.

## 2025-02-14 NOTE — TELEPHONE ENCOUNTER
Patient's wife Carol calling. Pt was just discharged from rehab yesterday after multiple hospitalizations. Carol is requesting a virtual visit with Dr. Holman because it is very difficult for patient to get to the office. Carol feels that patient had a lot of testing done in the hospital, therefore, Dr. Holman may be agreeable to a virtual visit to discuss care and medications.  Please contact Carol at [phone] to schedule.     Pt was also wearing Zio monitor, but it was discontinued when patient was in hospital. Carol will mail monitor back to company.

## 2025-02-16 ENCOUNTER — HOSPITAL ENCOUNTER (INPATIENT)
Facility: HOSPITAL | Age: 70
LOS: 2 days | Discharge: HOME HEALTH | DRG: 871 | End: 2025-02-19
Attending: EMERGENCY MEDICINE | Admitting: STUDENT IN AN ORGANIZED HEALTH CARE EDUCATION/TRAINING PROGRAM
Payer: COMMERCIAL

## 2025-02-16 ENCOUNTER — APPOINTMENT (EMERGENCY)
Dept: CT IMAGING | Facility: HOSPITAL | Age: 70
DRG: 871 | End: 2025-02-16
Payer: COMMERCIAL

## 2025-02-16 DIAGNOSIS — J96.01 ACUTE RESPIRATORY FAILURE WITH HYPOXIA (HCC): ICD-10-CM

## 2025-02-16 DIAGNOSIS — J43.2 CENTRILOBULAR EMPHYSEMA (HCC): ICD-10-CM

## 2025-02-16 DIAGNOSIS — U07.1 COVID-19: Primary | ICD-10-CM

## 2025-02-16 DIAGNOSIS — J45.51 SEVERE PERSISTENT ASTHMA WITH ACUTE EXACERBATION: ICD-10-CM

## 2025-02-16 PROBLEM — Z86.39 HISTORY OF DIABETES MELLITUS: Status: RESOLVED | Noted: 2024-10-11 | Resolved: 2025-02-16

## 2025-02-16 PROBLEM — G24.01 TARDIVE DYSKINESIA: Status: ACTIVE | Noted: 2025-02-16

## 2025-02-16 LAB
2HR DELTA HS TROPONIN: 1 NG/L
ANION GAP SERPL CALCULATED.3IONS-SCNC: 13 MMOL/L (ref 4–13)
ANISOCYTOSIS BLD QL SMEAR: PRESENT
APTT PPP: 27 SECONDS (ref 23–34)
ATRIAL RATE: 118 BPM
BASOPHILS # BLD MANUAL: 0 THOUSAND/UL (ref 0–0.1)
BASOPHILS NFR MAR MANUAL: 0 % (ref 0–1)
BNP SERPL-MCNC: 50 PG/ML (ref 0–100)
BUN SERPL-MCNC: 29 MG/DL (ref 5–25)
CALCIUM SERPL-MCNC: 8.6 MG/DL (ref 8.4–10.2)
CARDIAC TROPONIN I PNL SERPL HS: 5 NG/L (ref ?–50)
CARDIAC TROPONIN I PNL SERPL HS: 6 NG/L (ref ?–50)
CHLORIDE SERPL-SCNC: 102 MMOL/L (ref 96–108)
CO2 SERPL-SCNC: 25 MMOL/L (ref 21–32)
CREAT SERPL-MCNC: 1.17 MG/DL (ref 0.6–1.3)
EOSINOPHIL # BLD MANUAL: 0.11 THOUSAND/UL (ref 0–0.4)
EOSINOPHIL NFR BLD MANUAL: 1 % (ref 0–6)
ERYTHROCYTE [DISTWIDTH] IN BLOOD BY AUTOMATED COUNT: 15.3 % (ref 11.6–15.1)
FLUAV RNA RESP QL NAA+PROBE: NEGATIVE
FLUBV RNA RESP QL NAA+PROBE: NEGATIVE
GFR SERPL CREATININE-BSD FRML MDRD: 62 ML/MIN/1.73SQ M
GLUCOSE SERPL-MCNC: 149 MG/DL (ref 65–140)
GLUCOSE SERPL-MCNC: 150 MG/DL (ref 65–140)
GLUCOSE SERPL-MCNC: 194 MG/DL (ref 65–140)
GLUCOSE SERPL-MCNC: 226 MG/DL (ref 65–140)
GLUCOSE SERPL-MCNC: 229 MG/DL (ref 65–140)
HCT VFR BLD AUTO: 32.1 % (ref 36.5–49.3)
HGB BLD-MCNC: 9.6 G/DL (ref 12–17)
INR PPP: 0.94 (ref 0.85–1.19)
LYMPHOCYTES # BLD AUTO: 2.62 THOUSAND/UL (ref 0.6–4.47)
LYMPHOCYTES # BLD AUTO: 23 % (ref 14–44)
MCH RBC QN AUTO: 25.3 PG (ref 26.8–34.3)
MCHC RBC AUTO-ENTMCNC: 29.9 G/DL (ref 31.4–37.4)
MCV RBC AUTO: 85 FL (ref 82–98)
METAMYELOCYTE ABSOLUTE CT: 0.11 THOUSAND/UL (ref 0–0.1)
METAMYELOCYTES NFR BLD MANUAL: 1 % (ref 0–1)
MONOCYTES # BLD AUTO: 1.09 THOUSAND/UL (ref 0–1.22)
MONOCYTES NFR BLD: 10 % (ref 4–12)
NEUTROPHILS # BLD MANUAL: 6.88 THOUSAND/UL (ref 1.85–7.62)
NEUTS BAND NFR BLD MANUAL: 3 % (ref 0–8)
NEUTS SEG NFR BLD AUTO: 60 % (ref 43–75)
P AXIS: 61 DEGREES
PLATELET # BLD AUTO: 461 THOUSANDS/UL (ref 149–390)
PLATELET BLD QL SMEAR: ABNORMAL
PMV BLD AUTO: 8.6 FL (ref 8.9–12.7)
POLYCHROMASIA BLD QL SMEAR: PRESENT
POTASSIUM SERPL-SCNC: 3.5 MMOL/L (ref 3.5–5.3)
PR INTERVAL: 156 MS
PROMYELOCYTE ABSOLUTE CT: 0.11 THOUSAND/UL (ref 0–0)
PROMYELOCYTES NFR BLD MANUAL: 1 % (ref 0–0)
PROTHROMBIN TIME: 13.3 SECONDS (ref 12.3–15)
QRS AXIS: 21 DEGREES
QRSD INTERVAL: 80 MS
QT INTERVAL: 304 MS
QTC INTERVAL: 426 MS
RBC # BLD AUTO: 3.8 MILLION/UL (ref 3.88–5.62)
RSV RNA RESP QL NAA+PROBE: NEGATIVE
SARS-COV-2 RNA RESP QL NAA+PROBE: POSITIVE
SODIUM SERPL-SCNC: 140 MMOL/L (ref 135–147)
T WAVE AXIS: 96 DEGREES
VARIANT LYMPHS # BLD AUTO: 1 %
VENTRICULAR RATE: 118 BPM
WBC # BLD AUTO: 10.92 THOUSAND/UL (ref 4.31–10.16)

## 2025-02-16 PROCEDURE — 84484 ASSAY OF TROPONIN QUANT: CPT

## 2025-02-16 PROCEDURE — XW033E5 INTRODUCTION OF REMDESIVIR ANTI-INFECTIVE INTO PERIPHERAL VEIN, PERCUTANEOUS APPROACH, NEW TECHNOLOGY GROUP 5: ICD-10-PCS | Performed by: FAMILY MEDICINE

## 2025-02-16 PROCEDURE — 94640 AIRWAY INHALATION TREATMENT: CPT

## 2025-02-16 PROCEDURE — 94664 DEMO&/EVAL PT USE INHALER: CPT

## 2025-02-16 PROCEDURE — 93005 ELECTROCARDIOGRAM TRACING: CPT

## 2025-02-16 PROCEDURE — 94660 CPAP INITIATION&MGMT: CPT

## 2025-02-16 PROCEDURE — 94760 N-INVAS EAR/PLS OXIMETRY 1: CPT

## 2025-02-16 PROCEDURE — 36415 COLL VENOUS BLD VENIPUNCTURE: CPT

## 2025-02-16 PROCEDURE — 71275 CT ANGIOGRAPHY CHEST: CPT

## 2025-02-16 PROCEDURE — 85027 COMPLETE CBC AUTOMATED: CPT

## 2025-02-16 PROCEDURE — 85730 THROMBOPLASTIN TIME PARTIAL: CPT

## 2025-02-16 PROCEDURE — 93010 ELECTROCARDIOGRAM REPORT: CPT | Performed by: INTERNAL MEDICINE

## 2025-02-16 PROCEDURE — 97167 OT EVAL HIGH COMPLEX 60 MIN: CPT

## 2025-02-16 PROCEDURE — 99222 1ST HOSP IP/OBS MODERATE 55: CPT | Performed by: FAMILY MEDICINE

## 2025-02-16 PROCEDURE — 0241U HB NFCT DS VIR RESP RNA 4 TRGT: CPT

## 2025-02-16 PROCEDURE — 96360 HYDRATION IV INFUSION INIT: CPT

## 2025-02-16 PROCEDURE — 85610 PROTHROMBIN TIME: CPT

## 2025-02-16 PROCEDURE — 99285 EMERGENCY DEPT VISIT HI MDM: CPT

## 2025-02-16 PROCEDURE — 82948 REAGENT STRIP/BLOOD GLUCOSE: CPT

## 2025-02-16 PROCEDURE — 80048 BASIC METABOLIC PNL TOTAL CA: CPT

## 2025-02-16 PROCEDURE — 85007 BL SMEAR W/DIFF WBC COUNT: CPT

## 2025-02-16 PROCEDURE — 83880 ASSAY OF NATRIURETIC PEPTIDE: CPT

## 2025-02-16 PROCEDURE — 97163 PT EVAL HIGH COMPLEX 45 MIN: CPT

## 2025-02-16 PROCEDURE — 96361 HYDRATE IV INFUSION ADD-ON: CPT

## 2025-02-16 RX ORDER — SODIUM CHLORIDE 9 MG/ML
50 INJECTION, SOLUTION INTRAVENOUS ONCE
Status: COMPLETED | OUTPATIENT
Start: 2025-02-16 | End: 2025-02-16

## 2025-02-16 RX ORDER — ASPIRIN 81 MG/1
81 TABLET ORAL DAILY
Status: DISCONTINUED | OUTPATIENT
Start: 2025-02-16 | End: 2025-02-19 | Stop reason: HOSPADM

## 2025-02-16 RX ORDER — ATORVASTATIN CALCIUM 10 MG/1
10 TABLET, FILM COATED ORAL
Status: DISCONTINUED | OUTPATIENT
Start: 2025-02-16 | End: 2025-02-19 | Stop reason: HOSPADM

## 2025-02-16 RX ORDER — INSULIN LISPRO 100 [IU]/ML
1-5 INJECTION, SOLUTION INTRAVENOUS; SUBCUTANEOUS
Status: DISCONTINUED | OUTPATIENT
Start: 2025-02-16 | End: 2025-02-19 | Stop reason: HOSPADM

## 2025-02-16 RX ORDER — IPRATROPIUM BROMIDE AND ALBUTEROL SULFATE 2.5; .5 MG/3ML; MG/3ML
3 SOLUTION RESPIRATORY (INHALATION)
Status: DISCONTINUED | OUTPATIENT
Start: 2025-02-16 | End: 2025-02-19 | Stop reason: HOSPADM

## 2025-02-16 RX ORDER — ALBUTEROL SULFATE 2.5 MG/3ML
2 SOLUTION RESPIRATORY (INHALATION) ONCE
Status: COMPLETED | OUTPATIENT
Start: 2025-02-16 | End: 2025-02-16

## 2025-02-16 RX ORDER — DOCUSATE SODIUM 100 MG/1
100 CAPSULE, LIQUID FILLED ORAL 2 TIMES DAILY
Status: DISCONTINUED | OUTPATIENT
Start: 2025-02-16 | End: 2025-02-19 | Stop reason: HOSPADM

## 2025-02-16 RX ORDER — PANTOPRAZOLE SODIUM 40 MG/1
40 TABLET, DELAYED RELEASE ORAL
Status: DISCONTINUED | OUTPATIENT
Start: 2025-02-16 | End: 2025-02-19 | Stop reason: HOSPADM

## 2025-02-16 RX ORDER — BUPROPION HYDROCHLORIDE 150 MG/1
300 TABLET ORAL DAILY
Status: DISCONTINUED | OUTPATIENT
Start: 2025-02-16 | End: 2025-02-19 | Stop reason: HOSPADM

## 2025-02-16 RX ORDER — MONTELUKAST SODIUM 10 MG/1
10 TABLET ORAL
Status: DISCONTINUED | OUTPATIENT
Start: 2025-02-16 | End: 2025-02-19 | Stop reason: HOSPADM

## 2025-02-16 RX ORDER — HEPARIN SODIUM 5000 [USP'U]/ML
5000 INJECTION, SOLUTION INTRAVENOUS; SUBCUTANEOUS EVERY 8 HOURS SCHEDULED
Status: DISCONTINUED | OUTPATIENT
Start: 2025-02-16 | End: 2025-02-19 | Stop reason: HOSPADM

## 2025-02-16 RX ORDER — DEXAMETHASONE SODIUM PHOSPHATE 10 MG/ML
6 INJECTION, SOLUTION INTRAMUSCULAR; INTRAVENOUS EVERY 24 HOURS
Status: DISCONTINUED | OUTPATIENT
Start: 2025-02-16 | End: 2025-02-19 | Stop reason: HOSPADM

## 2025-02-16 RX ORDER — IPRATROPIUM BROMIDE AND ALBUTEROL SULFATE .5; 3 MG/3ML; MG/3ML
2 SOLUTION RESPIRATORY (INHALATION) ONCE
Status: COMPLETED | OUTPATIENT
Start: 2025-02-16 | End: 2025-02-16

## 2025-02-16 RX ORDER — KETOROLAC TROMETHAMINE 30 MG/ML
15 INJECTION, SOLUTION INTRAMUSCULAR; INTRAVENOUS ONCE
Status: COMPLETED | OUTPATIENT
Start: 2025-02-16 | End: 2025-02-16

## 2025-02-16 RX ORDER — LISINOPRIL 20 MG/1
40 TABLET ORAL DAILY
Status: DISCONTINUED | OUTPATIENT
Start: 2025-02-16 | End: 2025-02-19 | Stop reason: HOSPADM

## 2025-02-16 RX ORDER — HYDROXYZINE HYDROCHLORIDE 25 MG/1
25 TABLET, FILM COATED ORAL EVERY 6 HOURS PRN
Status: DISCONTINUED | OUTPATIENT
Start: 2025-02-16 | End: 2025-02-19 | Stop reason: HOSPADM

## 2025-02-16 RX ORDER — ALBUTEROL SULFATE 0.83 MG/ML
2.5 SOLUTION RESPIRATORY (INHALATION) EVERY 6 HOURS
Status: DISCONTINUED | OUTPATIENT
Start: 2025-02-16 | End: 2025-02-16

## 2025-02-16 RX ORDER — ARIPIPRAZOLE 5 MG/1
10 TABLET ORAL DAILY
Status: DISCONTINUED | OUTPATIENT
Start: 2025-02-16 | End: 2025-02-19 | Stop reason: HOSPADM

## 2025-02-16 RX ORDER — IPRATROPIUM BROMIDE AND ALBUTEROL SULFATE 2.5; .5 MG/3ML; MG/3ML
3 SOLUTION RESPIRATORY (INHALATION) ONCE
Status: COMPLETED | OUTPATIENT
Start: 2025-02-16 | End: 2025-02-16

## 2025-02-16 RX ORDER — ALBUTEROL SULFATE 90 UG/1
2 INHALANT RESPIRATORY (INHALATION) EVERY 4 HOURS PRN
Status: DISCONTINUED | OUTPATIENT
Start: 2025-02-16 | End: 2025-02-19 | Stop reason: HOSPADM

## 2025-02-16 RX ORDER — ACETAMINOPHEN 325 MG/1
650 TABLET ORAL EVERY 6 HOURS PRN
Status: DISCONTINUED | OUTPATIENT
Start: 2025-02-16 | End: 2025-02-19 | Stop reason: HOSPADM

## 2025-02-16 RX ORDER — CLOPIDOGREL BISULFATE 75 MG/1
75 TABLET ORAL DAILY
Status: DISCONTINUED | OUTPATIENT
Start: 2025-02-16 | End: 2025-02-19 | Stop reason: HOSPADM

## 2025-02-16 RX ORDER — METOPROLOL SUCCINATE 25 MG/1
25 TABLET, EXTENDED RELEASE ORAL DAILY
Status: DISCONTINUED | OUTPATIENT
Start: 2025-02-16 | End: 2025-02-19 | Stop reason: HOSPADM

## 2025-02-16 RX ORDER — ACETAMINOPHEN 325 MG/1
650 TABLET ORAL ONCE
Status: COMPLETED | OUTPATIENT
Start: 2025-02-16 | End: 2025-02-16

## 2025-02-16 RX ORDER — VENLAFAXINE HYDROCHLORIDE 75 MG/1
75 CAPSULE, EXTENDED RELEASE ORAL DAILY
Status: DISCONTINUED | OUTPATIENT
Start: 2025-02-16 | End: 2025-02-19 | Stop reason: HOSPADM

## 2025-02-16 RX ORDER — AMLODIPINE BESYLATE 10 MG/1
10 TABLET ORAL DAILY
Status: DISCONTINUED | OUTPATIENT
Start: 2025-02-16 | End: 2025-02-19 | Stop reason: HOSPADM

## 2025-02-16 RX ORDER — TAMSULOSIN HYDROCHLORIDE 0.4 MG/1
0.4 CAPSULE ORAL DAILY
Status: DISCONTINUED | OUTPATIENT
Start: 2025-02-16 | End: 2025-02-19 | Stop reason: HOSPADM

## 2025-02-16 RX ADMIN — IPRATROPIUM BROMIDE AND ALBUTEROL SULFATE 3 ML: 2.5; .5 SOLUTION RESPIRATORY (INHALATION) at 13:36

## 2025-02-16 RX ADMIN — PANTOPRAZOLE SODIUM 40 MG: 40 TABLET, DELAYED RELEASE ORAL at 06:08

## 2025-02-16 RX ADMIN — MONTELUKAST 10 MG: 10 TABLET, FILM COATED ORAL at 21:11

## 2025-02-16 RX ADMIN — SODIUM CHLORIDE 50 ML/HR: 0.9 INJECTION, SOLUTION INTRAVENOUS at 09:54

## 2025-02-16 RX ADMIN — VENLAFAXINE HYDROCHLORIDE 75 MG: 75 CAPSULE, EXTENDED RELEASE ORAL at 08:08

## 2025-02-16 RX ADMIN — SODIUM CHLORIDE 1000 ML: 0.9 INJECTION, SOLUTION INTRAVENOUS at 03:14

## 2025-02-16 RX ADMIN — IPRATROPIUM BROMIDE AND ALBUTEROL SULFATE 3 ML: 2.5; .5 SOLUTION RESPIRATORY (INHALATION) at 03:35

## 2025-02-16 RX ADMIN — REMDESIVIR 200 MG: 100 INJECTION, POWDER, LYOPHILIZED, FOR SOLUTION INTRAVENOUS at 07:04

## 2025-02-16 RX ADMIN — IPRATROPIUM BROMIDE AND ALBUTEROL SULFATE 3 ML: 2.5; .5 SOLUTION RESPIRATORY (INHALATION) at 07:22

## 2025-02-16 RX ADMIN — IPRATROPIUM BROMIDE AND ALBUTEROL SULFATE 3 ML: 2.5; .5 SOLUTION RESPIRATORY (INHALATION) at 19:33

## 2025-02-16 RX ADMIN — INSULIN LISPRO 2 UNITS: 100 INJECTION, SOLUTION INTRAVENOUS; SUBCUTANEOUS at 12:17

## 2025-02-16 RX ADMIN — TAMSULOSIN HYDROCHLORIDE 0.4 MG: 0.4 CAPSULE ORAL at 08:08

## 2025-02-16 RX ADMIN — ALBUTEROL SULFATE 2 PUFF: 90 AEROSOL, METERED RESPIRATORY (INHALATION) at 09:40

## 2025-02-16 RX ADMIN — HEPARIN SODIUM 5000 UNITS: 5000 INJECTION INTRAVENOUS; SUBCUTANEOUS at 06:08

## 2025-02-16 RX ADMIN — B-COMPLEX W/ C & FOLIC ACID TAB 1 TABLET: TAB at 08:09

## 2025-02-16 RX ADMIN — CLOPIDOGREL 75 MG: 75 TABLET ORAL at 08:08

## 2025-02-16 RX ADMIN — LISINOPRIL 40 MG: 20 TABLET ORAL at 08:08

## 2025-02-16 RX ADMIN — ACETAMINOPHEN 650 MG: 325 TABLET, FILM COATED ORAL at 06:08

## 2025-02-16 RX ADMIN — ASPIRIN 81 MG: 81 TABLET, COATED ORAL at 08:08

## 2025-02-16 RX ADMIN — KETOROLAC TROMETHAMINE 15 MG: 30 INJECTION, SOLUTION INTRAMUSCULAR at 06:08

## 2025-02-16 RX ADMIN — DOCUSATE SODIUM 100 MG: 100 CAPSULE, LIQUID FILLED ORAL at 08:08

## 2025-02-16 RX ADMIN — DOCUSATE SODIUM 100 MG: 100 CAPSULE, LIQUID FILLED ORAL at 17:20

## 2025-02-16 RX ADMIN — DEXAMETHASONE SODIUM PHOSPHATE 6 MG: 10 INJECTION, SOLUTION INTRAMUSCULAR; INTRAVENOUS at 07:01

## 2025-02-16 RX ADMIN — ARIPIPRAZOLE 10 MG: 5 TABLET ORAL at 08:09

## 2025-02-16 RX ADMIN — AMLODIPINE BESYLATE 10 MG: 10 TABLET ORAL at 08:09

## 2025-02-16 RX ADMIN — IOHEXOL 85 ML: 350 INJECTION, SOLUTION INTRAVENOUS at 03:57

## 2025-02-16 RX ADMIN — ATORVASTATIN CALCIUM 10 MG: 10 TABLET, FILM COATED ORAL at 16:01

## 2025-02-16 RX ADMIN — HEPARIN SODIUM 5000 UNITS: 5000 INJECTION INTRAVENOUS; SUBCUTANEOUS at 21:11

## 2025-02-16 RX ADMIN — METOPROLOL SUCCINATE 25 MG: 25 TABLET, EXTENDED RELEASE ORAL at 08:08

## 2025-02-16 RX ADMIN — INSULIN LISPRO 2 UNITS: 100 INJECTION, SOLUTION INTRAVENOUS; SUBCUTANEOUS at 16:01

## 2025-02-16 RX ADMIN — HEPARIN SODIUM 5000 UNITS: 5000 INJECTION INTRAVENOUS; SUBCUTANEOUS at 16:01

## 2025-02-16 RX ADMIN — BUPROPION HYDROCHLORIDE 300 MG: 150 TABLET, EXTENDED RELEASE ORAL at 08:08

## 2025-02-16 NOTE — H&P
H&P - Hospitalist   Name: Fahad Hernandez 69 y.o. male I MRN: [id number]  Unit/Bed#: 2 E 269-01 I Date of Admission: 2/16/2025   Date of Service: 2/16/2025 I Hospital Day: 0     Assessment & Plan  COVID-19  With sob, requiring oxygen  High risk due to pulmonary issues,(COPD/asthma) followed by pulmonary in the outpatient setting  Will do remdesivir  IV dexamethasone  Incentive spirometry  Breathing treatments  Acute respiratory failure (HCC)  Requiring 2 L of oxygen, secondary COVID-19  Hypertension  Continue with Norvasc, lisinopril and Lopressor  Type 2 diabetes mellitus with hyperglycemia, without long-term current use of insulin (HCC)  Home regimen: Metformin  Hospital regimen: Insulin sliding scale    Lab Results   Component Value Date    HGBA1C 8.3 (H) 01/26/2025     Mixed hyperlipidemia  Continue statin  Gastroesophageal reflux disease with esophagitis  Continue PPI  Chronic pain syndrome  Continue oxycodone 7.5 mg  Major depressive disorder  Continue Abilify, Wellbutrin and Effexor  Tardive dyskinesia  Continues Astuedo   Multiple falls  Was recently admitted to the hospital January 2015 for very tired and acute right-sided weakness and multiple falls  His oxycodone was decreased from 10 mg to 7.5 mg secondary to concern that may be contributing to falls and fatigue.   Medical marijuana use  Noted        Disposition  #1  IV steroids, breathing treatments, IV remdesivir  #2  Labs in the morning  #3  Titrate oxygen as able  #4  PT/OT consult  #5 medication reconciliation needs to be completed, I went over some of the medication with the patient but he does not follow-up.  Recently got into rehab, there may be some changes      VTE Pharmacologic Prophylaxis: VTE Score: 6 High Risk (Score >/= 5) - Pharmacological DVT Prophylaxis Ordered: heparin. Sequential Compression Devices Ordered.  Code Status: Level 1 - Full Code       Anticipated Length of Stay: Patient will be admitted on an observation basis with an  anticipated length of stay of less than 2 midnights secondary to COVID-19, respiratory failure.    History of Present Illness   Chief Complaint: Shortness of breath    Fahad Hernandez is a 69 y.o. male with a PMH of bronchiectasis, hypertension, hyperlipidemia, depression, diabetes who presents with shortness of breath.  He also noted elevated heart rate.  Patient was recently hospitalized and just got out of rehab.  Patient had multiple falls.  Patient currently requires 2 L of oxygen, he does not take oxygen at home.  Patient has a possible COVID-19.    Review of Systems   Constitutional:  Positive for appetite change.   Respiratory:  Positive for shortness of breath.    Cardiovascular: Negative.    Gastrointestinal: Negative.    Neurological:  Positive for weakness.       Historical Information   Past Medical History:   Diagnosis Date    Asthma     COPD (chronic obstructive pulmonary disease) ([facility])     Dementia ([facility])     Diabetes mellitus ([facility])     GERD (gastroesophageal reflux disease)     History of stroke 11/07/2019    Hypertension     Interstitial lung disease ([facility])     Major depressive disorder with current active episode 11/07/2019    Pneumonia     Rotator cuff arthropathy of right shoulder 12/17/2024    Sleep apnea     Sleep apnea, obstructive     Stroke ([facility])     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     Past Surgical History:   Procedure Laterality Date    BACK SURGERY      ELBOW SURGERY      KNEE SURGERY      NECK SURGERY      SHOULDER SURGERY      SPINE SURGERY  2004     Social History     Tobacco Use    Smoking status: Never     Passive exposure: Never    Smokeless tobacco: Never    Tobacco comments:     Patient admits to using marijuana, edibles and smoking    Vaping Use    Vaping status: Never Used   Substance and Sexual Activity    Alcohol use: Not Currently    Drug use: Yes     Types: Marijuana, Oxycodone, Psilocybin    Sexual activity: Yes     Partners: Female     Birth control/protection: Male  Sterilization     E-Cigarette/Vaping    E-Cigarette Use Never User      E-Cigarette/Vaping Substances    Nicotine No     THC No     CBD Yes     Flavoring No     Other No     Unknown No      Family history non-contributory  Social History:  Marital Status: /Civil Union   Occupation: Retired  Patient Pre-hospital Living Situation: Home  Patient Pre-hospital Level of Mobility:   Patient Pre-hospital Diet Restrictions:       Meds/Allergies   I have reviewed home medications with patient personally.  Prior to Admission medications    Medication Sig Start Date End Date Taking? Authorizing Provider   albuterol (2.5 mg/3 mL) 0.083 % nebulizer solution Take 3 mL (2.5 mg total) by nebulization every 6 (six) hours 1/24/25   Kirk Bueno PA-C   amLODIPine-benazepril (LOTREL) 10-40 MG per capsule  2/26/19   Historical Provider, MD   ARIPiprazole (ABILIFY) 10 mg tablet  9/28/20   Historical Provider, MD   aspirin (ECOTRIN LOW STRENGTH) 81 mg EC tablet Take 81 mg by mouth daily    Historical Provider, MD   atorvastatin (LIPITOR) 10 mg tablet Take 10 mg by mouth daily 2/28/22   Historical Provider, MD   Austedo 12 MG TABS  5/30/24   Historical Provider, MD   budesonide (PULMICORT) 0.5 mg/2 mL nebulizer solution Take 2 mL (0.5 mg total) by nebulization every 12 (twelve) hours Rinse mouth after use. 1/24/25   Kirk Bueno PA-C   buPROPion (WELLBUTRIN XL) 300 mg 24 hr tablet 1 tab(s)    Historical Provider, MD   clopidogrel (PLAVIX) 75 mg tablet Take 1 tablet (75 mg total) by mouth daily 1/31/25   Carter Srivastava MD   docusate sodium (COLACE) 100 mg capsule Take 1 capsule (100 mg total) by mouth 2 (two) times a day 1/30/25   Carter Srivastava MD   Dupixent subcutaneous injection INJECT 2ML SUBCUTANEOUSLY 1 TIME EVERY 2 WEEKS *NEW PRESCRIPTION REQUEST* 12/27/24   Kirk Bueno PA-C   esomeprazole (NexIUM) 40 MG capsule Take 40 mg by mouth    Historical Provider, MD   formoterol (PERFOROMIST) 20 MCG/2ML nebulizer solution  Take 2 mL (20 mcg total) by nebulization 2 (two) times a day 1/24/25   Kirk Bueno PA-C   metFORMIN (GLUCOPHAGE) 500 mg tablet Take 1 tablet by mouth 2 (two) times a day with meals 2/14/22   Historical Provider, MD   metoprolol succinate (TOPROL-XL) 25 mg 24 hr tablet TAKE 1 TABLET (25 MG TOTAL) BY MOUTH DAILY. 10/24/24   JAMIL Lindsay   montelukast (SINGULAIR) 10 mg tablet Take 1 tablet (10 mg total) by mouth daily at bedtime 1/24/25   Kirk Bueno PA-C   multivitamin (THERAGRAN) TABS Take 1 tablet by mouth    Historical Provider, MD   nitroglycerin (NITROSTAT) 0.4 mg SL tablet Place 1 tablet (0.4 mg total) under the tongue every 5 (five) minutes as needed for chest pain 10/1/24   JAMIL Lindsay   OneTouch Ultra test strip TEST DAILY.. DIAGNOSIS E11.9 3/28/23   Historical Provider, MD   tamsulosin (FLOMAX) 0.4 mg Take 0.4 mg by mouth daily 2/13/22   Historical Provider, MD   temazepam (RESTORIL) 30 mg capsule Take 30 mg by mouth daily at bedtime as needed 3/15/22   Historical Provider, MD   venlafaxine (EFFEXOR-XR) 75 mg 24 hr capsule 1 cap(s)    Historical Provider, MD     Allergies   Allergen Reactions    Beta Adrenergic Blockers      Pt states they make him feel crappy.       Objective :  Temp:  [98.6 °F (37 °C)] 98.6 °F (37 °C)  HR:  [103-133] 107  BP: (116-139)/(56-71) 116/68  Resp:  [20-22] 20  SpO2:  [91 %-96 %] 94 %  O2 Device: Nasal cannula  Nasal Cannula O2 Flow Rate (L/min):  [2 L/min] 2 L/min    Physical Exam  Constitutional:       Appearance: He is normal weight.   HENT:      Head: Normocephalic and atraumatic.      Mouth/Throat:      Mouth: Mucous membranes are dry.   Eyes:      Extraocular Movements: Extraocular movements intact.      Conjunctiva/sclera: Conjunctivae normal.   Cardiovascular:      Rate and Rhythm: Tachycardia present.      Pulses: Normal pulses.      Heart sounds: Normal heart sounds.   Pulmonary:      Comments: Decreased breath sounds bilaterally  Abdominal:       General: There is no distension.      Palpations: Abdomen is soft.      Tenderness: There is no abdominal tenderness. There is no guarding.   Musculoskeletal:         General: Normal range of motion.   Skin:     General: Skin is warm and dry.   Neurological:      General: No focal deficit present.      Mental Status: He is alert and oriented to person, place, and time. Mental status is at baseline.   Psychiatric:         Mood and Affect: Mood normal.         Behavior: Behavior normal.         Thought Content: Thought content normal.                Lab Results: I have reviewed the following results:  Results from last 7 days   Lab Units 02/16/25  0305   WBC Thousand/uL 10.92*   HEMOGLOBIN g/dL 9.6*   HEMATOCRIT % 32.1*   PLATELETS Thousands/uL 461*   BANDS PCT % 3   LYMPHO PCT % 23   MONO PCT % 10   EOS PCT % 1     Results from last 7 days   Lab Units 02/16/25  0305   SODIUM mmol/L 140   POTASSIUM mmol/L 3.5   CHLORIDE mmol/L 102   CO2 mmol/L 25   BUN mg/dL 29*   CREATININE mg/dL 1.17   ANION GAP mmol/L 13   CALCIUM mg/dL 8.6   GLUCOSE RANDOM mg/dL 194*     Results from last 7 days   Lab Units 02/16/25  0305   INR  0.94         Lab Results   Component Value Date    HGBA1C 8.3 (H) 01/26/2025    HGBA1C 8.1 (H) 12/14/2024    HGBA1C 7.0 (H) 09/11/2024           Imaging Results Review: I reviewed radiology reports from this admission including: CT chest.  Other Study Results Review: EKG was reviewed.     Administrative Statements     Medical decision making: Moderate  Diagnosis addressed: Acute complicated: COVID 19 with respiratory failure  Data:   Reviewed  CBC, CMP, troponin, BNP, viral panel, CT of the chest  Ordered CBC, BMP,  Reviewed external notes from previous admission and pulmonary  Discussion of management with ER provider: COVID-19 positive, required oxygen, breathing treatments were given          Risk:  Prescription drug management: IV remdesivir, IV steroids  Discussion for hospitalization with ER  provider: Requires hospitalization for COVID-19, as patient requires oxygen  Drug therapy requiring intensive monitoring: IV remdesivir, requires IV renal panel daily        ** Please Note: This note has been constructed using a voice recognition system. **

## 2025-02-16 NOTE — ASSESSMENT & PLAN NOTE
Presenting with SOB and hypoxia. Hypoxia present on admission, secondary to COVID-19  Baseline room air, requiring up to 3 L via NC  Currently SpO2: 94 % on Nasal Cannula O2 Flow Rate (L/min): 2.5 L/min   Wean as tolerated for oxygen saturation greater than 92%

## 2025-02-16 NOTE — PHYSICAL THERAPY NOTE
Physical Therapy Evaluation     Patient's Name: Fahad Hernandez    Admitting Diagnosis  Shortness of breath [R06.02]  Acute respiratory failure with hypoxia ([facility]) [J96.01]  COVID-19 [U07.1]    Problem List  Patient Active Problem List   Diagnosis    Abnormal EKG    Hypertension    Type 2 diabetes mellitus with hyperglycemia, without long-term current use of insulin (HCC)    Chronic obstructive asthma (HCC)    Major depressive disorder    Mixed hyperlipidemia    Gastroesophageal reflux disease with esophagitis    Severe persistent asthma with acute exacerbation    Medical marijuana use    Centrilobular emphysema (HCC)    Chest pain    Lung nodules    Hoarseness of voice    SOB (shortness of breath)    Long-term exposure involving bird droppings    Orthostasis    Asthma exacerbation attacks    Moderate persistent asthma with exacerbation    Acute right-sided weakness    Fall    Metabolic acidosis    Lactic acidosis due to diabetes mellitus (HCC)    BENNY (acute kidney injury) ([facility])    Severe persistent asthma with (acute) exacerbation    Rotator cuff arthropathy of right shoulder    Acute respiratory failure ([facility])    Multiple falls    Chronic pain syndrome    CASSIE (obstructive sleep apnea)    COVID-19    Tardive dyskinesia     Past Medical History  Past Medical History:   Diagnosis Date    Asthma     COPD (chronic obstructive pulmonary disease) (HCC)     Dementia (HCC)     Diabetes mellitus (HCC)     GERD (gastroesophageal reflux disease)     History of stroke 11/07/2019    Hypertension     Interstitial lung disease ([facility])     Major depressive disorder with current active episode 11/07/2019    Pneumonia     Rotator cuff arthropathy of right shoulder 12/17/2024    Sleep apnea     Sleep apnea, obstructive     Stroke ([facility])     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     Past Surgical History  Past Surgical History:   Procedure Laterality Date    BACK SURGERY      ELBOW SURGERY      KNEE SURGERY      NECK SURGERY      SHOULDER SURGERY  "     SPINE SURGERY  2004 02/16/25 0954   PT Last Visit   PT Visit Date 02/16/25   Note Type   Note type Evaluation   Pain Assessment   Pain Assessment Tool 0-10   Pain Score No Pain   Restrictions/Precautions   Weight Bearing Precautions Per Order No   Other Precautions Contact/isolation;Airborne/isolation;Droplet precautions;Chair Alarm;Bed Alarm;O2;Fall Risk;Telemetry  (+COVID; +4L O2 via NC)   Home Living   Type of Home House   Home Layout Two level;Able to live on main level with bedroom/bathroom;Performs ADLs on one level;Stairs to enter with rails  (5-6 CHUCHO)   Bathroom Shower/Tub Walk-in shower   Bathroom Toilet Standard   Bathroom Equipment Other (Comment)  (none per patient)   Bathroom Accessibility Accessible   Home Equipment Walker   Additional Comments Pt ambulates with a walker.   Prior Function   Level of [place] Independent with functional mobility;Needs assistance with ADLs;Needs assistance with IADLS   Lives With Spouse   Receives Help From Family   IADLs Independent with medication management;Family/Friend/Other provides transportation;Family/Friend/Other provides meals   Falls in the last 6 months 5 to 10  (about 5 falls)   Vocational Retired   Comments Pt's home setup and PLOF obtained from PT evaluation on 1/27/25. Pt reports no changes to his home setup. Pt reports following hospitalization in January 2025, he went to [facility]. Pt reports he was home for 2 days prior to current hospitalization.   General   Family/Caregiver Present No   Cognition   Overall Cognitive Status WFL   Arousal/Participation Alert   Orientation Level Oriented to person;Oriented to place;Oriented to situation   Memory Within functional limits   Following Commands Follows multistep commands without difficulty   Comments Pt agreeable to PT.   Subjective   Subjective \"I need to sit up.\"   RLE Assessment   RLE Assessment X   Strength RLE   RLE Overall Strength 3+/5   LLE Assessment   LLE Assessment X   Strength LLE   LLE " Overall Strength 4-/5   Light Touch   RLE Light Touch Grossly intact   LLE Light Touch Grossly intact   Bed Mobility   Supine to Sit 4  Minimal assistance   Additional items Assist x 1;HOB elevated;Bedrails;Increased time required;Verbal cues;LE management   Additional Comments Upon arrival pt reporting increased difficulty with breathing and requesting to sit at edge of bed. RN present. Pt assisted with supine to sit transfer and pt reported he felt an improvement in his breathing upon sitting at EOB. Pt noted with decreased work of breathing sitting at EOB. Pt was provided a prolonged seated rest break prior to further mobility testing.   Transfers   Sit to Stand 4  Minimal assistance   Additional items Assist x 1;Increased time required;Verbal cues   Stand to Sit 4  Minimal assistance   Additional items Assist x 1;Armrests;Increased time required;Verbal cues   Ambulation/Elevation   Gait pattern Decreased toe off;Decreased heel strike;Decreased hip extension;Short stride;Shuffling   Gait Assistance 4  Minimal assist   Additional items Assist x 1;Verbal cues   Assistive Device Rolling walker   Distance 10 feet   Balance   Static Sitting Fair +   Dynamic Sitting Fair   Static Standing Fair -   Dynamic Standing Poor +   Ambulatory Poor +   Endurance Deficit   Endurance Deficit Yes   Endurance Deficit Description decreased activity tolerance; pt requiring supplemental oxygen; pt on 4L O2 via NC; SpO2 stable; dyspnea on exertion noted; pt provided with verbal cues for proper breathing techniques with good carryover   Activity Tolerance   Activity Tolerance Patient tolerated treatment well;Patient limited by fatigue   Medical Staff Made Aware OT Ashley  (Co-evaluation performed with OT secondary to complex medical condition of patient and regression of functional status from baseline. PT/OT goals were addressed separately.)   Nurse Made Aware RN Florence   Assessment   Prognosis Good   Problem List Decreased  strength;Decreased endurance;Impaired balance;Decreased mobility   Assessment Pt is 69 year old male seen for PT evaluation s/p admit to [facility] on 2/16/2025 with COVID-19. PT consulted to assess pt's functional mobility and discharge needs. Order placed for PT evaluation and treatment, with activity as tolerated order. Comorbidities affecting pt's physical performance at time of assessment include hypertension, type 2 DM, acute respiratory failure, multiple falls, chronic pain syndrome, and tardive dyskinesia. Prior to hospitalization, pt was independent with all functional mobility with a walker. Pt ambulates household and community distances. Pt resides with his spouse, in a two level house, but is able to stay on the first floor, with 5-6 steps to enter. Pt reports following hospitalization in January 2025, he went to [facility]. Pt reports he was home for 2 days prior to current hospitalization. Personal factors affecting pt at time of initial evaluation include stairs to enter home, ambulating with an assistive device, inability to ambulate household distances, inability to ambulate community distances, inability to navigate level surfaces without external assistance, unable to perform dynamic tasks in the community, limited home support, positive fall history, difficulty performing ADLs, and inability to perform IADLs. Please find objective findings from PT assessment regarding body systems outlined above with impairments and limitations including weakness, impaired balance, decreased endurance, gait deviations, decreased activity tolerance, decreased functional mobility tolerance, and fall risk. The following objective measures were performed on initial evaluation Barthel Index: 40/100, Modified [place]: 4 (moderate/severe disability), and AM-PAC 6-Clicks: 16/24. Pt's clinical presentation is currently unstable/unpredictable seen in pt's presentation of need for ongoing medical management/monitoring, pt is  a fall risk, pt is currently requiring supplemental oxygen, and pt requires cues and assist for safety with functional mobility. Pt to benefit from continued PT treatment to address deficits as defined above and maximize pt's level of function and independence with mobility. From a PT standpoint, recommendation at time of discharge would be level 2, moderate resource intensity in order to facilitate return to prior level of function.   Barriers to Discharge Inaccessible home environment;Decreased caregiver support;Other (Comment)  (decline in functional mobility)   Goals   STG Expiration Date 02/26/25   Short Term Goal #1 In 10 days: Increase bilateral LE strength 1/2 grade to facilitate independent mobility, Perform all bed mobility tasks modified independent to decrease caregiver burden, Perform all transfers modified independent to improve independence, Ambulate > 150 ft. with RW modified independent w/o LOB and w/ normalized gait pattern 100% of the time, Navigate 6 stair(s) modified independent with unilateral handrail to facilitate return to previous living environment, and Increase all balance 1/2 grade to decrease risk for falls   Plan   Treatment/Interventions Functional transfer training;LE strengthening/ROM;Elevations;Therapeutic exercise;Endurance training;Patient/family training;Bed mobility;Gait training;Spoke to nursing;OT   PT Frequency 3-5x/wk   Discharge Recommendation   Rehab Resource Intensity Level, PT II (Moderate Resource Intensity)   AM-PAC Basic Mobility Inpatient   Turning in Flat Bed Without Bedrails 3   Lying on Back to Sitting on Edge of Flat Bed Without Bedrails 3   Moving Bed to Chair 3   Standing Up From Chair Using Arms 3   Walk in Room 3   Climb 3-5 Stairs With Railing 1   Basic Mobility Inpatient Raw Score 16   Basic Mobility Standardized Score 38.32   [facility] Highest Level Of Mobility   -HL Goal 5: Stand one or more mins   -HL Achieved 6: Walk 10 steps or more    Modified [place] Scale   Modified [place] Scale 4   Barthel Index   Feeding 5   Bathing 0   Grooming Score 0   Dressing Score 5   Bladder Score 5   Bowels Score 10   Toilet Use Score 5   Transfers (Bed/Chair) Score 10   Mobility (Level Surface) Score 0   Stairs Score 0   Barthel Index Score 40     PT Evaluation Time: [id number]  Hiwot Chiang, PT, DPT

## 2025-02-16 NOTE — ASSESSMENT & PLAN NOTE
With sob, requiring oxygen  High risk due to pulmonary issues,(COPD/asthma) followed by pulmonary in the outpatient setting  Will do remdesivir  IV dexamethasone  Incentive spirometry  Breathing treatments

## 2025-02-16 NOTE — TREATMENT PLAN
"[facility]  Post-admission follow up  Name: Fahad Hernandez I MRN: [id number]  Unit/Bed#: [address] [place] [address] Date of Admission: 2/16/2025   Date of Service: 2/16/2025  I Hospital Day: 0      * COVID-19  Assessment & Plan  With sob, requiring oxygen  High risk due to pulmonary issues,(COPD/asthma) followed by pulmonary in the outpatient setting    Mild COVID pathway as below   COVID19- positive on 2/16  Supplemental oxygen with 2-3L saturating 95-98%  Wean as tolerated for oxygen saturation greater than 92%  Chest CT scan revealed \" No evidence of pulmonary embolus. \"   AC per protocol   Continue  Dexamethasone 6mg IV daily X 10 days  Remdesivir 200mg IV day 1 and 100mg IV daily X4 days   OOB TID; ambulation and mobilization strongly encouraged  PT/OT consult and evaluation   Self proning strongly encouraged  Supportive care     Tardive dyskinesia  Assessment & Plan  Continues Astuedo     Chronic pain syndrome  Assessment & Plan  Continue oxycodone 7.5 mg    Multiple falls  Assessment & Plan  Was recently admitted to the hospital January 2025 for very tired and acute right-sided weakness and multiple falls  His oxycodone was decreased from 10 mg to 7.5 mg secondary to concern that may be contributing to falls and fatigue.     Acute respiratory failure (HCC)  Assessment & Plan  Requiring 2 L of oxygen, secondary COVID-19  Baseline on room air      Patient is persistently tachycardic likely in the setting of COVID-19 and acute respiratory failure, will follow-up EKG  Consider gentle IV fluids  Remains afebrile  "

## 2025-02-16 NOTE — ASSESSMENT & PLAN NOTE
High-risk in setting of COPD/asthma     Mild COVID pathway, (+) 2/16  Chest CT negative PE; (+) chronic bronchiectasis, distended esophagus  Continue IV remdesivir, initiated 2/16  Continue IV Decadron 6 mg, initiated 2/16  Follow up on procalcitonin  Continue heparin tid for DVT prophylaxis   Encourage incentive spirometer, OOB as appropriate  Self proning while in bed  Monitor inflammatory markers q 2-3 days  PT/OT consulted: Level 2 recs  Mucinex, tessalon, robitussin-DM

## 2025-02-16 NOTE — PLAN OF CARE
Problem: RESPIRATORY - ADULT  Goal: Achieves optimal ventilation and oxygenation  Description: INTERVENTIONS:  - Assess for changes in respiratory status  - Assess for changes in mentation and behavior  - Position to facilitate oxygenation and minimize respiratory effort  - Oxygen administered by appropriate delivery if ordered  - Initiate smoking cessation education as indicated  - Encourage broncho-pulmonary hygiene including cough, deep breathe, Incentive Spirometry  - Assess the need for suctioning and aspirate as needed  - Assess and instruct to report SOB or any respiratory difficulty  - Respiratory Therapy support as indicated  Outcome: Progressing     Problem: MUSCULOSKELETAL - ADULT  Goal: Maintain or return mobility to safest level of function  Description: INTERVENTIONS:  - Assess patient's ability to carry out ADLs; assess patient's baseline for ADL function and identify physical deficits which impact ability to perform ADLs (bathing, care of mouth/teeth, toileting, grooming, dressing, etc.)  - Assess/evaluate cause of self-care deficits   - Assess range of motion  - Assess patient's mobility  - Assess patient's need for assistive devices and provide as appropriate  - Encourage maximum independence but intervene and supervise when necessary  - Involve family in performance of ADLs  - Assess for home care needs following discharge   - Consider OT consult to assist with ADL evaluation and planning for discharge  - Provide patient education as appropriate  Outcome: Progressing  Goal: Maintain proper alignment of affected body part  Description: INTERVENTIONS:  - Support, maintain and protect limb and body alignment  - Provide patient/ family with appropriate education  Outcome: Progressing     Problem: PAIN - ADULT  Goal: Verbalizes/displays adequate comfort level or baseline comfort level  Description: Interventions:  - Encourage patient to monitor pain and request assistance  - Assess pain using  appropriate pain scale  - Administer analgesics based on type and severity of pain and evaluate response  - Implement non-pharmacological measures as appropriate and evaluate response  - Consider cultural and social influences on pain and pain management  - Notify physician/advanced practitioner if interventions unsuccessful or patient reports new pain  Outcome: Progressing     Problem: INFECTION - ADULT  Goal: Absence or prevention of progression during hospitalization  Description: INTERVENTIONS:  - Assess and monitor for signs and symptoms of infection  - Monitor lab/diagnostic results  - Monitor all insertion sites, i.e. indwelling lines, tubes, and drains  - Monitor endotracheal if appropriate and nasal secretions for changes in amount and color  - [place] appropriate cooling/warming therapies per order  - Administer medications as ordered  - Instruct and encourage patient and family to use good hand hygiene technique  - Identify and instruct in appropriate isolation precautions for identified infection/condition  Outcome: Progressing  Goal: Absence of fever/infection during neutropenic period  Description: INTERVENTIONS:  - Monitor WBC    Outcome: Progressing     Problem: SAFETY ADULT  Goal: Patient will remain free of falls  Description: INTERVENTIONS:  - Educate patient/family on patient safety including physical limitations  - Instruct patient to call for assistance with activity   - Consult OT/PT to assist with strengthening/mobility   - Keep Call bell within reach  - Keep bed low and locked with side rails adjusted as appropriate  - Keep care items and personal belongings within reach  - Initiate and maintain comfort rounds  - Make Fall Risk Sign visible to staff  - Offer Toileting every 2 Hours, in advance of need  - Initiate/Maintain bed alarm  - Obtain necessary fall risk management equipment:   - Apply yellow socks and bracelet for high fall risk patients  - Consider moving patient to room near  nurses station  Outcome: Progressing     Problem: SAFETY ADULT  Goal: Patient will remain free of falls  Description: INTERVENTIONS:  - Educate patient/family on patient safety including physical limitations  - Instruct patient to call for assistance with activity   - Consult OT/PT to assist with strengthening/mobility   - Keep Call bell within reach  - Keep bed low and locked with side rails adjusted as appropriate  - Keep care items and personal belongings within reach  - Initiate and maintain comfort rounds  - Make Fall Risk Sign visible to staff  - Offer Toileting every 2 Hours, in advance of need  - Initiate/Maintain bed alarm  - Obtain necessary fall risk management equipment:   - Apply yellow socks and bracelet for high fall risk patients  - Consider moving patient to room near nurses station  Outcome: Progressing  Goal: Maintain or return to baseline ADL function  Description: INTERVENTIONS:  -  Assess patient's ability to carry out ADLs; assess patient's baseline for ADL function and identify physical deficits which impact ability to perform ADLs (bathing, care of mouth/teeth, toileting, grooming, dressing, etc.)  - Assess/evaluate cause of self-care deficits   - Assess range of motion  - Assess patient's mobility; develop plan if impaired  - Assess patient's need for assistive devices and provide as appropriate  - Encourage maximum independence but intervene and supervise when necessary  - Involve family in performance of ADLs  - Assess for home care needs following discharge   - Consider OT consult to assist with ADL evaluation and planning for discharge  - Provide patient education as appropriate  Outcome: Progressing  Goal: Maintains/Returns to pre admission functional level  Description: INTERVENTIONS:  - Perform AM-PAC 6 Click Basic Mobility/ Daily Activity assessment daily.  - Set and communicate daily mobility goal to care team and patient/family/caregiver.   - Collaborate with rehabilitation  services on mobility goals if consulted  - Perform Range of Motion 3 times a day.  - Reposition patient every 2 hours.  - Dangle patient 3 times a day  - Stand patient 3 times a day  - Ambulate patient 3 times a day  - Out of bed to chair 3 times a day   - Out of bed for meals 3 times a day  - Out of bed for toileting  - Record patient progress and toleration of activity level   Outcome: Progressing     Problem: DISCHARGE PLANNING  Goal: Discharge to home or other facility with appropriate resources  Description: INTERVENTIONS:  - Identify barriers to discharge w/patient and caregiver  - Arrange for needed discharge resources and transportation as appropriate  - Identify discharge learning needs (meds, wound care, etc.)  - Arrange for interpretive services to assist at discharge as needed  - Refer to Case Management Department for coordinating discharge planning if the patient needs post-hospital services based on physician/advanced practitioner order or complex needs related to functional status, cognitive ability, or social support system  Outcome: Progressing     Problem: Knowledge Deficit  Goal: Patient/family/caregiver demonstrates understanding of disease process, treatment plan, medications, and discharge instructions  Description: Complete learning assessment and assess knowledge base.  Interventions:  - Provide teaching at level of understanding  - Provide teaching via preferred learning methods  Outcome: Progressing     Problem: Prexisting or High Potential for Compromised Skin Integrity  Goal: Skin integrity is maintained or improved  Description: INTERVENTIONS:  - Identify patients at risk for skin breakdown  - Assess and monitor skin integrity  - Assess and monitor nutrition and hydration status  - Monitor labs   - Assess for incontinence   - Turn and reposition patient  - Assist with mobility/ambulation  - Relieve pressure over bony prominences  - Avoid friction and shearing  - Provide appropriate  hygiene as needed including keeping skin clean and dry  - Evaluate need for skin moisturizer/barrier cream  - Collaborate with interdisciplinary team   - Patient/family teaching  - Consider wound care consult   Outcome: Progressing

## 2025-02-16 NOTE — RESPIRATORY THERAPY NOTE
RT Protocol Note  Fahad Hernandez 69 y.o. male MRN: [id number]  Unit/Bed#: 2 E 269-01 Encounter: [id number]    Assessment    Principal Problem:    COVID-19  Active Problems:    Hypertension    Type 2 diabetes mellitus with hyperglycemia, without long-term current use of insulin (HCC)    Acute respiratory failure (HCC)    Multiple falls    Chronic pain syndrome    Tardive dyskinesia      Home Pulmonary Medications:     02/16/25 1337   Respiratory Protocol   Protocol Initiated? No   Protocol Selection Respiratory   Language Barrier? No   Medical & Social History Reviewed? Yes   Diagnostic Studies Reviewed? Yes   Physical Assessment Performed? Yes   Respiratory Plan Mild Distress pathway   Respiratory Assessment   Assessment Type Pre-treatment   General Appearance Alert;Awake   Respiratory Pattern Tachypneic   Chest Assessment Chest expansion symmetrical   Bilateral Breath Sounds Diminished;Expiratory wheezes   Resp Comments admitted with COVID with asthma and COPD history, bbs scattered exp wheezes, tachypneic, spo2 96% 3L NC will continue nebs TID   O2 Device nc   Additional Assessments   SpO2 97 %            Past Medical History:   Diagnosis Date    Asthma     COPD (chronic obstructive pulmonary disease) ([facility])     Dementia ([facility])     Diabetes mellitus ([facility])     GERD (gastroesophageal reflux disease)     History of stroke 11/07/2019    Hypertension     Interstitial lung disease ([facility])     Major depressive disorder with current active episode 11/07/2019    Pneumonia     Rotator cuff arthropathy of right shoulder 12/17/2024    Sleep apnea     Sleep apnea, obstructive     Stroke ([facility])     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     Social History     Socioeconomic History    Marital status: /Civil Union     Spouse name: None    Number of children: None    Years of education: None    Highest education level: None   Occupational History    None   Tobacco Use    Smoking status: Never     Passive exposure: Never    Smokeless  tobacco: Never    Tobacco comments:     Patient admits to using marijuana, edibles and smoking    Vaping Use    Vaping status: Never Used   Substance and Sexual Activity    Alcohol use: Not Currently    Drug use: Yes     Types: Marijuana, Oxycodone, Psilocybin    Sexual activity: Yes     Partners: Female     Birth control/protection: Male Sterilization   Other Topics Concern    None   Social History Narrative    None     Social Drivers of Health     Financial Resource Strain: Low Risk  (11/1/2024)    Received from [facility]    Overall Financial Resource Strain (CARDIA)     Difficulty of Paying Living Expenses: Not hard at all   Food Insecurity: No Food Insecurity (2/16/2025)    Nursing - Inadequate Food Risk Classification     Worried About Running Out of Food in the Last Year: Never true     Ran Out of Food in the Last Year: Never true     Ran Out of Food in the Last Year: Never true   Transportation Needs: No Transportation Needs (2/16/2025)    Nursing - Transportation Risk Classification     Lack of Transportation: Not on file     Lack of Transportation: No   Physical Activity: Inactive (11/1/2024)    Received from [facility]    Exercise Vital Sign     Days of Exercise per Week: 0 days     Minutes of Exercise per Session: 0 min   Stress: Stress Concern Present (11/1/2024)    Received from [facility]    [employer] - Occupational Stress Questionnaire     Feeling of Stress : To some extent   Social Connections: Moderately Isolated (11/1/2024)    Received from [facility]    Social Connection and Isolation Panel [NHANES]     Frequency of Communication with Friends and Family: Three times a week     Frequency of Social Gatherings with Friends and Family: Twice a week     Attends Sabianism Services: Never     Active Member of Clubs or Organizations: No     Attends Club or Organization Meetings: Never     Marital Status:  "   Intimate Partner Violence: Unknown (2025)    Nursing IPS     Feels Physically and Emotionally Safe: Not on file     Physically Hurt by Someone: Not on file     Humiliated or Emotionally Abused by Someone: Not on file     Physically Hurt by Someone: No     Hurt or Threatened by Someone: No   Housing Stability: Unknown (2025)    Nursing: Inadequate Housing Risk Classification     Has Housing: Not on file     Worried About Losing Housing: Not on file     Unable to Get Utilities: Not on file     Unable to Pay for Housing in the Last Year: No     Has Housin       Subjective         Objective    Physical Exam:   Assessment Type: Pre-treatment  General Appearance: Alert, Awake  Respiratory Pattern: Tachypneic  Chest Assessment: Chest expansion symmetrical  Bilateral Breath Sounds: Diminished, Expiratory wheezes  O2 Device: nc    Vitals:  Blood pressure 96/58, pulse 81, temperature 98.3 °F (36.8 °C), resp. rate 21, height 5' 11\" (1.803 m), weight 68.8 kg (151 lb 10.8 oz), SpO2 94%.          Imaging and other studies:     O2 Device: nc     Plan    Respiratory Plan: Mild Distress pathway        Resp Comments: admitted with COVID with asthma and COPD history, bbs scattered exp wheezes, tachypneic, spo2 96% 3L NC will continue nebs TID   "

## 2025-02-16 NOTE — ED PROVIDER NOTES
Time reflects when diagnosis was documented in both MDM as applicable and the Disposition within this note       Time User Action Codes Description Comment    2/16/2025  4:47 AM Mary Antonio [U07.1] COVID-19     2/16/2025  4:47 AM Mary Antonio [J96.01] Acute respiratory failure with hypoxia (HCC)           ED Disposition       ED Disposition   Admit    Condition   Stable    Date/Time   Sun Feb 16, 2025  4:48 AM    Comment   Case was discussed with Dr. Moe Hendrickson and the patient's admission status was agreed to be Admission Status: observation status to the service of Dr. Hendrickson .               Assessment & Plan       Medical Decision Making  Has been tachycardic while in the ER 100s-125s. Sinus tachycardia per EKG. On arrival, 88% on room air, improvement on 2L NC. Otherwise, VSS. White count 10.19. Hemoglobin 9.6. + COVID. Stable troponins. CT PE study without PE, chronic bronchiectasis, and fluid distended thoracic esophagus. Discussed with internal medicine for admission.  Patient understands and consents to admission.    Amount and/or Complexity of Data Reviewed  Labs: ordered. Decision-making details documented in ED Course.  Radiology: ordered.    Risk  OTC drugs.  Prescription drug management.  Decision regarding hospitalization.        ED Course as of 02/16/25 0529   Sun Feb 16, 2025   0313 WBC(!): 10.92   0313 Hemoglobin(!): 9.6   0354 SARS-COV-2(!): Positive       Medications   acetaminophen (TYLENOL) tablet 650 mg (has no administration in time range)   ketorolac (TORADOL) injection 15 mg (has no administration in time range)   dexamethasone (PF) (DECADRON) injection 6 mg (has no administration in time range)   remdesivir (Veklury) 200 mg in sodium chloride 0.9 % 290 mL IVPB (has no administration in time range)     Followed by   remdesivir (Veklury) 100 mg in sodium chloride 0.9 % 270 mL IVPB (has no administration in time range)   amLODIPine (NORVASC) tablet 10 mg (has no administration in  time range)     And   lisinopril (ZESTRIL) tablet 40 mg (has no administration in time range)   aspirin (ECOTRIN LOW STRENGTH) EC tablet 81 mg (has no administration in time range)   atorvastatin (LIPITOR) tablet 10 mg (has no administration in time range)   pantoprazole (PROTONIX) EC tablet 40 mg (has no administration in time range)   metoprolol succinate (TOPROL-XL) 24 hr tablet 25 mg (has no administration in time range)   montelukast (SINGULAIR) tablet 10 mg (has no administration in time range)   multivitamin stress formula tablet 1 tablet (has no administration in time range)   tamsulosin (FLOMAX) capsule 0.4 mg (has no administration in time range)   heparin (porcine) subcutaneous injection 5,000 Units (has no administration in time range)   acetaminophen (TYLENOL) tablet 650 mg (has no administration in time range)   insulin lispro (HumALOG/ADMELOG) 100 units/mL subcutaneous injection 1-5 Units (has no administration in time range)   albuterol (FOR EMS ONLY) (2.5 mg/3 mL) 0.083 % inhalation solution 5 mg (0 mg Does not apply Given to EMS 2/16/25 0258)   ipratropium-albuterol (FOR EMS ONLY) (DUO-NEB) 0.5-2.5 mg/3 mL inhalation solution 6 mL (0 mL Does not apply Given to EMS 2/16/25 0258)   ipratropium-albuterol (DUO-NEB) 0.5-2.5 mg/3 mL inhalation solution 3 mL (3 mL Nebulization Given 2/16/25 0335)   sodium chloride 0.9 % bolus 1,000 mL (0 mL Intravenous Stopped 2/16/25 0522)   iohexol (OMNIPAQUE) 350 MG/ML injection (MULTI-DOSE) 85 mL (85 mL Intravenous Given 2/16/25 8447)       ED Risk Strat Scores   HEART Risk Score      Flowsheet Row Most Recent Value   Heart Score Risk Calculator    History 0 Filed at: 02/16/2025 0527   ECG 1 Filed at: 02/16/2025 0527   Age 2 Filed at: 02/16/2025 0527   Risk Factors 2 Filed at: 02/16/2025 0527   Troponin 0 Filed at: 02/16/2025 0527   HEART Score 5 Filed at: 02/16/2025 0527          HEART Risk Score      Flowsheet Row Most Recent Value   Heart Score Risk Calculator     History 0 Filed at: 02/16/2025 0527   ECG 1 Filed at: 02/16/2025 0527   Age 2 Filed at: 02/16/2025 0527   Risk Factors 2 Filed at: 02/16/2025 0527   Troponin 0 Filed at: 02/16/2025 0527   HEART Score 5 Filed at: 02/16/2025 0527                              SBIRT 20yo+      Flowsheet Row Most Recent Value   Initial Alcohol Screen: US AUDIT-C     1. How often do you have a drink containing alcohol? 0 Filed at: 02/16/2025 0255   2. How many drinks containing alcohol do you have on a typical day you are drinking?  0 Filed at: 02/16/2025 0255   3b. FEMALE Any Age, or MALE 65+: How often do you have 4 or more drinks on one occassion? 0 Filed at: 02/16/2025 0255   Audit-C Score 0 Filed at: 02/16/2025 0255   LETICIA: How many times in the past year have you...    Used an illegal drug or used a prescription medication for non-medical reasons? Never Filed at: 02/16/2025 0255                            History of Present Illness       Chief Complaint   Patient presents with    Shortness of Breath     Pt comes in from  home via ems C/O SOB. Pt has elevated Hr and pt has not been taking his Cardizem in 3 months.        Past Medical History:   Diagnosis Date    Asthma     COPD (chronic obstructive pulmonary disease) (HCC)     Dementia (HCC)     Diabetes mellitus (HCC)     GERD (gastroesophageal reflux disease)     History of stroke 11/07/2019    Hypertension     Interstitial lung disease (HCC)     Major depressive disorder with current active episode 11/07/2019    Pneumonia     Rotator cuff arthropathy of right shoulder 12/17/2024    Sleep apnea     Sleep apnea, obstructive     Stroke (HCC)     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      Past Surgical History:   Procedure Laterality Date    BACK SURGERY      ELBOW SURGERY      KNEE SURGERY      NECK SURGERY      SHOULDER SURGERY      SPINE SURGERY  2004      Family History   Family history unknown: Yes      Social History     Tobacco Use    Smoking status: Never     Passive exposure: Never     Smokeless tobacco: Never    Tobacco comments:     Patient admits to using marijuana, edibles and smoking    Vaping Use    Vaping status: Never Used   Substance Use Topics    Alcohol use: Not Currently    Drug use: Yes     Types: Marijuana, Oxycodone, Psilocybin      E-Cigarette/Vaping    E-Cigarette Use Never User       E-Cigarette/Vaping Substances    Nicotine No     THC No     CBD Yes     Flavoring No     Other No     Unknown No       I have reviewed and agree with the history as documented.     Patient is a 69-year-old male who presents to the emergency room for shortness of breath.  Patient reports shortness of breath is chronic.  Reports shortness of breath worsened tonight.  Associated cough for the past few days.  Denies any other symptoms.  Takes aspirin and Plavix.  Hypoxic to 88% on room air, does not wear oxygen at baseline.       Shortness of Breath  Associated symptoms: cough    Associated symptoms: no abdominal pain, no chest pain, no ear pain, no fever, no headaches, no rash, no sore throat and no vomiting        Review of Systems   Constitutional:  Negative for chills and fever.   HENT:  Negative for ear pain, sore throat, trouble swallowing and voice change.    Eyes:  Negative for pain and visual disturbance.   Respiratory:  Positive for cough and shortness of breath.    Cardiovascular:  Negative for chest pain and palpitations.   Gastrointestinal:  Negative for abdominal pain, nausea and vomiting.   Genitourinary:  Negative for dysuria, flank pain and hematuria.   Musculoskeletal:  Negative for arthralgias and back pain.   Skin:  Negative for color change and rash.   Neurological:  Negative for seizures, syncope and headaches.   Psychiatric/Behavioral:  Negative for confusion.    All other systems reviewed and are negative.          Objective       ED Triage Vitals   Temperature Pulse Blood Pressure Respirations SpO2 Patient Position - Orthostatic VS   02/16/25 0258 02/16/25 0254 02/16/25 0254  02/16/25 0254 02/16/25 0254 02/16/25 0254   98.6 °F (37 °C) (!) 107 116/56 22 92 % Lying      Temp Source Heart Rate Source BP Location FiO2 (%) Pain Score    02/16/25 0258 02/16/25 0254 02/16/25 0254 -- --    Oral Monitor Right arm        Vitals      Date and Time Temp Pulse SpO2 Resp BP Pain Score FACES Pain Rating User   02/16/25 0500 -- 133 95 % 20 124/71 -- -- BS   02/16/25 0430 -- 103 96 % 20 138/64 -- -- BS   02/16/25 0415 -- 105 95 % 20 -- -- -- BS   02/16/25 0400 -- 126 95 % 20 139/64 -- -- BS   02/16/25 0330 -- 111 91 % 22 -- -- -- BS   02/16/25 0300 -- 113 92 % 20 116/56 -- -- BS   02/16/25 0258 98.6 °F (37 °C) -- -- -- -- -- -- BS   02/16/25 0254 -- 107 92 % 22 116/56 -- -- BS            Physical Exam  Vitals and nursing note reviewed.   Constitutional:       General: He is not in acute distress.     Appearance: Normal appearance. He is well-developed.   HENT:      Head: Normocephalic and atraumatic.   Eyes:      Conjunctiva/sclera: Conjunctivae normal.   Cardiovascular:      Rate and Rhythm: Regular rhythm. Tachycardia present.      Pulses: Normal pulses.      Heart sounds: No murmur heard.  Pulmonary:      Effort: Pulmonary effort is normal. No respiratory distress.      Breath sounds: Wheezing present.   Abdominal:      Palpations: Abdomen is soft.      Tenderness: There is no abdominal tenderness.   Musculoskeletal:         General: No swelling.      Cervical back: Neck supple.   Skin:     General: Skin is warm and dry.      Capillary Refill: Capillary refill takes less than 2 seconds.   Neurological:      General: No focal deficit present.      Mental Status: He is alert and oriented to person, place, and time.   Psychiatric:         Mood and Affect: Mood normal.         Results Reviewed       Procedure Component Value Units Date/Time    HS Troponin I 2hr [[id number]] Collected: 02/16/25 0521    Lab Status: In process Specimen: Blood from Arm, Right Updated: 02/16/25 0524    Manual  Differential(PHLEBS Do Not Order) [[id number]]  (Abnormal) Collected: 02/16/25 0305    Lab Status: Final result Specimen: Blood from Arm, Right Updated: 02/16/25 0401     Segmented % 60 %      Bands % 3 %      Lymphocytes % 23 %      Monocytes % 10 %      Eosinophils % 1 %      Basophils % 0 %      Metamyelocytes % 1 %      Promyelocytes % 1 %      Atypical Lymphocytes % 1 %      Absolute Neutrophils 6.88 Thousand/uL      Absolute Lymphocytes 2.62 Thousand/uL      Absolute Monocytes 1.09 Thousand/uL      Absolute Eosinophils 0.11 Thousand/uL      Absolute Basophils 0.00 Thousand/uL      Absolute Metamyelocytes 0.11 Thousand/uL      Absolute Promyelocytes 0.11 Thousand/uL      Total Counted --     Platelet Estimate Increased     Anisocytosis Present     Polychromasia Present    FLU/RSV/COVID - if FLU/RSV clinically relevant (2hr TAT) [[id number]]  (Abnormal) Collected: 02/16/25 0305    Lab Status: Final result Specimen: Nares from Nose Updated: 02/16/25 0348     SARS-CoV-2 Positive     INFLUENZA A PCR Negative     INFLUENZA B PCR Negative     RSV PCR Negative    Narrative:      This test has been performed using the CoV-2/Flu/RSV plus assay on the [employer] GeneXpert platform. This test has been validated by the  and verified by the performing laboratory.     This test is designed to amplify and detect the following: nucleocapsid (N), envelope (E), and RNA-dependent RNA polymerase (RdRP) genes of the SARS-CoV-2 genome; matrix (M), basic polymerase (PB2), and acidic protein (PA) segments of the influenza A genome; matrix (M) and non-structural protein (NS) segments of the influenza B genome, and the nucleocapsid genes of RSV A and RSV B.     Positive results are indicative of the presence of Flu A, Flu B, RSV, and/or SARS-CoV-2 RNA. Positive results for SARS-CoV-2 or suspected novel influenza should be reported to state, local, or federal health departments according to local reporting requirements.      All  results should be assessed in conjunction with clinical presentation and other laboratory markers for clinical management.     FOR PEDIATRIC PATIENTS - copy/paste COVID Guidelines URL to browser: [email or web address]       HS Troponin 0hr (reflex protocol) [[id number]]  (Normal) Collected: 02/16/25 0305    Lab Status: Final result Specimen: Blood from Arm, Right Updated: 02/16/25 0334     hs TnI 0hr 5 ng/L     B-Type Natriuretic Peptide(BNP) [[id number]]  (Normal) Collected: 02/16/25 0305    Lab Status: Final result Specimen: Blood from Arm, Right Updated: 02/16/25 0334     BNP 50 pg/mL     Basic metabolic panel [[id number]]  (Abnormal) Collected: 02/16/25 0305    Lab Status: Final result Specimen: Blood from Arm, Right Updated: 02/16/25 0330     Sodium 140 mmol/L      Potassium 3.5 mmol/L      Chloride 102 mmol/L      CO2 25 mmol/L      ANION GAP 13 mmol/L      BUN 29 mg/dL      Creatinine 1.17 mg/dL      Glucose 194 mg/dL      Calcium 8.6 mg/dL      eGFR 62 ml/min/1.73sq m     Narrative:      National Kidney Disease Foundation guidelines for Chronic Kidney Disease (CKD):     Stage 1 with normal or high GFR (GFR > 90 mL/min/1.73 square meters)    Stage 2 Mild CKD (GFR = 60-89 mL/min/1.73 square meters)    Stage 3A Moderate CKD (GFR = 45-59 mL/min/1.73 square meters)    Stage 3B Moderate CKD (GFR = 30-44 mL/min/1.73 square meters)    Stage 4 Severe CKD (GFR = 15-29 mL/min/1.73 square meters)    Stage 5 End Stage CKD (GFR <15 mL/min/1.73 square meters)  Note: GFR calculation is accurate only with a steady state creatinine    Protime-INR [[id number]]  (Normal) Collected: 02/16/25 0305    Lab Status: Final result Specimen: Blood from Arm, Left Updated: 02/16/25 0324     Protime 13.3 seconds      INR 0.94    Narrative:      INR Therapeutic Range    Indication                                             INR Range      Atrial Fibrillation                                                2.0-3.0  Hypercoagulable State                                    2.0.2.3  Left Ventricular Asist Device                            2.0-3.0  Mechanical Heart Valve                                  -    Aortic(with afib, MI, embolism, HF, LA enlargement,    and/or coagulopathy)                                     2.0-3.0 (2.5-3.5)     Mitral                                                             2.5-3.5  Prosthetic/Bioprosthetic Heart Valve               2.0-3.0  Venous thromboembolism (VTE: VT, PE        2.0-3.0    APTT [[id number]]  (Normal) Collected: 02/16/25 0305    Lab Status: Final result Specimen: Blood from Arm, Left Updated: 02/16/25 0324     PTT 27 seconds     CBC and differential [[id number]]  (Abnormal) Collected: 02/16/25 0305    Lab Status: Final result Specimen: Blood from Arm, Right Updated: 02/16/25 0316     WBC 10.92 Thousand/uL      RBC 3.80 Million/uL      Hemoglobin 9.6 g/dL      Hematocrit 32.1 %      MCV 85 fL      MCH 25.3 pg      MCHC 29.9 g/dL      RDW 15.3 %      MPV 8.6 fL      Platelets 461 Thousands/uL     Narrative:      This is an appended report.  These results have been appended to a previously verified report.            CTA chest pe study   Final Interpretation by Herminio Campos MD (02/16 0433)      No evidence of pulmonary embolus.      Chronic bronchiectasis/scar with rounded atelectasis at the right lung base.      Fluid distended thoracic esophagus                  Workstation performed: [id number]             ECG 12 Lead Documentation Only    Date/Time: 2/16/2025 3:00 AM    Performed by: Mary Antonio PA-C  Authorized by: Mary Antonio PA-C    Indications / Diagnosis:  Cardiac work-up  ECG reviewed by me, the ED Provider: yes    Patient location:  ED  Interpretation:     Interpretation: non-specific    Rate:     ECG rate:  120    ECG rate assessment: tachycardic    Rhythm:     Rhythm: sinus tachycardia    Ectopy:     Ectopy: PVCs      PVCs:  Infrequent  ST  segments:     ST segments:  Normal  T waves:     T waves: normal        ED Medication and Procedure Management   Prior to Admission Medications   Prescriptions Last Dose Informant Patient Reported? Taking?   ARIPiprazole (ABILIFY) 10 mg tablet  Self Yes No   Austedo 12 MG TABS  Self Yes No   Dupixent subcutaneous injection   No No   Sig: INJECT 2ML SUBCUTANEOUSLY 1 TIME EVERY 2 WEEKS *NEW PRESCRIPTION REQUEST*   OneTouch Ultra test strip  Self Yes No   Sig: TEST DAILY.. DIAGNOSIS E11.9   albuterol (2.5 mg/3 mL) 0.083 % nebulizer solution   No No   Sig: Take 3 mL (2.5 mg total) by nebulization every 6 (six) hours   amLODIPine-benazepril (LOTREL) 10-40 MG per capsule  Self Yes No   aspirin (ECOTRIN LOW STRENGTH) 81 mg EC tablet  Self Yes No   Sig: Take 81 mg by mouth daily   atorvastatin (LIPITOR) 10 mg tablet  Self Yes No   Sig: Take 10 mg by mouth daily   buPROPion (WELLBUTRIN XL) 300 mg 24 hr tablet  Self Yes No   Si tab(s)   budesonide (PULMICORT) 0.5 mg/2 mL nebulizer solution   No No   Sig: Take 2 mL (0.5 mg total) by nebulization every 12 (twelve) hours Rinse mouth after use.   clopidogrel (PLAVIX) 75 mg tablet   No No   Sig: Take 1 tablet (75 mg total) by mouth daily   docusate sodium (COLACE) 100 mg capsule   No No   Sig: Take 1 capsule (100 mg total) by mouth 2 (two) times a day   esomeprazole (NexIUM) 40 MG capsule  Self Yes No   Sig: Take 40 mg by mouth   formoterol (PERFOROMIST) 20 MCG/2ML nebulizer solution   No No   Sig: Take 2 mL (20 mcg total) by nebulization 2 (two) times a day   metFORMIN (GLUCOPHAGE) 500 mg tablet  Self Yes No   Sig: Take 1 tablet by mouth 2 (two) times a day with meals   metoprolol succinate (TOPROL-XL) 25 mg 24 hr tablet   No No   Sig: TAKE 1 TABLET (25 MG TOTAL) BY MOUTH DAILY.   montelukast (SINGULAIR) 10 mg tablet   No No   Sig: Take 1 tablet (10 mg total) by mouth daily at bedtime   multivitamin (THERAGRAN) TABS  Self Yes No   Sig: Take 1 tablet by mouth   nitroglycerin  (NITROSTAT) 0.4 mg SL tablet   No No   Sig: Place 1 tablet (0.4 mg total) under the tongue every 5 (five) minutes as needed for chest pain   tamsulosin (FLOMAX) 0.4 mg  Self Yes No   Sig: Take 0.4 mg by mouth daily   temazepam (RESTORIL) 30 mg capsule  Self Yes No   Sig: Take 30 mg by mouth daily at bedtime as needed   venlafaxine (EFFEXOR-XR) 75 mg 24 hr capsule  Self Yes No   Si cap(s)      Facility-Administered Medications: None     Patient's Medications   Discharge Prescriptions    No medications on file     No discharge procedures on file.  ED SEPSIS DOCUMENTATION   Time reflects when diagnosis was documented in both MDM as applicable and the Disposition within this note       Time User Action Codes Description Comment    2025  4:47 AM Mary Antonio [U07.1] COVID-19     2025  4:47 AM Mary Antonio [J96.01] Acute respiratory failure with hypoxia (HCC)                  Mary Antonio PA-C  25 0529

## 2025-02-16 NOTE — ASSESSMENT & PLAN NOTE
Was recently admitted to the hospital January 2015 for very tired and acute right-sided weakness and multiple falls  His oxycodone was decreased from 10 mg to 7.5 mg secondary to concern that may be contributing to falls and fatigue.

## 2025-02-16 NOTE — ASSESSMENT & PLAN NOTE
Was recently admitted to the hospital January 2025 due to fatigue, right-sided weakness, and multiple falls  Stroke workup negative   Severe cervical stenosis, neurosurgery recommended no acute intervention  His oxycodone was decreased from 10 mg to 7.5 mg secondary to concern that may be contributing to falls and fatigue  Discharged to [facility] 2/13, discharged home 2/13

## 2025-02-16 NOTE — ASSESSMENT & PLAN NOTE
Home regimen: Metformin  Hospital regimen: Insulin sliding scale    Lab Results   Component Value Date    HGBA1C 8.3 (H) 01/26/2025      8

## 2025-02-16 NOTE — PLAN OF CARE
Problem: PHYSICAL THERAPY ADULT  Goal: Performs mobility at highest level of function for planned discharge setting.  See evaluation for individualized goals.  Description: Treatment/Interventions: Functional transfer training, LE strengthening/ROM, Elevations, Therapeutic exercise, Endurance training, Patient/family training, Bed mobility, Gait training, Spoke to nursing, OT          See flowsheet documentation for full assessment, interventions and recommendations.  Note: Prognosis: Good  Problem List: Decreased strength, Decreased endurance, Impaired balance, Decreased mobility  Assessment: Pt is 69 year old male seen for PT evaluation s/p admit to [facility] on 2/16/2025 with COVID-19. PT consulted to assess pt's functional mobility and discharge needs. Order placed for PT evaluation and treatment, with activity as tolerated order. Comorbidities affecting pt's physical performance at time of assessment include hypertension, type 2 DM, acute respiratory failure, multiple falls, chronic pain syndrome, and tardive dyskinesia. Prior to hospitalization, pt was independent with all functional mobility with a walker. Pt ambulates household and community distances. Pt resides with his spouse, in a two level house, but is able to stay on the first floor, with 5-6 steps to enter. Pt reports following hospitalization in January 2025, he went to [facility]. Pt reports he was home for 2 days prior to current hospitalization. Personal factors affecting pt at time of initial evaluation include stairs to enter home, ambulating with an assistive device, inability to ambulate household distances, inability to ambulate community distances, inability to navigate level surfaces without external assistance, unable to perform dynamic tasks in the community, limited home support, positive fall history, difficulty performing ADLs, and inability to perform IADLs. Please find objective findings from PT assessment regarding body systems  outlined above with impairments and limitations including weakness, impaired balance, decreased endurance, gait deviations, decreased activity tolerance, decreased functional mobility tolerance, and fall risk. The following objective measures were performed on initial evaluation Barthel Index: 40/100, Modified Livingston: 4 (moderate/severe disability), and AM-PAC 6-Clicks: 16/24. Pt's clinical presentation is currently unstable/unpredictable seen in pt's presentation of need for ongoing medical management/monitoring, pt is a fall risk, pt is currently requiring supplemental oxygen, and pt requires cues and assist for safety with functional mobility. Pt to benefit from continued PT treatment to address deficits as defined above and maximize pt's level of function and independence with mobility. From a PT standpoint, recommendation at time of discharge would be level 2, moderate resource intensity in order to facilitate return to prior level of function.    Barriers to Discharge: Inaccessible home environment, Decreased caregiver support, Other (Comment) (decline in functional mobility)     Rehab Resource Intensity Level, PT: II (Moderate Resource Intensity)    See flowsheet documentation for full assessment.

## 2025-02-16 NOTE — PLAN OF CARE
Problem: OCCUPATIONAL THERAPY ADULT  Goal: Performs self-care activities at highest level of function for planned discharge setting.  See evaluation for individualized goals.  Description: Treatment Interventions: ADL retraining, Functional transfer training, UE strengthening/ROM, Endurance training, Patient/family training          See flowsheet documentation for full assessment, interventions and recommendations.   Note: Limitation: Decreased ADL status, Decreased UE strength, Decreased endurance, Decreased self-care trans, Decreased high-level ADLs  Prognosis: Good  Assessment: Patient is a 69 y.o. male seen for OT evaluation s/p admit to [facility] on 2/16/2025 w/ COVID-19. Commorbidities affecting patient's functional performance at time of assessment include:  has a past medical history of Asthma, COPD (chronic obstructive pulmonary disease) ([facility]), Dementia ([facility]), Diabetes mellitus ([facility]), GERD (gastroesophageal reflux disease), History of stroke, Hypertension, Interstitial lung disease ([facility]), Major depressive disorder with current active episode, Pneumonia, Rotator cuff arthropathy of right shoulder, Sleep apnea, Sleep apnea, obstructive, Stroke ([facility]), and Urethral disorder.  Orders placed for OT evaluation and treatment.  Performed at least two patient identifiers during session including name and wristband.  Prior to admission, patient was Minimum assistance with ADLs and family helped with Meal prep, Housekeeping, and Transportation. Personal factors affecting patient at time of initial evaluation include: steps to enter and difficulty performing ADLs. Upon evaluation, patient requires supervision and set up assist for UB ADLs, maximal assist for LB ADLs, transfers and functional ambulation in room and bathroom with minimum assistance x1 assist, with the use of Rolling Walker.  Patient is oriented x 4. Occupational performance is affected by the following deficits: decreased muscle strength,  dynamic sit/ stand balance deficit with poor standing tolerance time for self care and functional mobility, and decreased activity tolerance.  Patient to benefit from continued Occupational Therapy treatment while in the hospital to address deficits as defined above and maximize level of functional independence with ADLs and functional mobility. Occupational Performance areas to address include: grooming , bathing/ shower, dressing, toilet hygiene, transfer to all surfaces, functional mobility, and community mobility. From OT standpoint, recommendation at time of d/c would be   Level II (Moderate Resource Intensity).     Rehab Resource Intensity Level, OT: II (Moderate Resource Intensity)

## 2025-02-16 NOTE — PLAN OF CARE
Problem: RESPIRATORY - ADULT  Goal: Achieves optimal ventilation and oxygenation  Description: INTERVENTIONS:  - Assess for changes in respiratory status  - Assess for changes in mentation and behavior  - Position to facilitate oxygenation and minimize respiratory effort  - Oxygen administered by appropriate delivery if ordered  - Initiate smoking cessation education as indicated  - Encourage broncho-pulmonary hygiene including cough, deep breathe, Incentive Spirometry  - Assess the need for suctioning and aspirate as needed  - Assess and instruct to report SOB or any respiratory difficulty  - Respiratory Therapy support as indicated  Outcome: Progressing         Problem: INFECTION - ADULT  Goal: Absence or prevention of progression during hospitalization  Description: INTERVENTIONS:  - Assess and monitor for signs and symptoms of infection  - Monitor lab/diagnostic results  - Monitor all insertion sites, i.e. indwelling lines, tubes, and drains  - Monitor endotracheal if appropriate and nasal secretions for changes in amount and color  - [place] appropriate cooling/warming therapies per order  - Administer medications as ordered  - Instruct and encourage patient and family to use good hand hygiene technique  - Identify and instruct in appropriate isolation precautions for identified infection/condition  Outcome: Progressing  Goal: Absence of fever/infection during neutropenic period  Description: INTERVENTIONS:  - Monitor WBC    Outcome: Progressing         Problem: PAIN - ADULT  Goal: Verbalizes/displays adequate comfort level or baseline comfort level  Description: Interventions:  - Encourage patient to monitor pain and request assistance  - Assess pain using appropriate pain scale  - Administer analgesics based on type and severity of pain and evaluate response  - Implement non-pharmacological measures as appropriate and evaluate response  - Consider cultural and social influences on pain and pain  management  - Notify physician/advanced practitioner if interventions unsuccessful or patient reports new pain  Outcome: Progressing           Problem: SAFETY ADULT  Goal: Patient will remain free of falls  Description: INTERVENTIONS:  - Educate patient/family on patient safety including physical limitations  - Instruct patient to call for assistance with activity   - Consult OT/PT to assist with strengthening/mobility   - Keep Call bell within reach  - Keep bed low and locked with side rails adjusted as appropriate  - Keep care items and personal belongings within reach  - Initiate and maintain comfort rounds  - Make Fall Risk Sign visible to staff  - Offer Toileting every 2 Hours, in advance of need  - Initiate/Maintain bed alarm  - Obtain necessary fall risk management equipment:   - Apply yellow socks and bracelet for high fall risk patients  - Consider moving patient to room near nurses station  Outcome: Progressing  Goal: Maintain or return to baseline ADL function  Description: INTERVENTIONS:  -  Assess patient's ability to carry out ADLs; assess patient's baseline for ADL function and identify physical deficits which impact ability to perform ADLs (bathing, care of mouth/teeth, toileting, grooming, dressing, etc.)  - Assess/evaluate cause of self-care deficits   - Assess range of motion  - Assess patient's mobility; develop plan if impaired  - Assess patient's need for assistive devices and provide as appropriate  - Encourage maximum independence but intervene and supervise when necessary  - Involve family in performance of ADLs  - Assess for home care needs following discharge   - Consider OT consult to assist with ADL evaluation and planning for discharge  - Provide patient education as appropriate  Outcome: Progressing  Goal: Maintains/Returns to pre admission functional level  Description: INTERVENTIONS:  - Perform AM-PAC 6 Click Basic Mobility/ Daily Activity assessment daily.  - Set and communicate  daily mobility goal to care team and patient/family/caregiver.   - Collaborate with rehabilitation services on mobility goals if consulted  - Perform Range of Motion 4 times a day.  - Reposition patient every 2 hours.  - Dangle patient 3 times a day  - Stand patient 3 times a day  - Ambulate patient 3 times a day  - Out of bed to chair 3 times a day   - Out of bed for meals 3 times a day  - Out of bed for toileting  - Record patient progress and toleration of activity level   Outcome: Progressing

## 2025-02-16 NOTE — OCCUPATIONAL THERAPY NOTE
Occupational Therapy Evaluation     Patient Name: Fahad Hernandez  Today's Date: 2/16/2025  Problem List  Principal Problem:    COVID-19  Active Problems:    Hypertension    Type 2 diabetes mellitus with hyperglycemia, without long-term current use of insulin (HCC)    Acute respiratory failure (HCC)    Multiple falls    Chronic pain syndrome    Tardive dyskinesia    Past Medical History  Past Medical History:   Diagnosis Date    Asthma     COPD (chronic obstructive pulmonary disease) (HCC)     Dementia (HCC)     Diabetes mellitus (HCC)     GERD (gastroesophageal reflux disease)     History of stroke 11/07/2019    Hypertension     Interstitial lung disease (HCC)     Major depressive disorder with current active episode 11/07/2019    Pneumonia     Rotator cuff arthropathy of right shoulder 12/17/2024    Sleep apnea     Sleep apnea, obstructive     Stroke (HCC)     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     Past Surgical History  Past Surgical History:   Procedure Laterality Date    BACK SURGERY      ELBOW SURGERY      KNEE SURGERY      NECK SURGERY      SHOULDER SURGERY      SPINE SURGERY  2004 02/16/25 0955   Note Type   Note type Evaluation   Pain Assessment   Pain Assessment Tool 0-10   Pain Score No Pain   Restrictions/Precautions   Weight Bearing Precautions Per Order No   Other Precautions Contact/isolation;Droplet precautions;Airborne/isolation;Chair Alarm;Bed Alarm;O2;Telemetry;Fall Risk   Home Living   Type of Home House   Home Layout Two level;Stairs to enter with rails;Performs ADLs on one level;Able to live on main level with bedroom/bathroom  (5-6 CHUCHO)   Bathroom Shower/Tub Walk-in shower   Bathroom Toilet Standard   Bathroom Equipment   (None)   Bathroom Accessibility Accessible   Home Equipment Walker   Additional Comments   (Functional mobility with a RW.)   Prior Function   Level of [place] Independent with functional mobility;Needs assistance with ADLs;Needs assistance with IADLS   Lives  With Spouse   Receives Help From Family   IADLs Independent with medication management;Family/Friend/Other provides transportation;Family/Friend/Other provides meals   Falls in the last 6 months 5 to 10  (about 5 falls)   Vocational Retired   Comments After last discharge pt went to [facility] and discharged home for 2 days prior to this admission.   Lifestyle   Autonomy Pt reported he was independent with mobility and required assist for ADL/IADLs   Reciprocal Relationships Wife   Service to Others Retired   General   Family/Caregiver Present No   ADL   Where Assessed   (Some ADL levels based on functional performance during OT evaluation.)   Eating Assistance 7  Independent   Grooming Assistance 5  Supervision/Setup   Grooming Deficit Supervision/safety;Setup  (Seated)   UB Bathing Assistance 5  Supervision/Setup   UB Bathing Deficit Setup;Supervision/safety;Increased time to complete   LB Bathing Assistance 2  Maximal Assistance   LB Bathing Deficit Increased time to complete;Supervision/safety;Verbal cueing   UB Dressing Assistance 5  Supervision/Setup   UB Dressing Deficit Increased time to complete;Supervision/safety;Impulsive   LB Dressing Assistance 2  Maximal Assistance   LB Dressing Deficit Increased time to complete;Supervision/safety;Verbal cueing   Toileting Assistance  4  Minimal Assistance   Bed Mobility   Supine to Sit 4  Minimal assistance   Additional items Assist x 1;HOB elevated;Bedrails;Increased time required;Verbal cues   Additional Comments Pt initially with increased difficulty breathing, assisted patient to sit edge of bed and educated on breathing technique.   Transfers   Sit to Stand 4  Minimal assistance   Additional items Assist x 1;Impulsive;Verbal cues   Stand to Sit 4  Minimal assistance   Additional items Assist x 1;Increased time required;Verbal cues   Functional Mobility   Functional Mobility 4  Minimal assistance   Additional Comments Ax1 with RW in room   Additional items Rolling  walker   Balance   Static Sitting Fair +   Dynamic Sitting Fair   Static Standing Fair -   Dynamic Standing Poor +   Ambulatory Poor +   Activity Tolerance   Activity Tolerance Patient tolerated treatment well;Patient limited by fatigue   Medical Staff Made Aware Pt seen as a co-eval with PT Hiwot due to the patient's co-morbidities, clinically unstable presentation, and present impairments which are a regression from the patient's baseline.   Nurse Made Aware JOHN Henriquez   RUE Assessment   RUE Assessment WFL   LUE Assessment   LUE Assessment WFL   Hand Function   Gross Motor Coordination Functional   Fine Motor Coordination Functional   Cognition   Overall Cognitive Status WFL   Arousal/Participation Alert;Responsive;Cooperative   Attention Within functional limits   Orientation Level Oriented to person;Oriented to place;Oriented to situation   Memory Within functional limits   Following Commands Follows multistep commands without difficulty   Assessment   Limitation Decreased ADL status;Decreased UE strength;Decreased endurance;Decreased self-care trans;Decreased high-level ADLs   Prognosis Good   Assessment Patient is a 69 y.o. male seen for OT evaluation s/p admit to [facility] on 2/16/2025 w/ COVID-19. Commorbidities affecting patient's functional performance at time of assessment include:  has a past medical history of Asthma, COPD (chronic obstructive pulmonary disease) (HCC), Dementia (HCC), Diabetes mellitus (HCC), GERD (gastroesophageal reflux disease), History of stroke, Hypertension, Interstitial lung disease (HCC), Major depressive disorder with current active episode, Pneumonia, Rotator cuff arthropathy of right shoulder, Sleep apnea, Sleep apnea, obstructive, Stroke (HCC), and Urethral disorder.  Orders placed for OT evaluation and treatment.  Performed at least two patient identifiers during session including name and wristband.  Prior to admission, patient was Minimum assistance with  ADLs and family helped with Meal prep, Housekeeping, and Transportation. Personal factors affecting patient at time of initial evaluation include: steps to enter and difficulty performing ADLs. Upon evaluation, patient requires supervision and set up assist for UB ADLs, maximal assist for LB ADLs, transfers and functional ambulation in room and bathroom with minimum assistance x1 assist, with the use of Rolling Walker.  Patient is oriented x 4. Occupational performance is affected by the following deficits: decreased muscle strength, dynamic sit/ stand balance deficit with poor standing tolerance time for self care and functional mobility, and decreased activity tolerance.  Patient to benefit from continued Occupational Therapy treatment while in the hospital to address deficits as defined above and maximize level of functional independence with ADLs and functional mobility. Occupational Performance areas to address include: grooming , bathing/ shower, dressing, toilet hygiene, transfer to all surfaces, functional mobility, and community mobility. From OT standpoint, recommendation at time of d/c would be   Level II (Moderate Resource Intensity).   Plan   Treatment Interventions ADL retraining;Functional transfer training;UE strengthening/ROM;Endurance training;Patient/family training   Goal Expiration Date 03/02/25   OT Treatment Day 0   OT Frequency 3-5x/wk   Discharge Recommendation   Rehab Resource Intensity Level, OT II (Moderate Resource Intensity)   AM-PAC Daily Activity Inpatient   Lower Body Dressing 2   Bathing 2   Toileting 3   Upper Body Dressing 3   Grooming 3   Eating 3   Daily Activity Raw Score 16   Daily Activity Standardized Score (Calc for Raw Score >=11) 35.96   AM-PAC Applied Cognition Inpatient   Following a Speech/Presentation 4   Understanding Ordinary Conversation 4   Taking Medications 3   Remembering Where Things Are Placed or Put Away 3   Remembering List of 4-5 Errands 3   Taking Care of  Complicated Tasks 3   Applied Cognition Raw Score 20   Applied Cognition Standardized Score 41.76   Barthel Index   Feeding 5   Bathing 0   Grooming Score 0   Dressing Score 5   Bladder Score 5   Bowels Score 10   Toilet Use Score 5   Transfers (Bed/Chair) Score 10   Mobility (Level Surface) Score 0   Stairs Score 0   Barthel Index Score 40     Occupational Therapy goals: In 7-14 days:  .  1- Patient will verbalize and demonstrate use of energy conservation/ deep breathing technique and work simplification skills during functional activity with no verbal cues.  2-Patient will verbalize and demonstrate good body mechanics and joint protection techniques during  ADLs/ IADLs with no verbal cues  3- Patient will increase OOB/ sitting tolerance to 2-4 hours per day for increased participation in self care and leisure tasks with no s/s of exertion.  4-Patient will increase standing tolerance time to 5  minutes with unilateral UE support to complete sink level ADLs@ supervision level   5- Patient will increase sitting tolerance at edge of bed to 20 minutes to complete UB ADLs @ set up assist level.  6- Patient will transfer bed to Chair / toilet at supervision level with AD as indicated.  7- Patient will complete UB ADLs with set up assist.  8- Patient will complete LB ADLs with min assist with the use of adaptive equipment.  9- Patient will complete toileting hygiene with set up assist/ supervision for thoroughness.  10-Patient/ Family  will demonstrate competency with UE Home Exercise Program.

## 2025-02-17 LAB
ANION GAP SERPL CALCULATED.3IONS-SCNC: 8 MMOL/L (ref 4–13)
BUN SERPL-MCNC: 19 MG/DL (ref 5–25)
CALCIUM SERPL-MCNC: 8.6 MG/DL (ref 8.4–10.2)
CHLORIDE SERPL-SCNC: 108 MMOL/L (ref 96–108)
CO2 SERPL-SCNC: 25 MMOL/L (ref 21–32)
CREAT SERPL-MCNC: 0.69 MG/DL (ref 0.6–1.3)
CRP SERPL QL: 6.8 MG/L
ERYTHROCYTE [DISTWIDTH] IN BLOOD BY AUTOMATED COUNT: 15.6 % (ref 11.6–15.1)
GFR SERPL CREATININE-BSD FRML MDRD: 95 ML/MIN/1.73SQ M
GLUCOSE P FAST SERPL-MCNC: 134 MG/DL (ref 65–99)
GLUCOSE SERPL-MCNC: 134 MG/DL (ref 65–140)
GLUCOSE SERPL-MCNC: 153 MG/DL (ref 65–140)
GLUCOSE SERPL-MCNC: 160 MG/DL (ref 65–140)
GLUCOSE SERPL-MCNC: 165 MG/DL (ref 65–140)
GLUCOSE SERPL-MCNC: 176 MG/DL (ref 65–140)
HCT VFR BLD AUTO: 32.7 % (ref 36.5–49.3)
HGB BLD-MCNC: 9.5 G/DL (ref 12–17)
MAGNESIUM SERPL-MCNC: 1.2 MG/DL (ref 1.9–2.7)
MCH RBC QN AUTO: 24.8 PG (ref 26.8–34.3)
MCHC RBC AUTO-ENTMCNC: 29.1 G/DL (ref 31.4–37.4)
MCV RBC AUTO: 85 FL (ref 82–98)
PLATELET # BLD AUTO: 513 THOUSANDS/UL (ref 149–390)
PMV BLD AUTO: 8.9 FL (ref 8.9–12.7)
POTASSIUM SERPL-SCNC: 4.1 MMOL/L (ref 3.5–5.3)
PROCALCITONIN SERPL-MCNC: <0.05 NG/ML
RBC # BLD AUTO: 3.83 MILLION/UL (ref 3.88–5.62)
SODIUM SERPL-SCNC: 141 MMOL/L (ref 135–147)
WBC # BLD AUTO: 15.31 THOUSAND/UL (ref 4.31–10.16)

## 2025-02-17 PROCEDURE — 82948 REAGENT STRIP/BLOOD GLUCOSE: CPT

## 2025-02-17 PROCEDURE — 94664 DEMO&/EVAL PT USE INHALER: CPT

## 2025-02-17 PROCEDURE — 94760 N-INVAS EAR/PLS OXIMETRY 1: CPT

## 2025-02-17 PROCEDURE — 83735 ASSAY OF MAGNESIUM: CPT | Performed by: NURSE PRACTITIONER

## 2025-02-17 PROCEDURE — 80048 BASIC METABOLIC PNL TOTAL CA: CPT | Performed by: FAMILY MEDICINE

## 2025-02-17 PROCEDURE — 85027 COMPLETE CBC AUTOMATED: CPT | Performed by: FAMILY MEDICINE

## 2025-02-17 PROCEDURE — 94640 AIRWAY INHALATION TREATMENT: CPT

## 2025-02-17 PROCEDURE — 99233 SBSQ HOSP IP/OBS HIGH 50: CPT | Performed by: PHYSICIAN ASSISTANT

## 2025-02-17 PROCEDURE — 86140 C-REACTIVE PROTEIN: CPT | Performed by: PHYSICIAN ASSISTANT

## 2025-02-17 PROCEDURE — 84145 PROCALCITONIN (PCT): CPT | Performed by: PHYSICIAN ASSISTANT

## 2025-02-17 RX ORDER — BENZONATATE 100 MG/1
100 CAPSULE ORAL 3 TIMES DAILY
Status: DISCONTINUED | OUTPATIENT
Start: 2025-02-17 | End: 2025-02-19 | Stop reason: HOSPADM

## 2025-02-17 RX ORDER — GUAIFENESIN 600 MG/1
600 TABLET, EXTENDED RELEASE ORAL EVERY 12 HOURS SCHEDULED
Status: DISCONTINUED | OUTPATIENT
Start: 2025-02-17 | End: 2025-02-19 | Stop reason: HOSPADM

## 2025-02-17 RX ORDER — GUAIFENESIN/DEXTROMETHORPHAN 100-10MG/5
10 SYRUP ORAL EVERY 4 HOURS PRN
Status: DISCONTINUED | OUTPATIENT
Start: 2025-02-17 | End: 2025-02-19 | Stop reason: HOSPADM

## 2025-02-17 RX ADMIN — AMLODIPINE BESYLATE 10 MG: 10 TABLET ORAL at 08:51

## 2025-02-17 RX ADMIN — DOCUSATE SODIUM 100 MG: 100 CAPSULE, LIQUID FILLED ORAL at 08:51

## 2025-02-17 RX ADMIN — CLOPIDOGREL 75 MG: 75 TABLET ORAL at 08:51

## 2025-02-17 RX ADMIN — BENZONATATE 100 MG: 100 CAPSULE ORAL at 17:16

## 2025-02-17 RX ADMIN — INSULIN LISPRO 1 UNITS: 100 INJECTION, SOLUTION INTRAVENOUS; SUBCUTANEOUS at 08:55

## 2025-02-17 RX ADMIN — GUAIFENESIN 600 MG: 600 TABLET ORAL at 21:32

## 2025-02-17 RX ADMIN — DEXAMETHASONE SODIUM PHOSPHATE 6 MG: 10 INJECTION, SOLUTION INTRAMUSCULAR; INTRAVENOUS at 05:11

## 2025-02-17 RX ADMIN — HEPARIN SODIUM 5000 UNITS: 5000 INJECTION INTRAVENOUS; SUBCUTANEOUS at 13:22

## 2025-02-17 RX ADMIN — B-COMPLEX W/ C & FOLIC ACID TAB 1 TABLET: TAB at 08:51

## 2025-02-17 RX ADMIN — INSULIN LISPRO 1 UNITS: 100 INJECTION, SOLUTION INTRAVENOUS; SUBCUTANEOUS at 21:31

## 2025-02-17 RX ADMIN — HYDROXYZINE HYDROCHLORIDE 25 MG: 25 TABLET ORAL at 05:34

## 2025-02-17 RX ADMIN — IPRATROPIUM BROMIDE AND ALBUTEROL SULFATE 3 ML: 2.5; .5 SOLUTION RESPIRATORY (INHALATION) at 20:02

## 2025-02-17 RX ADMIN — BENZONATATE 100 MG: 100 CAPSULE ORAL at 08:51

## 2025-02-17 RX ADMIN — ARIPIPRAZOLE 10 MG: 5 TABLET ORAL at 08:51

## 2025-02-17 RX ADMIN — INSULIN LISPRO 1 UNITS: 100 INJECTION, SOLUTION INTRAVENOUS; SUBCUTANEOUS at 12:07

## 2025-02-17 RX ADMIN — INSULIN LISPRO 1 UNITS: 100 INJECTION, SOLUTION INTRAVENOUS; SUBCUTANEOUS at 17:16

## 2025-02-17 RX ADMIN — TAMSULOSIN HYDROCHLORIDE 0.4 MG: 0.4 CAPSULE ORAL at 08:51

## 2025-02-17 RX ADMIN — METOPROLOL SUCCINATE 25 MG: 25 TABLET, EXTENDED RELEASE ORAL at 08:51

## 2025-02-17 RX ADMIN — LISINOPRIL 40 MG: 20 TABLET ORAL at 08:50

## 2025-02-17 RX ADMIN — HEPARIN SODIUM 5000 UNITS: 5000 INJECTION INTRAVENOUS; SUBCUTANEOUS at 05:11

## 2025-02-17 RX ADMIN — ACETAMINOPHEN 650 MG: 325 TABLET, FILM COATED ORAL at 19:48

## 2025-02-17 RX ADMIN — DOCUSATE SODIUM 100 MG: 100 CAPSULE, LIQUID FILLED ORAL at 17:16

## 2025-02-17 RX ADMIN — HEPARIN SODIUM 5000 UNITS: 5000 INJECTION INTRAVENOUS; SUBCUTANEOUS at 21:32

## 2025-02-17 RX ADMIN — VENLAFAXINE HYDROCHLORIDE 75 MG: 75 CAPSULE, EXTENDED RELEASE ORAL at 08:51

## 2025-02-17 RX ADMIN — PANTOPRAZOLE SODIUM 40 MG: 40 TABLET, DELAYED RELEASE ORAL at 05:11

## 2025-02-17 RX ADMIN — BUPROPION HYDROCHLORIDE 300 MG: 150 TABLET, EXTENDED RELEASE ORAL at 08:51

## 2025-02-17 RX ADMIN — IPRATROPIUM BROMIDE AND ALBUTEROL SULFATE 3 ML: 2.5; .5 SOLUTION RESPIRATORY (INHALATION) at 07:33

## 2025-02-17 RX ADMIN — ASPIRIN 81 MG: 81 TABLET, COATED ORAL at 08:51

## 2025-02-17 RX ADMIN — BENZONATATE 100 MG: 100 CAPSULE ORAL at 21:32

## 2025-02-17 RX ADMIN — ATORVASTATIN CALCIUM 10 MG: 10 TABLET, FILM COATED ORAL at 17:16

## 2025-02-17 RX ADMIN — MONTELUKAST 10 MG: 10 TABLET, FILM COATED ORAL at 21:32

## 2025-02-17 RX ADMIN — GUAIFENESIN 600 MG: 600 TABLET ORAL at 08:51

## 2025-02-17 RX ADMIN — REMDESIVIR 100 MG: 100 INJECTION, POWDER, LYOPHILIZED, FOR SOLUTION INTRAVENOUS at 05:11

## 2025-02-17 RX ADMIN — IPRATROPIUM BROMIDE AND ALBUTEROL SULFATE 3 ML: 2.5; .5 SOLUTION RESPIRATORY (INHALATION) at 14:04

## 2025-02-17 NOTE — RESPIRATORY THERAPY NOTE
RT Protocol Note  Fahad Hernandez 69 y.o. male MRN: [id number]  Unit/Bed#: 2 E 269-01 Encounter: [id number]    Assessment    Principal Problem:    COVID-19  Active Problems:    Hypertension    Type 2 diabetes mellitus with hyperglycemia, without long-term current use of insulin (HCC)    Acute respiratory failure (HCC)    Multiple falls    Chronic pain syndrome    Tardive dyskinesia      Home Pulmonary Medications:         Past Medical History:   Diagnosis Date    Asthma     COPD (chronic obstructive pulmonary disease) ([facility])     Dementia (HCC)     Diabetes mellitus (HCC)     GERD (gastroesophageal reflux disease)     History of stroke 11/07/2019    Hypertension     Interstitial lung disease ([facility])     Major depressive disorder with current active episode 11/07/2019    Pneumonia     Rotator cuff arthropathy of right shoulder 12/17/2024    Sleep apnea     Sleep apnea, obstructive     Stroke ([facility])     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     Social History     Socioeconomic History    Marital status: /Civil Union     Spouse name: None    Number of children: None    Years of education: None    Highest education level: None   Occupational History    None   Tobacco Use    Smoking status: Never     Passive exposure: Never    Smokeless tobacco: Never    Tobacco comments:     Patient admits to using marijuana, edibles and smoking    Vaping Use    Vaping status: Never Used   Substance and Sexual Activity    Alcohol use: Not Currently    Drug use: Yes     Types: Marijuana, Oxycodone, Psilocybin    Sexual activity: Yes     Partners: Female     Birth control/protection: Male Sterilization   Other Topics Concern    None   Social History Narrative    None     Social Drivers of Health     Financial Resource Strain: Low Risk  (11/1/2024)    Received from [facility]    Overall Financial Resource Strain (CARDIA)     Difficulty of Paying Living Expenses: Not hard at all   Food Insecurity: No Food Insecurity (2/16/2025)     Nursing - Inadequate Food Risk Classification     Worried About Running Out of Food in the Last Year: Never true     Ran Out of Food in the Last Year: Never true     Ran Out of Food in the Last Year: Never true   Transportation Needs: No Transportation Needs (2025)    Nursing - Transportation Risk Classification     Lack of Transportation: Not on file     Lack of Transportation: No   Physical Activity: Inactive (2024)    Received from [facility]    Exercise Vital Sign     Days of Exercise per Week: 0 days     Minutes of Exercise per Session: 0 min   Stress: Stress Concern Present (2024)    Received from [facility]    [place] [employer] - Occupational Stress Questionnaire     Feeling of Stress : To some extent   Social Connections: Moderately Isolated (2024)    Received from [facility]    Social Connection and Isolation Panel [NHANES]     Frequency of Communication with Friends and Family: Three times a week     Frequency of Social Gatherings with Friends and Family: Twice a week     Attends Mandaeism Services: Never     Active Member of Clubs or Organizations: No     Attends Club or Organization Meetings: Never     Marital Status:    Intimate Partner Violence: Unknown (2025)    Nursing IPS     Feels Physically and Emotionally Safe: Not on file     Physically Hurt by Someone: Not on file     Humiliated or Emotionally Abused by Someone: Not on file     Physically Hurt by Someone: No     Hurt or Threatened by Someone: No   Housing Stability: Unknown (2025)    Nursing: Inadequate Housing Risk Classification     Has Housing: Not on file     Worried About Losing Housing: Not on file     Unable to Get Utilities: Not on file     Unable to Pay for Housing in the Last Year: No     Has Housin       Subjective         Objective    Physical Exam:   Assessment Type: Assess only  General Appearance: Awake,  "Alert  Respiratory Pattern: Normal  Chest Assessment: Chest expansion symmetrical  Bilateral Breath Sounds: Diminished  O2 Device: NC    Vitals:  Blood pressure 125/67, pulse 91, temperature 98.3 °F (36.8 °C), resp. rate 21, height 5' 11\" (1.803 m), weight 68.8 kg (151 lb 10.8 oz), SpO2 97%.          Imaging and other studies: Results Review Statement: No pertinent imaging studies reviewed.    O2 Device: NC     Plan    Respiratory Plan: Mild Distress pathway        Resp Comments: pt with hx of copd, asthma and dx of covid, will continue with tid duo   "

## 2025-02-17 NOTE — PLAN OF CARE
Problem: RESPIRATORY - ADULT  Goal: Achieves optimal ventilation and oxygenation  Description: INTERVENTIONS:  - Assess for changes in respiratory status  - Assess for changes in mentation and behavior  - Position to facilitate oxygenation and minimize respiratory effort  - Oxygen administered by appropriate delivery if ordered  - Initiate smoking cessation education as indicated  - Encourage broncho-pulmonary hygiene including cough, deep breathe, Incentive Spirometry  - Assess the need for suctioning and aspirate as needed  - Assess and instruct to report SOB or any respiratory difficulty  - Respiratory Therapy support as indicated  Outcome: Progressing     Problem: MUSCULOSKELETAL - ADULT  Goal: Maintain or return mobility to safest level of function  Description: INTERVENTIONS:  - Assess patient's ability to carry out ADLs; assess patient's baseline for ADL function and identify physical deficits which impact ability to perform ADLs (bathing, care of mouth/teeth, toileting, grooming, dressing, etc.)  - Assess/evaluate cause of self-care deficits   - Assess range of motion  - Assess patient's mobility  - Assess patient's need for assistive devices and provide as appropriate  - Encourage maximum independence but intervene and supervise when necessary  - Involve family in performance of ADLs  - Assess for home care needs following discharge   - Consider OT consult to assist with ADL evaluation and planning for discharge  - Provide patient education as appropriate  Outcome: Progressing     Problem: PAIN - ADULT  Goal: Verbalizes/displays adequate comfort level or baseline comfort level  Description: Interventions:  - Encourage patient to monitor pain and request assistance  - Assess pain using appropriate pain scale  - Administer analgesics based on type and severity of pain and evaluate response  - Implement non-pharmacological measures as appropriate and evaluate response  - Consider cultural and social  influences on pain and pain management  - Notify physician/advanced practitioner if interventions unsuccessful or patient reports new pain  Outcome: Progressing     Problem: INFECTION - ADULT  Goal: Absence or prevention of progression during hospitalization  Description: INTERVENTIONS:  - Assess and monitor for signs and symptoms of infection  - Monitor lab/diagnostic results  - Monitor all insertion sites, i.e. indwelling lines, tubes, and drains  - Monitor endotracheal if appropriate and nasal secretions for changes in amount and color  - Trail appropriate cooling/warming therapies per order  - Administer medications as ordered  - Instruct and encourage patient and family to use good hand hygiene technique  - Identify and instruct in appropriate isolation precautions for identified infection/condition  Outcome: Progressing     Problem: SAFETY ADULT  Goal: Patient will remain free of falls  Description: INTERVENTIONS:  - Educate patient/family on patient safety including physical limitations  - Instruct patient to call for assistance with activity   - Consult OT/PT to assist with strengthening/mobility   - Keep Call bell within reach  - Keep bed low and locked with side rails adjusted as appropriate  - Keep care items and personal belongings within reach  - Initiate and maintain comfort rounds  - Make Fall Risk Sign visible to staff  - Offer Toileting every 2 Hours, in advance of need  - Initiate/Maintain bed/chair alarm  - Obtain necessary fall risk management equipment: call bell within reach  - Apply yellow socks and bracelet for high fall risk patients  - Consider moving patient to room near nurses station  Outcome: Progressing     Problem: DISCHARGE PLANNING  Goal: Discharge to home or other facility with appropriate resources  Description: INTERVENTIONS:  - Identify barriers to discharge w/patient and caregiver  - Arrange for needed discharge resources and transportation as appropriate  - Identify  discharge learning needs (meds, wound care, etc.)  - Arrange for interpretive services to assist at discharge as needed  - Refer to Case Management Department for coordinating discharge planning if the patient needs post-hospital services based on physician/advanced practitioner order or complex needs related to functional status, cognitive ability, or social support system  Outcome: Progressing     Problem: Knowledge Deficit  Goal: Patient/family/caregiver demonstrates understanding of disease process, treatment plan, medications, and discharge instructions  Description: Complete learning assessment and assess knowledge base.  Interventions:  - Provide teaching at level of understanding  - Provide teaching via preferred learning methods  Outcome: Progressing     Problem: Prexisting or High Potential for Compromised Skin Integrity  Goal: Skin integrity is maintained or improved  Description: INTERVENTIONS:  - Identify patients at risk for skin breakdown  - Assess and monitor skin integrity  - Assess and monitor nutrition and hydration status  - Monitor labs   - Assess for incontinence   - Turn and reposition patient  - Assist with mobility/ambulation  - Relieve pressure over bony prominences  - Avoid friction and shearing  - Provide appropriate hygiene as needed including keeping skin clean and dry  - Evaluate need for skin moisturizer/barrier cream  - Collaborate with interdisciplinary team   - Patient/family teaching  - Consider wound care consult   Outcome: Progressing

## 2025-02-17 NOTE — RESPIRATORY THERAPY NOTE
02/16/25 2127   Respiratory Assessment   Assessment Type Assess only   General Appearance Awake;Alert   Respiratory Pattern Tachypneic   Chest Assessment Chest expansion symmetrical   Bilateral Breath Sounds Diminished   Resp Comments pt placed on BIPAP for hours of sleep   Non-Invasive Information   O2 Interface Device Nasal mask   Non-Invasive Ventilation Mode BiPAP   $ Intermittent NIV Yes   $ Pulse Oximetry Spot Check Charge Completed   Non-Invasive Settings   IPAP (cm) 12 cm   EPAP (cm) 6 cm   Rate (Set) 12   FiO2 (%) 28   Pressure Support (cm H2O) 6   Rise Time 2   Non-Invasive Readings   Total Rate 34   MV (Mech) 24.3   Peak Pressure (Obs) 13   Spontaneous Vt (mL) 713   Leak (lpm) 9   Skin Intervention Skin intact   Non-Invasive Alarms   Insp Pressure High (cm H20) 30   Insp Pressure Low (cm H20) 5   Low Insp Pressure Time (sec) 20 sec   MV Low (L/min) 2   Vt High (mL) 1250   Vt Low (mL) 200   High Resp Rate (BPM) 50 BPM   Low Resp Rate (BPM) 6 BPM

## 2025-02-17 NOTE — UTILIZATION REVIEW
OBSERVATION 2/16/25 @ 0509 CONVERTED TO INPATIENT 2/17/25@0806 DUE TO COVID 19  Initial Clinical Review    Admission: Date/Time/Statement:   Admission Orders (From admission, onward)       Ordered        02/17/25 0806  INPATIENT ADMISSION  Once            02/16/25 0509  Place in Observation  Once                          Orders Placed This Encounter   Procedures    Place in Observation     Standing Status:   Standing     Number of Occurrences:   1     Level of Care:   Med Surg [16]    INPATIENT ADMISSION     Standing Status:   Standing     Number of Occurrences:   1     Level of Care:   Med Surg [16]     Estimated length of stay:   More than 2 Midnights     Certification:   I certify that inpatient services are medically necessary for this patient for a duration of greater than two midnights. See H&P and MD Progress Notes for additional information about the patient's course of treatment.     ED Arrival Information       Expected   -    Arrival   2/16/2025 02:51    Acuity   Urgent              Means of arrival   Ambulance    Escorted by   [facility]   Hospitalist    Admission type   Emergency              Arrival complaint   Shortness of breath             Chief Complaint   Patient presents with    Shortness of Breath     Pt comes in from  home via ems C/O SOB. Pt has elevated Hr and pt has not been taking his Cardizem in 3 months.        Initial Presentation: 69 y.o. male to the ED from home via EMS with complaints of shortness of breath, elevated heart rate.  Admitted under observation then converted to  inpatient for COVID 19, acute respiratory failure due to COVID. H/O  bronchiectasis, hypertension, hyperlipidemia, depression, diabetes.  Recently admitted from 1/25/25-2/3 for acute right sided weakness discharged to rehab and then back home. NOt on oxygen at home. On arrival, requiring 2LNC. Arrives tachycardic with decreased breath sounds. IN the ED, given 1 liter iv fluid bolus.  Given  nebulizer. Wheezing breath sounds. Started on Remdesivir.   Started on VI steroids.  Wean oxygen as able.  PT/OT eval.  Consider gentle IV fluids.   Anticipated Length of Stay/Certification Statement: Patient will be admitted on an observation basis with an anticipated length of stay of less than 2 midnights secondary to COVID-19, respiratory failure.     Date: 2/17   Day 2:  REquiring 2.5 L/MIn. Wean oxygen as tolerated. Continue with IV decadron, iv remdesivir. Self proning while in bed. Very weak, coughing, not as short of breath as before.     Date: 2/18  Day 3: Has surpassed a 2nd midnight with active treatments and services. Initially admitted to inpatient for COVID 19, being treated with iv remdesivir, iv steroids. Has been weaned to 1LNC. Continues with wheezing.       ED Treatment-Medication Administration from 02/16/2025 0251 to 02/16/2025 0532         Date/Time Order Dose Route Action     02/16/2025 0258 albuterol (FOR EMS ONLY) (2.5 mg/3 mL) 0.083 % inhalation solution 5 mg 0 mg Does not apply Given to EMS     02/16/2025 0258 ipratropium-albuterol (FOR EMS ONLY) (DUO-NEB) 0.5-2.5 mg/3 mL inhalation solution 6 mL 0 mL Does not apply Given to EMS     02/16/2025 0335 ipratropium-albuterol (DUO-NEB) 0.5-2.5 mg/3 mL inhalation solution 3 mL 3 mL Nebulization Given     02/16/2025 0314 sodium chloride 0.9 % bolus 1,000 mL 1,000 mL Intravenous New Bag     02/16/2025 0357 iohexol (OMNIPAQUE) 350 MG/ML injection (MULTI-DOSE) 85 mL 85 mL Intravenous Given            Scheduled Medications:  amLODIPine, 10 mg, Oral, Daily   And  lisinopril, 40 mg, Oral, Daily  ARIPiprazole, 10 mg, Oral, Daily  aspirin, 81 mg, Oral, Daily  atorvastatin, 10 mg, Oral, Daily With Dinner  benzonatate, 100 mg, Oral, TID  buPROPion, 300 mg, Oral, Daily  clopidogrel, 75 mg, Oral, Daily  Deutetrabenazine, 12 mg, Oral, Daily  dexamethasone, 6 mg, Intravenous, Q24H  docusate sodium, 100 mg, Oral, BID  guaiFENesin, 600 mg, Oral, Q12H  [facility]  heparin (porcine), 5,000 Units, Subcutaneous, Q8H PARTHA  insulin lispro, 1-5 Units, Subcutaneous, 4x Daily (AC & HS)  ipratropium-albuterol, 3 mL, Nebulization, TID  metoprolol succinate, 25 mg, Oral, Daily  montelukast, 10 mg, Oral, HS  multivitamin stress formula, 1 tablet, Oral, Daily  pantoprazole, 40 mg, Oral, Early Morning  remdesivir, 100 mg, Intravenous, Q24H  tamsulosin, 0.4 mg, Oral, Daily  venlafaxine, 75 mg, Oral, Daily      Continuous IV Infusions:     PRN Meds:  acetaminophen, 650 mg, Oral, Q6H PRN  albuterol, 2 puff, Inhalation, Q4H PRN  dextromethorphan-guaiFENesin, 10 mL, Oral, Q4H PRN  hydrOXYzine HCL, 25 mg, Oral, Q6H PRN      ED Triage Vitals   Temperature Pulse Respirations Blood Pressure SpO2 Pain Score   02/16/25 0258 02/16/25 0254 02/16/25 0254 02/16/25 0254 02/16/25 0254 02/16/25 0606   98.6 °F (37 °C) (!) 107 22 116/56 92 % 8     Weight (last 2 days)       Date/Time Weight    02/16/25 0610 68.8 (151.68)            Vital Signs (last 3 days)       Date/Time Temp Pulse Resp BP MAP (mmHg) SpO2 Calculated FIO2 (%) - Nasal Cannula Nasal Cannula O2 Flow Rate (L/min) O2 Device O2 Interface Device Patient Position - Orthostatic VS Pain    02/18/25 0841 -- 78 -- 116/75 -- -- -- -- -- -- -- --    02/18/25 0802 -- -- -- -- -- 94 % -- -- None (Room air) -- -- --    02/18/25 0755 -- -- -- -- -- -- -- -- None (Room air) -- -- No Pain    02/18/25 07:07:43 98.6 °F (37 °C) 74 18 117/69 85 92 % -- -- -- -- -- --    02/17/25 2200 -- -- -- -- -- 91 % -- -- -- -- -- --    02/17/25 20:47:47 98.4 °F (36.9 °C) 94 -- 133/75 94 91 % -- -- -- -- -- --    02/17/25 2006 -- -- -- -- -- -- -- -- None (Room air) -- -- 8    02/17/25 2003 -- 85 18 -- -- 95 % -- -- -- -- -- --    02/17/25 1948 -- -- -- -- -- -- -- -- -- -- -- 8    02/17/25 1500 97.7 °F (36.5 °C) 94 17 130/78 -- 96 % -- -- -- -- Lying --    02/17/25 1403 -- -- -- -- -- 95 % 24 1 L/min Nasal cannula -- -- --    02/17/25 1323 -- -- -- -- -- -- 24 1 L/min  Nasal cannula -- -- --    02/17/25 0857 -- -- -- -- -- -- 30 2.5 L/min Nasal cannula -- -- No Pain    02/17/25 07:59:01 97.7 °F (36.5 °C) 100 18 132/73 93 95 % -- -- -- -- -- --    02/17/25 0733 -- -- -- -- -- 94 % 30 2.5 L/min Nasal cannula -- -- --    02/17/25 0721 -- -- -- -- -- 97 % -- -- -- -- -- --    02/16/25 2300 -- 91 -- 125/67 86 98 % -- -- -- -- Lying --    02/16/25 22:43:41 -- 87 -- 125/67 86 96 % -- -- -- -- -- --    02/16/25 2127 -- -- -- -- -- -- -- -- -- Nasal mask -- --    02/16/25 2000 -- -- -- -- -- 95 % 32 3 L/min Nasal cannula -- -- No Pain    02/16/25 1934 -- -- -- -- -- -- 28 2 L/min Nasal cannula -- -- --    02/16/25 15:22:30 98.3 °F (36.8 °C) 81 -- 96/58 71 94 % -- -- -- -- -- --    02/16/25 15:20:44 98.3 °F (36.8 °C) 84 -- 91/55 67 95 % 28 2 L/min Nasal cannula -- Lying --    02/16/25 1337 -- -- -- -- -- 97 % 32 3 L/min Nasal cannula -- -- --    02/16/25 11:38:50 98.2 °F (36.8 °C) 86 -- 105/64 78 98 % -- -- -- -- -- --    02/16/25 0955 -- -- -- -- -- -- -- -- -- -- -- No Pain    02/16/25 0954 -- -- -- -- -- -- -- -- -- -- -- No Pain    02/16/25 0930 -- -- -- -- -- -- 36 4 L/min Nasal cannula -- -- No Pain    02/16/25 09:29:54 98.4 °F (36.9 °C) 110 -- -- -- 94 % -- -- -- -- -- --    02/16/25 08:10:11 98.3 °F (36.8 °C) 106 -- 110/86 94 95 % -- -- -- -- -- --    02/16/25 0808 -- 115 -- 110/86 -- -- -- -- -- -- -- --    02/16/25 0722 -- -- -- -- -- 97 % 32 3 L/min Nasal cannula -- -- --    02/16/25 0610 -- -- -- -- -- -- 28 2 L/min Nasal cannula -- -- --    02/16/25 0606 -- -- -- -- -- -- -- -- -- -- -- 8    02/16/25 05:34:18 98.6 °F (37 °C) 107 21 116/68 84 94 % -- -- -- -- -- --    02/16/25 0500 -- 133 20 124/71 90 95 % -- -- -- -- -- --    02/16/25 0430 -- 103 20 138/64 92 96 % -- -- -- -- -- --    02/16/25 0415 -- 105 20 -- -- 95 % -- -- -- -- -- --    02/16/25 0400 -- 126 20 139/64 92 95 % -- -- -- -- -- --    02/16/25 0330 -- 111 22 -- -- 91 % -- -- -- -- -- --    02/16/25 0300 -- 113 20  116/56 79 92 % 28 2 L/min Nasal cannula -- -- --    02/16/25 0258 98.6 °F (37 °C) -- -- -- -- -- -- -- -- -- -- --    02/16/25 0254 -- 107 22 116/56 -- 92 % -- -- None (Room air) -- Lying --              Pertinent Labs/Diagnostic Test Results:   Radiology:  CTA chest pe study   Final Interpretation by Herminio Campos MD (02/16 0433)      No evidence of pulmonary embolus.      Chronic bronchiectasis/scar with rounded atelectasis at the right lung base.      Fluid distended thoracic esophagus                  Workstation performed: [id number]           Cardiology:  ECG 12 lead   Final Result by Jose Manuel Balderas MD (02/16 1036)   Sinus tachycardia   Low voltage QRS   Nonspecific T wave abnormality   Abnormal ECG      Confirmed by Jose Manuel Balderas ([id number]) on 2/16/2025 10:36:25 AM      ECG 12 lead   Final Result by Jose Manuel Balderas MD (02/18 1139)   Sinus rhythm with PACs and PVCs   Confirmed by Jose Manuel Balderas ([id number]) on 2/18/2025 11:39:47 AM              Results from last 7 days   Lab Units 02/16/25  0305   SARS-COV-2  Positive*     Results from last 7 days   Lab Units 02/18/25  0513 02/17/25  0521 02/16/25  0305   WBC Thousand/uL 15.07* 15.31* 10.92*   HEMOGLOBIN g/dL 9.9* 9.5* 9.6*   HEMATOCRIT % 31.6* 32.7* 32.1*   PLATELETS Thousands/uL 543* 513* 461*   TOTAL NEUT ABS Thousands/µL 10.12*  --   --    BANDS PCT %  --   --  3         Results from last 7 days   Lab Units 02/17/25  0521 02/16/25  0305   SODIUM mmol/L 141 140   POTASSIUM mmol/L 4.1 3.5   CHLORIDE mmol/L 108 102   CO2 mmol/L 25 25   ANION GAP mmol/L 8 13   BUN mg/dL 19 29*   CREATININE mg/dL 0.69 1.17   EGFR ml/min/1.73sq m 95 62   CALCIUM mg/dL 8.6 8.6   MAGNESIUM mg/dL 1.2*  --          Results from last 7 days   Lab Units 02/18/25  1115 02/18/25  0705 02/17/25  2045 02/17/25  1620 02/17/25  1149 02/17/25  0757 02/16/25  2112 02/16/25  1615 02/16/25  1139 02/16/25  0809   POC GLUCOSE mg/dl 202* 108 165* 160* 153* 176* 149* 229* 226* 150*     Results from  last 7 days   Lab Units 02/17/25  0521 02/16/25  0305   GLUCOSE RANDOM mg/dL 134 194*         Results from last 7 days   Lab Units 02/16/25  0521 02/16/25  0305   HS TNI 0HR ng/L  --  5   HS TNI 2HR ng/L 6  --    HSTNI D2 ng/L 1  --          Results from last 7 days   Lab Units 02/16/25  0305   PROTIME seconds 13.3   INR  0.94   PTT seconds 27       Results from last 7 days   Lab Units 02/16/25  0305   BNP pg/mL 50       Results from last 7 days   Lab Units 02/16/25  0305   INFLUENZA A PCR  Negative   INFLUENZA B PCR  Negative   RSV PCR  Negative         Past Medical History:   Diagnosis Date    Asthma     COPD (chronic obstructive pulmonary disease) ([facility])     Dementia (HCC)     Diabetes mellitus (HCC)     GERD (gastroesophageal reflux disease)     History of stroke 11/07/2019    Hypertension     Interstitial lung disease ([facility])     Major depressive disorder with current active episode 11/07/2019    Pneumonia     Rotator cuff arthropathy of right shoulder 12/17/2024    Sleep apnea     Sleep apnea, obstructive     Stroke ([facility])     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     Present on Admission:   COVID-19   Hypertension   Type 2 diabetes mellitus with hyperglycemia, without long-term current use of insulin (HCC)   Chronic pain syndrome   Acute respiratory failure ([facility])   Tardive dyskinesia      Admitting Diagnosis: Shortness of breath [R06.02]  Acute respiratory failure with hypoxia (HCC) [J96.01]  COVID-19 [U07.1]  Age/Sex: 69 y.o. male    Network Utilization Review Department  ATTENTION: Please call with any questions or concerns to [phone] and carefully listen to the prompts so that you are directed to the right person. All voicemails are confidential.   For Discharge needs, contact Care Management DC Support Team at [phone] opt. [phone]  Send all requests for admission clinical reviews, approved or denied determinations and any other requests to dedicated fax number below belonging to the campus where the patient is  receiving treatment. List of dedicated fax numbers for the Facilities:  FACILITY NAME UR FAX NUMBER   ADMISSION DENIALS (Administrative/Medical Necessity) [phone]   DISCHARGE SUPPORT TEAM (NETWORK) [phone]   PARENT CHILD HEALTH (Maternity/NICU/Pediatrics) [phone]   General acute hospital [phone]   [facility] [phone]   [facility] [phone]   [facility] [phone]   [facility] [phone]   [facility] [phone]   [facility] [phone]   [facility] [phone]   [facility] [phone]   [facility] [phone]   [facility] [phone]   [facility] [phone]

## 2025-02-17 NOTE — ASSESSMENT & PLAN NOTE
Blood Sugar Average: Last 72 hrs (P) 188.5  Uncontrolled a/e/b A1c 8.3%  Hold metformin  Continue Humalog SSI # 2   Diabetic diet level 2  Blood glucose monitoring ACHS  Hypoglycemia protocol

## 2025-02-17 NOTE — CASE MANAGEMENT
Case Management Assessment & Discharge Planning Note    Patient name Fahad Hernandez  Location 2 EAST 269/2 E 269-01 MRN [id number]  : 1955 Date 2025       Current Admission Date: 2025  Current Admission Diagnosis:COVID-19   Patient Active Problem List    Diagnosis Date Noted Date Diagnosed    COVID-19 2025     Tardive dyskinesia 2025     CASSIE (obstructive sleep apnea) 2025     Multiple falls 2025     Chronic pain syndrome 2025     Acute respiratory failure (HCC) 2025     Rotator cuff arthropathy of right shoulder 2024     Acute right-sided weakness 2024     Fall 2024     Metabolic acidosis 2024     Lactic acidosis due to diabetes mellitus (HCC) 2024     BENNY (acute kidney injury) (formerly [facility]) 2024     Severe persistent asthma with (acute) exacerbation 2024     Asthma exacerbation attacks 2024     Moderate persistent asthma with exacerbation 2024     Orthostasis 10/15/2024     Hoarseness of voice 2024     SOB (shortness of breath) 2024     Long-term exposure involving bird droppings 2024     Lung nodules 2024     Chest pain 2024     Centrilobular emphysema (HCC) 2024     Severe persistent asthma with acute exacerbation 2024     Medical marijuana use 2024     Type 2 diabetes mellitus with hyperglycemia, without long-term current use of insulin (formerly [facility]) 2022     Chronic obstructive asthma (HCC) 2019     Major depressive disorder 2019     Gastroesophageal reflux disease with esophagitis 2019     Abnormal EKG 04/15/2019     Hypertension 04/15/2019     Mixed hyperlipidemia 2018       LOS (days): 0  Geometric Mean LOS (GMLOS) (days):   Days to GMLOS:     OBJECTIVE:  PATIENT READMITTED TO HOSPITAL  Risk of Unplanned Readmission Score: 30.27         Current admission status: Inpatient       Preferred Pharmacy:   [id number] [address]  - [address]  [address]  [place] [address]  Phone: [phone] Fax: [phone]    [facility] - [place], [place] - [address]  [address]  [address]  [place] [facility] [place] [address]  Phone: [phone] Fax: [phone]    [facility] - [place], [place] - [address]  [address] [place] [address]  Phone: [phone] Fax: [phone]    Primary Care Provider: Surjit Hayes DO    Primary Insurance: [employer] REP  Secondary Insurance:     ASSESSMENT:  Active Health Care Proxies       Carol Hernandez Health Care Representative - Spouse   Primary Phone: [phone] (Mobile)                           Readmission Root Cause  30 Day Readmission: Yes    Patient Information  Mental Status: Alert  Primary Caregiver: Spouse  Caregiver's Name:: Carol Hernandez  Spouse  [phone]  Caregiver's Relationship to Patient:: Family Member  Caregiver's Telephone Number:: [phone]  Support Systems: Self, Spouse/significant other  County of Residence: [place]  [place] [facility] do you live in[place]  Home entry access options. Select all that apply.: Stairs  Number of steps to enter home.: 5  Do the steps have railings?: Yes  Type of Current Residence: [facility]  Living Arrangements: Lives w/ Spouse/significant other    Activities of Daily Living Prior to Admission  Completes ADLs independently?: No  Level of ADL dependence: Assistance  Ambulates independently?: Yes  Does patient use assisted devices?: Yes  Assisted Devices (DME) used: Walker, Shower Chair, Bedside Commode  Does patient currently own DME?: Yes  What DME does the patient currently own?: Walker, Shower Chair, Bedside Commode  Does the patient have a history of Short-Term Rehab?: Yes ([facility]- wont go back)  Does patient have a history of HHC?: Yes  Does patient currently have HHC?: Yes ([facility])    Current Home Health Care  Type of Current Home Care Services: Nurse visit, Home  PT  Home Health Agency Name:: [facility]    Patient Information Continued  Income Source: Pension/custodial  Does patient have prescription coverage?: Yes  Does patient receive dialysis treatments?: No  Does patient have a history of substance abuse?: No  Does patient have a history of Mental Health Diagnosis?: Yes (Depression)  Is patient receiving treatment for mental health?: No. Treatment options were provided.         Means of Transportation  Means of Transport to Appts:: Family transport (Wife drives locally but wants resources- gave [facility]- will have OP CM f/u)      Social Determinants of Health (SDOH)      Flowsheet Row Most Recent Value   Housing Stability    In the last 12 months, was there a time when you were not able to pay the mortgage or rent on time? N   In the past 12 months, how many times have you moved where you were living? 0   At any time in the past 12 months, were you homeless or living in a shelter (including now)? N   Transportation Needs    In the past 12 months, has lack of transportation kept you from medical appointments or from getting medications? no   In the past 12 months, has lack of transportation kept you from meetings, work, or from getting things needed for daily living? No   Food Insecurity    Within the past 12 months, you worried that your food would run out before you got the money to buy more. Never true   Within the past 12 months, the food you bought just didn't last and you didn't have money to get more. Never true   Utilities    In the past 12 months has the electric, gas, oil, or water company threatened to shut off services in your home? No            DISCHARGE DETAILS:    Discharge planning discussed with:: Patient  Freedom of Choice: Yes  Comments - Freedom of Choice: TREY introduced self and role with wife . The patient is presently in isolation for Covid. His wife requested d/c planning be discussed FOC was maintained throughout the conversation of d/c  planning with wife.  She does not want him back in a STR- she wants a KIMBERLEY with [facility] , then when well enough she wants him to attend OP Pulmonary rehab.  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  CM put a [facility] application along with [facility] info as well as info on OP Pulmonary and Therapy locations in an enverlope.  CM gave to Nursing to put in patients room(closet with take home belongings).  CM referred back to [facility] and also made a referral to OP CM for f/u support on resources upon d/c.  CM contacted family/caregiver?: Yes  Were Treatment Team discharge recommendations reviewed with patient/caregiver?: Yes  Did patient/caregiver verbalize understanding of patient care needs?: Yes       Contacts  Patient Contacts: Carol  Relationship to Patient:: Family    Requested Home Health Care         Is the patient interested in HHC at discharge?: Yes  Home Health Discipline requested:: Occupational Therapy, Nursing, Physical Therapy  Home Health Agency Name:: [facility] External Referral Reason (only applicable if external HHA name selected): Patient has established relationship with provider  Home Health Follow-Up Provider:: PCP  Home Health Services Needed:: Evaluate Functional Status and Safety, Strengthening/Theraputic Exercises to Improve Function  Homebound Criteria Met:: Requires the Assistance of Another Person for Safe Ambulation or to Leave the Home  Supporting Clincal Findings:: Fatigues Easliy in Short Distances    DME Referral Provided  Referral made for DME?: No    Other Referral/Resources/Interventions Provided:  Interventions: HHC, FindHelp, Outpatient   Referral Comments: Wife is not interested in [facility] [place]. [facility] reserved for a KIMBERLEY.  Referral made to CM for OP resource f/u suport    Would you like to participate in our [facility] service program?  : No - Declined    Treatment Team Recommendation:  Short Term Rehab  Discharge Destination Plan:: Home with Home Health Care

## 2025-02-17 NOTE — ASSESSMENT & PLAN NOTE
Continue with Norvasc, lisinopril and Lopressor  Blood Pressure: 125/67   Monitor VS per unit protocol

## 2025-02-17 NOTE — PROGRESS NOTES
Progress Note - Hospitalist   Name: Fahad Hernandez 69 y.o. male I MRN: [id number]  Unit/Bed#: 2 E 269-01 I Date of Admission: 2/16/2025   Date of Service: 2/17/2025 I Hospital Day: 0    Assessment & Plan  Acute respiratory failure (HCC)  Presenting with SOB and hypoxia. Hypoxia present on admission, secondary to COVID-19  Baseline room air, requiring up to 3 L via NC  Currently SpO2: 94 % on Nasal Cannula O2 Flow Rate (L/min): 2.5 L/min   Wean as tolerated for oxygen saturation greater than 92%  COVID-19  High-risk in setting of COPD/asthma     Mild COVID pathway, (+) 2/16  Chest CT negative PE; (+) chronic bronchiectasis, distended esophagus  Continue IV remdesivir, initiated 2/16  Continue IV Decadron 6 mg, initiated 2/16  Follow up on procalcitonin  Continue heparin tid for DVT prophylaxis   Encourage incentive spirometer, OOB as appropriate  Self proning while in bed  Monitor inflammatory markers q 2-3 days  PT/OT consulted: Level 2 recs  Mucinex, tessalon, robitussin-DM  Hypertension  Continue with Norvasc, lisinopril and Lopressor  Blood Pressure: 125/67   Monitor VS per unit protocol   Type 2 diabetes mellitus with hyperglycemia, without long-term current use of insulin ([facility])  Blood Sugar Average: Last 72 hrs (P) 188.5  Uncontrolled a/e/b A1c 8.3%  Hold metformin  Continue Humalog SSI # 2   Diabetic diet level 2  Blood glucose monitoring ACHS  Hypoglycemia protocol  Chronic pain syndrome  Continue oxycodone 7.5 mg  PDMP reviewed 2/17  Tardive dyskinesia  Chronic, continue Astuedo   Multiple falls  Was recently admitted to the hospital January 2025 due to fatigue, right-sided weakness, and multiple falls  Stroke workup negative   Severe cervical stenosis, neurosurgery recommended no acute intervention  His oxycodone was decreased from 10 mg to 7.5 mg secondary to concern that may be contributing to falls and fatigue  Discharged to [facility] 2/13, discharged home 2/13    VTE Pharmacologic Prophylaxis: VTE  Score: 6 High Risk (Score >/= 5) - Pharmacological DVT Prophylaxis Ordered: heparin. Sequential Compression Devices Ordered.    Mobility:   Basic Mobility Inpatient Raw Score: 16  JH-HLM Goal: 5: Stand one or more mins  JH-HLM Achieved: 2: Bed activities/Dependent transfer  JH-HLM Goal NOT achieved. Continue with multidisciplinary rounding and encourage appropriate mobility to improve upon JH-HLM goals.    Patient Centered Rounds: I performed bedside rounds with nursing staff today.   Discussions with Specialists or Other Care Team Provider: TREY    Education and Discussions with Family / Patient: Updated  (wife) via phone. Reports he has a PAIN MANAGEMENT appt in [place] on Thursday    Current Length of Stay: 0 day(s)  Current Patient Status: Observation   Certification Statement: The patient, admitted on an observation basis, will now require > 2 midnight hospital stay due to ongoing hypoxia  Discharge Plan: Anticipate discharge in 24-48 hrs to recommended STR again    Code Status: Level 1 - Full Code    Subjective   Patient doing better - he tells me he is willing for rehab but not [facility].  Still generally weak, still coughing but does not feel quite as short of breath today.  His wife tells me that she would not want him to go to rehab this time, reports that he has been hospitalized so many times in the past 6 months and has gotten progressively weaker, she would prefer him to be home with therapy either at home or outpatient.    Objective :  Temp:  [98.2 °F (36.8 °C)-98.4 °F (36.9 °C)] 98.3 °F (36.8 °C)  HR:  [] 91  BP: ()/(55-86) 125/67  SpO2:  [94 %-98 %] 94 %  O2 Device: Nasal cannula  Nasal Cannula O2 Flow Rate (L/min):  [2 L/min-4 L/min] 2.5 L/min    Body mass index is 21.15 kg/m².     Input and Output Summary (last 24 hours):     Intake/Output Summary (Last 24 hours) at 2/17/2025 0718  Last data filed at 2/17/2025 0511  Gross per 24 hour   Intake 1460 ml   Output 1750 ml   Net -290  ml       Physical Exam  Vitals and nursing note reviewed.   Constitutional:       General: He is not in acute distress.     Appearance: Normal appearance. He is not ill-appearing or toxic-appearing.   Cardiovascular:      Rate and Rhythm: Normal rate and regular rhythm.      Heart sounds: Normal heart sounds. No murmur heard.  Pulmonary:      Effort: Pulmonary effort is normal. No respiratory distress.      Breath sounds: Normal breath sounds.   Abdominal:      General: Bowel sounds are normal. There is no distension.      Palpations: Abdomen is soft.   Neurological:      Mental Status: He is alert and oriented to person, place, and time.   Psychiatric:         Mood and Affect: Mood normal.         Behavior: Behavior normal.         Lab Results: I have reviewed the following results:   Results from last 7 days   Lab Units 02/17/25  0521 02/16/25  0305   WBC Thousand/uL 15.31* 10.92*   HEMOGLOBIN g/dL 9.5* 9.6*   HEMATOCRIT % 32.7* 32.1*   PLATELETS Thousands/uL 513* 461*   BANDS PCT %  --  3   LYMPHO PCT %  --  23   MONO PCT %  --  10   EOS PCT %  --  1     Results from last 7 days   Lab Units 02/17/25  0521   SODIUM mmol/L 141   POTASSIUM mmol/L 4.1   CHLORIDE mmol/L 108   CO2 mmol/L 25   BUN mg/dL 19   CREATININE mg/dL 0.69   ANION GAP mmol/L 8   CALCIUM mg/dL 8.6   GLUCOSE RANDOM mg/dL 134     Results from last 7 days   Lab Units 02/16/25  0305   INR  0.94     Results from last 7 days   Lab Units 02/16/25  2112 02/16/25  1615 02/16/25  1139 02/16/25  0809   POC GLUCOSE mg/dl 149* 229* 226* 150*               Recent Cultures (last 7 days):         Imaging Results Review: No pertinent imaging studies reviewed.  Other Study Results Review: No additional pertinent studies reviewed.    Last 24 Hours Medication List:     Current Facility-Administered Medications:     acetaminophen (TYLENOL) tablet 650 mg, Q6H PRN    albuterol (PROVENTIL HFA,VENTOLIN HFA) inhaler 2 puff, Q4H PRN    amLODIPine (NORVASC) tablet 10 mg,  Daily **AND** lisinopril (ZESTRIL) tablet 40 mg, Daily    ARIPiprazole (ABILIFY) tablet 10 mg, Daily    aspirin (ECOTRIN LOW STRENGTH) EC tablet 81 mg, Daily    atorvastatin (LIPITOR) tablet 10 mg, Daily With Dinner    buPROPion (WELLBUTRIN XL) 24 hr tablet 300 mg, Daily    clopidogrel (PLAVIX) tablet 75 mg, Daily    Deutetrabenazine TABS 12 mg, Daily    dexamethasone (PF) (DECADRON) injection 6 mg, Q24H    docusate sodium (COLACE) capsule 100 mg, BID    heparin (porcine) subcutaneous injection 5,000 Units, Q8H PARTHA    hydrOXYzine HCL (ATARAX) tablet 25 mg, Q6H PRN    insulin lispro (HumALOG/ADMELOG) 100 units/mL subcutaneous injection 1-5 Units, 4x Daily (AC & HS) **AND** Fingerstick Glucose (POCT), 4x Daily AC and at bedtime    ipratropium-albuterol (DUO-NEB) 0.5-2.5 mg/3 mL inhalation solution 3 mL, TID    metoprolol succinate (TOPROL-XL) 24 hr tablet 25 mg, Daily    montelukast (SINGULAIR) tablet 10 mg, HS    multivitamin stress formula tablet 1 tablet, Daily    pantoprazole (PROTONIX) EC tablet 40 mg, Early Morning    [COMPLETED] remdesivir (Veklury) 200 mg in sodium chloride 0.9 % 290 mL IVPB, Q24H **FOLLOWED BY** remdesivir (Veklury) 100 mg in sodium chloride 0.9 % 270 mL IVPB, Q24H    tamsulosin (FLOMAX) capsule 0.4 mg, Daily    venlafaxine (EFFEXOR-XR) 24 hr capsule 75 mg, Daily    Administrative Statements   Today, Patient Was Seen By: Yolanda Neumann PA-C  I have spent a total time of 50 minutes in caring for this patient on the day of the visit/encounter including Patient and family education, Importance of tx compliance, Counseling / Coordination of care, Documenting in the medical record, Reviewing/placing orders in the medical record (including tests, medications, and/or procedures), Obtaining or reviewing history  , and Communicating with other healthcare professionals .    **Please Note: This note may have been constructed using a voice recognition system.**

## 2025-02-17 NOTE — TELEPHONE ENCOUNTER
Marine Benjamin  [facility]3 hours ago (8:10 AM)     CS  PLS advise on virtual visit         Note        Adeline Mc, RN routed conversation to [facility]; [facility]3 days ago     Adeline Mc RN3 days ago       Patient's wife Carol calling. Pt was just discharged from rehab yesterday after multiple hospitalizations. Carol is requesting a virtual visit with Dr. Holman because it is very difficult for patient to get to the office. Carol feels that patient had a lot of testing done in the hospital, therefore, Dr. Holman may be agreeable to a virtual visit to discuss care and medications.  Please contact Carol at [phone] to schedule.      Pt was also wearing Zio monitor, but it was discontinued when patient was in hospital. Carol will mail monitor back to company.

## 2025-02-17 NOTE — PLAN OF CARE
Problem: RESPIRATORY - ADULT  Goal: Achieves optimal ventilation and oxygenation  Description: INTERVENTIONS:  - Assess for changes in respiratory status  - Assess for changes in mentation and behavior  - Position to facilitate oxygenation and minimize respiratory effort  - Oxygen administered by appropriate delivery if ordered  - Initiate smoking cessation education as indicated  - Encourage broncho-pulmonary hygiene including cough, deep breathe, Incentive Spirometry  - Assess the need for suctioning and aspirate as needed  - Assess and instruct to report SOB or any respiratory difficulty  - Respiratory Therapy support as indicated  Outcome: Progressing     Problem: MUSCULOSKELETAL - ADULT  Goal: Maintain or return mobility to safest level of function  Description: INTERVENTIONS:  - Assess patient's ability to carry out ADLs; assess patient's baseline for ADL function and identify physical deficits which impact ability to perform ADLs (bathing, care of mouth/teeth, toileting, grooming, dressing, etc.)  - Assess/evaluate cause of self-care deficits   - Assess range of motion  - Assess patient's mobility  - Assess patient's need for assistive devices and provide as appropriate  - Encourage maximum independence but intervene and supervise when necessary  - Involve family in performance of ADLs  - Assess for home care needs following discharge   - Consider OT consult to assist with ADL evaluation and planning for discharge  - Provide patient education as appropriate  Outcome: Progressing  Goal: Maintain proper alignment of affected body part  Description: INTERVENTIONS:  - Support, maintain and protect limb and body alignment  - Provide patient/ family with appropriate education  Outcome: Progressing     Problem: PAIN - ADULT  Goal: Verbalizes/displays adequate comfort level or baseline comfort level  Description: Interventions:  - Encourage patient to monitor pain and request assistance  - Assess pain using  appropriate pain scale  - Administer analgesics based on type and severity of pain and evaluate response  - Implement non-pharmacological measures as appropriate and evaluate response  - Consider cultural and social influences on pain and pain management  - Notify physician/advanced practitioner if interventions unsuccessful or patient reports new pain  Outcome: Progressing     Problem: INFECTION - ADULT  Goal: Absence or prevention of progression during hospitalization  Description: INTERVENTIONS:  - Assess and monitor for signs and symptoms of infection  - Monitor lab/diagnostic results  - Monitor all insertion sites, i.e. indwelling lines, tubes, and drains  - Monitor endotracheal if appropriate and nasal secretions for changes in amount and color  - [place] appropriate cooling/warming therapies per order  - Administer medications as ordered  - Instruct and encourage patient and family to use good hand hygiene technique  - Identify and instruct in appropriate isolation precautions for identified infection/condition  Outcome: Progressing  Goal: Absence of fever/infection during neutropenic period  Description: INTERVENTIONS:  - Monitor WBC    Outcome: Progressing     Problem: SAFETY ADULT  Goal: Patient will remain free of falls  Description: INTERVENTIONS:  - Educate patient/family on patient safety including physical limitations  - Instruct patient to call for assistance with activity   - Consult OT/PT to assist with strengthening/mobility   - Keep Call bell within reach  - Keep bed low and locked with side rails adjusted as appropriate  - Keep care items and personal belongings within reach  - Initiate and maintain comfort rounds  - Make Fall Risk Sign visible to staff  - Offer Toileting every 2 Hours, in advance of need  - Initiate/Maintain alarm  - Obtain necessary fall risk management equipment:   - Apply yellow socks and bracelet for high fall risk patients  - Consider moving patient to room near nurses  station  Outcome: Progressing  Goal: Maintain or return to baseline ADL function  Description: INTERVENTIONS:  -  Assess patient's ability to carry out ADLs; assess patient's baseline for ADL function and identify physical deficits which impact ability to perform ADLs (bathing, care of mouth/teeth, toileting, grooming, dressing, etc.)  - Assess/evaluate cause of self-care deficits   - Assess range of motion  - Assess patient's mobility; develop plan if impaired  - Assess patient's need for assistive devices and provide as appropriate  - Encourage maximum independence but intervene and supervise when necessary  - Involve family in performance of ADLs  - Assess for home care needs following discharge   - Consider OT consult to assist with ADL evaluation and planning for discharge  - Provide patient education as appropriate  Outcome: Progressing  Goal: Maintains/Returns to pre admission functional level  Description: INTERVENTIONS:  - Perform AM-PAC 6 Click Basic Mobility/ Daily Activity assessment daily.  - Set and communicate daily mobility goal to care team and patient/family/caregiver.   - Collaborate with rehabilitation services on mobility goals if consulted  - Perform Range of Motion 3 times a day.  - Reposition patient every 2 hours.  - Dangle patient 3 times a day  - Stand patient 3 times a day  - Ambulate patient 3 times a day  - Out of bed to chair 3 times a day   - Out of bed for meals 3 times a day  - Out of bed for toileting  - Record patient progress and toleration of activity level   Outcome: Progressing     Problem: DISCHARGE PLANNING  Goal: Discharge to home or other facility with appropriate resources  Description: INTERVENTIONS:  - Identify barriers to discharge w/patient and caregiver  - Arrange for needed discharge resources and transportation as appropriate  - Identify discharge learning needs (meds, wound care, etc.)  - Arrange for interpretive services to assist at discharge as needed  - Refer  to Case Management Department for coordinating discharge planning if the patient needs post-hospital services based on physician/advanced practitioner order or complex needs related to functional status, cognitive ability, or social support system  Outcome: Progressing     Problem: Knowledge Deficit  Goal: Patient/family/caregiver demonstrates understanding of disease process, treatment plan, medications, and discharge instructions  Description: Complete learning assessment and assess knowledge base.  Interventions:  - Provide teaching at level of understanding  - Provide teaching via preferred learning methods  Outcome: Progressing     Problem: Prexisting or High Potential for Compromised Skin Integrity  Goal: Skin integrity is maintained or improved  Description: INTERVENTIONS:  - Identify patients at risk for skin breakdown  - Assess and monitor skin integrity  - Assess and monitor nutrition and hydration status  - Monitor labs   - Assess for incontinence   - Turn and reposition patient  - Assist with mobility/ambulation  - Relieve pressure over bony prominences  - Avoid friction and shearing  - Provide appropriate hygiene as needed including keeping skin clean and dry  - Evaluate need for skin moisturizer/barrier cream  - Collaborate with interdisciplinary team   - Patient/family teaching  - Consider wound care consult   Outcome: Progressing

## 2025-02-17 NOTE — PROGRESS NOTES
Patient:  KARLIE LÓPEZ    MRN:  [id number]    Aidin Request ID:  [id number]    Level of care reserved:    Partner Reserved:    Clinical needs requested:    Geography searched:  [phone]    Start of Service:    Request sent:  5:11pm EST on 2/17/2025 by Franny Fong    Partner reserved:    Choice list shared:  by Franny Fong

## 2025-02-17 NOTE — RESPIRATORY THERAPY NOTE
Patient wears a BiPAP HS, wife will call tomorrow with his actual settings. Setting patient up on 12/6 for tonight. Updated order was saved to have CPAP order changed to BiPAP.

## 2025-02-17 NOTE — RESPIRATORY THERAPY NOTE
RT Protocol Note  Fahad Hernandez 69 y.o. male MRN: [id number]  Unit/Bed#: 2 E 269-01 Encounter: [id number]    Assessment    Principal Problem:    COVID-19  Active Problems:    Hypertension    Type 2 diabetes mellitus with hyperglycemia, without long-term current use of insulin (HCC)    Acute respiratory failure (HCC)    Multiple falls    Chronic pain syndrome    Tardive dyskinesia      Home Pulmonary Medications:         Past Medical History:   Diagnosis Date    Asthma     COPD (chronic obstructive pulmonary disease) ([facility])     Dementia (HCC)     Diabetes mellitus (HCC)     GERD (gastroesophageal reflux disease)     History of stroke 11/07/2019    Hypertension     Interstitial lung disease ([facility])     Major depressive disorder with current active episode 11/07/2019    Pneumonia     Rotator cuff arthropathy of right shoulder 12/17/2024    Sleep apnea     Sleep apnea, obstructive     Stroke ([facility])     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     Social History     Socioeconomic History    Marital status: /Civil Union     Spouse name: None    Number of children: None    Years of education: None    Highest education level: None   Occupational History    None   Tobacco Use    Smoking status: Never     Passive exposure: Never    Smokeless tobacco: Never    Tobacco comments:     Patient admits to using marijuana, edibles and smoking    Vaping Use    Vaping status: Never Used   Substance and Sexual Activity    Alcohol use: Not Currently    Drug use: Yes     Types: Marijuana, Oxycodone, Psilocybin    Sexual activity: Yes     Partners: Female     Birth control/protection: Male Sterilization   Other Topics Concern    None   Social History Narrative    None     Social Drivers of Health     Financial Resource Strain: Low Risk  (11/1/2024)    Received from [facility]    Overall Financial Resource Strain (CARDIA)     Difficulty of Paying Living Expenses: Not hard at all   Food Insecurity: No Food Insecurity (2/16/2025)     Nursing - Inadequate Food Risk Classification     Worried About Running Out of Food in the Last Year: Never true     Ran Out of Food in the Last Year: Never true     Ran Out of Food in the Last Year: Never true   Transportation Needs: No Transportation Needs (2025)    Nursing - Transportation Risk Classification     Lack of Transportation: Not on file     Lack of Transportation: No   Physical Activity: Inactive (2024)    Received from [facility]    Exercise Vital Sign     Days of Exercise per Week: 0 days     Minutes of Exercise per Session: 0 min   Stress: Stress Concern Present (2024)    Received from [facility]    [employer] [place] [employer] - Occupational Stress Questionnaire     Feeling of Stress : To some extent   Social Connections: Moderately Isolated (2024)    Received from [facility]    Social Connection and Isolation Panel [NHANES]     Frequency of Communication with Friends and Family: Three times a week     Frequency of Social Gatherings with Friends and Family: Twice a week     Attends Taoism Services: Never     Active Member of Clubs or Organizations: No     Attends Club or Organization Meetings: Never     Marital Status:    Intimate Partner Violence: Unknown (2025)    Nursing IPS     Feels Physically and Emotionally Safe: Not on file     Physically Hurt by Someone: Not on file     Humiliated or Emotionally Abused by Someone: Not on file     Physically Hurt by Someone: No     Hurt or Threatened by Someone: No   Housing Stability: Unknown (2025)    Nursing: Inadequate Housing Risk Classification     Has Housing: Not on file     Worried About Losing Housing: Not on file     Unable to Get Utilities: Not on file     Unable to Pay for Housing in the Last Year: No     Has Housin       Subjective         Objective    Physical Exam:   Assessment Type: During-treatment  General Appearance: Awake,  "Alert  Respiratory Pattern: Tachypneic  Chest Assessment: Chest expansion symmetrical  Bilateral Breath Sounds: Diminished  O2 Device: NC    Vitals:  Blood pressure 96/58, pulse 81, temperature 98.3 °F (36.8 °C), resp. rate 21, height 5' 11\" (1.803 m), weight 68.8 kg (151 lb 10.8 oz), SpO2 94%.          Imaging and other studies:     O2 Device: NC     Plan    Respiratory Plan: Mild Distress pathway        Resp Comments: pt admitted with COVID SOB and tachypneia will continue with TID txs.   "

## 2025-02-18 LAB
ATRIAL RATE: 102 BPM
BASOPHILS # BLD AUTO: 0.02 THOUSANDS/ΜL (ref 0–0.1)
BASOPHILS NFR BLD AUTO: 0 % (ref 0–1)
EOSINOPHIL # BLD AUTO: 0 THOUSAND/ΜL (ref 0–0.61)
EOSINOPHIL NFR BLD AUTO: 0 % (ref 0–6)
ERYTHROCYTE [DISTWIDTH] IN BLOOD BY AUTOMATED COUNT: 15.5 % (ref 11.6–15.1)
GLUCOSE SERPL-MCNC: 108 MG/DL (ref 65–140)
GLUCOSE SERPL-MCNC: 124 MG/DL (ref 65–140)
GLUCOSE SERPL-MCNC: 202 MG/DL (ref 65–140)
GLUCOSE SERPL-MCNC: 217 MG/DL (ref 65–140)
HCT VFR BLD AUTO: 31.6 % (ref 36.5–49.3)
HGB BLD-MCNC: 9.9 G/DL (ref 12–17)
IMM GRANULOCYTES # BLD AUTO: 0.2 THOUSAND/UL (ref 0–0.2)
IMM GRANULOCYTES NFR BLD AUTO: 1 % (ref 0–2)
LYMPHOCYTES # BLD AUTO: 3.38 THOUSANDS/ΜL (ref 0.6–4.47)
LYMPHOCYTES NFR BLD AUTO: 22 % (ref 14–44)
MCH RBC QN AUTO: 25.7 PG (ref 26.8–34.3)
MCHC RBC AUTO-ENTMCNC: 31.3 G/DL (ref 31.4–37.4)
MCV RBC AUTO: 82 FL (ref 82–98)
MONOCYTES # BLD AUTO: 1.35 THOUSAND/ΜL (ref 0.17–1.22)
MONOCYTES NFR BLD AUTO: 9 % (ref 4–12)
NEUTROPHILS # BLD AUTO: 10.12 THOUSANDS/ΜL (ref 1.85–7.62)
NEUTS SEG NFR BLD AUTO: 68 % (ref 43–75)
NRBC BLD AUTO-RTO: 0 /100 WBCS
P AXIS: 36 DEGREES
PLATELET # BLD AUTO: 543 THOUSANDS/UL (ref 149–390)
PMV BLD AUTO: 8.9 FL (ref 8.9–12.7)
PR INTERVAL: 150 MS
PROCALCITONIN SERPL-MCNC: <0.05 NG/ML
QRS AXIS: 19 DEGREES
QRSD INTERVAL: 76 MS
QT INTERVAL: 342 MS
QTC INTERVAL: 483 MS
RBC # BLD AUTO: 3.85 MILLION/UL (ref 3.88–5.62)
T WAVE AXIS: 62 DEGREES
VENTRICULAR RATE: 120 BPM
WBC # BLD AUTO: 15.07 THOUSAND/UL (ref 4.31–10.16)

## 2025-02-18 PROCEDURE — 94660 CPAP INITIATION&MGMT: CPT

## 2025-02-18 PROCEDURE — 94664 DEMO&/EVAL PT USE INHALER: CPT

## 2025-02-18 PROCEDURE — 84145 PROCALCITONIN (PCT): CPT | Performed by: PHYSICIAN ASSISTANT

## 2025-02-18 PROCEDURE — 85025 COMPLETE CBC W/AUTO DIFF WBC: CPT | Performed by: PHYSICIAN ASSISTANT

## 2025-02-18 PROCEDURE — 94760 N-INVAS EAR/PLS OXIMETRY 1: CPT

## 2025-02-18 PROCEDURE — 82948 REAGENT STRIP/BLOOD GLUCOSE: CPT

## 2025-02-18 PROCEDURE — 94640 AIRWAY INHALATION TREATMENT: CPT

## 2025-02-18 PROCEDURE — 99232 SBSQ HOSP IP/OBS MODERATE 35: CPT | Performed by: PHYSICIAN ASSISTANT

## 2025-02-18 RX ADMIN — GUAIFENESIN 600 MG: 600 TABLET ORAL at 21:15

## 2025-02-18 RX ADMIN — DOCUSATE SODIUM 100 MG: 100 CAPSULE, LIQUID FILLED ORAL at 08:41

## 2025-02-18 RX ADMIN — ASPIRIN 81 MG: 81 TABLET, COATED ORAL at 08:41

## 2025-02-18 RX ADMIN — BENZONATATE 100 MG: 100 CAPSULE ORAL at 17:35

## 2025-02-18 RX ADMIN — B-COMPLEX W/ C & FOLIC ACID TAB 1 TABLET: TAB at 08:41

## 2025-02-18 RX ADMIN — CLOPIDOGREL 75 MG: 75 TABLET ORAL at 08:41

## 2025-02-18 RX ADMIN — METOPROLOL SUCCINATE 25 MG: 25 TABLET, EXTENDED RELEASE ORAL at 08:41

## 2025-02-18 RX ADMIN — TAMSULOSIN HYDROCHLORIDE 0.4 MG: 0.4 CAPSULE ORAL at 08:42

## 2025-02-18 RX ADMIN — BENZONATATE 100 MG: 100 CAPSULE ORAL at 08:41

## 2025-02-18 RX ADMIN — GUAIFENESIN 600 MG: 600 TABLET ORAL at 08:42

## 2025-02-18 RX ADMIN — DEXAMETHASONE SODIUM PHOSPHATE 6 MG: 10 INJECTION, SOLUTION INTRAMUSCULAR; INTRAVENOUS at 05:06

## 2025-02-18 RX ADMIN — VENLAFAXINE HYDROCHLORIDE 75 MG: 75 CAPSULE, EXTENDED RELEASE ORAL at 08:42

## 2025-02-18 RX ADMIN — ATORVASTATIN CALCIUM 10 MG: 10 TABLET, FILM COATED ORAL at 17:32

## 2025-02-18 RX ADMIN — INSULIN LISPRO 1 UNITS: 100 INJECTION, SOLUTION INTRAVENOUS; SUBCUTANEOUS at 12:12

## 2025-02-18 RX ADMIN — AMLODIPINE BESYLATE 10 MG: 10 TABLET ORAL at 08:41

## 2025-02-18 RX ADMIN — HEPARIN SODIUM 5000 UNITS: 5000 INJECTION INTRAVENOUS; SUBCUTANEOUS at 05:07

## 2025-02-18 RX ADMIN — HEPARIN SODIUM 5000 UNITS: 5000 INJECTION INTRAVENOUS; SUBCUTANEOUS at 13:06

## 2025-02-18 RX ADMIN — BUPROPION HYDROCHLORIDE 300 MG: 150 TABLET, EXTENDED RELEASE ORAL at 08:41

## 2025-02-18 RX ADMIN — BENZONATATE 100 MG: 100 CAPSULE ORAL at 21:15

## 2025-02-18 RX ADMIN — ACETAMINOPHEN 650 MG: 325 TABLET, FILM COATED ORAL at 19:32

## 2025-02-18 RX ADMIN — IPRATROPIUM BROMIDE AND ALBUTEROL SULFATE 3 ML: 2.5; .5 SOLUTION RESPIRATORY (INHALATION) at 19:27

## 2025-02-18 RX ADMIN — HEPARIN SODIUM 5000 UNITS: 5000 INJECTION INTRAVENOUS; SUBCUTANEOUS at 21:15

## 2025-02-18 RX ADMIN — PANTOPRAZOLE SODIUM 40 MG: 40 TABLET, DELAYED RELEASE ORAL at 05:07

## 2025-02-18 RX ADMIN — INSULIN LISPRO 2 UNITS: 100 INJECTION, SOLUTION INTRAVENOUS; SUBCUTANEOUS at 17:33

## 2025-02-18 RX ADMIN — IPRATROPIUM BROMIDE AND ALBUTEROL SULFATE 3 ML: 2.5; .5 SOLUTION RESPIRATORY (INHALATION) at 08:01

## 2025-02-18 RX ADMIN — DOCUSATE SODIUM 100 MG: 100 CAPSULE, LIQUID FILLED ORAL at 17:32

## 2025-02-18 RX ADMIN — MONTELUKAST 10 MG: 10 TABLET, FILM COATED ORAL at 21:15

## 2025-02-18 RX ADMIN — REMDESIVIR 100 MG: 100 INJECTION, POWDER, LYOPHILIZED, FOR SOLUTION INTRAVENOUS at 05:07

## 2025-02-18 RX ADMIN — ARIPIPRAZOLE 10 MG: 5 TABLET ORAL at 08:41

## 2025-02-18 RX ADMIN — LISINOPRIL 40 MG: 20 TABLET ORAL at 08:41

## 2025-02-18 RX ADMIN — IPRATROPIUM BROMIDE AND ALBUTEROL SULFATE 3 ML: 2.5; .5 SOLUTION RESPIRATORY (INHALATION) at 14:01

## 2025-02-18 NOTE — ASSESSMENT & PLAN NOTE
Presenting with SOB and hypoxia. Hypoxia present on admission, secondary to COVID-19  Baseline room air, requiring up to 3 L via NC  Currently SpO2: 92 % on Nasal Cannula O2 Flow Rate (L/min): 1 L/min   Wean as tolerated for oxygen saturation greater than 92%

## 2025-02-18 NOTE — PROGRESS NOTES
Progress Note - Hospitalist   Name: Fahad Hernandez 69 y.o. male I MRN: [id number]  Unit/Bed#: 2 E 269-01 I Date of Admission: 2/16/2025   Date of Service: 2/18/2025 I Hospital Day: 1    Assessment & Plan  Acute respiratory failure (HCC)  Presenting with SOB and hypoxia. Hypoxia present on admission, secondary to COVID-19  Baseline room air, requiring up to 3 L via NC  Currently SpO2: 92 % on Nasal Cannula O2 Flow Rate (L/min): 1 L/min   Wean as tolerated for oxygen saturation greater than 92%  COVID-19  High-risk in setting of COPD/asthma     Mild COVID pathway, (+) 2/16  Chest CT negative PE; (+) chronic bronchiectasis, distended esophagus  Continue IV remdesivir, initiated 2/16  Continue IV Decadron 6 mg, initiated 2/16  Follow up on procalcitonin  Continue heparin tid for DVT prophylaxis   Encourage incentive spirometer, OOB as appropriate  Self proning while in bed  Monitor inflammatory markers q 2-3 days  PT/OT consulted: Level 2 recs  Mucinex, tessalon, robitussin-DM  Hypertension  Continue with Norvasc, lisinopril and Lopressor  Blood Pressure: 117/69   Monitor VS per unit protocol   Type 2 diabetes mellitus with hyperglycemia, without long-term current use of insulin ([facility])  Blood Sugar Average: Last 72 hrs (P) 168.5622585779516668  Uncontrolled a/e/b A1c 8.3%  Hold metformin  Continue Humalog SSI # 2   Diabetic diet level 2  Blood glucose monitoring ACHS  Hypoglycemia protocol  Chronic pain syndrome  Continue oxycodone 7.5 mg  PDMP reviewed 2/17  Tardive dyskinesia  Chronic, continue Astuedo   Multiple falls  Was recently admitted to the hospital January 2025 due to fatigue, right-sided weakness, and multiple falls  Stroke workup negative   Severe cervical stenosis, neurosurgery recommended no acute intervention  His oxycodone was decreased from 10 mg to 7.5 mg secondary to concern that may be contributing to falls and fatigue  Discharged to [facility] 2/13, discharged home 2/13    VTE Pharmacologic  "Prophylaxis: VTE Score: 6 High Risk (Score >/= 5) - Pharmacological DVT Prophylaxis Ordered: heparin. Sequential Compression Devices Ordered.    Mobility:   Basic Mobility Inpatient Raw Score: 16  JH-HLM Goal: 5: Stand one or more mins  JH-HLM Achieved: 6: Walk 10 steps or more  JH-HLM Goal achieved. Continue to encourage appropriate mobility.    Patient Centered Rounds: I performed bedside rounds with nursing staff today.   Discussions with Specialists or Other Care Team Provider: TREY    Education and Discussions with Family / Patient: Updated  (wife) via phone.    Current Length of Stay: 1 day(s)  Current Patient Status: Inpatient   Certification Statement: The patient will continue to require additional inpatient hospital stay due to wheezing, covid  Discharge Plan: Anticipate discharge tomorrow to home with home services.    Code Status: Level 1 - Full Code    Subjective   Patient reports feeling \"OK\" today but is still wheezing. No worsening shortness of breath and no chest pain. Agreeable to home tomorrow.     Objective :  Temp:  [97.7 °F (36.5 °C)-98.6 °F (37 °C)] 98.6 °F (37 °C)  HR:  [] 74  BP: (117-133)/(69-78) 117/69  Resp:  [17-18] 18  SpO2:  [91 %-96 %] 92 %  O2 Device: None (Room air)  Nasal Cannula O2 Flow Rate (L/min):  [1 L/min-2.5 L/min] 1 L/min    Body mass index is 21.15 kg/m².     Input and Output Summary (last 24 hours):     Intake/Output Summary (Last 24 hours) at 2/18/2025 0732  Last data filed at 2/17/2025 1323  Gross per 24 hour   Intake --   Output 450 ml   Net -450 ml       Physical Exam  Vitals and nursing note reviewed.   Constitutional:       General: He is not in acute distress.     Appearance: Normal appearance. He is not ill-appearing or toxic-appearing.   Cardiovascular:      Rate and Rhythm: Normal rate and regular rhythm.      Heart sounds: Normal heart sounds.   Pulmonary:      Effort: Pulmonary effort is normal. No respiratory distress.      Breath sounds: " Wheezing present. No rales.   Abdominal:      General: Bowel sounds are normal. There is no distension.      Palpations: Abdomen is soft.   Neurological:      General: No focal deficit present.      Mental Status: He is alert and oriented to person, place, and time. Mental status is at baseline.   Psychiatric:         Mood and Affect: Mood normal.         Behavior: Behavior normal.         Thought Content: Thought content normal.         Judgment: Judgment normal.            Lines/Drains:              Lab Results: I have reviewed the following results:   Results from last 7 days   Lab Units 02/18/25  0513 02/17/25  0521 02/16/25  0305   WBC Thousand/uL 15.07*   < > 10.92*   HEMOGLOBIN g/dL 9.9*   < > 9.6*   HEMATOCRIT % 31.6*   < > 32.1*   PLATELETS Thousands/uL 543*   < > 461*   BANDS PCT %  --   --  3   SEGS PCT % 68  --   --    LYMPHO PCT % 22  --  23   MONO PCT % 9  --  10   EOS PCT % 0  --  1    < > = values in this interval not displayed.     Results from last 7 days   Lab Units 02/17/25  0521   SODIUM mmol/L 141   POTASSIUM mmol/L 4.1   CHLORIDE mmol/L 108   CO2 mmol/L 25   BUN mg/dL 19   CREATININE mg/dL 0.69   ANION GAP mmol/L 8   CALCIUM mg/dL 8.6   GLUCOSE RANDOM mg/dL 134     Results from last 7 days   Lab Units 02/16/25  0305   INR  0.94     Results from last 7 days   Lab Units 02/18/25  0705 02/17/25  2045 02/17/25  1620 02/17/25  1149 02/17/25  0757 02/16/25  2112 02/16/25  1615 02/16/25  1139 02/16/25  0809   POC GLUCOSE mg/dl 108 165* 160* 153* 176* 149* 229* 226* 150*         Results from last 7 days   Lab Units 02/18/25  0513 02/17/25  0959   PROCALCITONIN ng/ml <0.05 <0.05       Recent Cultures (last 7 days):         Imaging Results Review: No pertinent imaging studies reviewed.  Other Study Results Review: No additional pertinent studies reviewed.    Last 24 Hours Medication List:     Current Facility-Administered Medications:     acetaminophen (TYLENOL) tablet 650 mg, Q6H PRN    albuterol  (PROVENTIL HFA,VENTOLIN HFA) inhaler 2 puff, Q4H PRN    amLODIPine (NORVASC) tablet 10 mg, Daily **AND** lisinopril (ZESTRIL) tablet 40 mg, Daily    ARIPiprazole (ABILIFY) tablet 10 mg, Daily    aspirin (ECOTRIN LOW STRENGTH) EC tablet 81 mg, Daily    atorvastatin (LIPITOR) tablet 10 mg, Daily With Dinner    benzonatate (TESSALON PERLES) capsule 100 mg, TID    buPROPion (WELLBUTRIN XL) 24 hr tablet 300 mg, Daily    clopidogrel (PLAVIX) tablet 75 mg, Daily    [Held by provider] Deutetrabenazine TABS 12 mg, Daily    dexamethasone (PF) (DECADRON) injection 6 mg, Q24H    dextromethorphan-guaiFENesin (ROBITUSSIN DM) oral syrup 10 mL, Q4H PRN    docusate sodium (COLACE) capsule 100 mg, BID    guaiFENesin (MUCINEX) 12 hr tablet 600 mg, Q12H PARTHA    heparin (porcine) subcutaneous injection 5,000 Units, Q8H PARTHA    hydrOXYzine HCL (ATARAX) tablet 25 mg, Q6H PRN    insulin lispro (HumALOG/ADMELOG) 100 units/mL subcutaneous injection 1-5 Units, 4x Daily (AC & HS) **AND** Fingerstick Glucose (POCT), 4x Daily AC and at bedtime    ipratropium-albuterol (DUO-NEB) 0.5-2.5 mg/3 mL inhalation solution 3 mL, TID    metoprolol succinate (TOPROL-XL) 24 hr tablet 25 mg, Daily    montelukast (SINGULAIR) tablet 10 mg, HS    multivitamin stress formula tablet 1 tablet, Daily    pantoprazole (PROTONIX) EC tablet 40 mg, Early Morning    [COMPLETED] remdesivir (Veklury) 200 mg in sodium chloride 0.9 % 290 mL IVPB, Q24H **FOLLOWED BY** remdesivir (Veklury) 100 mg in sodium chloride 0.9 % 270 mL IVPB, Q24H    tamsulosin (FLOMAX) capsule 0.4 mg, Daily    venlafaxine (EFFEXOR-XR) 24 hr capsule 75 mg, Daily    Administrative Statements   Today, Patient Was Seen By: Yolanda E Nel, PA-C  I have spent a total time of 35 minutes in caring for this patient on the day of the visit/encounter including Instructions for management, Impressions, Counseling / Coordination of care, Documenting in the medical record, Reviewing/placing orders in the medical  record (including tests, medications, and/or procedures), and Communicating with other healthcare professionals .    **Please Note: This note may have been constructed using a voice recognition system.**

## 2025-02-18 NOTE — ASSESSMENT & PLAN NOTE
Continue with Norvasc, lisinopril and Lopressor  Blood Pressure: 117/69   Monitor VS per unit protocol

## 2025-02-18 NOTE — ASSESSMENT & PLAN NOTE
Blood Sugar Average: Last 72 hrs (P) [id number]  Uncontrolled a/e/b A1c 8.3%  Hold metformin  Continue Humalog SSI # 2   Diabetic diet level 2  Blood glucose monitoring ACHS  Hypoglycemia protocol

## 2025-02-18 NOTE — RESPIRATORY THERAPY NOTE
RT Protocol Note  Fahad Hernandez 69 y.o. male MRN: [id number]  Unit/Bed#: 2 E 269-01 Encounter: [id number]    Assessment    Principal Problem:    COVID-19  Active Problems:    Hypertension    Type 2 diabetes mellitus with hyperglycemia, without long-term current use of insulin (HCC)    Acute respiratory failure (HCC)    Multiple falls    Chronic pain syndrome    Tardive dyskinesia      Home Pulmonary Medications:     02/17/25 2003   Respiratory Protocol   Protocol Initiated? No   Protocol Selection Respiratory   Language Barrier? No   Medical & Social History Reviewed? Yes   Diagnostic Studies Reviewed? Yes   Physical Assessment Performed? Yes   Respiratory Plan Mild Distress pathway   Respiratory Assessment   Assessment Type During-treatment   General Appearance Alert;Awake   Respiratory Pattern Normal   Chest Assessment Chest expansion symmetrical   Bilateral Breath Sounds Diminished   Resp Comments Will continue with current therapy   O2 Device NC   Additional Assessments   Pulse 85   Respirations 18   SpO2 95 %            Past Medical History:   Diagnosis Date    Asthma     COPD (chronic obstructive pulmonary disease) (HCC)     Dementia (HCC)     Diabetes mellitus (HCC)     GERD (gastroesophageal reflux disease)     History of stroke 11/07/2019    Hypertension     Interstitial lung disease (HCC)     Major depressive disorder with current active episode 11/07/2019    Pneumonia     Rotator cuff arthropathy of right shoulder 12/17/2024    Sleep apnea     Sleep apnea, obstructive     Stroke (HCC)     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     Social History     Socioeconomic History    Marital status: /Civil Union     Spouse name: None    Number of children: None    Years of education: None    Highest education level: None   Occupational History    None   Tobacco Use    Smoking status: Never     Passive exposure: Never    Smokeless tobacco: Never    Tobacco comments:     Patient admits to using marijuana, edibles and  smoking    Vaping Use    Vaping status: Never Used   Substance and Sexual Activity    Alcohol use: Not Currently    Drug use: Yes     Types: Marijuana, Oxycodone, Psilocybin    Sexual activity: Yes     Partners: Female     Birth control/protection: Male Sterilization   Other Topics Concern    None   Social History Narrative    None     Social Drivers of Health     Financial Resource Strain: Low Risk  (11/1/2024)    Received from [facility]    Overall Financial Resource Strain (CARDIA)     Difficulty of Paying Living Expenses: Not hard at all   Food Insecurity: No Food Insecurity (2/17/2025)    Hunger Vital Sign     Worried About Running Out of Food in the Last Year: Never true     Ran Out of Food in the Last Year: Never true   Transportation Needs: No Transportation Needs (2/17/2025)    PRAPARE - Transportation     Lack of Transportation (Medical): No     Lack of Transportation (Non-Medical): No   Physical Activity: Inactive (11/1/2024)    Received from [facility]    Exercise Vital Sign     Days of Exercise per Week: 0 days     Minutes of Exercise per Session: 0 min   Stress: Stress Concern Present (11/1/2024)    Received from [facility]    [place] Island[place] of Occupational Health - Occupational Stress Questionnaire     Feeling of Stress : To some extent   Social Connections: Moderately Isolated (11/1/2024)    Received from [facility]    Social Connection and Isolation Panel [NHANES]     Frequency of Communication with Friends and Family: Three times a week     Frequency of Social Gatherings with Friends and Family: Twice a week     Attends [employer] Services: Never     Active Member of Clubs or Organizations: No     Attends Club or Organization Meetings: Never     Marital Status:    Intimate Partner Violence: Unknown (2/16/2025)    Nursing IPS     Feels Physically and Emotionally Safe: Not on file     Physically Hurt by Someone: Not on file      "Humiliated or Emotionally Abused by Someone: Not on file     Physically Hurt by Someone: No     Hurt or Threatened by Someone: No   Housing Stability: Low Risk  (2/17/2025)    Housing Stability Vital Sign     Unable to Pay for Housing in the Last Year: No     Number of Times Moved in the Last Year: 0     Homeless in the Last Year: No       Subjective         Objective    Physical Exam:   Assessment Type: During-treatment  General Appearance: Alert, Awake  Respiratory Pattern: Normal  Chest Assessment: Chest expansion symmetrical  Bilateral Breath Sounds: Diminished  O2 Device: NC    Vitals:  Blood pressure 130/78, pulse 85, temperature 97.7 °F (36.5 °C), temperature source Oral, resp. rate 18, height 5' 11\" (1.803 m), weight 68.8 kg (151 lb 10.8 oz), SpO2 95%.          Imaging and other studies: Results Review Statement: No pertinent imaging studies reviewed.    O2 Device: NC     Plan    Respiratory Plan: Mild Distress pathway        Resp Comments: Will continue with current therapy   "

## 2025-02-18 NOTE — PLAN OF CARE
Problem: RESPIRATORY - ADULT  Goal: Achieves optimal ventilation and oxygenation  Description: INTERVENTIONS:  - Assess for changes in respiratory status  - Assess for changes in mentation and behavior  - Position to facilitate oxygenation and minimize respiratory effort  - Oxygen administered by appropriate delivery if ordered  - Initiate smoking cessation education as indicated  - Encourage broncho-pulmonary hygiene including cough, deep breathe, Incentive Spirometry  - Assess the need for suctioning and aspirate as needed  - Assess and instruct to report SOB or any respiratory difficulty  - Respiratory Therapy support as indicated  Outcome: Progressing     Problem: MUSCULOSKELETAL - ADULT  Goal: Maintain or return mobility to safest level of function  Description: INTERVENTIONS:  - Assess patient's ability to carry out ADLs; assess patient's baseline for ADL function and identify physical deficits which impact ability to perform ADLs (bathing, care of mouth/teeth, toileting, grooming, dressing, etc.)  - Assess/evaluate cause of self-care deficits   - Assess range of motion  - Assess patient's mobility  - Assess patient's need for assistive devices and provide as appropriate  - Encourage maximum independence but intervene and supervise when necessary  - Involve family in performance of ADLs  - Assess for home care needs following discharge   - Consider OT consult to assist with ADL evaluation and planning for discharge  - Provide patient education as appropriate  Outcome: Progressing  Goal: Maintain proper alignment of affected body part  Description: INTERVENTIONS:  - Support, maintain and protect limb and body alignment  - Provide patient/ family with appropriate education  Outcome: Progressing     Problem: PAIN - ADULT  Goal: Verbalizes/displays adequate comfort level or baseline comfort level  Description: Interventions:  - Encourage patient to monitor pain and request assistance  - Assess pain using  appropriate pain scale  - Administer analgesics based on type and severity of pain and evaluate response  - Implement non-pharmacological measures as appropriate and evaluate response  - Consider cultural and social influences on pain and pain management  - Notify physician/advanced practitioner if interventions unsuccessful or patient reports new pain  Outcome: Progressing     Problem: INFECTION - ADULT  Goal: Absence or prevention of progression during hospitalization  Description: INTERVENTIONS:  - Assess and monitor for signs and symptoms of infection  - Monitor lab/diagnostic results  - Monitor all insertion sites, i.e. indwelling lines, tubes, and drains  - Monitor endotracheal if appropriate and nasal secretions for changes in amount and color  - [place] appropriate cooling/warming therapies per order  - Administer medications as ordered  - Instruct and encourage patient and family to use good hand hygiene technique  - Identify and instruct in appropriate isolation precautions for identified infection/condition  Outcome: Progressing  Goal: Absence of fever/infection during neutropenic period  Description: INTERVENTIONS:  - Monitor WBC    Outcome: Progressing     Problem: SAFETY ADULT  Goal: Patient will remain free of falls  Description: INTERVENTIONS:  - Educate patient/family on patient safety including physical limitations  - Instruct patient to call for assistance with activity   - Consult OT/PT to assist with strengthening/mobility   - Keep Call bell within reach  - Keep bed low and locked with side rails adjusted as appropriate  - Keep care items and personal belongings within reach  - Initiate and maintain comfort rounds  - Make Fall Risk Sign visible to staff  - Offer Toileting every  Hours, in advance of need  - Initiate/Maintain alarm  - Obtain necessary fall risk management equipment:   - Apply yellow socks and bracelet for high fall risk patients  - Consider moving patient to room near nurses  station  Outcome: Progressing  Goal: Maintain or return to baseline ADL function  Description: INTERVENTIONS:  -  Assess patient's ability to carry out ADLs; assess patient's baseline for ADL function and identify physical deficits which impact ability to perform ADLs (bathing, care of mouth/teeth, toileting, grooming, dressing, etc.)  - Assess/evaluate cause of self-care deficits   - Assess range of motion  - Assess patient's mobility; develop plan if impaired  - Assess patient's need for assistive devices and provide as appropriate  - Encourage maximum independence but intervene and supervise when necessary  - Involve family in performance of ADLs  - Assess for home care needs following discharge   - Consider OT consult to assist with ADL evaluation and planning for discharge  - Provide patient education as appropriate  Outcome: Progressing  Goal: Maintains/Returns to pre admission functional level  Description: INTERVENTIONS:  - Perform AM-PAC 6 Click Basic Mobility/ Daily Activity assessment daily.  - Set and communicate daily mobility goal to care team and patient/family/caregiver.   - Collaborate with rehabilitation services on mobility goals if consulted  - Perform Range of Motion  times a day.  - Reposition patient every  hours.  - Dangle patient  times a day  - Stand patient  times a day  - Ambulate patient  times a day  - Out of bed to chair  times a day   - Out of bed for meals times a day  - Out of bed for toileting  - Record patient progress and toleration of activity level   Outcome: Progressing     Problem: DISCHARGE PLANNING  Goal: Discharge to home or other facility with appropriate resources  Description: INTERVENTIONS:  - Identify barriers to discharge w/patient and caregiver  - Arrange for needed discharge resources and transportation as appropriate  - Identify discharge learning needs (meds, wound care, etc.)  - Arrange for interpretive services to assist at discharge as needed  - Refer to Case  Management Department for coordinating discharge planning if the patient needs post-hospital services based on physician/advanced practitioner order or complex needs related to functional status, cognitive ability, or social support system  Outcome: Progressing     Problem: Knowledge Deficit  Goal: Patient/family/caregiver demonstrates understanding of disease process, treatment plan, medications, and discharge instructions  Description: Complete learning assessment and assess knowledge base.  Interventions:  - Provide teaching at level of understanding  - Provide teaching via preferred learning methods  Outcome: Progressing     Problem: Prexisting or High Potential for Compromised Skin Integrity  Goal: Skin integrity is maintained or improved  Description: INTERVENTIONS:  - Identify patients at risk for skin breakdown  - Assess and monitor skin integrity  - Assess and monitor nutrition and hydration status  - Monitor labs   - Assess for incontinence   - Turn and reposition patient  - Assist with mobility/ambulation  - Relieve pressure over bony prominences  - Avoid friction and shearing  - Provide appropriate hygiene as needed including keeping skin clean and dry  - Evaluate need for skin moisturizer/barrier cream  - Collaborate with interdisciplinary team   - Patient/family teaching  - Consider wound care consult   Outcome: Progressing     Problem: Nutrition/Hydration-ADULT  Goal: Nutrient/Hydration intake appropriate for improving, restoring or maintaining nutritional needs  Description: Monitor and assess patient's nutrition/hydration status for malnutrition. Collaborate with interdisciplinary team and initiate plan and interventions as ordered.  Monitor patient's weight and dietary intake as ordered or per policy. Utilize nutrition screening tool and intervene as necessary. Determine patient's food preferences and provide high-protein, high-caloric foods as appropriate.     INTERVENTIONS:  - Monitor oral  intake, urinary output, labs, and treatment plans  - Assess nutrition and hydration status and recommend course of action  - Evaluate amount of meals eaten  - Assist patient with eating if necessary   - Allow adequate time for meals  - Recommend/ encourage appropriate diets, oral nutritional supplements, and vitamin/mineral supplements  - Order, calculate, and assess calorie counts as needed  - Recommend, monitor, and adjust tube feedings and TPN/PPN based on assessed needs  - Assess need for intravenous fluids  - Provide specific nutrition/hydration education as appropriate  - Include patient/family/caregiver in decisions related to nutrition  Outcome: Progressing

## 2025-02-18 NOTE — ASSESSMENT & PLAN NOTE
Was recently admitted to the hospital January 2025 due to fatigue, right-sided weakness, and multiple falls  Stroke workup negative   Severe cervical stenosis, neurosurgery recommended no acute intervention  His oxycodone was decreased from 10 mg to 7.5 mg secondary to concern that may be contributing to falls and fatigue  Discharged to [facility] 2/13, discharged home 2/13

## 2025-02-18 NOTE — RESPIRATORY THERAPY NOTE
RT Protocol Note  Fahad Hernandez 69 y.o. male MRN: [id number]  Unit/Bed#: 2 E 269-01 Encounter: [id number]    Assessment    Principal Problem:    COVID-19  Active Problems:    Hypertension    Type 2 diabetes mellitus with hyperglycemia, without long-term current use of insulin (HCC)    Acute respiratory failure (HCC)    Multiple falls    Chronic pain syndrome    Tardive dyskinesia      Home Pulmonary Medications:         Past Medical History:   Diagnosis Date    Asthma     COPD (chronic obstructive pulmonary disease) ([facility])     Dementia (HCC)     Diabetes mellitus (HCC)     GERD (gastroesophageal reflux disease)     History of stroke 11/07/2019    Hypertension     Interstitial lung disease ([facility])     Major depressive disorder with current active episode 11/07/2019    Pneumonia     Rotator cuff arthropathy of right shoulder 12/17/2024    Sleep apnea     Sleep apnea, obstructive     Stroke ([facility])     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     Social History     Socioeconomic History    Marital status: /Civil Union     Spouse name: None    Number of children: None    Years of education: None    Highest education level: None   Occupational History    None   Tobacco Use    Smoking status: Never     Passive exposure: Never    Smokeless tobacco: Never    Tobacco comments:     Patient admits to using marijuana, edibles and smoking    Vaping Use    Vaping status: Never Used   Substance and Sexual Activity    Alcohol use: Not Currently    Drug use: Yes     Types: Marijuana, Oxycodone, Psilocybin    Sexual activity: Yes     Partners: Female     Birth control/protection: Male Sterilization   Other Topics Concern    None   Social History Narrative    None     Social Drivers of Health     Financial Resource Strain: Low Risk  (11/1/2024)    Received from [facility]    Overall Financial Resource Strain (CARDIA)     Difficulty of Paying Living Expenses: Not hard at all   Food Insecurity: No Food Insecurity (2/17/2025)     Hunger Vital Sign     Worried About Running Out of Food in the Last Year: Never true     Ran Out of Food in the Last Year: Never true   Transportation Needs: No Transportation Needs (2/17/2025)    PRAPARE - Transportation     Lack of Transportation (Medical): No     Lack of Transportation (Non-Medical): No   Physical Activity: Inactive (11/1/2024)    Received from [facility]    Exercise Vital Sign     Days of Exercise per Week: 0 days     Minutes of Exercise per Session: 0 min   Stress: Stress Concern Present (11/1/2024)    Received from [facility]    [place] of Occupational Health - Occupational Stress Questionnaire     Feeling of Stress : To some extent   Social Connections: Moderately Isolated (11/1/2024)    Received from [facility]    Social Connection and Isolation Panel [NHANES]     Frequency of Communication with Friends and Family: Three times a week     Frequency of Social Gatherings with Friends and Family: Twice a week     Attends Mandaeism Services: Never     Active Member of Clubs or Organizations: No     Attends Club or Organization Meetings: Never     Marital Status:    Intimate Partner Violence: Unknown (2/16/2025)    Nursing IPS     Feels Physically and Emotionally Safe: Not on file     Physically Hurt by Someone: Not on file     Humiliated or Emotionally Abused by Someone: Not on file     Physically Hurt by Someone: No     Hurt or Threatened by Someone: No   Housing Stability: Low Risk  (2/17/2025)    Housing Stability Vital Sign     Unable to Pay for Housing in the Last Year: No     Number of Times Moved in the Last Year: 0     Homeless in the Last Year: No       Subjective         Objective    Physical Exam:   Assessment Type: Assess only  General Appearance: Awake, Alert  Respiratory Pattern: Normal  Chest Assessment: Chest expansion symmetrical  Bilateral Breath Sounds: Diminished    Vitals:  Blood pressure 116/75, pulse 78,  "temperature 98.6 °F (37 °C), resp. rate 18, height 5' 11\" (1.803 m), weight 68.8 kg (151 lb 10.8 oz), SpO2 95%.          Imaging and other studies:     O2 Device: NC     Plan    Respiratory Plan: Mild Distress pathway        Resp Comments: pt with hx of copd will continue with tid duo   "

## 2025-02-18 NOTE — RESPIRATORY THERAPY NOTE
02/17/25 2200   Respiratory Assessment   Resp Comments Pt refusing bipap   Non-Invasive Information   SpO2 91 %     RT Ventilator Management Note  Fahad Hernandez 69 y.o. male MRN: [id number]  Unit/Bed#: 2 E 269-01 Encounter: [id number]      Daily Screen    No data found in the last 10 encounters.           Physical Exam:   Assessment Type: During-treatment  General Appearance: Alert, Awake  Respiratory Pattern: Normal  Chest Assessment: Chest expansion symmetrical  Bilateral Breath Sounds: Diminished  O2 Device: NC      Resp Comments: Pt refusing bipap

## 2025-02-18 NOTE — PLAN OF CARE
Problem: RESPIRATORY - ADULT  Goal: Achieves optimal ventilation and oxygenation  Description: INTERVENTIONS:  - Assess for changes in respiratory status  - Assess for changes in mentation and behavior  - Position to facilitate oxygenation and minimize respiratory effort  - Oxygen administered by appropriate delivery if ordered  - Initiate smoking cessation education as indicated  - Encourage broncho-pulmonary hygiene including cough, deep breathe, Incentive Spirometry  - Assess the need for suctioning and aspirate as needed  - Assess and instruct to report SOB or any respiratory difficulty  - Respiratory Therapy support as indicated  Outcome: Progressing     Problem: MUSCULOSKELETAL - ADULT  Goal: Maintain or return mobility to safest level of function  Description: INTERVENTIONS:  - Assess patient's ability to carry out ADLs; assess patient's baseline for ADL function and identify physical deficits which impact ability to perform ADLs (bathing, care of mouth/teeth, toileting, grooming, dressing, etc.)  - Assess/evaluate cause of self-care deficits   - Assess range of motion  - Assess patient's mobility  - Assess patient's need for assistive devices and provide as appropriate  - Encourage maximum independence but intervene and supervise when necessary  - Involve family in performance of ADLs  - Assess for home care needs following discharge   - Consider OT consult to assist with ADL evaluation and planning for discharge  - Provide patient education as appropriate  Outcome: Progressing  Goal: Maintain proper alignment of affected body part  Description: INTERVENTIONS:  - Support, maintain and protect limb and body alignment  - Provide patient/ family with appropriate education  Outcome: Progressing     Problem: PAIN - ADULT  Goal: Verbalizes/displays adequate comfort level or baseline comfort level  Description: Interventions:  - Encourage patient to monitor pain and request assistance  - Assess pain using  appropriate pain scale  - Administer analgesics based on type and severity of pain and evaluate response  - Implement non-pharmacological measures as appropriate and evaluate response  - Consider cultural and social influences on pain and pain management  - Notify physician/advanced practitioner if interventions unsuccessful or patient reports new pain  Outcome: Progressing     Problem: INFECTION - ADULT  Goal: Absence or prevention of progression during hospitalization  Description: INTERVENTIONS:  - Assess and monitor for signs and symptoms of infection  - Monitor lab/diagnostic results  - Monitor all insertion sites, i.e. indwelling lines, tubes, and drains  - Monitor endotracheal if appropriate and nasal secretions for changes in amount and color  - [place] appropriate cooling/warming therapies per order  - Administer medications as ordered  - Instruct and encourage patient and family to use good hand hygiene technique  - Identify and instruct in appropriate isolation precautions for identified infection/condition  Outcome: Progressing  Goal: Absence of fever/infection during neutropenic period  Description: INTERVENTIONS:  - Monitor WBC    Outcome: Progressing     Problem: SAFETY ADULT  Goal: Patient will remain free of falls  Description: INTERVENTIONS:  - Educate patient/family on patient safety including physical limitations  - Instruct patient to call for assistance with activity   - Consult OT/PT to assist with strengthening/mobility   - Keep Call bell within reach  - Keep bed low and locked with side rails adjusted as appropriate  - Keep care items and personal belongings within reach  - Initiate and maintain comfort rounds  - Make Fall Risk Sign visible to staff  - Offer Toileting every 2 Hours, in advance of need  - Initiate/Maintain alarm  - Obtain necessary fall risk management equipment:   - Apply yellow socks and bracelet for high fall risk patients  - Consider moving patient to room near nurses  station  Outcome: Progressing  Goal: Maintain or return to baseline ADL function  Description: INTERVENTIONS:  -  Assess patient's ability to carry out ADLs; assess patient's baseline for ADL function and identify physical deficits which impact ability to perform ADLs (bathing, care of mouth/teeth, toileting, grooming, dressing, etc.)  - Assess/evaluate cause of self-care deficits   - Assess range of motion  - Assess patient's mobility; develop plan if impaired  - Assess patient's need for assistive devices and provide as appropriate  - Encourage maximum independence but intervene and supervise when necessary  - Involve family in performance of ADLs  - Assess for home care needs following discharge   - Consider OT consult to assist with ADL evaluation and planning for discharge  - Provide patient education as appropriate  Outcome: Progressing  Goal: Maintains/Returns to pre admission functional level  Description: INTERVENTIONS:  - Perform AM-PAC 6 Click Basic Mobility/ Daily Activity assessment daily.  - Set and communicate daily mobility goal to care team and patient/family/caregiver.   - Collaborate with rehabilitation services on mobility goals if consulted  - Perform Range of Motion 3 times a day.  - Reposition patient every 2 hours.  - Dangle patient 3 times a day  - Stand patient 3 times a day  - Ambulate patient 3 times a day  - Out of bed to chair 3 times a day   - Out of bed for meals 3 times a day  - Out of bed for toileting  - Record patient progress and toleration of activity level   Outcome: Progressing     Problem: DISCHARGE PLANNING  Goal: Discharge to home or other facility with appropriate resources  Description: INTERVENTIONS:  - Identify barriers to discharge w/patient and caregiver  - Arrange for needed discharge resources and transportation as appropriate  - Identify discharge learning needs (meds, wound care, etc.)  - Arrange for interpretive services to assist at discharge as needed  - Refer  to Case Management Department for coordinating discharge planning if the patient needs post-hospital services based on physician/advanced practitioner order or complex needs related to functional status, cognitive ability, or social support system  Outcome: Progressing     Problem: Knowledge Deficit  Goal: Patient/family/caregiver demonstrates understanding of disease process, treatment plan, medications, and discharge instructions  Description: Complete learning assessment and assess knowledge base.  Interventions:  - Provide teaching at level of understanding  - Provide teaching via preferred learning methods  Outcome: Progressing     Problem: Prexisting or High Potential for Compromised Skin Integrity  Goal: Skin integrity is maintained or improved  Description: INTERVENTIONS:  - Identify patients at risk for skin breakdown  - Assess and monitor skin integrity  - Assess and monitor nutrition and hydration status  - Monitor labs   - Assess for incontinence   - Turn and reposition patient  - Assist with mobility/ambulation  - Relieve pressure over bony prominences  - Avoid friction and shearing  - Provide appropriate hygiene as needed including keeping skin clean and dry  - Evaluate need for skin moisturizer/barrier cream  - Collaborate with interdisciplinary team   - Patient/family teaching  - Consider wound care consult   Outcome: Progressing     Problem: Nutrition/Hydration-ADULT  Goal: Nutrient/Hydration intake appropriate for improving, restoring or maintaining nutritional needs  Description: Monitor and assess patient's nutrition/hydration status for malnutrition. Collaborate with interdisciplinary team and initiate plan and interventions as ordered.  Monitor patient's weight and dietary intake as ordered or per policy. Utilize nutrition screening tool and intervene as necessary. Determine patient's food preferences and provide high-protein, high-caloric foods as appropriate.     INTERVENTIONS:  - Monitor  oral intake, urinary output, labs, and treatment plans  - Assess nutrition and hydration status and recommend course of action  - Evaluate amount of meals eaten  - Assist patient with eating if necessary   - Allow adequate time for meals  - Recommend/ encourage appropriate diets, oral nutritional supplements, and vitamin/mineral supplements  - Order, calculate, and assess calorie counts as needed  - Recommend, monitor, and adjust tube feedings and TPN/PPN based on assessed needs  - Assess need for intravenous fluids  - Provide specific nutrition/hydration education as appropriate  - Include patient/family/caregiver in decisions related to nutrition  Outcome: Progressing

## 2025-02-19 VITALS
HEART RATE: 84 BPM | BODY MASS INDEX: 21.23 KG/M2 | RESPIRATION RATE: 18 BRPM | SYSTOLIC BLOOD PRESSURE: 119 MMHG | TEMPERATURE: 98.3 F | DIASTOLIC BLOOD PRESSURE: 66 MMHG | WEIGHT: 151.68 LBS | HEIGHT: 71 IN | OXYGEN SATURATION: 95 %

## 2025-02-19 PROBLEM — B97.89 VIRAL SEPSIS (HCC): Status: ACTIVE | Noted: 2025-02-19

## 2025-02-19 PROBLEM — A41.89 VIRAL SEPSIS (HCC): Status: ACTIVE | Noted: 2025-02-19

## 2025-02-19 LAB — GLUCOSE SERPL-MCNC: 163 MG/DL (ref 65–140)

## 2025-02-19 PROCEDURE — 94760 N-INVAS EAR/PLS OXIMETRY 1: CPT

## 2025-02-19 PROCEDURE — 94640 AIRWAY INHALATION TREATMENT: CPT

## 2025-02-19 PROCEDURE — 82948 REAGENT STRIP/BLOOD GLUCOSE: CPT

## 2025-02-19 PROCEDURE — 99239 HOSP IP/OBS DSCHRG MGMT >30: CPT | Performed by: PHYSICIAN ASSISTANT

## 2025-02-19 RX ORDER — PREDNISONE 20 MG/1
TABLET ORAL
Qty: 7 TABLET | Refills: 0 | Status: ON HOLD | OUTPATIENT
Start: 2025-02-19 | End: 2025-02-25

## 2025-02-19 RX ORDER — BENZONATATE 100 MG/1
100 CAPSULE ORAL 3 TIMES DAILY
Qty: 30 CAPSULE | Refills: 1 | Status: ON HOLD | OUTPATIENT
Start: 2025-02-19

## 2025-02-19 RX ORDER — GUAIFENESIN 600 MG/1
600 TABLET, EXTENDED RELEASE ORAL EVERY 12 HOURS SCHEDULED
Status: ON HOLD
Start: 2025-02-19

## 2025-02-19 RX ADMIN — INSULIN LISPRO 1 UNITS: 100 INJECTION, SOLUTION INTRAVENOUS; SUBCUTANEOUS at 08:41

## 2025-02-19 RX ADMIN — DEXAMETHASONE SODIUM PHOSPHATE 6 MG: 10 INJECTION, SOLUTION INTRAMUSCULAR; INTRAVENOUS at 05:22

## 2025-02-19 RX ADMIN — BENZONATATE 100 MG: 100 CAPSULE ORAL at 08:40

## 2025-02-19 RX ADMIN — HEPARIN SODIUM 5000 UNITS: 5000 INJECTION INTRAVENOUS; SUBCUTANEOUS at 05:22

## 2025-02-19 RX ADMIN — TAMSULOSIN HYDROCHLORIDE 0.4 MG: 0.4 CAPSULE ORAL at 08:40

## 2025-02-19 RX ADMIN — B-COMPLEX W/ C & FOLIC ACID TAB 1 TABLET: TAB at 08:40

## 2025-02-19 RX ADMIN — BUPROPION HYDROCHLORIDE 300 MG: 150 TABLET, EXTENDED RELEASE ORAL at 08:40

## 2025-02-19 RX ADMIN — REMDESIVIR 100 MG: 100 INJECTION, POWDER, LYOPHILIZED, FOR SOLUTION INTRAVENOUS at 05:22

## 2025-02-19 RX ADMIN — LISINOPRIL 40 MG: 20 TABLET ORAL at 08:40

## 2025-02-19 RX ADMIN — DOCUSATE SODIUM 100 MG: 100 CAPSULE, LIQUID FILLED ORAL at 08:40

## 2025-02-19 RX ADMIN — AMLODIPINE BESYLATE 10 MG: 10 TABLET ORAL at 08:40

## 2025-02-19 RX ADMIN — IPRATROPIUM BROMIDE AND ALBUTEROL SULFATE 3 ML: 2.5; .5 SOLUTION RESPIRATORY (INHALATION) at 13:37

## 2025-02-19 RX ADMIN — CLOPIDOGREL 75 MG: 75 TABLET ORAL at 08:40

## 2025-02-19 RX ADMIN — GUAIFENESIN 600 MG: 600 TABLET ORAL at 08:40

## 2025-02-19 RX ADMIN — IPRATROPIUM BROMIDE AND ALBUTEROL SULFATE 3 ML: 2.5; .5 SOLUTION RESPIRATORY (INHALATION) at 07:07

## 2025-02-19 RX ADMIN — VENLAFAXINE HYDROCHLORIDE 75 MG: 75 CAPSULE, EXTENDED RELEASE ORAL at 08:39

## 2025-02-19 RX ADMIN — METOPROLOL SUCCINATE 25 MG: 25 TABLET, EXTENDED RELEASE ORAL at 08:40

## 2025-02-19 RX ADMIN — ARIPIPRAZOLE 10 MG: 5 TABLET ORAL at 08:40

## 2025-02-19 RX ADMIN — PANTOPRAZOLE SODIUM 40 MG: 40 TABLET, DELAYED RELEASE ORAL at 05:22

## 2025-02-19 RX ADMIN — ASPIRIN 81 MG: 81 TABLET, COATED ORAL at 08:40

## 2025-02-19 NOTE — PLAN OF CARE
Problem: RESPIRATORY - ADULT  Goal: Achieves optimal ventilation and oxygenation  Description: INTERVENTIONS:  - Assess for changes in respiratory status  - Assess for changes in mentation and behavior  - Position to facilitate oxygenation and minimize respiratory effort  - Oxygen administered by appropriate delivery if ordered  - Initiate smoking cessation education as indicated  - Encourage broncho-pulmonary hygiene including cough, deep breathe, Incentive Spirometry  - Assess the need for suctioning and aspirate as needed  - Assess and instruct to report SOB or any respiratory difficulty  - Respiratory Therapy support as indicated  Outcome: Progressing     Problem: PAIN - ADULT  Goal: Verbalizes/displays adequate comfort level or baseline comfort level  Description: Interventions:  - Encourage patient to monitor pain and request assistance  - Assess pain using appropriate pain scale  - Administer analgesics based on type and severity of pain and evaluate response  - Implement non-pharmacological measures as appropriate and evaluate response  - Consider cultural and social influences on pain and pain management  - Notify physician/advanced practitioner if interventions unsuccessful or patient reports new pain  Outcome: Progressing     Problem: INFECTION - ADULT  Goal: Absence or prevention of progression during hospitalization  Description: INTERVENTIONS:  - Assess and monitor for signs and symptoms of infection  - Monitor lab/diagnostic results  - Monitor all insertion sites, i.e. indwelling lines, tubes, and drains  - Monitor endotracheal if appropriate and nasal secretions for changes in amount and color  - [place] appropriate cooling/warming therapies per order  - Administer medications as ordered  - Instruct and encourage patient and family to use good hand hygiene technique  - Identify and instruct in appropriate isolation precautions for identified infection/condition  Outcome: Progressing  Goal:  Absence of fever/infection during neutropenic period  Description: INTERVENTIONS:  - Monitor WBC    Outcome: Progressing     Problem: SAFETY ADULT  Goal: Patient will remain free of falls  Description: INTERVENTIONS:  - Educate patient/family on patient safety including physical limitations  - Instruct patient to call for assistance with activity   - Consult OT/PT to assist with strengthening/mobility   - Keep Call bell within reach  - Keep bed low and locked with side rails adjusted as appropriate  - Keep care items and personal belongings within reach  - Initiate and maintain comfort rounds  - Make Fall Risk Sign visible to staff  - Offer Toileting every  Hours, in advance of need  - Initiate/Maintain alarm  - Obtain necessary fall risk management equipment:   - Apply yellow socks and bracelet for high fall risk patients  - Consider moving patient to room near nurses station  Outcome: Progressing  Goal: Maintain or return to baseline ADL function  Description: INTERVENTIONS:  -  Assess patient's ability to carry out ADLs; assess patient's baseline for ADL function and identify physical deficits which impact ability to perform ADLs (bathing, care of mouth/teeth, toileting, grooming, dressing, etc.)  - Assess/evaluate cause of self-care deficits   - Assess range of motion  - Assess patient's mobility; develop plan if impaired  - Assess patient's need for assistive devices and provide as appropriate  - Encourage maximum independence but intervene and supervise when necessary  - Involve family in performance of ADLs  - Assess for home care needs following discharge   - Consider OT consult to assist with ADL evaluation and planning for discharge  - Provide patient education as appropriate  Outcome: Progressing  Goal: Maintains/Returns to pre admission functional level  Description: INTERVENTIONS:  - Perform AM-PAC 6 Click Basic Mobility/ Daily Activity assessment daily.  - Set and communicate daily mobility goal to  care team and patient/family/caregiver.   - Collaborate with rehabilitation services on mobility goals if consulted  - Perform Range of Motion  times a day.  - Reposition patient every  hours.  - Dangle patient  times a day  - Stand patient  times a day  - Ambulate patient  times a day  - Out of bed to chair  times a day   - Out of bed for meals  times a day  - Out of bed for toileting  - Record patient progress and toleration of activity level   Outcome: Progressing     Problem: DISCHARGE PLANNING  Goal: Discharge to home or other facility with appropriate resources  Description: INTERVENTIONS:  - Identify barriers to discharge w/patient and caregiver  - Arrange for needed discharge resources and transportation as appropriate  - Identify discharge learning needs (meds, wound care, etc.)  - Arrange for interpretive services to assist at discharge as needed  - Refer to Case Management Department for coordinating discharge planning if the patient needs post-hospital services based on physician/advanced practitioner order or complex needs related to functional status, cognitive ability, or social support system  Outcome: Progressing     Problem: Knowledge Deficit  Goal: Patient/family/caregiver demonstrates understanding of disease process, treatment plan, medications, and discharge instructions  Description: Complete learning assessment and assess knowledge base.  Interventions:  - Provide teaching at level of understanding  - Provide teaching via preferred learning methods  Outcome: Progressing     Problem: Prexisting or High Potential for Compromised Skin Integrity  Goal: Skin integrity is maintained or improved  Description: INTERVENTIONS:  - Identify patients at risk for skin breakdown  - Assess and monitor skin integrity  - Assess and monitor nutrition and hydration status  - Monitor labs   - Assess for incontinence   - Turn and reposition patient  - Assist with mobility/ambulation  - Relieve pressure over bony  prominences  - Avoid friction and shearing  - Provide appropriate hygiene as needed including keeping skin clean and dry  - Evaluate need for skin moisturizer/barrier cream  - Collaborate with interdisciplinary team   - Patient/family teaching  - Consider wound care consult   Outcome: Progressing     Problem: Nutrition/Hydration-ADULT  Goal: Nutrient/Hydration intake appropriate for improving, restoring or maintaining nutritional needs  Description: Monitor and assess patient's nutrition/hydration status for malnutrition. Collaborate with interdisciplinary team and initiate plan and interventions as ordered.  Monitor patient's weight and dietary intake as ordered or per policy. Utilize nutrition screening tool and intervene as necessary. Determine patient's food preferences and provide high-protein, high-caloric foods as appropriate.     INTERVENTIONS:  - Monitor oral intake, urinary output, labs, and treatment plans  - Assess nutrition and hydration status and recommend course of action  - Evaluate amount of meals eaten  - Assist patient with eating if necessary   - Allow adequate time for meals  - Recommend/ encourage appropriate diets, oral nutritional supplements, and vitamin/mineral supplements  - Order, calculate, and assess calorie counts as needed  - Recommend, monitor, and adjust tube feedings and TPN/PPN based on assessed needs  - Assess need for intravenous fluids  - Provide specific nutrition/hydration education as appropriate  - Include patient/family/caregiver in decisions related to nutrition  Outcome: Progressing

## 2025-02-19 NOTE — RESPIRATORY THERAPY NOTE
RT Protocol Note  Fahad Hernandez 69 y.o. male MRN: [id number]  Unit/Bed#: 2 E 269-01 Encounter: [id number]    Assessment    Principal Problem:    COVID-19  Active Problems:    Hypertension    Type 2 diabetes mellitus with hyperglycemia, without long-term current use of insulin (HCC)    Acute respiratory failure (HCC)    Multiple falls    Chronic pain syndrome    Tardive dyskinesia      Home Pulmonary Medications:     02/18/25 1927   Respiratory Protocol   Protocol Initiated? No   Protocol Selection Respiratory   Language Barrier? No   Medical & Social History Reviewed? Yes   Diagnostic Studies Reviewed? Yes   Physical Assessment Performed? Yes   Respiratory Plan Mild Distress pathway   Respiratory Assessment   Assessment Type During-treatment   General Appearance Alert;Awake   Respiratory Pattern Normal   Chest Assessment Chest expansion symmetrical   Bilateral Breath Sounds Diminished   Cough None   Resp Comments Will continue with current therapy   O2 Device NC   Cough Description   Sputum Amount None   Additional Assessments   Pulse 98   Respirations 18   SpO2 96 %            Past Medical History:   Diagnosis Date    Asthma     COPD (chronic obstructive pulmonary disease) ([facility])     Dementia (HCC)     Diabetes mellitus ([facility])     GERD (gastroesophageal reflux disease)     History of stroke 11/07/2019    Hypertension     Interstitial lung disease ([facility])     Major depressive disorder with current active episode 11/07/2019    Pneumonia     Rotator cuff arthropathy of right shoulder 12/17/2024    Sleep apnea     Sleep apnea, obstructive     Stroke ([facility])     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     Social History     Socioeconomic History    Marital status: /Civil Union     Spouse name: None    Number of children: None    Years of education: None    Highest education level: None   Occupational History    None   Tobacco Use    Smoking status: Never     Passive exposure: Never    Smokeless tobacco: Never    Tobacco  comments:     Patient admits to using marijuana, edibles and smoking    Vaping Use    Vaping status: Never Used   Substance and Sexual Activity    Alcohol use: Not Currently    Drug use: Yes     Types: Marijuana, Oxycodone, Psilocybin    Sexual activity: Yes     Partners: Female     Birth control/protection: Male Sterilization   Other Topics Concern    None   Social History Narrative    None     Social Drivers of Health     Financial Resource Strain: Low Risk  (11/1/2024)    Received from [facility]    Overall Financial Resource Strain (CARDIA)     Difficulty of Paying Living Expenses: Not hard at all   Food Insecurity: No Food Insecurity (2/17/2025)    Hunger Vital Sign     Worried About Running Out of Food in the Last Year: Never true     Ran Out of Food in the Last Year: Never true   Transportation Needs: No Transportation Needs (2/17/2025)    PRAPARE - Transportation     Lack of Transportation (Medical): No     Lack of Transportation (Non-Medical): No   Physical Activity: Inactive (11/1/2024)    Received from [facility]    Exercise Vital Sign     Days of Exercise per Week: 0 days     Minutes of Exercise per Session: 0 min   Stress: Stress Concern Present (11/1/2024)    Received from [facility]    [place] [employer] - Occupational Stress Questionnaire     Feeling of Stress : To some extent   Social Connections: Moderately Isolated (11/1/2024)    Received from [facility]    Social Connection and Isolation Panel [NHANES]     Frequency of Communication with Friends and Family: Three times a week     Frequency of Social Gatherings with Friends and Family: Twice a week     Attends Congregational Services: Never     Active Member of Clubs or Organizations: No     Attends Club or Organization Meetings: Never     Marital Status:    Intimate Partner Violence: Unknown (2/16/2025)    Nursing IPS     Feels Physically and Emotionally Safe:  "Not on file     Physically Hurt by Someone: Not on file     Humiliated or Emotionally Abused by Someone: Not on file     Physically Hurt by Someone: No     Hurt or Threatened by Someone: No   Housing Stability: Low Risk  (2/17/2025)    Housing Stability Vital Sign     Unable to Pay for Housing in the Last Year: No     Number of Times Moved in the Last Year: 0     Homeless in the Last Year: No       Subjective         Objective    Physical Exam:   Assessment Type: During-treatment  General Appearance: Alert, Awake  Respiratory Pattern: Normal  Chest Assessment: Chest expansion symmetrical  Bilateral Breath Sounds: Diminished  Cough: None  O2 Device: NC    Vitals:  Blood pressure 132/75, pulse 98, temperature 98.4 °F (36.9 °C), resp. rate 18, height 5' 11\" (1.803 m), weight 68.8 kg (151 lb 10.8 oz), SpO2 96%.          Imaging and other studies: Results Review Statement: No pertinent imaging studies reviewed.    O2 Device: NC     Plan    Respiratory Plan: Mild Distress pathway        Resp Comments: Will continue with current therapy   "

## 2025-02-19 NOTE — RESPIRATORY THERAPY NOTE
02/19/25 0400   Respiratory Assessment   Assessment Type Assess only   General Appearance Drowsy   Respiratory Pattern Normal   Chest Assessment Chest expansion symmetrical   Bilateral Breath Sounds Diminished   Cough None   Resp Comments Pt remains on bipap   O2 Device V60   Non-Invasive Information   O2 Interface Device Nasal mask   Non-Invasive Ventilation Mode BiPAP   SpO2 96 %   $ Pulse Oximetry Spot Check Charge Completed   Non-Invasive Settings   IPAP (cm) 12 cm   EPAP (cm) 6 cm   Rate (Set) 8   FiO2 (%) 28   Pressure Support (cm H2O) 6   Rise Time 2   Non-Invasive Readings   Total Rate 24   Vt (mL) (Mech) 609   MV (Mech) 14   Peak Pressure (Obs) 12   Spontaneous Vt (mL) 608   Leak (lpm) 15   Skin Intervention Skin intact   Non-Invasive Alarms   Insp Pressure High (cm H20) 30   Insp Pressure Low (cm H20) 5   Low Insp Pressure Time (sec) 20 sec   MV Low (L/min) 2   Vt High (mL) 1250   Vt Low (mL) 200   High Resp Rate (BPM) 50 BPM   Low Resp Rate (BPM) 8 BPM     RT Ventilator Management Note  Fahad Hernandez 69 y.o. male MRN: [id number]  Unit/Bed#: 2 E 269-01 Encounter: [id number]      Daily Screen    No data found in the last 10 encounters.           Physical Exam:   Assessment Type: Assess only  General Appearance: Drowsy  Respiratory Pattern: Normal  Chest Assessment: Chest expansion symmetrical  Bilateral Breath Sounds: Diminished  Cough: None  O2 Device: V60      Resp Comments: Pt remains on bipap

## 2025-02-19 NOTE — RESPIRATORY THERAPY NOTE
02/18/25 2231   Respiratory Assessment   Assessment Type Assess only   General Appearance Awake;Alert   Respiratory Pattern Normal   Chest Assessment Chest expansion symmetrical   Bilateral Breath Sounds Diminished   Cough None   Resp Comments Pt placed on bipap at this time   O2 Device V60   Non-Invasive Information   O2 Interface Device Nasal mask   Non-Invasive Ventilation Mode CPAP   $ Intermittent NIV Yes   SpO2 93 %   $ Pulse Oximetry Spot Check Charge Completed   Non-Invasive Settings   IPAP (cm) 12 cm   EPAP (cm) 6 cm   Rate (Set) 8   FiO2 (%) 28   Pressure Support (cm H2O) 6   Rise Time 2   Non-Invasive Readings   Total Rate 19   Vt (mL) (Mech) 594   MV (Mech) 13.6   Peak Pressure (Obs) 12   Spontaneous Vt (mL) 735   Leak (lpm) 24   Skin Intervention Skin intact   Non-Invasive Alarms   Insp Pressure High (cm H20) 30   Insp Pressure Low (cm H20) 5   Low Insp Pressure Time (sec) 20 sec   MV Low (L/min) 2   Vt High (mL) 1250   Vt Low (mL) 200   High Resp Rate (BPM) 50 BPM   Low Resp Rate (BPM) 8 BPM     RT Ventilator Management Note  Fahad Hernandez 69 y.o. male MRN: [id number]  Unit/Bed#: 2 E 269-01 Encounter: [id number]      Daily Screen    No data found in the last 10 encounters.           Physical Exam:   Assessment Type: Assess only  General Appearance: Awake, Alert  Respiratory Pattern: Normal  Chest Assessment: Chest expansion symmetrical  Bilateral Breath Sounds: Diminished  Cough: None  O2 Device: V60      Resp Comments: Pt placed on bipap at this time

## 2025-02-19 NOTE — HOSPITAL COURSE
Fahad Hernandez is a 69 y.o. male with a PMH of bronchiectasis, COPD/asthma overlap, hypertension, hyperlipidemia, depression, diabetes who originally presented 2/16/2025 with shortness of breath and tachycardia. Patient was recently hospitalized 1/25/2025-2/3/2025, discharge to rehab and presents from home. Patient has had multiple falls.     Workup revealing of COVID-19, acute hypoxic respiratory failure requiring 2 L via nasal cannula.  He was initiated on IV remdesivir and dexamethasone, respiratory protocol with breathing treatments, and PT/OT were consulted.  He is recommended to return to rehab, however patient and wife prefer home.  He is hemodynamically stable and has received 4 days of inpatient treatment.  Will continue with a rapid steroid taper on discharge, he has been weaned from oxygen successfully.  Case management consulted for home care referral.

## 2025-02-19 NOTE — ASSESSMENT & PLAN NOTE
Was recently admitted to the hospital January 2025 due to fatigue, right-sided weakness, and multiple falls  Stroke workup negative   Severe cervical stenosis, neurosurgery recommended no acute intervention  His oxycodone was decreased from 10 mg to 7.5 mg secondary to concern that may be contributing to falls and fatigue  Discharged to [facility] 2/13, discharged home 2/13

## 2025-02-19 NOTE — QUICK NOTE
Ambulated patient 50 ft without oxygen, patient maintained 94% Spo2 on room air while ambulating.     Anabel Monsivais RN

## 2025-02-19 NOTE — ASSESSMENT & PLAN NOTE
Presenting with SOB and hypoxia. Hypoxia present on admission, secondary to COVID-19  Baseline room air, requiring up to 3 L via NC  Currently SpO2: 94 % on Nasal Cannula O2 Flow Rate (L/min): 1 L/min   Wean as tolerated for oxygen saturation greater than 92%

## 2025-02-19 NOTE — DISCHARGE SUMMARY
Discharge Summary - Hospitalist   Name: Fahad Hernandez 69 y.o. male I MRN: [id number]  Unit/Bed#: 2 E 269-01 I Date of Admission: 2/16/2025   Date of Service: 2/19/2025 I Hospital Day: 2     Assessment & Plan  Acute respiratory failure (HCC)  Presenting with SOB and hypoxia. Hypoxia present on admission, secondary to COVID-19  Baseline room air, requiring up to 3 L via NC  Currently SpO2: 94 % on Nasal Cannula O2 Flow Rate (L/min): 1 L/min   Wean as tolerated for oxygen saturation greater than 92%  Viral sepsis (HCC)  Secondary to COVID -19   SIRS: tachycardia (-133), tachypnea (RR 22)   SOURCE: COVID-19 (+) PCR  TREATMENT: IVF, remdesivir, dexamethasone, respiratory therapy, supplemental O2, encourage pulmonary toileting  COVID-19  High-risk in setting of COPD/asthma     Mild COVID pathway, (+) 2/16  Chest CT negative PE; (+) chronic bronchiectasis, distended esophagus  Continue IV remdesivir, initiated 2/16  Continue IV Decadron 6 mg, initiated 2/16  Follow up on procalcitonin  Continue heparin tid for DVT prophylaxis   Encourage incentive spirometer, OOB as appropriate  Self proning while in bed  Monitor inflammatory markers q 2-3 days  PT/OT consulted: Level 2 recs  Mucinex, tessalon, robitussin-DM  Type 2 diabetes mellitus with hyperglycemia, without long-term current use of insulin ([facility])  Blood Sugar Average: Last 72 hrs (P) 170.3680444089875148  Uncontrolled a/e/b A1c 8.3%  Hold metformin  Continue Humalog SSI # 2   Diabetic diet level 2  Blood glucose monitoring ACHS  Hypoglycemia protocol  Hypertension  Continue with Norvasc, lisinopril and Lopressor  Blood Pressure: 119/66   Monitor VS per unit protocol   Chronic pain syndrome  Continue oxycodone 7.5 mg  PDMP reviewed 2/17  Tardive dyskinesia  Chronic, continue Astuedo   Multiple falls  Was recently admitted to the hospital January 2025 due to fatigue, right-sided weakness, and multiple falls  Stroke workup negative   Severe cervical stenosis,  neurosurgery recommended no acute intervention  His oxycodone was decreased from 10 mg to 7.5 mg secondary to concern that may be contributing to falls and fatigue  Discharged to [facility] 2/13, discharged home 2/13       Discharging Physician / Practitioner: Yolanda Neumann PA-C  PCP: Surjit Hayes DO  Admission Date:   Admission Orders (From admission, onward)       Ordered        02/17/25 0806  INPATIENT ADMISSION  Once            02/16/25 0509  Place in Observation  Once                          Discharge Date: 02/19/25    Consultations During Hospital Stay:  IP CONSULT TO CASE MANAGEMENT    Procedures Performed:   None     Significant Findings / Test Results:   CTA chest pe study Result Date: 2/16/2025  Impression: No evidence of pulmonary embolus. Chronic bronchiectasis/scar with rounded atelectasis at the right lung base. Fluid distended thoracic esophagus Workstation performed: [id number]     Incidental Findings:   None other than noted above; I reviewed the above mentioned incidental findings with the patient and/or family and they expressed understanding.    Test Results Pending at Discharge (will require follow up):   None     Outpatient Tests Requested:  None    Complications:  None    Reason for Admission: Shortness of Breath (Pt comes in from  home via ems C/O SOB. Pt has elevated Hr and pt has not been taking his Cardizem in 3 months. )       Hospital Course:   Fahad Hernandez is a 69 y.o. male with a PMH of bronchiectasis, COPD/asthma overlap, hypertension, hyperlipidemia, depression, diabetes who originally presented 2/16/2025 with shortness of breath and tachycardia. Patient was recently hospitalized 1/25/2025-2/3/2025, discharge to rehab and presents from home. Patient has had multiple falls.     Workup revealing of COVID-19, acute hypoxic respiratory failure requiring 2 L via nasal cannula.  He was initiated on IV remdesivir and dexamethasone, respiratory protocol with breathing treatments,  "and PT/OT were consulted.  He is recommended to return to rehab, however patient and wife prefer home.  He is hemodynamically stable and has received 4 days of inpatient treatment.  Will continue with a rapid steroid taper on discharge, he has been weaned from oxygen successfully.  Case management consulted for home care referral.      Please see above list of diagnoses and related plan for additional information.     Condition at Discharge: stable    Discharge Day Visit / Exam:   Subjective: Offers no new complaints at this time. Still a little SOB with exertion, but overall much better since presentation. No acute events reported overnight. Understanding of plan.  All questions answered to the best of my ability at this time to patient satisfaction. Plan of care agreed upon.  Vitals: Blood Pressure: 119/66 (02/19/25 0739)  Pulse: 84 (02/19/25 0739)  Temperature: 98.3 °F (36.8 °C) (02/19/25 0739)  Temp Source: Oral (02/17/25 1500)  Respirations: 18 (02/19/25 0739)  Height: 5' 11\" (180.3 cm) (02/16/25 0610)  Weight - Scale: 68.8 kg (151 lb 10.8 oz) (02/16/25 0610)  SpO2: 94 % (02/19/25 0739)  Exam:   Physical Exam  Vitals and nursing note reviewed.   Constitutional:       General: He is not in acute distress.     Appearance: Normal appearance. He is not ill-appearing or toxic-appearing.   Cardiovascular:      Rate and Rhythm: Normal rate and regular rhythm.      Heart sounds: Normal heart sounds. No murmur heard.  Pulmonary:      Effort: Pulmonary effort is normal. No respiratory distress.      Breath sounds: Normal breath sounds. No wheezing, rhonchi or rales.   Abdominal:      General: Bowel sounds are normal. There is no distension.      Palpations: Abdomen is soft.   Neurological:      Mental Status: He is alert and oriented to person, place, and time.   Psychiatric:         Mood and Affect: Mood normal.         Behavior: Behavior normal.          Discussion with Family: Updated  (wife) via " phone.    Discharge instructions/Information to patient and family:   See after visit summary for information provided to patient and family.      Provisions for Follow-Up Care:  See after visit summary for information related to follow-up care and any pertinent home health orders.       Mobility at time of Discharge:   Basic Mobility Inpatient Raw Score: 17  JH-HLM Goal: 5: Stand one or more mins  JH-HLM Achieved: 6: Walk 10 steps or more  HLM Goal achieved. Continue to encourage appropriate mobility.    Disposition:   Home with VNA Services (Reminder: Complete face to face encounter)    Planned Readmission: none     Discharge Statement:  I spent 55 minutes discharging the patient. This time was spent on the day of discharge. I had direct contact with the patient on the day of discharge. Greater than 50% of the total time was spent examining patient, answering all patient questions, arranging and discussing plan of care with patient as well as directly providing post-discharge instructions.  Additional time then spent on discharge activities.    Discharge Medications:  See after visit summary for reconciled discharge medications provided to patient and/or family.      **Please Note: This note may have been constructed using a voice recognition system**

## 2025-02-19 NOTE — ASSESSMENT & PLAN NOTE
Blood Sugar Average: Last 72 hrs (P) [id number]  Uncontrolled a/e/b A1c 8.3%  Hold metformin  Continue Humalog SSI # 2   Diabetic diet level 2  Blood glucose monitoring ACHS  Hypoglycemia protocol

## 2025-02-19 NOTE — CASE MANAGEMENT
Case Management Progress Note    Patient name Fahad Hernandez  Location 2 EAST 269/2 E 269-01 MRN [id number]  : 1955 Date 2025       LOS (days): 1  Geometric Mean LOS (GMLOS) (days):   Days to GMLOS:        OBJECTIVE:        Current admission status: Inpatient  Preferred Pharmacy:   [id number] [place] - [address]  [address]  [place] [address]  Phone: [phone] Fax: [phone]    [facility] - [address]  [address] [address]  Phone: [phone] Fax: [phone]    [facility] [id number] [place] [address]  Phone: [phone] Fax: [phone]    Primary Care Provider: Surjit Hayes DO    Primary Insurance: [employer]  REP  Secondary Insurance:     PROGRESS NOTE:    CM alerted via SLIM and Nursing wife requesting a bedside commode for patient.  CM ordered via [employer] for Home delivery (approx. 3 -5 business days).  Wife had stated she may  not be able to transport home, as she is weak from Covid and will not drive in this winter weather and he may need a transport.

## 2025-02-19 NOTE — ASSESSMENT & PLAN NOTE
Secondary to COVID -19   SIRS: tachycardia (-133), tachypnea (RR 22)   SOURCE: COVID-19 (+) PCR  TREATMENT: IVF, remdesivir, dexamethasone, respiratory therapy, supplemental O2, encourage pulmonary toileting

## 2025-02-19 NOTE — ASSESSMENT & PLAN NOTE
Continue with Norvasc, lisinopril and Lopressor  Blood Pressure: 119/66   Monitor VS per unit protocol

## 2025-02-19 NOTE — PLAN OF CARE
Problem: RESPIRATORY - ADULT  Goal: Achieves optimal ventilation and oxygenation  Description: INTERVENTIONS:  - Assess for changes in respiratory status  - Assess for changes in mentation and behavior  - Position to facilitate oxygenation and minimize respiratory effort  - Oxygen administered by appropriate delivery if ordered  - Initiate smoking cessation education as indicated  - Encourage broncho-pulmonary hygiene including cough, deep breathe, Incentive Spirometry  - Assess the need for suctioning and aspirate as needed  - Assess and instruct to report SOB or any respiratory difficulty  - Respiratory Therapy support as indicated  Outcome: Progressing     Problem: MUSCULOSKELETAL - ADULT  Goal: Maintain or return mobility to safest level of function  Description: INTERVENTIONS:  - Assess patient's ability to carry out ADLs; assess patient's baseline for ADL function and identify physical deficits which impact ability to perform ADLs (bathing, care of mouth/teeth, toileting, grooming, dressing, etc.)  - Assess/evaluate cause of self-care deficits   - Assess range of motion  - Assess patient's mobility  - Assess patient's need for assistive devices and provide as appropriate  - Encourage maximum independence but intervene and supervise when necessary  - Involve family in performance of ADLs  - Assess for home care needs following discharge   - Consider OT consult to assist with ADL evaluation and planning for discharge  - Provide patient education as appropriate  Outcome: Progressing  Goal: Maintain proper alignment of affected body part  Description: INTERVENTIONS:  - Support, maintain and protect limb and body alignment  - Provide patient/ family with appropriate education  Outcome: Progressing     Problem: PAIN - ADULT  Goal: Verbalizes/displays adequate comfort level or baseline comfort level  Description: Interventions:  - Encourage patient to monitor pain and request assistance  - Assess pain using  appropriate pain scale  - Administer analgesics based on type and severity of pain and evaluate response  - Implement non-pharmacological measures as appropriate and evaluate response  - Consider cultural and social influences on pain and pain management  - Notify physician/advanced practitioner if interventions unsuccessful or patient reports new pain  Outcome: Progressing     Problem: INFECTION - ADULT  Goal: Absence or prevention of progression during hospitalization  Description: INTERVENTIONS:  - Assess and monitor for signs and symptoms of infection  - Monitor lab/diagnostic results  - Monitor all insertion sites, i.e. indwelling lines, tubes, and drains  - Monitor endotracheal if appropriate and nasal secretions for changes in amount and color  - [place] appropriate cooling/warming therapies per order  - Administer medications as ordered  - Instruct and encourage patient and family to use good hand hygiene technique  - Identify and instruct in appropriate isolation precautions for identified infection/condition  Outcome: Progressing  Goal: Absence of fever/infection during neutropenic period  Description: INTERVENTIONS:  - Monitor WBC    Outcome: Progressing     Problem: SAFETY ADULT  Goal: Patient will remain free of falls  Description: INTERVENTIONS:  - Educate patient/family on patient safety including physical limitations  - Instruct patient to call for assistance with activity   - Consult OT/PT to assist with strengthening/mobility   - Keep Call bell within reach  - Keep bed low and locked with side rails adjusted as appropriate  - Keep care items and personal belongings within reach  - Initiate and maintain comfort rounds  - Make Fall Risk Sign visible to staff  - Offer Toileting every  Hours, in advance of need  - Initiate/Maintain alarm  - Obtain necessary fall risk management equipment:   - Apply yellow socks and bracelet for high fall risk patients  - Consider moving patient to room near nurses  station  Outcome: Progressing  Goal: Maintain or return to baseline ADL function  Description: INTERVENTIONS:  -  Assess patient's ability to carry out ADLs; assess patient's baseline for ADL function and identify physical deficits which impact ability to perform ADLs (bathing, care of mouth/teeth, toileting, grooming, dressing, etc.)  - Assess/evaluate cause of self-care deficits   - Assess range of motion  - Assess patient's mobility; develop plan if impaired  - Assess patient's need for assistive devices and provide as appropriate  - Encourage maximum independence but intervene and supervise when necessary  - Involve family in performance of ADLs  - Assess for home care needs following discharge   - Consider OT consult to assist with ADL evaluation and planning for discharge  - Provide patient education as appropriate  Outcome: Progressing  Goal: Maintains/Returns to pre admission functional level  Description: INTERVENTIONS:  - Perform AM-PAC 6 Click Basic Mobility/ Daily Activity assessment daily.  - Set and communicate daily mobility goal to care team and patient/family/caregiver.   - Collaborate with rehabilitation services on mobility goals if consulted  - Perform Range of Motion  times a day.  - Reposition patient every  hours.  - Dangle patient  times a day  - Stand patient  times a day  - Ambulate patient  times a day  - Out of bed to chair  times a day   - Out of bed for meals times a day  - Out of bed for toileting  - Record patient progress and toleration of activity level   Outcome: Progressing     Problem: DISCHARGE PLANNING  Goal: Discharge to home or other facility with appropriate resources  Description: INTERVENTIONS:  - Identify barriers to discharge w/patient and caregiver  - Arrange for needed discharge resources and transportation as appropriate  - Identify discharge learning needs (meds, wound care, etc.)  - Arrange for interpretive services to assist at discharge as needed  - Refer to Case  Management Department for coordinating discharge planning if the patient needs post-hospital services based on physician/advanced practitioner order or complex needs related to functional status, cognitive ability, or social support system  Outcome: Progressing     Problem: Knowledge Deficit  Goal: Patient/family/caregiver demonstrates understanding of disease process, treatment plan, medications, and discharge instructions  Description: Complete learning assessment and assess knowledge base.  Interventions:  - Provide teaching at level of understanding  - Provide teaching via preferred learning methods  Outcome: Progressing     Problem: Prexisting or High Potential for Compromised Skin Integrity  Goal: Skin integrity is maintained or improved  Description: INTERVENTIONS:  - Identify patients at risk for skin breakdown  - Assess and monitor skin integrity  - Assess and monitor nutrition and hydration status  - Monitor labs   - Assess for incontinence   - Turn and reposition patient  - Assist with mobility/ambulation  - Relieve pressure over bony prominences  - Avoid friction and shearing  - Provide appropriate hygiene as needed including keeping skin clean and dry  - Evaluate need for skin moisturizer/barrier cream  - Collaborate with interdisciplinary team   - Patient/family teaching  - Consider wound care consult   Outcome: Progressing     Problem: Nutrition/Hydration-ADULT  Goal: Nutrient/Hydration intake appropriate for improving, restoring or maintaining nutritional needs  Description: Monitor and assess patient's nutrition/hydration status for malnutrition. Collaborate with interdisciplinary team and initiate plan and interventions as ordered.  Monitor patient's weight and dietary intake as ordered or per policy. Utilize nutrition screening tool and intervene as necessary. Determine patient's food preferences and provide high-protein, high-caloric foods as appropriate.     INTERVENTIONS:  - Monitor oral  intake, urinary output, labs, and treatment plans  - Assess nutrition and hydration status and recommend course of action  - Evaluate amount of meals eaten  - Assist patient with eating if necessary   - Allow adequate time for meals  - Recommend/ encourage appropriate diets, oral nutritional supplements, and vitamin/mineral supplements  - Order, calculate, and assess calorie counts as needed  - Recommend, monitor, and adjust tube feedings and TPN/PPN based on assessed needs  - Assess need for intravenous fluids  - Provide specific nutrition/hydration education as appropriate  - Include patient/family/caregiver in decisions related to nutrition  Outcome: Progressing

## 2025-02-20 LAB
DME PARACHUTE DELIVERY DATE ACTUAL: NORMAL
DME PARACHUTE DELIVERY DATE REQUESTED: NORMAL
DME PARACHUTE DELIVERY NOTE: NORMAL
DME PARACHUTE ITEM DESCRIPTION: NORMAL
DME PARACHUTE ORDER STATUS: NORMAL
DME PARACHUTE SUPPLIER NAME: NORMAL
DME PARACHUTE SUPPLIER PHONE: NORMAL

## 2025-02-20 NOTE — UTILIZATION REVIEW
NOTIFICATION OF ADMISSION DISCHARGE   This is a Notification of Discharge from [facility]. Please be advised that this patient has been discharge from our facility. Below you will find the admission and discharge date and time including the patient’s disposition.   UTILIZATION REVIEW CONTACT:  Robina Reese  Utilization   Network Utilization Review Department  Phone: [phone] x carefully listen to the prompts. All voicemails are confidential.  Email: [email or web address]     ADMISSION INFORMATION  PRESENTATION DATE: 2/16/2025  2:52 AM  OBERVATION ADMISSION DATE: 02/16/2025 0509  INPATIENT ADMISSION DATE: 2/17/25  8:06 AM   DISCHARGE DATE: 2/19/2025  4:14 PM   DISPOSITION:Home with Home Health Care    Network Utilization Review Department  ATTENTION: Please call with any questions or concerns to [phone] and carefully listen to the prompts so that you are directed to the right person. All voicemails are confidential.   For Discharge needs, contact Care Management DC Support Team at [phone] opt. 2  Send all requests for admission clinical reviews, approved or denied determinations and any other requests to dedicated fax number below belonging to the campus where the patient is receiving treatment. List of dedicated fax numbers for the Facilities:  FACILITY NAME UR FAX NUMBER   ADMISSION DENIALS (Administrative/Medical Necessity) [phone]   DISCHARGE SUPPORT TEAM ([facility]) [phone]   PARENT CHILD HEALTH (Maternity/NICU/Pediatrics) [phone]   [facility] [phone]   [facility] [phone]   [facility] [phone]   [facility] [phone]   [facility] [phone]   [facility] [phone]   [facility] [phone]   [facility]  [facility] [phone]   [facility] [phone]   [facility] [phone]   [facility] [phone]   [facility] [phone]

## 2025-02-21 ENCOUNTER — OFFICE VISIT (OUTPATIENT)
Dept: CARDIOLOGY CLINIC | Facility: CLINIC | Age: 70
End: 2025-02-21
Payer: COMMERCIAL

## 2025-02-21 ENCOUNTER — HOSPITAL ENCOUNTER (INPATIENT)
Facility: HOSPITAL | Age: 70
LOS: 6 days | Discharge: HOME HEALTH | DRG: 871 | End: 2025-02-28
Attending: EMERGENCY MEDICINE | Admitting: FAMILY MEDICINE
Payer: COMMERCIAL

## 2025-02-21 ENCOUNTER — TELEPHONE (OUTPATIENT)
Age: 70
End: 2025-02-21

## 2025-02-21 ENCOUNTER — APPOINTMENT (EMERGENCY)
Dept: RADIOLOGY | Facility: HOSPITAL | Age: 70
DRG: 871 | End: 2025-02-21
Payer: COMMERCIAL

## 2025-02-21 DIAGNOSIS — E11.65 TYPE 2 DIABETES MELLITUS WITH HYPERGLYCEMIA, WITHOUT LONG-TERM CURRENT USE OF INSULIN (HCC): ICD-10-CM

## 2025-02-21 DIAGNOSIS — U07.1 COVID-19 VIRUS INFECTION: ICD-10-CM

## 2025-02-21 DIAGNOSIS — I49.3 FREQUENT PVCS: ICD-10-CM

## 2025-02-21 DIAGNOSIS — J45.901 ASTHMA EXACERBATION ATTACKS: ICD-10-CM

## 2025-02-21 DIAGNOSIS — R06.02 SHORTNESS OF BREATH: ICD-10-CM

## 2025-02-21 DIAGNOSIS — I25.10 NONOBSTRUCTIVE ATHEROSCLEROSIS OF CORONARY ARTERY: Primary | ICD-10-CM

## 2025-02-21 DIAGNOSIS — E78.2 MIXED HYPERLIPIDEMIA: ICD-10-CM

## 2025-02-21 DIAGNOSIS — J44.1 COPD WITH ACUTE EXACERBATION (HCC): Primary | ICD-10-CM

## 2025-02-21 DIAGNOSIS — R39.9 LOWER URINARY TRACT SYMPTOMS (LUTS): ICD-10-CM

## 2025-02-21 DIAGNOSIS — I10 HYPERTENSION, ESSENTIAL: ICD-10-CM

## 2025-02-21 LAB
2HR DELTA HS TROPONIN: 1 NG/L
ALBUMIN SERPL BCG-MCNC: 3.2 G/DL (ref 3.5–5)
ALP SERPL-CCNC: 86 U/L (ref 34–104)
ALT SERPL W P-5'-P-CCNC: 17 U/L (ref 7–52)
ANION GAP SERPL CALCULATED.3IONS-SCNC: 9 MMOL/L (ref 4–13)
APTT PPP: 28 SECONDS (ref 23–34)
AST SERPL W P-5'-P-CCNC: 14 U/L (ref 13–39)
BASE EX.OXY STD BLDV CALC-SCNC: 81.6 % (ref 60–80)
BASE EXCESS BLDV CALC-SCNC: -2.6 MMOL/L
BASOPHILS # BLD AUTO: 0.01 THOUSANDS/ΜL (ref 0–0.1)
BASOPHILS NFR BLD AUTO: 0 % (ref 0–1)
BILIRUB DIRECT SERPL-MCNC: 0.07 MG/DL (ref 0–0.2)
BILIRUB SERPL-MCNC: 0.27 MG/DL (ref 0.2–1)
BNP SERPL-MCNC: 121 PG/ML (ref 0–100)
BUN SERPL-MCNC: 36 MG/DL (ref 5–25)
CALCIUM SERPL-MCNC: 8.6 MG/DL (ref 8.4–10.2)
CARDIAC TROPONIN I PNL SERPL HS: 6 NG/L (ref ?–50)
CARDIAC TROPONIN I PNL SERPL HS: 7 NG/L (ref ?–50)
CHLORIDE SERPL-SCNC: 107 MMOL/L (ref 96–108)
CO2 SERPL-SCNC: 22 MMOL/L (ref 21–32)
CREAT SERPL-MCNC: 0.79 MG/DL (ref 0.6–1.3)
D DIMER PPP FEU-MCNC: 0.41 UG/ML FEU
EOSINOPHIL # BLD AUTO: 0 THOUSAND/ΜL (ref 0–0.61)
EOSINOPHIL NFR BLD AUTO: 0 % (ref 0–6)
ERYTHROCYTE [DISTWIDTH] IN BLOOD BY AUTOMATED COUNT: 15.8 % (ref 11.6–15.1)
GFR SERPL CREATININE-BSD FRML MDRD: 90 ML/MIN/1.73SQ M
GLUCOSE SERPL-MCNC: 228 MG/DL (ref 65–140)
HCO3 BLDV-SCNC: 20.4 MMOL/L (ref 24–30)
HCT VFR BLD AUTO: 30.7 % (ref 36.5–49.3)
HGB BLD-MCNC: 9.2 G/DL (ref 12–17)
IMM GRANULOCYTES # BLD AUTO: 0.2 THOUSAND/UL (ref 0–0.2)
IMM GRANULOCYTES NFR BLD AUTO: 1 % (ref 0–2)
INR PPP: 1.03 (ref 0.85–1.19)
LACTATE SERPL-SCNC: 3.7 MMOL/L (ref 0.5–2)
LYMPHOCYTES # BLD AUTO: 1.93 THOUSANDS/ΜL (ref 0.6–4.47)
LYMPHOCYTES NFR BLD AUTO: 13 % (ref 14–44)
MAGNESIUM SERPL-MCNC: 1.5 MG/DL (ref 1.9–2.7)
MCH RBC QN AUTO: 25 PG (ref 26.8–34.3)
MCHC RBC AUTO-ENTMCNC: 30 G/DL (ref 31.4–37.4)
MCV RBC AUTO: 83 FL (ref 82–98)
MONOCYTES # BLD AUTO: 0.86 THOUSAND/ΜL (ref 0.17–1.22)
MONOCYTES NFR BLD AUTO: 6 % (ref 4–12)
NEUTROPHILS # BLD AUTO: 12.14 THOUSANDS/ΜL (ref 1.85–7.62)
NEUTS SEG NFR BLD AUTO: 80 % (ref 43–75)
NRBC BLD AUTO-RTO: 0 /100 WBCS
O2 CT BLDV-SCNC: 11.9 ML/DL
PCO2 BLDV: 29.5 MM HG (ref 42–50)
PH BLDV: 7.46 [PH] (ref 7.3–7.4)
PLATELET # BLD AUTO: 496 THOUSANDS/UL (ref 149–390)
PMV BLD AUTO: 8.7 FL (ref 8.9–12.7)
PO2 BLDV: 46.6 MM HG (ref 35–45)
POTASSIUM SERPL-SCNC: 4.4 MMOL/L (ref 3.5–5.3)
PROCALCITONIN SERPL-MCNC: 0.09 NG/ML
PROT SERPL-MCNC: 5.9 G/DL (ref 6.4–8.4)
PROTHROMBIN TIME: 14.2 SECONDS (ref 12.3–15)
RBC # BLD AUTO: 3.68 MILLION/UL (ref 3.88–5.62)
SODIUM SERPL-SCNC: 138 MMOL/L (ref 135–147)
WBC # BLD AUTO: 15.14 THOUSAND/UL (ref 4.31–10.16)

## 2025-02-21 PROCEDURE — 94640 AIRWAY INHALATION TREATMENT: CPT

## 2025-02-21 PROCEDURE — 85025 COMPLETE CBC W/AUTO DIFF WBC: CPT | Performed by: EMERGENCY MEDICINE

## 2025-02-21 PROCEDURE — 80076 HEPATIC FUNCTION PANEL: CPT | Performed by: EMERGENCY MEDICINE

## 2025-02-21 PROCEDURE — 96367 TX/PROPH/DG ADDL SEQ IV INF: CPT

## 2025-02-21 PROCEDURE — 94644 CONT INHLJ TX 1ST HOUR: CPT

## 2025-02-21 PROCEDURE — 84145 PROCALCITONIN (PCT): CPT | Performed by: EMERGENCY MEDICINE

## 2025-02-21 PROCEDURE — 80048 BASIC METABOLIC PNL TOTAL CA: CPT | Performed by: EMERGENCY MEDICINE

## 2025-02-21 PROCEDURE — 99291 CRITICAL CARE FIRST HOUR: CPT | Performed by: EMERGENCY MEDICINE

## 2025-02-21 PROCEDURE — 83880 ASSAY OF NATRIURETIC PEPTIDE: CPT | Performed by: EMERGENCY MEDICINE

## 2025-02-21 PROCEDURE — 83605 ASSAY OF LACTIC ACID: CPT | Performed by: EMERGENCY MEDICINE

## 2025-02-21 PROCEDURE — 82805 BLOOD GASES W/O2 SATURATION: CPT | Performed by: EMERGENCY MEDICINE

## 2025-02-21 PROCEDURE — 85610 PROTHROMBIN TIME: CPT | Performed by: EMERGENCY MEDICINE

## 2025-02-21 PROCEDURE — 99285 EMERGENCY DEPT VISIT HI MDM: CPT

## 2025-02-21 PROCEDURE — 98014 SYNCH AUDIO-ONLY EST MOD 30: CPT

## 2025-02-21 PROCEDURE — 96366 THER/PROPH/DIAG IV INF ADDON: CPT

## 2025-02-21 PROCEDURE — 94760 N-INVAS EAR/PLS OXIMETRY 1: CPT

## 2025-02-21 PROCEDURE — NC001 PR NO CHARGE

## 2025-02-21 PROCEDURE — 87040 BLOOD CULTURE FOR BACTERIA: CPT | Performed by: EMERGENCY MEDICINE

## 2025-02-21 PROCEDURE — 93005 ELECTROCARDIOGRAM TRACING: CPT

## 2025-02-21 PROCEDURE — 84484 ASSAY OF TROPONIN QUANT: CPT | Performed by: EMERGENCY MEDICINE

## 2025-02-21 PROCEDURE — 84145 PROCALCITONIN (PCT): CPT | Performed by: HOSPITALIST

## 2025-02-21 PROCEDURE — 83735 ASSAY OF MAGNESIUM: CPT | Performed by: EMERGENCY MEDICINE

## 2025-02-21 PROCEDURE — 71045 X-RAY EXAM CHEST 1 VIEW: CPT

## 2025-02-21 PROCEDURE — 85730 THROMBOPLASTIN TIME PARTIAL: CPT | Performed by: EMERGENCY MEDICINE

## 2025-02-21 PROCEDURE — 36415 COLL VENOUS BLD VENIPUNCTURE: CPT | Performed by: EMERGENCY MEDICINE

## 2025-02-21 PROCEDURE — 85379 FIBRIN DEGRADATION QUANT: CPT | Performed by: EMERGENCY MEDICINE

## 2025-02-21 PROCEDURE — 94664 DEMO&/EVAL PT USE INHALER: CPT

## 2025-02-21 PROCEDURE — 96361 HYDRATE IV INFUSION ADD-ON: CPT

## 2025-02-21 PROCEDURE — 96365 THER/PROPH/DIAG IV INF INIT: CPT

## 2025-02-21 RX ORDER — LEVALBUTEROL INHALATION SOLUTION 1.25 MG/3ML
1.25 SOLUTION RESPIRATORY (INHALATION) ONCE
Status: COMPLETED | OUTPATIENT
Start: 2025-02-21 | End: 2025-02-21

## 2025-02-21 RX ORDER — DILTIAZEM HYDROCHLORIDE 180 MG/1
1 CAPSULE, EXTENDED RELEASE ORAL DAILY
COMMUNITY
Start: 2024-12-11 | End: 2025-02-21 | Stop reason: ALTCHOICE

## 2025-02-21 RX ORDER — METHYLPREDNISOLONE SOD SUCC 125 MG
1 VIAL (EA) INJECTION ONCE
Status: COMPLETED | OUTPATIENT
Start: 2025-02-21 | End: 2025-02-21

## 2025-02-21 RX ORDER — SODIUM CHLORIDE FOR INHALATION 0.9 %
12 VIAL, NEBULIZER (ML) INHALATION ONCE
Status: COMPLETED | OUTPATIENT
Start: 2025-02-21 | End: 2025-02-21

## 2025-02-21 RX ORDER — ALBUTEROL SULFATE 5 MG/ML
10 SOLUTION RESPIRATORY (INHALATION) ONCE
Status: COMPLETED | OUTPATIENT
Start: 2025-02-21 | End: 2025-02-21

## 2025-02-21 RX ORDER — MAGNESIUM SULFATE HEPTAHYDRATE 40 MG/ML
2 INJECTION, SOLUTION INTRAVENOUS ONCE
Status: DISCONTINUED | OUTPATIENT
Start: 2025-02-21 | End: 2025-02-21

## 2025-02-21 RX ORDER — MAGNESIUM SULFATE HEPTAHYDRATE 40 MG/ML
2 INJECTION, SOLUTION INTRAVENOUS ONCE
Status: COMPLETED | OUTPATIENT
Start: 2025-02-21 | End: 2025-02-21

## 2025-02-21 RX ORDER — IPRATROPIUM BROMIDE AND ALBUTEROL SULFATE .5; 3 MG/3ML; MG/3ML
2 SOLUTION RESPIRATORY (INHALATION) ONCE
Status: COMPLETED | OUTPATIENT
Start: 2025-02-21 | End: 2025-02-21

## 2025-02-21 RX ORDER — ALBUTEROL SULFATE 2.5 MG/3ML
1 SOLUTION RESPIRATORY (INHALATION) ONCE
Status: COMPLETED | OUTPATIENT
Start: 2025-02-21 | End: 2025-02-21

## 2025-02-21 RX ORDER — LANOLIN ALCOHOL/MO/W.PET/CERES
400 CREAM (GRAM) TOPICAL ONCE
Status: COMPLETED | OUTPATIENT
Start: 2025-02-21 | End: 2025-02-21

## 2025-02-21 RX ORDER — MAGNESIUM SULFATE HEPTAHYDRATE 40 MG/ML
4 INJECTION, SOLUTION INTRAVENOUS ONCE
Status: COMPLETED | OUTPATIENT
Start: 2025-02-21 | End: 2025-02-21

## 2025-02-21 RX ADMIN — SODIUM CHLORIDE 500 ML: 0.9 INJECTION, SOLUTION INTRAVENOUS at 19:46

## 2025-02-21 RX ADMIN — MAGNESIUM SULFATE HEPTAHYDRATE 2 G: 40 INJECTION, SOLUTION INTRAVENOUS at 20:40

## 2025-02-21 RX ADMIN — Medication 500 MG: at 22:08

## 2025-02-21 RX ADMIN — MAGNESIUM SULFATE 4 G: 4 INJECTION INTRAVENOUS at 21:41

## 2025-02-21 RX ADMIN — LEVALBUTEROL HYDROCHLORIDE 1.25 MG: 1.25 SOLUTION RESPIRATORY (INHALATION) at 22:50

## 2025-02-21 RX ADMIN — IPRATROPIUM BROMIDE 1 MG: 0.5 SOLUTION RESPIRATORY (INHALATION) at 20:07

## 2025-02-21 RX ADMIN — ISODIUM CHLORIDE 12 ML: 0.03 SOLUTION RESPIRATORY (INHALATION) at 20:07

## 2025-02-21 RX ADMIN — SODIUM CHLORIDE 500 ML: 0.9 INJECTION, SOLUTION INTRAVENOUS at 23:45

## 2025-02-21 RX ADMIN — Medication 400 MG: at 20:40

## 2025-02-21 RX ADMIN — ALBUTEROL SULFATE 10 MG: 2.5 SOLUTION RESPIRATORY (INHALATION) at 20:07

## 2025-02-21 NOTE — TELEPHONE ENCOUNTER
Pt wife calls and states is pt even supposed to be using fomoterol. He states he has albuterol and budesonide as well so should pt be using all three

## 2025-02-21 NOTE — TELEPHONE ENCOUNTER
Advised as per HILARIO Thapa 02/21/25 1521 message: reviewed pulmonary medications with spouse; no further questions and/or concerns at this time.

## 2025-02-21 NOTE — TELEPHONE ENCOUNTER
Dayanara calls from [facility] and states pt did not received his formoterol. Informed her script was sent with refill on 1/24 to [facility] pharmacy. Provided phone number

## 2025-02-21 NOTE — TELEPHONE ENCOUNTER
Attempted to contact patient per referral as he would be in need of a follow up visit, left a voicemail for patient to return our call. Thank you

## 2025-02-21 NOTE — PROGRESS NOTES
Virtual Brief Visit  Name: Fahad Hernandez      : 1955      MRN: [id number]  Encounter Provider: JAMIL Lindsay  Encounter Date: 2025   Encounter department: [facility]  :  Assessment & Plan  Shortness of breath  S/p cardiac catheterization revealing nonobstructive CAD  Patient completed ZIO monitor but needs to mail in to company  Patient recently hospitalized for COVID-19.  Patient had comprehensive evaluation for this at [facility] revealing eosinophilic asthma.  Patient was started on biologic and is now starting to notice improvement of symptoms.  Symptoms likely noncardiac in nature but will await ZIO monitor report.       Nonobstructive atherosclerosis of coronary artery  Had recent cardiac catheterization at [facility] showing nonobstructive CAD  Continue aspirin, metoprolol succinate 25 mg daily, atorvastatin 10 mg daily       Mixed hyperlipidemia  Continue atorvastatin       Hypertension, essential  Continue amlodipine-benazepril, metoprolol succinate       Type 2 diabetes mellitus with hyperglycemia, without long-term current use of insulin ([facility])    Lab Results   Component Value Date    HGBA1C 8.3 (H) 2025              Cardiac Cath 10/9/24 [facility]  LEFT HEART CATHETERIZATION:     HEMODYNAMICS:   Rhythm:   Aortic pressure: 133/81   LVEDP: 10 to 12 mmHg      LEFT VENTRICLE:  An 10 ml hand injection demonstrated LVEF 70 %.  No wall   motion abnormalities      VALVES:   Mitral : No mitral regurgitation is seen with left ventriculography   Aortic :There was no  signficant gradient across the aortic valve on   pullback.      CORONARY ANGIOGRAPHY :     LEFT MAIN: The LMCA arises in the left coronary sinus. It is large caliber   and bifurcates into a large caliber LAD and LCX.  There is no occlusive   disease in the left main coronary artery     LEFT ANTERIOR DESCENDING: The LAD arises from the  left main coronary   artery. It is a large caliber vessel, which reaches the apex.  The ostium   of the LAD there is a concentric 25% stenosis.  The ongoing LAD is   moderately tortuous but there is no atherosclerotic occlusions.  A   moderate-sized second diagonal branch is present appears to have an ostial   25-30% stenosis.     LEFT CIRCUMFLEX: The LCX arises from the left main coronary artery . It    is a moderate caliber vessel, non dominant.  There is no occlusive disease   in the left circumflex or its branches     RIGHT CORONARY ARTERY: The RCA arises in the right coronary sinus. The   vessel is large in caliber and  dominant.,  And has moderate tortuosity in   the proximal mid segment.  It gives rise to the posterior sending artery   and posterolateral branch distally.  There is no occlusive disease in the   RCA or its branches.       SUMMARY:   Sinus tachycardia   LVEF 70%   Normal LV diastolic pressure   Mild coronary atherosclerosis of the proximal LAD and the origin of the   second diagonal branch   TR band right radial artery         History of Present Illness    Fahad Hernandez is a 68 y.o. year old male with PMH of hypertension, left carotid stenosis, diabetes, GERD who presents for follow-up. Patient is known to Dr. Holman.     Patient recently treated for asthma exacerbation on 9/10/2024.  He began to feel very short of breath after completing a course of steroids.  He given nebulizer treatments and was restarted on course of steroids. His troponins were negative and there was no ischemic changes on his ECG. Patient had TTE which showed EF 60% and mild MR and TR.    Patient underwent subsequent pharmacologic MPI which did not show strong evidence of ischemia.  Patient continued to have symptoms however and was admitted at [facility] where he did go undergo cardiac catheterization revealing nonobstructive CAD.  Patient was thoroughly evaluated for symptoms and was started on  biologic, Dupixant, for eosinophilic asthma.  Is been a few weeks of therapy and his wife notes that his symptoms are starting to improve.    Fortunately patient was recently hospitalized for COVID-19 so him and his wife are recovering from this.  He notes improvement of shortness of breath.  He denies any chest pain.  Patient completed [employer] monitor but has not mailed back to company yet.  Patient was started on diltiazem since he was tachycardic at [place] but he never started this medication.  He is currently taking metoprolol and amlodipine-benazepril.  He is tolerating these medications well and denies any further tachycardia.  It is believed that this tachycardia was related to his prednisone therapy which he has completed.        Administrative Statements   Encounter provider JAMIL Lindsay    The Patient is located at Home and in the following state in which I hold an active license [place].    The patient was identified by name and date of birth. Fahad Hernandez was informed that this is a telemedicine visit and that the visit is being conducted through Telephone.  My office door was closed. No one else was in the room.  He acknowledged consent and understanding of privacy and security of the video platform. The patient has agreed to participate and understands they can discontinue the visit at any time.    I have spent a total time of 28 minutes in caring for this patient on the day of the visit/encounter including Diagnostic results, Patient and family education, Impressions, Documenting in the medical record, Reviewing/placing orders in the medical record (including tests, medications, and/or procedures), and Obtaining or reviewing history  .

## 2025-02-21 NOTE — TELEPHONE ENCOUNTER
Please let patient know that yes:    Budesonide and formoterol twice a day    Albuterol 3 to 4 times a day

## 2025-02-22 PROBLEM — E87.21 ACUTE LACTIC ACIDOSIS: Status: ACTIVE | Noted: 2024-12-14

## 2025-02-22 LAB
ALBUMIN SERPL BCG-MCNC: 3.3 G/DL (ref 3.5–5)
ALP SERPL-CCNC: 79 U/L (ref 34–104)
ALT SERPL W P-5'-P-CCNC: 16 U/L (ref 7–52)
ANION GAP SERPL CALCULATED.3IONS-SCNC: 8 MMOL/L (ref 4–13)
APTT PPP: 26 SECONDS (ref 23–34)
AST SERPL W P-5'-P-CCNC: 12 U/L (ref 13–39)
ATRIAL RATE: 113 BPM
BASOPHILS # BLD AUTO: 0.01 THOUSANDS/ΜL (ref 0–0.1)
BASOPHILS NFR BLD AUTO: 0 % (ref 0–1)
BILIRUB SERPL-MCNC: 0.24 MG/DL (ref 0.2–1)
BUN SERPL-MCNC: 27 MG/DL (ref 5–25)
CALCIUM ALBUM COR SERPL-MCNC: 9.1 MG/DL (ref 8.3–10.1)
CALCIUM SERPL-MCNC: 8.5 MG/DL (ref 8.4–10.2)
CHLORIDE SERPL-SCNC: 108 MMOL/L (ref 96–108)
CO2 SERPL-SCNC: 22 MMOL/L (ref 21–32)
CREAT SERPL-MCNC: 0.8 MG/DL (ref 0.6–1.3)
EOSINOPHIL # BLD AUTO: 0 THOUSAND/ΜL (ref 0–0.61)
EOSINOPHIL NFR BLD AUTO: 0 % (ref 0–6)
ERYTHROCYTE [DISTWIDTH] IN BLOOD BY AUTOMATED COUNT: 16 % (ref 11.6–15.1)
GFR SERPL CREATININE-BSD FRML MDRD: 89 ML/MIN/1.73SQ M
GLUCOSE SERPL-MCNC: 194 MG/DL (ref 65–140)
GLUCOSE SERPL-MCNC: 214 MG/DL (ref 65–140)
GLUCOSE SERPL-MCNC: 232 MG/DL (ref 65–140)
GLUCOSE SERPL-MCNC: 275 MG/DL (ref 65–140)
GLUCOSE SERPL-MCNC: 286 MG/DL (ref 65–140)
GLUCOSE SERPL-MCNC: 352 MG/DL (ref 65–140)
HCT VFR BLD AUTO: 30.2 % (ref 36.5–49.3)
HGB BLD-MCNC: 9.2 G/DL (ref 12–17)
IMM GRANULOCYTES # BLD AUTO: 0.26 THOUSAND/UL (ref 0–0.2)
IMM GRANULOCYTES NFR BLD AUTO: 2 % (ref 0–2)
INR PPP: 1.02 (ref 0.85–1.19)
LACTATE SERPL-SCNC: 2 MMOL/L (ref 0.5–2)
LACTATE SERPL-SCNC: 3.6 MMOL/L (ref 0.5–2)
LACTATE SERPL-SCNC: 4.9 MMOL/L (ref 0.5–2)
LYMPHOCYTES # BLD AUTO: 0.99 THOUSANDS/ΜL (ref 0.6–4.47)
LYMPHOCYTES NFR BLD AUTO: 7 % (ref 14–44)
MAGNESIUM SERPL-MCNC: 2.6 MG/DL (ref 1.9–2.7)
MCH RBC QN AUTO: 25.3 PG (ref 26.8–34.3)
MCHC RBC AUTO-ENTMCNC: 30.5 G/DL (ref 31.4–37.4)
MCV RBC AUTO: 83 FL (ref 82–98)
MONOCYTES # BLD AUTO: 0.32 THOUSAND/ΜL (ref 0.17–1.22)
MONOCYTES NFR BLD AUTO: 2 % (ref 4–12)
NEUTROPHILS # BLD AUTO: 12.66 THOUSANDS/ΜL (ref 1.85–7.62)
NEUTS SEG NFR BLD AUTO: 89 % (ref 43–75)
NRBC BLD AUTO-RTO: 0 /100 WBCS
P AXIS: 57 DEGREES
PHOSPHATE SERPL-MCNC: 2 MG/DL (ref 2.3–4.1)
PLATELET # BLD AUTO: 506 THOUSANDS/UL (ref 149–390)
PMV BLD AUTO: 9 FL (ref 8.9–12.7)
POTASSIUM SERPL-SCNC: 4.5 MMOL/L (ref 3.5–5.3)
PR INTERVAL: 150 MS
PROCALCITONIN SERPL-MCNC: 0.09 NG/ML
PROT SERPL-MCNC: 5.9 G/DL (ref 6.4–8.4)
PROTHROMBIN TIME: 14.1 SECONDS (ref 12.3–15)
QRS AXIS: 34 DEGREES
QRSD INTERVAL: 76 MS
QT INTERVAL: 334 MS
QTC INTERVAL: 458 MS
RBC # BLD AUTO: 3.63 MILLION/UL (ref 3.88–5.62)
SODIUM SERPL-SCNC: 138 MMOL/L (ref 135–147)
T WAVE AXIS: 40 DEGREES
VENTRICULAR RATE: 113 BPM
WBC # BLD AUTO: 14.24 THOUSAND/UL (ref 4.31–10.16)

## 2025-02-22 PROCEDURE — 82948 REAGENT STRIP/BLOOD GLUCOSE: CPT

## 2025-02-22 PROCEDURE — 80053 COMPREHEN METABOLIC PANEL: CPT | Performed by: HOSPITALIST

## 2025-02-22 PROCEDURE — 84100 ASSAY OF PHOSPHORUS: CPT | Performed by: HOSPITALIST

## 2025-02-22 PROCEDURE — 99223 1ST HOSP IP/OBS HIGH 75: CPT | Performed by: HOSPITALIST

## 2025-02-22 PROCEDURE — 36415 COLL VENOUS BLD VENIPUNCTURE: CPT | Performed by: EMERGENCY MEDICINE

## 2025-02-22 PROCEDURE — 85610 PROTHROMBIN TIME: CPT | Performed by: HOSPITALIST

## 2025-02-22 PROCEDURE — 85025 COMPLETE CBC W/AUTO DIFF WBC: CPT | Performed by: HOSPITALIST

## 2025-02-22 PROCEDURE — 93010 ELECTROCARDIOGRAM REPORT: CPT | Performed by: INTERNAL MEDICINE

## 2025-02-22 PROCEDURE — 94760 N-INVAS EAR/PLS OXIMETRY 1: CPT

## 2025-02-22 PROCEDURE — 83735 ASSAY OF MAGNESIUM: CPT | Performed by: HOSPITALIST

## 2025-02-22 PROCEDURE — 83605 ASSAY OF LACTIC ACID: CPT | Performed by: HOSPITALIST

## 2025-02-22 PROCEDURE — 94664 DEMO&/EVAL PT USE INHALER: CPT

## 2025-02-22 PROCEDURE — 83605 ASSAY OF LACTIC ACID: CPT | Performed by: EMERGENCY MEDICINE

## 2025-02-22 PROCEDURE — 85730 THROMBOPLASTIN TIME PARTIAL: CPT | Performed by: HOSPITALIST

## 2025-02-22 PROCEDURE — 94660 CPAP INITIATION&MGMT: CPT

## 2025-02-22 PROCEDURE — 94640 AIRWAY INHALATION TREATMENT: CPT

## 2025-02-22 RX ORDER — TAMSULOSIN HYDROCHLORIDE 0.4 MG/1
0.4 CAPSULE ORAL DAILY
Status: DISCONTINUED | OUTPATIENT
Start: 2025-02-22 | End: 2025-02-28

## 2025-02-22 RX ORDER — GUAIFENESIN 600 MG/1
600 TABLET, EXTENDED RELEASE ORAL EVERY 12 HOURS SCHEDULED
Status: DISCONTINUED | OUTPATIENT
Start: 2025-02-22 | End: 2025-02-28 | Stop reason: HOSPADM

## 2025-02-22 RX ORDER — ARIPIPRAZOLE 5 MG/1
10 TABLET ORAL DAILY
Status: DISCONTINUED | OUTPATIENT
Start: 2025-02-22 | End: 2025-02-28 | Stop reason: HOSPADM

## 2025-02-22 RX ORDER — ATORVASTATIN CALCIUM 10 MG/1
10 TABLET, FILM COATED ORAL
Status: DISCONTINUED | OUTPATIENT
Start: 2025-02-22 | End: 2025-02-28 | Stop reason: HOSPADM

## 2025-02-22 RX ORDER — ACETAMINOPHEN 325 MG/1
650 TABLET ORAL EVERY 6 HOURS PRN
Status: DISCONTINUED | OUTPATIENT
Start: 2025-02-22 | End: 2025-02-28 | Stop reason: HOSPADM

## 2025-02-22 RX ORDER — DOCUSATE SODIUM 100 MG/1
100 CAPSULE, LIQUID FILLED ORAL 2 TIMES DAILY
Status: DISCONTINUED | OUTPATIENT
Start: 2025-02-22 | End: 2025-02-28 | Stop reason: HOSPADM

## 2025-02-22 RX ORDER — INSULIN LISPRO 100 [IU]/ML
1-6 INJECTION, SOLUTION INTRAVENOUS; SUBCUTANEOUS
Status: DISCONTINUED | OUTPATIENT
Start: 2025-02-22 | End: 2025-02-28 | Stop reason: HOSPADM

## 2025-02-22 RX ORDER — ASPIRIN 81 MG/1
81 TABLET ORAL DAILY
Status: DISCONTINUED | OUTPATIENT
Start: 2025-02-22 | End: 2025-02-28 | Stop reason: HOSPADM

## 2025-02-22 RX ORDER — PANTOPRAZOLE SODIUM 40 MG/1
40 TABLET, DELAYED RELEASE ORAL
Status: DISCONTINUED | OUTPATIENT
Start: 2025-02-22 | End: 2025-02-28 | Stop reason: HOSPADM

## 2025-02-22 RX ORDER — NITROGLYCERIN 0.4 MG/1
0.4 TABLET SUBLINGUAL
Status: DISCONTINUED | OUTPATIENT
Start: 2025-02-22 | End: 2025-02-28 | Stop reason: HOSPADM

## 2025-02-22 RX ORDER — OXYCODONE AND ACETAMINOPHEN 5; 325 MG/1; MG/1
1.5 TABLET ORAL EVERY 4 HOURS PRN
Refills: 0 | Status: DISCONTINUED | OUTPATIENT
Start: 2025-02-22 | End: 2025-02-28 | Stop reason: HOSPADM

## 2025-02-22 RX ORDER — FORMOTEROL FUMARATE 20 UG/2ML
20 SOLUTION RESPIRATORY (INHALATION)
Status: DISCONTINUED | OUTPATIENT
Start: 2025-02-22 | End: 2025-02-28 | Stop reason: HOSPADM

## 2025-02-22 RX ORDER — AZITHROMYCIN 500 MG/1
500 TABLET, FILM COATED ORAL EVERY 24 HOURS
Status: COMPLETED | OUTPATIENT
Start: 2025-02-22 | End: 2025-02-24

## 2025-02-22 RX ORDER — METHYLPREDNISOLONE SODIUM SUCCINATE 40 MG/ML
40 INJECTION, POWDER, LYOPHILIZED, FOR SOLUTION INTRAMUSCULAR; INTRAVENOUS EVERY 8 HOURS
Status: DISCONTINUED | OUTPATIENT
Start: 2025-02-22 | End: 2025-02-23

## 2025-02-22 RX ORDER — BUPROPION HYDROCHLORIDE 150 MG/1
300 TABLET ORAL DAILY
Status: DISCONTINUED | OUTPATIENT
Start: 2025-02-22 | End: 2025-02-28 | Stop reason: HOSPADM

## 2025-02-22 RX ORDER — BUDESONIDE 0.5 MG/2ML
0.5 INHALANT ORAL
Status: DISCONTINUED | OUTPATIENT
Start: 2025-02-22 | End: 2025-02-22

## 2025-02-22 RX ORDER — OXYCODONE AND ACETAMINOPHEN 7.5; 325 MG/1; MG/1
1 TABLET ORAL EVERY 4 HOURS PRN
COMMUNITY

## 2025-02-22 RX ORDER — VENLAFAXINE HYDROCHLORIDE 75 MG/1
75 CAPSULE, EXTENDED RELEASE ORAL DAILY
Status: DISCONTINUED | OUTPATIENT
Start: 2025-02-22 | End: 2025-02-28 | Stop reason: HOSPADM

## 2025-02-22 RX ORDER — LEVALBUTEROL INHALATION SOLUTION 1.25 MG/3ML
1.25 SOLUTION RESPIRATORY (INHALATION)
Status: DISCONTINUED | OUTPATIENT
Start: 2025-02-22 | End: 2025-02-25

## 2025-02-22 RX ORDER — SODIUM CHLORIDE FOR INHALATION 0.9 %
3 VIAL, NEBULIZER (ML) INHALATION
Status: DISCONTINUED | OUTPATIENT
Start: 2025-02-22 | End: 2025-02-22

## 2025-02-22 RX ORDER — METOPROLOL SUCCINATE 25 MG/1
25 TABLET, EXTENDED RELEASE ORAL DAILY
Status: DISCONTINUED | OUTPATIENT
Start: 2025-02-22 | End: 2025-02-28 | Stop reason: HOSPADM

## 2025-02-22 RX ORDER — LISINOPRIL 20 MG/1
40 TABLET ORAL DAILY
Status: DISCONTINUED | OUTPATIENT
Start: 2025-02-22 | End: 2025-02-26

## 2025-02-22 RX ORDER — BUDESONIDE 0.5 MG/2ML
0.5 INHALANT ORAL
Status: DISCONTINUED | OUTPATIENT
Start: 2025-02-22 | End: 2025-02-25

## 2025-02-22 RX ORDER — CLOPIDOGREL BISULFATE 75 MG/1
75 TABLET ORAL DAILY
Status: DISCONTINUED | OUTPATIENT
Start: 2025-02-22 | End: 2025-02-28 | Stop reason: HOSPADM

## 2025-02-22 RX ORDER — AMLODIPINE BESYLATE 10 MG/1
10 TABLET ORAL DAILY
Status: DISCONTINUED | OUTPATIENT
Start: 2025-02-22 | End: 2025-02-26

## 2025-02-22 RX ORDER — FORMOTEROL FUMARATE 20 UG/2ML
20 SOLUTION RESPIRATORY (INHALATION) 2 TIMES DAILY
Status: DISCONTINUED | OUTPATIENT
Start: 2025-02-22 | End: 2025-02-22

## 2025-02-22 RX ORDER — BENZONATATE 100 MG/1
100 CAPSULE ORAL 3 TIMES DAILY
Status: DISCONTINUED | OUTPATIENT
Start: 2025-02-22 | End: 2025-02-28 | Stop reason: HOSPADM

## 2025-02-22 RX ORDER — ENOXAPARIN SODIUM 100 MG/ML
40 INJECTION SUBCUTANEOUS
Status: DISCONTINUED | OUTPATIENT
Start: 2025-02-22 | End: 2025-02-28 | Stop reason: HOSPADM

## 2025-02-22 RX ORDER — MONTELUKAST SODIUM 10 MG/1
10 TABLET ORAL
Status: DISCONTINUED | OUTPATIENT
Start: 2025-02-22 | End: 2025-02-28 | Stop reason: HOSPADM

## 2025-02-22 RX ORDER — HYDROXYZINE HYDROCHLORIDE 25 MG/1
25 TABLET, FILM COATED ORAL EVERY 6 HOURS PRN
Status: DISCONTINUED | OUTPATIENT
Start: 2025-02-22 | End: 2025-02-28 | Stop reason: HOSPADM

## 2025-02-22 RX ADMIN — AMLODIPINE BESYLATE 10 MG: 10 TABLET ORAL at 10:19

## 2025-02-22 RX ADMIN — FORMOTEROL FUMARATE DIHYDRATE 20 MCG: 20 SOLUTION RESPIRATORY (INHALATION) at 07:10

## 2025-02-22 RX ADMIN — OXYCODONE HYDROCHLORIDE AND ACETAMINOPHEN 1.5 TABLET: 5; 325 TABLET ORAL at 02:01

## 2025-02-22 RX ADMIN — METOPROLOL SUCCINATE 25 MG: 25 TABLET, EXTENDED RELEASE ORAL at 10:19

## 2025-02-22 RX ADMIN — ENOXAPARIN SODIUM 40 MG: 40 INJECTION SUBCUTANEOUS at 10:19

## 2025-02-22 RX ADMIN — LEVALBUTEROL HYDROCHLORIDE 1.25 MG: 1.25 SOLUTION RESPIRATORY (INHALATION) at 19:25

## 2025-02-22 RX ADMIN — GUAIFENESIN 600 MG: 600 TABLET ORAL at 20:33

## 2025-02-22 RX ADMIN — SODIUM CHLORIDE 500 ML: 0.9 INJECTION, SOLUTION INTRAVENOUS at 01:52

## 2025-02-22 RX ADMIN — BUDESONIDE 0.5 MG: 0.5 INHALANT ORAL at 19:25

## 2025-02-22 RX ADMIN — GUAIFENESIN 600 MG: 600 TABLET ORAL at 01:55

## 2025-02-22 RX ADMIN — CLOPIDOGREL 75 MG: 75 TABLET ORAL at 10:19

## 2025-02-22 RX ADMIN — MONTELUKAST 10 MG: 10 TABLET, FILM COATED ORAL at 22:36

## 2025-02-22 RX ADMIN — HYDROXYZINE HYDROCHLORIDE 25 MG: 25 TABLET ORAL at 02:01

## 2025-02-22 RX ADMIN — GUAIFENESIN 600 MG: 600 TABLET ORAL at 10:19

## 2025-02-22 RX ADMIN — INSULIN LISPRO 2 UNITS: 100 INJECTION, SOLUTION INTRAVENOUS; SUBCUTANEOUS at 17:44

## 2025-02-22 RX ADMIN — BENZONATATE 100 MG: 100 CAPSULE ORAL at 17:50

## 2025-02-22 RX ADMIN — BENZONATATE 100 MG: 100 CAPSULE ORAL at 20:33

## 2025-02-22 RX ADMIN — PANTOPRAZOLE SODIUM 40 MG: 40 TABLET, DELAYED RELEASE ORAL at 05:13

## 2025-02-22 RX ADMIN — ASPIRIN 81 MG: 81 TABLET, COATED ORAL at 10:19

## 2025-02-22 RX ADMIN — BUPROPION HYDROCHLORIDE 300 MG: 150 TABLET, EXTENDED RELEASE ORAL at 10:19

## 2025-02-22 RX ADMIN — INSULIN LISPRO 3 UNITS: 100 INJECTION, SOLUTION INTRAVENOUS; SUBCUTANEOUS at 22:35

## 2025-02-22 RX ADMIN — LEVALBUTEROL HYDROCHLORIDE 1.25 MG: 1.25 SOLUTION RESPIRATORY (INHALATION) at 07:10

## 2025-02-22 RX ADMIN — DOCUSATE SODIUM 100 MG: 100 CAPSULE, LIQUID FILLED ORAL at 17:44

## 2025-02-22 RX ADMIN — ATORVASTATIN CALCIUM 10 MG: 10 TABLET, FILM COATED ORAL at 17:44

## 2025-02-22 RX ADMIN — OXYCODONE HYDROCHLORIDE AND ACETAMINOPHEN 1.5 TABLET: 5; 325 TABLET ORAL at 20:33

## 2025-02-22 RX ADMIN — INSULIN LISPRO 4 UNITS: 100 INJECTION, SOLUTION INTRAVENOUS; SUBCUTANEOUS at 01:54

## 2025-02-22 RX ADMIN — IPRATROPIUM BROMIDE 0.5 MG: 0.5 SOLUTION RESPIRATORY (INHALATION) at 07:10

## 2025-02-22 RX ADMIN — METHYLPREDNISOLONE SODIUM SUCCINATE 40 MG: 40 INJECTION, POWDER, FOR SOLUTION INTRAMUSCULAR; INTRAVENOUS at 01:49

## 2025-02-22 RX ADMIN — METHYLPREDNISOLONE SODIUM SUCCINATE 40 MG: 40 INJECTION, POWDER, FOR SOLUTION INTRAMUSCULAR; INTRAVENOUS at 13:12

## 2025-02-22 RX ADMIN — BENZONATATE 100 MG: 100 CAPSULE ORAL at 10:19

## 2025-02-22 RX ADMIN — OXYCODONE HYDROCHLORIDE AND ACETAMINOPHEN 1.5 TABLET: 5; 325 TABLET ORAL at 10:36

## 2025-02-22 RX ADMIN — DOCUSATE SODIUM 100 MG: 100 CAPSULE, LIQUID FILLED ORAL at 10:19

## 2025-02-22 RX ADMIN — FORMOTEROL FUMARATE DIHYDRATE 20 MCG: 20 SOLUTION RESPIRATORY (INHALATION) at 19:25

## 2025-02-22 RX ADMIN — TAMSULOSIN HYDROCHLORIDE 0.4 MG: 0.4 CAPSULE ORAL at 10:19

## 2025-02-22 RX ADMIN — VENLAFAXINE HYDROCHLORIDE 75 MG: 75 CAPSULE, EXTENDED RELEASE ORAL at 10:19

## 2025-02-22 RX ADMIN — ARIPIPRAZOLE 10 MG: 5 TABLET ORAL at 10:19

## 2025-02-22 RX ADMIN — BUDESONIDE 0.5 MG: 0.5 INHALANT ORAL at 07:10

## 2025-02-22 RX ADMIN — INSULIN LISPRO 2 UNITS: 100 INJECTION, SOLUTION INTRAVENOUS; SUBCUTANEOUS at 08:12

## 2025-02-22 RX ADMIN — IPRATROPIUM BROMIDE 0.5 MG: 0.5 SOLUTION RESPIRATORY (INHALATION) at 13:02

## 2025-02-22 RX ADMIN — METHYLPREDNISOLONE SODIUM SUCCINATE 40 MG: 40 INJECTION, POWDER, FOR SOLUTION INTRAMUSCULAR; INTRAVENOUS at 17:44

## 2025-02-22 RX ADMIN — SODIUM CHLORIDE 500 ML: 0.9 INJECTION, SOLUTION INTRAVENOUS at 06:26

## 2025-02-22 RX ADMIN — LISINOPRIL 40 MG: 20 TABLET ORAL at 10:19

## 2025-02-22 RX ADMIN — INSULIN LISPRO 6 UNITS: 100 INJECTION, SOLUTION INTRAVENOUS; SUBCUTANEOUS at 10:36

## 2025-02-22 RX ADMIN — LEVALBUTEROL HYDROCHLORIDE 1.25 MG: 1.25 SOLUTION RESPIRATORY (INHALATION) at 13:02

## 2025-02-22 RX ADMIN — IPRATROPIUM BROMIDE 0.5 MG: 0.5 SOLUTION RESPIRATORY (INHALATION) at 19:25

## 2025-02-22 RX ADMIN — MONTELUKAST 10 MG: 10 TABLET, FILM COATED ORAL at 01:55

## 2025-02-22 RX ADMIN — AZITHROMYCIN 500 MG: 500 TABLET, FILM COATED ORAL at 22:35

## 2025-02-22 NOTE — PLAN OF CARE
Problem: PAIN - ADULT  Goal: Verbalizes/displays adequate comfort level or baseline comfort level  Description: Interventions:  - Encourage patient to monitor pain and request assistance  - Assess pain using appropriate pain scale  - Administer analgesics based on type and severity of pain and evaluate response  - Implement non-pharmacological measures as appropriate and evaluate response  - Consider cultural and social influences on pain and pain management  - Notify physician/advanced practitioner if interventions unsuccessful or patient reports new pain  Outcome: Progressing     Problem: INFECTION - ADULT  Goal: Absence or prevention of progression during hospitalization  Description: INTERVENTIONS:  - Assess and monitor for signs and symptoms of infection  - Monitor lab/diagnostic results  - Monitor all insertion sites, i.e. indwelling lines, tubes, and drains  - Monitor endotracheal if appropriate and nasal secretions for changes in amount and color  - [place] appropriate cooling/warming therapies per order  - Administer medications as ordered  - Instruct and encourage patient and family to use good hand hygiene technique  - Identify and instruct in appropriate isolation precautions for identified infection/condition  Outcome: Progressing     Problem: Knowledge Deficit  Goal: Patient/family/caregiver demonstrates understanding of disease process, treatment plan, medications, and discharge instructions  Description: Complete learning assessment and assess knowledge base.  Interventions:  - Provide teaching at level of understanding  - Provide teaching via preferred learning methods  Outcome: Progressing

## 2025-02-22 NOTE — H&P
H&P - Hospitalist   Name: Fahad Hernandez 69 y.o. male I MRN: [id number]  Unit/Bed#: ED 23 I Date of Admission: 2/21/2025   Date of Service: 2/22/2025 I Hospital Day: 0     Assessment & Plan  Type 2 diabetes mellitus with hyperglycemia, without long-term current use of insulin ([facility])  Lab Results   Component Value Date    HGBA1C 8.3 (H) 01/26/2025       Recent Labs     02/19/25  0737   POCGLU 163*       Blood Sugar Average: Last 72 hrs:    HOLD metformin  ISS  Diabetic diet  Major depressive disorder  Continue Abilify, Wellbutrin and Effexor   Hypertension  Continue norvasc, lisinopril, metoprolol  Mixed hyperlipidemia  Cont lipitor  Gastroesophageal reflux disease with esophagitis  Cont PPI  Severe persistent asthma with acute exacerbation  Solumedrol, atrovent, levalbuterol plus saline nebs, proformist, singulair, pulmicort, TOOK DUPIXENT yesterday--check procalcitonin and consider CT chest, cont mucinex and tessalon pearles, add prn hydroxyzine for the anxiety that goes with the shortness of breath  CASSIE (obstructive sleep apnea)  Continue BIPAP at night  Tardive dyskinesia  Cont Astuedo  Acute lactic acidosis  Multifactorial with dehydration, multiple albuterol nebs, chronic metformin use.   Second one went up, repeat NS bolus for total of 1500ml, repeat lactic acid.      VTE Pharmacologic Prophylaxis:   High Risk (Score >/= 5) - Pharmacological DVT Prophylaxis Ordered: enoxaparin (Lovenox). Sequential Compression Devices Ordered.  Code Status: Full Code  Discussion with family: Patient declined call to .     Anticipated Length of Stay: Patient will be admitted on an observation basis with an anticipated length of stay of less than 2 midnights secondary to shortness of breath and wheezing.    History of Present Illness   Chief Complaint: shortness of breath    Fahad Hernandez is a 69 y.o. male with a PMH of bronchiectasis, hypertension, hyperlipidemia, depression, diabetes and recent COVID  who  presents with wheezing and shortness of breath.    Review of Systems   Constitutional:  Negative for activity change, appetite change, chills, diaphoresis, fatigue and fever.   HENT:  Negative for congestion, ear pain, facial swelling, nosebleeds, rhinorrhea, sore throat, trouble swallowing and voice change.    Eyes: Negative.    Respiratory:  Positive for cough, shortness of breath and wheezing.    Cardiovascular:  Negative for chest pain, palpitations and leg swelling.   Gastrointestinal:  Positive for constipation. Negative for abdominal pain, blood in stool, diarrhea, nausea and vomiting.   Endocrine: Positive for heat intolerance and polydipsia. Negative for cold intolerance, polyphagia and polyuria.   Genitourinary:  Negative for dysuria and frequency.   Musculoskeletal:  Positive for back pain and neck pain.        Chronic without change   Skin: Negative.    Allergic/Immunologic: Negative.    Neurological:  Positive for dizziness, weakness and numbness. Negative for tremors, seizures, syncope, speech difficulty and headaches.        He gets positional vertigo sometimes with standing up and sometimes with turning his head, feels like he is spinning.  Has chronic neuropathy in his toes on the right and the right leg is weaker than the left leg but has been since a service related injury.   Hematological:  Negative for adenopathy. Bruises/bleeds easily.   Psychiatric/Behavioral:  Positive for dysphoric mood. Negative for suicidal ideas. The patient is nervous/anxious.        Historical Information   Past Medical History:   Diagnosis Date    Asthma     COPD (chronic obstructive pulmonary disease) ([facility])     Dementia (HCC)     Diabetes mellitus (HCC)     GERD (gastroesophageal reflux disease)     History of stroke 11/07/2019    Hypertension     Interstitial lung disease (HCC)     Major depressive disorder with current active episode 11/07/2019    Pneumonia     Rotator cuff arthropathy of right shoulder 12/17/2024     Sleep apnea     Sleep apnea, obstructive     Stroke (HCC)     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     Past Surgical History:   Procedure Laterality Date    BACK SURGERY      ELBOW SURGERY      KNEE SURGERY      NECK SURGERY      SHOULDER SURGERY      SPINE SURGERY  2004     Social History     Tobacco Use    Smoking status: Never     Passive exposure: Never    Smokeless tobacco: Never    Tobacco comments:     Patient admits to using marijuana, edibles and smoking    Vaping Use    Vaping status: Never Used   Substance and Sexual Activity    Alcohol use: Not Currently    Drug use: Yes     Types: Marijuana, Oxycodone, Psilocybin    Sexual activity: Yes     Partners: Female     Birth control/protection: Male Sterilization     E-Cigarette/Vaping    E-Cigarette Use Never User      E-Cigarette/Vaping Substances    Nicotine No     THC No     CBD Yes     Flavoring No     Other No     Unknown No      Family History   Family history unknown: Yes     Social History:  Marital Status: /Civil Union   Patient Pre-hospital Living Situation: Home      Meds/Allergies   I have reviewed home medications using recent Epic encounter.  Prior to Admission medications    Medication Sig Start Date End Date Taking? Authorizing Provider   albuterol (2.5 mg/3 mL) 0.083 % nebulizer solution Take 3 mL (2.5 mg total) by nebulization every 6 (six) hours 1/24/25   Kirk Bueno PA-C   amLODIPine-benazepril (LOTREL) 10-40 MG per capsule  2/26/19   Historical Provider, MD   ARIPiprazole (ABILIFY) 10 mg tablet  9/28/20   Historical Provider, MD   aspirin (ECOTRIN LOW STRENGTH) 81 mg EC tablet Take 81 mg by mouth daily    Historical Provider, MD   atorvastatin (LIPITOR) 10 mg tablet Take 10 mg by mouth daily 2/28/22   Historical Provider, MD   Austedo 12 MG TABS  5/30/24   Historical Provider, MD   benzonatate (TESSALON PERLES) 100 mg capsule Take 1 capsule (100 mg total) by mouth 3 (three) times a day 2/19/25   Yolanda Neumann PA-C   budesonide  (PULMICORT) 0.5 mg/2 mL nebulizer solution Take 2 mL (0.5 mg total) by nebulization every 12 (twelve) hours Rinse mouth after use. 1/24/25   Kirk Bueno PA-C   buPROPion (WELLBUTRIN XL) 300 mg 24 hr tablet 1 tab(s)    Historical Provider, MD   clopidogrel (PLAVIX) 75 mg tablet Take 1 tablet (75 mg total) by mouth daily 1/31/25   Carter Srivastava MD   docusate sodium (COLACE) 100 mg capsule Take 1 capsule (100 mg total) by mouth 2 (two) times a day 1/30/25   Carter Srivastava MD   Dupixent subcutaneous injection INJECT 2ML SUBCUTANEOUSLY 1 TIME EVERY 2 WEEKS *NEW PRESCRIPTION REQUEST* 12/27/24   Kirk Bueno PA-C   esomeprazole (NexIUM) 40 MG capsule Take 40 mg by mouth    Historical Provider, MD   formoterol (PERFOROMIST) 20 MCG/2ML nebulizer solution Take 2 mL (20 mcg total) by nebulization 2 (two) times a day 1/24/25   Kirk Bueno PA-C   guaiFENesin (MUCINEX) 600 mg 12 hr tablet Take 1 tablet (600 mg total) by mouth every 12 (twelve) hours 2/19/25   Yolanda Neumann PA-C   metFORMIN (GLUCOPHAGE) 500 mg tablet Take 1 tablet by mouth 2 (two) times a day with meals 2/14/22   Historical Provider, MD   metoprolol succinate (TOPROL-XL) 25 mg 24 hr tablet TAKE 1 TABLET (25 MG TOTAL) BY MOUTH DAILY. 10/24/24   JAMIL Lindsay   montelukast (SINGULAIR) 10 mg tablet Take 1 tablet (10 mg total) by mouth daily at bedtime 1/24/25   Kirk Bueno PA-C   multivitamin (THERAGRAN) TABS Take 1 tablet by mouth    Historical Provider, MD   nitroglycerin (NITROSTAT) 0.4 mg SL tablet Place 1 tablet (0.4 mg total) under the tongue every 5 (five) minutes as needed for chest pain 10/1/24   JAMIL Lindsay   OneTouch Ultra test strip TEST DAILY.. DIAGNOSIS E11.9 3/28/23   Historical Provider, MD   predniSONE 20 mg tablet Take 2 tablets (40 mg total) by mouth daily for 2 days, THEN 1 tablet (20 mg total) daily for 2 days, THEN 0.5 tablets (10 mg total) daily for 2 days. 2/19/25 2/25/25  Yolanda Neumann PA-C    tamsulosin (FLOMAX) 0.4 mg Take 0.4 mg by mouth daily 2/13/22   Historical Provider, MD   temazepam (RESTORIL) 30 mg capsule Take 30 mg by mouth daily at bedtime as needed 3/15/22   Historical Provider, MD   venlafaxine (EFFEXOR-XR) 75 mg 24 hr capsule 1 cap(s)    Historical Provider, MD     Allergies   Allergen Reactions    Beta Adrenergic Blockers      Pt states they make him feel crappy.       Objective :  Temp:  [98 °F (36.7 °C)] 98 °F (36.7 °C)  HR:  [104-128] 106  BP: (106-137)/(56-86) 135/58  Resp:  [20-43] 31  SpO2:  [93 %-99 %] 97 %  O2 Device: Nasal cannula  Nasal Cannula O2 Flow Rate (L/min):  [2 L/min] 2 L/min    Physical Exam  Constitutional:       General: He is in acute distress.      Appearance: He is not ill-appearing, toxic-appearing or diaphoretic.      Comments: Mild increased work of breathing when interviewing for H+P   HENT:      Head: Normocephalic and atraumatic.      Mouth/Throat:      Mouth: Mucous membranes are dry.      Pharynx: No oropharyngeal exudate or posterior oropharyngeal erythema.   Eyes:      General: No scleral icterus.     Extraocular Movements: Extraocular movements intact.      Conjunctiva/sclera: Conjunctivae normal.      Pupils: Pupils are equal, round, and reactive to light.   Neck:      Vascular: No carotid bruit.   Cardiovascular:      Rate and Rhythm: Regular rhythm. Tachycardia present.      Pulses: Normal pulses.      Heart sounds: Normal heart sounds. No murmur heard.     No friction rub. No gallop.   Pulmonary:      Effort: Respiratory distress present.      Breath sounds: No stridor. Wheezing present. No rhonchi or rales.   Chest:      Chest wall: No tenderness.   Abdominal:      General: Abdomen is flat. Bowel sounds are normal. There is no distension.      Palpations: Abdomen is soft.      Tenderness: There is no abdominal tenderness. There is no guarding or rebound.   Musculoskeletal:         General: No swelling or tenderness.      Cervical back: No  rigidity or tenderness.   Lymphadenopathy:      Cervical: No cervical adenopathy.   Skin:     General: Skin is warm and dry.      Coloration: Skin is pale. Skin is not jaundiced.   Neurological:      General: No focal deficit present.      Mental Status: He is alert and oriented to person, place, and time.      Cranial Nerves: No cranial nerve deficit.      Sensory: No sensory deficit.      Motor: Weakness present.      Coordination: Coordination normal.      Comments: Bilateral lower extremity weakness and RUE weakness on shoulder flexion but relates it is limited by pain in the shoulder, he also reports that the right leg is always weaker since a service related injury   Psychiatric:         Mood and Affect: Mood normal.         Behavior: Behavior normal.         Thought Content: Thought content normal.         Judgment: Judgment normal.                 Lab Results: I have reviewed the following results:  Results from last 7 days   Lab Units 02/21/25 1942 02/17/25  0521 02/16/25  0305   WBC Thousand/uL 15.14*   < > 10.92*   HEMOGLOBIN g/dL 9.2*   < > 9.6*   HEMATOCRIT % 30.7*   < > 32.1*   PLATELETS Thousands/uL 496*   < > 461*   BANDS PCT %  --   --  3   SEGS PCT % 80*   < >  --    LYMPHO PCT % 13*   < > 23   MONO PCT % 6   < > 10   EOS PCT % 0   < > 1    < > = values in this interval not displayed.     Results from last 7 days   Lab Units 02/21/25 1942   SODIUM mmol/L 138   POTASSIUM mmol/L 4.4   CHLORIDE mmol/L 107   CO2 mmol/L 22   BUN mg/dL 36*   CREATININE mg/dL 0.79   ANION GAP mmol/L 9   CALCIUM mg/dL 8.6   ALBUMIN g/dL 3.2*   TOTAL BILIRUBIN mg/dL 0.27   ALK PHOS U/L 86   ALT U/L 17   AST U/L 14   GLUCOSE RANDOM mg/dL 228*     Results from last 7 days   Lab Units 02/21/25 1942   INR  1.03     Results from last 7 days   Lab Units 02/19/25  0737 02/18/25  2107 02/18/25  1617 02/18/25  1115 02/18/25  0705 02/17/25  2045 02/17/25  1620 02/17/25  1149 02/17/25  0757 02/16/25  2112 02/16/25  1615  02/16/25  1139   POC GLUCOSE mg/dl 163* 124 217* 202* 108 165* 160* 153* 176* 149* 229* 226*     Lab Results   Component Value Date    HGBA1C 8.3 (H) 01/26/2025    HGBA1C 8.1 (H) 12/14/2024    HGBA1C 7.0 (H) 09/11/2024     Results from last 7 days   Lab Units 02/21/25  2158 02/18/25  0513 02/17/25  0959   LACTIC ACID mmol/L 3.7*  --   --    PROCALCITONIN ng/ml 0.09 <0.05 <0.05     CXR: No acute process per my review    Other Study Results Review: EKG was reviewed.     Administrative Statements   Spent 65 minutes reviewing chart, interviewing and examining patient, placing orders, discussing with other providers and documenting in chart

## 2025-02-22 NOTE — RESPIRATORY THERAPY NOTE
RT Protocol Note  Fahad Hernandez 69 y.o. male MRN: [id number]  Unit/Bed#: 2 E 277-01 Encounter: [id number]    Assessment    Principal Problem:    Severe persistent asthma with acute exacerbation  Active Problems:    Hypertension    Type 2 diabetes mellitus with hyperglycemia, without long-term current use of insulin (HCC)    Major depressive disorder    Mixed hyperlipidemia    Gastroesophageal reflux disease with esophagitis    Acute lactic acidosis    CASSIE (obstructive sleep apnea)    Tardive dyskinesia      Home Pulmonary Medications:         Past Medical History:   Diagnosis Date    Asthma     COPD (chronic obstructive pulmonary disease) ([facility])     Dementia (HCC)     Diabetes mellitus (HCC)     GERD (gastroesophageal reflux disease)     History of stroke 11/07/2019    Hypertension     Interstitial lung disease ([facility])     Major depressive disorder with current active episode 11/07/2019    Pneumonia     Rotator cuff arthropathy of right shoulder 12/17/2024    Sleep apnea     Sleep apnea, obstructive     Stroke ([facility])     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     Social History     Socioeconomic History    Marital status: /Civil Union     Spouse name: Not on file    Number of children: Not on file    Years of education: Not on file    Highest education level: Not on file   Occupational History    Not on file   Tobacco Use    Smoking status: Never     Passive exposure: Never    Smokeless tobacco: Never    Tobacco comments:     Patient admits to using marijuana, edibles and smoking    Vaping Use    Vaping status: Never Used   Substance and Sexual Activity    Alcohol use: Not Currently    Drug use: Yes     Types: Marijuana, Oxycodone, Psilocybin    Sexual activity: Yes     Partners: Female     Birth control/protection: Male Sterilization   Other Topics Concern    Not on file   Social History Narrative    Not on file     Social Drivers of Health     Financial Resource Strain: Low Risk  (11/1/2024)    Received from [facility]    Overall Financial Resource Strain (CARDIA)     Difficulty of Paying Living Expenses: Not hard at all   Food Insecurity: No Food Insecurity (2/22/2025)    Nursing - Inadequate Food Risk Classification     Worried About Running Out of Food in the Last Year: Never true     Ran Out of Food in the Last Year: Never true     Ran Out of Food in the Last Year: Never true   Transportation Needs: No Transportation Needs (2/22/2025)    Nursing - Transportation Risk Classification     Lack of Transportation: Not on file     Lack of Transportation: No   Physical Activity: Inactive (11/1/2024)    Received from [facility]    Exercise Vital Sign     Days of Exercise per Week: 0 days     Minutes of Exercise per Session: 0 min   Stress: Stress Concern Present (11/1/2024)    Received from [facility]    [employer] [place] [employer] - Occupational Stress Questionnaire     Feeling of Stress : To some extent   Social Connections: Moderately Isolated (11/1/2024)    Received from [facility]    Social Connection and Isolation Panel [NHANES]     Frequency of Communication with Friends and Family: Three times a week     Frequency of Social Gatherings with Friends and Family: Twice a week     Attends [employer]ism Services: Never     Active Member of Clubs or Organizations: No     Attends Club or Organization Meetings: Never     Marital Status:    Intimate Partner Violence: Unknown (2/22/2025)    Nursing IPS     Feels Physically and Emotionally Safe: Not on file     Physically Hurt by Someone: Not on file     Humiliated or Emotionally Abused by Someone: Not on file     Physically Hurt by Someone: No     Hurt or Threatened by Someone: No   Housing Stability: Unknown (2/22/2025)    Nursing: Inadequate Housing Risk Classification     Has Housing: Not on file     Worried About Losing Housing: Not on file     Unable to Get Utilities: Not on file     Unable to Pay for  "Housing in the Last Year: No     Has Housin       Subjective         Objective    Physical Exam:   Assessment Type: During-treatment  General Appearance: Awake, Alert  Respiratory Pattern: Normal  Chest Assessment: Chest expansion symmetrical  Bilateral Breath Sounds: Diminished  Cough: None  O2 Device: V60    Vitals:  Blood pressure 134/61, pulse (!) 109, temperature 97.9 °F (36.6 °C), temperature source Oral, resp. rate (!) 24, height 5' 11\" (1.803 m), weight 72 kg (158 lb 11.7 oz), SpO2 99%.          Imaging and other studies:     O2 Device: V60     Plan    Respiratory Plan: Mild Distress pathway, Home Bronchodilator Patient pathway        Resp Comments: pt with hx of copd and asthma, dx of covid, will continue with xop atr tid and pulm perf bid   "

## 2025-02-22 NOTE — PLAN OF CARE
Problem: PAIN - ADULT  Goal: Verbalizes/displays adequate comfort level or baseline comfort level  Description: Interventions:  - Encourage patient to monitor pain and request assistance  - Assess pain using appropriate pain scale  - Administer analgesics based on type and severity of pain and evaluate response  - Implement non-pharmacological measures as appropriate and evaluate response  - Consider cultural and social influences on pain and pain management  - Notify physician/advanced practitioner if interventions unsuccessful or patient reports new pain  Outcome: Progressing     Problem: INFECTION - ADULT  Goal: Absence or prevention of progression during hospitalization  Description: INTERVENTIONS:  - Assess and monitor for signs and symptoms of infection  - Monitor lab/diagnostic results  - Monitor all insertion sites, i.e. indwelling lines, tubes, and drains  - Monitor endotracheal if appropriate and nasal secretions for changes in amount and color  - [place] appropriate cooling/warming therapies per order  - Administer medications as ordered  - Instruct and encourage patient and family to use good hand hygiene technique  - Identify and instruct in appropriate isolation precautions for identified infection/condition  Outcome: Progressing  Goal: Absence of fever/infection during neutropenic period  Description: INTERVENTIONS:  - Monitor WBC    Outcome: Progressing     Problem: SAFETY ADULT  Goal: Patient will remain free of falls  Description: INTERVENTIONS:  - Educate patient/family on patient safety including physical limitations  - Instruct patient to call for assistance with activity   - Consult OT/PT to assist with strengthening/mobility   - Keep Call bell within reach  - Keep bed low and locked with side rails adjusted as appropriate  - Keep care items and personal belongings within reach  - Initiate and maintain comfort rounds  - Make Fall Risk Sign visible to staff  - Offer Toileting every 2 Hours,  in advance of need  - Initiate/Maintain bed/chair alarm  - Obtain necessary fall risk management equipment.  - Apply yellow socks and bracelet for high fall risk patients  - Consider moving patient to room near nurses station  Outcome: Progressing  Goal: Maintain or return to baseline ADL function  Description: INTERVENTIONS:  -  Assess patient's ability to carry out ADLs; assess patient's baseline for ADL function and identify physical deficits which impact ability to perform ADLs (bathing, care of mouth/teeth, toileting, grooming, dressing, etc.)  - Assess/evaluate cause of self-care deficits   - Assess range of motion  - Assess patient's mobility; develop plan if impaired  - Assess patient's need for assistive devices and provide as appropriate  - Encourage maximum independence but intervene and supervise when necessary  - Involve family in performance of ADLs  - Assess for home care needs following discharge   - Consider OT consult to assist with ADL evaluation and planning for discharge  - Provide patient education as appropriate  Outcome: Progressing  Goal: Maintains/Returns to pre admission functional level  Description: INTERVENTIONS:  - Perform AM-PAC 6 Click Basic Mobility/ Daily Activity assessment daily.  - Set and communicate daily mobility goal to care team and patient/family/caregiver.   - Collaborate with rehabilitation services on mobility goals if consulted  - Perform Range of Motion 2 times a day.  - Reposition patient every 2 hours.  - Dangle patient 2 times a day  - Stand patient 2 times a day  - Ambulate patient 2 times a day  - Out of bed to chair 2 times a day   - Out of bed for meals 2 times a day  - Out of bed for toileting  - Record patient progress and toleration of activity level   Outcome: Progressing     Problem: DISCHARGE PLANNING  Goal: Discharge to home or other facility with appropriate resources  Description: INTERVENTIONS:  - Identify barriers to discharge w/patient and  caregiver  - Arrange for needed discharge resources and transportation as appropriate  - Identify discharge learning needs (meds, wound care, etc.)  - Arrange for interpretive services to assist at discharge as needed  - Refer to Case Management Department for coordinating discharge planning if the patient needs post-hospital services based on physician/advanced practitioner order or complex needs related to functional status, cognitive ability, or social support system  Outcome: Progressing     Problem: Knowledge Deficit  Goal: Patient/family/caregiver demonstrates understanding of disease process, treatment plan, medications, and discharge instructions  Description: Complete learning assessment and assess knowledge base.  Interventions:  - Provide teaching at level of understanding  - Provide teaching via preferred learning methods  Outcome: Progressing

## 2025-02-22 NOTE — RESPIRATORY THERAPY NOTE
2   02/22/25 0222   Respiratory Assessment   Assessment Type Assess only   General Appearance Drowsy   Respiratory Pattern Normal   Chest Assessment Chest expansion symmetrical   Bilateral Breath Sounds Expiratory wheezes   Cough None   Resp Comments Pt placed on bipap at this time   O2 Device V60   Non-Invasive Information   O2 Interface Device Nasal mask   Non-Invasive Ventilation Mode BiPAP   $ Intermittent NIV Yes   SpO2 98 %   $ Pulse Oximetry Spot Check Charge Completed   Non-Invasive Settings   IPAP (cm) 12 cm   EPAP (cm) 6 cm   Rate (Set) 8   FiO2 (%) 40   Pressure Support (cm H2O) 6   Rise Time 2   Non-Invasive Readings   Total Rate 27   Vt (mL) (Mech) 705   MV (Mech) 20   Peak Pressure (Obs) 14   Spontaneous Vt (mL) 785   Leak (lpm) 34   Skin Intervention Skin intact   Non-Invasive Alarms   Insp Pressure High (cm H20) 25   Insp Pressure Low (cm H20) 5   Low Insp Pressure Time (sec) 20 sec   MV Low (L/min) 2   Vt High (mL) 1200   Vt Low (mL) 200   High Resp Rate (BPM) 40 BPM   Low Resp Rate (BPM) 8 BPM     RT Ventilator Management Note  Fahad Hernandez 69 y.o. male MRN: [id number]  Unit/Bed#: 2 E 277-01 Encounter: [id number]      Daily Screen    No data found in the last 10 encounters.           Physical Exam:   Assessment Type: Assess only  General Appearance: Drowsy  Respiratory Pattern: Normal  Chest Assessment: Chest expansion symmetrical  Bilateral Breath Sounds: Expiratory wheezes  Cough: None  O2 Device: V60      Resp Comments: Pt placed on bipap at this time

## 2025-02-22 NOTE — QUICK NOTE
Postadmission follow-up.  The patient still continues to have some expiratory wheezes with poor inspiratory effort.  Continue with the steroids for now.  Patient states he is short of breath with minimal exertion.  Continue with respiratory protocol as well.  O2 sats are okay.  Patient is currently on sliding scale insulin.  However if his blood sugars remain significantly elevated may consider putting the patient on Lantus for now

## 2025-02-22 NOTE — UTILIZATION REVIEW
Initial Clinical Review    ED TREATMENT TIME  2/21  @   1930    OBSERVATION   2/22  and changed to IP ADMISSION 2/22  @  1245    Admission: Date/Time/Statement:   Admission Orders (From admission, onward)       Ordered        02/22/25 0028  Place in Observation  Once                          2/22/25 1245  INPATIENT ADMISSION  Once        Transfer Service: Hospitalist   Question Answer Comment   Level of Care Med Surg    Estimated length of stay More than 2 Midnights    Certification I certify that inpatient services are medically necessary for this patient for a duration of greater than two midnights. See H&P and MD Progress Notes for additional information about the patient's course of treatment.        02/22/25 1245       ED Arrival Information       Expected   -    Arrival   2/21/2025 19:22    Acuity   Urgent              Means of arrival   Ambulance    Escorted by   [facility]   Hospitalist    Admission type   Emergency              Arrival complaint   sob             Chief Complaint   Patient presents with    Shortness of Breath     Son w exertion, recent covid, asthma hx, nebs and solu en route with improvement        Initial Presentation: 69 y.o. male presents to ED via  EMS  from home with shortness of  breath and wheezing.  PMH  is  bronchiectasis,  tardive dyskinesia, GERD,  HTN, depression, DM and recent COVID.Labs  show  lactic acidosis.  Admit  Observation with  Severe persistent asthma, acute exacerbation, Acute  Lactic acidosis  and plan is  IV  s/medrol,  BiPap  HS, monitor labs, S/P  NS  bolus for 1500 cc,   nebulizers and continue home meds.    2/22 IP ADMISSION  Continue  IV  steroids.   Still SOB with minimal exertion.  Still with E  wheezes,  poor inspiratory effort.    Date:   2/23  Day 3: Has surpassed a 2nd midnight with active treatments and services.  Pulmonary consult  Continue  IV  s/medrol and po antibiotics.   Continue nebulizers.   O2  sats  95  RA.  Likely  needs a   long  steroid taper.    Still with SOB  with exertion. + wheezing on  exam              ED Treatment-Medication Administration from 02/21/2025 1922 to 02/22/2025 0055         Date/Time Order Dose Route Action     02/21/2025 1930 albuterol (FOR EMS ONLY) (2.5 mg/3 mL) 0.083 % inhalation solution 2.5 mg 0 mg Does not apply Given to EMS     02/21/2025 1930 ipratropium-albuterol (FOR EMS ONLY) (DUO-NEB) 0.5-2.5 mg/3 mL inhalation solution 6 mL 0 mL Does not apply Given to EMS     02/21/2025 1930 methylPREDNISolone sodium succinate (FOR EMS ONLY) (Solu-MEDROL) 125 MG injection 125 mg 0 mg Does not apply Given to EMS     02/21/2025 1946 sodium chloride 0.9 % bolus 500 mL 500 mL Intravenous New Bag     02/21/2025 2007 albuterol inhalation solution 10 mg 10 mg Nebulization Given     02/21/2025 2007 ipratropium (ATROVENT) 0.02 % inhalation solution 1 mg 1 mg Nebulization Given     02/21/2025 2007 sodium chloride 0.9 % inhalation solution 12 mL 12 mL Nebulization Given     02/21/2025 2040 magnesium sulfate 2 g/50 mL IVPB (premix) 2 g 2 g Intravenous New Bag     02/21/2025 2040 magnesium Oxide (MAG-OX) tablet 400 mg 400 mg Oral Given     02/21/2025 2208 azithromycin (ZITHROMAX) 500 mg in sodium chloride 0.9% 250mL IVPB 500 mg 500 mg Intravenous New Bag     02/21/2025 2141 magnesium sulfate 4 g/100 mL IVPB (premix) 4 g 4 g Intravenous New Bag     02/21/2025 2250 levalbuterol (XOPENEX) inhalation solution 1.25 mg 1.25 mg Nebulization Given     02/21/2025 2345 sodium chloride 0.9 % bolus 500 mL 500 mL Intravenous New Bag            Scheduled Medications:  amLODIPine, 10 mg, Oral, Daily   And  lisinopril, 40 mg, Oral, Daily  ARIPiprazole, 10 mg, Oral, Daily  aspirin, 81 mg, Oral, Daily  atorvastatin, 10 mg, Oral, Daily With Dinner  azithromycin, 500 mg, Oral, Q24H  benzonatate, 100 mg, Oral, TID  budesonide, 0.5 mg, Nebulization, Q12H  buPROPion, 300 mg, Oral, Daily  clopidogrel, 75 mg, Oral, Daily  Deutetrabenazine, 12 mg, Oral,  Daily  docusate sodium, 100 mg, Oral, BID  enoxaparin, 40 mg, Subcutaneous, Q24H PARTHA  formoterol, 20 mcg, Nebulization, BID  guaiFENesin, 600 mg, Oral, Q12H PARTHA  insulin lispro, 1-6 Units, Subcutaneous, TID AC  insulin lispro, 1-6 Units, Subcutaneous, HS  ipratropium, 0.5 mg, Nebulization, TID  levalbuterol, 1.25 mg, Nebulization, TID  methylPREDNISolone sodium succinate, 40 mg, Intravenous, Q8H  metoprolol succinate, 25 mg, Oral, Daily  montelukast, 10 mg, Oral, HS  pantoprazole, 40 mg, Oral, Early Morning  tamsulosin, 0.4 mg, Oral, Daily  venlafaxine, 75 mg, Oral, Daily      Continuous IV Infusions:     PRN Meds:  acetaminophen, 650 mg, Oral, Q6H PRN  hydrOXYzine HCL, 25 mg, Oral, Q6H PRN  nitroglycerin, 0.4 mg, Sublingual, Q5 Min PRN  oxyCODONE-acetaminophen, 1.5 tablet, Oral, Q4H PRN      ED Triage Vitals   Temperature Pulse Respirations Blood Pressure SpO2 Pain Score   02/21/25 1926 02/21/25 1926 02/21/25 1926 02/21/25 1926 02/21/25 1926 02/22/25 0201   98 °F (36.7 °C) 104 20 133/78 95 % 8     Weight (last 2 days)       Date/Time Weight    02/22/25 0548 72 (158.73)    02/22/25 0300 72 (158.73)    02/22/25 0201 71.8 (158.29)            Vital Signs (last 3 days)       Date/Time Temp Pulse Resp BP MAP (mmHg) SpO2 Calculated FIO2 (%) - Nasal Cannula Nasal Cannula O2 Flow Rate (L/min) O2 Device O2 Interface Device Patient Position - Orthostatic VS Pain    02/22/25 07:36:21 -- 96 -- -- -- 99 % -- -- -- -- -- --    02/22/25 0730 98 °F (36.7 °C) 96 21 139/75 96 99 % -- -- Nasal cannula -- Lying --    02/22/25 0710 -- -- -- -- -- 100 % -- -- -- -- -- --    02/22/25 0222 -- -- -- -- -- 98 % -- -- -- Nasal mask -- --    02/22/25 0201 -- -- -- -- -- -- -- -- -- -- -- 8    02/22/25 01:05:22 97.9 °F (36.6 °C) 109 24 134/61 85 97 % -- -- -- -- Sitting --    02/22/25 0000 -- 106 31 135/58 83 97 % 28 2 L/min Nasal cannula -- -- --    02/21/25 2330 -- 112 26 117/56 80 96 % -- -- -- -- -- --    02/21/25 2300 -- 110 33 106/86 92  99 % -- -- -- -- -- --    02/21/25 2230 -- 116 34 116/64 83 95 % -- -- -- -- -- --    02/21/25 2215 -- 116 31 123/57 82 96 % -- -- -- -- -- --    02/21/25 2200 -- 116 33 -- -- 95 % -- -- -- -- -- --    02/21/25 2130 -- 114 36 -- -- 96 % -- -- -- -- -- --    02/21/25 2100 -- 120 27 137/73 99 99 % -- -- Aerosol mask -- -- --    02/21/25 2030 -- 128 43 -- -- 98 % -- -- -- -- -- --    02/21/25 2012 -- 110 29 130/75 -- 93 % -- -- Aerosol mask -- -- --    02/21/25 1954 -- -- -- -- -- -- -- -- None (Room air) -- -- --    02/21/25 1930 -- 105 -- 133/78 99 95 % -- -- -- -- -- --    02/21/25 1926 98 °F (36.7 °C) 104 20 133/78 -- 95 % -- -- -- -- -- --              Pertinent Labs/Diagnostic Test Results:   Radiology:  XR chest 1 view portable   ED Interpretation by Donna Stringer DO (02/21 2037)   No acute abnormality in the chest.      Final Interpretation by Naida Grady MD (02/22 0917)      No acute cardiopulmonary disease.            Workstation performed: [id number]           Cardiology:  ECG 12 lead    by Interface, Ris Results In (02/21 2010)        Results from last 7 days   Lab Units 02/16/25  0305   SARS-COV-2  Positive*     Results from last 7 days   Lab Units 02/22/25  0436 02/21/25  1942 02/18/25  0513 02/17/25  0521 02/16/25  0305   WBC Thousand/uL 14.24* 15.14* 15.07* 15.31* 10.92*   HEMOGLOBIN g/dL 9.2* 9.2* 9.9* 9.5* 9.6*   HEMATOCRIT % 30.2* 30.7* 31.6* 32.7* 32.1*   PLATELETS Thousands/uL 506* 496* 543* 513* 461*   TOTAL NEUT ABS Thousands/µL 12.66* 12.14* 10.12*  --   --    BANDS PCT %  --   --   --   --  3         Results from last 7 days   Lab Units 02/22/25  0436 02/21/25  1942 02/17/25  0521 02/16/25  0305   SODIUM mmol/L 138 138 141 140   POTASSIUM mmol/L 4.5 4.4 4.1 3.5   CHLORIDE mmol/L 108 107 108 102   CO2 mmol/L 22 22 25 25   ANION GAP mmol/L 8 9 8 13   BUN mg/dL 27* 36* 19 29*   CREATININE mg/dL 0.80 0.79 0.69 1.17   EGFR ml/min/1.73sq m 89 90 95 62   CALCIUM mg/dL 8.5 8.6 8.6  8.6   MAGNESIUM mg/dL 2.6 1.5* 1.2*  --    PHOSPHORUS mg/dL 2.0*  --   --   --      Results from last 7 days   Lab Units 02/22/25 0436 02/21/25 1942   AST U/L 12* 14   ALT U/L 16 17   ALK PHOS U/L 79 86   TOTAL PROTEIN g/dL 5.9* 5.9*   ALBUMIN g/dL 3.3* 3.2*   TOTAL BILIRUBIN mg/dL 0.24 0.27   BILIRUBIN DIRECT mg/dL  --  0.07     Results from last 7 days   Lab Units 02/22/25  0736 02/22/25  0131 02/19/25  0737 02/18/25  2107 02/18/25  1617 02/18/25  1115 02/18/25  0705 02/17/25  2045 02/17/25  1620 02/17/25  1149 02/17/25  0757 02/16/25 2112   POC GLUCOSE mg/dl 194* 275* 163* 124 217* 202* 108 165* 160* 153* 176* 149*     Results from last 7 days   Lab Units 02/22/25 0436 02/21/25 1942 02/17/25  0521 02/16/25  0305   GLUCOSE RANDOM mg/dL 286* 228* 134 194*          Results from last 7 days   Lab Units 02/21/25 1942   PH JOSE DANIEL  7.458*   PCO2 JOSE DANIEL mm Hg 29.5*   PO2 JOSE DANIEL mm Hg 46.6*   HCO3 JOSE DANIEL mmol/L 20.4*   BASE EXC JOSE DANIEL mmol/L -2.6   O2 CONTENT JOSE DANIEL ml/dL 11.9   O2 HGB, VENOUS % 81.6*             Results from last 7 days   Lab Units 02/21/25 2158 02/21/25 1942 02/16/25  0521 02/16/25  0305   HS TNI 0HR ng/L  --  6  --  5   HS TNI 2HR ng/L 7  --  6  --    HSTNI D2 ng/L 1  --  1  --      Results from last 7 days   Lab Units 02/21/25 1942   D-DIMER QUANTITATIVE ug/ml FEU 0.41     Results from last 7 days   Lab Units 02/22/25 0436 02/21/25 1942 02/16/25  0305   PROTIME seconds 14.1 14.2 13.3   INR  1.02 1.03 0.94   PTT seconds 26 28 27         Results from last 7 days   Lab Units 02/21/25  2158 02/18/25  0513 02/17/25  0959   PROCALCITONIN ng/ml 0.09  0.09 <0.05 <0.05     Results from last 7 days   Lab Units 02/22/25  0837 02/22/25  0436 02/22/25  0035 02/21/25  2158   LACTIC ACID mmol/L 2.0 3.6* 4.9* 3.7*             Results from last 7 days   Lab Units 02/21/25  1942 02/16/25  0305   BNP pg/mL 121* 50                         Results from last 7 days   Lab Units 02/17/25  0521   CRP mg/L 6.8*                  Results from last 7 days   Lab Units 02/16/25  0305   INFLUENZA A PCR  Negative   INFLUENZA B PCR  Negative   RSV PCR  Negative                           Present on Admission:   Type 2 diabetes mellitus with hyperglycemia, without long-term current use of insulin ([facility])   Major depressive disorder   Hypertension   Mixed hyperlipidemia   Gastroesophageal reflux disease with esophagitis   Severe persistent asthma with acute exacerbation   Acute lactic acidosis   CASSIE (obstructive sleep apnea)   Tardive dyskinesia      Admitting Diagnosis: COPD with acute exacerbation (HCC) [J44.1]  Frequent PVCs [I49.3]  COVID-19 virus infection [U07.1]  Age/Sex: 69 y.o. male    Network Utilization Review Department  ATTENTION: Please call with any questions or concerns to [phone] and carefully listen to the prompts so that you are directed to the right person. All voicemails are confidential.   For Discharge needs, contact Care Management DC Support Team at [phone] opt. [phone]  Send all requests for admission clinical reviews, approved or denied determinations and any other requests to dedicated fax number below belonging to the campus where the patient is receiving treatment. List of dedicated fax numbers for the Facilities:  FACILITY NAME UR FAX NUMBER   ADMISSION DENIALS (Administrative/Medical Necessity) [phone]   DISCHARGE SUPPORT TEAM (NETWORK) [phone]   PARENT CHILD HEALTH (Maternity/NICU/Pediatrics) [phone]   [facility] [phone]   [facility] [phone]   [facility] [phone]   [facility] [phone]   [facility] [phone]   [facility] [phone]   [facility] [phone]   [facility] [phone]   [facility] [phone]   [facility]  [facility] [phone]   [facility] [phone]   [facility] [phone]

## 2025-02-22 NOTE — ASSESSMENT & PLAN NOTE
Multifactorial with dehydration, multiple albuterol nebs, chronic metformin use.   Second one went up, repeat NS bolus for total of 1500ml, repeat lactic acid.

## 2025-02-22 NOTE — PROGRESS NOTES
Progress Note - Triage Asssessment   Fahad Hernandez 69 y.o. male MRN: [id number]    Time Called ( Time): 2345  Date Called: 02/22/25  Room#: ED23  Person requesting evaluation: Dr. Stringer    Situation:    Patient is recently admitted and treated for COVID  Presents today with worsening shortness of breath and wheezing  Received nebulizers, steroids, IV magnesium in the ED  Upon assessment the patient had mild wheezing of the right lower lobe      Interventions:   Placed on nasal cannula for work of breathing  Recommend treatment for COPD exacerbation with azithromycin, scheduled steroids IV, continue home Pulmicort, Perforomist, Singulair, as needed Xopenex.  Patient reports wearing BiPAP nightly, would continue with this regimen.          Triage Assessment:     Patient can be admitted to med-surg level of care    Recommendations discussed with Dr. Rodas

## 2025-02-22 NOTE — ED PROVIDER NOTES
Time reflects when diagnosis was documented in both MDM as applicable and the Disposition within this note       Time User Action Codes Description Comment    2/22/2025 12:15 AM Donna Stringer Add [J44.1] COPD with acute exacerbation (HCC)     2/22/2025 12:15 AM Donna Stringer Add [I49.3] Frequent PVCs     2/22/2025 12:15 AM Donan Stringer Add [U07.1] COVID-19 virus infection           ED Disposition       ED Disposition   Admit    Condition   Stable    Date/Time   Sat Feb 22, 2025 12:27 AM    Comment   Case was discussed with ZOE and the patient's admission status was agreed to be Admission Status: observation status to the service of Dr. Rodas .               Assessment & Plan       Medical Decision Making  69-year-old male with recent diagnosis of COVID 5 days ago requiring hospitalization due to hypoxia, presents to the ED for acute dyspnea today.  Patient does have history of asthma and COPD and presents with bronchospasm.  Patient currently on prednisone taper but failing outpatient management.  He is not hypoxic.  Will check cardiac labs, EKG, chest x-ray.  Will also obtain D-dimer given recent hospitalization and COVID infection.  Will provide small fluid bolus, hour-long SOMMERS neb.     Amount and/or Complexity of Data Reviewed  Labs: ordered. Decision-making details documented in ED Course.  Radiology: ordered and independent interpretation performed. Decision-making details documented in ED Course.  ECG/medicine tests: ordered and independent interpretation performed. Decision-making details documented in ED Course.    Risk  OTC drugs.  Prescription drug management.  Decision regarding hospitalization.        ED Course as of 02/22/25 0028   Fri Feb 21, 2025 1954 WBC(!): 15.14   1954 Hemoglobin(!): 9.2  WBC count and hemoglobin is similar to recent labs 3 and 4 days ago.  Leukocytosis likely secondary to recent steroids during hospitalization.  According to records he was on dexamethasone for COVID.    2016 MAGNESIUM(!): 1.5  Will give PO and IV magnesium.   2029 D-Dimer, Quant: 0.41   2131 Nursing printed out rhythm strip showing 4-5 beat run of nonsustained V. tach.  Patient has had intermittent PVCs.  He is more tachycardic which I attribute to the albuterol treatment.  I suspect the albuterol is making patient's myocardium more irritable.  Will recommend admission for cardiac monitoring as well as for treatment of COPD exacerbation failing outpatient prednisone.   2135 Patient reassessed and still feels short of breath.  He is still mildly tachypneic with accessory muscle use.  Patient still has decreased air movement with diffuse wheezing.  Patient likely needs more albuterol but he is having tachycardia with frequent PVCs will hold off for now.  Will start high flow nasal cannula to assist with respiratory effort.  Will discuss with critical care.   2155 Critical care attending and PA at bedside and does not feel patient needs high flow nasal cannula.  They recommended giving additional magnesium including additional 4 g over 1 hour and reassessing.   2235 LACTIC ACID(!): 3.7  Lactic acid elevated likely secondary to albuterol.   2325 Patient reassessed and he received all of the magnesium as well as the lev albuterol.  Patient still tight with wheezing and still mildly tachypneic.  He states he is still feeling short of breath.  Will have internal medicine evaluate patient and if they feel he needs higher level of care, will reconsult ICU.   Sat Feb 22, 2025   0026 Both critical care and internal medicine evaluated patient and internal medicine accepted patient to [facility] and recommended we start him on BiPAP as he does have sleep apnea and uses BiPAP at night.       Medications   sodium chloride 0.9 % bolus 500 mL (500 mL Intravenous New Bag 2/21/25 2345)   albuterol (FOR EMS ONLY) (2.5 mg/3 mL) 0.083 % inhalation solution 2.5 mg (0 mg Does not apply Given to EMS 2/21/25 1930)   ipratropium-albuterol  (FOR EMS ONLY) (DUO-NEB) 0.5-2.5 mg/3 mL inhalation solution 6 mL (0 mL Does not apply Given to EMS 2/21/25 1930)   methylPREDNISolone sodium succinate (FOR EMS ONLY) (Solu-MEDROL) 125 MG injection 125 mg (0 mg Does not apply Given to EMS 2/21/25 1930)   sodium chloride 0.9 % bolus 500 mL (0 mL Intravenous Stopped 2/21/25 2123)   albuterol inhalation solution 10 mg (10 mg Nebulization Given 2/21/25 2007)   ipratropium (ATROVENT) 0.02 % inhalation solution 1 mg (1 mg Nebulization Given 2/21/25 2007)   sodium chloride 0.9 % inhalation solution 12 mL (12 mL Nebulization Given 2/21/25 2007)   magnesium sulfate 2 g/50 mL IVPB (premix) 2 g (0 g Intravenous Stopped 2/21/25 2137)   magnesium Oxide (MAG-OX) tablet 400 mg (400 mg Oral Given 2/21/25 2040)   azithromycin (ZITHROMAX) 500 mg in sodium chloride 0.9% 250mL IVPB 500 mg (0 mg Intravenous Stopped 2/21/25 2336)   magnesium sulfate 4 g/100 mL IVPB (premix) 4 g (0 g Intravenous Stopped 2/21/25 2241)   levalbuterol (XOPENEX) inhalation solution 1.25 mg (1.25 mg Nebulization Given 2/21/25 2250)       ED Risk Strat Scores                    Identification of Seniors at Risk      Flowsheet Row Most Recent Value   (ISAR) Identification of Seniors at Risk    Before the illness or injury that brought you to the Emergency, did you need someone to help you on a regular basis? 0 Filed at: 02/21/2025 1925   In the last 24 hours, have you needed more help than usual? 0 Filed at: 02/21/2025 1925   Have you been hospitalized for one or more nights during the past 6 months? 1 Filed at: 02/21/2025 1925   In general, do you see well? 0 Filed at: 02/21/2025 1925   In general, do you have serious problems with your memory? 0 Filed at: 02/21/2025 1925   Do you take more than three different medications every day? 1 Filed at: 02/21/2025 1925   ISAR Score 2 Filed at: 02/21/2025 1925                SBIRT 22yo+      Flowsheet Row Most Recent Value   Initial Alcohol Screen: US AUDIT-C     1. How  often do you have a drink containing alcohol? 0 Filed at: 02/21/2025 1925   2. How many drinks containing alcohol do you have on a typical day you are drinking?  0 Filed at: 02/21/2025 1925   3a. Male UNDER 65: How often do you have five or more drinks on one occasion? 0 Filed at: 02/21/2025 1925   3b. FEMALE Any Age, or MALE 65+: How often do you have 4 or more drinks on one occassion? 0 Filed at: 02/21/2025 1925   Audit-C Score 0 Filed at: 02/21/2025 1925   LETICIA: How many times in the past year have you...    Used an illegal drug or used a prescription medication for non-medical reasons? Never Filed at: 02/21/2025 1925                            History of Present Illness       Chief Complaint   Patient presents with    Shortness of Breath     Son w exertion, recent covid, asthma hx, nebs and solu en route with improvement        Past Medical History:   Diagnosis Date    Asthma     COPD (chronic obstructive pulmonary disease) (HCC)     Dementia (HCC)     Diabetes mellitus (HCC)     GERD (gastroesophageal reflux disease)     History of stroke 11/07/2019    Hypertension     Interstitial lung disease (HCC)     Major depressive disorder with current active episode 11/07/2019    Pneumonia     Rotator cuff arthropathy of right shoulder 12/17/2024    Sleep apnea     Sleep apnea, obstructive     Stroke (HCC)     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      Past Surgical History:   Procedure Laterality Date    BACK SURGERY      ELBOW SURGERY      KNEE SURGERY      NECK SURGERY      SHOULDER SURGERY      SPINE SURGERY  2004      Family History   Family history unknown: Yes      Social History     Tobacco Use    Smoking status: Never     Passive exposure: Never    Smokeless tobacco: Never    Tobacco comments:     Patient admits to using marijuana, edibles and smoking    Vaping Use    Vaping status: Never Used   Substance Use Topics    Alcohol use: Not Currently    Drug use: Yes     Types: Marijuana, Oxycodone, Psilocybin     "  E-Cigarette/Vaping    E-Cigarette Use Never User       E-Cigarette/Vaping Substances    Nicotine No     THC No     CBD Yes     Flavoring No     Other No     Unknown No       I have reviewed and agree with the history as documented.     Patient is a 69-year-old male with past medical history significant for eosinophilic asthma, COPD, interstitial lung disease, GERD, hypertension, diabetes, nonobstructive coronary artery disease, obstructive sleep apnea, depression, chronic pain, presents to the emergency department by ambulance for acute dyspnea.  According to patient and according to medical records, he was recently hospitalized for acute hypoxic respiratory failure in the setting of acute COVID infection.  He was hospitalized from 2/16 through 2/19 and received treatment for COVID as well as Decadron.  Patient did require 3 L nasal cannula oxygen but was ultimately discharged off oxygen.  He states he was doing well up until today in which he started to feel short of breath again.  He reports wheezing and chest heaviness.  He reports persistent dry cough unchanged.  He denies any other URI symptoms, new fevers or chills, palpitations, abdominal pain, nausea, vomiting, diarrhea, constipation, urinary symptoms, skin rash or color change, leg pain or swelling, new focal neurologic deficits.  Prehospital, patient received DuoNeb breathing treatment as well as albuterol and IV Solu-Medrol.  Patient\" and to records, patient was discharged home with a prednisone taper which she is currently still taking.      History provided by:  Patient, medical records and EMS personnel   used: No    Shortness of Breath  Associated symptoms: chest pain, cough and wheezing    Associated symptoms: no abdominal pain, no ear pain, no fever, no headaches, no neck pain, no rash, no sore throat and no vomiting        Review of Systems   Constitutional:  Negative for chills and fever.   HENT:  Negative for congestion, ear " pain, rhinorrhea and sore throat.    Respiratory:  Positive for cough, chest tightness, shortness of breath and wheezing.    Cardiovascular:  Positive for chest pain. Negative for palpitations and leg swelling.   Gastrointestinal:  Negative for abdominal pain, constipation, diarrhea, nausea and vomiting.   Genitourinary:  Negative for dysuria, flank pain, frequency and hematuria.   Musculoskeletal:  Negative for back pain, neck pain and neck stiffness.   Skin:  Negative for color change, pallor, rash and wound.   Allergic/Immunologic: Negative for immunocompromised state.   Neurological:  Negative for dizziness, syncope, weakness, light-headedness, numbness and headaches.   Hematological:  Negative for adenopathy.   Psychiatric/Behavioral:  Negative for confusion and decreased concentration.    All other systems reviewed and are negative.          Objective       ED Triage Vitals [02/21/25 1926]   Temperature Pulse Blood Pressure Respirations SpO2 Patient Position - Orthostatic VS   98 °F (36.7 °C) 104 133/78 20 95 % --      Temp src Heart Rate Source BP Location FiO2 (%) Pain Score    -- -- -- -- --      Vitals      Date and Time Temp Pulse SpO2 Resp BP Pain Score FACES Pain Rating User   02/22/25 0000 -- 106 97 % 31 135/58 -- -- AR   02/21/25 2330 -- 112 96 % 26 117/56 -- -- AR   02/21/25 2300 -- 110 99 % 33 106/86 -- -- AR   02/21/25 2230 -- 116 95 % 34 116/64 -- -- AR   02/21/25 2215 -- 116 96 % 31 123/57 -- -- AR   02/21/25 2200 -- 116 95 % 33 -- -- -- AR   02/21/25 2130 -- 114 96 % 36 -- -- -- AR   02/21/25 2100 -- 120 99 % 27 137/73 -- -- AR   02/21/25 2030 -- 128 98 % 43 -- -- -- AR   02/21/25 2012 -- 110 -- 29 130/75 -- -- AR   02/21/25 2012 -- -- 93 % -- -- -- -- CB   02/21/25 1930 -- 105 95 % -- 133/78 -- -- AR   02/21/25 1926 98 °F (36.7 °C) 104 95 % 20 133/78 -- -- AR          Vitals:    02/21/25 2230 02/21/25 2300 02/21/25 2330 02/22/25 0000   BP: 116/64 106/86 117/56 135/58   Pulse: (!) 116 (!) 110  (!) 112 (!) 106   Resp: (!) 34 (!) 33 (!) 26 (!) 31   Temp:       SpO2: 95% 99% 96% 97%          Physical Exam  Vitals and nursing note reviewed.   Constitutional:       General: He is not in acute distress.     Appearance: Normal appearance. He is well-developed. He is not ill-appearing, toxic-appearing or diaphoretic.   HENT:      Head: Normocephalic and atraumatic.      Right Ear: External ear normal.      Left Ear: External ear normal.      Nose: Nose normal.      Mouth/Throat:      Mouth: Mucous membranes are moist.      Pharynx: Oropharynx is clear.   Eyes:      Extraocular Movements: Extraocular movements intact.      Conjunctiva/sclera: Conjunctivae normal.   Neck:      Vascular: No JVD.   Cardiovascular:      Rate and Rhythm: Regular rhythm. Tachycardia present.      Pulses: Normal pulses.      Heart sounds: Normal heart sounds. No murmur heard.     No friction rub. No gallop.   Pulmonary:      Effort: No respiratory distress.      Breath sounds: Wheezing present. No rhonchi or rales.      Comments: Patient mildly tachypneic with slight increased work of breathing.  O2 sat 94% on room air.  Patient has decreased air movement throughout with diffuse expiratory wheezing.  Chest:      Chest wall: No tenderness.   Abdominal:      General: There is no distension.      Palpations: Abdomen is soft.      Tenderness: There is no abdominal tenderness. There is no guarding or rebound.   Musculoskeletal:         General: No swelling or tenderness. Normal range of motion.      Cervical back: Normal range of motion and neck supple. No rigidity.      Right lower leg: No edema.      Left lower leg: No edema.   Skin:     General: Skin is warm and dry.      Coloration: Skin is not pale.      Findings: No erythema or rash.   Neurological:      General: No focal deficit present.      Mental Status: He is alert and oriented to person, place, and time.      Sensory: No sensory deficit.      Motor: No weakness.   Psychiatric:          Mood and Affect: Mood normal.         Behavior: Behavior normal.         Results Reviewed       Procedure Component Value Units Date/Time    Procalcitonin [[id number]]  (Normal) Collected: 02/21/25 2158    Lab Status: Final result Specimen: Blood from Arm, Right Updated: 02/21/25 2240     Procalcitonin 0.09 ng/ml     HS Troponin I 2hr [[id number]]  (Normal) Collected: 02/21/25 2158    Lab Status: Final result Specimen: Blood from Arm, Right Updated: 02/21/25 2238     hs TnI 2hr 7 ng/L      Delta 2hr hsTnI 1 ng/L     Lactic acid, plasma (w/reflex if result > 2.0) [[id number]]  (Abnormal) Collected: 02/21/25 2158    Lab Status: Final result Specimen: Blood from Arm, Right Updated: 02/21/25 2230     LACTIC ACID 3.7 mmol/L     Narrative:      Result may be elevated if tourniquet was used during collection.    Lactic acid 2 Hours [[id number]]     Lab Status: No result Specimen: Blood     Blood culture #2 [[id number]] Collected: 02/21/25 2158    Lab Status: In process Specimen: Blood from Arm, Left Updated: 02/21/25 2209    Blood culture #1 [[id number]] Collected: 02/21/25 2158    Lab Status: In process Specimen: Blood from Arm, Right Updated: 02/21/25 2209    D-Dimer [[id number]]  (Normal) Collected: 02/21/25 1942    Lab Status: Final result Specimen: Blood from Arm, Left Updated: 02/21/25 2020     D-Dimer, Quant 0.41 ug/ml FEU     Narrative:      In the evaluation for possible pulmonary embolism, in the appropriate (Well's Score of 4 or less) patient, the age adjusted d-dimer cutoff for this patient can be calculated as:    Age x 0.01 (in ug/mL) for Age-adjusted D-dimer exclusion threshold for a patient over 50 years.    Protime-INR [[id number]]  (Normal) Collected: 02/21/25 1942    Lab Status: Final result Specimen: Blood from Arm, Left Updated: 02/21/25 2018     Protime 14.2 seconds      INR 1.03    Narrative:      INR Therapeutic Range    Indication                                             INR Range      Atrial  Fibrillation                                               2.0-3.0  Hypercoagulable State                                    2.0.2.3  Left Ventricular Asist Device                            2.0-3.0  Mechanical Heart Valve                                  -    Aortic(with afib, MI, embolism, HF, LA enlargement,    and/or coagulopathy)                                     2.0-3.0 (2.5-3.5)     Mitral                                                             2.5-3.5  Prosthetic/Bioprosthetic Heart Valve               2.0-3.0  Venous thromboembolism (VTE: VT, PE        2.0-3.0    APTT [[id number]]  (Normal) Collected: 02/21/25 1942    Lab Status: Final result Specimen: Blood from Arm, Left Updated: 02/21/25 2018     PTT 28 seconds     HS Troponin 0hr (reflex protocol) [[id number]]  (Normal) Collected: 02/21/25 1942    Lab Status: Final result Specimen: Blood from Arm, Left Updated: 02/21/25 2015     hs TnI 0hr 6 ng/L     B-Type Natriuretic Peptide(BNP) [[id number]]  (Abnormal) Collected: 02/21/25 1942    Lab Status: Final result Specimen: Blood from Arm, Left Updated: 02/21/25 2013      pg/mL     Basic metabolic panel [[id number]]  (Abnormal) Collected: 02/21/25 1942    Lab Status: Final result Specimen: Blood from Arm, Left Updated: 02/21/25 2006     Sodium 138 mmol/L      Potassium 4.4 mmol/L      Chloride 107 mmol/L      CO2 22 mmol/L      ANION GAP 9 mmol/L      BUN 36 mg/dL      Creatinine 0.79 mg/dL      Glucose 228 mg/dL      Calcium 8.6 mg/dL      eGFR 90 ml/min/1.73sq m     Narrative:      [employer] guidelines for Chronic Kidney Disease (CKD):     Stage 1 with normal or high GFR (GFR > 90 mL/min/1.73 square meters)    Stage 2 Mild CKD (GFR = 60-89 mL/min/1.73 square meters)    Stage 3A Moderate CKD (GFR = 45-59 mL/min/1.73 square meters)    Stage 3B Moderate CKD (GFR = 30-44 mL/min/1.73 square meters)    Stage 4 Severe CKD (GFR = 15-29 mL/min/1.73 square meters)    Stage 5 End  Stage CKD (GFR <15 mL/min/1.73 square meters)  Note: GFR calculation is accurate only with a steady state creatinine    Hepatic function panel [[id number]]  (Abnormal) Collected: 02/21/25 1942    Lab Status: Final result Specimen: Blood from Arm, Left Updated: 02/21/25 2006     Total Bilirubin 0.27 mg/dL      Bilirubin, Direct 0.07 mg/dL      Alkaline Phosphatase 86 U/L      AST 14 U/L      ALT 17 U/L      Total Protein 5.9 g/dL      Albumin 3.2 g/dL     Magnesium [[id number]]  (Abnormal) Collected: 02/21/25 1942    Lab Status: Final result Specimen: Blood from Arm, Left Updated: 02/21/25 2006     Magnesium 1.5 mg/dL     Blood gas, venous [[id number]]  (Abnormal) Collected: 02/21/25 1942    Lab Status: Final result Specimen: Blood from Arm, Left Updated: 02/21/25 1952     pH, Baljit 7.458     pCO2, Baljit 29.5 mm Hg      pO2, Baljit 46.6 mm Hg      HCO3, Baljit 20.4 mmol/L      Base Excess, Baljit -2.6 mmol/L      O2 Content, Baljit 11.9 ml/dL      O2 HGB, VENOUS 81.6 %     CBC and differential [[id number]]  (Abnormal) Collected: 02/21/25 1942    Lab Status: Final result Specimen: Blood from Arm, Left Updated: 02/21/25 1952     WBC 15.14 Thousand/uL      RBC 3.68 Million/uL      Hemoglobin 9.2 g/dL      Hematocrit 30.7 %      MCV 83 fL      MCH 25.0 pg      MCHC 30.0 g/dL      RDW 15.8 %      MPV 8.7 fL      Platelets 496 Thousands/uL      nRBC 0 /100 WBCs      Segmented % 80 %      Immature Grans % 1 %      Lymphocytes % 13 %      Monocytes % 6 %      Eosinophils Relative 0 %      Basophils Relative 0 %      Absolute Neutrophils 12.14 Thousands/µL      Absolute Immature Grans 0.20 Thousand/uL      Absolute Lymphocytes 1.93 Thousands/µL      Absolute Monocytes 0.86 Thousand/µL      Eosinophils Absolute 0.00 Thousand/µL      Basophils Absolute 0.01 Thousands/µL             XR chest 1 view portable   ED Interpretation by Donna Stringer DO (02/21 2037)   No acute abnormality in the chest.          ECG 12 Lead Documentation  Only    Date/Time: 2025 8:09 PM    Performed by: Donna Stringer DO  Authorized by: Donna Stringer DO    ECG reviewed by me, the ED Provider: yes    Patient location:  ED  Previous ECG:     Previous ECG:  Compared to current    Comparison ECG info:  2025; PACs are now present.  Rate:     ECG rate:  113    ECG rate assessment: tachycardic    Rhythm:     Rhythm: sinus tachycardia    Ectopy:     Ectopy: PAC    QRS:     QRS axis:  Normal    QRS intervals:  Normal  Conduction:     Conduction: normal    ST segments:     ST segments:  Normal  T waves:     T waves: normal    Comments:      Low voltage QRS complex.      ED Medication and Procedure Management   Prior to Admission Medications   Prescriptions Last Dose Informant Patient Reported? Taking?   ARIPiprazole (ABILIFY) 10 mg tablet  Self Yes No   Austedo 12 MG TABS  Self Yes No   Dupixent subcutaneous injection  Self No No   Sig: INJECT 2ML SUBCUTANEOUSLY 1 TIME EVERY 2 WEEKS *NEW PRESCRIPTION REQUEST*   OneTouch Ultra test strip  Self Yes No   Sig: TEST DAILY.. DIAGNOSIS E11.9   albuterol (2.5 mg/3 mL) 0.083 % nebulizer solution  Self No No   Sig: Take 3 mL (2.5 mg total) by nebulization every 6 (six) hours   amLODIPine-benazepril (LOTREL) 10-40 MG per capsule  Self Yes No   aspirin (ECOTRIN LOW STRENGTH) 81 mg EC tablet  Self Yes No   Sig: Take 81 mg by mouth daily   atorvastatin (LIPITOR) 10 mg tablet  Self Yes No   Sig: Take 10 mg by mouth daily   benzonatate (TESSALON PERLES) 100 mg capsule  Self No No   Sig: Take 1 capsule (100 mg total) by mouth 3 (three) times a day   buPROPion (WELLBUTRIN XL) 300 mg 24 hr tablet  Self Yes No   Si tab(s)   budesonide (PULMICORT) 0.5 mg/2 mL nebulizer solution  Self No No   Sig: Take 2 mL (0.5 mg total) by nebulization every 12 (twelve) hours Rinse mouth after use.   clopidogrel (PLAVIX) 75 mg tablet  Self No No   Sig: Take 1 tablet (75 mg total) by mouth daily   docusate sodium (COLACE) 100 mg  capsule  Self No No   Sig: Take 1 capsule (100 mg total) by mouth 2 (two) times a day   esomeprazole (NexIUM) 40 MG capsule  Self Yes No   Sig: Take 40 mg by mouth   formoterol (PERFOROMIST) 20 MCG/2ML nebulizer solution  Self No No   Sig: Take 2 mL (20 mcg total) by nebulization 2 (two) times a day   guaiFENesin (MUCINEX) 600 mg 12 hr tablet  Self No No   Sig: Take 1 tablet (600 mg total) by mouth every 12 (twelve) hours   metFORMIN (GLUCOPHAGE) 500 mg tablet  Self Yes No   Sig: Take 1 tablet by mouth 2 (two) times a day with meals   metoprolol succinate (TOPROL-XL) 25 mg 24 hr tablet  Self No No   Sig: TAKE 1 TABLET (25 MG TOTAL) BY MOUTH DAILY.   montelukast (SINGULAIR) 10 mg tablet  Self No No   Sig: Take 1 tablet (10 mg total) by mouth daily at bedtime   multivitamin (THERAGRAN) TABS  Self Yes No   Sig: Take 1 tablet by mouth   nitroglycerin (NITROSTAT) 0.4 mg SL tablet  Self No No   Sig: Place 1 tablet (0.4 mg total) under the tongue every 5 (five) minutes as needed for chest pain   predniSONE 20 mg tablet  Self No No   Sig: Take 2 tablets (40 mg total) by mouth daily for 2 days, THEN 1 tablet (20 mg total) daily for 2 days, THEN 0.5 tablets (10 mg total) daily for 2 days.   tamsulosin (FLOMAX) 0.4 mg  Self Yes No   Sig: Take 0.4 mg by mouth daily   temazepam (RESTORIL) 30 mg capsule  Self Yes No   Sig: Take 30 mg by mouth daily at bedtime as needed   venlafaxine (EFFEXOR-XR) 75 mg 24 hr capsule  Self Yes No   Si cap(s)      Facility-Administered Medications: None     Patient's Medications   Discharge Prescriptions    No medications on file     No discharge procedures on file.  ED SEPSIS DOCUMENTATION   Time reflects when diagnosis was documented in both MDM as applicable and the Disposition within this note       Time User Action Codes Description Comment    2025 12:15 AM Donna Stringer [J44.1] COPD with acute exacerbation (HCC)     2025 12:15 AM Donna Stringer [I49.3] Frequent PVCs      2/22/2025 12:15 AM Donna Stringer Add [U07.1] COVID-19 virus infection                  Donna Stringer,   02/22/25 0029

## 2025-02-22 NOTE — ED NOTES
Next 2GM mag hung, pt will have a total of 6GM IV mag when done      Yessenia Cavanaugh, RN  02/21/25 3895

## 2025-02-22 NOTE — ASSESSMENT & PLAN NOTE
Lab Results   Component Value Date    HGBA1C 8.3 (H) 01/26/2025       Recent Labs     02/19/25  0737   POCGLU 163*       Blood Sugar Average: Last 72 hrs:    HOLD metformin  ISS  Diabetic diet

## 2025-02-22 NOTE — ASSESSMENT & PLAN NOTE
Solumedrol, atrovent, levalbuterol plus saline nebs, proformist, singulair, pulmicort, TOOK DUPIXENT yesterday--check procalcitonin and consider CT chest, cont mucinex and tessalon mounika, add prn hydroxyzine for the anxiety that goes with the shortness of breath

## 2025-02-23 LAB
ANION GAP SERPL CALCULATED.3IONS-SCNC: 9 MMOL/L (ref 4–13)
BUN SERPL-MCNC: 25 MG/DL (ref 5–25)
CALCIUM SERPL-MCNC: 8.8 MG/DL (ref 8.4–10.2)
CHLORIDE SERPL-SCNC: 103 MMOL/L (ref 96–108)
CO2 SERPL-SCNC: 25 MMOL/L (ref 21–32)
CREAT SERPL-MCNC: 0.79 MG/DL (ref 0.6–1.3)
FLUAV RNA RESP QL NAA+PROBE: POSITIVE
FLUBV RNA RESP QL NAA+PROBE: NEGATIVE
GFR SERPL CREATININE-BSD FRML MDRD: 90 ML/MIN/1.73SQ M
GLUCOSE SERPL-MCNC: 194 MG/DL (ref 65–140)
GLUCOSE SERPL-MCNC: 203 MG/DL (ref 65–140)
GLUCOSE SERPL-MCNC: 222 MG/DL (ref 65–140)
GLUCOSE SERPL-MCNC: 223 MG/DL (ref 65–140)
GLUCOSE SERPL-MCNC: 257 MG/DL (ref 65–140)
PHOSPHATE SERPL-MCNC: 2.9 MG/DL (ref 2.3–4.1)
POTASSIUM SERPL-SCNC: 4.6 MMOL/L (ref 3.5–5.3)
RSV RNA RESP QL NAA+PROBE: NEGATIVE
SARS-COV-2 RNA RESP QL NAA+PROBE: POSITIVE
SODIUM SERPL-SCNC: 137 MMOL/L (ref 135–147)

## 2025-02-23 PROCEDURE — 0241U HB NFCT DS VIR RESP RNA 4 TRGT: CPT | Performed by: GENERAL PRACTICE

## 2025-02-23 PROCEDURE — 94640 AIRWAY INHALATION TREATMENT: CPT

## 2025-02-23 PROCEDURE — 99223 1ST HOSP IP/OBS HIGH 75: CPT | Performed by: NURSE PRACTITIONER

## 2025-02-23 PROCEDURE — 80048 BASIC METABOLIC PNL TOTAL CA: CPT | Performed by: GENERAL PRACTICE

## 2025-02-23 PROCEDURE — 99232 SBSQ HOSP IP/OBS MODERATE 35: CPT | Performed by: GENERAL PRACTICE

## 2025-02-23 PROCEDURE — 84100 ASSAY OF PHOSPHORUS: CPT | Performed by: GENERAL PRACTICE

## 2025-02-23 PROCEDURE — 94760 N-INVAS EAR/PLS OXIMETRY 1: CPT

## 2025-02-23 PROCEDURE — 94660 CPAP INITIATION&MGMT: CPT

## 2025-02-23 PROCEDURE — 94664 DEMO&/EVAL PT USE INHALER: CPT

## 2025-02-23 PROCEDURE — 82948 REAGENT STRIP/BLOOD GLUCOSE: CPT

## 2025-02-23 RX ORDER — METHYLPREDNISOLONE SODIUM SUCCINATE 40 MG/ML
40 INJECTION, POWDER, LYOPHILIZED, FOR SOLUTION INTRAMUSCULAR; INTRAVENOUS EVERY 12 HOURS SCHEDULED
Status: DISCONTINUED | OUTPATIENT
Start: 2025-02-23 | End: 2025-02-25

## 2025-02-23 RX ORDER — OSELTAMIVIR PHOSPHATE 75 MG/1
75 CAPSULE ORAL EVERY 12 HOURS SCHEDULED
Status: DISCONTINUED | OUTPATIENT
Start: 2025-02-23 | End: 2025-02-28 | Stop reason: HOSPADM

## 2025-02-23 RX ADMIN — LISINOPRIL 40 MG: 20 TABLET ORAL at 09:27

## 2025-02-23 RX ADMIN — TAMSULOSIN HYDROCHLORIDE 0.4 MG: 0.4 CAPSULE ORAL at 09:28

## 2025-02-23 RX ADMIN — AMLODIPINE BESYLATE 10 MG: 10 TABLET ORAL at 09:27

## 2025-02-23 RX ADMIN — LEVALBUTEROL HYDROCHLORIDE 1.25 MG: 1.25 SOLUTION RESPIRATORY (INHALATION) at 13:04

## 2025-02-23 RX ADMIN — PANTOPRAZOLE SODIUM 40 MG: 40 TABLET, DELAYED RELEASE ORAL at 06:21

## 2025-02-23 RX ADMIN — OXYCODONE HYDROCHLORIDE AND ACETAMINOPHEN 1.5 TABLET: 5; 325 TABLET ORAL at 15:05

## 2025-02-23 RX ADMIN — BENZONATATE 100 MG: 100 CAPSULE ORAL at 18:12

## 2025-02-23 RX ADMIN — INSULIN LISPRO 3 UNITS: 100 INJECTION, SOLUTION INTRAVENOUS; SUBCUTANEOUS at 18:13

## 2025-02-23 RX ADMIN — METOPROLOL SUCCINATE 25 MG: 25 TABLET, EXTENDED RELEASE ORAL at 09:27

## 2025-02-23 RX ADMIN — GUAIFENESIN 600 MG: 600 TABLET ORAL at 22:10

## 2025-02-23 RX ADMIN — METHYLPREDNISOLONE SODIUM SUCCINATE 40 MG: 40 INJECTION, POWDER, FOR SOLUTION INTRAMUSCULAR; INTRAVENOUS at 00:44

## 2025-02-23 RX ADMIN — DOCUSATE SODIUM 100 MG: 100 CAPSULE, LIQUID FILLED ORAL at 09:28

## 2025-02-23 RX ADMIN — AZITHROMYCIN 500 MG: 500 TABLET, FILM COATED ORAL at 22:10

## 2025-02-23 RX ADMIN — IPRATROPIUM BROMIDE 0.5 MG: 0.5 SOLUTION RESPIRATORY (INHALATION) at 07:03

## 2025-02-23 RX ADMIN — INSULIN LISPRO 2 UNITS: 100 INJECTION, SOLUTION INTRAVENOUS; SUBCUTANEOUS at 12:40

## 2025-02-23 RX ADMIN — METHYLPREDNISOLONE SODIUM SUCCINATE 40 MG: 40 INJECTION, POWDER, FOR SOLUTION INTRAMUSCULAR; INTRAVENOUS at 09:28

## 2025-02-23 RX ADMIN — BUPROPION HYDROCHLORIDE 300 MG: 150 TABLET, EXTENDED RELEASE ORAL at 09:27

## 2025-02-23 RX ADMIN — IPRATROPIUM BROMIDE 0.5 MG: 0.5 SOLUTION RESPIRATORY (INHALATION) at 20:25

## 2025-02-23 RX ADMIN — ARIPIPRAZOLE 10 MG: 5 TABLET ORAL at 09:27

## 2025-02-23 RX ADMIN — INSULIN LISPRO 2 UNITS: 100 INJECTION, SOLUTION INTRAVENOUS; SUBCUTANEOUS at 09:25

## 2025-02-23 RX ADMIN — ASPIRIN 81 MG: 81 TABLET, COATED ORAL at 09:27

## 2025-02-23 RX ADMIN — FORMOTEROL FUMARATE DIHYDRATE 20 MCG: 20 SOLUTION RESPIRATORY (INHALATION) at 07:03

## 2025-02-23 RX ADMIN — LEVALBUTEROL HYDROCHLORIDE 1.25 MG: 1.25 SOLUTION RESPIRATORY (INHALATION) at 20:25

## 2025-02-23 RX ADMIN — METHYLPREDNISOLONE SODIUM SUCCINATE 40 MG: 40 INJECTION, POWDER, FOR SOLUTION INTRAMUSCULAR; INTRAVENOUS at 22:10

## 2025-02-23 RX ADMIN — CLOPIDOGREL 75 MG: 75 TABLET ORAL at 09:27

## 2025-02-23 RX ADMIN — LEVALBUTEROL HYDROCHLORIDE 1.25 MG: 1.25 SOLUTION RESPIRATORY (INHALATION) at 07:03

## 2025-02-23 RX ADMIN — INSULIN LISPRO 2 UNITS: 100 INJECTION, SOLUTION INTRAVENOUS; SUBCUTANEOUS at 22:14

## 2025-02-23 RX ADMIN — MONTELUKAST 10 MG: 10 TABLET, FILM COATED ORAL at 22:10

## 2025-02-23 RX ADMIN — OXYCODONE HYDROCHLORIDE AND ACETAMINOPHEN 1.5 TABLET: 5; 325 TABLET ORAL at 20:54

## 2025-02-23 RX ADMIN — IPRATROPIUM BROMIDE 0.5 MG: 0.5 SOLUTION RESPIRATORY (INHALATION) at 13:04

## 2025-02-23 RX ADMIN — BENZONATATE 100 MG: 100 CAPSULE ORAL at 09:27

## 2025-02-23 RX ADMIN — VENLAFAXINE HYDROCHLORIDE 75 MG: 75 CAPSULE, EXTENDED RELEASE ORAL at 09:28

## 2025-02-23 RX ADMIN — OXYCODONE HYDROCHLORIDE AND ACETAMINOPHEN 1.5 TABLET: 5; 325 TABLET ORAL at 09:45

## 2025-02-23 RX ADMIN — BENZONATATE 100 MG: 100 CAPSULE ORAL at 22:09

## 2025-02-23 RX ADMIN — OSELTAMAVIR PHOSPHATE 75 MG: 75 CAPSULE ORAL at 15:05

## 2025-02-23 RX ADMIN — ENOXAPARIN SODIUM 40 MG: 40 INJECTION SUBCUTANEOUS at 09:26

## 2025-02-23 RX ADMIN — FORMOTEROL FUMARATE DIHYDRATE 20 MCG: 20 SOLUTION RESPIRATORY (INHALATION) at 20:25

## 2025-02-23 RX ADMIN — GUAIFENESIN 600 MG: 600 TABLET ORAL at 09:27

## 2025-02-23 RX ADMIN — ATORVASTATIN CALCIUM 10 MG: 10 TABLET, FILM COATED ORAL at 18:12

## 2025-02-23 RX ADMIN — DOCUSATE SODIUM 100 MG: 100 CAPSULE, LIQUID FILLED ORAL at 18:12

## 2025-02-23 RX ADMIN — BUDESONIDE 0.5 MG: 0.5 INHALANT ORAL at 20:25

## 2025-02-23 RX ADMIN — BUDESONIDE 0.5 MG: 0.5 INHALANT ORAL at 07:03

## 2025-02-23 NOTE — RESPIRATORY THERAPY NOTE
02/22/25 2211   Respiratory Assessment   Assessment Type Assess only   General Appearance Awake;Drowsy   Respiratory Pattern Normal   Chest Assessment Chest expansion symmetrical   Bilateral Breath Sounds Diminished   Cough None   Resp Comments Pt placed on bipap at this time   O2 Device V60   Non-Invasive Information   O2 Interface Device Nasal mask   Non-Invasive Ventilation Mode BiPAP   $ Intermittent NIV Yes   SpO2 92 %   $ Pulse Oximetry Spot Check Charge Completed   Non-Invasive Settings   IPAP (cm) 12 cm   EPAP (cm) 6 cm   Rate (Set) 8   FiO2 (%) 40   Pressure Support (cm H2O) 6   Rise Time 2   Non-Invasive Readings   Total Rate 19   Vt (mL) (Mech) 791   MV (Mech) 12   Peak Pressure (Obs) 12   Spontaneous Vt (mL) 676   Leak (lpm) 11   Skin Intervention Skin intact   Non-Invasive Alarms   Insp Pressure High (cm H20) 25   Insp Pressure Low (cm H20) 5   Low Insp Pressure Time (sec) 20 sec   MV Low (L/min) 2   Vt High (mL) 1200   Vt Low (mL) 200   High Resp Rate (BPM) 40 BPM   Low Resp Rate (BPM) 8 BPM     RT Ventilator Management Note  Fahad Hernandez 69 y.o. male MRN: [id number]  Unit/Bed#: 2 E 277-01 Encounter: [id number]      Daily Screen    No data found in the last 10 encounters.           Physical Exam:   Assessment Type: Assess only  General Appearance: Awake, Drowsy  Respiratory Pattern: Normal  Chest Assessment: Chest expansion symmetrical  Bilateral Breath Sounds: Diminished  Cough: None  O2 Device: V60      Resp Comments: Pt placed on bipap at this time

## 2025-02-23 NOTE — RESPIRATORY THERAPY NOTE
02/23/25 0409   Respiratory Assessment   Assessment Type Assess only   General Appearance Sleeping   Respiratory Pattern Normal   Chest Assessment Chest expansion symmetrical   Bilateral Breath Sounds Diminished   Cough None   Resp Comments Pt remains on bipap   O2 Device V60   Non-Invasive Information   O2 Interface Device Nasal mask   Non-Invasive Ventilation Mode BiPAP   SpO2 100 %   $ Pulse Oximetry Spot Check Charge Completed   Non-Invasive Settings   IPAP (cm) 12 cm   EPAP (cm) 6 cm   Rate (Set) 8   FiO2 (%) 40   Pressure Support (cm H2O) 6   Rise Time 2   Non-Invasive Readings   Total Rate 18   Vt (mL) (Mech) 708   MV (Mech) 12   Peak Pressure (Obs) 12   Spontaneous Vt (mL) 645   Leak (lpm) 18   Skin Intervention Skin intact   Non-Invasive Alarms   Insp Pressure High (cm H20) 25   Insp Pressure Low (cm H20) 5   Low Insp Pressure Time (sec) 20 sec   MV Low (L/min) 2   Vt High (mL) 1200   Vt Low (mL) 200   High Resp Rate (BPM) 40 BPM   Low Resp Rate (BPM) 8 BPM     RT Ventilator Management Note  Fahad Hernandez 69 y.o. male MRN: [id number]  Unit/Bed#: 2 E 277-01 Encounter: [id number]      Daily Screen    No data found in the last 10 encounters.           Physical Exam:   Assessment Type: Assess only  General Appearance: Sleeping  Respiratory Pattern: Normal  Chest Assessment: Chest expansion symmetrical  Bilateral Breath Sounds: Diminished  Cough: None  O2 Device: V60      Resp Comments: Pt remains on bipap

## 2025-02-23 NOTE — ASSESSMENT & PLAN NOTE
Solumedrol, atrovent, levalbuterol plus saline nebs, proformist, singulair, pulmicort, TOOK DUPIXENT PTA--check procalcitonin and consider CT chest, cont mucinex and tessalon mounika, add prn hydroxyzine for the anxiety that goes with the shortness of breath

## 2025-02-23 NOTE — RESPIRATORY THERAPY NOTE
RT Protocol Note  Fahad Hernandez 69 y.o. male MRN: [id number]  Unit/Bed#: 2 E 277-01 Encounter: [id number]    Assessment    Principal Problem:    Severe persistent asthma with acute exacerbation  Active Problems:    Hypertension    Type 2 diabetes mellitus with hyperglycemia, without long-term current use of insulin (HCC)    Major depressive disorder    Mixed hyperlipidemia    Gastroesophageal reflux disease with esophagitis    Acute lactic acidosis    CASSIE (obstructive sleep apnea)    Tardive dyskinesia      Home Pulmonary Medications:     02/22/25 1926   Respiratory Protocol   Protocol Initiated? Yes   Protocol Selection Respiratory   Language Barrier? No   Medical & Social History Reviewed? Yes   Diagnostic Studies Reviewed? Yes   Physical Assessment Performed? Yes   Respiratory Plan Mild Distress pathway   Respiratory Assessment   Assessment Type During-treatment   General Appearance Alert;Awake   Respiratory Pattern Normal   Chest Assessment Chest expansion symmetrical   Bilateral Breath Sounds Diminished   Cough None   Resp Comments Will continue with current therapy   O2 Device NC   Cough Description   Sputum Amount None   Additional Assessments   Pulse 87   Respirations 20   SpO2 93 %            Past Medical History:   Diagnosis Date    Asthma     COPD (chronic obstructive pulmonary disease) ([facility])     Dementia ([facility])     Diabetes mellitus ([facility])     GERD (gastroesophageal reflux disease)     History of stroke 11/07/2019    Hypertension     Interstitial lung disease ([facility])     Major depressive disorder with current active episode 11/07/2019    Pneumonia     Rotator cuff arthropathy of right shoulder 12/17/2024    Sleep apnea     Sleep apnea, obstructive     Stroke ([facility])     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     Social History     Socioeconomic History    Marital status: /Civil Union     Spouse name: Not on file    Number of children: Not on file    Years of education: Not on file    Highest education level:  Not on file   Occupational History    Not on file   Tobacco Use    Smoking status: Never     Passive exposure: Never    Smokeless tobacco: Never    Tobacco comments:     Patient admits to using marijuana, edibles and smoking    Vaping Use    Vaping status: Never Used   Substance and Sexual Activity    Alcohol use: Not Currently    Drug use: Yes     Types: Marijuana, Oxycodone, Psilocybin    Sexual activity: Yes     Partners: Female     Birth control/protection: Male Sterilization   Other Topics Concern    Not on file   Social History Narrative    Not on file     Social Drivers of Health     Financial Resource Strain: Low Risk  (11/1/2024)    Received from [facility]    Overall Financial Resource Strain (CARDIA)     Difficulty of Paying Living Expenses: Not hard at all   Food Insecurity: No Food Insecurity (2/22/2025)    Nursing - Inadequate Food Risk Classification     Worried About Running Out of Food in the Last Year: Never true     Ran Out of Food in the Last Year: Never true     Ran Out of Food in the Last Year: Never true   Transportation Needs: No Transportation Needs (2/22/2025)    Nursing - Transportation Risk Classification     Lack of Transportation: Not on file     Lack of Transportation: No   Physical Activity: Inactive (11/1/2024)    Received from [facility]    Exercise Vital Sign     Days of Exercise per Week: 0 days     Minutes of Exercise per Session: 0 min   Stress: Stress Concern Present (11/1/2024)    Received from [facility]    [employer] [place] - Occupational Stress Questionnaire     Feeling of Stress : To some extent   Social Connections: Moderately Isolated (11/1/2024)    Received from [facility]    Social Connection and Isolation Panel [NHANES]     Frequency of Communication with Friends and Family: Three times a week     Frequency of Social Gatherings with Friends and Family: Twice a week     Attends  "Nondenominational Services: Never     Active Member of Clubs or Organizations: No     Attends Club or Organization Meetings: Never     Marital Status:    Intimate Partner Violence: Unknown (2025)    Nursing IPS     Feels Physically and Emotionally Safe: Not on file     Physically Hurt by Someone: Not on file     Humiliated or Emotionally Abused by Someone: Not on file     Physically Hurt by Someone: No     Hurt or Threatened by Someone: No   Housing Stability: Unknown (2025)    Nursing: Inadequate Housing Risk Classification     Has Housing: Not on file     Worried About Losing Housing: Not on file     Unable to Get Utilities: Not on file     Unable to Pay for Housing in the Last Year: No     Has Housin       Subjective         Objective    Physical Exam:   Assessment Type: During-treatment  General Appearance: Alert, Awake  Respiratory Pattern: Normal  Chest Assessment: Chest expansion symmetrical  Bilateral Breath Sounds: Diminished  Cough: None  O2 Device: NC    Vitals:  Blood pressure 137/77, pulse 87, temperature 98 °F (36.7 °C), temperature source Oral, resp. rate 20, height 5' 11\" (1.803 m), weight 72 kg (158 lb 11.7 oz), SpO2 93%.          Imaging and other studies: Results Review Statement: No pertinent imaging studies reviewed.    O2 Device: NC     Plan    Respiratory Plan: Mild Distress pathway        Resp Comments: Will continue with current therapy   "

## 2025-02-23 NOTE — ASSESSMENT & PLAN NOTE
Lab Results   Component Value Date    HGBA1C 8.3 (H) 01/26/2025       Recent Labs     02/22/25  1018 02/22/25  1647 02/22/25 2007 02/23/25  0745   POCGLU 352* 214* 232* 203*       Blood Sugar Average: Last 72 hrs:  (P) 245  HOLD metformin  ISS  Diabetic diet

## 2025-02-23 NOTE — ASSESSMENT & PLAN NOTE
Continue azithromycin for 3 days.  Continue budesonide/Perforomist twice daily with Xopenex/ipratropium 3 times daily.  Continue Solu-Medrol, but decrease to 40 mg IV every 12 hours today.  Will likely need a longer prednisone taper while he continues to have Dupixent until he achieves maximal benefit.

## 2025-02-23 NOTE — PROGRESS NOTES
Progress Note - Hospitalist   Name: Fahad Hernandez 69 y.o. male I MRN: [id number]  Unit/Bed#: 2 E 277-01 I Date of Admission: 2/21/2025   Date of Service: 2/23/2025 I Hospital Day: 1    Assessment & Plan  Type 2 diabetes mellitus with hyperglycemia, without long-term current use of insulin (HCC)  Lab Results   Component Value Date    HGBA1C 8.3 (H) 01/26/2025       Recent Labs     02/22/25  1018 02/22/25  1647 02/22/25 2007 02/23/25  0745   POCGLU 352* 214* 232* 203*       Blood Sugar Average: Last 72 hrs:  (P) 245  HOLD metformin  ISS  Diabetic diet  Major depressive disorder  Continue Abilify, Wellbutrin and Effexor   Hypertension  Continue norvasc, lisinopril, metoprolol  Mixed hyperlipidemia  Cont lipitor  Gastroesophageal reflux disease with esophagitis  Cont PPI  Severe persistent asthma with acute exacerbation  Solumedrol, atrovent, levalbuterol plus saline nebs, proformist, singulair, pulmicort, TOOK DUPIXENT PTA--check procalcitonin and consider CT chest, cont mucinex and tessalon pearles, add prn hydroxyzine for the anxiety that goes with the shortness of breath  CASSIE (obstructive sleep apnea)  Continue BIPAP at night  Tardive dyskinesia  Cont Astuedo  Acute lactic acidosis  Multifactorial with dehydration, multiple albuterol nebs, chronic metformin use.       VTE Pharmacologic Prophylaxis:   Moderate Risk (Score 3-4) - Pharmacological DVT Prophylaxis Ordered: enoxaparin (Lovenox).    Mobility:   Basic Mobility Inpatient Raw Score: 13  JH-HLM Goal: 4: Move to chair/commode  JH-HLM Achieved: 6: Walk 10 steps or more  JH-HLM Goal achieved. Continue to encourage appropriate mobility.    Patient Centered Rounds: I performed bedside rounds with nursing staff today.   Discussions with Specialists or Other Care Team Provider:     Education and Discussions with Family / Patient:     Current Length of Stay: 1 day(s)  Current Patient Status: Inpatient   Certification Statement: The patient will continue to require  additional inpatient hospital stay due to continued dyspnea and IV steroids  Discharge Plan: Anticipate discharge tomorrow to home.    Code Status: Level 1 - Full Code    Subjective   C/o shortness of exertion and mild shortness of breath with rest.     Objective :  Temp:  [97.9 °F (36.6 °C)] 97.9 °F (36.6 °C)  HR:  [81-98] 91  BP: (134-162)/() 136/80  Resp:  [18-20] 18  SpO2:  [92 %-100 %] 95 %  O2 Device: None (Room air)    Body mass index is 22.45 kg/m².     Input and Output Summary (last 24 hours):     Intake/Output Summary (Last 24 hours) at 2/23/2025 0818  Last data filed at 2/23/2025 0623  Gross per 24 hour   Intake 900 ml   Output 1900 ml   Net -1000 ml       Physical Exam  Vitals reviewed.   Constitutional:       Appearance: Normal appearance.   HENT:      Head: Normocephalic and atraumatic.      Mouth/Throat:      Mouth: Mucous membranes are moist.   Eyes:      Extraocular Movements: Extraocular movements intact.      Pupils: Pupils are equal, round, and reactive to light.   Cardiovascular:      Rate and Rhythm: Normal rate and regular rhythm.   Pulmonary:      Breath sounds: Wheezing present.   Abdominal:      General: Abdomen is flat.      Palpations: Abdomen is soft.   Musculoskeletal:         General: No swelling.      Cervical back: Neck supple.   Skin:     General: Skin is warm and dry.   Neurological:      General: No focal deficit present.      Mental Status: He is alert and oriented to person, place, and time.   Psychiatric:         Mood and Affect: Mood normal.         Behavior: Behavior normal.           Lines/Drains:        Telemetry:  Telemetry Orders (From admission, onward)               24 Hour Telemetry Monitoring  Continuous x 24 Hours (Telem)        Expiring   Question:  Reason for 24 Hour Telemetry  Answer:  Arrhythmias requiring acute medical intervention / PPM or ICD malfunction                     Telemetry Reviewed:   Indication for Continued Telemetry Use:                Lab  Results: I have reviewed the following results:   Results from last 7 days   Lab Units 02/22/25  0436   WBC Thousand/uL 14.24*   HEMOGLOBIN g/dL 9.2*   HEMATOCRIT % 30.2*   PLATELETS Thousands/uL 506*   SEGS PCT % 89*   LYMPHO PCT % 7*   MONO PCT % 2*   EOS PCT % 0     Results from last 7 days   Lab Units 02/22/25  0436   SODIUM mmol/L 138   POTASSIUM mmol/L 4.5   CHLORIDE mmol/L 108   CO2 mmol/L 22   BUN mg/dL 27*   CREATININE mg/dL 0.80   ANION GAP mmol/L 8   CALCIUM mg/dL 8.5   ALBUMIN g/dL 3.3*   TOTAL BILIRUBIN mg/dL 0.24   ALK PHOS U/L 79   ALT U/L 16   AST U/L 12*   GLUCOSE RANDOM mg/dL 286*     Results from last 7 days   Lab Units 02/22/25  0436   INR  1.02     Results from last 7 days   Lab Units 02/23/25  0745 02/22/25  2007 02/22/25  1647 02/22/25  1018 02/22/25  0736 02/22/25  0131 02/19/25  0737 02/18/25  2107 02/18/25  1617 02/18/25  1115 02/18/25  0705 02/17/25  2045   POC GLUCOSE mg/dl 203* 232* 214* 352* 194* 275* 163* 124 217* 202* 108 165*         Results from last 7 days   Lab Units 02/22/25  0837 02/22/25  0436 02/22/25  0035 02/21/25  2158 02/18/25  0513 02/17/25  0959   LACTIC ACID mmol/L 2.0 3.6* 4.9* 3.7*  --   --    PROCALCITONIN ng/ml  --   --   --  0.09  0.09 <0.05 <0.05       Recent Cultures (last 7 days):   Results from last 7 days   Lab Units 02/21/25  2158   BLOOD CULTURE  Received in Microbiology Lab. Culture in Progress.  Received in Microbiology Lab. Culture in Progress.           Last 24 Hours Medication List:     Current Facility-Administered Medications:     acetaminophen (TYLENOL) tablet 650 mg, Q6H PRN    amLODIPine (NORVASC) tablet 10 mg, Daily **AND** lisinopril (ZESTRIL) tablet 40 mg, Daily    ARIPiprazole (ABILIFY) tablet 10 mg, Daily    aspirin (ECOTRIN LOW STRENGTH) EC tablet 81 mg, Daily    atorvastatin (LIPITOR) tablet 10 mg, Daily With Dinner    azithromycin (ZITHROMAX) tablet 500 mg, Q24H    benzonatate (TESSALON PERLES) capsule 100 mg, TID    budesonide  (PULMICORT) inhalation solution 0.5 mg, Q12H    buPROPion (WELLBUTRIN XL) 24 hr tablet 300 mg, Daily    clopidogrel (PLAVIX) tablet 75 mg, Daily    Deutetrabenazine TABS 12 mg, Daily    docusate sodium (COLACE) capsule 100 mg, BID    enoxaparin (LOVENOX) subcutaneous injection 40 mg, Q24H PARTHA    formoterol (PERFOROMIST) nebulizer solution 20 mcg, BID    guaiFENesin (MUCINEX) 12 hr tablet 600 mg, Q12H PARTHA    hydrOXYzine HCL (ATARAX) tablet 25 mg, Q6H PRN    insulin lispro (HumALOG/ADMELOG) 100 units/mL subcutaneous injection 1-6 Units, TID AC **AND** Fingerstick Glucose (POCT), TID AC    insulin lispro (HumALOG/ADMELOG) 100 units/mL subcutaneous injection 1-6 Units, HS    ipratropium (ATROVENT) 0.02 % inhalation solution 0.5 mg, TID    levalbuterol (XOPENEX) inhalation solution 1.25 mg, TID **AND** [DISCONTINUED] sodium chloride 0.9 % inhalation solution 3 mL, TID    methylPREDNISolone sodium succinate (Solu-MEDROL) injection 40 mg, Q8H    metoprolol succinate (TOPROL-XL) 24 hr tablet 25 mg, Daily    montelukast (SINGULAIR) tablet 10 mg, HS    nitroglycerin (NITROSTAT) SL tablet 0.4 mg, Q5 Min PRN    oxyCODONE-acetaminophen (PERCOCET) 5-325 mg per tablet 1.5 tablet, Q4H PRN    pantoprazole (PROTONIX) EC tablet 40 mg, Early Morning    tamsulosin (FLOMAX) capsule 0.4 mg, Daily    venlafaxine (EFFEXOR-XR) 24 hr capsule 75 mg, Daily    Administrative Statements   Today, Patient Was Seen By: Dania Henning MD      **Please Note: This note may have been constructed using a voice recognition system.**

## 2025-02-23 NOTE — CONSULTS
Consultation - Pulmonology   Name: Fahad Hernandez 69 y.o. male I MRN: [id number]  Unit/Bed#: 2 E 277-01 I Date of Admission: 2/21/2025   Date of Service: 2/23/2025 I Hospital Day: 1   Inpatient consult to Pulmonology  Consult performed by: JAMIL Wheat  Consult ordered by: Lucie Rodas DO        Physician Requesting Evaluation: Dania Henning, *   Reason for Evaluation / Principal Problem: Asthma exacerbation attacks    Assessment & Plan  Severe persistent asthma with acute exacerbation  Continue azithromycin for 3 days.  Continue budesonide/Perforomist twice daily with Xopenex/ipratropium 3 times daily.  Continue Solu-Medrol, but decrease to 40 mg IV every 12 hours today.  Will likely need a longer prednisone taper while he continues to have Dupixent until he achieves maximal benefit.  CASSIE (obstructive sleep apnea)  Continue BiPAP at bedtime.  COVID-19  Diagnosis on February 16, 2025.  Supportive care.    I have discussed the above management plan in detail with the primary service.   Pulmonology service will follow.  Please contact the SecureChat role for the Pulmonology service with any questions/concerns.  D/W Dr. Terrell who agrees with plan as listed.    History of Present Illness   Fahad Hernandez is a 69 y.o. male who presents with worsened asthma symptoms.  Ed reports that he was sick with COVID a little over a week ago and was hospitalized.  He went home with a prednisone taper, but soon after stopping the prednisone, he had refractory asthma symptoms with wheezing, dry cough, and shortness of breath.  He had his Dupixent injection 2 days ago, and then presented to the ER for worsening shortness of breath.  He has not had fevers or chills.  No significant sputum production.  No other complaints.  He was started on Dupixent within the last few months.    Review of Systems   All other systems reviewed and are negative.  A full 12-point review of systems was completed and is negative  except for those outlined in the HPI.    Historical Information   Medical History Review: I have reviewed the patient's PMH, PSH, Social History, Family History, Meds, and Allergies   Historical Information   Past Medical History:   Diagnosis Date    Asthma     COPD (chronic obstructive pulmonary disease) (HCC)     Dementia (HCC)     Diabetes mellitus (HCC)     GERD (gastroesophageal reflux disease)     History of stroke 11/07/2019    Hypertension     Interstitial lung disease (HCC)     Major depressive disorder with current active episode 11/07/2019    Pneumonia     Rotator cuff arthropathy of right shoulder 12/17/2024    Sleep apnea     Sleep apnea, obstructive     Stroke (HCC)     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     Past Surgical History:   Procedure Laterality Date    BACK SURGERY      ELBOW SURGERY      KNEE SURGERY      NECK SURGERY      SHOULDER SURGERY      SPINE SURGERY  2004     Social History     Tobacco Use    Smoking status: Never     Passive exposure: Never    Smokeless tobacco: Never    Tobacco comments:     Patient admits to using marijuana, edibles and smoking    Vaping Use    Vaping status: Never Used   Substance and Sexual Activity    Alcohol use: Not Currently    Drug use: Yes     Types: Marijuana, Oxycodone, Psilocybin    Sexual activity: Yes     Partners: Female     Birth control/protection: Male Sterilization     E-Cigarette/Vaping    E-Cigarette Use Never User      E-Cigarette/Vaping Substances    Nicotine No     THC No     CBD Yes     Flavoring No     Other No     Unknown No      Family History   Family history unknown: Yes       Current Facility-Administered Medications:     acetaminophen (TYLENOL) tablet 650 mg, Q6H PRN    amLODIPine (NORVASC) tablet 10 mg, Daily **AND** lisinopril (ZESTRIL) tablet 40 mg, Daily    ARIPiprazole (ABILIFY) tablet 10 mg, Daily    aspirin (ECOTRIN LOW STRENGTH) EC tablet 81 mg, Daily    atorvastatin (LIPITOR) tablet 10 mg, Daily With Dinner    azithromycin  (ZITHROMAX) tablet 500 mg, Q24H    benzonatate (TESSALON PERLES) capsule 100 mg, TID    budesonide (PULMICORT) inhalation solution 0.5 mg, Q12H    buPROPion (WELLBUTRIN XL) 24 hr tablet 300 mg, Daily    clopidogrel (PLAVIX) tablet 75 mg, Daily    Deutetrabenazine TABS 12 mg, Daily    docusate sodium (COLACE) capsule 100 mg, BID    enoxaparin (LOVENOX) subcutaneous injection 40 mg, Q24H PARTHA    formoterol (PERFOROMIST) nebulizer solution 20 mcg, BID    guaiFENesin (MUCINEX) 12 hr tablet 600 mg, Q12H PARTHA    hydrOXYzine HCL (ATARAX) tablet 25 mg, Q6H PRN    insulin lispro (HumALOG/ADMELOG) 100 units/mL subcutaneous injection 1-6 Units, TID AC **AND** Fingerstick Glucose (POCT), TID AC    insulin lispro (HumALOG/ADMELOG) 100 units/mL subcutaneous injection 1-6 Units, HS    ipratropium (ATROVENT) 0.02 % inhalation solution 0.5 mg, TID    levalbuterol (XOPENEX) inhalation solution 1.25 mg, TID **AND** [DISCONTINUED] sodium chloride 0.9 % inhalation solution 3 mL, TID    methylPREDNISolone sodium succinate (Solu-MEDROL) injection 40 mg, Q8H    metoprolol succinate (TOPROL-XL) 24 hr tablet 25 mg, Daily    montelukast (SINGULAIR) tablet 10 mg, HS    nitroglycerin (NITROSTAT) SL tablet 0.4 mg, Q5 Min PRN    oxyCODONE-acetaminophen (PERCOCET) 5-325 mg per tablet 1.5 tablet, Q4H PRN    pantoprazole (PROTONIX) EC tablet 40 mg, Early Morning    tamsulosin (FLOMAX) capsule 0.4 mg, Daily    venlafaxine (EFFEXOR-XR) 24 hr capsule 75 mg, Daily  Prior to Admission Medications   Prescriptions Last Dose Informant Patient Reported? Taking?   ARIPiprazole (ABILIFY) 10 mg tablet  Self Yes No   Austedo 12 MG TABS  Self Yes No   Dupixent subcutaneous injection  Self No No   Sig: INJECT 2ML SUBCUTANEOUSLY 1 TIME EVERY 2 WEEKS *NEW PRESCRIPTION REQUEST*   OneTouch Ultra test strip  Self Yes No   Sig: TEST DAILY.. DIAGNOSIS E11.9   albuterol (2.5 mg/3 mL) 0.083 % nebulizer solution  Self No No   Sig: Take 3 mL (2.5 mg total) by nebulization  every 6 (six) hours   amLODIPine-benazepril (LOTREL) 10-40 MG per capsule  Self Yes No   aspirin (ECOTRIN LOW STRENGTH) 81 mg EC tablet  Self Yes No   Sig: Take 81 mg by mouth daily   atorvastatin (LIPITOR) 10 mg tablet  Self Yes No   Sig: Take 10 mg by mouth daily   benzonatate (TESSALON PERLES) 100 mg capsule  Self No No   Sig: Take 1 capsule (100 mg total) by mouth 3 (three) times a day   buPROPion (WELLBUTRIN XL) 300 mg 24 hr tablet  Self Yes No   Si tab(s)   budesonide (PULMICORT) 0.5 mg/2 mL nebulizer solution  Self No No   Sig: Take 2 mL (0.5 mg total) by nebulization every 12 (twelve) hours Rinse mouth after use.   clopidogrel (PLAVIX) 75 mg tablet  Self No No   Sig: Take 1 tablet (75 mg total) by mouth daily   docusate sodium (COLACE) 100 mg capsule  Self No No   Sig: Take 1 capsule (100 mg total) by mouth 2 (two) times a day   esomeprazole (NexIUM) 40 MG capsule  Self Yes No   Sig: Take 40 mg by mouth   formoterol (PERFOROMIST) 20 MCG/2ML nebulizer solution  Self No No   Sig: Take 2 mL (20 mcg total) by nebulization 2 (two) times a day   guaiFENesin (MUCINEX) 600 mg 12 hr tablet  Self No No   Sig: Take 1 tablet (600 mg total) by mouth every 12 (twelve) hours   metFORMIN (GLUCOPHAGE) 500 mg tablet  Self Yes No   Sig: Take 1 tablet by mouth 2 (two) times a day with meals   metoprolol succinate (TOPROL-XL) 25 mg 24 hr tablet  Self No No   Sig: TAKE 1 TABLET (25 MG TOTAL) BY MOUTH DAILY.   montelukast (SINGULAIR) 10 mg tablet  Self No No   Sig: Take 1 tablet (10 mg total) by mouth daily at bedtime   multivitamin (THERAGRAN) TABS  Self Yes No   Sig: Take 1 tablet by mouth   nitroglycerin (NITROSTAT) 0.4 mg SL tablet  Self No No   Sig: Place 1 tablet (0.4 mg total) under the tongue every 5 (five) minutes as needed for chest pain   oxyCODONE-acetaminophen (PERCOCET) 7.5-325 MG per tablet   Yes Yes   Sig: Take 1 tablet by mouth every 4 (four) hours as needed for moderate pain   predniSONE 20 mg tablet  Self No  No   Sig: Take 2 tablets (40 mg total) by mouth daily for 2 days, THEN 1 tablet (20 mg total) daily for 2 days, THEN 0.5 tablets (10 mg total) daily for 2 days.   tamsulosin (FLOMAX) 0.4 mg  Self Yes No   Sig: Take 0.4 mg by mouth daily   temazepam (RESTORIL) 30 mg capsule  Self Yes No   Sig: Take 30 mg by mouth daily at bedtime as needed   venlafaxine (EFFEXOR-XR) 75 mg 24 hr capsule  Self Yes No   Si cap(s)      Facility-Administered Medications: None     Beta adrenergic blockers    Objective :  Temp:  [97.9 °F (36.6 °C)] 97.9 °F (36.6 °C)  HR:  [81-98] 91  BP: (134-162)/() 136/80  Resp:  [18-20] 18  SpO2:  [92 %-100 %] 95 %  O2 Device: None (Room air)    Physical Exam  Vitals reviewed.   Constitutional:       General: He is not in acute distress.     Appearance: He is well-developed. He is not toxic-appearing or diaphoretic.   HENT:      Head: Normocephalic and atraumatic.   Eyes:      General: No scleral icterus.  Neck:      Trachea: No tracheal deviation.   Cardiovascular:      Rate and Rhythm: Normal rate and regular rhythm.      Heart sounds: S1 normal and S2 normal. No murmur heard.     No friction rub. No gallop.   Pulmonary:      Effort: Pulmonary effort is normal. No tachypnea, accessory muscle usage or respiratory distress.      Breath sounds: No stridor. Decreased breath sounds present. No wheezing, rhonchi or rales.   Chest:      Chest wall: No tenderness.   Abdominal:      General: Bowel sounds are normal. There is no distension.      Palpations: Abdomen is soft.      Tenderness: There is no abdominal tenderness.   Musculoskeletal:         General: No tenderness.      Cervical back: Neck supple.      Right lower leg: No edema.      Left lower leg: No edema.   Skin:     General: Skin is warm and dry.      Findings: No rash.   Neurological:      Mental Status: He is alert and oriented to person, place, and time.      GCS: GCS eye subscore is 4. GCS verbal subscore is 5. GCS motor subscore is  6.   Psychiatric:         Speech: Speech normal.         Behavior: Behavior normal. Behavior is cooperative.         Lab Results: I have reviewed the following results:  Lab Results   Component Value Date    WBC 14.24 (H) 02/22/2025    HGB 9.2 (L) 02/22/2025    HCT 30.2 (L) 02/22/2025    MCV 83 02/22/2025     (H) 02/22/2025     Lab Results   Component Value Date    SODIUM 138 02/22/2025    K 4.5 02/22/2025     02/22/2025    CO2 22 02/22/2025    AGAP 8 02/22/2025    BUN 27 (H) 02/22/2025    CREATININE 0.80 02/22/2025    GLUC 286 (H) 02/22/2025    GLUF 134 (H) 02/17/2025    CALCIUM 8.5 02/22/2025    AST 12 (L) 02/22/2025    ALT 16 02/22/2025    ALKPHOS 79 02/22/2025    TP 5.9 (L) 02/22/2025    TBILI 0.24 02/22/2025    EGFR 89 02/22/2025     Echocardiogram shows EF 52% with grade one diastolic dysfunction and normal RV size and function.  Last CTA chest showed chronic bronchiectasis right lung base.  CXR images this admit personally reviewed and show no acute pulmonary infiltrate.

## 2025-02-23 NOTE — RESPIRATORY THERAPY NOTE
RT Protocol Note  Fahad Hernandez 69 y.o. male MRN: [id number]  Unit/Bed#: 2 E 277-01 Encounter: [id number]    Assessment    Principal Problem:    Severe persistent asthma with acute exacerbation  Active Problems:    Hypertension    Type 2 diabetes mellitus with hyperglycemia, without long-term current use of insulin (HCC)    Major depressive disorder    Mixed hyperlipidemia    Gastroesophageal reflux disease with esophagitis    Acute lactic acidosis    CASSIE (obstructive sleep apnea)    Tardive dyskinesia      Home Pulmonary Medications:         Past Medical History:   Diagnosis Date    Asthma     COPD (chronic obstructive pulmonary disease) (formerly [facility])     Dementia (HCC)     Diabetes mellitus (HCC)     GERD (gastroesophageal reflux disease)     History of stroke 11/07/2019    Hypertension     Interstitial lung disease (formerly [facility])     Major depressive disorder with current active episode 11/07/2019    Pneumonia     Rotator cuff arthropathy of right shoulder 12/17/2024    Sleep apnea     Sleep apnea, obstructive     Stroke (formerly [facility])     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     Social History     Socioeconomic History    Marital status: /Civil Union     Spouse name: Not on file    Number of children: Not on file    Years of education: Not on file    Highest education level: Not on file   Occupational History    Not on file   Tobacco Use    Smoking status: Never     Passive exposure: Never    Smokeless tobacco: Never    Tobacco comments:     Patient admits to using marijuana, edibles and smoking    Vaping Use    Vaping status: Never Used   Substance and Sexual Activity    Alcohol use: Not Currently    Drug use: Yes     Types: Marijuana, Oxycodone, Psilocybin    Sexual activity: Yes     Partners: Female     Birth control/protection: Male Sterilization   Other Topics Concern    Not on file   Social History Narrative    Not on file     Social Drivers of Health     Financial Resource Strain: Low Risk  (11/1/2024)    Received from [facility]    Overall Financial Resource Strain (CARDIA)     Difficulty of Paying Living Expenses: Not hard at all   Food Insecurity: No Food Insecurity (2/22/2025)    Nursing - Inadequate Food Risk Classification     Worried About Running Out of Food in the Last Year: Never true     Ran Out of Food in the Last Year: Never true     Ran Out of Food in the Last Year: Never true   Transportation Needs: No Transportation Needs (2/22/2025)    Nursing - Transportation Risk Classification     Lack of Transportation: Not on file     Lack of Transportation: No   Physical Activity: Inactive (11/1/2024)    Received from [facility]    Exercise Vital Sign     Days of Exercise per Week: 0 days     Minutes of Exercise per Session: 0 min   Stress: Stress Concern Present (11/1/2024)    Received from [facility]    [employer] - Occupational Stress Questionnaire     Feeling of Stress : To some extent   Social Connections: Moderately Isolated (11/1/2024)    Received from [facility]    Social Connection and Isolation Panel [NHANES]     Frequency of Communication with Friends and Family: Three times a week     Frequency of Social Gatherings with Friends and Family: Twice a week     Attends [employer] Services: Never     Active Member of Clubs or Organizations: No     Attends Club or Organization Meetings: Never     Marital Status:    Intimate Partner Violence: Unknown (2/22/2025)    Nursing IPS     Feels Physically and Emotionally Safe: Not on file     Physically Hurt by Someone: Not on file     Humiliated or Emotionally Abused by Someone: Not on file     Physically Hurt by Someone: No     Hurt or Threatened by Someone: No   Housing Stability: Unknown (2/22/2025)    Nursing: Inadequate Housing Risk Classification     Has Housing: Not on file     Worried About Losing Housing: Not on file     Unable to Get Utilities: Not on file     Unable to Pay for  "Housing in the Last Year: No     Has Housin       Subjective         Objective    Physical Exam:   Assessment Type: During-treatment  General Appearance: Awake, Alert  Respiratory Pattern: Normal  Chest Assessment: Chest expansion symmetrical  Bilateral Breath Sounds: Diminished  Cough: None  O2 Device: V60    Vitals:  Blood pressure 154/92, pulse 81, temperature 98 °F (36.7 °C), temperature source Oral, resp. rate 18, height 5' 11\" (1.803 m), weight 73 kg (160 lb 15 oz), SpO2 92%.          Imaging and other studies:     O2 Device: V60     Plan    Respiratory Plan: Mild Distress pathway, Home Bronchodilator Patient pathway        Resp Comments: pt with hx of copd, and dx of covid, will contniue with xop atr tid and pulm perf bid   "

## 2025-02-23 NOTE — PLAN OF CARE
Problem: PAIN - ADULT  Goal: Verbalizes/displays adequate comfort level or baseline comfort level  Description: Interventions:  - Encourage patient to monitor pain and request assistance  - Assess pain using appropriate pain scale  - Administer analgesics based on type and severity of pain and evaluate response  - Implement non-pharmacological measures as appropriate and evaluate response  - Consider cultural and social influences on pain and pain management  - Notify physician/advanced practitioner if interventions unsuccessful or patient reports new pain  Outcome: Progressing     Problem: INFECTION - ADULT  Goal: Absence or prevention of progression during hospitalization  Description: INTERVENTIONS:  - Assess and monitor for signs and symptoms of infection  - Monitor lab/diagnostic results  - Monitor all insertion sites, i.e. indwelling lines, tubes, and drains  - Monitor endotracheal if appropriate and nasal secretions for changes in amount and color  - [facility] appropriate cooling/warming therapies per order  - Administer medications as ordered  - Instruct and encourage patient and family to use good hand hygiene technique  - Identify and instruct in appropriate isolation precautions for identified infection/condition  Outcome: Progressing  Goal: Absence of fever/infection during neutropenic period  Description: INTERVENTIONS:  - Monitor WBC    Outcome: Progressing     Problem: SAFETY ADULT  Goal: Patient will remain free of falls  Description: INTERVENTIONS:  - Educate patient/family on patient safety including physical limitations  - Instruct patient to call for assistance with activity   - Consult OT/PT to assist with strengthening/mobility   - Keep Call bell within reach  - Keep bed low and locked with side rails adjusted as appropriate  - Keep care items and personal belongings within reach  - Initiate and maintain comfort rounds  - Make Fall Risk Sign visible to staff  - Offer Toileting every 2 Hours,  in advance of need  - Initiate/Maintain bed alarm  - Obtain necessary fall risk management equipment  - Apply yellow socks and bracelet for high fall risk patients  - Consider moving patient to room near nurses station  Outcome: Progressing  Goal: Maintain or return to baseline ADL function  Description: INTERVENTIONS:  -  Assess patient's ability to carry out ADLs; assess patient's baseline for ADL function and identify physical deficits which impact ability to perform ADLs (bathing, care of mouth/teeth, toileting, grooming, dressing, etc.)  - Assess/evaluate cause of self-care deficits   - Assess range of motion  - Assess patient's mobility; develop plan if impaired  - Assess patient's need for assistive devices and provide as appropriate  - Encourage maximum independence but intervene and supervise when necessary  - Involve family in performance of ADLs  - Assess for home care needs following discharge   - Consider OT consult to assist with ADL evaluation and planning for discharge  - Provide patient education as appropriate  Outcome: Progressing  Goal: Maintains/Returns to pre admission functional level  Description: INTERVENTIONS:  - Perform AM-PAC 6 Click Basic Mobility/ Daily Activity assessment daily.  - Set and communicate daily mobility goal to care team and patient/family/caregiver.   - Collaborate with rehabilitation services on mobility goals if consulted  - Perform Range of Motion 3 times a day.  - Reposition patient every 2 hours.  - Dangle patient 3 times a day  - Stand patient 3 times a day  - Ambulate patient 3 times a day  - Out of bed to chair 3 times a day   - Out of bed for meals 3 times a day  - Out of bed for toileting  - Record patient progress and toleration of activity level   Outcome: Progressing     Problem: DISCHARGE PLANNING  Goal: Discharge to home or other facility with appropriate resources  Description: INTERVENTIONS:  - Identify barriers to discharge w/patient and caregiver  -  Arrange for needed discharge resources and transportation as appropriate  - Identify discharge learning needs (meds, wound care, etc.)  - Arrange for interpretive services to assist at discharge as needed  - Refer to Case Management Department for coordinating discharge planning if the patient needs post-hospital services based on physician/advanced practitioner order or complex needs related to functional status, cognitive ability, or social support system  Outcome: Progressing     Problem: Knowledge Deficit  Goal: Patient/family/caregiver demonstrates understanding of disease process, treatment plan, medications, and discharge instructions  Description: Complete learning assessment and assess knowledge base.  Interventions:  - Provide teaching at level of understanding  - Provide teaching via preferred learning methods  Outcome: Progressing     Problem: Nutrition/Hydration-ADULT  Goal: Nutrient/Hydration intake appropriate for improving, restoring or maintaining nutritional needs  Description: Monitor and assess patient's nutrition/hydration status for malnutrition. Collaborate with interdisciplinary team and initiate plan and interventions as ordered.  Monitor patient's weight and dietary intake as ordered or per policy. Utilize nutrition screening tool and intervene as necessary. Determine patient's food preferences and provide high-protein, high-caloric foods as appropriate.     INTERVENTIONS:  - Monitor oral intake, urinary output, labs, and treatment plans  - Assess nutrition and hydration status and recommend course of action  - Evaluate amount of meals eaten  - Assist patient with eating if necessary   - Allow adequate time for meals  - Recommend/ encourage appropriate diets, oral nutritional supplements, and vitamin/mineral supplements  - Order, calculate, and assess calorie counts as needed  - Recommend, monitor, and adjust tube feedings and TPN/PPN based on assessed needs  - Assess need for intravenous  fluids  - Provide specific nutrition/hydration education as appropriate  - Include patient/family/caregiver in decisions related to nutrition  Outcome: Progressing

## 2025-02-24 PROBLEM — A41.89 OTHER SPECIFIED SEPSIS (HCC): Status: RESOLVED | Noted: 2025-02-19 | Resolved: 2025-02-24

## 2025-02-24 PROBLEM — J10.1 INFLUENZA A: Status: ACTIVE | Noted: 2025-02-24

## 2025-02-24 LAB
ANION GAP SERPL CALCULATED.3IONS-SCNC: 7 MMOL/L (ref 4–13)
BASOPHILS # BLD AUTO: 0.01 THOUSANDS/ÂΜL (ref 0–0.1)
BASOPHILS NFR BLD AUTO: 0 % (ref 0–1)
BUN SERPL-MCNC: 26 MG/DL (ref 5–25)
CALCIUM SERPL-MCNC: 8.9 MG/DL (ref 8.4–10.2)
CHLORIDE SERPL-SCNC: 100 MMOL/L (ref 96–108)
CO2 SERPL-SCNC: 29 MMOL/L (ref 21–32)
CREAT SERPL-MCNC: 0.76 MG/DL (ref 0.6–1.3)
EOSINOPHIL # BLD AUTO: 0 THOUSAND/ÂΜL (ref 0–0.61)
EOSINOPHIL NFR BLD AUTO: 0 % (ref 0–6)
ERYTHROCYTE [DISTWIDTH] IN BLOOD BY AUTOMATED COUNT: 15.7 % (ref 11.6–15.1)
GFR SERPL CREATININE-BSD FRML MDRD: 91 ML/MIN/1.73SQ M
GLUCOSE SERPL-MCNC: 171 MG/DL (ref 65–140)
GLUCOSE SERPL-MCNC: 184 MG/DL (ref 65–140)
GLUCOSE SERPL-MCNC: 198 MG/DL (ref 65–140)
GLUCOSE SERPL-MCNC: 223 MG/DL (ref 65–140)
GLUCOSE SERPL-MCNC: 299 MG/DL (ref 65–140)
HCT VFR BLD AUTO: 32 % (ref 36.5–49.3)
HGB BLD-MCNC: 9.5 G/DL (ref 12–17)
IMM GRANULOCYTES # BLD AUTO: 0.33 THOUSAND/UL (ref 0–0.2)
IMM GRANULOCYTES NFR BLD AUTO: 2 % (ref 0–2)
LYMPHOCYTES # BLD AUTO: 1.34 THOUSANDS/ÂΜL (ref 0.6–4.47)
LYMPHOCYTES NFR BLD AUTO: 9 % (ref 14–44)
MCH RBC QN AUTO: 24.2 PG (ref 26.8–34.3)
MCHC RBC AUTO-ENTMCNC: 29.7 G/DL (ref 31.4–37.4)
MCV RBC AUTO: 82 FL (ref 82–98)
MONOCYTES # BLD AUTO: 0.73 THOUSAND/ÂΜL (ref 0.17–1.22)
MONOCYTES NFR BLD AUTO: 5 % (ref 4–12)
NEUTROPHILS # BLD AUTO: 13.19 THOUSANDS/ÂΜL (ref 1.85–7.62)
NEUTS SEG NFR BLD AUTO: 84 % (ref 43–75)
NRBC BLD AUTO-RTO: 0 /100 WBCS
PLATELET # BLD AUTO: 529 THOUSANDS/UL (ref 149–390)
PMV BLD AUTO: 8.8 FL (ref 8.9–12.7)
POTASSIUM SERPL-SCNC: 4.8 MMOL/L (ref 3.5–5.3)
RBC # BLD AUTO: 3.92 MILLION/UL (ref 3.88–5.62)
SODIUM SERPL-SCNC: 136 MMOL/L (ref 135–147)
WBC # BLD AUTO: 15.6 THOUSAND/UL (ref 4.31–10.16)

## 2025-02-24 PROCEDURE — 85025 COMPLETE CBC W/AUTO DIFF WBC: CPT | Performed by: GENERAL PRACTICE

## 2025-02-24 PROCEDURE — 94640 AIRWAY INHALATION TREATMENT: CPT

## 2025-02-24 PROCEDURE — 82948 REAGENT STRIP/BLOOD GLUCOSE: CPT

## 2025-02-24 PROCEDURE — 94760 N-INVAS EAR/PLS OXIMETRY 1: CPT

## 2025-02-24 PROCEDURE — 94660 CPAP INITIATION&MGMT: CPT

## 2025-02-24 PROCEDURE — 80048 BASIC METABOLIC PNL TOTAL CA: CPT | Performed by: GENERAL PRACTICE

## 2025-02-24 PROCEDURE — 99232 SBSQ HOSP IP/OBS MODERATE 35: CPT | Performed by: PHYSICIAN ASSISTANT

## 2025-02-24 PROCEDURE — 99232 SBSQ HOSP IP/OBS MODERATE 35: CPT | Performed by: FAMILY MEDICINE

## 2025-02-24 RX ORDER — POLYETHYLENE GLYCOL 3350 17 G/17G
17 POWDER, FOR SOLUTION ORAL ONCE
Status: COMPLETED | OUTPATIENT
Start: 2025-02-24 | End: 2025-02-24

## 2025-02-24 RX ADMIN — IPRATROPIUM BROMIDE 0.5 MG: 0.5 SOLUTION RESPIRATORY (INHALATION) at 07:22

## 2025-02-24 RX ADMIN — METHYLPREDNISOLONE SODIUM SUCCINATE 40 MG: 40 INJECTION, POWDER, FOR SOLUTION INTRAMUSCULAR; INTRAVENOUS at 21:00

## 2025-02-24 RX ADMIN — VENLAFAXINE HYDROCHLORIDE 75 MG: 75 CAPSULE, EXTENDED RELEASE ORAL at 09:01

## 2025-02-24 RX ADMIN — LEVALBUTEROL HYDROCHLORIDE 1.25 MG: 1.25 SOLUTION RESPIRATORY (INHALATION) at 07:22

## 2025-02-24 RX ADMIN — DOCUSATE SODIUM 100 MG: 100 CAPSULE, LIQUID FILLED ORAL at 17:41

## 2025-02-24 RX ADMIN — INSULIN LISPRO 2 UNITS: 100 INJECTION, SOLUTION INTRAVENOUS; SUBCUTANEOUS at 13:58

## 2025-02-24 RX ADMIN — GUAIFENESIN 600 MG: 600 TABLET ORAL at 20:59

## 2025-02-24 RX ADMIN — MONTELUKAST 10 MG: 10 TABLET, FILM COATED ORAL at 21:00

## 2025-02-24 RX ADMIN — OSELTAMAVIR PHOSPHATE 75 MG: 75 CAPSULE ORAL at 20:59

## 2025-02-24 RX ADMIN — BUDESONIDE 0.5 MG: 0.5 INHALANT ORAL at 18:03

## 2025-02-24 RX ADMIN — IPRATROPIUM BROMIDE 0.5 MG: 0.5 SOLUTION RESPIRATORY (INHALATION) at 18:02

## 2025-02-24 RX ADMIN — ARIPIPRAZOLE 10 MG: 5 TABLET ORAL at 09:01

## 2025-02-24 RX ADMIN — LEVALBUTEROL HYDROCHLORIDE 1.25 MG: 1.25 SOLUTION RESPIRATORY (INHALATION) at 13:23

## 2025-02-24 RX ADMIN — ASPIRIN 81 MG: 81 TABLET, COATED ORAL at 09:01

## 2025-02-24 RX ADMIN — BUDESONIDE 0.5 MG: 0.5 INHALANT ORAL at 07:22

## 2025-02-24 RX ADMIN — GUAIFENESIN 600 MG: 600 TABLET ORAL at 09:02

## 2025-02-24 RX ADMIN — METHYLPREDNISOLONE SODIUM SUCCINATE 40 MG: 40 INJECTION, POWDER, FOR SOLUTION INTRAMUSCULAR; INTRAVENOUS at 09:02

## 2025-02-24 RX ADMIN — FORMOTEROL FUMARATE DIHYDRATE 20 MCG: 20 SOLUTION RESPIRATORY (INHALATION) at 07:22

## 2025-02-24 RX ADMIN — AMLODIPINE BESYLATE 10 MG: 10 TABLET ORAL at 09:01

## 2025-02-24 RX ADMIN — OXYCODONE HYDROCHLORIDE AND ACETAMINOPHEN 1.5 TABLET: 5; 325 TABLET ORAL at 09:01

## 2025-02-24 RX ADMIN — FORMOTEROL FUMARATE DIHYDRATE 20 MCG: 20 SOLUTION RESPIRATORY (INHALATION) at 18:02

## 2025-02-24 RX ADMIN — ENOXAPARIN SODIUM 40 MG: 40 INJECTION SUBCUTANEOUS at 09:00

## 2025-02-24 RX ADMIN — LISINOPRIL 40 MG: 20 TABLET ORAL at 09:00

## 2025-02-24 RX ADMIN — OXYCODONE HYDROCHLORIDE AND ACETAMINOPHEN 1.5 TABLET: 5; 325 TABLET ORAL at 15:32

## 2025-02-24 RX ADMIN — INSULIN LISPRO 4 UNITS: 100 INJECTION, SOLUTION INTRAVENOUS; SUBCUTANEOUS at 17:41

## 2025-02-24 RX ADMIN — AZITHROMYCIN 500 MG: 500 TABLET, FILM COATED ORAL at 21:00

## 2025-02-24 RX ADMIN — OSELTAMAVIR PHOSPHATE 75 MG: 75 CAPSULE ORAL at 09:01

## 2025-02-24 RX ADMIN — BENZONATATE 100 MG: 100 CAPSULE ORAL at 21:00

## 2025-02-24 RX ADMIN — OXYCODONE HYDROCHLORIDE AND ACETAMINOPHEN 1.5 TABLET: 5; 325 TABLET ORAL at 20:55

## 2025-02-24 RX ADMIN — INSULIN LISPRO 1 UNITS: 100 INJECTION, SOLUTION INTRAVENOUS; SUBCUTANEOUS at 08:10

## 2025-02-24 RX ADMIN — TAMSULOSIN HYDROCHLORIDE 0.4 MG: 0.4 CAPSULE ORAL at 09:02

## 2025-02-24 RX ADMIN — DOCUSATE SODIUM 100 MG: 100 CAPSULE, LIQUID FILLED ORAL at 09:02

## 2025-02-24 RX ADMIN — CLOPIDOGREL 75 MG: 75 TABLET ORAL at 09:01

## 2025-02-24 RX ADMIN — BENZONATATE 100 MG: 100 CAPSULE ORAL at 17:41

## 2025-02-24 RX ADMIN — BENZONATATE 100 MG: 100 CAPSULE ORAL at 09:02

## 2025-02-24 RX ADMIN — METOPROLOL SUCCINATE 25 MG: 25 TABLET, EXTENDED RELEASE ORAL at 09:01

## 2025-02-24 RX ADMIN — IPRATROPIUM BROMIDE 0.5 MG: 0.5 SOLUTION RESPIRATORY (INHALATION) at 13:23

## 2025-02-24 RX ADMIN — POLYETHYLENE GLYCOL 3350 17 G: 17 POWDER, FOR SOLUTION ORAL at 17:41

## 2025-02-24 RX ADMIN — BUPROPION HYDROCHLORIDE 300 MG: 150 TABLET, EXTENDED RELEASE ORAL at 09:01

## 2025-02-24 RX ADMIN — ATORVASTATIN CALCIUM 10 MG: 10 TABLET, FILM COATED ORAL at 17:41

## 2025-02-24 RX ADMIN — LEVALBUTEROL HYDROCHLORIDE 1.25 MG: 1.25 SOLUTION RESPIRATORY (INHALATION) at 18:03

## 2025-02-24 RX ADMIN — INSULIN LISPRO 1 UNITS: 100 INJECTION, SOLUTION INTRAVENOUS; SUBCUTANEOUS at 22:12

## 2025-02-24 RX ADMIN — PANTOPRAZOLE SODIUM 40 MG: 40 TABLET, DELAYED RELEASE ORAL at 06:13

## 2025-02-24 NOTE — PROGRESS NOTES
Progress Note - Hospitalist   Name: Fahad Hernandez 69 y.o. male I MRN: [id number]  Unit/Bed#: 2 E 277-01 I Date of Admission: 2/21/2025   Date of Service: 2/24/2025 I Hospital Day: 2    Assessment & Plan  Type 2 diabetes mellitus with hyperglycemia, without long-term current use of insulin (HCC)  Lab Results   Component Value Date    HGBA1C 8.3 (H) 01/26/2025       Recent Labs     02/23/25  1633 02/23/25  2039 02/24/25  0741 02/24/25  1051   POCGLU 257* 194* 184* 198*       Blood Sugar Average: Last 72 hrs:  (P) [id number]  HOLD metformin  ISS  Diabetic diet  Major depressive disorder  Continue Abilify, Wellbutrin and Effexor   Hypertension  Continue norvasc, lisinopril, metoprolol  Severe persistent asthma with acute exacerbation  Asthma exacerbation secondary to COVID-19 and influenza A infection.  Continue new IV Solu-Medrol  Continue Atrovent/Xopenex nebulizer  Continue singular  Continue Pulmicort  Pulmonology consult appreciated  CASSIE (obstructive sleep apnea)  Continue BIPAP at night  Tardive dyskinesia  Cont Astuedo  Acute lactic acidosis  Multifactorial with dehydration, multiple albuterol nebs, chronic metformin use.     COVID-19  Supportive care  Currently on room air  Other specified sepsis (HCC) (Resolved: 2/24/2025)  Other specified sepsis, POA, in the setting of influenza A, evidenced by Leukocytosis WBC- 15.14 > 15.60, Tachycardia HR- 106 > 109, tachypnea RR- 31 >24, treated with NS bolus 2L, Tamiflu, Trend CBC, and monitoring of patient.   Influenza A  Supportive care  Continue Tamiflu    VTE Pharmacologic Prophylaxis:   Moderate Risk (Score 3-4) - Pharmacological DVT Prophylaxis Ordered: enoxaparin (Lovenox).    Mobility:   Basic Mobility Inpatient Raw Score: 13  JH-HLM Goal: 4: Move to chair/commode  JH-HLM Achieved: 3: Sit at edge of bed  JH-HLM Goal NOT achieved. Continue with multidisciplinary rounding and encourage appropriate mobility to improve upon JH-HLM goals.    Patient  Centered Rounds: I performed bedside rounds with nursing staff today.      Current Length of Stay: 2 day(s)  Current Patient Status: Inpatient   Certification Statement: The patient will continue to require additional inpatient hospital stay due to pending IV Solu-Medrol  Discharge Plan: Anticipate discharge in 24-48 hrs to home with home services.    Code Status: Level 1 - Full Code    Subjective   Patient examined at bedside.  Patient is comfortable, lying on the bed.  Not in any respiratory distress.  Currently on room air.  On exam, does have wheezing    Objective :  Temp:  [97.8 °F (36.6 °C)-98.3 °F (36.8 °C)] 98.1 °F (36.7 °C)  HR:  [] 102  BP: (126-141)/(76-88) 141/88  Resp:  [18-20] 18  SpO2:  [92 %-99 %] 94 %  O2 Device: None (Room air)    Body mass index is 22.32 kg/m².     Input and Output Summary (last 24 hours):     Intake/Output Summary (Last 24 hours) at 2/24/2025 1354  Last data filed at 2/24/2025 1300  Gross per 24 hour   Intake 593 ml   Output 3255 ml   Net -2662 ml       Physical Exam  Vitals and nursing note reviewed.   Constitutional:       General: He is not in acute distress.     Appearance: Normal appearance.   HENT:      Head: Normocephalic and atraumatic.      Right Ear: External ear normal.      Left Ear: External ear normal.      Nose: Nose normal.   Eyes:      Extraocular Movements: Extraocular movements intact.   Pulmonary:      Effort: Pulmonary effort is normal.      Breath sounds: Wheezing present.   Musculoskeletal:      Cervical back: Normal range of motion.   Neurological:      Mental Status: He is alert. Mental status is at baseline.   Psychiatric:         Mood and Affect: Mood normal.           Lines/Drains:              Lab Results: I have reviewed the following results:   Results from last 7 days   Lab Units 02/24/25  0438   WBC Thousand/uL 15.60*   HEMOGLOBIN g/dL 9.5*   HEMATOCRIT % 32.0*   PLATELETS Thousands/uL 529*   SEGS PCT % 84*   LYMPHO PCT % 9*   MONO PCT % 5    EOS PCT % 0     Results from last 7 days   Lab Units 02/24/25  0438 02/23/25  1000 02/22/25  0436   SODIUM mmol/L 136   < > 138   POTASSIUM mmol/L 4.8   < > 4.5   CHLORIDE mmol/L 100   < > 108   CO2 mmol/L 29   < > 22   BUN mg/dL 26*   < > 27*   CREATININE mg/dL 0.76   < > 0.80   ANION GAP mmol/L 7   < > 8   CALCIUM mg/dL 8.9   < > 8.5   ALBUMIN g/dL  --   --  3.3*   TOTAL BILIRUBIN mg/dL  --   --  0.24   ALK PHOS U/L  --   --  79   ALT U/L  --   --  16   AST U/L  --   --  12*   GLUCOSE RANDOM mg/dL 223*   < > 286*    < > = values in this interval not displayed.     Results from last 7 days   Lab Units 02/22/25  0436   INR  1.02     Results from last 7 days   Lab Units 02/24/25  1051 02/24/25  0741 02/23/25  2039 02/23/25  1633 02/23/25  1010 02/23/25  0745 02/22/25  2007 02/22/25  1647 02/22/25  1018 02/22/25  0736 02/22/25  0131 02/19/25  0737   POC GLUCOSE mg/dl 198* 184* 194* 257* 223* 203* 232* 214* 352* 194* 275* 163*         Results from last 7 days   Lab Units 02/22/25  0837 02/22/25  0436 02/22/25  0035 02/21/25  2158 02/18/25  0513   LACTIC ACID mmol/L 2.0 3.6* 4.9* 3.7*  --    PROCALCITONIN ng/ml  --   --   --  0.09  0.09 <0.05       Recent Cultures (last 7 days):   Results from last 7 days   Lab Units 02/21/25  2158   BLOOD CULTURE  No Growth at 48 hrs.  No Growth at 48 hrs.          Last 24 Hours Medication List:     Current Facility-Administered Medications:     acetaminophen (TYLENOL) tablet 650 mg, Q6H PRN    amLODIPine (NORVASC) tablet 10 mg, Daily **AND** lisinopril (ZESTRIL) tablet 40 mg, Daily    ARIPiprazole (ABILIFY) tablet 10 mg, Daily    aspirin (ECOTRIN LOW STRENGTH) EC tablet 81 mg, Daily    atorvastatin (LIPITOR) tablet 10 mg, Daily With Dinner    azithromycin (ZITHROMAX) tablet 500 mg, Q24H    benzonatate (TESSALON PERLES) capsule 100 mg, TID    budesonide (PULMICORT) inhalation solution 0.5 mg, Q12H    buPROPion (WELLBUTRIN XL) 24 hr tablet 300 mg, Daily    clopidogrel (PLAVIX)  tablet 75 mg, Daily    [Held by provider] Deutetrabenazine TABS 12 mg, Daily    docusate sodium (COLACE) capsule 100 mg, BID    enoxaparin (LOVENOX) subcutaneous injection 40 mg, Q24H PARTHA    formoterol (PERFOROMIST) nebulizer solution 20 mcg, BID    guaiFENesin (MUCINEX) 12 hr tablet 600 mg, Q12H PARTHA    hydrOXYzine HCL (ATARAX) tablet 25 mg, Q6H PRN    insulin lispro (HumALOG/ADMELOG) 100 units/mL subcutaneous injection 1-6 Units, TID AC **AND** Fingerstick Glucose (POCT), TID AC    insulin lispro (HumALOG/ADMELOG) 100 units/mL subcutaneous injection 1-6 Units, HS    ipratropium (ATROVENT) 0.02 % inhalation solution 0.5 mg, TID    levalbuterol (XOPENEX) inhalation solution 1.25 mg, TID **AND** [DISCONTINUED] sodium chloride 0.9 % inhalation solution 3 mL, TID    methylPREDNISolone sodium succinate (Solu-MEDROL) injection 40 mg, Q12H PARTHA    metoprolol succinate (TOPROL-XL) 24 hr tablet 25 mg, Daily    montelukast (SINGULAIR) tablet 10 mg, HS    nitroglycerin (NITROSTAT) SL tablet 0.4 mg, Q5 Min PRN    oseltamivir (TAMIFLU) capsule 75 mg, Q12H PARTHA    oxyCODONE-acetaminophen (PERCOCET) 5-325 mg per tablet 1.5 tablet, Q4H PRN    pantoprazole (PROTONIX) EC tablet 40 mg, Early Morning    tamsulosin (FLOMAX) capsule 0.4 mg, Daily    venlafaxine (EFFEXOR-XR) 24 hr capsule 75 mg, Daily    Administrative Statements   Today, Patient Was Seen By: Luisa Gomez MD      **Please Note: This note may have been constructed using a voice recognition system.**

## 2025-02-24 NOTE — PLAN OF CARE
Problem: PAIN - ADULT  Goal: Verbalizes/displays adequate comfort level or baseline comfort level  Description: Interventions:  - Encourage patient to monitor pain and request assistance  - Assess pain using appropriate pain scale  - Administer analgesics based on type and severity of pain and evaluate response  - Implement non-pharmacological measures as appropriate and evaluate response  - Consider cultural and social influences on pain and pain management  - Notify physician/advanced practitioner if interventions unsuccessful or patient reports new pain  Outcome: Progressing     Problem: INFECTION - ADULT  Goal: Absence or prevention of progression during hospitalization  Description: INTERVENTIONS:  - Assess and monitor for signs and symptoms of infection  - Monitor lab/diagnostic results  - Monitor all insertion sites, i.e. indwelling lines, tubes, and drains  - Monitor endotracheal if appropriate and nasal secretions for changes in amount and color  - [place] appropriate cooling/warming therapies per order  - Administer medications as ordered  - Instruct and encourage patient and family to use good hand hygiene technique  - Identify and instruct in appropriate isolation precautions for identified infection/condition  Outcome: Progressing  Goal: Absence of fever/infection during neutropenic period  Description: INTERVENTIONS:  - Monitor WBC    Outcome: Progressing     Problem: SAFETY ADULT  Goal: Patient will remain free of falls  Description: INTERVENTIONS:  - Educate patient/family on patient safety including physical limitations  - Instruct patient to call for assistance with activity   - Consult OT/PT to assist with strengthening/mobility   - Keep Call bell within reach  - Keep bed low and locked with side rails adjusted as appropriate  - Keep care items and personal belongings within reach  - Initiate and maintain comfort rounds  - Make Fall Risk Sign visible to staff  - Offer Toileting every 2 Hours,  in advance of need  - Initiate/Maintain bed alarm  - Obtain necessary fall risk management equipment:   - Apply yellow socks and bracelet for high fall risk patients  - Consider moving patient to room near nurses station  Outcome: Progressing  Goal: Maintain or return to baseline ADL function  Description: INTERVENTIONS:  -  Assess patient's ability to carry out ADLs; assess patient's baseline for ADL function and identify physical deficits which impact ability to perform ADLs (bathing, care of mouth/teeth, toileting, grooming, dressing, etc.)  - Assess/evaluate cause of self-care deficits   - Assess range of motion  - Assess patient's mobility; develop plan if impaired  - Assess patient's need for assistive devices and provide as appropriate  - Encourage maximum independence but intervene and supervise when necessary  - Involve family in performance of ADLs  - Assess for home care needs following discharge   - Consider OT consult to assist with ADL evaluation and planning for discharge  - Provide patient education as appropriate  Outcome: Progressing  Goal: Maintains/Returns to pre admission functional level  Description: INTERVENTIONS:  - Perform AM-PAC 6 Click Basic Mobility/ Daily Activity assessment daily.  - Set and communicate daily mobility goal to care team and patient/family/caregiver.   - Collaborate with rehabilitation services on mobility goals if consulted  - Perform Range of Motion times a day.  - Reposition patient every  hours.  - Dangle patient  times a day  - Stand patient  times a day  - Ambulate patient  times a day  - Out of bed to chair  times a day   - Out of bed for meals  times a day  - Out of bed for toileting  - Record patient progress and toleration of activity level   Outcome: Progressing     Problem: DISCHARGE PLANNING  Goal: Discharge to home or other facility with appropriate resources  Description: INTERVENTIONS:  - Identify barriers to discharge w/patient and caregiver  - Arrange  for needed discharge resources and transportation as appropriate  - Identify discharge learning needs (meds, wound care, etc.)  - Arrange for interpretive services to assist at discharge as needed  - Refer to Case Management Department for coordinating discharge planning if the patient needs post-hospital services based on physician/advanced practitioner order or complex needs related to functional status, cognitive ability, or social support system  Outcome: Progressing     Problem: Knowledge Deficit  Goal: Patient/family/caregiver demonstrates understanding of disease process, treatment plan, medications, and discharge instructions  Description: Complete learning assessment and assess knowledge base.  Interventions:  - Provide teaching at level of understanding  - Provide teaching via preferred learning methods  Outcome: Progressing

## 2025-02-24 NOTE — ASSESSMENT & PLAN NOTE
Asthma exacerbation secondary to COVID-19 and influenza A infection.  Continue new IV Solu-Medrol  Continue Atrovent/Xopenex nebulizer  Continue singular  Continue Pulmicort  Pulmonology consult appreciated

## 2025-02-24 NOTE — ASSESSMENT & PLAN NOTE
Lab Results   Component Value Date    HGBA1C 8.3 (H) 01/26/2025       Recent Labs     02/23/25  1633 02/23/25 2039 02/24/25  0741 02/24/25  1051   POCGLU 257* 194* 184* 198*       Blood Sugar Average: Last 72 hrs:  (P) [id number]  HOLD metformin  ISS  Diabetic diet

## 2025-02-24 NOTE — ASSESSMENT & PLAN NOTE
Continue azithromycin for 3 days.  Continue budesonide/Perforomist twice daily with Xopenex/ipratropium 3 times daily.  Will maintain Solu-Medrol 40 every 12 for another day and hopefully decrease tomorrow  Continue Dupixent as outpatient    Will likely need a longer prednisone taper while he continues to have Dupixent until he achieves maximal benefit.

## 2025-02-24 NOTE — PROGRESS NOTES
Progress Note - Pulmonology   Name: Fahad Hernandez 69 y.o. male I MRN: [id number]  Unit/Bed#: 2 E 277-01 I Date of Admission: 2/21/2025   Date of Service: 2/24/2025 I Hospital Day: 2    Assessment & Plan  Severe persistent asthma with acute exacerbation  Continue azithromycin for 3 days.  Continue budesonide/Perforomist twice daily with Xopenex/ipratropium 3 times daily.  Will maintain Solu-Medrol 40 every 12 for another day and hopefully decrease tomorrow  Continue Dupixent as outpatient    Will likely need a longer prednisone taper while he continues to have Dupixent until he achieves maximal benefit.  CASSIE (obstructive sleep apnea)  Continue BiPAP at bedtime.  COVID-19  Diagnosis on February 16, 2025.  Supportive care.  Treatment for asthma exacerbation as above  Influenza A  Continue Tamiflu  Treatment for asthma exacerbation as above    24 Hour Events : No events  Subjective : Patient resting in bed.  He is feeling slightly better with his energy today, still feeling tight and wheezy with his breathing.    Objective :  Temp:  [97.8 °F (36.6 °C)-98.3 °F (36.8 °C)] 98.3 °F (36.8 °C)  HR:  [76-98] 81  BP: (126-141)/(76-88) 141/88  Resp:  [18-20] 19  SpO2:  [92 %-99 %] 99 %  O2 Device: None (Room air)    Physical Exam  Vitals reviewed.   Constitutional:       General: He is not in acute distress.     Appearance: Normal appearance. He is well-developed. He is not ill-appearing.   HENT:      Head: Normocephalic and atraumatic.      Mouth/Throat:      Pharynx: Oropharynx is clear.   Eyes:      Pupils: Pupils are equal, round, and reactive to light.   Cardiovascular:      Rate and Rhythm: Normal rate and regular rhythm.   Pulmonary:      Effort: Pulmonary effort is normal. No respiratory distress.      Breath sounds: Wheezing (faint expiratory) present. No decreased breath sounds, rhonchi or rales.   Abdominal:      General: Abdomen is flat. There is no distension.   Musculoskeletal:         General: Normal range of  motion.      Cervical back: Normal range of motion.      Right lower leg: No edema.      Left lower leg: No edema.   Skin:     General: Skin is warm and dry.      Findings: No rash.   Neurological:      Mental Status: He is alert and oriented to person, place, and time.   Psychiatric:         Mood and Affect: Mood normal.         Behavior: Behavior normal.           Lab Results: I have reviewed the following results:   .     02/23/25  1000 02/24/25  0438   WBC  --  15.60*   HGB  --  9.5*   HCT  --  32.0*   PLT  --  529*   SODIUM 137 136   K 4.6 4.8    100   CO2 25 29   BUN 25 26*   CREATININE 0.79 0.76   GLUC 222* 223*   PHOS 2.9  --      ABG: No new results in last 24 hours.    Imaging Results Review: I reviewed radiology reports from this admission including: chest xray.  Other Study Results Review: No additional pertinent studies reviewed.  PFT Results Reviewed: reviewed

## 2025-02-24 NOTE — PLAN OF CARE
Problem: PAIN - ADULT  Goal: Verbalizes/displays adequate comfort level or baseline comfort level  Description: Interventions:  - Encourage patient to monitor pain and request assistance  - Assess pain using appropriate pain scale  - Administer analgesics based on type and severity of pain and evaluate response  - Implement non-pharmacological measures as appropriate and evaluate response  - Consider cultural and social influences on pain and pain management  - Notify physician/advanced practitioner if interventions unsuccessful or patient reports new pain  Outcome: Progressing     Problem: INFECTION - ADULT  Goal: Absence or prevention of progression during hospitalization  Description: INTERVENTIONS:  - Assess and monitor for signs and symptoms of infection  - Monitor lab/diagnostic results  - Monitor all insertion sites, i.e. indwelling lines, tubes, and drains  - Monitor endotracheal if appropriate and nasal secretions for changes in amount and color  - [place] appropriate cooling/warming therapies per order  - Administer medications as ordered  - Instruct and encourage patient and family to use good hand hygiene technique  - Identify and instruct in appropriate isolation precautions for identified infection/condition  Outcome: Progressing  Goal: Absence of fever/infection during neutropenic period  Description: INTERVENTIONS:  - Monitor WBC    Outcome: Progressing     Problem: SAFETY ADULT  Goal: Patient will remain free of falls  Description: INTERVENTIONS:  - Educate patient/family on patient safety including physical limitations  - Instruct patient to call for assistance with activity   - Consult OT/PT to assist with strengthening/mobility   - Keep Call bell within reach  - Keep bed low and locked with side rails adjusted as appropriate  - Keep care items and personal belongings within reach  - Initiate and maintain comfort rounds  - Make Fall Risk Sign visible to staff  - Offer Toileting every 2 Hours,  in advance of need  - Initiate/Maintain bed alarm  - Obtain necessary fall risk management equipment  - Apply yellow socks and bracelet for high fall risk patients  - Consider moving patient to room near nurses station  Outcome: Progressing  Goal: Maintain or return to baseline ADL function  Description: INTERVENTIONS:  -  Assess patient's ability to carry out ADLs; assess patient's baseline for ADL function and identify physical deficits which impact ability to perform ADLs (bathing, care of mouth/teeth, toileting, grooming, dressing, etc.)  - Assess/evaluate cause of self-care deficits   - Assess range of motion  - Assess patient's mobility; develop plan if impaired  - Assess patient's need for assistive devices and provide as appropriate  - Encourage maximum independence but intervene and supervise when necessary  - Involve family in performance of ADLs  - Assess for home care needs following discharge   - Consider OT consult to assist with ADL evaluation and planning for discharge  - Provide patient education as appropriate  Outcome: Progressing  Goal: Maintains/Returns to pre admission functional level  Description: INTERVENTIONS:  - Perform AM-PAC 6 Click Basic Mobility/ Daily Activity assessment daily.  - Set and communicate daily mobility goal to care team and patient/family/caregiver.   - Collaborate with rehabilitation services on mobility goals if consulted  - Perform Range of Motion 3 times a day.  - Reposition patient every 2 hours.  - Dangle patient 3 times a day  - Stand patient 3 times a day  - Ambulate patient 3 times a day  - Out of bed to chair 3 times a day   - Out of bed for meals 3 times a day  - Out of bed for toileting  - Record patient progress and toleration of activity level   Outcome: Progressing     Problem: DISCHARGE PLANNING  Goal: Discharge to home or other facility with appropriate resources  Description: INTERVENTIONS:  - Identify barriers to discharge w/patient and caregiver  -  Arrange for needed discharge resources and transportation as appropriate  - Identify discharge learning needs (meds, wound care, etc.)  - Arrange for interpretive services to assist at discharge as needed  - Refer to Case Management Department for coordinating discharge planning if the patient needs post-hospital services based on physician/advanced practitioner order or complex needs related to functional status, cognitive ability, or social support system  Outcome: Progressing     Problem: Knowledge Deficit  Goal: Patient/family/caregiver demonstrates understanding of disease process, treatment plan, medications, and discharge instructions  Description: Complete learning assessment and assess knowledge base.  Interventions:  - Provide teaching at level of understanding  - Provide teaching via preferred learning methods  Outcome: Progressing     Problem: Nutrition/Hydration-ADULT  Goal: Nutrient/Hydration intake appropriate for improving, restoring or maintaining nutritional needs  Description: Monitor and assess patient's nutrition/hydration status for malnutrition. Collaborate with interdisciplinary team and initiate plan and interventions as ordered.  Monitor patient's weight and dietary intake as ordered or per policy. Utilize nutrition screening tool and intervene as necessary. Determine patient's food preferences and provide high-protein, high-caloric foods as appropriate.     INTERVENTIONS:  - Monitor oral intake, urinary output, labs, and treatment plans  - Assess nutrition and hydration status and recommend course of action  - Evaluate amount of meals eaten  - Assist patient with eating if necessary   - Allow adequate time for meals  - Recommend/ encourage appropriate diets, oral nutritional supplements, and vitamin/mineral supplements  - Order, calculate, and assess calorie counts as needed  - Recommend, monitor, and adjust tube feedings and TPN/PPN based on assessed needs  - Assess need for intravenous  fluids  - Provide specific nutrition/hydration education as appropriate  - Include patient/family/caregiver in decisions related to nutrition  Outcome: Progressing

## 2025-02-24 NOTE — ASSESSMENT & PLAN NOTE
Other specified sepsis, POA, in the setting of influenza A, evidenced by Leukocytosis WBC- 15.14 > 15.60, Tachycardia HR- 106 > 109, tachypnea RR- 31 >24, treated with NS bolus 2L, Tamiflu, Trend CBC, and monitoring of patient.

## 2025-02-24 NOTE — RESPIRATORY THERAPY NOTE
RT Protocol Note  Fahad Hernandez 69 y.o. male MRN: [id number]  Unit/Bed#: 2 E 277-01 Encounter: [id number]    Assessment    Principal Problem:    Severe persistent asthma with acute exacerbation  Active Problems:    Hypertension    Type 2 diabetes mellitus with hyperglycemia, without long-term current use of insulin (HCC)    Major depressive disorder    Mixed hyperlipidemia    Gastroesophageal reflux disease with esophagitis    Acute lactic acidosis    CASSIE (obstructive sleep apnea)    COVID-19    Tardive dyskinesia      Home Pulmonary Medications:         Past Medical History:   Diagnosis Date    Asthma     COPD (chronic obstructive pulmonary disease) ([facility])     Dementia ([facility])     Diabetes mellitus ([facility])     GERD (gastroesophageal reflux disease)     History of stroke 11/07/2019    Hypertension     Interstitial lung disease ([facility])     Major depressive disorder with current active episode 11/07/2019    Pneumonia     Rotator cuff arthropathy of right shoulder 12/17/2024    Sleep apnea     Sleep apnea, obstructive     Stroke ([facility])     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     Social History     Socioeconomic History    Marital status: /Civil Union     Spouse name: None    Number of children: None    Years of education: None    Highest education level: None   Occupational History    None   Tobacco Use    Smoking status: Never     Passive exposure: Never    Smokeless tobacco: Never    Tobacco comments:     Patient admits to using marijuana, edibles and smoking    Vaping Use    Vaping status: Never Used   Substance and Sexual Activity    Alcohol use: Not Currently    Drug use: Yes     Types: Marijuana, Oxycodone, Psilocybin    Sexual activity: Yes     Partners: Female     Birth control/protection: Male Sterilization   Other Topics Concern    None   Social History Narrative    None     Social Drivers of Health     Financial Resource Strain: Low Risk  (11/1/2024)    Received from [facility]    Overall Financial  Resource Strain (CARDIA)     Difficulty of Paying Living Expenses: Not hard at all   Food Insecurity: No Food Insecurity (2/22/2025)    Nursing - Inadequate Food Risk Classification     Worried About Running Out of Food in the Last Year: Never true     Ran Out of Food in the Last Year: Never true     Ran Out of Food in the Last Year: Never true   Transportation Needs: No Transportation Needs (2/22/2025)    Nursing - Transportation Risk Classification     Lack of Transportation: Not on file     Lack of Transportation: No   Physical Activity: Inactive (11/1/2024)    Received from [facility]    Exercise Vital Sign     Days of Exercise per Week: 0 days     Minutes of Exercise per Session: 0 min   Stress: Stress Concern Present (11/1/2024)    Received from [facility]    [place] citizen [place] of Occupational Health - Occupational Stress Questionnaire     Feeling of Stress : To some extent   Social Connections: Moderately Isolated (11/1/2024)    Received from [facility]    Social Connection and Isolation Panel [NHANES]     Frequency of Communication with Friends and Family: Three times a week     Frequency of Social Gatherings with Friends and Family: Twice a week     Attends Synagogue Services: Never     Active Member of Clubs or Organizations: No     Attends Club or Organization Meetings: Never     Marital Status:    Intimate Partner Violence: Unknown (2/22/2025)    Nursing IPS     Feels Physically and Emotionally Safe: Not on file     Physically Hurt by Someone: Not on file     Humiliated or Emotionally Abused by Someone: Not on file     Physically Hurt by Someone: No     Hurt or Threatened by Someone: No   Housing Stability: Unknown (2/22/2025)    Nursing: Inadequate Housing Risk Classification     Has Housing: Not on file     Worried About Losing Housing: Not on file     Unable to Get Utilities: Not on file     Unable to Pay for Housing in the Last Year: No     Has  "Housin       Subjective         Objective    Physical Exam:        Vitals:  Blood pressure 131/77, pulse 87, temperature 98.2 °F (36.8 °C), resp. rate 18, height 5' 11\" (1.803 m), weight 73 kg (160 lb 15 oz), SpO2 (P) 94%.          Imaging and other studies:     O2 Device: V60     Plan    Respiratory Plan: Mild Distress pathway, Home Bronchodilator Patient pathway        Resp Comments: (P) hx COPD. BS dim sl coarse. no signs of resp distress. Pt appears comfortable. Sop/Atro Pulmicort followed by Performist given. Cont tx as ordered per respiratory protocol.   "

## 2025-02-25 LAB
GLUCOSE SERPL-MCNC: 120 MG/DL (ref 65–140)
GLUCOSE SERPL-MCNC: 170 MG/DL (ref 65–140)
GLUCOSE SERPL-MCNC: 185 MG/DL (ref 65–140)
GLUCOSE SERPL-MCNC: 223 MG/DL (ref 65–140)

## 2025-02-25 PROCEDURE — 94664 DEMO&/EVAL PT USE INHALER: CPT

## 2025-02-25 PROCEDURE — 82948 REAGENT STRIP/BLOOD GLUCOSE: CPT

## 2025-02-25 PROCEDURE — 99233 SBSQ HOSP IP/OBS HIGH 50: CPT | Performed by: INTERNAL MEDICINE

## 2025-02-25 PROCEDURE — 94760 N-INVAS EAR/PLS OXIMETRY 1: CPT

## 2025-02-25 PROCEDURE — 94640 AIRWAY INHALATION TREATMENT: CPT

## 2025-02-25 PROCEDURE — 99232 SBSQ HOSP IP/OBS MODERATE 35: CPT | Performed by: INTERNAL MEDICINE

## 2025-02-25 RX ORDER — BUDESONIDE 0.5 MG/2ML
0.5 INHALANT ORAL
Status: DISCONTINUED | OUTPATIENT
Start: 2025-02-25 | End: 2025-02-28 | Stop reason: HOSPADM

## 2025-02-25 RX ORDER — IPRATROPIUM BROMIDE AND ALBUTEROL SULFATE 2.5; .5 MG/3ML; MG/3ML
3 SOLUTION RESPIRATORY (INHALATION)
Status: DISCONTINUED | OUTPATIENT
Start: 2025-02-25 | End: 2025-02-28 | Stop reason: HOSPADM

## 2025-02-25 RX ORDER — METHYLPREDNISOLONE SODIUM SUCCINATE 40 MG/ML
40 INJECTION, POWDER, LYOPHILIZED, FOR SOLUTION INTRAMUSCULAR; INTRAVENOUS DAILY
Status: DISCONTINUED | OUTPATIENT
Start: 2025-02-26 | End: 2025-02-27

## 2025-02-25 RX ADMIN — OXYCODONE HYDROCHLORIDE AND ACETAMINOPHEN 1.5 TABLET: 5; 325 TABLET ORAL at 02:06

## 2025-02-25 RX ADMIN — INSULIN LISPRO 1 UNITS: 100 INJECTION, SOLUTION INTRAVENOUS; SUBCUTANEOUS at 12:48

## 2025-02-25 RX ADMIN — AMLODIPINE BESYLATE 10 MG: 10 TABLET ORAL at 10:00

## 2025-02-25 RX ADMIN — INSULIN LISPRO 1 UNITS: 100 INJECTION, SOLUTION INTRAVENOUS; SUBCUTANEOUS at 08:58

## 2025-02-25 RX ADMIN — GUAIFENESIN 600 MG: 600 TABLET ORAL at 10:00

## 2025-02-25 RX ADMIN — VENLAFAXINE HYDROCHLORIDE 75 MG: 75 CAPSULE, EXTENDED RELEASE ORAL at 10:00

## 2025-02-25 RX ADMIN — TAMSULOSIN HYDROCHLORIDE 0.4 MG: 0.4 CAPSULE ORAL at 10:00

## 2025-02-25 RX ADMIN — LISINOPRIL 40 MG: 20 TABLET ORAL at 10:00

## 2025-02-25 RX ADMIN — IPRATROPIUM BROMIDE AND ALBUTEROL SULFATE 3 ML: 2.5; .5 SOLUTION RESPIRATORY (INHALATION) at 13:44

## 2025-02-25 RX ADMIN — OSELTAMAVIR PHOSPHATE 75 MG: 75 CAPSULE ORAL at 10:00

## 2025-02-25 RX ADMIN — FORMOTEROL FUMARATE DIHYDRATE 20 MCG: 20 SOLUTION RESPIRATORY (INHALATION) at 07:39

## 2025-02-25 RX ADMIN — BUDESONIDE 0.5 MG: 0.5 INHALANT RESPIRATORY (INHALATION) at 18:25

## 2025-02-25 RX ADMIN — IPRATROPIUM BROMIDE AND ALBUTEROL SULFATE 3 ML: 2.5; .5 SOLUTION RESPIRATORY (INHALATION) at 18:19

## 2025-02-25 RX ADMIN — OSELTAMAVIR PHOSPHATE 75 MG: 75 CAPSULE ORAL at 20:10

## 2025-02-25 RX ADMIN — BUDESONIDE 0.5 MG: 0.5 INHALANT ORAL at 07:39

## 2025-02-25 RX ADMIN — ENOXAPARIN SODIUM 40 MG: 40 INJECTION SUBCUTANEOUS at 09:59

## 2025-02-25 RX ADMIN — INSULIN LISPRO 2 UNITS: 100 INJECTION, SOLUTION INTRAVENOUS; SUBCUTANEOUS at 22:05

## 2025-02-25 RX ADMIN — OXYCODONE HYDROCHLORIDE AND ACETAMINOPHEN 1.5 TABLET: 5; 325 TABLET ORAL at 10:00

## 2025-02-25 RX ADMIN — OXYCODONE HYDROCHLORIDE AND ACETAMINOPHEN 1.5 TABLET: 5; 325 TABLET ORAL at 20:14

## 2025-02-25 RX ADMIN — ASPIRIN 81 MG: 81 TABLET, COATED ORAL at 10:00

## 2025-02-25 RX ADMIN — PANTOPRAZOLE SODIUM 40 MG: 40 TABLET, DELAYED RELEASE ORAL at 06:52

## 2025-02-25 RX ADMIN — BENZONATATE 100 MG: 100 CAPSULE ORAL at 20:09

## 2025-02-25 RX ADMIN — DOCUSATE SODIUM 100 MG: 100 CAPSULE, LIQUID FILLED ORAL at 10:00

## 2025-02-25 RX ADMIN — OXYCODONE HYDROCHLORIDE AND ACETAMINOPHEN 1.5 TABLET: 5; 325 TABLET ORAL at 15:15

## 2025-02-25 RX ADMIN — CLOPIDOGREL 75 MG: 75 TABLET ORAL at 10:00

## 2025-02-25 RX ADMIN — METOPROLOL SUCCINATE 25 MG: 25 TABLET, EXTENDED RELEASE ORAL at 10:00

## 2025-02-25 RX ADMIN — IPRATROPIUM BROMIDE AND ALBUTEROL SULFATE 3 ML: 2.5; .5 SOLUTION RESPIRATORY (INHALATION) at 07:39

## 2025-02-25 RX ADMIN — BUPROPION HYDROCHLORIDE 300 MG: 150 TABLET, EXTENDED RELEASE ORAL at 10:00

## 2025-02-25 RX ADMIN — GUAIFENESIN 600 MG: 600 TABLET ORAL at 20:14

## 2025-02-25 RX ADMIN — ATORVASTATIN CALCIUM 10 MG: 10 TABLET, FILM COATED ORAL at 17:40

## 2025-02-25 RX ADMIN — BENZONATATE 100 MG: 100 CAPSULE ORAL at 17:40

## 2025-02-25 RX ADMIN — DOCUSATE SODIUM 100 MG: 100 CAPSULE, LIQUID FILLED ORAL at 17:40

## 2025-02-25 RX ADMIN — ARIPIPRAZOLE 10 MG: 5 TABLET ORAL at 10:00

## 2025-02-25 RX ADMIN — FORMOTEROL FUMARATE DIHYDRATE 20 MCG: 20 SOLUTION RESPIRATORY (INHALATION) at 18:18

## 2025-02-25 RX ADMIN — MONTELUKAST 10 MG: 10 TABLET, FILM COATED ORAL at 22:05

## 2025-02-25 RX ADMIN — BENZONATATE 100 MG: 100 CAPSULE ORAL at 10:00

## 2025-02-25 NOTE — CASE MANAGEMENT
Case Management Progress Note    Patient name Fahad Hernandez  Location 2 EAST 277/2 E 277-01 MRN [id number]  : 1955 Date 2025       LOS (days): 3  Geometric Mean LOS (GMLOS) (days): 4.9  Days to GMLOS:1.8        OBJECTIVE:        Current admission status: Inpatient  Preferred Pharmacy:   [facility]/pharmacy #[id number] - [place] - [address]  [address]  [place] [address]  Phone: [phone] Fax: [phone]    [facility] - [place] [id number] [address]  Phone: [phone] Fax: [phone]    [facility] [id number] [place] [address]  Phone: [phone] Fax: [phone]    Primary Care Provider: Surjit Hayes DO    Primary Insurance: ZAC  REP  Secondary Insurance:     PROGRESS NOTE:    Per rounding with SLIM, patient is being treated for an asthma exacerbation. He is on IV steroids and anticipated to be discharged in 24-48 hrs. CM assessment pending. CM department will continue to follow patient through discharge.

## 2025-02-25 NOTE — ASSESSMENT & PLAN NOTE
Completed azithromycin.  Continue budesonide/Perforomist twice daily with Xopenex/ipratropium 3 times daily.  Will decrease solu-medrol to daily  Continue Dupixent as outpatient    Will likely need a longer prednisone taper while he continues to have Dupixent until he achieves maximal benefit.

## 2025-02-25 NOTE — ASSESSMENT & PLAN NOTE
Asthma exacerbation secondary to COVID-19 and influenza A infection.    Pulmonology consult appreciated    Continue budesonide/Perforomist twice daily with Xopenex/ipratropium 3 times daily.  Will decrease solu-medrol to daily - eventual transition to PO if continues to improve in the next day or so  Continue Dupixent as outpatient

## 2025-02-25 NOTE — RESPIRATORY THERAPY NOTE
RT Protocol Note  Fahad Hernandez 69 y.o. male MRN: [id number]  Unit/Bed#: 2 E 277-01 Encounter: [id number]    Assessment    Principal Problem:    Severe persistent asthma with acute exacerbation  Active Problems:    Hypertension    Type 2 diabetes mellitus with hyperglycemia, without long-term current use of insulin (HCC)    Major depressive disorder    Acute lactic acidosis    CASSIE (obstructive sleep apnea)    COVID-19    Tardive dyskinesia    Influenza A      Home Pulmonary Medications:         Past Medical History:   Diagnosis Date    Asthma     COPD (chronic obstructive pulmonary disease) ([facility])     Dementia ([facility])     Diabetes mellitus ([facility])     GERD (gastroesophageal reflux disease)     History of stroke 11/07/2019    Hypertension     Interstitial lung disease ([facility])     Major depressive disorder with current active episode 11/07/2019    Pneumonia     Rotator cuff arthropathy of right shoulder 12/17/2024    Sleep apnea     Sleep apnea, obstructive     Stroke ([facility])     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     Social History     Socioeconomic History    Marital status: /Civil Union     Spouse name: None    Number of children: None    Years of education: None    Highest education level: None   Occupational History    None   Tobacco Use    Smoking status: Never     Passive exposure: Never    Smokeless tobacco: Never    Tobacco comments:     Patient admits to using marijuana, edibles and smoking    Vaping Use    Vaping status: Never Used   Substance and Sexual Activity    Alcohol use: Not Currently    Drug use: Yes     Types: Marijuana, Oxycodone, Psilocybin    Sexual activity: Yes     Partners: Female     Birth control/protection: Male Sterilization   Other Topics Concern    None   Social History Narrative    None     Social Drivers of Health     Financial Resource Strain: Low Risk  (11/1/2024)    Received from [facility]    Overall Financial Resource Strain (CARDIA)     Difficulty of Paying Living  Expenses: Not hard at all   Food Insecurity: No Food Insecurity (2025)    Nursing - Inadequate Food Risk Classification     Worried About Running Out of Food in the Last Year: Never true     Ran Out of Food in the Last Year: Never true     Ran Out of Food in the Last Year: Never true   Transportation Needs: No Transportation Needs (2025)    Nursing - Transportation Risk Classification     Lack of Transportation: Not on file     Lack of Transportation: No   Physical Activity: Inactive (2024)    Received from [facility]    Exercise Vital Sign     Days of Exercise per Week: 0 days     Minutes of Exercise per Session: 0 min   Stress: Stress Concern Present (2024)    Received from [facility]    [place] of Occupational Health - Occupational Stress Questionnaire     Feeling of Stress : To some extent   Social Connections: Moderately Isolated (2024)    Received from [facility]    Social Connection and Isolation Panel [NHANES]     Frequency of Communication with Friends and Family: Three times a week     Frequency of Social Gatherings with Friends and Family: Twice a week     Attends Congregation Services: Never     Active Member of Clubs or Organizations: No     Attends Club or Organization Meetings: Never     Marital Status:    Intimate Partner Violence: Unknown (2025)    Nursing IPS     Feels Physically and Emotionally Safe: Not on file     Physically Hurt by Someone: Not on file     Humiliated or Emotionally Abused by Someone: Not on file     Physically Hurt by Someone: No     Hurt or Threatened by Someone: No   Housing Stability: Unknown (2025)    Nursing: Inadequate Housing Risk Classification     Has Housing: Not on file     Worried About Losing Housing: Not on file     Unable to Get Utilities: Not on file     Unable to Pay for Housing in the Last Year: No     Has Housin       Subjective         Objective    Physical  "Exam:   Assessment Type: (P) During-treatment  General Appearance: (P) Awake, Alert  Respiratory Pattern: (P) Normal  Chest Assessment: (P) Chest expansion symmetrical  Bilateral Breath Sounds: (P) Diminished, Clear  Cough: (P) None  O2 Device: (P) ra    Vitals:  Blood pressure 145/84, pulse 82, temperature 98 °F (36.7 °C), resp. rate 12, height 5' 11\" (1.803 m), weight 69.8 kg (153 lb 14.1 oz), SpO2 94%.          Imaging and other studies: Results Review Statement: No pertinent imaging studies reviewed.    O2 Device: (P) ra     Plan    Respiratory Plan: Mild Distress pathway        Resp Comments: (P) cont w current therapy   "

## 2025-02-25 NOTE — PLAN OF CARE
Problem: PAIN - ADULT  Goal: Verbalizes/displays adequate comfort level or baseline comfort level  Description: Interventions:  - Encourage patient to monitor pain and request assistance  - Assess pain using appropriate pain scale  - Administer analgesics based on type and severity of pain and evaluate response  - Implement non-pharmacological measures as appropriate and evaluate response  - Consider cultural and social influences on pain and pain management  - Notify physician/advanced practitioner if interventions unsuccessful or patient reports new pain  Outcome: Progressing     Problem: INFECTION - ADULT  Goal: Absence or prevention of progression during hospitalization  Description: INTERVENTIONS:  - Assess and monitor for signs and symptoms of infection  - Monitor lab/diagnostic results  - Monitor all insertion sites, i.e. indwelling lines, tubes, and drains  - Monitor endotracheal if appropriate and nasal secretions for changes in amount and color  - [place] appropriate cooling/warming therapies per order  - Administer medications as ordered  - Instruct and encourage patient and family to use good hand hygiene technique  - Identify and instruct in appropriate isolation precautions for identified infection/condition  Outcome: Progressing  Goal: Absence of fever/infection during neutropenic period  Description: INTERVENTIONS:  - Monitor WBC    Outcome: Progressing     Problem: SAFETY ADULT  Goal: Patient will remain free of falls  Description: INTERVENTIONS:  - Educate patient/family on patient safety including physical limitations  - Instruct patient to call for assistance with activity   - Consult OT/PT to assist with strengthening/mobility   - Keep Call bell within reach  - Keep bed low and locked with side rails adjusted as appropriate  - Keep care items and personal belongings within reach  - Initiate and maintain comfort rounds  - Make Fall Risk Sign visible to staff  - Offer Toileting every 2 Hours,  in advance of need  - Initiate/Maintain bed alarm  - Obtain necessary fall risk management equipment.  - Apply yellow socks and bracelet for high fall risk patients  - Consider moving patient to room near nurses station  Outcome: Progressing  Goal: Maintain or return to baseline ADL function  Description: INTERVENTIONS:  -  Assess patient's ability to carry out ADLs; assess patient's baseline for ADL function and identify physical deficits which impact ability to perform ADLs (bathing, care of mouth/teeth, toileting, grooming, dressing, etc.)  - Assess/evaluate cause of self-care deficits   - Assess range of motion  - Assess patient's mobility; develop plan if impaired  - Assess patient's need for assistive devices and provide as appropriate  - Encourage maximum independence but intervene and supervise when necessary  - Involve family in performance of ADLs  - Assess for home care needs following discharge   - Consider OT consult to assist with ADL evaluation and planning for discharge  - Provide patient education as appropriate  Outcome: Progressing  Goal: Maintains/Returns to pre admission functional level  Description: INTERVENTIONS:  - Perform AM-PAC 6 Click Basic Mobility/ Daily Activity assessment daily.  - Set and communicate daily mobility goal to care team and patient/family/caregiver.   - Collaborate with rehabilitation services on mobility goals if consulted  - Perform Range of Motion 2 times a day.  - Reposition patient every 2 hours.  - Dangle patient 2 times a day  - Stand patient 2 times a day  - Ambulate patient 2 times a day  - Out of bed to chair 2 times a day   - Out of bed for meals 2 times a day  - Out of bed for toileting  - Record patient progress and toleration of activity level   Outcome: Progressing     Problem: DISCHARGE PLANNING  Goal: Discharge to home or other facility with appropriate resources  Description: INTERVENTIONS:  - Identify barriers to discharge w/patient and caregiver  -  Arrange for needed discharge resources and transportation as appropriate  - Identify discharge learning needs (meds, wound care, etc.)  - Arrange for interpretive services to assist at discharge as needed  - Refer to Case Management Department for coordinating discharge planning if the patient needs post-hospital services based on physician/advanced practitioner order or complex needs related to functional status, cognitive ability, or social support system  Outcome: Progressing     Problem: Knowledge Deficit  Goal: Patient/family/caregiver demonstrates understanding of disease process, treatment plan, medications, and discharge instructions  Description: Complete learning assessment and assess knowledge base.  Interventions:  - Provide teaching at level of understanding  - Provide teaching via preferred learning methods  Outcome: Progressing     Problem: Nutrition/Hydration-ADULT  Goal: Nutrient/Hydration intake appropriate for improving, restoring or maintaining nutritional needs  Description: Monitor and assess patient's nutrition/hydration status for malnutrition. Collaborate with interdisciplinary team and initiate plan and interventions as ordered.  Monitor patient's weight and dietary intake as ordered or per policy. Utilize nutrition screening tool and intervene as necessary. Determine patient's food preferences and provide high-protein, high-caloric foods as appropriate.     INTERVENTIONS:  - Monitor oral intake, urinary output, labs, and treatment plans  - Assess nutrition and hydration status and recommend course of action  - Evaluate amount of meals eaten  - Assist patient with eating if necessary   - Allow adequate time for meals  - Recommend/ encourage appropriate diets, oral nutritional supplements, and vitamin/mineral supplements  - Order, calculate, and assess calorie counts as needed  - Recommend, monitor, and adjust tube feedings and TPN/PPN based on assessed needs  - Assess need for intravenous  fluids  - Provide specific nutrition/hydration education as appropriate  - Include patient/family/caregiver in decisions related to nutrition  Outcome: Progressing

## 2025-02-25 NOTE — RESPIRATORY THERAPY NOTE
02/24/25 2210   Respiratory Assessment   Assessment Type Assess only   General Appearance Alert;Awake   Respiratory Pattern Normal   Chest Assessment Chest expansion symmetrical   Bilateral Breath Sounds Diminished   Cough None   Resp Comments Pt [id number] on bipap at this time   O2 Device V60   Non-Invasive Information   O2 Interface Device Nasal mask   Non-Invasive Ventilation Mode BiPAP   $ Intermittent NIV Yes   SpO2 94 %   $ Pulse Oximetry Spot Check Charge Completed   Non-Invasive Settings   IPAP (cm) 12 cm   EPAP (cm) 6 cm   Rate (Set) 8   FiO2 (%) 40   Pressure Support (cm H2O) 6   Rise Time 2   Non-Invasive Readings   Total Rate 15   Vt (mL) (Mech) 867   MV (Mech) 14   Peak Pressure (Obs) 13   Spontaneous Vt (mL) 957   Leak (lpm) 9   Skin Intervention Skin intact   Non-Invasive Alarms   Insp Pressure High (cm H20) 25   Insp Pressure Low (cm H20) 5   Low Insp Pressure Time (sec) 20 sec   MV Low (L/min) 2   Vt High (mL) 1200   Vt Low (mL) 200   High Resp Rate (BPM) 40 BPM   Low Resp Rate (BPM) 8 BPM     RT Ventilator Management Note  Fahad Hernandez 69 y.o. male MRN: [id number]  Unit/Bed#: 2 E 277-01 Encounter: [id number]      Daily Screen    No data found in the last 10 encounters.           Physical Exam:   Assessment Type: Assess only  General Appearance: Alert, Awake  Respiratory Pattern: Normal  Chest Assessment: Chest expansion symmetrical  Bilateral Breath Sounds: Diminished  Cough: None  O2 Device: V60      Resp Comments: Pt [id number] on bipap at this time

## 2025-02-25 NOTE — PLAN OF CARE
Problem: PAIN - ADULT  Goal: Verbalizes/displays adequate comfort level or baseline comfort level  Description: Interventions:  - Encourage patient to monitor pain and request assistance  - Assess pain using appropriate pain scale  - Administer analgesics based on type and severity of pain and evaluate response  - Implement non-pharmacological measures as appropriate and evaluate response  - Consider cultural and social influences on pain and pain management  - Notify physician/advanced practitioner if interventions unsuccessful or patient reports new pain  Outcome: Progressing     Problem: INFECTION - ADULT  Goal: Absence or prevention of progression during hospitalization  Description: INTERVENTIONS:  - Assess and monitor for signs and symptoms of infection  - Monitor lab/diagnostic results  - Monitor all insertion sites, i.e. indwelling lines, tubes, and drains  - Monitor endotracheal if appropriate and nasal secretions for changes in amount and color  - [place] appropriate cooling/warming therapies per order  - Administer medications as ordered  - Instruct and encourage patient and family to use good hand hygiene technique  - Identify and instruct in appropriate isolation precautions for identified infection/condition  Outcome: Progressing  Goal: Absence of fever/infection during neutropenic period  Description: INTERVENTIONS:  - Monitor WBC    Outcome: Progressing     Problem: SAFETY ADULT  Goal: Patient will remain free of falls  Description: INTERVENTIONS:  - Educate patient/family on patient safety including physical limitations  - Instruct patient to call for assistance with activity   - Consult OT/PT to assist with strengthening/mobility   - Keep Call bell within reach  - Keep bed low and locked with side rails adjusted as appropriate  - Keep care items and personal belongings within reach  - Initiate and maintain comfort rounds  - Make Fall Risk Sign visible to staff  - Offer Toileting every 2 Hours,  in advance of need  - Initiate/Maintain bed alarm  - Obtain necessary fall risk management equipment  - Apply yellow socks and bracelet for high fall risk patients  - Consider moving patient to room near nurses station  Outcome: Progressing  Goal: Maintain or return to baseline ADL function  Description: INTERVENTIONS:  -  Assess patient's ability to carry out ADLs; assess patient's baseline for ADL function and identify physical deficits which impact ability to perform ADLs (bathing, care of mouth/teeth, toileting, grooming, dressing, etc.)  - Assess/evaluate cause of self-care deficits   - Assess range of motion  - Assess patient's mobility; develop plan if impaired  - Assess patient's need for assistive devices and provide as appropriate  - Encourage maximum independence but intervene and supervise when necessary  - Involve family in performance of ADLs  - Assess for home care needs following discharge   - Consider OT consult to assist with ADL evaluation and planning for discharge  - Provide patient education as appropriate  Outcome: Progressing  Goal: Maintains/Returns to pre admission functional level  Description: INTERVENTIONS:  - Perform AM-PAC 6 Click Basic Mobility/ Daily Activity assessment daily.  - Set and communicate daily mobility goal to care team and patient/family/caregiver.   - Collaborate with rehabilitation services on mobility goals if consulted  - Perform Range of Motion 3 times a day.  - Reposition patient every 2 hours.  - Dangle patient 3 times a day  - Stand patient 3 times a day  - Ambulate patient 3 times a day  - Out of bed to chair 3 times a day   - Out of bed for meals 3 times a day  - Out of bed for toileting  - Record patient progress and toleration of activity level   Outcome: Progressing     Problem: DISCHARGE PLANNING  Goal: Discharge to home or other facility with appropriate resources  Description: INTERVENTIONS:  - Identify barriers to discharge w/patient and caregiver  -  Arrange for needed discharge resources and transportation as appropriate  - Identify discharge learning needs (meds, wound care, etc.)  - Arrange for interpretive services to assist at discharge as needed  - Refer to Case Management Department for coordinating discharge planning if the patient needs post-hospital services based on physician/advanced practitioner order or complex needs related to functional status, cognitive ability, or social support system  Outcome: Progressing     Problem: Knowledge Deficit  Goal: Patient/family/caregiver demonstrates understanding of disease process, treatment plan, medications, and discharge instructions  Description: Complete learning assessment and assess knowledge base.  Interventions:  - Provide teaching at level of understanding  - Provide teaching via preferred learning methods  Outcome: Progressing     Problem: Nutrition/Hydration-ADULT  Goal: Nutrient/Hydration intake appropriate for improving, restoring or maintaining nutritional needs  Description: Monitor and assess patient's nutrition/hydration status for malnutrition. Collaborate with interdisciplinary team and initiate plan and interventions as ordered.  Monitor patient's weight and dietary intake as ordered or per policy. Utilize nutrition screening tool and intervene as necessary. Determine patient's food preferences and provide high-protein, high-caloric foods as appropriate.     INTERVENTIONS:  - Monitor oral intake, urinary output, labs, and treatment plans  - Assess nutrition and hydration status and recommend course of action  - Evaluate amount of meals eaten  - Assist patient with eating if necessary   - Allow adequate time for meals  - Recommend/ encourage appropriate diets, oral nutritional supplements, and vitamin/mineral supplements  - Order, calculate, and assess calorie counts as needed  - Recommend, monitor, and adjust tube feedings and TPN/PPN based on assessed needs  - Assess need for intravenous  fluids  - Provide specific nutrition/hydration education as appropriate  - Include patient/family/caregiver in decisions related to nutrition  Outcome: Progressing

## 2025-02-25 NOTE — PROGRESS NOTES
Progress Note - Hospitalist   Name: Fahad Hernandez 69 y.o. male I MRN: [id number]  Unit/Bed#: 2 E 277-01 I Date of Admission: 2/21/2025   Date of Service: 2/25/2025 I Hospital Day: 3    Assessment & Plan  Severe persistent asthma with acute exacerbation  Asthma exacerbation secondary to COVID-19 and influenza A infection.    Pulmonology consult appreciated    Continue budesonide/Perforomist twice daily with Xopenex/ipratropium 3 times daily.  Will decrease solu-medrol to daily - eventual transition to PO if continues to improve in the next day or so  Continue Dupixent as outpatient  Type 2 diabetes mellitus with hyperglycemia, without long-term current use of insulin ([facility])  Lab Results   Component Value Date    HGBA1C 8.3 (H) 01/26/2025       Recent Labs     02/24/25  1652 02/24/25  2208 02/25/25  0733 02/25/25  1058   POCGLU 299* 171* 170* 185*       Blood Sugar Average: Last 72 hrs:  (P) 223.4    Continue to HOLD metformin  ISS  Diabetic diet  Major depressive disorder  Continue Abilify, Wellbutrin and Effexor   Hypertension  Continue norvasc, lisinopril, metoprolol  CASSIE (obstructive sleep apnea)  Continue BIPAP at night  Tardive dyskinesia  Cont Astuedo  Acute lactic acidosis  Multifactorial with dehydration, multiple albuterol nebs, chronic metformin use.     No signs of endorgan damage  Monitor  COVID-19  Supportive care  Currently on room air  Influenza A  Supportive care  Continue Tamiflu    VTE Pharmacologic Prophylaxis: VTE Score: 2 Low Risk (Score 0-2) - Encourage Ambulation.    Mobility:   Basic Mobility Inpatient Raw Score: 13  JH-HLM Goal: 4: Move to chair/commode  JH-HLM Achieved: 3: Sit at edge of bed  JH-HLM Goal NOT achieved. Continue with multidisciplinary rounding and encourage appropriate mobility to improve upon JH-HLM goals.    Patient Centered Rounds: I performed bedside rounds with nursing staff today.   Discussions with Specialists or Other Care Team Provider: Discussed with care management  team    Education and Discussions with Family / Patient: Patient declined call to .     Current Length of Stay: 3 day(s)  Current Patient Status: Inpatient   Certification Statement: The patient will continue to require additional inpatient hospital stay due to IV steroids  Discharge Plan: Anticipate discharge in 24-48 hrs to home.    Code Status: Level 1 - Full Code    Subjective   Patient is comfortable and in no acute distress compared to yesterday but not back to baseline yet. States his cough has resolved. No fever, chills, chest pain. Denies myalgias or arthralgias. He has a good appetite and is eating and drinking well. Denies nausea, vomiting, abdominal pain.     Objective :  Temp:  [98 °F (36.7 °C)-98.2 °F (36.8 °C)] 98 °F (36.7 °C)  HR:  [82-95] 82  BP: (127-145)/(76-84) 145/84  Resp:  [12] 12  SpO2:  [92 %-99 %] 94 %  O2 Device: None (Room air)    Body mass index is 21.46 kg/m².     Input and Output Summary (last 24 hours):     Intake/Output Summary (Last 24 hours) at 2/25/2025 1230  Last data filed at 2/25/2025 1001  Gross per 24 hour   Intake 993 ml   Output 2400 ml   Net -1407 ml       Physical Exam  Vitals and nursing note reviewed.   Constitutional:       General: He is not in acute distress.     Appearance: Normal appearance. He is well-developed. He is not ill-appearing, toxic-appearing or diaphoretic.   HENT:      Head: Normocephalic and atraumatic.      Right Ear: External ear normal.      Left Ear: External ear normal.      Nose: Nose normal. No congestion or rhinorrhea.      Mouth/Throat:      Mouth: Mucous membranes are moist.      Pharynx: Oropharynx is clear. No oropharyngeal exudate or posterior oropharyngeal erythema.   Eyes:      General: No scleral icterus.        Right eye: No discharge.         Left eye: No discharge.      Conjunctiva/sclera: Conjunctivae normal.      Pupils: Pupils are equal, round, and reactive to light.   Neck:      Vascular: No carotid bruit.    Cardiovascular:      Rate and Rhythm: Normal rate and regular rhythm.      Pulses: Normal pulses.      Heart sounds: No murmur heard.     No friction rub. No gallop.   Pulmonary:      Effort: Pulmonary effort is normal. No respiratory distress.      Breath sounds: Normal breath sounds. No stridor. No wheezing, rhonchi or rales.   Abdominal:      General: Abdomen is flat. Bowel sounds are normal. There is no distension.      Palpations: Abdomen is soft. There is no mass.      Tenderness: There is no abdominal tenderness. There is no guarding or rebound.      Hernia: No hernia is present.   Musculoskeletal:         General: No swelling, tenderness, deformity or signs of injury. Normal range of motion.      Cervical back: Normal range of motion and neck supple. No rigidity. No muscular tenderness.   Lymphadenopathy:      Cervical: No cervical adenopathy.   Skin:     General: Skin is warm and dry.      Capillary Refill: Capillary refill takes less than 2 seconds.      Coloration: Skin is not jaundiced or pale.      Findings: No bruising or erythema.   Neurological:      General: No focal deficit present.      Mental Status: He is alert and oriented to person, place, and time. Mental status is at baseline.      Cranial Nerves: No cranial nerve deficit.      Sensory: No sensory deficit.      Motor: No weakness.      Coordination: Coordination normal.      Deep Tendon Reflexes: Reflexes normal.   Psychiatric:         Mood and Affect: Mood normal.         Behavior: Behavior normal.         Thought Content: Thought content normal.         Judgment: Judgment normal.             Lines/Drains:              Lab Results: I have reviewed the following results:   Results from last 7 days   Lab Units 02/24/25  1914   WBC Thousand/uL 15.60*   HEMOGLOBIN g/dL 9.5*   HEMATOCRIT % 32.0*   PLATELETS Thousands/uL 529*   SEGS PCT % 84*   LYMPHO PCT % 9*   MONO PCT % 5   EOS PCT % 0     Results from last 7 days   Lab Units 02/24/25  9844  02/23/25  1000 02/22/25  0436   SODIUM mmol/L 136   < > 138   POTASSIUM mmol/L 4.8   < > 4.5   CHLORIDE mmol/L 100   < > 108   CO2 mmol/L 29   < > 22   BUN mg/dL 26*   < > 27*   CREATININE mg/dL 0.76   < > 0.80   ANION GAP mmol/L 7   < > 8   CALCIUM mg/dL 8.9   < > 8.5   ALBUMIN g/dL  --   --  3.3*   TOTAL BILIRUBIN mg/dL  --   --  0.24   ALK PHOS U/L  --   --  79   ALT U/L  --   --  16   AST U/L  --   --  12*   GLUCOSE RANDOM mg/dL 223*   < > 286*    < > = values in this interval not displayed.     Results from last 7 days   Lab Units 02/22/25  0436   INR  1.02     Results from last 7 days   Lab Units 02/25/25  1058 02/25/25  0733 02/24/25  2208 02/24/25  1652 02/24/25  1051 02/24/25  0741 02/23/25  2039 02/23/25  1633 02/23/25  1010 02/23/25  0745 02/22/25  2007 02/22/25  1647   POC GLUCOSE mg/dl 185* 170* 171* 299* 198* 184* 194* 257* 223* 203* 232* 214*         Results from last 7 days   Lab Units 02/22/25  0837 02/22/25  0436 02/22/25  0035 02/21/25  2158   LACTIC ACID mmol/L 2.0 3.6* 4.9* 3.7*   PROCALCITONIN ng/ml  --   --   --  0.09  0.09       Recent Cultures (last 7 days):   Results from last 7 days   Lab Units 02/21/25  2158   BLOOD CULTURE  No Growth at 48 hrs.  No Growth at 48 hrs.             Last 24 Hours Medication List:     Current Facility-Administered Medications:     acetaminophen (TYLENOL) tablet 650 mg, Q6H PRN    amLODIPine (NORVASC) tablet 10 mg, Daily **AND** lisinopril (ZESTRIL) tablet 40 mg, Daily    ARIPiprazole (ABILIFY) tablet 10 mg, Daily    aspirin (ECOTRIN LOW STRENGTH) EC tablet 81 mg, Daily    atorvastatin (LIPITOR) tablet 10 mg, Daily With Dinner    benzonatate (TESSALON PERLES) capsule 100 mg, TID    budesonide (PULMICORT) inhalation solution 0.5 mg, Q12H    buPROPion (WELLBUTRIN XL) 24 hr tablet 300 mg, Daily    clopidogrel (PLAVIX) tablet 75 mg, Daily    [Held by provider] Deutetrabenazine TABS 12 mg, Daily    docusate sodium (COLACE) capsule 100 mg, BID    enoxaparin  (LOVENOX) subcutaneous injection 40 mg, Q24H PARTHA    formoterol (PERFOROMIST) nebulizer solution 20 mcg, BID    guaiFENesin (MUCINEX) 12 hr tablet 600 mg, Q12H PARTHA    hydrOXYzine HCL (ATARAX) tablet 25 mg, Q6H PRN    insulin lispro (HumALOG/ADMELOG) 100 units/mL subcutaneous injection 1-6 Units, TID AC **AND** Fingerstick Glucose (POCT), TID AC    insulin lispro (HumALOG/ADMELOG) 100 units/mL subcutaneous injection 1-6 Units, HS    ipratropium-albuterol (DUO-NEB) 0.5-2.5 mg/3 mL inhalation solution 3 mL, TID    [START ON 2/26/2025] methylPREDNISolone sodium succinate (Solu-MEDROL) injection 40 mg, Daily    metoprolol succinate (TOPROL-XL) 24 hr tablet 25 mg, Daily    montelukast (SINGULAIR) tablet 10 mg, HS    nitroglycerin (NITROSTAT) SL tablet 0.4 mg, Q5 Min PRN    oseltamivir (TAMIFLU) capsule 75 mg, Q12H PARTHA    oxyCODONE-acetaminophen (PERCOCET) 5-325 mg per tablet 1.5 tablet, Q4H PRN    pantoprazole (PROTONIX) EC tablet 40 mg, Early Morning    tamsulosin (FLOMAX) capsule 0.4 mg, Daily    venlafaxine (EFFEXOR-XR) 24 hr capsule 75 mg, Daily    Administrative Statements   Today, Patient Was Seen By: Andrzej Keller      **Please Note: This note may have been constructed using a voice recognition system.**

## 2025-02-25 NOTE — ASSESSMENT & PLAN NOTE
Lab Results   Component Value Date    HGBA1C 8.3 (H) 01/26/2025       Recent Labs     02/24/25  1652 02/24/25  2208 02/25/25  0733 02/25/25  1058   POCGLU 299* 171* 170* 185*       Blood Sugar Average: Last 72 hrs:  (P) 223.4    Continue to HOLD metformin  ISS  Diabetic diet

## 2025-02-25 NOTE — RESPIRATORY THERAPY NOTE
02/24/25 2210   Respiratory Protocol   Protocol Initiated? No   Protocol Selection Respiratory   Language Barrier? No   Medical & Social History Reviewed? Yes   Diagnostic Studies Reviewed? Yes   Physical Assessment Performed? Yes   Respiratory Plan Mild Distress pathway   Respiratory Assessment   Assessment Type Assess only   General Appearance Alert;Awake   Respiratory Pattern Normal   Chest Assessment Chest expansion symmetrical   Bilateral Breath Sounds Diminished   Cough None   Resp Comments Continue with current therapy   O2 Device V60   Cough Description   Sputum Amount None   Additional Assessments   Pulse 89   Respirations 12   SpO2 94 %     RT Ventilator Management Note  Fahad Hernandez 69 y.o. male MRN: [id number]  Unit/Bed#: 2 E 277-01 Encounter: [id number]      Daily Screen    No data found in the last 10 encounters.           Physical Exam:   Assessment Type: Assess only  General Appearance: Alert, Awake  Respiratory Pattern: Normal  Chest Assessment: Chest expansion symmetrical  Bilateral Breath Sounds: Diminished  Cough: None  O2 Device: V60      Resp Comments: Continue with current therapy

## 2025-02-25 NOTE — ASSESSMENT & PLAN NOTE
Multifactorial with dehydration, multiple albuterol nebs, chronic metformin use.     No signs of endorgan damage  Monitor

## 2025-02-25 NOTE — PROGRESS NOTES
Progress Note - Pulmonology   Name: Fahad Hernandez 69 y.o. male I MRN: [id number]  Unit/Bed#: 2 E 277-01 I Date of Admission: 2/21/2025   Date of Service: 2/25/2025 I Hospital Day: 3    Assessment & Plan  Severe persistent asthma with acute exacerbation  Completed azithromycin.  Continue budesonide/Perforomist twice daily with Xopenex/ipratropium 3 times daily.  Will decrease solu-medrol to daily  Continue Dupixent as outpatient    Will likely need a longer prednisone taper while he continues to have Dupixent until he achieves maximal benefit.  CASSIE (obstructive sleep apnea)  Continue BiPAP at bedtime.  COVID-19  Diagnosis on February 16, 2025.  Supportive care.  Treatment for asthma exacerbation as above  Influenza A  Continue Tamiflu  Treatment for asthma exacerbation as above    24 Hour Events : No events  Subjective : Patient resting in bed.  He is feeling better with his breathing and energy.    Objective :  Temp:  [98 °F (36.7 °C)-98.2 °F (36.8 °C)] 98 °F (36.7 °C)  HR:  [] 82  BP: (127-145)/(76-88) 145/84  Resp:  [12-18] 12  SpO2:  [92 %-99 %] 94 %  O2 Device: None (Room air)    Physical Exam  Vitals reviewed.   Constitutional:       General: He is not in acute distress.     Appearance: Normal appearance. He is well-developed. He is not ill-appearing.   HENT:      Head: Normocephalic and atraumatic.      Mouth/Throat:      Pharynx: Oropharynx is clear.   Eyes:      Pupils: Pupils are equal, round, and reactive to light.   Cardiovascular:      Rate and Rhythm: Normal rate and regular rhythm.   Pulmonary:      Effort: Pulmonary effort is normal. No respiratory distress.      Breath sounds: Decreased breath sounds (improving air movement) present. No wheezing, rhonchi or rales.   Abdominal:      General: Abdomen is flat. There is no distension.   Musculoskeletal:         General: Normal range of motion.      Cervical back: Normal range of motion.      Right lower leg: No edema.      Left lower leg: No  edema.   Skin:     General: Skin is warm and dry.      Findings: No rash.   Neurological:      Mental Status: He is alert and oriented to person, place, and time.   Psychiatric:         Mood and Affect: Mood normal.         Behavior: Behavior normal.           Lab Results: I have reviewed the following results:   No new results in last 24 hours.  ABG: No new results in last 24 hours.    Imaging Results Review: I reviewed radiology reports from this admission including: chest xray.  Other Study Results Review: No additional pertinent studies reviewed.  PFT Results Reviewed: reviewed

## 2025-02-26 LAB
ALBUMIN SERPL BCG-MCNC: 3.4 G/DL (ref 3.5–5)
ALP SERPL-CCNC: 55 U/L (ref 34–104)
ALT SERPL W P-5'-P-CCNC: 15 U/L (ref 7–52)
ANION GAP SERPL CALCULATED.3IONS-SCNC: 6 MMOL/L (ref 4–13)
AST SERPL W P-5'-P-CCNC: 12 U/L (ref 13–39)
BASOPHILS # BLD MANUAL: 0 THOUSAND/UL (ref 0–0.1)
BASOPHILS NFR MAR MANUAL: 0 % (ref 0–1)
BILIRUB SERPL-MCNC: 0.39 MG/DL (ref 0.2–1)
BUN SERPL-MCNC: 19 MG/DL (ref 5–25)
CALCIUM ALBUM COR SERPL-MCNC: 9.8 MG/DL (ref 8.3–10.1)
CALCIUM SERPL-MCNC: 9.3 MG/DL (ref 8.4–10.2)
CHLORIDE SERPL-SCNC: 100 MMOL/L (ref 96–108)
CO2 SERPL-SCNC: 32 MMOL/L (ref 21–32)
CREAT SERPL-MCNC: 0.78 MG/DL (ref 0.6–1.3)
EOSINOPHIL # BLD MANUAL: 0.32 THOUSAND/UL (ref 0–0.4)
EOSINOPHIL NFR BLD MANUAL: 2 % (ref 0–6)
ERYTHROCYTE [DISTWIDTH] IN BLOOD BY AUTOMATED COUNT: 15.8 % (ref 11.6–15.1)
GFR SERPL CREATININE-BSD FRML MDRD: 90 ML/MIN/1.73SQ M
GLUCOSE SERPL-MCNC: 137 MG/DL (ref 65–140)
GLUCOSE SERPL-MCNC: 140 MG/DL (ref 65–140)
GLUCOSE SERPL-MCNC: 144 MG/DL (ref 65–140)
GLUCOSE SERPL-MCNC: 238 MG/DL (ref 65–140)
GLUCOSE SERPL-MCNC: 289 MG/DL (ref 65–140)
HCT VFR BLD AUTO: 36.5 % (ref 36.5–49.3)
HGB BLD-MCNC: 11.1 G/DL (ref 12–17)
LYMPHOCYTES # BLD AUTO: 24 % (ref 14–44)
LYMPHOCYTES # BLD AUTO: 3.87 THOUSAND/UL (ref 0.6–4.47)
MCH RBC QN AUTO: 25.1 PG (ref 26.8–34.3)
MCHC RBC AUTO-ENTMCNC: 30.4 G/DL (ref 31.4–37.4)
MCV RBC AUTO: 83 FL (ref 82–98)
MICROCYTES BLD QL AUTO: PRESENT
MONOCYTES # BLD AUTO: 1.61 THOUSAND/UL (ref 0–1.22)
MONOCYTES NFR BLD: 10 % (ref 4–12)
NEUTROPHILS # BLD MANUAL: 10.31 THOUSAND/UL (ref 1.85–7.62)
NEUTS SEG NFR BLD AUTO: 64 % (ref 43–75)
PLATELET # BLD AUTO: 524 THOUSANDS/UL (ref 149–390)
PLATELET BLD QL SMEAR: ABNORMAL
PMV BLD AUTO: 8.5 FL (ref 8.9–12.7)
POTASSIUM SERPL-SCNC: 4.4 MMOL/L (ref 3.5–5.3)
PROT SERPL-MCNC: 6.3 G/DL (ref 6.4–8.4)
RBC # BLD AUTO: 4.42 MILLION/UL (ref 3.88–5.62)
SODIUM SERPL-SCNC: 138 MMOL/L (ref 135–147)
WBC # BLD AUTO: 16.11 THOUSAND/UL (ref 4.31–10.16)

## 2025-02-26 PROCEDURE — 80053 COMPREHEN METABOLIC PANEL: CPT | Performed by: INTERNAL MEDICINE

## 2025-02-26 PROCEDURE — 94664 DEMO&/EVAL PT USE INHALER: CPT

## 2025-02-26 PROCEDURE — 94660 CPAP INITIATION&MGMT: CPT

## 2025-02-26 PROCEDURE — 99233 SBSQ HOSP IP/OBS HIGH 50: CPT | Performed by: INTERNAL MEDICINE

## 2025-02-26 PROCEDURE — 99232 SBSQ HOSP IP/OBS MODERATE 35: CPT | Performed by: PHYSICIAN ASSISTANT

## 2025-02-26 PROCEDURE — 82948 REAGENT STRIP/BLOOD GLUCOSE: CPT

## 2025-02-26 PROCEDURE — 85027 COMPLETE CBC AUTOMATED: CPT | Performed by: INTERNAL MEDICINE

## 2025-02-26 PROCEDURE — 94760 N-INVAS EAR/PLS OXIMETRY 1: CPT

## 2025-02-26 PROCEDURE — 85007 BL SMEAR W/DIFF WBC COUNT: CPT | Performed by: INTERNAL MEDICINE

## 2025-02-26 PROCEDURE — 94640 AIRWAY INHALATION TREATMENT: CPT

## 2025-02-26 RX ORDER — IPRATROPIUM BROMIDE AND ALBUTEROL SULFATE 2.5; .5 MG/3ML; MG/3ML
3 SOLUTION RESPIRATORY (INHALATION) EVERY 8 HOURS PRN
Qty: 360 ML | Refills: 0 | Status: SHIPPED | OUTPATIENT
Start: 2025-02-26 | End: 2025-02-28

## 2025-02-26 RX ADMIN — METOPROLOL SUCCINATE 25 MG: 25 TABLET, EXTENDED RELEASE ORAL at 10:27

## 2025-02-26 RX ADMIN — BUDESONIDE 0.5 MG: 0.5 INHALANT RESPIRATORY (INHALATION) at 19:07

## 2025-02-26 RX ADMIN — CLOPIDOGREL 75 MG: 75 TABLET ORAL at 10:26

## 2025-02-26 RX ADMIN — BUDESONIDE 0.5 MG: 0.5 INHALANT RESPIRATORY (INHALATION) at 09:22

## 2025-02-26 RX ADMIN — OXYCODONE HYDROCHLORIDE AND ACETAMINOPHEN 1.5 TABLET: 5; 325 TABLET ORAL at 17:13

## 2025-02-26 RX ADMIN — IPRATROPIUM BROMIDE AND ALBUTEROL SULFATE 3 ML: 2.5; .5 SOLUTION RESPIRATORY (INHALATION) at 09:21

## 2025-02-26 RX ADMIN — IPRATROPIUM BROMIDE AND ALBUTEROL SULFATE 3 ML: 2.5; .5 SOLUTION RESPIRATORY (INHALATION) at 14:41

## 2025-02-26 RX ADMIN — DOCUSATE SODIUM 100 MG: 100 CAPSULE, LIQUID FILLED ORAL at 10:26

## 2025-02-26 RX ADMIN — DOCUSATE SODIUM 100 MG: 100 CAPSULE, LIQUID FILLED ORAL at 17:13

## 2025-02-26 RX ADMIN — OXYCODONE HYDROCHLORIDE AND ACETAMINOPHEN 1.5 TABLET: 5; 325 TABLET ORAL at 21:11

## 2025-02-26 RX ADMIN — MONTELUKAST 10 MG: 10 TABLET, FILM COATED ORAL at 21:12

## 2025-02-26 RX ADMIN — OXYCODONE HYDROCHLORIDE AND ACETAMINOPHEN 1.5 TABLET: 5; 325 TABLET ORAL at 10:24

## 2025-02-26 RX ADMIN — FORMOTEROL FUMARATE DIHYDRATE 20 MCG: 20 SOLUTION RESPIRATORY (INHALATION) at 19:11

## 2025-02-26 RX ADMIN — TAMSULOSIN HYDROCHLORIDE 0.4 MG: 0.4 CAPSULE ORAL at 10:26

## 2025-02-26 RX ADMIN — BUPROPION HYDROCHLORIDE 300 MG: 150 TABLET, EXTENDED RELEASE ORAL at 10:26

## 2025-02-26 RX ADMIN — FORMOTEROL FUMARATE DIHYDRATE 20 MCG: 20 SOLUTION RESPIRATORY (INHALATION) at 09:22

## 2025-02-26 RX ADMIN — ENOXAPARIN SODIUM 40 MG: 40 INJECTION SUBCUTANEOUS at 10:28

## 2025-02-26 RX ADMIN — GUAIFENESIN 600 MG: 600 TABLET ORAL at 21:13

## 2025-02-26 RX ADMIN — ATORVASTATIN CALCIUM 10 MG: 10 TABLET, FILM COATED ORAL at 17:13

## 2025-02-26 RX ADMIN — IPRATROPIUM BROMIDE AND ALBUTEROL SULFATE 3 ML: 2.5; .5 SOLUTION RESPIRATORY (INHALATION) at 19:07

## 2025-02-26 RX ADMIN — VENLAFAXINE HYDROCHLORIDE 75 MG: 75 CAPSULE, EXTENDED RELEASE ORAL at 10:26

## 2025-02-26 RX ADMIN — OSELTAMAVIR PHOSPHATE 75 MG: 75 CAPSULE ORAL at 21:13

## 2025-02-26 RX ADMIN — GUAIFENESIN 600 MG: 600 TABLET ORAL at 10:26

## 2025-02-26 RX ADMIN — BENZONATATE 100 MG: 100 CAPSULE ORAL at 10:26

## 2025-02-26 RX ADMIN — AMLODIPINE BESYLATE 10 MG: 10 TABLET ORAL at 10:26

## 2025-02-26 RX ADMIN — INSULIN LISPRO 4 UNITS: 100 INJECTION, SOLUTION INTRAVENOUS; SUBCUTANEOUS at 17:55

## 2025-02-26 RX ADMIN — ARIPIPRAZOLE 10 MG: 5 TABLET ORAL at 10:25

## 2025-02-26 RX ADMIN — BENZONATATE 100 MG: 100 CAPSULE ORAL at 17:54

## 2025-02-26 RX ADMIN — ASPIRIN 81 MG: 81 TABLET, COATED ORAL at 10:27

## 2025-02-26 RX ADMIN — OSELTAMAVIR PHOSPHATE 75 MG: 75 CAPSULE ORAL at 10:26

## 2025-02-26 RX ADMIN — METHYLPREDNISOLONE SODIUM SUCCINATE 40 MG: 40 INJECTION, POWDER, FOR SOLUTION INTRAMUSCULAR; INTRAVENOUS at 10:27

## 2025-02-26 RX ADMIN — INSULIN LISPRO 4 UNITS: 100 INJECTION, SOLUTION INTRAVENOUS; SUBCUTANEOUS at 21:09

## 2025-02-26 RX ADMIN — PANTOPRAZOLE SODIUM 40 MG: 40 TABLET, DELAYED RELEASE ORAL at 05:43

## 2025-02-26 NOTE — PROGRESS NOTES
Progress Note - Hospitalist   Name: Fahad Hernandez 69 y.o. male I MRN: [id number]  Unit/Bed#: 2 E 277-01 I Date of Admission: 2/21/2025   Date of Service: 2/26/2025 I Hospital Day: 4    Assessment & Plan  Severe persistent asthma with acute exacerbation  Asthma exacerbation secondary to COVID-19 and influenza A infection.  Pulmonology consult appreciated    Continue nebulized bronchodilators  Continue IV Solu-Medrol for today daily  Anticipate transition to p.o. alternative and potential discharge tomorrow pending clinical course likely on slow prednisone taper, pulmonology input appreciated  Type 2 diabetes mellitus with hyperglycemia, without long-term current use of insulin ([facility])  Lab Results   Component Value Date    HGBA1C 8.3 (H) 01/26/2025       Recent Labs     02/25/25  1621 02/25/25  2043 02/26/25  0727 02/26/25  1139   POCGLU 120 223* 140 144*       Blood Sugar Average: Last 72 hrs:  (P) [id number]    Continue to HOLD metformin  ISS  Diabetic diet  Major depressive disorder  Continue Abilify, Wellbutrin and Effexor   Hypertension  Continue norvasc, lisinopril, metoprolol  CASSIE (obstructive sleep apnea)  Continue BIPAP at night  Tardive dyskinesia  Cont Astuedo  Acute lactic acidosis  Multifactorial with dehydration, multiple albuterol nebs, chronic metformin use.     No signs of endorgan damage  Monitor  COVID-19  Supportive care  Currently on room air  Influenza A  Supportive care  Continue Tamiflu    VTE Pharmacologic Prophylaxis: VTE Score: 2 Lovenox    Mobility:   Basic Mobility Inpatient Raw Score: 13  JH-HLM Goal: 4: Move to chair/commode  JH-HLM Achieved: 3: Sit at edge of bed  JH-HLM Goal NOT achieved. Continue with multidisciplinary rounding and encourage appropriate mobility to improve upon JH-HLM goals.    Patient Centered Rounds: I performed bedside rounds with nursing staff today.   Discussions with Specialists or Other Care Team Provider: Discussed with care management  team    Education and Discussions with Family / Patient: Patient declined call to .     Current Length of Stay: 4 day(s)  Current Patient Status: Inpatient   Certification Statement: The patient will continue to require additional inpatient hospital stay due to need for IV therapy, clinical improvement  Discharge Plan: Anticipate discharge in 24-48 hrs to home.    Code Status: Level 1 - Full Code    Subjective   Patient states his wheezing and cough worsened over the past night. He feels more short of breath especially upon exertion and feels a chest tightness. Denies chest pain, nausea, vomiting, fever, chills, myalgias. Cough is productive with a yellow mucous.     Objective :  Temp:  [98.3 °F (36.8 °C)-98.6 °F (37 °C)] 98.3 °F (36.8 °C)  HR:  [82-99] 95  BP: (114-146)/(69-95) 125/95  Resp:  [18-19] 19  SpO2:  [93 %-96 %] 93 %  O2 Device: None (Room air)    Body mass index is 20.7 kg/m².     Input and Output Summary (last 24 hours):     Intake/Output Summary (Last 24 hours) at 2/26/2025 1251  Last data filed at 2/26/2025 0900  Gross per 24 hour   Intake 1194 ml   Output --   Net 1194 ml       Physical Exam  Vitals and nursing note reviewed.   Constitutional:       General: He is not in acute distress.     Appearance: Normal appearance. He is well-developed. He is not ill-appearing, toxic-appearing or diaphoretic.   HENT:      Head: Normocephalic and atraumatic.      Right Ear: External ear normal.      Left Ear: External ear normal.      Nose: Nose normal. No congestion or rhinorrhea.      Mouth/Throat:      Mouth: Mucous membranes are moist.      Pharynx: Oropharynx is clear. No oropharyngeal exudate or posterior oropharyngeal erythema.   Eyes:      General: No scleral icterus.        Right eye: No discharge.         Left eye: No discharge.      Conjunctiva/sclera: Conjunctivae normal.      Pupils: Pupils are equal, round, and reactive to light.   Neck:      Vascular: No carotid bruit.    Cardiovascular:      Rate and Rhythm: Normal rate and regular rhythm.      Pulses: Normal pulses.      Heart sounds: No murmur heard.     No friction rub. No gallop.   Pulmonary:      Effort: Pulmonary effort is normal. No respiratory distress.      Breath sounds: Decreased air movement present. No stridor. Decreased breath sounds and wheezing present. No rhonchi or rales.   Abdominal:      General: Abdomen is flat. Bowel sounds are normal. There is no distension.      Palpations: Abdomen is soft. There is no mass.      Tenderness: There is no abdominal tenderness. There is no guarding or rebound.      Hernia: No hernia is present.   Musculoskeletal:         General: No swelling, tenderness, deformity or signs of injury. Normal range of motion.      Cervical back: Normal range of motion and neck supple. No rigidity. No muscular tenderness.   Lymphadenopathy:      Cervical: No cervical adenopathy.   Skin:     General: Skin is warm and dry.      Capillary Refill: Capillary refill takes less than 2 seconds.      Coloration: Skin is not jaundiced or pale.      Findings: No bruising or erythema.   Neurological:      General: No focal deficit present.      Mental Status: He is alert and oriented to person, place, and time. Mental status is at baseline.      Cranial Nerves: No cranial nerve deficit.      Sensory: No sensory deficit.      Motor: No weakness.      Coordination: Coordination normal.      Deep Tendon Reflexes: Reflexes normal.   Psychiatric:         Mood and Affect: Mood normal.         Behavior: Behavior normal.         Thought Content: Thought content normal.         Judgment: Judgment normal.             Lines/Drains:              Lab Results: I have reviewed the following results:   Results from last 7 days   Lab Units 02/26/25  0537 02/24/25  0438   WBC Thousand/uL 16.11* 15.60*   HEMOGLOBIN g/dL 11.1* 9.5*   HEMATOCRIT % 36.5 32.0*   PLATELETS Thousands/uL 524* 529*   SEGS PCT %  --  84*   LYMPHO PCT %  24 9*   MONO PCT % 10 5   EOS PCT % 2 0     Results from last 7 days   Lab Units 02/26/25  0537   SODIUM mmol/L 138   POTASSIUM mmol/L 4.4   CHLORIDE mmol/L 100   CO2 mmol/L 32   BUN mg/dL 19   CREATININE mg/dL 0.78   ANION GAP mmol/L 6   CALCIUM mg/dL 9.3   ALBUMIN g/dL 3.4*   TOTAL BILIRUBIN mg/dL 0.39   ALK PHOS U/L 55   ALT U/L 15   AST U/L 12*   GLUCOSE RANDOM mg/dL 137     Results from last 7 days   Lab Units 02/22/25  0436   INR  1.02     Results from last 7 days   Lab Units 02/26/25  1139 02/26/25  0727 02/25/25  2043 02/25/25  1621 02/25/25  1058 02/25/25  0733 02/24/25  2208 02/24/25  1652 02/24/25  1051 02/24/25  0741 02/23/25  2039 02/23/25  1633   POC GLUCOSE mg/dl 144* 140 223* 120 185* 170* 171* 299* 198* 184* 194* 257*         Results from last 7 days   Lab Units 02/22/25  0837 02/22/25  0436 02/22/25  0035 02/21/25  2158   LACTIC ACID mmol/L 2.0 3.6* 4.9* 3.7*   PROCALCITONIN ng/ml  --   --   --  0.09  0.09       Recent Cultures (last 7 days):   Results from last 7 days   Lab Units 02/21/25  2158   BLOOD CULTURE  No Growth at 72 hrs.  No Growth at 72 hrs.             Last 24 Hours Medication List:     Current Facility-Administered Medications:     acetaminophen (TYLENOL) tablet 650 mg, Q6H PRN    amLODIPine (NORVASC) tablet 10 mg, Daily **AND** lisinopril (ZESTRIL) tablet 40 mg, Daily    ARIPiprazole (ABILIFY) tablet 10 mg, Daily    aspirin (ECOTRIN LOW STRENGTH) EC tablet 81 mg, Daily    atorvastatin (LIPITOR) tablet 10 mg, Daily With Dinner    benzonatate (TESSALON PERLES) capsule 100 mg, TID    budesonide (PULMICORT) inhalation solution 0.5 mg, Q12H    buPROPion (WELLBUTRIN XL) 24 hr tablet 300 mg, Daily    clopidogrel (PLAVIX) tablet 75 mg, Daily    [Held by provider] Deutetrabenazine TABS 12 mg, Daily    docusate sodium (COLACE) capsule 100 mg, BID    enoxaparin (LOVENOX) subcutaneous injection 40 mg, Q24H PARTHA    formoterol (PERFOROMIST) nebulizer solution 20 mcg, BID    guaiFENesin (MUCINEX)  12 hr tablet 600 mg, Q12H PARTHA    hydrOXYzine HCL (ATARAX) tablet 25 mg, Q6H PRN    insulin lispro (HumALOG/ADMELOG) 100 units/mL subcutaneous injection 1-6 Units, TID AC **AND** Fingerstick Glucose (POCT), TID AC    insulin lispro (HumALOG/ADMELOG) 100 units/mL subcutaneous injection 1-6 Units, HS    ipratropium-albuterol (DUO-NEB) 0.5-2.5 mg/3 mL inhalation solution 3 mL, TID    methylPREDNISolone sodium succinate (Solu-MEDROL) injection 40 mg, Daily    metoprolol succinate (TOPROL-XL) 24 hr tablet 25 mg, Daily    montelukast (SINGULAIR) tablet 10 mg, HS    nitroglycerin (NITROSTAT) SL tablet 0.4 mg, Q5 Min PRN    oseltamivir (TAMIFLU) capsule 75 mg, Q12H PARTHA    oxyCODONE-acetaminophen (PERCOCET) 5-325 mg per tablet 1.5 tablet, Q4H PRN    pantoprazole (PROTONIX) EC tablet 40 mg, Early Morning    tamsulosin (FLOMAX) capsule 0.4 mg, Daily    venlafaxine (EFFEXOR-XR) 24 hr capsule 75 mg, Daily    Administrative Statements   Today, Patient Was Seen By: Andrzej Keller      **Please Note: This note may have been constructed using a voice recognition system.**

## 2025-02-26 NOTE — RESPIRATORY THERAPY NOTE
RT Protocol Note  Fahad Hernandez 69 y.o. male MRN: [id number]  Unit/Bed#: 2 E 277-01 Encounter: [id number]    Assessment    Principal Problem:    Severe persistent asthma with acute exacerbation  Active Problems:    Hypertension    Type 2 diabetes mellitus with hyperglycemia, without long-term current use of insulin (HCC)    Major depressive disorder    Acute lactic acidosis    CASSIE (obstructive sleep apnea)    COVID-19    Tardive dyskinesia    Influenza A      Home Pulmonary Medications:         Past Medical History:   Diagnosis Date    Asthma     COPD (chronic obstructive pulmonary disease) ([facility])     Dementia ([facility])     Diabetes mellitus ([facility])     GERD (gastroesophageal reflux disease)     History of stroke 11/07/2019    Hypertension     Interstitial lung disease ([facility])     Major depressive disorder with current active episode 11/07/2019    Pneumonia     Rotator cuff arthropathy of right shoulder 12/17/2024    Sleep apnea     Sleep apnea, obstructive     Stroke ([facility])     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     Social History     Socioeconomic History    Marital status: /Civil Union     Spouse name: None    Number of children: None    Years of education: None    Highest education level: None   Occupational History    None   Tobacco Use    Smoking status: Never     Passive exposure: Never    Smokeless tobacco: Never    Tobacco comments:     Patient admits to using marijuana, edibles and smoking    Vaping Use    Vaping status: Never Used   Substance and Sexual Activity    Alcohol use: Not Currently    Drug use: Yes     Types: Marijuana, Oxycodone, Psilocybin    Sexual activity: Yes     Partners: Female     Birth control/protection: Male Sterilization   Other Topics Concern    None   Social History Narrative    None     Social Drivers of Health     Financial Resource Strain: Low Risk  (11/1/2024)    Received from [facility]    Overall Financial Resource Strain (CARDIA)     Difficulty of Paying Living  Expenses: Not hard at all   Food Insecurity: No Food Insecurity (2025)    Nursing - Inadequate Food Risk Classification     Worried About Running Out of Food in the Last Year: Never true     Ran Out of Food in the Last Year: Never true     Ran Out of Food in the Last Year: Never true   Transportation Needs: No Transportation Needs (2025)    Nursing - Transportation Risk Classification     Lack of Transportation: Not on file     Lack of Transportation: No   Physical Activity: Inactive (2024)    Received from [facility]    Exercise Vital Sign     Days of Exercise per Week: 0 days     Minutes of Exercise per Session: 0 min   Stress: Stress Concern Present (2024)    Received from [facility]    [employer] - Occupational Stress Questionnaire     Feeling of Stress : To some extent   Social Connections: Moderately Isolated (2024)    Received from [facility]    Social Connection and Isolation Panel [NHANES]     Frequency of Communication with Friends and Family: Three times a week     Frequency of Social Gatherings with Friends and Family: Twice a week     Attends Sabianist Services: Never     Active Member of Clubs or Organizations: No     Attends Club or Organization Meetings: Never     Marital Status:    Intimate Partner Violence: Unknown (2025)    Nursing IPS     Feels Physically and Emotionally Safe: Not on file     Physically Hurt by Someone: Not on file     Humiliated or Emotionally Abused by Someone: Not on file     Physically Hurt by Someone: No     Hurt or Threatened by Someone: No   Housing Stability: Unknown (2025)    Nursing: Inadequate Housing Risk Classification     Has Housing: Not on file     Worried About Losing Housing: Not on file     Unable to Get Utilities: Not on file     Unable to Pay for Housing in the Last Year: No     Has Housin       Subjective         Objective    Physical  "Exam:   Assessment Type: (P) During-treatment  General Appearance: (P) Awake, Alert  Respiratory Pattern: (P) Normal  Chest Assessment: (P) Chest expansion symmetrical  Bilateral Breath Sounds: (P) Diminished, Expiratory wheezes  Cough: (P) None  O2 Device: (P) ra    Vitals:  Blood pressure 114/80, pulse 99, temperature 98.3 °F (36.8 °C), resp. rate 19, height 5' 11\" (1.803 m), weight 67.3 kg (148 lb 6.4 oz), SpO2 94%.          Imaging and other studies: Results Review Statement: No pertinent imaging studies reviewed.    O2 Device: (P) ra     Plan    Respiratory Plan: Mild Distress pathway        Resp Comments: (P) cont w/ current therapy   "

## 2025-02-26 NOTE — RESPIRATORY THERAPY NOTE
02/25/25 2300   Respiratory Assessment   Resp Comments Pt refusing bipap   Non-Invasive Information   SpO2 95 %     RT Ventilator Management Note  Fahad Hernandez 69 y.o. male MRN: [id number]  Unit/Bed#: 2 E 277-01 Encounter: [id number]      Daily Screen    No data found in the last 10 encounters.           Physical Exam:          Resp Comments: Pt refusing bipap

## 2025-02-26 NOTE — ASSESSMENT & PLAN NOTE
Completed azithromycin.  Continue budesonide/Perforomist twice daily with Xopenex/ipratropium 3 times daily.  Will continue solumedrol for today  Continue Dupixent as outpatient    Will likely need a longer prednisone taper while he continues to have Dupixent until he achieves maximal benefit.

## 2025-02-26 NOTE — CASE MANAGEMENT
Case Management Assessment & Discharge Planning Note    Patient name Fahad eHrnandez  Location 2 EAST 277/2 E 277-01 MRN [id number]  : 1955 Date 2025       Current Admission Date: 2025  Current Admission Diagnosis:Severe persistent asthma with acute exacerbation   Patient Active Problem List    Diagnosis Date Noted Date Diagnosed    Influenza A 2025     COVID-19 2025     Tardive dyskinesia 2025     CASSIE (obstructive sleep apnea) 2025     Multiple falls 2025     Chronic pain syndrome 2025     Acute respiratory failure (HCC) 2025     Rotator cuff arthropathy of right shoulder 2024     Acute right-sided weakness 2024     Fall 2024     Metabolic acidosis 2024     Acute lactic acidosis 2024     BENNY (acute kidney injury) (HCC) 2024     Severe persistent asthma with (acute) exacerbation 2024     Asthma exacerbation attacks 2024     Moderate persistent asthma with exacerbation 2024     Orthostasis 10/15/2024     Hoarseness of voice 2024     SOB (shortness of breath) 2024     Long-term exposure involving bird droppings 2024     Lung nodules 2024     Chest pain 2024     Centrilobular emphysema (HCC) 2024     Severe persistent asthma with acute exacerbation 2024     Medical marijuana use 2024     Type 2 diabetes mellitus with hyperglycemia, without long-term current use of insulin (HCC) 2022     Chronic obstructive asthma (HCC) 2019     Major depressive disorder 2019     Gastroesophageal reflux disease with esophagitis 2019     Abnormal EKG 04/15/2019     Hypertension 04/15/2019     Mixed hyperlipidemia 2018       LOS (days): 4  Geometric Mean LOS (GMLOS) (days): 4.9  Days to GMLOS:0.8     OBJECTIVE:  PATIENT READMITTED TO HOSPITAL  Risk of Unplanned Readmission Score: 37.37         Current admission status: Inpatient       Preferred  Pharmacy:   [facility]/pharm[facility] #[id number] - [place] - [address]  [address]  [place] [address]  Phone: [phone] Fax: [phone]    [facility] - [place], [place] - [address]  [address]  [address]  [place] [address]  Phone: [phone] Fax: [phone]    [facility] [place] [place] - [address]  [address] [address]  Phone: [phone] Fax: [phone]    Primary Care Provider: Surjit Hayes DO    Primary Insurance: ZAC MAURO  Secondary Insurance:     ASSESSMENT:  Active Health Care Proxies       Carol Hernandez Health Care Representative - Spouse   Primary Phone: [phone] (Mobile)                 Advance Directives  Does patient have a Health Care POA?: No  Was patient offered paperwork?: Yes (paperwork placed on chart for provision at dc)  Does patient currently have a Health Care decision maker?: Yes, please see Health Care Proxy section  Does patient have Advance Directives?: No  Was patient offered paperwork?: Yes (paperwork placed on chart for provision at dc)  Primary Contact: spouse, Carol         Readmission Root Cause  30 Day Readmission: Yes  During your hospital stay, did someone (provider, nurse, ) explain your care to you in a way you could understand?: Yes  Did you feel medically stable to leave the hospital?: Yes  Were you able to pay for your medication at the pharmacy?: Yes  Did you have reliable transportation to take you to your appointments?: Yes  During previous admission, was a post-acute recommendation made?: Yes  What post-acute resources were offered?: [facility]  Patient was readmitted due to: increased difficulty with breathing    Patient Information  Admitted from:: Home  Mental Status: Alert  During Assessment patient was accompanied by: Not accompanied during assessment  Assessment information provided by:: Patient  Primary Caregiver: Self  Support Systems:  Spouse/significant other  County of Residence: [place]  What city do you live in?: [place]  Home entry access options. Select all that apply.: Stairs  Number of steps to enter home.: 5  Type of Current Residence: [facility]  Living Arrangements: Lives w/ Spouse/significant other    Activities of Daily Living Prior to Admission  Functional Status: Assistance  Completes ADLs independently?: No  Level of ADL dependence: Assistance  Ambulates independently?: No  Level of ambulatory dependence: Assistance  Does patient use assisted devices?: Yes  Assisted Devices (DME) used: Walker, Bedside Commode  Does patient currently own DME?: Yes  What DME does the patient currently own?: Bedside Commode, Walker  Does the patient have a history of Short-Term Rehab?: Yes ([facility])  Does patient have a history of HHC?: Yes  Does patient currently have HHC?: Yes    Current Home Health Care  Type of Current Home Care Services: Home PT, Nurse visit, Home OT  Home Health Agency Name:: [facility]  Current Home Health Follow-Up Provider:: PCP    Patient Information Continued  Does patient have prescription coverage?: Yes  Does patient receive dialysis treatments?: No     Means of Transportation  Means of Transport to Appts:: Family transport    DISCHARGE DETAILS:    Discharge planning discussed with:: patient, LMOVM for spouse  Freedom of Choice: Yes  Comments - Freedom of Choice: CM reached out to patient to introduce self/role.  Patient reports he is current with [facility] for SN/PT/OT and he would like for CM to send referral for KIMBERLEY at discharge.  Referral sent in [place].  Patient reports he already has a bedside commode at home but would like another one to place over his toilet.  He would also like a shower chair and a nebulizer.  After discussing that the second commode and a shower chair would likely have OOP cost for patient, he asked that CM not order either for him but did ask that CM order a nebulizer from [facility] to be  sent to the home.  Patient also expressed interest in POA/AD paperwork and CM agreed to place same on his chart for provision with AVS at [place]. Patient reports he would like to use [facility] if prescribed any medications at discharge. CM Dept will continue to follow.  CM contacted family/caregiver?: Yes (LMOVM for spouse asking for call back)  Were Treatment Team discharge recommendations reviewed with patient/caregiver?: Yes  Did patient/caregiver verbalize understanding of patient care needs?: Yes  Were patient/caregiver advised of the risks associated with not following Treatment Team discharge recommendations?: Yes    Contacts  Patient Contacts: LMOVM for Carol  Relationship to Patient:: Family  Contact Method: Phone  Phone Number: [phone]  Reason/Outcome: Continuity of Care, Referral, Discharge Planning    Requested Home Health Care         Is the patient interested in HHC at discharge?: Yes  Home Health Discipline requested:: Nursing, Occupational Therapy, Physical Therapy  [facility] Reason (only applicable if external HHA name selected): Patient has established relationship with provider  Home Health Follow-Up Provider:: PCP  Home Health Services Needed:: Diabetes Management, Evaluate Functional Status and Safety, Gait/ADL Training, Strengthening/Theraputic Exercises to Improve Function  Homebound Criteria Met:: Requires the Assistance of Another Person for Safe Ambulation or to Leave the Home, Uses an Assist Device (i.e. cane, walker, etc)  Supporting Clincal Findings:: Fatigues Easliy in Short Distances, Dyspnea with Exertion, Limited Endurance    DME Referral Provided  Referral made for DME?: Yes (requested for home delivery)  DME referral completed for the following items:: Nebulizer  DME Supplier Name:: [employer]    Other Referral/Resources/Interventions Provided:  Interventions: HHC, DME  Referral Comments: Referral sent to [facility] in  [place] for KIMBERLEY.  CM already reserved them.  CM sent referral to [facility] via [place] for nebulizer.    Would you like to participate in our [facility] service program?  : Yes (Patient specifically asked to utilize [facility] at [place].)    Treatment Team Recommendation: Home with Home Health Care  Discharge Destination Plan:: Home with Home Health Care  Transport at Discharge : Other (Comment) (patient requested uber at [place])

## 2025-02-26 NOTE — ASSESSMENT & PLAN NOTE
Asthma exacerbation secondary to COVID-19 and influenza A infection.  Pulmonology consult appreciated    Continue nebulized bronchodilators  Continue IV Solu-Medrol for today daily  Anticipate transition to p.o. alternative and potential discharge tomorrow pending clinical course likely on slow prednisone taper, pulmonology input appreciated

## 2025-02-26 NOTE — PROGRESS NOTES
Progress Note - Pulmonology   Name: Fahad Hernandez 69 y.o. male I MRN: [id number]  Unit/Bed#: 2 E 277-01 I Date of Admission: 2/21/2025   Date of Service: 2/26/2025 I Hospital Day: 4    Assessment & Plan  Severe persistent asthma with acute exacerbation  Completed azithromycin.  Continue budesonide/Perforomist twice daily with Xopenex/ipratropium 3 times daily.  Will continue solumedrol for today  Continue Dupixent as outpatient    Will likely need a longer prednisone taper while he continues to have Dupixent until he achieves maximal benefit.  CASSIE (obstructive sleep apnea)  Continue BiPAP at bedtime.  COVID-19  Diagnosis on February 16, 2025.  Supportive care.  Treatment for asthma exacerbation as above  Influenza A  Continue Tamiflu  Treatment for asthma exacerbation as above    24 Hour Events : No events  Subjective : Patient resting in bed. Feels slightly tighter with his breathing today    Objective :  Temp:  [98.3 °F (36.8 °C)-98.6 °F (37 °C)] 98.3 °F (36.8 °C)  HR:  [82-99] 99  BP: (114-146)/(69-86) 114/80  Resp:  [18-19] 19  SpO2:  [93 %-96 %] 94 %  O2 Device: None (Room air)    Physical Exam  Vitals reviewed.   Constitutional:       General: He is not in acute distress.     Appearance: Normal appearance. He is well-developed. He is not ill-appearing.   HENT:      Head: Normocephalic and atraumatic.      Mouth/Throat:      Pharynx: Oropharynx is clear.   Eyes:      Pupils: Pupils are equal, round, and reactive to light.   Cardiovascular:      Rate and Rhythm: Normal rate and regular rhythm.   Pulmonary:      Effort: Pulmonary effort is normal. No respiratory distress.      Breath sounds: Decreased air movement present. Decreased breath sounds present. No wheezing, rhonchi or rales.   Abdominal:      General: Abdomen is flat. There is no distension.   Musculoskeletal:         General: Normal range of motion.      Cervical back: Normal range of motion.      Right lower leg: No edema.      Left lower leg: No  edema.   Skin:     General: Skin is warm and dry.      Findings: No rash.   Neurological:      Mental Status: He is alert and oriented to person, place, and time.   Psychiatric:         Mood and Affect: Mood normal.         Behavior: Behavior normal.           Lab Results: I have reviewed the following results:   .     02/26/25  0537   WBC 16.11*   HGB 11.1*   HCT 36.5   *   SODIUM 138   K 4.4      CO2 32   BUN 19   CREATININE 0.78   GLUC 137   AST 12*   ALT 15   ALB 3.4*   TBILI 0.39   ALKPHOS 55     ABG: No new results in last 24 hours.    Imaging Results Review: I reviewed radiology reports from this admission including: chest xray.  Other Study Results Review: No additional pertinent studies reviewed.  PFT Results Reviewed: reviewed

## 2025-02-26 NOTE — ASSESSMENT & PLAN NOTE
Lab Results   Component Value Date    HGBA1C 8.3 (H) 01/26/2025       Recent Labs     02/25/25  1621 02/25/25  2043 02/26/25  0727 02/26/25  1139   POCGLU 120 223* 140 144*       Blood Sugar Average: Last 72 hrs:  (P) [id number]    Continue to HOLD metformin  ISS  Diabetic diet

## 2025-02-26 NOTE — PLAN OF CARE
Problem: PAIN - ADULT  Goal: Verbalizes/displays adequate comfort level or baseline comfort level  Description: Interventions:  - Encourage patient to monitor pain and request assistance  - Assess pain using appropriate pain scale  - Administer analgesics based on type and severity of pain and evaluate response  - Implement non-pharmacological measures as appropriate and evaluate response  - Consider cultural and social influences on pain and pain management  - Notify physician/advanced practitioner if interventions unsuccessful or patient reports new pain  Outcome: Progressing     Problem: INFECTION - ADULT  Goal: Absence or prevention of progression during hospitalization  Description: INTERVENTIONS:  - Assess and monitor for signs and symptoms of infection  - Monitor lab/diagnostic results  - Monitor all insertion sites, i.e. indwelling lines, tubes, and drains  - Monitor endotracheal if appropriate and nasal secretions for changes in amount and color  - [place] appropriate cooling/warming therapies per order  - Administer medications as ordered  - Instruct and encourage patient and family to use good hand hygiene technique  - Identify and instruct in appropriate isolation precautions for identified infection/condition  Outcome: Progressing  Goal: Absence of fever/infection during neutropenic period  Description: INTERVENTIONS:  - Monitor WBC    Outcome: Progressing     Problem: SAFETY ADULT  Goal: Patient will remain free of falls  Description: INTERVENTIONS:  - Educate patient/family on patient safety including physical limitations  - Instruct patient to call for assistance with activity   - Consult OT/PT to assist with strengthening/mobility   - Keep Call bell within reach  - Keep bed low and locked with side rails adjusted as appropriate  - Keep care items and personal belongings within reach  - Initiate and maintain comfort rounds  - Make Fall Risk Sign visible to staff  - Offer Toileting every 2 Hours,  in advance of need  - Initiate/Maintain bed alarm  - Obtain necessary fall risk management equipment  - Apply yellow socks and bracelet for high fall risk patients  - Consider moving patient to room near nurses station  Outcome: Progressing  Goal: Maintain or return to baseline ADL function  Description: INTERVENTIONS:  -  Assess patient's ability to carry out ADLs; assess patient's baseline for ADL function and identify physical deficits which impact ability to perform ADLs (bathing, care of mouth/teeth, toileting, grooming, dressing, etc.)  - Assess/evaluate cause of self-care deficits   - Assess range of motion  - Assess patient's mobility; develop plan if impaired  - Assess patient's need for assistive devices and provide as appropriate  - Encourage maximum independence but intervene and supervise when necessary  - Involve family in performance of ADLs  - Assess for home care needs following discharge   - Consider OT consult to assist with ADL evaluation and planning for discharge  - Provide patient education as appropriate  Outcome: Progressing  Goal: Maintains/Returns to pre admission functional level  Description: INTERVENTIONS:  - Perform AM-PAC 6 Click Basic Mobility/ Daily Activity assessment daily.  - Set and communicate daily mobility goal to care team and patient/family/caregiver.   - Collaborate with rehabilitation services on mobility goals if consulted  - Perform Range of Motion 2 times a day.  - Reposition patient every 2 hours.  - Dangle patient 2 times a day  - Stand patient 2 times a day  - Ambulate patient 2 times a day  - Out of bed to chair 2 times a day   - Out of bed for meals 2 times a day  - Out of bed for toileting  - Record patient progress and toleration of activity level   Outcome: Progressing     Problem: DISCHARGE PLANNING  Goal: Discharge to home or other facility with appropriate resources  Description: INTERVENTIONS:  - Identify barriers to discharge w/patient and caregiver  -  Arrange for needed discharge resources and transportation as appropriate  - Identify discharge learning needs (meds, wound care, etc.)  - Arrange for interpretive services to assist at discharge as needed  - Refer to Case Management Department for coordinating discharge planning if the patient needs post-hospital services based on physician/advanced practitioner order or complex needs related to functional status, cognitive ability, or social support system  Outcome: Progressing     Problem: Knowledge Deficit  Goal: Patient/family/caregiver demonstrates understanding of disease process, treatment plan, medications, and discharge instructions  Description: Complete learning assessment and assess knowledge base.  Interventions:  - Provide teaching at level of understanding  - Provide teaching via preferred learning methods  Outcome: Progressing     Problem: Nutrition/Hydration-ADULT  Goal: Nutrient/Hydration intake appropriate for improving, restoring or maintaining nutritional needs  Description: Monitor and assess patient's nutrition/hydration status for malnutrition. Collaborate with interdisciplinary team and initiate plan and interventions as ordered.  Monitor patient's weight and dietary intake as ordered or per policy. Utilize nutrition screening tool and intervene as necessary. Determine patient's food preferences and provide high-protein, high-caloric foods as appropriate.     INTERVENTIONS:  - Monitor oral intake, urinary output, labs, and treatment plans  - Assess nutrition and hydration status and recommend course of action  - Evaluate amount of meals eaten  - Assist patient with eating if necessary   - Allow adequate time for meals  - Recommend/ encourage appropriate diets, oral nutritional supplements, and vitamin/mineral supplements  - Order, calculate, and assess calorie counts as needed  - Recommend, monitor, and adjust tube feedings and TPN/PPN based on assessed needs  - Assess need for intravenous  fluids  - Provide specific nutrition/hydration education as appropriate  - Include patient/family/caregiver in decisions related to nutrition  Outcome: Progressing

## 2025-02-27 ENCOUNTER — APPOINTMENT (INPATIENT)
Dept: RADIOLOGY | Facility: HOSPITAL | Age: 70
DRG: 871 | End: 2025-02-27
Payer: COMMERCIAL

## 2025-02-27 LAB
ALBUMIN SERPL BCG-MCNC: 3.4 G/DL (ref 3.5–5)
ALP SERPL-CCNC: 52 U/L (ref 34–104)
ALT SERPL W P-5'-P-CCNC: 14 U/L (ref 7–52)
ANION GAP SERPL CALCULATED.3IONS-SCNC: 9 MMOL/L (ref 4–13)
AST SERPL W P-5'-P-CCNC: 11 U/L (ref 13–39)
BACTERIA BLD CULT: NORMAL
BACTERIA BLD CULT: NORMAL
BASOPHILS # BLD MANUAL: 0 THOUSAND/UL (ref 0–0.1)
BASOPHILS NFR MAR MANUAL: 0 % (ref 0–1)
BILIRUB SERPL-MCNC: 0.36 MG/DL (ref 0.2–1)
BUN SERPL-MCNC: 24 MG/DL (ref 5–25)
CALCIUM ALBUM COR SERPL-MCNC: 9.5 MG/DL (ref 8.3–10.1)
CALCIUM SERPL-MCNC: 9 MG/DL (ref 8.4–10.2)
CHLORIDE SERPL-SCNC: 99 MMOL/L (ref 96–108)
CO2 SERPL-SCNC: 29 MMOL/L (ref 21–32)
CREAT SERPL-MCNC: 0.77 MG/DL (ref 0.6–1.3)
EOSINOPHIL # BLD MANUAL: 0.18 THOUSAND/UL (ref 0–0.4)
EOSINOPHIL NFR BLD MANUAL: 1 % (ref 0–6)
ERYTHROCYTE [DISTWIDTH] IN BLOOD BY AUTOMATED COUNT: 15.7 % (ref 11.6–15.1)
GFR SERPL CREATININE-BSD FRML MDRD: 91 ML/MIN/1.73SQ M
GLUCOSE SERPL-MCNC: 147 MG/DL (ref 65–140)
GLUCOSE SERPL-MCNC: 152 MG/DL (ref 65–140)
GLUCOSE SERPL-MCNC: 168 MG/DL (ref 65–140)
GLUCOSE SERPL-MCNC: 211 MG/DL (ref 65–140)
GLUCOSE SERPL-MCNC: 213 MG/DL (ref 65–140)
HCT VFR BLD AUTO: 33.4 % (ref 36.5–49.3)
HGB BLD-MCNC: 10.2 G/DL (ref 12–17)
HYPERCHROMIA BLD QL SMEAR: PRESENT
LYMPHOCYTES # BLD AUTO: 31 % (ref 14–44)
LYMPHOCYTES # BLD AUTO: 6.48 THOUSAND/UL (ref 0.6–4.47)
MCH RBC QN AUTO: 25.1 PG (ref 26.8–34.3)
MCHC RBC AUTO-ENTMCNC: 30.5 G/DL (ref 31.4–37.4)
MCV RBC AUTO: 82 FL (ref 82–98)
MONOCYTES # BLD AUTO: 0.54 THOUSAND/UL (ref 0–1.22)
MONOCYTES NFR BLD: 3 % (ref 4–12)
MYELOCYTE ABSOLUTE CT: 0.18 THOUSAND/UL (ref 0–0.1)
MYELOCYTES NFR BLD MANUAL: 1 % (ref 0–1)
NEUTROPHILS # BLD MANUAL: 10.63 THOUSAND/UL (ref 1.85–7.62)
NEUTS SEG NFR BLD AUTO: 59 % (ref 43–75)
PLATELET # BLD AUTO: 496 THOUSANDS/UL (ref 149–390)
PLATELET BLD QL SMEAR: ABNORMAL
PMV BLD AUTO: 8.6 FL (ref 8.9–12.7)
POTASSIUM SERPL-SCNC: 4.3 MMOL/L (ref 3.5–5.3)
PROT SERPL-MCNC: 6.1 G/DL (ref 6.4–8.4)
RBC # BLD AUTO: 4.06 MILLION/UL (ref 3.88–5.62)
RBC MORPH BLD: PRESENT
SODIUM SERPL-SCNC: 137 MMOL/L (ref 135–147)
VARIANT LYMPHS # BLD AUTO: 5 %
WBC # BLD AUTO: 18.01 THOUSAND/UL (ref 4.31–10.16)

## 2025-02-27 PROCEDURE — 73080 X-RAY EXAM OF ELBOW: CPT

## 2025-02-27 PROCEDURE — 94660 CPAP INITIATION&MGMT: CPT

## 2025-02-27 PROCEDURE — 85027 COMPLETE CBC AUTOMATED: CPT | Performed by: INTERNAL MEDICINE

## 2025-02-27 PROCEDURE — 73030 X-RAY EXAM OF SHOULDER: CPT

## 2025-02-27 PROCEDURE — 85007 BL SMEAR W/DIFF WBC COUNT: CPT | Performed by: INTERNAL MEDICINE

## 2025-02-27 PROCEDURE — 94760 N-INVAS EAR/PLS OXIMETRY 1: CPT

## 2025-02-27 PROCEDURE — 94640 AIRWAY INHALATION TREATMENT: CPT

## 2025-02-27 PROCEDURE — 82948 REAGENT STRIP/BLOOD GLUCOSE: CPT

## 2025-02-27 PROCEDURE — 80053 COMPREHEN METABOLIC PANEL: CPT | Performed by: INTERNAL MEDICINE

## 2025-02-27 PROCEDURE — 99232 SBSQ HOSP IP/OBS MODERATE 35: CPT | Performed by: PHYSICIAN ASSISTANT

## 2025-02-27 PROCEDURE — 94664 DEMO&/EVAL PT USE INHALER: CPT

## 2025-02-27 PROCEDURE — 99233 SBSQ HOSP IP/OBS HIGH 50: CPT | Performed by: INTERNAL MEDICINE

## 2025-02-27 RX ORDER — PREDNISONE 20 MG/1
40 TABLET ORAL DAILY
Status: DISCONTINUED | OUTPATIENT
Start: 2025-02-27 | End: 2025-02-28 | Stop reason: HOSPADM

## 2025-02-27 RX ADMIN — BUPROPION HYDROCHLORIDE 300 MG: 150 TABLET, EXTENDED RELEASE ORAL at 09:49

## 2025-02-27 RX ADMIN — ARIPIPRAZOLE 10 MG: 5 TABLET ORAL at 09:50

## 2025-02-27 RX ADMIN — BENZONATATE 100 MG: 100 CAPSULE ORAL at 01:13

## 2025-02-27 RX ADMIN — IPRATROPIUM BROMIDE AND ALBUTEROL SULFATE 3 ML: 2.5; .5 SOLUTION RESPIRATORY (INHALATION) at 07:06

## 2025-02-27 RX ADMIN — OXYCODONE HYDROCHLORIDE AND ACETAMINOPHEN 1.5 TABLET: 5; 325 TABLET ORAL at 04:42

## 2025-02-27 RX ADMIN — INSULIN LISPRO 2 UNITS: 100 INJECTION, SOLUTION INTRAVENOUS; SUBCUTANEOUS at 18:20

## 2025-02-27 RX ADMIN — INSULIN LISPRO 1 UNITS: 100 INJECTION, SOLUTION INTRAVENOUS; SUBCUTANEOUS at 13:15

## 2025-02-27 RX ADMIN — OSELTAMAVIR PHOSPHATE 75 MG: 75 CAPSULE ORAL at 09:50

## 2025-02-27 RX ADMIN — ASPIRIN 81 MG: 81 TABLET, COATED ORAL at 09:50

## 2025-02-27 RX ADMIN — GUAIFENESIN 600 MG: 600 TABLET ORAL at 09:50

## 2025-02-27 RX ADMIN — VENLAFAXINE HYDROCHLORIDE 75 MG: 75 CAPSULE, EXTENDED RELEASE ORAL at 09:50

## 2025-02-27 RX ADMIN — DOCUSATE SODIUM 100 MG: 100 CAPSULE, LIQUID FILLED ORAL at 09:50

## 2025-02-27 RX ADMIN — IPRATROPIUM BROMIDE AND ALBUTEROL SULFATE 3 ML: 2.5; .5 SOLUTION RESPIRATORY (INHALATION) at 13:34

## 2025-02-27 RX ADMIN — INSULIN LISPRO 2 UNITS: 100 INJECTION, SOLUTION INTRAVENOUS; SUBCUTANEOUS at 22:03

## 2025-02-27 RX ADMIN — OSELTAMAVIR PHOSPHATE 75 MG: 75 CAPSULE ORAL at 22:02

## 2025-02-27 RX ADMIN — BUDESONIDE 0.5 MG: 0.5 INHALANT RESPIRATORY (INHALATION) at 19:23

## 2025-02-27 RX ADMIN — ACETAMINOPHEN 650 MG: 325 TABLET, FILM COATED ORAL at 22:02

## 2025-02-27 RX ADMIN — TAMSULOSIN HYDROCHLORIDE 0.4 MG: 0.4 CAPSULE ORAL at 09:51

## 2025-02-27 RX ADMIN — ATORVASTATIN CALCIUM 10 MG: 10 TABLET, FILM COATED ORAL at 18:19

## 2025-02-27 RX ADMIN — FORMOTEROL FUMARATE DIHYDRATE 20 MCG: 20 SOLUTION RESPIRATORY (INHALATION) at 19:23

## 2025-02-27 RX ADMIN — METOPROLOL SUCCINATE 25 MG: 25 TABLET, EXTENDED RELEASE ORAL at 09:50

## 2025-02-27 RX ADMIN — GUAIFENESIN 600 MG: 600 TABLET ORAL at 22:01

## 2025-02-27 RX ADMIN — METHYLPREDNISOLONE SODIUM SUCCINATE 40 MG: 40 INJECTION, POWDER, FOR SOLUTION INTRAMUSCULAR; INTRAVENOUS at 09:51

## 2025-02-27 RX ADMIN — MONTELUKAST 10 MG: 10 TABLET, FILM COATED ORAL at 22:02

## 2025-02-27 RX ADMIN — IPRATROPIUM BROMIDE AND ALBUTEROL SULFATE 3 ML: 2.5; .5 SOLUTION RESPIRATORY (INHALATION) at 19:23

## 2025-02-27 RX ADMIN — INSULIN LISPRO 1 UNITS: 100 INJECTION, SOLUTION INTRAVENOUS; SUBCUTANEOUS at 07:43

## 2025-02-27 RX ADMIN — FORMOTEROL FUMARATE DIHYDRATE 20 MCG: 20 SOLUTION RESPIRATORY (INHALATION) at 07:06

## 2025-02-27 RX ADMIN — OXYCODONE HYDROCHLORIDE AND ACETAMINOPHEN 1.5 TABLET: 5; 325 TABLET ORAL at 13:15

## 2025-02-27 RX ADMIN — PANTOPRAZOLE SODIUM 40 MG: 40 TABLET, DELAYED RELEASE ORAL at 05:50

## 2025-02-27 RX ADMIN — CLOPIDOGREL 75 MG: 75 TABLET ORAL at 09:50

## 2025-02-27 RX ADMIN — ENOXAPARIN SODIUM 40 MG: 40 INJECTION SUBCUTANEOUS at 09:49

## 2025-02-27 RX ADMIN — OXYCODONE HYDROCHLORIDE AND ACETAMINOPHEN 1.5 TABLET: 5; 325 TABLET ORAL at 18:19

## 2025-02-27 RX ADMIN — BENZONATATE 100 MG: 100 CAPSULE ORAL at 10:04

## 2025-02-27 RX ADMIN — BUDESONIDE 0.5 MG: 0.5 INHALANT RESPIRATORY (INHALATION) at 07:06

## 2025-02-27 RX ADMIN — BENZONATATE 100 MG: 100 CAPSULE ORAL at 18:19

## 2025-02-27 NOTE — PROGRESS NOTES
Progress Note - Hospitalist   Name: Fahad Hernandez 69 y.o. male I MRN: [id number]  Unit/Bed#: 2 E 277-01 I Date of Admission: 2/21/2025   Date of Service: 2/27/2025 I Hospital Day: 5    Assessment & Plan  Severe persistent asthma with acute exacerbation  Asthma exacerbation secondary to COVID-19 and influenza A infection.  Pulmonology consult appreciated    Continue nebulized bronchodilators  Transition to PO steroids today and monitor response - if doing OK anticipate discharge tomorrow, spoke with patient and wife  Pulmonology input appreciated  Leukocytosis noted but likely because of steroids, no other signs of bacterial infection clinically  Type 2 diabetes mellitus with hyperglycemia, without long-term current use of insulin ([facility])  Lab Results   Component Value Date    HGBA1C 8.3 (H) 01/26/2025       Recent Labs     02/26/25  1616 02/26/25  2108 02/27/25  0805 02/27/25  1148   POCGLU 289* 238* 152* 168*       Blood Sugar Average: Last 72 hrs:  (P) 191.5    Continue to HOLD metformin  ISS  Diabetic diet  Major depressive disorder  Continue Abilify, Wellbutrin and Effexor   Hypertension  Continue norvasc, lisinopril, metoprolol  CASSIE (obstructive sleep apnea)  Continue BIPAP at night  Tardive dyskinesia  Cont Astuedo  Acute lactic acidosis  Multifactorial with dehydration, multiple albuterol nebs, chronic metformin use.     No signs of endorgan damage  Monitor  COVID-19  Supportive care  Currently on room air  Influenza A  Supportive care  Continue Tamiflu  Asthma exacerbation attacks      VTE Pharmacologic Prophylaxis: VTE Score: 2 Lovenox    Mobility:   Basic Mobility Inpatient Raw Score: 14  JH-HLM Goal: 4: Move to chair/commode  JH-HLM Achieved: 3: Sit at edge of bed  JH-HLM Goal NOT achieved. Continue with multidisciplinary rounding and encourage appropriate mobility to improve upon JH-HLM goals.    Patient Centered Rounds: I performed bedside rounds with nursing staff today.   Discussions with Specialists  or Other Care Team Provider: Discussed with care management team    Education and Discussions with Family / Patient: Patient and Wife over the phone    Current Length of Stay: 5 day(s)  Current Patient Status: Inpatient   Certification Statement: The patient will continue to require additional inpatient hospital stay due to need for improvement in respiratory status  Discharge Plan: Anticipate discharge tomorrow to home.    Code Status: Level 1 - Full Code    Subjective     Patient with improving his breathing but not back to baseline yet.  Denies any chest pain nausea vomiting  Still has a cough and significant shortness of breath on exertion.  No other events reported.    Objective :  Temp:  [98 °F (36.7 °C)-98.3 °F (36.8 °C)] 98 °F (36.7 °C)  HR:  [] 102  BP: (108-130)/(67-77) 130/67  Resp:  [18] 18  SpO2:  [93 %-98 %] 95 %  O2 Device: None (Room air)    Body mass index is 20.88 kg/m².     Input and Output Summary (last 24 hours):     Intake/Output Summary (Last 24 hours) at 2/27/2025 1330  Last data filed at 2/27/2025 0900  Gross per 24 hour   Intake 1470 ml   Output 225 ml   Net 1245 ml       Physical Exam  Vitals and nursing note reviewed.   Constitutional:       Appearance: Normal appearance.   HENT:      Head: Normocephalic and atraumatic.      Right Ear: External ear normal.      Left Ear: External ear normal.      Nose: Nose normal. No congestion or rhinorrhea.      Mouth/Throat:      Mouth: Mucous membranes are moist.      Pharynx: Oropharynx is clear. No oropharyngeal exudate or posterior oropharyngeal erythema.   Eyes:      General: No scleral icterus.        Right eye: No discharge.         Left eye: No discharge.      Pupils: Pupils are equal, round, and reactive to light.   Neck:      Vascular: No carotid bruit.   Cardiovascular:      Rate and Rhythm: Normal rate and regular rhythm.      Pulses: Normal pulses.      Heart sounds: No murmur heard.     No friction rub. No gallop.   Pulmonary:       Effort: Pulmonary effort is normal. No respiratory distress.      Breath sounds: No stridor. Wheezing present. No rhonchi or rales.      Comments: Late expiratory wheezing still appreciated but better compared to yesterday  Abdominal:      General: Abdomen is flat. Bowel sounds are normal. There is no distension.      Palpations: Abdomen is soft. There is no mass.      Tenderness: There is no abdominal tenderness. There is no guarding or rebound.      Hernia: No hernia is present.   Musculoskeletal:         General: No swelling, tenderness, deformity or signs of injury. Normal range of motion.      Cervical back: Normal range of motion. No rigidity. No muscular tenderness.   Lymphadenopathy:      Cervical: No cervical adenopathy.   Skin:     General: Skin is warm and dry.      Capillary Refill: Capillary refill takes less than 2 seconds.      Coloration: Skin is not jaundiced or pale.      Findings: No bruising or erythema.   Neurological:      General: No focal deficit present.      Mental Status: He is alert and oriented to person, place, and time. Mental status is at baseline.      Cranial Nerves: No cranial nerve deficit.      Sensory: No sensory deficit.      Motor: No weakness.      Coordination: Coordination normal.      Deep Tendon Reflexes: Reflexes normal.   Psychiatric:         Mood and Affect: Mood normal.         Behavior: Behavior normal.         Thought Content: Thought content normal.         Judgment: Judgment normal.           Lines/Drains:              Lab Results: I have reviewed the following results:   Results from last 7 days   Lab Units 02/27/25  0437 02/26/25  0537 02/24/25  0438   WBC Thousand/uL 18.01*   < > 15.60*   HEMOGLOBIN g/dL 10.2*   < > 9.5*   HEMATOCRIT % 33.4*   < > 32.0*   PLATELETS Thousands/uL 496*   < > 529*   SEGS PCT %  --   --  84*   LYMPHO PCT % 31   < > 9*   MONO PCT % 3*   < > 5   EOS PCT % 1   < > 0    < > = values in this interval not displayed.     Results from last  7 days   Lab Units 02/27/25  0437   SODIUM mmol/L 137   POTASSIUM mmol/L 4.3   CHLORIDE mmol/L 99   CO2 mmol/L 29   BUN mg/dL 24   CREATININE mg/dL 0.77   ANION GAP mmol/L 9   CALCIUM mg/dL 9.0   ALBUMIN g/dL 3.4*   TOTAL BILIRUBIN mg/dL 0.36   ALK PHOS U/L 52   ALT U/L 14   AST U/L 11*   GLUCOSE RANDOM mg/dL 147*     Results from last 7 days   Lab Units 02/22/25  0436   INR  1.02     Results from last 7 days   Lab Units 02/27/25  1148 02/27/25  0805 02/26/25  2108 02/26/25  1616 02/26/25  1139 02/26/25  0727 02/25/25  2043 02/25/25  1621 02/25/25  1058 02/25/25  0733 02/24/25  2208 02/24/25  1652   POC GLUCOSE mg/dl 168* 152* 238* 289* 144* 140 223* 120 185* 170* 171* 299*         Results from last 7 days   Lab Units 02/22/25  0837 02/22/25  0436 02/22/25  0035 02/21/25  2158   LACTIC ACID mmol/L 2.0 3.6* 4.9* 3.7*   PROCALCITONIN ng/ml  --   --   --  0.09  0.09       Recent Cultures (last 7 days):   Results from last 7 days   Lab Units 02/21/25  2158   BLOOD CULTURE  No Growth After 5 Days.  No Growth After 5 Days.             Last 24 Hours Medication List:     Current Facility-Administered Medications:     acetaminophen (TYLENOL) tablet 650 mg, Q6H PRN    ARIPiprazole (ABILIFY) tablet 10 mg, Daily    aspirin (ECOTRIN LOW STRENGTH) EC tablet 81 mg, Daily    atorvastatin (LIPITOR) tablet 10 mg, Daily With Dinner    benzonatate (TESSALON PERLES) capsule 100 mg, TID    budesonide (PULMICORT) inhalation solution 0.5 mg, Q12H    buPROPion (WELLBUTRIN XL) 24 hr tablet 300 mg, Daily    clopidogrel (PLAVIX) tablet 75 mg, Daily    [Held by provider] Deutetrabenazine TABS 12 mg, Daily    docusate sodium (COLACE) capsule 100 mg, BID    enoxaparin (LOVENOX) subcutaneous injection 40 mg, Q24H PARTHA    formoterol (PERFOROMIST) nebulizer solution 20 mcg, BID    guaiFENesin (MUCINEX) 12 hr tablet 600 mg, Q12H PARTHA    hydrOXYzine HCL (ATARAX) tablet 25 mg, Q6H PRN    insulin lispro (HumALOG/ADMELOG) 100 units/mL subcutaneous  injection 1-6 Units, TID AC **AND** Fingerstick Glucose (POCT), TID AC    insulin lispro (HumALOG/ADMELOG) 100 units/mL subcutaneous injection 1-6 Units, HS    ipratropium-albuterol (DUO-NEB) 0.5-2.5 mg/3 mL inhalation solution 3 mL, TID    metoprolol succinate (TOPROL-XL) 24 hr tablet 25 mg, Daily    montelukast (SINGULAIR) tablet 10 mg, HS    nitroglycerin (NITROSTAT) SL tablet 0.4 mg, Q5 Min PRN    oseltamivir (TAMIFLU) capsule 75 mg, Q12H PARTHA    oxyCODONE-acetaminophen (PERCOCET) 5-325 mg per tablet 1.5 tablet, Q4H PRN    pantoprazole (PROTONIX) EC tablet 40 mg, Early Morning    predniSONE tablet 40 mg, Daily    tamsulosin (FLOMAX) capsule 0.4 mg, Daily    venlafaxine (EFFEXOR-XR) 24 hr capsule 75 mg, Daily    Administrative Statements   Today, Patient Was Seen By: Brad Lao MD      **Please Note: This note may have been constructed using a voice recognition system.**

## 2025-02-27 NOTE — ASSESSMENT & PLAN NOTE
Completed azithromycin.  Continue budesonide/Perforomist twice daily with Xopenex/ipratropium 3 times daily.  Will transition to prednisone, can start at 40 mg and decrease by 10 mg every 5 days  Continue Dupixent as outpatient    Anticipate discharge home tomorrow

## 2025-02-27 NOTE — ASSESSMENT & PLAN NOTE
Asthma exacerbation secondary to COVID-19 and influenza A infection.  Pulmonology consult appreciated    Continue nebulized bronchodilators  Transition to PO steroids today and monitor response - if doing OK anticipate discharge tomorrow, spoke with patient and wife  Pulmonology input appreciated  Leukocytosis noted but likely because of steroids, no other signs of bacterial infection clinically

## 2025-02-27 NOTE — RESPIRATORY THERAPY NOTE
RT Protocol Note  Fahad Hernandez 69 y.o. male MRN: [id number]  Unit/Bed#: 2 E 277-01 Encounter: [id number]    Assessment    Principal Problem:    Severe persistent asthma with acute exacerbation  Active Problems:    Hypertension    Type 2 diabetes mellitus with hyperglycemia, without long-term current use of insulin ([facility])    Major depressive disorder    Asthma exacerbation attacks    Acute lactic acidosis    CASSIE (obstructive sleep apnea)    COVID-19    Tardive dyskinesia    Influenza A      Home Pulmonary Medications:         Past Medical History:   Diagnosis Date    Asthma     COPD (chronic obstructive pulmonary disease) ([facility])     Dementia ([facility])     Diabetes mellitus ([facility])     GERD (gastroesophageal reflux disease)     History of stroke 11/07/2019    Hypertension     Interstitial lung disease ([facility])     Major depressive disorder with current active episode 11/07/2019    Pneumonia     Rotator cuff arthropathy of right shoulder 12/17/2024    Sleep apnea     Sleep apnea, obstructive     Stroke ([facility])     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     Social History     Socioeconomic History    Marital status: /Civil Union     Spouse name: None    Number of children: None    Years of education: None    Highest education level: None   Occupational History    None   Tobacco Use    Smoking status: Never     Passive exposure: Never    Smokeless tobacco: Never    Tobacco comments:     Patient admits to using marijuana, edibles and smoking    Vaping Use    Vaping status: Never Used   Substance and Sexual Activity    Alcohol use: Not Currently    Drug use: Yes     Types: Marijuana, Oxycodone, Psilocybin    Sexual activity: Yes     Partners: Female     Birth control/protection: Male Sterilization   Other Topics Concern    None   Social History Narrative    None     Social Drivers of Health     Financial Resource Strain: Low Risk  (11/1/2024)    Received from [facility]    Overall Financial Resource Strain (CARDIA)      Difficulty of Paying Living Expenses: Not hard at all   Food Insecurity: No Food Insecurity (2025)    Nursing - Inadequate Food Risk Classification     Worried About Running Out of Food in the Last Year: Never true     Ran Out of Food in the Last Year: Never true     Ran Out of Food in the Last Year: Never true   Transportation Needs: No Transportation Needs (2025)    Nursing - Transportation Risk Classification     Lack of Transportation: Not on file     Lack of Transportation: No   Physical Activity: Inactive (2024)    Received from [facility]    Exercise Vital Sign     Days of Exercise per Week: 0 days     Minutes of Exercise per Session: 0 min   Stress: Stress Concern Present (2024)    Received from [facility]    [place] [place] [employer] - Occupational Stress Questionnaire     Feeling of Stress : To some extent   Social Connections: Moderately Isolated (2024)    Received from [facility]    Social Connection and Isolation Panel [NHANES]     Frequency of Communication with Friends and Family: Three times a week     Frequency of Social Gatherings with Friends and Family: Twice a week     Attends Jehovah's witness Services: Never     Active Member of Clubs or Organizations: No     Attends Club or Organization Meetings: Never     Marital Status:    Intimate Partner Violence: Unknown (2025)    Nursing IPS     Feels Physically and Emotionally Safe: Not on file     Physically Hurt by Someone: Not on file     Humiliated or Emotionally Abused by Someone: Not on file     Physically Hurt by Someone: No     Hurt or Threatened by Someone: No   Housing Stability: Unknown (2025)    Nursing: Inadequate Housing Risk Classification     Has Housing: Not on file     Worried About Losing Housing: Not on file     Unable to Get Utilities: Not on file     Unable to Pay for Housing in the Last Year: No     Has Housin       Subjective      "    Objective    Physical Exam:   Assessment Type: Assess only  General Appearance: Awake, Alert  Respiratory Pattern: Normal  Chest Assessment: Chest expansion symmetrical  Bilateral Breath Sounds: Diminished    Vitals:  Blood pressure 108/70, pulse 85, temperature 98 °F (36.7 °C), temperature source Axillary, resp. rate 18, height 5' 11\" (1.803 m), weight 67.9 kg (149 lb 11.1 oz), SpO2 93%.          Imaging and other studies:     O2 Device: ra     Plan    Respiratory Plan: Home Bronchodilator Patient pathway        Resp Comments: pt with hx of copd and dx of covid and flu will continue with tid duo neb and bid pulm perf   "

## 2025-02-27 NOTE — PLAN OF CARE
Problem: PAIN - ADULT  Goal: Verbalizes/displays adequate comfort level or baseline comfort level  Description: Interventions:  - Encourage patient to monitor pain and request assistance  - Assess pain using appropriate pain scale  - Administer analgesics based on type and severity of pain and evaluate response  - Implement non-pharmacological measures as appropriate and evaluate response  - Consider cultural and social influences on pain and pain management  - Notify physician/advanced practitioner if interventions unsuccessful or patient reports new pain  Outcome: Progressing     Problem: INFECTION - ADULT  Goal: Absence or prevention of progression during hospitalization  Description: INTERVENTIONS:  - Assess and monitor for signs and symptoms of infection  - Monitor lab/diagnostic results  - Monitor all insertion sites, i.e. indwelling lines, tubes, and drains  - Monitor endotracheal if appropriate and nasal secretions for changes in amount and color  - [place] appropriate cooling/warming therapies per order  - Administer medications as ordered  - Instruct and encourage patient and family to use good hand hygiene technique  - Identify and instruct in appropriate isolation precautions for identified infection/condition  Outcome: Progressing  Goal: Absence of fever/infection during neutropenic period  Description: INTERVENTIONS:  - Monitor WBC    Outcome: Progressing     Problem: SAFETY ADULT  Goal: Patient will remain free of falls  Description: INTERVENTIONS:  - Educate patient/family on patient safety including physical limitations  - Instruct patient to call for assistance with activity   - Consult OT/PT to assist with strengthening/mobility   - Keep Call bell within reach  - Keep bed low and locked with side rails adjusted as appropriate  - Keep care items and personal belongings within reach  - Initiate and maintain comfort rounds  - Make Fall Risk Sign visible to staff  - Offer Toileting every 2 Hours,  in advance of need  - Initiate/Maintain bed/chair alarm  - Obtain necessary fall risk management equipment  - Apply yellow socks and bracelet for high fall risk patients  - Consider moving patient to room near nurses station  Outcome: Progressing  Goal: Maintain or return to baseline ADL function  Description: INTERVENTIONS:  -  Assess patient's ability to carry out ADLs; assess patient's baseline for ADL function and identify physical deficits which impact ability to perform ADLs (bathing, care of mouth/teeth, toileting, grooming, dressing, etc.)  - Assess/evaluate cause of self-care deficits   - Assess range of motion  - Assess patient's mobility; develop plan if impaired  - Assess patient's need for assistive devices and provide as appropriate  - Encourage maximum independence but intervene and supervise when necessary  - Involve family in performance of ADLs  - Assess for home care needs following discharge   - Consider OT consult to assist with ADL evaluation and planning for discharge  - Provide patient education as appropriate  Outcome: Progressing  Goal: Maintains/Returns to pre admission functional level  Description: INTERVENTIONS:  - Perform AM-PAC 6 Click Basic Mobility/ Daily Activity assessment daily.  - Set and communicate daily mobility goal to care team and patient/family/caregiver.   - Collaborate with rehabilitation services on mobility goals if consulted  - Perform Range of Motion 2 times a day.  - Reposition patient every 2 hours.  - Dangle patient 2 times a day  - Stand patient 2 times a day  - Ambulate patient 2 times a day  - Out of bed to chair 2 times a day   - Out of bed for meals 2 times a day  - Out of bed for toileting  - Record patient progress and toleration of activity level   Outcome: Progressing     Problem: DISCHARGE PLANNING  Goal: Discharge to home or other facility with appropriate resources  Description: INTERVENTIONS:  - Identify barriers to discharge w/patient and  caregiver  - Arrange for needed discharge resources and transportation as appropriate  - Identify discharge learning needs (meds, wound care, etc.)  - Arrange for interpretive services to assist at discharge as needed  - Refer to Case Management Department for coordinating discharge planning if the patient needs post-hospital services based on physician/advanced practitioner order or complex needs related to functional status, cognitive ability, or social support system  Outcome: Progressing     Problem: Knowledge Deficit  Goal: Patient/family/caregiver demonstrates understanding of disease process, treatment plan, medications, and discharge instructions  Description: Complete learning assessment and assess knowledge base.  Interventions:  - Provide teaching at level of understanding  - Provide teaching via preferred learning methods  Outcome: Progressing     Problem: Nutrition/Hydration-ADULT  Goal: Nutrient/Hydration intake appropriate for improving, restoring or maintaining nutritional needs  Description: Monitor and assess patient's nutrition/hydration status for malnutrition. Collaborate with interdisciplinary team and initiate plan and interventions as ordered.  Monitor patient's weight and dietary intake as ordered or per policy. Utilize nutrition screening tool and intervene as necessary. Determine patient's food preferences and provide high-protein, high-caloric foods as appropriate.     INTERVENTIONS:  - Monitor oral intake, urinary output, labs, and treatment plans  - Assess nutrition and hydration status and recommend course of action  - Evaluate amount of meals eaten  - Assist patient with eating if necessary   - Allow adequate time for meals  - Recommend/ encourage appropriate diets, oral nutritional supplements, and vitamin/mineral supplements  - Order, calculate, and assess calorie counts as needed  - Recommend, monitor, and adjust tube feedings and TPN/PPN based on assessed needs  - Assess need for  intravenous fluids  - Provide specific nutrition/hydration education as appropriate  - Include patient/family/caregiver in decisions related to nutrition  Outcome: Progressing

## 2025-02-27 NOTE — RESPIRATORY THERAPY NOTE
02/26/25 8255   Respiratory Assessment   Assessment Type Assess only   General Appearance Drowsy   Respiratory Pattern Normal   Chest Assessment Chest expansion symmetrical   Bilateral Breath Sounds Diminished   Resp Comments pt placed on BiPAP at this time   Non-Invasive Information   O2 Interface Device Nasal mask   Non-Invasive Ventilation Mode BiPAP   $ Intermittent NIV Yes   $ Pulse Oximetry Spot Check Charge Completed   Non-Invasive Settings   IPAP (cm) 12 cm   EPAP (cm) 6 cm   Rate (Set) 8   FiO2 (%) 40   Pressure Support (cm H2O) 6   Rise Time 2   Non-Invasive Readings   Total Rate 18   MV (Mech) 9.6   Peak Pressure (Obs) 13   Spontaneous Vt (mL) 590   Leak (lpm) 29   Skin Intervention Skin intact   Non-Invasive Alarms   Insp Pressure High (cm H20) 25   Insp Pressure Low (cm H20) 5   Low Insp Pressure Time (sec) 20 sec   MV Low (L/min) 2   Vt High (mL) 1200   Vt Low (mL) 200   High Resp Rate (BPM) 40 BPM   Low Resp Rate (BPM) 8 BPM   Apnea Interval (sec) 0

## 2025-02-27 NOTE — ASSESSMENT & PLAN NOTE
Lab Results   Component Value Date    HGBA1C 8.3 (H) 01/26/2025       Recent Labs     02/26/25  1616 02/26/25  2108 02/27/25  0805 02/27/25  1148   POCGLU 289* 238* 152* 168*       Blood Sugar Average: Last 72 hrs:  (P) 191.5    Continue to HOLD metformin  ISS  Diabetic diet

## 2025-02-27 NOTE — RESPIRATORY THERAPY NOTE
RT Protocol Note  Fahad Hernandez 69 y.o. male MRN: [id number]  Unit/Bed#: 2 E 277-01 Encounter: [id number]    Assessment    Principal Problem:    Severe persistent asthma with acute exacerbation  Active Problems:    Hypertension    Type 2 diabetes mellitus with hyperglycemia, without long-term current use of insulin (HCC)    Major depressive disorder    Asthma exacerbation attacks    Acute lactic acidosis    CASSIE (obstructive sleep apnea)    COVID-19    Tardive dyskinesia    Influenza A      Home Pulmonary Medications:         Past Medical History:   Diagnosis Date    Asthma     COPD (chronic obstructive pulmonary disease) ([facility])     Dementia ([facility])     Diabetes mellitus ([facility])     GERD (gastroesophageal reflux disease)     History of stroke 11/07/2019    Hypertension     Interstitial lung disease ([facility])     Major depressive disorder with current active episode 11/07/2019    Pneumonia     Rotator cuff arthropathy of right shoulder 12/17/2024    Sleep apnea     Sleep apnea, obstructive     Stroke ([facility])     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     Social History     Socioeconomic History    Marital status: /Civil Union     Spouse name: None    Number of children: None    Years of education: None    Highest education level: None   Occupational History    None   Tobacco Use    Smoking status: Never     Passive exposure: Never    Smokeless tobacco: Never    Tobacco comments:     Patient admits to using marijuana, edibles and smoking    Vaping Use    Vaping status: Never Used   Substance and Sexual Activity    Alcohol use: Not Currently    Drug use: Yes     Types: Marijuana, Oxycodone, Psilocybin    Sexual activity: Yes     Partners: Female     Birth control/protection: Male Sterilization   Other Topics Concern    None   Social History Narrative    None     Social Drivers of Health     Financial Resource Strain: Low Risk  (11/1/2024)    Received from [facility]    Overall Financial Resource Strain (CARDIA)      Difficulty of Paying Living Expenses: Not hard at all   Food Insecurity: No Food Insecurity (2025)    Nursing - Inadequate Food Risk Classification     Worried About Running Out of Food in the Last Year: Never true     Ran Out of Food in the Last Year: Never true     Ran Out of Food in the Last Year: Never true   Transportation Needs: No Transportation Needs (2025)    Nursing - Transportation Risk Classification     Lack of Transportation: Not on file     Lack of Transportation: No   Physical Activity: Inactive (2024)    Received from [facility]    Exercise Vital Sign     Days of Exercise per Week: 0 days     Minutes of Exercise per Session: 0 min   Stress: Stress Concern Present (2024)    Received from [facility]    [employer] [place] [employer] - Occupational Stress Questionnaire     Feeling of Stress : To some extent   Social Connections: Moderately Isolated (2024)    Received from [facility]    Social Connection and Isolation Panel [NHANES]     Frequency of Communication with Friends and Family: Three times a week     Frequency of Social Gatherings with Friends and Family: Twice a week     Attends Holiness Services: Never     Active Member of Clubs or Organizations: No     Attends Club or Organization Meetings: Never     Marital Status:    Intimate Partner Violence: Unknown (2025)    Nursing IPS     Feels Physically and Emotionally Safe: Not on file     Physically Hurt by Someone: Not on file     Humiliated or Emotionally Abused by Someone: Not on file     Physically Hurt by Someone: No     Hurt or Threatened by Someone: No   Housing Stability: Unknown (2025)    Nursing: Inadequate Housing Risk Classification     Has Housing: Not on file     Worried About Losing Housing: Not on file     Unable to Get Utilities: Not on file     Unable to Pay for Housing in the Last Year: No     Has Housin       Subjective      "    Objective    Physical Exam:   Assessment Type: During-treatment  General Appearance: Alert, Awake  Respiratory Pattern: Normal  Chest Assessment: Chest expansion symmetrical  Bilateral Breath Sounds: Diminished  Cough: None  O2 Device: ra    Vitals:  Blood pressure 130/77, pulse 94, temperature 98.3 °F (36.8 °C), temperature source Oral, resp. rate 18, height 5' 11\" (1.803 m), weight 67.3 kg (148 lb 6.4 oz), SpO2 95%.          Imaging and other studies:     O2 Device: ra     Plan    Respiratory Plan: Home Bronchodilator Patient pathway        Resp Comments: patient awake and alert in no apparent resp distress at this time, patient states he takes txs BID will continue with current txs tid as ordered.   "

## 2025-02-27 NOTE — PROGRESS NOTES
Progress Note - Pulmonology   Name: Fahad Hernandez 69 y.o. male I MRN: [id number]  Unit/Bed#: 2 E 277-01 I Date of Admission: 2/21/2025   Date of Service: 2/27/2025 I Hospital Day: 5    Assessment & Plan  Severe persistent asthma with acute exacerbation  Completed azithromycin.  Continue budesonide/Perforomist twice daily with Xopenex/ipratropium 3 times daily.  Will transition to prednisone, can start at 40 mg and decrease by 10 mg every 5 days  Continue Dupixent as outpatient    Anticipate discharge home tomorrow  CASSIE (obstructive sleep apnea)  Continue BiPAP at bedtime.  COVID-19  Diagnosis on February 16, 2025.  Supportive care.  Treatment for asthma exacerbation as above  Influenza A  Continue Tamiflu  Treatment for asthma exacerbation as above  Asthma exacerbation attacks      24 Hour Events : No events  Subjective : Patient resting in bed.  He is feeling better this morning, does not feel as tight today.  Would like to stay another day when switching to prednisone to make sure his symptoms do not worsen.    Objective :  Temp:  [98 °F (36.7 °C)-98.3 °F (36.8 °C)] 98 °F (36.7 °C)  HR:  [] 102  BP: (108-130)/(67-95) 130/67  Resp:  [18] 18  SpO2:  [93 %-98 %] 95 %  O2 Device: None (Room air)    Physical Exam  Vitals reviewed.   Constitutional:       General: He is not in acute distress.     Appearance: Normal appearance. He is well-developed. He is not ill-appearing.   HENT:      Head: Normocephalic and atraumatic.      Mouth/Throat:      Pharynx: Oropharynx is clear.   Eyes:      Pupils: Pupils are equal, round, and reactive to light.   Cardiovascular:      Rate and Rhythm: Normal rate and regular rhythm.   Pulmonary:      Effort: Pulmonary effort is normal. No respiratory distress.      Breath sounds: Normal breath sounds. No decreased breath sounds, wheezing, rhonchi or rales.   Abdominal:      General: Abdomen is flat. There is no distension.   Musculoskeletal:         General: Normal range of motion.       Cervical back: Normal range of motion.      Right lower leg: No edema.      Left lower leg: No edema.   Skin:     General: Skin is warm and dry.      Findings: No rash.   Neurological:      Mental Status: He is alert and oriented to person, place, and time.   Psychiatric:         Mood and Affect: Mood normal.         Behavior: Behavior normal.           Lab Results: I have reviewed the following results:   .     02/27/25  0437   WBC 18.01*   HGB 10.2*   HCT 33.4*   *   SODIUM 137   K 4.3   CL 99   CO2 29   BUN 24   CREATININE 0.77   GLUC 147*   AST 11*   ALT 14   ALB 3.4*   TBILI 0.36   ALKPHOS 52     ABG: No new results in last 24 hours.    Imaging Results Review: I reviewed radiology reports from this admission including: chest xray and CT chest.  Other Study Results Review: No additional pertinent studies reviewed.  PFT Results Reviewed: reviewed

## 2025-02-28 VITALS
RESPIRATION RATE: 18 BRPM | WEIGHT: 147.4 LBS | BODY MASS INDEX: 20.64 KG/M2 | DIASTOLIC BLOOD PRESSURE: 93 MMHG | OXYGEN SATURATION: 94 % | HEIGHT: 71 IN | SYSTOLIC BLOOD PRESSURE: 142 MMHG | TEMPERATURE: 97.9 F | HEART RATE: 93 BPM

## 2025-02-28 PROBLEM — N40.0 BPH (BENIGN PROSTATIC HYPERPLASIA): Status: ACTIVE | Noted: 2025-02-28

## 2025-02-28 LAB
GLUCOSE SERPL-MCNC: 144 MG/DL (ref 65–140)
GLUCOSE SERPL-MCNC: 238 MG/DL (ref 65–140)

## 2025-02-28 PROCEDURE — 99239 HOSP IP/OBS DSCHRG MGMT >30: CPT | Performed by: INTERNAL MEDICINE

## 2025-02-28 PROCEDURE — 82948 REAGENT STRIP/BLOOD GLUCOSE: CPT

## 2025-02-28 PROCEDURE — 94760 N-INVAS EAR/PLS OXIMETRY 1: CPT

## 2025-02-28 PROCEDURE — 99232 SBSQ HOSP IP/OBS MODERATE 35: CPT | Performed by: PHYSICIAN ASSISTANT

## 2025-02-28 PROCEDURE — 99222 1ST HOSP IP/OBS MODERATE 55: CPT

## 2025-02-28 PROCEDURE — 94664 DEMO&/EVAL PT USE INHALER: CPT

## 2025-02-28 PROCEDURE — 94640 AIRWAY INHALATION TREATMENT: CPT

## 2025-02-28 RX ORDER — TAMSULOSIN HYDROCHLORIDE 0.4 MG/1
0.4 CAPSULE ORAL
Status: DISCONTINUED | OUTPATIENT
Start: 2025-02-28 | End: 2025-02-28 | Stop reason: HOSPADM

## 2025-02-28 RX ORDER — IPRATROPIUM BROMIDE AND ALBUTEROL SULFATE 2.5; .5 MG/3ML; MG/3ML
3 SOLUTION RESPIRATORY (INHALATION) EVERY 8 HOURS PRN
Qty: 360 ML | Refills: 0 | Status: SHIPPED | OUTPATIENT
Start: 2025-02-28

## 2025-02-28 RX ORDER — PREDNISONE 10 MG/1
TABLET ORAL
Qty: 30 TABLET | Refills: 0 | Status: SHIPPED | OUTPATIENT
Start: 2025-02-28 | End: 2025-03-12

## 2025-02-28 RX ORDER — FINASTERIDE 5 MG/1
5 TABLET, FILM COATED ORAL DAILY
Status: DISCONTINUED | OUTPATIENT
Start: 2025-02-28 | End: 2025-02-28 | Stop reason: HOSPADM

## 2025-02-28 RX ADMIN — ASPIRIN 81 MG: 81 TABLET, COATED ORAL at 08:09

## 2025-02-28 RX ADMIN — BENZONATATE 100 MG: 100 CAPSULE ORAL at 09:27

## 2025-02-28 RX ADMIN — PANTOPRAZOLE SODIUM 40 MG: 40 TABLET, DELAYED RELEASE ORAL at 06:12

## 2025-02-28 RX ADMIN — VENLAFAXINE HYDROCHLORIDE 75 MG: 75 CAPSULE, EXTENDED RELEASE ORAL at 08:10

## 2025-02-28 RX ADMIN — BUDESONIDE 0.5 MG: 0.5 INHALANT RESPIRATORY (INHALATION) at 07:08

## 2025-02-28 RX ADMIN — BENZONATATE 100 MG: 100 CAPSULE ORAL at 01:36

## 2025-02-28 RX ADMIN — METOPROLOL SUCCINATE 25 MG: 25 TABLET, EXTENDED RELEASE ORAL at 08:09

## 2025-02-28 RX ADMIN — TAMSULOSIN HYDROCHLORIDE 0.4 MG: 0.4 CAPSULE ORAL at 08:09

## 2025-02-28 RX ADMIN — ENOXAPARIN SODIUM 40 MG: 40 INJECTION SUBCUTANEOUS at 08:10

## 2025-02-28 RX ADMIN — CLOPIDOGREL 75 MG: 75 TABLET ORAL at 08:09

## 2025-02-28 RX ADMIN — GUAIFENESIN 600 MG: 600 TABLET ORAL at 08:09

## 2025-02-28 RX ADMIN — DOCUSATE SODIUM 100 MG: 100 CAPSULE, LIQUID FILLED ORAL at 08:10

## 2025-02-28 RX ADMIN — OSELTAMAVIR PHOSPHATE 75 MG: 75 CAPSULE ORAL at 08:09

## 2025-02-28 RX ADMIN — FORMOTEROL FUMARATE DIHYDRATE 20 MCG: 20 SOLUTION RESPIRATORY (INHALATION) at 07:08

## 2025-02-28 RX ADMIN — BUPROPION HYDROCHLORIDE 300 MG: 150 TABLET, EXTENDED RELEASE ORAL at 08:09

## 2025-02-28 RX ADMIN — IPRATROPIUM BROMIDE AND ALBUTEROL SULFATE 3 ML: 2.5; .5 SOLUTION RESPIRATORY (INHALATION) at 07:08

## 2025-02-28 RX ADMIN — FINASTERIDE 5 MG: 5 TABLET, FILM COATED ORAL at 09:27

## 2025-02-28 RX ADMIN — TAMSULOSIN HYDROCHLORIDE 0.4 MG: 0.4 CAPSULE ORAL at 09:27

## 2025-02-28 RX ADMIN — IPRATROPIUM BROMIDE AND ALBUTEROL SULFATE 3 ML: 2.5; .5 SOLUTION RESPIRATORY (INHALATION) at 13:04

## 2025-02-28 RX ADMIN — ARIPIPRAZOLE 10 MG: 5 TABLET ORAL at 08:09

## 2025-02-28 RX ADMIN — PREDNISONE 40 MG: 20 TABLET ORAL at 08:09

## 2025-02-28 RX ADMIN — INSULIN LISPRO 3 UNITS: 100 INJECTION, SOLUTION INTRAVENOUS; SUBCUTANEOUS at 12:13

## 2025-02-28 NOTE — DISCHARGE SUMMARY
Discharge Summary - Hospitalist   Name: Fahad Hernandez 69 y.o. male I MRN: [id number]  Unit/Bed#: 2 E 277-01 I Date of Admission: 2/21/2025   Date of Service: 2/28/2025 I Hospital Day: 6       Discharge diagnosis:    Asthma exacerbation  Type 2 diabetes  Depression  Hypertension  CASSIE  Tardive dyskinesia  Lactic acidosis but no signs of endorgan dysfunction  COVID-19  Influenza A  LUTS    Discharging Physician / Practitioner: Brad Lao MD  PCP: Surjit Hayes DO  Admission Date:   Admission Orders (From admission, onward)       Ordered        02/22/25 1245  INPATIENT ADMISSION  Once            02/22/25 0028  Place in Observation  Once                          Discharge Date: 02/28/25    Consultations During Hospital Stay:  Urology  Pulmonology    Significant Findings / Test Results:   XR shoulder 2+ vw right [[id number]] Collected: 02/27/25 1651   Order Status: Completed Updated: 02/27/25 1653   Narrative:     XR SHOULDER 2+ VW RIGHT    INDICATION: R sided arm and shoulder pain after fall check shoulder and elbow.    COMPARISON: None    FINDINGS:    No acute fracture or dislocation.    No significant degenerative changes.    No lytic or blastic osseous lesion.    Unremarkable soft tissues.   Impression:       No acute osseous abnormality.      Computerized Assisted Algorithm (CAA) may have been used to analyze all applicable images.      Workstation performed: [id number]   XR elbow 3+ vw right [[id number]] Collected: 02/27/25 1652   Order Status: Completed Updated: 02/27/25 1654   Narrative:     XR ELBOW 3+ VW RIGHT    INDICATION: R sided arm and shoulder pain after fall check shoulder and elbow.    COMPARISON: None    FINDINGS:    No acute fracture or dislocation.    No joint effusion.    No significant degenerative changes.    No lytic or blastic osseous lesion.    Unremarkable soft tissues.   Impression:       No acute osseous abnormality.      Computerized Assisted Algorithm (CAA) may have been used  to analyze all applicable images.      Workstation performed: [id number]   XR chest 1 view portable [[id number]] Collected: 02/22/25 0916   Order Status: Completed Updated: 02/22/25 0918   Narrative:     XR CHEST PORTABLE    INDICATION: SOB, recent COVID hospitalization. COVID-positive 2/16/2025.    COMPARISON: CXR 1/25/2025, chest CT 2/16/2025.    FINDINGS:    Clear lungs. No pneumothorax or pleural effusion.    Normal cardiomediastinal silhouette.    Bones are unremarkable for age. ACDF. Partially imaged lumbar fusion hardware.    Normal upper abdomen.   Impression:       No acute cardiopulmonary disease.          Outpatient Tests Requested:  PCP  Urology  Pulmonology    Complications:  None    Reason for Admission: Asthma exacerbation    HPI:  Fahad Hernandez is a 69 y.o. male with a PMH of bronchiectasis, hypertension, hyperlipidemia, depression, diabetes and recent COVID  who presents with wheezing and shortness of breath.     Hospital Course:     The patient was hospitalized, seen by pulmonology, concern was for asthma exacerbation, also positive for COVID and influenza A, completed course of Tamiflu.  He was treated with IV steroids and bronchodilators with significant improvement in his symptoms.  Cleared for discharge by pulmonology, they recommend the steroid taper of prednisone 40 mg daily decreasing by 10 mg every 3 days.  Recommend close outpatient follow-up, continuation of nebulized bronchodilators.  Patient also had some lower urinary tract symptoms discussed with urology plan to continue Flomax and outpatient follow-up.  Patient able to void subsequently.  Been discharged in stable condition, discussed with wife.    Condition at Discharge: good     Discharge Day Visit / Exam:     Subjective:    Patient evaluated this morning.  Doing better, breathing much improved.  Denies any chest pain nausea vomiting did complain of some dribbling and inability to void, PVR initially elevated but able to void  "subsequently.  Vitals: Blood Pressure: 142/93 (02/28/25 0800)  Pulse: 93 (02/28/25 0800)  Temperature: 97.9 °F (36.6 °C) (02/28/25 0753)  Temp Source: Axillary (02/27/25 2300)  Respirations: 18 (02/27/25 1521)  Height: 5' 11\" (180.3 cm) (02/22/25 0201)  Weight - Scale: 66.9 kg (147 lb 6.4 oz) (02/28/25 0600)  SpO2: 95 % (02/28/25 0800)    Exam:   Physical Exam  Vitals and nursing note reviewed.   Constitutional:       Appearance: Normal appearance.   HENT:      Head: Normocephalic and atraumatic.      Right Ear: External ear normal.      Left Ear: External ear normal.      Nose: Nose normal. No congestion or rhinorrhea.      Mouth/Throat:      Mouth: Mucous membranes are moist.      Pharynx: Oropharynx is clear. No oropharyngeal exudate or posterior oropharyngeal erythema.   Eyes:      General: No scleral icterus.        Right eye: No discharge.         Left eye: No discharge.      Pupils: Pupils are equal, round, and reactive to light.   Neck:      Vascular: No carotid bruit.   Cardiovascular:      Rate and Rhythm: Normal rate and regular rhythm.      Pulses: Normal pulses.      Heart sounds: No murmur heard.     No friction rub. No gallop.   Pulmonary:      Effort: Pulmonary effort is normal. No respiratory distress.      Breath sounds: Normal breath sounds. No stridor. No wheezing, rhonchi or rales.   Abdominal:      General: Abdomen is flat. Bowel sounds are normal. There is no distension.      Palpations: Abdomen is soft. There is no mass.      Tenderness: There is no abdominal tenderness. There is no guarding or rebound.      Hernia: No hernia is present.   Musculoskeletal:         General: No swelling, tenderness, deformity or signs of injury. Normal range of motion.      Cervical back: Normal range of motion. No rigidity. No muscular tenderness.   Lymphadenopathy:      Cervical: No cervical adenopathy.   Skin:     General: Skin is warm and dry.      Capillary Refill: Capillary refill takes less than 2 " seconds.      Coloration: Skin is not jaundiced or pale.      Findings: No bruising or erythema.   Neurological:      General: No focal deficit present.      Mental Status: He is alert and oriented to person, place, and time. Mental status is at baseline.      Cranial Nerves: No cranial nerve deficit.      Sensory: No sensory deficit.      Motor: No weakness.      Coordination: Coordination normal.      Deep Tendon Reflexes: Reflexes normal.   Psychiatric:         Mood and Affect: Mood normal.         Behavior: Behavior normal.         Thought Content: Thought content normal.         Judgment: Judgment normal.           Discussion with Family: Spoke with wife over the phone in regards to discharge plan    Discharge instructions/Information to patient and family:   See after visit summary for information provided to patient and family.      Provisions for Follow-Up Care:  See after visit summary for information related to follow-up care and any pertinent home health orders.      Disposition:     Home with VNA Services (Reminder: Complete face to face encounter)    For Discharges to [facility] SNF:   Not Applicable to this Patient - Not Applicable to this Patient    Planned Readmission: No     Discharge Statement:  I spent 76 minutes discharging the patient. This time was spent on the day of discharge. I had direct contact with the patient on the day of discharge. Greater than 50% of the total time was spent examining patient, answering all patient questions, arranging and discussing plan of care with patient as well as directly providing post-discharge instructions.  Additional time then spent on discharge activities.    Discharge Medications:  See after visit summary for reconciled discharge medications provided to patient and family.      ** Please Note: This note has been constructed using a voice recognition system **

## 2025-02-28 NOTE — PROGRESS NOTES
Progress Note - Pulmonology   Name: Fahad Hernandez 69 y.o. male I MRN: [id number]  Unit/Bed#: 2 E 277-01 I Date of Admission: 2/21/2025   Date of Service: 2/28/2025 I Hospital Day: 6    Assessment & Plan  Severe persistent asthma with acute exacerbation  Completed azithromycin.  Continue budesonide/Perforomist twice daily with Xopenex/ipratropium 3 times daily.  Transitioned to prednisone, can decrease by 10 mg every 3 days  Continue Dupixent as outpatient    Stable for discharge home today from pulmonary standpoint  CASSIE (obstructive sleep apnea)  Continue BiPAP at bedtime.  COVID-19  Diagnosis on February 16, 2025.  Supportive care.  Treatment for asthma exacerbation as above  Influenza A  Continue Tamiflu  Treatment for asthma exacerbation as above  Asthma exacerbation attacks      24 Hour Events : No events  Subjective : Patient resting in bed.  Feeling much better with his breathing.  Having trouble urinating this morning.    Objective :  Temp:  [98 °F (36.7 °C)-98.4 °F (36.9 °C)] 98 °F (36.7 °C)  HR:  [] 77  BP: (121-133)/(64-73) 121/64  Resp:  [18] 18  SpO2:  [93 %-98 %] 98 %  O2 Device: BiPAP    Physical Exam  Vitals reviewed.   Constitutional:       General: He is not in acute distress.     Appearance: Normal appearance. He is well-developed. He is not ill-appearing.   HENT:      Head: Normocephalic and atraumatic.      Mouth/Throat:      Pharynx: Oropharynx is clear.   Eyes:      Pupils: Pupils are equal, round, and reactive to light.   Cardiovascular:      Rate and Rhythm: Normal rate and regular rhythm.   Pulmonary:      Effort: Pulmonary effort is normal. No respiratory distress.      Breath sounds: Normal breath sounds. No decreased breath sounds, wheezing, rhonchi or rales.   Abdominal:      General: Abdomen is flat. There is no distension.   Musculoskeletal:         General: Normal range of motion.      Cervical back: Normal range of motion.      Right lower leg: No edema.      Left lower leg:  No edema.   Skin:     General: Skin is warm and dry.      Findings: No rash.   Neurological:      Mental Status: He is alert and oriented to person, place, and time.   Psychiatric:         Mood and Affect: Mood normal.         Behavior: Behavior normal.           Lab Results: I have reviewed the following results:   No new results in last 24 hours.  ABG: No new results in last 24 hours.    Imaging Results Review: I reviewed radiology reports from this admission including: chest xray.  Other Study Results Review: No additional pertinent studies reviewed.  PFT Results Reviewed: reviewed

## 2025-02-28 NOTE — RESPIRATORY THERAPY NOTE
02/27/25 5693   Respiratory Assessment   Assessment Type Assess only   General Appearance Awake   Respiratory Pattern Normal   Chest Assessment Chest expansion symmetrical   Bilateral Breath Sounds Diminished   Resp Comments pt placed on BiPAP for hours of sleep   Non-Invasive Information   O2 Interface Device Nasal mask   Non-Invasive Ventilation Mode BiPAP   $ Intermittent NIV Yes   $ Pulse Oximetry Spot Check Charge Completed   Non-Invasive Settings   IPAP (cm) 12 cm   EPAP (cm) 6 cm   Rate (Set) 8   FiO2 (%) 40   Pressure Support (cm H2O) 6   Rise Time 2   Non-Invasive Readings   Total Rate 21   MV (Mech) 16.4   Peak Pressure (Obs) 13   Spontaneous Vt (mL) 736   Leak (lpm) 50   Skin Intervention Skin intact   Non-Invasive Alarms   Insp Pressure High (cm H20) 25   Insp Pressure Low (cm H20) 5   Low Insp Pressure Time (sec) 20 sec   MV Low (L/min) 2   Vt High (mL) 1200   Vt Low (mL) 200   High Resp Rate (BPM) 40 BPM   Low Resp Rate (BPM) 8 BPM

## 2025-02-28 NOTE — ASSESSMENT & PLAN NOTE
Completed azithromycin.  Continue budesonide/Perforomist twice daily with Xopenex/ipratropium 3 times daily.  Transitioned to prednisone, can decrease by 10 mg every 3 days  Continue Dupixent as outpatient    Stable for discharge home today from pulmonary standpoint

## 2025-02-28 NOTE — CASE MANAGEMENT
Case Management Discharge Planning Note    Patient name Fahad Hernandez  Location 2 RUST 277/2 E 277-01 MRN [id number]  : 1955 Date 2025       Current Admission Date: 2025  Current Admission Diagnosis:Severe persistent asthma with acute exacerbation   Patient Active Problem List    Diagnosis Date Noted Date Diagnosed    Influenza A 2025     COVID-19 2025     Tardive dyskinesia 2025     CASSIE (obstructive sleep apnea) 2025     Multiple falls 2025     Chronic pain syndrome 2025     Acute respiratory failure (HCC) 2025     Rotator cuff arthropathy of right shoulder 2024     Acute right-sided weakness 2024     Fall 2024     Metabolic acidosis 2024     Acute lactic acidosis 2024     BENNY (acute kidney injury) (HCC) 2024     Severe persistent asthma with (acute) exacerbation 2024     Asthma exacerbation attacks 2024     Moderate persistent asthma with exacerbation 2024     Orthostasis 10/15/2024     Hoarseness of voice 2024     SOB (shortness of breath) 2024     Long-term exposure involving bird droppings 2024     Lung nodules 2024     Chest pain 2024     Centrilobular emphysema (HCC) 2024     Severe persistent asthma with acute exacerbation 2024     Medical marijuana use 2024     Type 2 diabetes mellitus with hyperglycemia, without long-term current use of insulin (HCC) 2022     Chronic obstructive asthma (HCC) 2019     Major depressive disorder 2019     Gastroesophageal reflux disease with esophagitis 2019     Abnormal EKG 04/15/2019     Hypertension 04/15/2019     Mixed hyperlipidemia 2018       LOS (days): 6  Geometric Mean LOS (GMLOS) (days): 4.9  Days to GMLOS:-1.1     OBJECTIVE:  Risk of Unplanned Readmission Score: 35.78         Current admission status: Inpatient   Preferred Pharmacy:   [facility]/pharmacy #[id number] - [place] -  [address]  [address]  [place] [address]  Phone: [phone] Fax: [phone]    [facility] - [place], [place] - [address]  [address]  [address]  [facility] [address]  Phone: [phone] Fax: [phone]    [facility] [place] [place] - [address]  [address] [id number]  Phone: [phone] Fax: [phone]    Primary Care Provider: Surjit Hayes DO    Primary Insurance: AETNA  REP  Secondary Insurance:     DISCHARGE DETAILS:    Discharge planning discussed with:: Patient's Wife  Freedom of Choice: Yes  Comments - Freedom of Choice: CM called patient's wife to review DC plan and IMM. CM encouraged patient's wife to contact [facility] re: payment for the nebulizer and DME. She is agreeable to do this later today. CM then reported that [facility] continues to follow and will see patient after discharge. Patient's wife denied any additional questions or concerns at this time.  CM contacted family/caregiver?: Yes  Were Treatment Team discharge recommendations reviewed with patient/caregiver?: Yes  Did patient/caregiver verbalize understanding of patient care needs?: Yes  Were patient/caregiver advised of the risks associated with not following Treatment Team discharge recommendations?: Yes    Contacts  Patient Contacts: Carol  Relationship to Patient:: Family  Contact Method: Phone  Phone Number: [phone]  Reason/Outcome: Continuity of Care, Discharge Planning    Other Referral/Resources/Interventions Provided:  Interventions: None Indicated    Would you like to participate in our [facility] service program?  : Yes    Treatment Team Recommendation: Home with Home Health Care  Discharge Destination Plan:: Home with Home Health Care  Transport at Discharge : Other (Comment) ([facility])    IMM Given (Date):: 02/28/25  IMM Given to:: Family  Family notified:: Patient's Wife  Additional Comments: TREY  reviewed IMM with patient's wife via phone. She reported understanding of these rights and denied any questions or concerns at this time. Copy placed in medical records.

## 2025-02-28 NOTE — RESPIRATORY THERAPY NOTE
RT Protocol Note  Fahad Hernandez 69 y.o. male MRN: [id number]  Unit/Bed#: 2 E 277-01 Encounter: [id number]    Assessment    Principal Problem:    Severe persistent asthma with acute exacerbation  Active Problems:    Hypertension    Type 2 diabetes mellitus with hyperglycemia, without long-term current use of insulin (HCC)    Major depressive disorder    Asthma exacerbation attacks    Acute lactic acidosis    CASSIE (obstructive sleep apnea)    COVID-19    Tardive dyskinesia    Influenza A      Home Pulmonary Medications:         Past Medical History:   Diagnosis Date    Asthma     COPD (chronic obstructive pulmonary disease) ([facility])     Dementia ([facility])     Diabetes mellitus ([facility])     GERD (gastroesophageal reflux disease)     History of stroke 11/07/2019    Hypertension     Interstitial lung disease ([facility])     Major depressive disorder with current active episode 11/07/2019    Pneumonia     Rotator cuff arthropathy of right shoulder 12/17/2024    Sleep apnea     Sleep apnea, obstructive     Stroke ([facility])     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     Social History     Socioeconomic History    Marital status: /Civil Union     Spouse name: None    Number of children: None    Years of education: None    Highest education level: None   Occupational History    None   Tobacco Use    Smoking status: Never     Passive exposure: Never    Smokeless tobacco: Never    Tobacco comments:     Patient admits to using marijuana, edibles and smoking    Vaping Use    Vaping status: Never Used   Substance and Sexual Activity    Alcohol use: Not Currently    Drug use: Yes     Types: Marijuana, Oxycodone, Psilocybin    Sexual activity: Yes     Partners: Female     Birth control/protection: Male Sterilization   Other Topics Concern    None   Social History Narrative    None     Social Drivers of Health     Financial Resource Strain: Low Risk  (11/1/2024)    Received from [facility]    Overall Financial Resource Strain (CARDIA)      Difficulty of Paying Living Expenses: Not hard at all   Food Insecurity: No Food Insecurity (2025)    Nursing - Inadequate Food Risk Classification     Worried About Running Out of Food in the Last Year: Never true     Ran Out of Food in the Last Year: Never true     Ran Out of Food in the Last Year: Never true   Transportation Needs: No Transportation Needs (2025)    Nursing - Transportation Risk Classification     Lack of Transportation: Not on file     Lack of Transportation: No   Physical Activity: Inactive (2024)    Received from [facility]    Exercise Vital Sign     Days of Exercise per Week: 0 days     Minutes of Exercise per Session: 0 min   Stress: Stress Concern Present (2024)    Received from [facility]    [place] of Occupational Health - Occupational Stress Questionnaire     Feeling of Stress : To some extent   Social Connections: Moderately Isolated (2024)    Received from [facility]    Social Connection and Isolation Panel [NHANES]     Frequency of Communication with Friends and Family: Three times a week     Frequency of Social Gatherings with Friends and Family: Twice a week     Attends Sabianism Services: Never     Active Member of Clubs or Organizations: No     Attends Club or Organization Meetings: Never     Marital Status:    Intimate Partner Violence: Unknown (2025)    Nursing IPS     Feels Physically and Emotionally Safe: Not on file     Physically Hurt by Someone: Not on file     Humiliated or Emotionally Abused by Someone: Not on file     Physically Hurt by Someone: No     Hurt or Threatened by Someone: No   Housing Stability: Unknown (2025)    Nursing: Inadequate Housing Risk Classification     Has Housing: Not on file     Worried About Losing Housing: Not on file     Unable to Get Utilities: Not on file     Unable to Pay for Housing in the Last Year: No     Has Housin       Subjective      "    Objective    Physical Exam:   Assessment Type: Assess only  General Appearance: Awake  Respiratory Pattern: Normal  Chest Assessment: Chest expansion symmetrical  Bilateral Breath Sounds: Diminished  O2 Device: RA    Vitals:  Blood pressure 121/64, pulse 77, temperature 98 °F (36.7 °C), temperature source Axillary, resp. rate 18, height 5' 11\" (1.803 m), weight 66.9 kg (147 lb 6.4 oz), SpO2 95%.          Imaging and other studies:     O2 Device: RA     Plan    Respiratory Plan: Home Bronchodilator Patient pathway        Resp Comments: pt with hx of copd, dx of flu and covid, will continue with duo tid and pulm perf bid   "

## 2025-02-28 NOTE — RESPIRATORY THERAPY NOTE
RT Protocol Note  Fahad Hernandez 69 y.o. male MRN: [id number]  Unit/Bed#: 2 E 277-01 Encounter: [id number]    Assessment    Principal Problem:    Severe persistent asthma with acute exacerbation  Active Problems:    Hypertension    Type 2 diabetes mellitus with hyperglycemia, without long-term current use of insulin (HCC)    Major depressive disorder    Asthma exacerbation attacks    Acute lactic acidosis    CASSIE (obstructive sleep apnea)    COVID-19    Tardive dyskinesia    Influenza A      Home Pulmonary Medications:         Past Medical History:   Diagnosis Date    Asthma     COPD (chronic obstructive pulmonary disease) ([facility])     Dementia ([facility])     Diabetes mellitus ([facility])     GERD (gastroesophageal reflux disease)     History of stroke 11/07/2019    Hypertension     Interstitial lung disease ([facility])     Major depressive disorder with current active episode 11/07/2019    Pneumonia     Rotator cuff arthropathy of right shoulder 12/17/2024    Sleep apnea     Sleep apnea, obstructive     Stroke ([facility])     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     Social History     Socioeconomic History    Marital status: /Civil Union     Spouse name: None    Number of children: None    Years of education: None    Highest education level: None   Occupational History    None   Tobacco Use    Smoking status: Never     Passive exposure: Never    Smokeless tobacco: Never    Tobacco comments:     Patient admits to using marijuana, edibles and smoking    Vaping Use    Vaping status: Never Used   Substance and Sexual Activity    Alcohol use: Not Currently    Drug use: Yes     Types: Marijuana, Oxycodone, Psilocybin    Sexual activity: Yes     Partners: Female     Birth control/protection: Male Sterilization   Other Topics Concern    None   Social History Narrative    None     Social Drivers of Health     Financial Resource Strain: Low Risk  (11/1/2024)    Received from [facility]    Overall Financial Resource Strain (CARDIA)      Difficulty of Paying Living Expenses: Not hard at all   Food Insecurity: No Food Insecurity (2025)    Nursing - Inadequate Food Risk Classification     Worried About Running Out of Food in the Last Year: Never true     Ran Out of Food in the Last Year: Never true     Ran Out of Food in the Last Year: Never true   Transportation Needs: No Transportation Needs (2025)    Nursing - Transportation Risk Classification     Lack of Transportation: Not on file     Lack of Transportation: No   Physical Activity: Inactive (2024)    Received from [facility]    Exercise Vital Sign     Days of Exercise per Week: 0 days     Minutes of Exercise per Session: 0 min   Stress: Stress Concern Present (2024)    Received from [facility]    [place] of Occupational Health - Occupational Stress Questionnaire     Feeling of Stress : To some extent   Social Connections: Moderately Isolated (2024)    Received from [facility]    Social Connection and Isolation Panel [NHANES]     Frequency of Communication with Friends and Family: Three times a week     Frequency of Social Gatherings with Friends and Family: Twice a week     Attends Shinto Services: Never     Active Member of Clubs or Organizations: No     Attends Club or Organization Meetings: Never     Marital Status:    Intimate Partner Violence: Unknown (2025)    Nursing IPS     Feels Physically and Emotionally Safe: Not on file     Physically Hurt by Someone: Not on file     Humiliated or Emotionally Abused by Someone: Not on file     Physically Hurt by Someone: No     Hurt or Threatened by Someone: No   Housing Stability: Unknown (2025)    Nursing: Inadequate Housing Risk Classification     Has Housing: Not on file     Worried About Losing Housing: Not on file     Unable to Get Utilities: Not on file     Unable to Pay for Housing in the Last Year: No     Has Housin       Subjective      "    Objective    Physical Exam:   Assessment Type: During-treatment  General Appearance: Alert, Awake  Respiratory Pattern: Normal  Chest Assessment: Chest expansion symmetrical  Bilateral Breath Sounds: Diminished  O2 Device: RA    Vitals:  Blood pressure 133/73, pulse 88, temperature 98.4 °F (36.9 °C), temperature source Oral, resp. rate 18, height 5' 11\" (1.803 m), weight 67.9 kg (149 lb 11.1 oz), SpO2 95%.          Imaging and other studies:     O2 Device: RA     Plan    Respiratory Plan: Home Bronchodilator Patient pathway        Resp Comments: pt with history of COPD takes txs at home, will continue with current orders   "

## 2025-02-28 NOTE — CONSULTS
Consultation - Urology   Name: Fahad Hernandez 69 y.o. male I MRN: [id number]  Unit/Bed#: 2 E 277-01 I Date of Admission: 2/21/2025   Date of Service: 2/28/2025 I Hospital Day: 6   Inpatient consult to Urology  Consult performed by: Rajwinder Montiel PA-C  Consult ordered by: Brad Corona MD        Physician Requesting Evaluation: Brad Corona,*   Reason for Evaluation / Principal Problem: BPH    Assessment & Plan  BPH (benign prostatic hyperplasia)  Known BPH, outpatient urologist Dr. Bran  Admitted for acute exacerbation of asthma, flu, COVID positive  Feelings of incomplete emptying, urgency, frequency  No dysuria, no white count afebrile.   mL this a.m.  Patient urinated afterwards.  On Flomax and Proscar  History of urethral stricture status post urethroplasty remotely 1970.  Last cystoscopy open urethra no signs of stricture or obstruction.  Discussed increasing his dose of Flomax however patient with history of falls and unsteady on feet, outweighs this.   Patient will follow-up outpatient for repeat PVR with primary urologist.  Urology will sign off but remain available for any further inpatient needs. Please feel free to contact the provider currently covering the Urology [facility] role for this campus with questions or concerns.           Subjective:   HPI: Ed is a 69-year-old possible history BPH, asthma, HLD, GERD who presented to the ED in acute asthma exacerbation.  He is admitted to [facility] for further management.  Patient also noted flu and COVID positive.  During treatment for his acute exacerbation of asthma patient also noticed incomplete emptying, frequency with urination.  Patient has known BPH follows outpatient with a urologist.  He has a history of a ureteral stricture and urethroplasty in the 1970s.  He reports his last cystoscopy 2 years ago showed no recurrence of his stricture.  He is on Flomax at home.  His bladder scan earlier this morning was 230 however  "patient urinated afterwards.  He is having no abdominal pain.  We discussed increasing his Flomax dose to 0.8 mg however patient reports sometimes he feels unsteady on his feet and has a history of falls.  We discussed this may outweigh the risks of increasing his Flomax.  For now patient will continue Flomax and Proscar.  He will follow-up with his outpatient urologist for PVR check.    Review of Systems   Constitutional:  Negative for chills and fever.   Respiratory:  Negative for cough and shortness of breath.    Cardiovascular:  Negative for chest pain and palpitations.   Gastrointestinal:  Negative for abdominal pain and vomiting.   Genitourinary:  Positive for difficulty urinating. Negative for dysuria and hematuria.   Musculoskeletal:  Negative for arthralgias and back pain.   Skin:  Negative for color change and rash.   Neurological:  Negative for seizures and syncope.   All other systems reviewed and are negative.      Objective:    Vitals: Blood pressure 142/93, pulse 93, temperature 97.9 °F (36.6 °C), resp. rate 18, height 5' 11\" (1.803 m), weight 66.9 kg (147 lb 6.4 oz), SpO2 94%.,Body mass index is 20.56 kg/m².    Physical Exam  Constitutional:       General: He is not in acute distress.     Appearance: Normal appearance. He is normal weight. He is not ill-appearing or toxic-appearing.   HENT:      Head: Normocephalic and atraumatic.      Right Ear: External ear normal.      Left Ear: External ear normal.      Nose: Nose normal.      Mouth/Throat:      Mouth: Mucous membranes are moist.   Eyes:      General: No scleral icterus.     Conjunctiva/sclera: Conjunctivae normal.   Cardiovascular:      Rate and Rhythm: Normal rate.      Pulses: Normal pulses.   Pulmonary:      Effort: Pulmonary effort is normal.   Abdominal:      General: There is no distension.      Tenderness: There is no abdominal tenderness. There is no guarding.   Neurological:      General: No focal deficit present.      Mental Status: He " is alert and oriented to person, place, and time. Mental status is at baseline.   Psychiatric:         Mood and Affect: Mood normal.         Behavior: Behavior normal.         Thought Content: Thought content normal.         Judgment: Judgment normal.             Labs:  Recent Labs     02/26/25  0537 02/27/25  0437   WBC 16.11* 18.01*       Recent Labs     02/26/25  0537 02/27/25  0437   HGB 11.1* 10.2*     Recent Labs     02/26/25  0537 02/27/25  0437   HCT 36.5 33.4*     Recent Labs     02/26/25  0537 02/27/25  0437   CREATININE 0.78 0.77         History:    Past Medical History:   Diagnosis Date    Asthma     COPD (chronic obstructive pulmonary disease) ([facility])     Dementia (HCC)     Diabetes mellitus (HCC)     GERD (gastroesophageal reflux disease)     History of stroke 11/07/2019    Hypertension     Interstitial lung disease ([facility])     Major depressive disorder with current active episode 11/07/2019    Pneumonia     Rotator cuff arthropathy of right shoulder 12/17/2024    Sleep apnea     Sleep apnea, obstructive     Stroke ([facility])     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     Social History     Socioeconomic History    Marital status: /Civil Union     Spouse name: None    Number of children: None    Years of education: None    Highest education level: None   Occupational History    None   Tobacco Use    Smoking status: Never     Passive exposure: Never    Smokeless tobacco: Never    Tobacco comments:     Patient admits to using marijuana, edibles and smoking    Vaping Use    Vaping status: Never Used   Substance and Sexual Activity    Alcohol use: Not Currently    Drug use: Yes     Types: Marijuana, Oxycodone, Psilocybin    Sexual activity: Yes     Partners: Female     Birth control/protection: Male Sterilization   Other Topics Concern    None   Social History Narrative    None     Social Drivers of Health     Financial Resource Strain: Low Risk  (11/1/2024)    Received from [facility]  Financial Resource Strain (CARDIA)     Difficulty of Paying Living Expenses: Not hard at all   Food Insecurity: No Food Insecurity (2/22/2025)    Nursing - Inadequate Food Risk Classification     Worried About Running Out of Food in the Last Year: Never true     Ran Out of Food in the Last Year: Never true     Ran Out of Food in the Last Year: Never true   Transportation Needs: No Transportation Needs (2/22/2025)    Nursing - Transportation Risk Classification     Lack of Transportation: Not on file     Lack of Transportation: No   Physical Activity: Inactive (11/1/2024)    Received from [facility]    Exercise Vital Sign     Days of Exercise per Week: 0 days     Minutes of Exercise per Session: 0 min   Stress: Stress Concern Present (11/1/2024)    Received from [facility]    [employer] - Occupational Stress Questionnaire     Feeling of Stress : To some extent   Social Connections: Moderately Isolated (11/1/2024)    Received from [facility]    Social Connection and Isolation Panel [NHANES]     Frequency of Communication with Friends and Family: Three times a week     Frequency of Social Gatherings with Friends and Family: Twice a week     Attends Church Services: Never     Active Member of Clubs or Organizations: No     Attends Club or Organization Meetings: Never     Marital Status:    Intimate Partner Violence: Unknown (2/22/2025)    Nursing IPS     Feels Physically and Emotionally Safe: Not on file     Physically Hurt by Someone: Not on file     Humiliated or Emotionally Abused by Someone: Not on file     Physically Hurt by Someone: No     Hurt or Threatened by Someone: No   Housing Stability: Unknown (2/22/2025)    Nursing: Inadequate Housing Risk Classification     Has Housing: Not on file     Worried About Losing Housing: Not on file     Unable to Get Utilities: Not on file     Unable to Pay for Housing in the Last Year: No      Has Housin     Past Surgical History:   Procedure Laterality Date    BACK SURGERY      ELBOW SURGERY      KNEE SURGERY      NECK SURGERY      SHOULDER SURGERY      SPINE SURGERY  2004     Family History   Family history unknown: Yes       Rajwinder Montiel PA-C  Date: 2025 Time: 1:59 PM

## 2025-02-28 NOTE — ASSESSMENT & PLAN NOTE
Known BPH, outpatient urologist Dr. Bran  Admitted for acute exacerbation of asthma, flu, COVID positive  Feelings of incomplete emptying, urgency, frequency  No dysuria, no white count afebrile.   mL this a.m.  Patient urinated afterwards.  On Flomax and Proscar  History of urethral stricture status post urethroplasty remotely 1970.  Last cystoscopy open urethra no signs of stricture or obstruction.  Discussed increasing his dose of Flomax however patient with history of falls and unsteady on feet, outweighs this.   Patient will follow-up outpatient for repeat PVR with primary urologist.  Urology will sign off but remain available for any further inpatient needs. Please feel free to contact the provider currently covering the Urology [facility] role for this campus with questions or concerns.

## 2025-02-28 NOTE — PLAN OF CARE
Problem: INFECTION - ADULT  Goal: Absence or prevention of progression during hospitalization  Description: INTERVENTIONS:  - Assess and monitor for signs and symptoms of infection  - Monitor lab/diagnostic results  - Monitor all insertion sites, i.e. indwelling lines, tubes, and drains  - Monitor endotracheal if appropriate and nasal secretions for changes in amount and color  - [place] appropriate cooling/warming therapies per order  - Administer medications as ordered  - Instruct and encourage patient and family to use good hand hygiene technique  - Identify and instruct in appropriate isolation precautions for identified infection/condition  Outcome: Progressing  Goal: Absence of fever/infection during neutropenic period  Description: INTERVENTIONS:  - Monitor WBC    Outcome: Progressing     Problem: PAIN - ADULT  Goal: Verbalizes/displays adequate comfort level or baseline comfort level  Description: Interventions:  - Encourage patient to monitor pain and request assistance  - Assess pain using appropriate pain scale  - Administer analgesics based on type and severity of pain and evaluate response  - Implement non-pharmacological measures as appropriate and evaluate response  - Consider cultural and social influences on pain and pain management  - Notify physician/advanced practitioner if interventions unsuccessful or patient reports new pain  Outcome: Progressing     Problem: DISCHARGE PLANNING  Goal: Discharge to home or other facility with appropriate resources  Description: INTERVENTIONS:  - Identify barriers to discharge w/patient and caregiver  - Arrange for needed discharge resources and transportation as appropriate  - Identify discharge learning needs (meds, wound care, etc.)  - Arrange for interpretive services to assist at discharge as needed  - Refer to Case Management Department for coordinating discharge planning if the patient needs post-hospital services based on physician/advanced  practitioner order or complex needs related to functional status, cognitive ability, or social support system  Outcome: Progressing

## 2025-03-04 PROBLEM — M47.12 CERVICAL SPONDYLOSIS WITH MYELOPATHY: Status: ACTIVE | Noted: 2025-03-04

## 2025-03-06 ENCOUNTER — OFFICE VISIT (OUTPATIENT)
Age: 70
End: 2025-03-06
Payer: COMMERCIAL

## 2025-03-06 VITALS
SYSTOLIC BLOOD PRESSURE: 138 MMHG | HEART RATE: 104 BPM | WEIGHT: 159 LBS | TEMPERATURE: 98.1 F | HEIGHT: 71 IN | OXYGEN SATURATION: 97 % | DIASTOLIC BLOOD PRESSURE: 82 MMHG | RESPIRATION RATE: 18 BRPM | BODY MASS INDEX: 22.26 KG/M2

## 2025-03-06 DIAGNOSIS — Z77.29 LONG-TERM EXPOSURE INVOLVING BIRD DROPPINGS: ICD-10-CM

## 2025-03-06 DIAGNOSIS — K21.00 GASTROESOPHAGEAL REFLUX DISEASE WITH ESOPHAGITIS, UNSPECIFIED WHETHER HEMORRHAGE: ICD-10-CM

## 2025-03-06 DIAGNOSIS — J44.89 CHRONIC OBSTRUCTIVE ASTHMA (HCC): Primary | ICD-10-CM

## 2025-03-06 PROBLEM — J10.1 INFLUENZA A: Status: RESOLVED | Noted: 2025-02-24 | Resolved: 2025-03-06

## 2025-03-06 PROBLEM — J45.901 ASTHMA EXACERBATION ATTACKS: Status: RESOLVED | Noted: 2024-11-02 | Resolved: 2025-03-06

## 2025-03-06 PROBLEM — J96.00 ACUTE RESPIRATORY FAILURE (HCC): Status: RESOLVED | Noted: 2025-01-09 | Resolved: 2025-03-06

## 2025-03-06 PROBLEM — J43.2 CENTRILOBULAR EMPHYSEMA (HCC): Status: RESOLVED | Noted: 2024-06-13 | Resolved: 2025-03-06

## 2025-03-06 PROBLEM — J45.51 SEVERE PERSISTENT ASTHMA WITH ACUTE EXACERBATION: Status: RESOLVED | Noted: 2024-06-07 | Resolved: 2025-03-06

## 2025-03-06 PROBLEM — R91.8 LUNG NODULES: Status: RESOLVED | Noted: 2024-09-04 | Resolved: 2025-03-06

## 2025-03-06 PROBLEM — J45.51 SEVERE PERSISTENT ASTHMA WITH (ACUTE) EXACERBATION: Status: RESOLVED | Noted: 2024-12-14 | Resolved: 2025-03-06

## 2025-03-06 PROBLEM — J45.41 MODERATE PERSISTENT ASTHMA WITH EXACERBATION: Status: RESOLVED | Noted: 2024-11-01 | Resolved: 2025-03-06

## 2025-03-06 PROCEDURE — 99214 OFFICE O/P EST MOD 30 MIN: CPT | Performed by: INTERNAL MEDICINE

## 2025-03-06 PROCEDURE — G2211 COMPLEX E/M VISIT ADD ON: HCPCS | Performed by: INTERNAL MEDICINE

## 2025-03-06 NOTE — ASSESSMENT & PLAN NOTE
He did not have evidence of hypersensitive pneumonitis on chest CT scan, regardless they got rid of the bird.

## 2025-03-06 NOTE — PROGRESS NOTES
Progress note - Pulmonary Medicine   Fahad Hernandez 69 y.o. male MRN: [id number]       Impression & Plan:     Chronic obstructive asthma (HCC)  Currently no wheezing and appears stable, continue budesonide twice daily and DuoNeb 3-4 times daily.  I explained that to the patient that if he is using the albuterol 3-4 times daily then no need for Perforomist.  He reports some improvement on Dupixent finally so we will continue that.  Otherwise his episodes of severe shortness of breath or strange, we could not catch him with severe wheezing or chest tightness, not sure if this related to other etiology and explained that to the patient and his wife.  For now we will continue the management as above.  In the future I may consider adding Daliresp although he has chronic obstructive asthma.  He had cardiac evaluation with cardiac catheterization and was negative as he states recently in [place].  Also he was seen by ENT with no vocal cord dysfunction.      Gastroesophageal reflux disease with esophagitis  He reports GERD but currently controlled and denies aspiration but I noticed that his last CT scan showed fluid distended esophagus throughout his chest.  I will recommend follow-up with GI for evaluation in case he has some esophageal stricture that is leading to silent aspiration causing his respiratory distress.      Long-term exposure involving bird droppings  He did not have evidence of hypersensitive pneumonitis on chest CT scan, regardless they got rid of the bird.      Return in about 6 months (around 9/6/2025).    Diagnoses and all orders for this visit:    Chronic obstructive asthma (HCC)  -     Cancel: POCT FeNO    Gastroesophageal reflux disease with esophagitis, unspecified whether hemorrhage  -     Cancel: Ambulatory Referral to Gastroenterology; Future  -     Ambulatory Referral to Gastroenterology; Future    Long-term exposure involving bird  droppings      ______________________________________________________________________    HPI:    Fahad Hernandez presents today for follow-up of asthma.    Patient has chronic obstructive asthma, he is currently on maximal nebulizer therapy with budesonide twice daily and DuoNebs 3-4 times daily, but unfortunately he has frequent hospitalizations due to recurrent shortness of breath.  According to the patient and his wife he gets short of breath with minimal exertion and he gets episodes of severe dyspnea that he needs to go to the emergency room.  There are few hospitalizations that we did not appreciate significant wheezing or chest tightness.  Patient was treated in the past with Tezspire and Xolair without any benefit.  We were planning to start Dupixent after than last hospitalization in December.  he has a residence in [place], he was there recently and had an exacerbation and was hospitalized, ended up having cardiac catheterization as he states that was negative.  He was started on Dupixent and has been on it for about 10 weeks, he started to feel some improvement.  Over the past 2 months he had influenza and COVID-19 infections which set him back as per wife.  Patient denies dysphagia or aspiration.  He has GERD that is controlled with medications.  He has no significant cardiac disease.  He used to have a bird at home but they got rid of.  He was evaluated by ENT and did not have vocal cord dysfunction.  Recently he has been having falls and also weight loss.    Current Medications:    Current Outpatient Medications:     albuterol (2.5 mg/3 mL) 0.083 % nebulizer solution, Take 3 mL (2.5 mg total) by nebulization every 6 (six) hours, Disp: 360 mL, Rfl: 2    amLODIPine-benazepril (LOTREL) 10-40 MG per capsule, , Disp: , Rfl:     ARIPiprazole (ABILIFY) 10 mg tablet, , Disp: , Rfl:     aspirin (ECOTRIN LOW STRENGTH) 81 mg EC tablet, Take 81 mg by mouth daily, Disp: , Rfl:     atorvastatin (LIPITOR) 10 mg  tablet, Take 10 mg by mouth daily, Disp: , Rfl:     Austedo 12 MG TABS, , Disp: , Rfl:     benzonatate (TESSALON PERLES) 100 mg capsule, Take 1 capsule (100 mg total) by mouth 3 (three) times a day, Disp: 30 capsule, Rfl: 1    budesonide (PULMICORT) 0.5 mg/2 mL nebulizer solution, Take 2 mL (0.5 mg total) by nebulization every 12 (twelve) hours Rinse mouth after use., Disp: 120 mL, Rfl: 0    buPROPion (WELLBUTRIN XL) 300 mg 24 hr tablet, 1 tab(s), Disp: , Rfl:     clopidogrel (PLAVIX) 75 mg tablet, Take 1 tablet (75 mg total) by mouth daily, Disp: 30 tablet, Rfl: 0    docusate sodium (COLACE) 100 mg capsule, Take 1 capsule (100 mg total) by mouth 2 (two) times a day, Disp: 60 capsule, Rfl: 0    Dupixent subcutaneous injection, INJECT 2ML SUBCUTANEOUSLY 1 TIME EVERY 2 WEEKS *NEW PRESCRIPTION REQUEST*, Disp: 2 mL, Rfl: 10    esomeprazole (NexIUM) 40 MG capsule, Take 40 mg by mouth, Disp: , Rfl:     formoterol (PERFOROMIST) 20 MCG/2ML nebulizer solution, Take 2 mL (20 mcg total) by nebulization 2 (two) times a day, Disp: 120 mL, Rfl: 3    guaiFENesin (MUCINEX) 600 mg 12 hr tablet, Take 1 tablet (600 mg total) by mouth every 12 (twelve) hours, Disp: , Rfl:     ipratropium-albuterol (DUO-NEB) 0.5-2.5 mg/3 mL nebulizer solution, Take 3 mL by nebulization every 8 (eight) hours as needed for wheezing or shortness of breath, Disp: 360 mL, Rfl: 0    metFORMIN (GLUCOPHAGE) 500 mg tablet, Take 1 tablet by mouth 2 (two) times a day with meals, Disp: , Rfl:     metoprolol succinate (TOPROL-XL) 25 mg 24 hr tablet, TAKE 1 TABLET (25 MG TOTAL) BY MOUTH DAILY., Disp: 90 tablet, Rfl: 1    montelukast (SINGULAIR) 10 mg tablet, Take 1 tablet (10 mg total) by mouth daily at bedtime, Disp: 90 tablet, Rfl: 2    multivitamin (THERAGRAN) TABS, Take 1 tablet by mouth, Disp: , Rfl:     nitroglycerin (NITROSTAT) 0.4 mg SL tablet, Place 1 tablet (0.4 mg total) under the tongue every 5 (five) minutes as needed for chest pain, Disp: 30 tablet,  "Rfl: 3    OneTouch Ultra test strip, TEST DAILY.. DIAGNOSIS E11.9, Disp: , Rfl:     oxyCODONE-acetaminophen (PERCOCET) 7.5-325 MG per tablet, Take 1 tablet by mouth every 4 (four) hours as needed for moderate pain, Disp: , Rfl:     predniSONE 10 mg tablet, Take 4 tablets (40 mg total) by mouth daily for 3 days, THEN 3 tablets (30 mg total) daily for 3 days, THEN 2 tablets (20 mg total) daily for 3 days, THEN 1 tablet (10 mg total) daily for 3 days., Disp: 30 tablet, Rfl: 0    tamsulosin (FLOMAX) 0.4 mg, Take 0.4 mg by mouth daily, Disp: , Rfl:     temazepam (RESTORIL) 30 mg capsule, Take 30 mg by mouth daily at bedtime as needed, Disp: , Rfl:     venlafaxine (EFFEXOR-XR) 75 mg 24 hr capsule, 1 cap(s), Disp: , Rfl:     Review of Systems:  Review of Systems   Constitutional: Negative.    HENT: Negative.     Eyes: Negative.    Respiratory:  Positive for shortness of breath.    Cardiovascular: Negative.    Gastrointestinal: Negative.    Endocrine: Negative.    Genitourinary: Negative.    Musculoskeletal: Negative.    Skin: Negative.    Allergic/Immunologic: Negative.    Neurological: Negative.    Hematological: Negative.    Psychiatric/Behavioral: Negative.       Aside from what is mentioned in the HPI, the review of systems is otherwise negative    Past medical history, surgical history, and family history were reviewed and updated as appropriate    Social history updates:  Social History     Tobacco Use   Smoking Status Never    Passive exposure: Never   Smokeless Tobacco Never   Tobacco Comments    Patient admits to using marijuana, edibles and smoking        PhysicalExamination:  Vitals:   /82 (BP Location: Right arm, Patient Position: Sitting, Cuff Size: Large)   Pulse 104   Temp 98.1 °F (36.7 °C) (Oral)   Resp 18   Ht 5' 11\" (1.803 m)   Wt 72.1 kg (159 lb)   SpO2 97%   BMI 22.18 kg/m²     Gen:  Comfortable on room air.  No conversational dyspnea  HEENT:  Conjugate gaze.  sclerae anicteric.  Oropharynx " moist  Neck: Trachea is midline. No JVD. No adenopathy  Chest: Equal breath sounds and clear to auscultation bilaterally, no wheezing or crackles  Cardiac: S1-S2 regular. no murmur  Abdomen:  benign  Extremities: No edema  Neuro:  Normal speech and mentation    Diagnostic Data:  Labs:  I personally reviewed the most recent laboratory data pertinent to today's visit    Lab Results   Component Value Date    WBC 18.01 (H) 02/27/2025    HGB 10.2 (L) 02/27/2025    HCT 33.4 (L) 02/27/2025    MCV 82 02/27/2025     (H) 02/27/2025     Lab Results   Component Value Date    SODIUM 137 02/27/2025    K 4.3 02/27/2025    CO2 29 02/27/2025    CL 99 02/27/2025    BUN 24 02/27/2025    CREATININE 0.77 02/27/2025    CALCIUM 9.0 02/27/2025       PFT results:  The most recent pulmonary function tests were reviewed.  His last pulmonary function test was in 2023 at [facility].  This suggested severe obstruction with preserved diffusion     Imaging:  I personally reviewed the images on the PAC system pertinent to today's visit     Most recent chest x-ray reviewed in PACS: Clear lungs    CTA chest from February 2025 reviewed in PACS: Clear lung parenchyma with areas of atelectasis and scarring at the bases but not significant, no change from before, no pleural effusion, dilated and fluid filled esophagus throughout the chest.        Other studies:  Cardiac echo from August 26 showed ejection fraction of 60% grade 1 diastolic dysfunction.  Mild mitral regurgitation and mild tricuspid regurgitation without evidence for pulmonary hypertension          Pily Torres MD

## 2025-03-06 NOTE — ASSESSMENT & PLAN NOTE
Currently no wheezing and appears stable, continue budesonide twice daily and DuoNeb 3-4 times daily.  I explained that to the patient that if he is using the albuterol 3-4 times daily then no need for Perforomist.  He reports some improvement on Dupixent finally so we will continue that.  Otherwise his episodes of severe shortness of breath or strange, we could not catch him with severe wheezing or chest tightness, not sure if this related to other etiology and explained that to the patient and his wife.  For now we will continue the management as above.  In the future I may consider adding Daliresp although he has chronic obstructive asthma.  He had cardiac evaluation with cardiac catheterization and was negative as he states recently in [place].  Also he was seen by ENT with no vocal cord dysfunction.

## 2025-03-06 NOTE — ASSESSMENT & PLAN NOTE
He reports GERD but currently controlled and denies aspiration but I noticed that his last CT scan showed fluid distended esophagus throughout his chest.  I will recommend follow-up with GI for evaluation in case he has some esophageal stricture that is leading to silent aspiration causing his respiratory distress.

## 2025-03-10 ENCOUNTER — TELEPHONE (OUTPATIENT)
Dept: NEUROSURGERY | Facility: CLINIC | Age: 70
End: 2025-03-10

## 2025-03-10 NOTE — TELEPHONE ENCOUNTER
3/12/25 - **2ND ATTEMPT** CALLED PT AND LMOM TO RESCHEDULE XC'D APT    3/10/25 - CALLED PT AND LM[facility] TO RESCHEDULE XC'D APT    [facility] - 3/6/2025    Appointment Status: Canceled   Cancel Reason: Canceled via automated reminder system   GRAYSON PinedaC   Department: [facility]   Time: 9:00 AM   Length: 45 minutes   Brandon George MD   Department: [facility]   Time: 9:45 AM   Length: 15 minutes

## 2025-03-11 ENCOUNTER — TELEPHONE (OUTPATIENT)
Dept: PULMONOLOGY | Facility: CLINIC | Age: 70
End: 2025-03-11

## 2025-03-14 ENCOUNTER — TELEPHONE (OUTPATIENT)
Dept: NEUROSURGERY | Facility: CLINIC | Age: 70
End: 2025-03-14

## 2025-03-14 NOTE — Clinical Note
[facility]  [address]  [address]  [place] [address]  Phone#  [phone]  Fax#  [phone]      March 14, 2025      Dear:   Fahad Hernandez         Our office has attempted to contact you several times regarding your {Reason list:94705}.  Could you please contact our office at [phone].    Thank you.     Sincerely,    Zuleika Miranda

## 2025-03-14 NOTE — LETTER
March 14, 2025    Fahad Hernandez  [address]  [place] [address]      Dear Mr. Hernandez:    We have contacted you to reschedule your missed appointment, please call our office at [phone].        [facility]

## 2025-03-14 NOTE — LETTER
March 14, 2025    Fahad Hernandez  [address]  [place] [address]      Dear Mr. Hernandez:    Please call our office to reschedule your missed  at [phone]          [facility]

## 2025-03-14 NOTE — LETTER
March 14, 2025    Fahad Hernandez  [address]  [place] [address]      Dear Mr. Hernandez:    Please call our office to reschedule your missed  at [phone]          [facility]

## 2025-03-19 ENCOUNTER — NURSE TRIAGE (OUTPATIENT)
Age: 70
End: 2025-03-19

## 2025-03-19 NOTE — TELEPHONE ENCOUNTER
Received a call from the patient's physical therapist today to report that he will be ready to start cardio-pulmonary rehab around 4/14 if recommended by cardiologist.     She also mentioned that the patient had severe back pain upon arrival today so the patient had not taken his medications. His BP was OK but on the higher side for him 132/68, normally 110-120 systolically. HR was 107, respirations were 28 and SpO2 was 92% on RA. Pt couldn't do much PT due to back pain (rated 9/10), but after medications and pulmonary meds respirations improved and HR came down below 100. She did not retake the blood pressure.

## 2025-03-21 ENCOUNTER — OFFICE VISIT (OUTPATIENT)
Dept: CARDIOLOGY CLINIC | Facility: CLINIC | Age: 70
End: 2025-03-21
Payer: COMMERCIAL

## 2025-03-21 ENCOUNTER — TELEPHONE (OUTPATIENT)
Age: 70
End: 2025-03-21

## 2025-03-21 VITALS
HEART RATE: 95 BPM | RESPIRATION RATE: 16 BRPM | DIASTOLIC BLOOD PRESSURE: 60 MMHG | WEIGHT: 161 LBS | BODY MASS INDEX: 22.54 KG/M2 | HEIGHT: 71 IN | OXYGEN SATURATION: 95 % | SYSTOLIC BLOOD PRESSURE: 90 MMHG

## 2025-03-21 DIAGNOSIS — I10 HYPERTENSION, ESSENTIAL: ICD-10-CM

## 2025-03-21 DIAGNOSIS — R00.0 TACHYCARDIA: Primary | ICD-10-CM

## 2025-03-21 DIAGNOSIS — R06.02 SHORTNESS OF BREATH: Primary | ICD-10-CM

## 2025-03-21 DIAGNOSIS — I25.10 NONOBSTRUCTIVE ATHEROSCLEROSIS OF CORONARY ARTERY: ICD-10-CM

## 2025-03-21 PROCEDURE — 99214 OFFICE O/P EST MOD 30 MIN: CPT | Performed by: INTERNAL MEDICINE

## 2025-03-21 RX ORDER — AMLODIPINE AND BENAZEPRIL HYDROCHLORIDE 5; 20 MG/1; MG/1
1 CAPSULE ORAL DAILY
Qty: 30 CAPSULE | Refills: 5 | Status: SHIPPED | OUTPATIENT
Start: 2025-03-21

## 2025-03-21 RX ORDER — DILTIAZEM HYDROCHLORIDE 180 MG/1
1 CAPSULE, EXTENDED RELEASE ORAL DAILY
COMMUNITY
Start: 2025-03-09

## 2025-03-21 NOTE — TELEPHONE ENCOUNTER
Patient's heart rate was 95 and regular.  Blood pressure was low and medications were decreased.    I have ordered a Holter monitor for 48 hours.  We can get this done to address concerns from the spouse.

## 2025-03-21 NOTE — TELEPHONE ENCOUNTER
Caller: Carol, spouse    Doctor: Dr. GEORGE    Reason for call: Spouse had another question for the Nurse she was speaking with @the office - thought her name was Val.  Was not able to transfer.    Call back#: [phone]

## 2025-03-21 NOTE — TELEPHONE ENCOUNTER
Spoke to pts spouse Carol. She wanted Dr. Holman to advise regarding original telephone encounter from 03/19/2025.    Carol stated she forgot to ask about pts erratic HR during office visit today.

## 2025-03-21 NOTE — TELEPHONE ENCOUNTER
Spouse called back asking to speak with Val. I attempted to assist with her questions re: HR, but she became frustrated and asked to speak with the office. I attempted to warm transfer, but no answer. Please call when available.

## 2025-03-21 NOTE — PROGRESS NOTES
[facility]  [address] [address]  Cardiology Follow Up    Fahad Hernandez  1955  [id number]      Assessment & Plan  Shortness of breath  Patient's shortness of breath is predominantly pulmonary.  Patient has had recurrent pulmonary issues including COVID, influenza as well as asthma exacerbation.    Patient has been hospitalized numerous times.  Patient did have a follow-up appointment with pulmonary recently.  Limited echocardiogram done showed normal ejection fraction with no significant valvular abnormalities.    Patient will continue to follow-up with primary care physician as well as pulmonary.  Hypertension, essential  Importance of salt restriction reinforced.  Blood pressures are on the lower side.  Will decrease the dosage of Lotrel to 5/20 mg p.o. daily.  Patient will continue to monitor blood pressures and report any low readings so that we can make any further adjustments to his antihypertensive regimen.    Patient also has history of TIA and is on dual antiplatelet therapy.  Patient to report any bleeding issues.  Nonobstructive atherosclerosis of coronary artery  Patient had cardiac catheterization November 2024 at [facility] which did not show any significant obstructive CAD.  Continue diet and risk factor modification.       Chief Complaint   Patient presents with    Follow-up       Interval History:   Patient presents for follow-up visit.  Patient denies any chest pain but does have shortness of breath related to his pulmonary issues.  No history of leg edema orthopnea PND.  Patient also has history of TIA.  No bleeding issues.  He has been hospitalized multiple times because of his pulmonary issues including COVID and influenza as well as asthma exacerbation.  Appetite is better.  He has lost some weight.    Patient Active Problem List   Diagnosis    Abnormal EKG    Hypertension    Type 2 diabetes mellitus with hyperglycemia, without  long-term current use of insulin (HCC)    Chronic obstructive asthma (HCC)    Major depressive disorder    Mixed hyperlipidemia    Gastroesophageal reflux disease with esophagitis    Medical marijuana use    Chest pain    Hoarseness of voice    SOB (shortness of breath)    Long-term exposure involving bird droppings    Orthostasis    Acute right-sided weakness    Fall    Metabolic acidosis    Acute lactic acidosis    BENNY (acute kidney injury) ([facility])    Rotator cuff arthropathy of right shoulder    Multiple falls    Chronic pain syndrome    CASSIE (obstructive sleep apnea)    COVID-19    Tardive dyskinesia    BPH (benign prostatic hyperplasia)    Cervical spondylosis with myelopathy     Past Medical History:   Diagnosis Date    Asthma     COPD (chronic obstructive pulmonary disease) ([facility])     Dementia ([facility])     Diabetes mellitus ([facility])     GERD (gastroesophageal reflux disease)     History of stroke 11/07/2019    Hypertension     Interstitial lung disease ([facility])     Major depressive disorder with current active episode 11/07/2019    Pneumonia     Rotator cuff arthropathy of right shoulder 12/17/2024    Sleep apnea     Sleep apnea, obstructive     Stroke ([facility])     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     Social History     Socioeconomic History    Marital status: /Civil Union     Spouse name: Not on file    Number of children: Not on file    Years of education: Not on file    Highest education level: Not on file   Occupational History    Not on file   Tobacco Use    Smoking status: Never     Passive exposure: Never    Smokeless tobacco: Never    Tobacco comments:     Patient admits to using marijuana, edibles and smoking    Vaping Use    Vaping status: Never Used   Substance and Sexual Activity    Alcohol use: Not Currently    Drug use: Yes     Types: Marijuana, Oxycodone, Psilocybin    Sexual activity: Yes     Partners: Female     Birth control/protection: Male Sterilization   Other Topics Concern    Not on file   Social  History Narrative    Not on file     Social Drivers of Health     Financial Resource Strain: Low Risk  (11/1/2024)    Received from [facility]    Overall Financial Resource Strain (CARDIA)     Difficulty of Paying Living Expenses: Not hard at all   Food Insecurity: No Food Insecurity (2/22/2025)    Nursing - Inadequate Food Risk Classification     Worried About Running Out of Food in the Last Year: Never true     Ran Out of Food in the Last Year: Never true     Ran Out of Food in the Last Year: Never true   Transportation Needs: No Transportation Needs (2/22/2025)    Nursing - Transportation Risk Classification     Lack of Transportation: Not on file     Lack of Transportation: No   Physical Activity: Inactive (11/1/2024)    Received from [facility]    Exercise Vital Sign     Days of Exercise per Week: 0 days     Minutes of Exercise per Session: 0 min   Stress: Stress Concern Present (11/1/2024)    Received from [facility]    [employer] - Occupational Stress Questionnaire     Feeling of Stress : To some extent   Social Connections: Moderately Isolated (11/1/2024)    Received from [facility]    Social Connection and Isolation Panel [NHANES]     Frequency of Communication with Friends and Family: Three times a week     Frequency of Social Gatherings with Friends and Family: Twice a week     Attends Jewish Services: Never     Active Member of Clubs or Organizations: No     Attends Club or Organization Meetings: Never     Marital Status:    Intimate Partner Violence: Unknown (2/22/2025)    Nursing IPS     Feels Physically and Emotionally Safe: Not on file     Physically Hurt by Someone: Not on file     Humiliated or Emotionally Abused by Someone: Not on file     Physically Hurt by Someone: No     Hurt or Threatened by Someone: No   Housing Stability: Unknown (2/22/2025)    Nursing: Inadequate Housing Risk  Classification     Has Housing: Not on file     Worried About Losing Housing: Not on file     Unable to Get Utilities: Not on file     Unable to Pay for Housing in the Last Year: No     Has Housin      Family History   Family history unknown: Yes     Past Surgical History:   Procedure Laterality Date    BACK SURGERY      ELBOW SURGERY      KNEE SURGERY      NECK SURGERY      SHOULDER SURGERY      SPINE SURGERY  2004       Current Outpatient Medications:     albuterol (2.5 mg/3 mL) 0.083 % nebulizer solution, Take 3 mL (2.5 mg total) by nebulization every 6 (six) hours, Disp: 360 mL, Rfl: 2    amLODIPine-benazepril (LOTREL) 10-40 MG per capsule, Take 1 capsule by mouth daily, Disp: , Rfl:     ARIPiprazole (ABILIFY) 10 mg tablet, Take 10 mg by mouth daily, Disp: , Rfl:     aspirin (ECOTRIN LOW STRENGTH) 81 mg EC tablet, Take 81 mg by mouth daily, Disp: , Rfl:     atorvastatin (LIPITOR) 10 mg tablet, Take 10 mg by mouth daily, Disp: , Rfl:     Austedo 12 MG TABS, , Disp: , Rfl:     budesonide (PULMICORT) 0.5 mg/2 mL nebulizer solution, Take 2 mL (0.5 mg total) by nebulization every 12 (twelve) hours Rinse mouth after use., Disp: 120 mL, Rfl: 0    buPROPion (WELLBUTRIN XL) 300 mg 24 hr tablet, Take 300 mg by mouth every morning, Disp: , Rfl:     clopidogrel (PLAVIX) 75 mg tablet, Take 1 tablet (75 mg total) by mouth daily, Disp: 30 tablet, Rfl: 0    docusate sodium (COLACE) 100 mg capsule, Take 1 capsule (100 mg total) by mouth 2 (two) times a day, Disp: 60 capsule, Rfl: 0    Dupixent subcutaneous injection, INJECT 2ML SUBCUTANEOUSLY 1 TIME EVERY 2 WEEKS *NEW PRESCRIPTION REQUEST*, Disp: 2 mL, Rfl: 10    esomeprazole (NexIUM) 40 MG capsule, Take 40 mg by mouth, Disp: , Rfl:     formoterol (PERFOROMIST) 20 MCG/2ML nebulizer solution, Take 2 mL (20 mcg total) by nebulization 2 (two) times a day, Disp: 120 mL, Rfl: 3    ipratropium-albuterol (DUO-NEB) 0.5-2.5 mg/3 mL nebulizer solution, Take 3 mL by nebulization every  8 (eight) hours as needed for wheezing or shortness of breath, Disp: 360 mL, Rfl: 0    metFORMIN (GLUCOPHAGE) 500 mg tablet, Take 1 tablet by mouth 2 (two) times a day with meals, Disp: , Rfl:     metoprolol succinate (TOPROL-XL) 25 mg 24 hr tablet, TAKE 1 TABLET (25 MG TOTAL) BY MOUTH DAILY., Disp: 90 tablet, Rfl: 1    montelukast (SINGULAIR) 10 mg tablet, Take 1 tablet (10 mg total) by mouth daily at bedtime, Disp: 90 tablet, Rfl: 2    multivitamin (THERAGRAN) TABS, Take 1 tablet by mouth daily, Disp: , Rfl:     nitroglycerin (NITROSTAT) 0.4 mg SL tablet, Place 1 tablet (0.4 mg total) under the tongue every 5 (five) minutes as needed for chest pain, Disp: 30 tablet, Rfl: 3    OneTouch Ultra test strip, TEST DAILY.. DIAGNOSIS E11.9, Disp: , Rfl:     oxyCODONE-acetaminophen (PERCOCET) 7.5-325 MG per tablet, Take 1 tablet by mouth every 4 (four) hours as needed for moderate pain, Disp: , Rfl:     tamsulosin (FLOMAX) 0.4 mg, Take 0.4 mg by mouth daily, Disp: , Rfl:     temazepam (RESTORIL) 30 mg capsule, Take 30 mg by mouth daily at bedtime as needed, Disp: , Rfl:     Tiadylt  MG 24 hr capsule, Take 1 capsule by mouth in the morning, Disp: , Rfl:     venlafaxine (EFFEXOR-XR) 75 mg 24 hr capsule, Take 75 mg by mouth daily, Disp: , Rfl:   Allergies   Allergen Reactions    Beta Adrenergic Blockers      Pt states they make him feel crappy.       Labs:  No results displayed because visit has over 200 results.      Admission on 02/16/2025, Discharged on 02/19/2025   Component Date Value    Ventricular Rate 02/16/2025 120     Atrial Rate 02/16/2025 102     DC Interval 02/16/2025 150     QRSD Interval 02/16/2025 76     QT Interval 02/16/2025 342     QTC Interval 02/16/2025 483     P Axis 02/16/2025 36     QRS [place] 02/16/2025 19     T Wave [place] 02/16/2025 62     WBC 02/16/2025 10.92 (H)     RBC 02/16/2025 3.80 (L)     Hemoglobin 02/16/2025 9.6 (L)     Hematocrit 02/16/2025 32.1 (L)     MCV 02/16/2025 85     MCH  02/16/2025 25.3 (L)     MCHC 02/16/2025 29.9 (L)     RDW 02/16/2025 15.3 (H)     MPV 02/16/2025 8.6 (L)     Platelets 02/16/2025 461 (H)     Protime 02/16/2025 13.3     INR 02/16/2025 0.94     PTT 02/16/2025 27     Sodium 02/16/2025 140     Potassium 02/16/2025 3.5     Chloride 02/16/2025 102     CO2 02/16/2025 25     ANION GAP 02/16/2025 13     BUN 02/16/2025 29 (H)     Creatinine 02/16/2025 1.17     Glucose 02/16/2025 194 (H)     Calcium 02/16/2025 8.6     eGFR 02/16/2025 62     hs TnI 0hr 02/16/2025 5     BNP 02/16/2025 50     SARS-CoV-2 02/16/2025 Positive (A)     INFLUENZA A PCR 02/16/2025 Negative     INFLUENZA B PCR 02/16/2025 Negative     RSV PCR 02/16/2025 Negative     Segmented % 02/16/2025 60     Bands % 02/16/2025 3     Lymphocytes % 02/16/2025 23     Monocytes % 02/16/2025 10     Eosinophils % 02/16/2025 1     Basophils % 02/16/2025 0     Metamyelocytes % 02/16/2025 1     Promyelocytes % 02/16/2025 1 (H)     Atypical Lymphocytes % 02/16/2025 1 (H)     Absolute Neutrophils 02/16/2025 6.88     Absolute Lymphocytes 02/16/2025 2.62     Absolute Monocytes 02/16/2025 1.09     Absolute Eosinophils 02/16/2025 0.11     Absolute Basophils 02/16/2025 0.00     Absolute Metamyelocytes 02/16/2025 0.11 (H)     Absolute Promyelocytes 02/16/2025 0.11 (H)     Platelet Estimate 02/16/2025 Increased (A)     Anisocytosis 02/16/2025 Present     Polychromasia 02/16/2025 Present     hs TnI 2hr 02/16/2025 6     Delta 2hr hsTnI 02/16/2025 1     POC Glucose 02/16/2025 150 (H)     Ventricular Rate 02/16/2025 118     Atrial Rate 02/16/2025 118     MA Interval 02/16/2025 156     QRSD Interval 02/16/2025 80     QT Interval 02/16/2025 304     QTC Interval 02/16/2025 426     P Axis 02/16/2025 61     QRS Axis 02/16/2025 21     T Wave [place] 02/16/2025 96     POC Glucose 02/16/2025 226 (H)     POC Glucose 02/16/2025 229 (H)     POC Glucose 02/16/2025 149 (H)     Sodium 02/17/2025 141     Potassium 02/17/2025 4.1     Chloride 02/17/2025 108      CO2 02/17/2025 25     ANION GAP 02/17/2025 8     BUN 02/17/2025 19     Creatinine 02/17/2025 0.69     Glucose 02/17/2025 134     Glucose, Fasting 02/17/2025 134 (H)     Calcium 02/17/2025 8.6     eGFR 02/17/2025 95     WBC 02/17/2025 15.31 (H)     RBC 02/17/2025 3.83 (L)     Hemoglobin 02/17/2025 9.5 (L)     Hematocrit 02/17/2025 32.7 (L)     MCV 02/17/2025 85     MCH 02/17/2025 24.8 (L)     MCHC 02/17/2025 29.1 (L)     RDW 02/17/2025 15.6 (H)     Platelets 02/17/2025 513 (H)     MPV 02/17/2025 8.9     Magnesium 02/17/2025 1.2 (L)     CRP 02/17/2025 6.8 (H)     Procalcitonin 02/17/2025 <0.05     POC Glucose 02/17/2025 176 (H)     POC Glucose 02/17/2025 153 (H)     POC Glucose 02/17/2025 160 (H)     POC Glucose 02/17/2025 165 (H)     WBC 02/18/2025 15.07 (H)     RBC 02/18/2025 3.85 (L)     Hemoglobin 02/18/2025 9.9 (L)     Hematocrit 02/18/2025 31.6 (L)     MCV 02/18/2025 82     MCH 02/18/2025 25.7 (L)     MCHC 02/18/2025 31.3 (L)     RDW 02/18/2025 15.5 (H)     MPV 02/18/2025 8.9     Platelets 02/18/2025 543 (H)     nRBC 02/18/2025 0     Segmented % 02/18/2025 68     Immature Grans % 02/18/2025 1     Lymphocytes % 02/18/2025 22     Monocytes % 02/18/2025 9     Eosinophils Relative 02/18/2025 0     Basophils Relative 02/18/2025 0     Absolute Neutrophils 02/18/2025 10.12 (H)     Absolute Immature Grans 02/18/2025 0.20     Absolute Lymphocytes 02/18/2025 3.38     Absolute Monocytes 02/18/2025 1.35 (H)     Eosinophils Absolute 02/18/2025 0.00     Basophils Absolute 02/18/2025 0.02     Procalcitonin 02/18/2025 <0.05     POC Glucose 02/18/2025 108     POC Glucose 02/18/2025 202 (H)     Supplier Name 02/17/2025 [employer]     Supplier Phone Number 02/17/2025 [phone]     Order Status 02/17/2025 Delivery Successful     Delivery Note 02/17/2025 Wife is home recovering from Covid - Please deliver to home address as he most likely will go home in a transport     Delivery Request Date  02/17/2025 02/18/2025     Date Delivered  02/17/2025 02/19/2025     Item Description 02/17/2025 3 in 1 Commode     POC Glucose 02/18/2025 217 (H)     POC Glucose 02/18/2025 124     POC Glucose 02/19/2025 163 (H)    Admission on 01/25/2025, Discharged on 02/03/2025   Component Date Value    Ventricular Rate 01/25/2025 92     Atrial Rate 01/25/2025 92     AR Interval 01/25/2025 150     QRSD Interval 01/25/2025 76     QT Interval 01/25/2025 344     QTC Interval 01/25/2025 425     P [place] 01/25/2025 20     QRS Axis 01/25/2025 34     T Wave [place] 01/25/2025 66     WBC 01/25/2025 15.86 (H)     RBC 01/25/2025 4.58     Hemoglobin 01/25/2025 11.8 (L)     Hematocrit 01/25/2025 38.8     MCV 01/25/2025 85     MCH 01/25/2025 25.8 (L)     MCHC 01/25/2025 30.4 (L)     RDW 01/25/2025 16.1 (H)     MPV 01/25/2025 8.4 (L)     Platelets 01/25/2025 440 (H)     nRBC 01/25/2025 0     Segmented % 01/25/2025 68     Immature Grans % 01/25/2025 2     Lymphocytes % 01/25/2025 21     Monocytes % 01/25/2025 8     Eosinophils Relative 01/25/2025 1     Basophils Relative 01/25/2025 0     Absolute Neutrophils 01/25/2025 10.79 (H)     Absolute Immature Grans 01/25/2025 0.23 (H)     Absolute Lymphocytes 01/25/2025 3.36     Absolute Monocytes 01/25/2025 1.26 (H)     Eosinophils Absolute 01/25/2025 0.17     Basophils Absolute 01/25/2025 0.05     Sodium 01/25/2025 136     Potassium 01/25/2025 4.1     Chloride 01/25/2025 102     CO2 01/25/2025 25     ANION GAP 01/25/2025 9     BUN 01/25/2025 21     Creatinine 01/25/2025 0.86     Glucose 01/25/2025 125     Calcium 01/25/2025 9.1     AST 01/25/2025 18     ALT 01/25/2025 37     Alkaline Phosphatase 01/25/2025 53     Total Protein 01/25/2025 6.4     Albumin 01/25/2025 3.5     Total Bilirubin 01/25/2025 0.33     eGFR 01/25/2025 87     Color, UA 01/25/2025 Light Yellow     Clarity, UA 01/25/2025 Clear     Specific Gravity, UA 01/25/2025 1.018     pH, UA 01/25/2025 5.5     Leukocytes, UA 01/25/2025 Negative      Nitrite, UA 01/25/2025 Negative     Protein, UA 01/25/2025 Negative     Glucose, UA 01/25/2025 Trace (A)     Ketones, UA 01/25/2025 Negative     Urobilinogen, UA 01/25/2025 <2.0     Bilirubin, UA 01/25/2025 Negative     Occult Blood, UA 01/25/2025 Negative     Cholesterol 01/26/2025 178     Triglycerides 01/26/2025 117     HDL, Direct 01/26/2025 76     LDL Calculated 01/26/2025 79     Hemoglobin A1C 01/26/2025 8.3 (H)     EAG 01/26/2025 192     CRP 01/26/2025 1.3     Sodium 01/26/2025 137     Potassium 01/26/2025 3.9     Chloride 01/26/2025 103     CO2 01/26/2025 27     ANION GAP 01/26/2025 7     BUN 01/26/2025 16     Creatinine 01/26/2025 0.77     Glucose 01/26/2025 116     Calcium 01/26/2025 8.9     Corrected Calcium 01/26/2025 9.5     AST 01/26/2025 16     ALT 01/26/2025 32     Alkaline Phosphatase 01/26/2025 39     Total Protein 01/26/2025 5.7 (L)     Albumin 01/26/2025 3.2 (L)     Total Bilirubin 01/26/2025 0.37     eGFR 01/26/2025 91     Magnesium 01/26/2025 1.5 (L)     Phosphorus 01/26/2025 3.6     WBC 01/26/2025 12.08 (H)     RBC 01/26/2025 4.14     Hemoglobin 01/26/2025 11.0 (L)     Hematocrit 01/26/2025 35.0 (L)     MCV 01/26/2025 85     MCH 01/26/2025 26.6 (L)     MCHC 01/26/2025 31.4     RDW 01/26/2025 16.0 (H)     MPV 01/26/2025 8.3 (L)     Platelets 01/26/2025 352     nRBC 01/26/2025 0     Segmented % 01/26/2025 59     Immature Grans % 01/26/2025 2     Lymphocytes % 01/26/2025 28     Monocytes % 01/26/2025 8     Eosinophils Relative 01/26/2025 2     Basophils Relative 01/26/2025 1     Absolute Neutrophils 01/26/2025 7.35     Absolute Immature Grans 01/26/2025 0.18     Absolute Lymphocytes 01/26/2025 3.32     Absolute Monocytes 01/26/2025 0.99     Eosinophils Absolute 01/26/2025 0.18     Basophils Absolute 01/26/2025 0.06     Protime 01/26/2025 13.2     INR 01/26/2025 0.94     PTT 01/26/2025 27     POC Glucose 01/26/2025 106     POC Glucose 01/26/2025 127     POC Glucose 01/26/2025 142 (H)     POC  Glucose 01/26/2025 172 (H)     POC Glucose 01/27/2025 102     BSA 01/27/2025 1.87     A4C EF 01/27/2025 61     LVIDd 01/27/2025 4.60     LVIDS 01/27/2025 3.00     IVSd 01/27/2025 1.20     LVPWd 01/27/2025 1.20     FS 01/27/2025 35     LA Volume Index (BP) 01/27/2025 18.7     E/A ratio 01/27/2025 0.76     E wave deceleration time 01/27/2025 348     MV Peak E Jack 01/27/2025 57     MV Peak A Jack 01/27/2025 0.75     RVID d 01/27/2025 3.9     Tricuspid annular plane * 01/27/2025 2.20     LA size 01/27/2025 3.7     LA length (A2C) 01/27/2025 4.60     LA volume (BP) 01/27/2025 35     RAA A4C 01/27/2025 15.5     MV stenosis pressure 1/2* 01/27/2025 101     MV valve area p 1/2 meth* 01/27/2025 2.18     Ao root 01/27/2025 3.70     Left ventricular stroke * 01/27/2025 61.00     IVS 01/27/2025 1.2     LEFT VENTRICLE SYSTOLIC * 01/27/2025 34     LV DIASTOLIC VOLUME (MOD* 01/27/2025 95     Left Atrium Area-systoli* 01/27/2025 16     Left Atrium Area-systoli* 01/27/2025 13.8     LVSV, 2D 01/27/2025 61     LV EF 01/27/2025 52     POC Glucose 01/27/2025 153 (H)     POC Glucose 01/27/2025 127     POC Glucose 01/27/2025 134     POC Glucose 01/28/2025 114     POC Glucose 01/28/2025 131     POC Glucose 01/28/2025 130     POC Glucose 01/28/2025 154 (H)     POC Glucose 01/29/2025 103     POC Glucose 01/29/2025 115     POC Glucose 01/29/2025 194 (H)     POC Glucose 01/29/2025 109     POC Glucose 01/30/2025 115     POC Glucose 01/30/2025 144 (H)     POC Glucose 01/30/2025 147 (H)     POC Glucose 01/30/2025 157 (H)     POC Glucose 01/31/2025 107     POC Glucose 01/31/2025 121     POC Glucose 01/31/2025 176 (H)     POC Glucose 01/31/2025 111     POC Glucose 02/01/2025 99     POC Glucose 02/01/2025 98     POC Glucose 02/01/2025 179 (H)     POC Glucose 02/01/2025 198 (H)     POC Glucose 02/01/2025 200 (H)     POC Glucose 02/02/2025 123     POC Glucose 02/02/2025 157 (H)     POC Glucose 02/02/2025 127     POC Glucose 02/02/2025 177 (H)     POC  Glucose 02/03/2025 127     POC Glucose 02/03/2025 133      Imaging: XR elbow 3+ vw right  Result Date: 2/27/2025  Narrative: XR ELBOW 3+ VW RIGHT INDICATION: R sided arm and shoulder pain after fall check shoulder and elbow. COMPARISON: None FINDINGS: No acute fracture or dislocation. No joint effusion. No significant degenerative changes. No lytic or blastic osseous lesion. Unremarkable soft tissues.     Impression: No acute osseous abnormality. Computerized Assisted Algorithm (CAA) may have been used to analyze all applicable images. Workstation performed: [id number]     XR shoulder 2+ vw right  Result Date: 2/27/2025  Narrative: XR SHOULDER 2+ VW RIGHT INDICATION: R sided arm and shoulder pain after fall check shoulder and elbow. COMPARISON: None FINDINGS: No acute fracture or dislocation. No significant degenerative changes. No lytic or blastic osseous lesion. Unremarkable soft tissues.     Impression: No acute osseous abnormality. Computerized Assisted Algorithm (CAA) may have been used to analyze all applicable images. Workstation performed: [id number]     XR chest 1 view portable  Result Date: 2/22/2025  Narrative: XR CHEST PORTABLE INDICATION: SOB, recent COVID hospitalization. COVID-positive 2/16/2025. COMPARISON: CXR 1/25/2025, chest CT 2/16/2025. FINDINGS: Clear lungs. No pneumothorax or pleural effusion. Normal cardiomediastinal silhouette. Bones are unremarkable for age. ACDF. Partially imaged lumbar fusion hardware. Normal upper abdomen.     Impression: No acute cardiopulmonary disease. Workstation performed: [id number]       Review of Systems:  Review of Systems  REVIEW OF SYSTEMS:  Constitutional:  Denies fever or chills   Eyes:  Denies change in visual acuity   HENT:  Denies nasal congestion or sore throat   Respiratory:  shortness of breath   Cardiovascular:  Denies chest pain or edema   GI:  Denies abdominal pain, nausea, vomiting, bloody stools or diarrhea   :  Denies dysuria, frequency,  "difficulty in micturition and nocturia  Musculoskeletal:  Denies back pain or joint pain   Neurologic:  Denies headache, focal weakness or sensory changes   Endocrine:  Denies polyuria or polydipsia   Lymphatic:  Denies swollen glands   Psychiatric:  Denies depression or anxiety    Physical Exam:    BP 90/60 (BP Location: Left arm, Patient Position: Sitting, Cuff Size: Standard)   Pulse 95   Resp 16   Ht 5' 11\" (1.803 m)   Wt 73 kg (161 lb)   SpO2 95%   BMI 22.45 kg/m²     Physical Exam  PHYSICAL EXAM:  General:  Patient is not in acute distress   Head: Normocephalic, Atraumatic.  HEENT:  Both pupils normal-size atraumatic, normocephalic, nonicteric  Neck:  JVP not raised. Trachea central. No carotid bruit  Respiratory: Decreased breath sounds bilateral  Cardiovascular:  Regular rate and rhythm no S3 no murmurs  GI:  Abdomen soft nontender. No organomegaly.   Lymphatic:  No cervical or inguinal lymphadenopathy  Neurologic:  Patient is awake alert, oriented . Grossly nonfocal  Extremities no edema      "

## 2025-03-30 DIAGNOSIS — I20.89 ANGINAL EQUIVALENT (HCC): ICD-10-CM

## 2025-03-31 RX ORDER — NITROGLYCERIN 0.4 MG/1
TABLET SUBLINGUAL
Qty: 75 TABLET | Refills: 2 | Status: SHIPPED | OUTPATIENT
Start: 2025-03-31

## 2025-04-15 DIAGNOSIS — I10 HYPERTENSION, ESSENTIAL: ICD-10-CM

## 2025-04-17 RX ORDER — AMLODIPINE AND BENAZEPRIL HYDROCHLORIDE 5; 20 MG/1; MG/1
1 CAPSULE ORAL DAILY
Qty: 90 CAPSULE | Refills: 1 | Status: SHIPPED | OUTPATIENT
Start: 2025-04-17

## 2025-05-30 ENCOUNTER — TELEPHONE (OUTPATIENT)
Age: 70
End: 2025-05-30

## 2025-06-13 ENCOUNTER — CLINICAL SUPPORT (OUTPATIENT)
Dept: CARDIOLOGY CLINIC | Facility: CLINIC | Age: 70
End: 2025-06-13
Attending: PHYSICIAN ASSISTANT

## 2025-06-13 DIAGNOSIS — R00.2 PALPITATIONS: Primary | ICD-10-CM

## 2025-06-13 NOTE — PROGRESS NOTES
Made nurse visit to attach order from when pt had monitor picked up in our office on 01/2025.  Dustin CHAMBERS was - .  # [id number]

## 2025-06-25 LAB
CV ZIO BASELINE AVG BPM: 91 BPM
CV ZIO BASELINE BPM HIGH: 235 BPM
CV ZIO BASELINE BPM LOW: 66 BPM
CV ZIO DEVICE ANALYSIS TIME: NORMAL
CV ZIO ECT SVE COUNT: NORMAL EPISODES
CV ZIO ECT SVE CPLT COUNT: NORMAL EPISODES
CV ZIO ECT SVE CPLT FREQ: NORMAL
CV ZIO ECT SVE FREQ: NORMAL
CV ZIO ECT SVE TPLT COUNT: NORMAL EPISODES
CV ZIO ECT SVE TPLT FREQ: NORMAL
CV ZIO ECT VE COUNT: NORMAL EPISODES
CV ZIO ECT VE CPLT COUNT: 1028 EPISODES
CV ZIO ECT VE CPLT FREQ: NORMAL
CV ZIO ECT VE FREQ: NORMAL
CV ZIO ECT VE TPLT COUNT: 36 EPISODES
CV ZIO ECT VE TPLT FREQ: NORMAL
CV ZIO ECTOPIC SVE COUPLET RAW PERCENT: 5.37 %
CV ZIO ECTOPIC SVE ISOLATED PERCENT: 13.7 %
CV ZIO ECTOPIC SVE TRIPLET RAW PERCENT: 2.78 %
CV ZIO ECTOPIC VE COUPLET RAW PERCENT: 0.16 %
CV ZIO ECTOPIC VE ISOLATED PERCENT: 1.43 %
CV ZIO ECTOPIC VE TRIPLET RAW PERCENT: 0.01 %
CV ZIO ENROLLMENT END: NORMAL
CV ZIO ENROLLMENT START: NORMAL
CV ZIO L BIGEMINY DUR: 3.8 SEC
CV ZIO L BIGEMINY END: NORMAL
CV ZIO L BIGEMINY START: NORMAL
CV ZIO L TRIGEMINY DUR: 9.1 SEC
CV ZIO L TRIGEMINY END: NORMAL
CV ZIO L TRIGEMINY START: NORMAL
CV ZIO PATIENT EVENTS DIARIES: 0
CV ZIO PATIENT EVENTS TRIGGERS: 0
CV ZIO PAUSE COUNT: 0
CV ZIO PRESCRIPTION STATUS: NORMAL
CV ZIO SVT AVG BPM: 137 BPM
CV ZIO SVT BPM HIGH: 235 BPM
CV ZIO SVT BPM LOW: 81 BPM
CV ZIO SVT COUNT: 6319
CV ZIO SVT F EPI AVG BPM: 217 BPM
CV ZIO SVT F EPI BEATS: 4 BEATS
CV ZIO SVT F EPI BPM HIGH: 235 BPM
CV ZIO SVT F EPI BPM LOW: 197 BPM
CV ZIO SVT F EPI DUR: 1.1 SEC
CV ZIO SVT F EPI END: NORMAL
CV ZIO SVT F EPI START: NORMAL
CV ZIO SVT L EPI AVG BPM: 138 BPM
CV ZIO SVT L EPI BEATS: 72 BEATS
CV ZIO SVT L EPI BPM HIGH: 176 BPM
CV ZIO SVT L EPI BPM LOW: 94 BPM
CV ZIO SVT L EPI DUR: 31.6 SEC
CV ZIO SVT L EPI END: NORMAL
CV ZIO SVT L EPI START: NORMAL
CV ZIO TOTAL  ENROLLMENT PERIOD: NORMAL
CV ZIO VT AVG BPM: 181 BPM
CV ZIO VT BPM HIGH: 190 BPM
CV ZIO VT BPM LOW: 167 BPM
CV ZIO VT COUNT: 1
CV ZIO VT F EPI AVG BPM: 181
CV ZIO VT F EPI BEATS: 4 BEATS
CV ZIO VT F EPI BPM HIGH: 190
CV ZIO VT F EPI BPM LOW: 167
CV ZIO VT F EPI DUR: 1.4 SEC
CV ZIO VT F EPI END: NORMAL
CV ZIO VT F EPI START: NORMAL
CV ZIO VT L EPI AVG BPM: 181
CV ZIO VT L EPI BEATS: 4 BEATS
CV ZIO VT L EPI BPM HIGH: 190 BPM
CV ZIO VT L EPI BPM LOW: 167 BPM
CV ZIO VT L EPI DUR: 1.4
CV ZIO VT L EPI END: NORMAL
CV ZIO VT L EPI START: NORMAL